# Patient Record
Sex: FEMALE | Race: WHITE | Employment: OTHER | URBAN - METROPOLITAN AREA
[De-identification: names, ages, dates, MRNs, and addresses within clinical notes are randomized per-mention and may not be internally consistent; named-entity substitution may affect disease eponyms.]

---

## 2017-02-10 ENCOUNTER — TELEPHONE (OUTPATIENT)
Dept: FAMILY MEDICINE CLINIC | Age: 67
End: 2017-02-10

## 2017-02-17 ENCOUNTER — OFFICE VISIT (OUTPATIENT)
Dept: FAMILY MEDICINE CLINIC | Age: 67
End: 2017-02-17

## 2017-02-17 VITALS
RESPIRATION RATE: 13 BRPM | WEIGHT: 165 LBS | HEART RATE: 102 BPM | OXYGEN SATURATION: 97 % | DIASTOLIC BLOOD PRESSURE: 72 MMHG | BODY MASS INDEX: 28.17 KG/M2 | SYSTOLIC BLOOD PRESSURE: 120 MMHG | HEIGHT: 64 IN

## 2017-02-17 DIAGNOSIS — Z87.898 H/O EPISTAXIS: ICD-10-CM

## 2017-02-17 DIAGNOSIS — J30.2 SEASONAL ALLERGIC RHINITIS, UNSPECIFIED ALLERGIC RHINITIS TRIGGER: Primary | ICD-10-CM

## 2017-02-17 PROCEDURE — G8399 PT W/DXA RESULTS DOCUMENT: HCPCS | Performed by: CLINICAL NURSE SPECIALIST

## 2017-02-17 PROCEDURE — 1036F TOBACCO NON-USER: CPT | Performed by: CLINICAL NURSE SPECIALIST

## 2017-02-17 PROCEDURE — 1090F PRES/ABSN URINE INCON ASSESS: CPT | Performed by: CLINICAL NURSE SPECIALIST

## 2017-02-17 PROCEDURE — G8420 CALC BMI NORM PARAMETERS: HCPCS | Performed by: CLINICAL NURSE SPECIALIST

## 2017-02-17 PROCEDURE — 3017F COLORECTAL CA SCREEN DOC REV: CPT | Performed by: CLINICAL NURSE SPECIALIST

## 2017-02-17 PROCEDURE — 4040F PNEUMOC VAC/ADMIN/RCVD: CPT | Performed by: CLINICAL NURSE SPECIALIST

## 2017-02-17 PROCEDURE — 3014F SCREEN MAMMO DOC REV: CPT | Performed by: CLINICAL NURSE SPECIALIST

## 2017-02-17 PROCEDURE — 99213 OFFICE O/P EST LOW 20 MIN: CPT | Performed by: CLINICAL NURSE SPECIALIST

## 2017-02-17 PROCEDURE — G8427 DOCREV CUR MEDS BY ELIG CLIN: HCPCS | Performed by: CLINICAL NURSE SPECIALIST

## 2017-02-17 PROCEDURE — 1123F ACP DISCUSS/DSCN MKR DOCD: CPT | Performed by: CLINICAL NURSE SPECIALIST

## 2017-02-17 PROCEDURE — G8484 FLU IMMUNIZE NO ADMIN: HCPCS | Performed by: CLINICAL NURSE SPECIALIST

## 2017-02-17 ASSESSMENT — ENCOUNTER SYMPTOMS
COUGH: 0
SORE THROAT: 0

## 2017-02-19 ASSESSMENT — ENCOUNTER SYMPTOMS
SINUS PRESSURE: 0
RHINORRHEA: 0
NAUSEA: 0
CHEST TIGHTNESS: 0
ABDOMINAL PAIN: 0
DIARRHEA: 0
SHORTNESS OF BREATH: 0
VOMITING: 0

## 2017-03-08 ENCOUNTER — HOSPITAL ENCOUNTER (OUTPATIENT)
Dept: GENERAL RADIOLOGY | Age: 67
Discharge: HOME | End: 2017-03-08

## 2017-03-08 ENCOUNTER — TELEPHONE (OUTPATIENT)
Dept: FAMILY MEDICINE CLINIC | Age: 67
End: 2017-03-08

## 2017-03-08 ENCOUNTER — OFFICE VISIT (OUTPATIENT)
Dept: FAMILY MEDICINE CLINIC | Age: 67
End: 2017-03-08

## 2017-03-08 VITALS
OXYGEN SATURATION: 99 % | HEART RATE: 91 BPM | DIASTOLIC BLOOD PRESSURE: 82 MMHG | TEMPERATURE: 98.3 F | SYSTOLIC BLOOD PRESSURE: 138 MMHG | WEIGHT: 164.2 LBS | BODY MASS INDEX: 28.18 KG/M2

## 2017-03-08 DIAGNOSIS — Z23 NEED FOR PNEUMOCOCCAL VACCINATION: ICD-10-CM

## 2017-03-08 DIAGNOSIS — F32.89 OTHER DEPRESSION: ICD-10-CM

## 2017-03-08 DIAGNOSIS — M85.80 OSTEOPENIA: ICD-10-CM

## 2017-03-08 DIAGNOSIS — E11.9 DIABETES MELLITUS WITHOUT COMPLICATION (HCC): Primary | ICD-10-CM

## 2017-03-08 DIAGNOSIS — I10 ESSENTIAL HYPERTENSION: ICD-10-CM

## 2017-03-08 DIAGNOSIS — J30.2 SEASONAL ALLERGIC RHINITIS, UNSPECIFIED ALLERGIC RHINITIS TRIGGER: ICD-10-CM

## 2017-03-08 LAB
A/G RATIO: 1.6 (ref 1.1–2.2)
ALBUMIN SERPL-MCNC: 4.5 G/DL (ref 3.4–5)
ALP BLD-CCNC: 119 U/L (ref 40–129)
ALT SERPL-CCNC: 31 U/L (ref 10–40)
ANION GAP SERPL CALCULATED.3IONS-SCNC: 15 MMOL/L (ref 3–16)
AST SERPL-CCNC: 23 U/L (ref 15–37)
BASOPHILS ABSOLUTE: 0 K/UL (ref 0–0.2)
BASOPHILS RELATIVE PERCENT: 0.7 %
BILIRUB SERPL-MCNC: 0.5 MG/DL (ref 0–1)
BILIRUBIN, POC: NORMAL
BLOOD URINE, POC: NORMAL
BUN BLDV-MCNC: 18 MG/DL (ref 7–20)
CALCIUM SERPL-MCNC: 10.1 MG/DL (ref 8.3–10.6)
CHLORIDE BLD-SCNC: 92 MMOL/L (ref 99–110)
CHOLESTEROL, TOTAL: 278 MG/DL (ref 0–199)
CK MB: 2.4 NG/ML
CLARITY, POC: CLEAR
CO2: 27 MMOL/L (ref 21–32)
COLOR, POC: YELLOW
CREAT SERPL-MCNC: 0.7 MG/DL (ref 0.6–1.2)
CREATININE URINE: 39 MG/DL (ref 28–259)
EOSINOPHILS ABSOLUTE: 0.1 K/UL (ref 0–0.6)
EOSINOPHILS RELATIVE PERCENT: 2.5 %
GFR AFRICAN AMERICAN: >60
GFR NON-AFRICAN AMERICAN: >60
GLOBULIN: 2.9 G/DL
GLUCOSE BLD-MCNC: 125 MG/DL (ref 70–99)
GLUCOSE BLD-MCNC: 148 MG/DL
GLUCOSE URINE, POC: NORMAL
HCT VFR BLD CALC: 37.6 % (ref 36–48)
HDLC SERPL-MCNC: 88 MG/DL (ref 40–60)
HEMOGLOBIN: 12.9 G/DL (ref 12–16)
KETONES, POC: NORMAL
LDL CHOLESTEROL CALCULATED: 168 MG/DL
LEUKOCYTE EST, POC: NORMAL
LYMPHOCYTES ABSOLUTE: 1.2 K/UL (ref 1–5.1)
LYMPHOCYTES RELATIVE PERCENT: 21.9 %
MCH RBC QN AUTO: 29.3 PG (ref 26–34)
MCHC RBC AUTO-ENTMCNC: 34.4 G/DL (ref 31–36)
MCV RBC AUTO: 85.4 FL (ref 80–100)
MICROALBUMIN UR-MCNC: <1.2 MG/DL
MICROALBUMIN/CREAT UR-RTO: NORMAL MG/G (ref 0–30)
MONOCYTES ABSOLUTE: 0.5 K/UL (ref 0–1.3)
MONOCYTES RELATIVE PERCENT: 8.2 %
NEUTROPHILS ABSOLUTE: 3.7 K/UL (ref 1.7–7.7)
NEUTROPHILS RELATIVE PERCENT: 66.7 %
NITRITE, POC: NORMAL
PDW BLD-RTO: 12.6 % (ref 12.4–15.4)
PH, POC: 7
PLATELET # BLD: 254 K/UL (ref 135–450)
PMV BLD AUTO: 8.4 FL (ref 5–10.5)
POTASSIUM SERPL-SCNC: 5 MMOL/L (ref 3.5–5.1)
PROTEIN, POC: NORMAL
RBC # BLD: 4.4 M/UL (ref 4–5.2)
SODIUM BLD-SCNC: 134 MMOL/L (ref 136–145)
SPECIFIC GRAVITY, POC: 1.01
TOTAL PROTEIN: 7.4 G/DL (ref 6.4–8.2)
TRIGL SERPL-MCNC: 111 MG/DL (ref 0–150)
UROBILINOGEN, POC: 0.2
VLDLC SERPL CALC-MCNC: 22 MG/DL
WBC # BLD: 5.5 K/UL (ref 4–11)

## 2017-03-08 PROCEDURE — 3014F SCREEN MAMMO DOC REV: CPT | Performed by: FAMILY MEDICINE

## 2017-03-08 PROCEDURE — 1123F ACP DISCUSS/DSCN MKR DOCD: CPT | Performed by: FAMILY MEDICINE

## 2017-03-08 PROCEDURE — G8399 PT W/DXA RESULTS DOCUMENT: HCPCS | Performed by: FAMILY MEDICINE

## 2017-03-08 PROCEDURE — 1036F TOBACCO NON-USER: CPT | Performed by: FAMILY MEDICINE

## 2017-03-08 PROCEDURE — G8484 FLU IMMUNIZE NO ADMIN: HCPCS | Performed by: FAMILY MEDICINE

## 2017-03-08 PROCEDURE — 90670 PCV13 VACCINE IM: CPT | Performed by: FAMILY MEDICINE

## 2017-03-08 PROCEDURE — G8420 CALC BMI NORM PARAMETERS: HCPCS | Performed by: FAMILY MEDICINE

## 2017-03-08 PROCEDURE — 81002 URINALYSIS NONAUTO W/O SCOPE: CPT | Performed by: FAMILY MEDICINE

## 2017-03-08 PROCEDURE — 99214 OFFICE O/P EST MOD 30 MIN: CPT | Performed by: FAMILY MEDICINE

## 2017-03-08 PROCEDURE — G0009 ADMIN PNEUMOCOCCAL VACCINE: HCPCS | Performed by: FAMILY MEDICINE

## 2017-03-08 PROCEDURE — 1090F PRES/ABSN URINE INCON ASSESS: CPT | Performed by: FAMILY MEDICINE

## 2017-03-08 PROCEDURE — 3017F COLORECTAL CA SCREEN DOC REV: CPT | Performed by: FAMILY MEDICINE

## 2017-03-08 PROCEDURE — 4040F PNEUMOC VAC/ADMIN/RCVD: CPT | Performed by: FAMILY MEDICINE

## 2017-03-08 PROCEDURE — 82962 GLUCOSE BLOOD TEST: CPT | Performed by: FAMILY MEDICINE

## 2017-03-08 PROCEDURE — G8427 DOCREV CUR MEDS BY ELIG CLIN: HCPCS | Performed by: FAMILY MEDICINE

## 2017-03-08 PROCEDURE — 3045F PR MOST RECENT HEMOGLOBIN A1C LEVEL 7.0-9.0%: CPT | Performed by: FAMILY MEDICINE

## 2017-03-08 RX ORDER — MELOXICAM 15 MG/1
15 TABLET ORAL DAILY
Qty: 90 TABLET | Refills: 0 | Status: SHIPPED | OUTPATIENT
Start: 2017-03-08 | End: 2017-06-09

## 2017-03-08 RX ORDER — VALSARTAN AND HYDROCHLOROTHIAZIDE 320; 25 MG/1; MG/1
1 TABLET, FILM COATED ORAL DAILY
Qty: 90 TABLET | Refills: 0 | Status: SHIPPED | OUTPATIENT
Start: 2017-03-08 | End: 2017-06-09 | Stop reason: SDUPTHER

## 2017-03-08 RX ORDER — OXYCODONE AND ACETAMINOPHEN 10; 325 MG/1; MG/1
TABLET ORAL
COMMUNITY
Start: 2017-03-04 | End: 2018-06-26

## 2017-03-08 RX ORDER — TRAZODONE HYDROCHLORIDE 100 MG/1
TABLET ORAL
Status: ON HOLD | COMMUNITY
Start: 2017-03-01 | End: 2018-07-06 | Stop reason: HOSPADM

## 2017-03-08 ASSESSMENT — ENCOUNTER SYMPTOMS
BLOOD IN STOOL: 0
CHEST TIGHTNESS: 0
RHINORRHEA: 1
COUGH: 0
ABDOMINAL PAIN: 0
SHORTNESS OF BREATH: 0

## 2017-03-09 LAB
ESTIMATED AVERAGE GLUCOSE: 168.6 MG/DL
HBA1C MFR BLD: 7.5 %

## 2017-06-09 ENCOUNTER — OFFICE VISIT (OUTPATIENT)
Dept: FAMILY MEDICINE CLINIC | Age: 67
End: 2017-06-09

## 2017-06-09 VITALS
TEMPERATURE: 98.4 F | WEIGHT: 160.8 LBS | OXYGEN SATURATION: 98 % | HEART RATE: 87 BPM | SYSTOLIC BLOOD PRESSURE: 124 MMHG | DIASTOLIC BLOOD PRESSURE: 70 MMHG | BODY MASS INDEX: 27.6 KG/M2

## 2017-06-09 DIAGNOSIS — F32.89 OTHER DEPRESSION: ICD-10-CM

## 2017-06-09 DIAGNOSIS — M54.50 CHRONIC BILATERAL LOW BACK PAIN WITHOUT SCIATICA: ICD-10-CM

## 2017-06-09 DIAGNOSIS — G89.29 CHRONIC BILATERAL LOW BACK PAIN WITHOUT SCIATICA: ICD-10-CM

## 2017-06-09 DIAGNOSIS — I10 ESSENTIAL HYPERTENSION: ICD-10-CM

## 2017-06-09 DIAGNOSIS — E11.9 DIABETES MELLITUS WITHOUT COMPLICATION (HCC): Primary | ICD-10-CM

## 2017-06-09 DIAGNOSIS — E78.49 OTHER HYPERLIPIDEMIA: ICD-10-CM

## 2017-06-09 LAB
BILIRUBIN, POC: NEGATIVE
BLOOD URINE, POC: NEGATIVE
CLARITY, POC: CLEAR
COLOR, POC: YELLOW
GLUCOSE BLD-MCNC: 143 MG/DL
GLUCOSE URINE, POC: NEGATIVE
KETONES, POC: NEGATIVE
LEUKOCYTE EST, POC: NEGATIVE
NITRITE, POC: NEGATIVE
PH, POC: 7
PROTEIN, POC: NEGATIVE
SPECIFIC GRAVITY, POC: 1.01
UROBILINOGEN, POC: 7

## 2017-06-09 PROCEDURE — 81002 URINALYSIS NONAUTO W/O SCOPE: CPT | Performed by: FAMILY MEDICINE

## 2017-06-09 PROCEDURE — 99214 OFFICE O/P EST MOD 30 MIN: CPT | Performed by: FAMILY MEDICINE

## 2017-06-09 PROCEDURE — 1123F ACP DISCUSS/DSCN MKR DOCD: CPT | Performed by: FAMILY MEDICINE

## 2017-06-09 PROCEDURE — G8419 CALC BMI OUT NRM PARAM NOF/U: HCPCS | Performed by: FAMILY MEDICINE

## 2017-06-09 PROCEDURE — 4040F PNEUMOC VAC/ADMIN/RCVD: CPT | Performed by: FAMILY MEDICINE

## 2017-06-09 PROCEDURE — 1090F PRES/ABSN URINE INCON ASSESS: CPT | Performed by: FAMILY MEDICINE

## 2017-06-09 PROCEDURE — G8427 DOCREV CUR MEDS BY ELIG CLIN: HCPCS | Performed by: FAMILY MEDICINE

## 2017-06-09 PROCEDURE — 1036F TOBACCO NON-USER: CPT | Performed by: FAMILY MEDICINE

## 2017-06-09 PROCEDURE — 3017F COLORECTAL CA SCREEN DOC REV: CPT | Performed by: FAMILY MEDICINE

## 2017-06-09 PROCEDURE — G8399 PT W/DXA RESULTS DOCUMENT: HCPCS | Performed by: FAMILY MEDICINE

## 2017-06-09 PROCEDURE — 3014F SCREEN MAMMO DOC REV: CPT | Performed by: FAMILY MEDICINE

## 2017-06-09 PROCEDURE — 82962 GLUCOSE BLOOD TEST: CPT | Performed by: FAMILY MEDICINE

## 2017-06-09 PROCEDURE — 3046F HEMOGLOBIN A1C LEVEL >9.0%: CPT | Performed by: FAMILY MEDICINE

## 2017-06-09 RX ORDER — VALSARTAN AND HYDROCHLOROTHIAZIDE 320; 25 MG/1; MG/1
1 TABLET, FILM COATED ORAL DAILY
Qty: 90 TABLET | Refills: 0 | Status: SHIPPED | OUTPATIENT
Start: 2017-06-09 | End: 2017-10-25 | Stop reason: SDUPTHER

## 2017-06-09 RX ORDER — MELOXICAM 7.5 MG/1
TABLET ORAL
Status: ON HOLD | COMMUNITY
Start: 2017-06-05 | End: 2018-07-06 | Stop reason: HOSPADM

## 2017-06-09 RX ORDER — TIZANIDINE 2 MG/1
TABLET ORAL
Status: ON HOLD | COMMUNITY
Start: 2017-06-05 | End: 2018-07-06 | Stop reason: HOSPADM

## 2017-06-09 RX ORDER — SERTRALINE HYDROCHLORIDE 100 MG/1
100 TABLET, FILM COATED ORAL 2 TIMES DAILY
COMMUNITY
Start: 2017-05-23 | End: 2019-06-12

## 2017-06-09 RX ORDER — ATORVASTATIN CALCIUM 20 MG/1
20 TABLET, FILM COATED ORAL DAILY
Qty: 30 TABLET | Refills: 0 | Status: SHIPPED | OUTPATIENT
Start: 2017-06-09 | End: 2017-07-14 | Stop reason: SDUPTHER

## 2017-06-09 ASSESSMENT — PATIENT HEALTH QUESTIONNAIRE - PHQ9
SUM OF ALL RESPONSES TO PHQ QUESTIONS 1-9: 0
2. FEELING DOWN, DEPRESSED OR HOPELESS: 0
SUM OF ALL RESPONSES TO PHQ9 QUESTIONS 1 & 2: 0
1. LITTLE INTEREST OR PLEASURE IN DOING THINGS: 0

## 2017-06-25 ASSESSMENT — ENCOUNTER SYMPTOMS
CHEST TIGHTNESS: 0
RHINORRHEA: 0
BLOOD IN STOOL: 0
COUGH: 0
SHORTNESS OF BREATH: 0
ABDOMINAL PAIN: 0

## 2017-07-17 RX ORDER — ATORVASTATIN CALCIUM 20 MG/1
20 TABLET, FILM COATED ORAL DAILY
Qty: 10 TABLET | Refills: 0 | Status: SHIPPED | OUTPATIENT
Start: 2017-07-17 | End: 2017-07-21 | Stop reason: SDUPTHER

## 2017-07-19 ENCOUNTER — HOSPITAL ENCOUNTER (OUTPATIENT)
Dept: GENERAL RADIOLOGY | Age: 67
Discharge: HOME | End: 2017-07-19

## 2017-07-19 LAB
A/G RATIO: 1.6 (ref 1.1–2.2)
ALBUMIN SERPL-MCNC: 4.6 G/DL (ref 3.4–5)
ALP BLD-CCNC: 146 U/L (ref 40–129)
ALT SERPL-CCNC: 78 U/L (ref 10–40)
ANION GAP SERPL CALCULATED.3IONS-SCNC: 16 MMOL/L (ref 3–16)
AST SERPL-CCNC: 31 U/L (ref 15–37)
BASOPHILS ABSOLUTE: 0 K/UL (ref 0–0.2)
BASOPHILS RELATIVE PERCENT: 0.6 %
BILIRUB SERPL-MCNC: 0.8 MG/DL (ref 0–1)
BUN BLDV-MCNC: 16 MG/DL (ref 7–20)
CALCIUM SERPL-MCNC: 10.1 MG/DL (ref 8.3–10.6)
CHLORIDE BLD-SCNC: 86 MMOL/L (ref 99–110)
CHOLESTEROL, TOTAL: 200 MG/DL (ref 0–199)
CO2: 27 MMOL/L (ref 21–32)
CREAT SERPL-MCNC: 0.7 MG/DL (ref 0.6–1.2)
EOSINOPHILS ABSOLUTE: 0.1 K/UL (ref 0–0.6)
EOSINOPHILS RELATIVE PERCENT: 2.2 %
GFR AFRICAN AMERICAN: >60
GFR NON-AFRICAN AMERICAN: >60
GLOBULIN: 2.9 G/DL
GLUCOSE BLD-MCNC: 167 MG/DL (ref 70–99)
HCT VFR BLD CALC: 39.6 % (ref 36–48)
HDLC SERPL-MCNC: 91 MG/DL (ref 40–60)
HEMOGLOBIN: 13.7 G/DL (ref 12–16)
LDL CHOLESTEROL CALCULATED: 93 MG/DL
LYMPHOCYTES ABSOLUTE: 0.7 K/UL (ref 1–5.1)
LYMPHOCYTES RELATIVE PERCENT: 12.7 %
MCH RBC QN AUTO: 29.2 PG (ref 26–34)
MCHC RBC AUTO-ENTMCNC: 34.5 G/DL (ref 31–36)
MCV RBC AUTO: 84.7 FL (ref 80–100)
MONOCYTES ABSOLUTE: 0.4 K/UL (ref 0–1.3)
MONOCYTES RELATIVE PERCENT: 7.6 %
NEUTROPHILS ABSOLUTE: 4.2 K/UL (ref 1.7–7.7)
NEUTROPHILS RELATIVE PERCENT: 76.9 %
PDW BLD-RTO: 13.3 % (ref 12.4–15.4)
PLATELET # BLD: 236 K/UL (ref 135–450)
PMV BLD AUTO: 8.8 FL (ref 5–10.5)
POTASSIUM SERPL-SCNC: 4.6 MMOL/L (ref 3.5–5.1)
RBC # BLD: 4.67 M/UL (ref 4–5.2)
SODIUM BLD-SCNC: 129 MMOL/L (ref 136–145)
TOTAL CK: 47 U/L (ref 26–192)
TOTAL PROTEIN: 7.5 G/DL (ref 6.4–8.2)
TRIGL SERPL-MCNC: 80 MG/DL (ref 0–150)
VLDLC SERPL CALC-MCNC: 16 MG/DL
WBC # BLD: 5.5 K/UL (ref 4–11)

## 2017-07-20 LAB
ESTIMATED AVERAGE GLUCOSE: 180 MG/DL
HBA1C MFR BLD: 7.9 %

## 2017-07-21 ENCOUNTER — OFFICE VISIT (OUTPATIENT)
Dept: FAMILY MEDICINE CLINIC | Age: 67
End: 2017-07-21

## 2017-07-21 VITALS
TEMPERATURE: 98.4 F | HEART RATE: 92 BPM | SYSTOLIC BLOOD PRESSURE: 130 MMHG | WEIGHT: 160.4 LBS | DIASTOLIC BLOOD PRESSURE: 60 MMHG | BODY MASS INDEX: 27.53 KG/M2 | OXYGEN SATURATION: 98 %

## 2017-07-21 DIAGNOSIS — E11.9 DIABETES MELLITUS WITHOUT COMPLICATION (HCC): ICD-10-CM

## 2017-07-21 DIAGNOSIS — I10 ESSENTIAL HYPERTENSION: ICD-10-CM

## 2017-07-21 DIAGNOSIS — E78.5 HYPERLIPIDEMIA, UNSPECIFIED HYPERLIPIDEMIA TYPE: Primary | ICD-10-CM

## 2017-07-21 DIAGNOSIS — R79.89 ELEVATED LFTS: ICD-10-CM

## 2017-07-21 LAB — GLUCOSE BLD-MCNC: 64 MG/DL

## 2017-07-21 PROCEDURE — 1090F PRES/ABSN URINE INCON ASSESS: CPT | Performed by: FAMILY MEDICINE

## 2017-07-21 PROCEDURE — G8419 CALC BMI OUT NRM PARAM NOF/U: HCPCS | Performed by: FAMILY MEDICINE

## 2017-07-21 PROCEDURE — G8399 PT W/DXA RESULTS DOCUMENT: HCPCS | Performed by: FAMILY MEDICINE

## 2017-07-21 PROCEDURE — 3017F COLORECTAL CA SCREEN DOC REV: CPT | Performed by: FAMILY MEDICINE

## 2017-07-21 PROCEDURE — 3014F SCREEN MAMMO DOC REV: CPT | Performed by: FAMILY MEDICINE

## 2017-07-21 PROCEDURE — 99213 OFFICE O/P EST LOW 20 MIN: CPT | Performed by: FAMILY MEDICINE

## 2017-07-21 PROCEDURE — 1036F TOBACCO NON-USER: CPT | Performed by: FAMILY MEDICINE

## 2017-07-21 PROCEDURE — G8427 DOCREV CUR MEDS BY ELIG CLIN: HCPCS | Performed by: FAMILY MEDICINE

## 2017-07-21 PROCEDURE — 3046F HEMOGLOBIN A1C LEVEL >9.0%: CPT | Performed by: FAMILY MEDICINE

## 2017-07-21 PROCEDURE — 4040F PNEUMOC VAC/ADMIN/RCVD: CPT | Performed by: FAMILY MEDICINE

## 2017-07-21 PROCEDURE — 82962 GLUCOSE BLOOD TEST: CPT | Performed by: FAMILY MEDICINE

## 2017-07-21 PROCEDURE — 1123F ACP DISCUSS/DSCN MKR DOCD: CPT | Performed by: FAMILY MEDICINE

## 2017-07-21 RX ORDER — ATORVASTATIN CALCIUM 20 MG/1
20 TABLET, FILM COATED ORAL DAILY
Qty: 90 TABLET | Refills: 0 | Status: SHIPPED | OUTPATIENT
Start: 2017-07-21 | End: 2017-10-25 | Stop reason: SDUPTHER

## 2017-07-21 ASSESSMENT — PATIENT HEALTH QUESTIONNAIRE - PHQ9
SUM OF ALL RESPONSES TO PHQ9 QUESTIONS 1 & 2: 0
SUM OF ALL RESPONSES TO PHQ QUESTIONS 1-9: 0
1. LITTLE INTEREST OR PLEASURE IN DOING THINGS: 0
2. FEELING DOWN, DEPRESSED OR HOPELESS: 0

## 2017-08-13 ASSESSMENT — ENCOUNTER SYMPTOMS
SHORTNESS OF BREATH: 0
COUGH: 0
BLOOD IN STOOL: 0
CHEST TIGHTNESS: 0
ABDOMINAL PAIN: 0

## 2017-10-24 ENCOUNTER — HOSPITAL ENCOUNTER (OUTPATIENT)
Dept: GENERAL RADIOLOGY | Age: 67
Discharge: HOME | End: 2017-10-24

## 2017-10-24 LAB
ALBUMIN SERPL-MCNC: 4.3 G/DL (ref 3.4–5)
ALP BLD-CCNC: 109 U/L (ref 40–129)
ALT SERPL-CCNC: 12 U/L (ref 10–40)
AST SERPL-CCNC: 21 U/L (ref 15–37)
BILIRUB SERPL-MCNC: 0.5 MG/DL (ref 0–1)
BILIRUBIN DIRECT: <0.2 MG/DL (ref 0–0.3)
BILIRUBIN, INDIRECT: NORMAL MG/DL (ref 0–1)
TOTAL PROTEIN: 7 G/DL (ref 6.4–8.2)

## 2017-10-25 ENCOUNTER — OFFICE VISIT (OUTPATIENT)
Dept: FAMILY MEDICINE CLINIC | Age: 67
End: 2017-10-25

## 2017-10-25 ENCOUNTER — TELEPHONE (OUTPATIENT)
Dept: FAMILY MEDICINE CLINIC | Age: 67
End: 2017-10-25

## 2017-10-25 VITALS
SYSTOLIC BLOOD PRESSURE: 138 MMHG | OXYGEN SATURATION: 98 % | WEIGHT: 157.2 LBS | HEART RATE: 89 BPM | BODY MASS INDEX: 26.98 KG/M2 | DIASTOLIC BLOOD PRESSURE: 80 MMHG | TEMPERATURE: 98 F

## 2017-10-25 DIAGNOSIS — K85.10 GALLSTONE PANCREATITIS: ICD-10-CM

## 2017-10-25 DIAGNOSIS — T39.395A DIABETES MELLITUS DUE TO NON-STEROID DRUG WITHOUT COMPLICATION (HCC): ICD-10-CM

## 2017-10-25 DIAGNOSIS — I10 ESSENTIAL HYPERTENSION: ICD-10-CM

## 2017-10-25 DIAGNOSIS — F32.89 OTHER DEPRESSION: ICD-10-CM

## 2017-10-25 DIAGNOSIS — K85.10 ACUTE BILIARY PANCREATITIS, UNSPECIFIED COMPLICATION STATUS: ICD-10-CM

## 2017-10-25 DIAGNOSIS — E78.5 HYPERLIPIDEMIA, UNSPECIFIED HYPERLIPIDEMIA TYPE: ICD-10-CM

## 2017-10-25 DIAGNOSIS — M85.80 OSTEOPENIA, UNSPECIFIED LOCATION: ICD-10-CM

## 2017-10-25 DIAGNOSIS — E09.9 DIABETES MELLITUS DUE TO NON-STEROID DRUG WITHOUT COMPLICATION (HCC): ICD-10-CM

## 2017-10-25 DIAGNOSIS — T39.395A DIABETES MELLITUS DUE TO NON-STEROID DRUG WITHOUT COMPLICATION (HCC): Primary | ICD-10-CM

## 2017-10-25 DIAGNOSIS — Z23 NEED FOR INFLUENZA VACCINATION: ICD-10-CM

## 2017-10-25 DIAGNOSIS — E09.9 DIABETES MELLITUS DUE TO NON-STEROID DRUG WITHOUT COMPLICATION (HCC): Primary | ICD-10-CM

## 2017-10-25 PROBLEM — K85.90 ACUTE PANCREATITIS: Status: ACTIVE | Noted: 2017-09-07

## 2017-10-25 PROBLEM — E87.6 HYPOKALEMIA: Status: ACTIVE | Noted: 2017-09-07

## 2017-10-25 PROBLEM — M19.90 OSTEOARTHROSIS: Status: ACTIVE | Noted: 2017-09-07

## 2017-10-25 PROBLEM — K83.8 DILATED CBD, ACQUIRED: Status: ACTIVE | Noted: 2017-09-07

## 2017-10-25 LAB
A/G RATIO: 1.5 (ref 1.1–2.2)
ALBUMIN SERPL-MCNC: 4.3 G/DL (ref 3.4–5)
ALP BLD-CCNC: 113 U/L (ref 40–129)
ALT SERPL-CCNC: 13 U/L (ref 10–40)
AMYLASE: 80 U/L (ref 25–115)
ANION GAP SERPL CALCULATED.3IONS-SCNC: 20 MMOL/L (ref 3–16)
AST SERPL-CCNC: 24 U/L (ref 15–37)
BILIRUB SERPL-MCNC: 0.5 MG/DL (ref 0–1)
BILIRUBIN, POC: NORMAL
BLOOD URINE, POC: NORMAL
BUN BLDV-MCNC: 13 MG/DL (ref 7–20)
CALCIUM SERPL-MCNC: 9.8 MG/DL (ref 8.3–10.6)
CHLORIDE BLD-SCNC: 91 MMOL/L (ref 99–110)
CHOLESTEROL, TOTAL: 187 MG/DL (ref 0–199)
CLARITY, POC: CLEAR
CO2: 23 MMOL/L (ref 21–32)
COLOR, POC: YELLOW
CREAT SERPL-MCNC: 0.8 MG/DL (ref 0.6–1.2)
GFR AFRICAN AMERICAN: >60
GFR NON-AFRICAN AMERICAN: >60
GLOBULIN: 2.8 G/DL
GLUCOSE BLD-MCNC: 221 MG/DL
GLUCOSE BLD-MCNC: 278 MG/DL (ref 70–99)
GLUCOSE URINE, POC: NORMAL
HDLC SERPL-MCNC: 95 MG/DL (ref 40–60)
KETONES, POC: NORMAL
LDL CHOLESTEROL CALCULATED: 76 MG/DL
LEUKOCYTE EST, POC: NORMAL
LIPASE: 15 U/L (ref 13–60)
NITRITE, POC: NORMAL
PH, POC: 7
POTASSIUM SERPL-SCNC: 5.3 MMOL/L (ref 3.5–5.1)
PROTEIN, POC: NORMAL
SODIUM BLD-SCNC: 134 MMOL/L (ref 136–145)
SPECIFIC GRAVITY, POC: 1.01
TOTAL CK: 201 U/L (ref 26–192)
TOTAL PROTEIN: 7.1 G/DL (ref 6.4–8.2)
TRIGL SERPL-MCNC: 81 MG/DL (ref 0–150)
UROBILINOGEN, POC: 0.2
VLDLC SERPL CALC-MCNC: 16 MG/DL

## 2017-10-25 PROCEDURE — 90662 IIV NO PRSV INCREASED AG IM: CPT | Performed by: FAMILY MEDICINE

## 2017-10-25 PROCEDURE — G0008 ADMIN INFLUENZA VIRUS VAC: HCPCS | Performed by: FAMILY MEDICINE

## 2017-10-25 PROCEDURE — G8399 PT W/DXA RESULTS DOCUMENT: HCPCS | Performed by: FAMILY MEDICINE

## 2017-10-25 PROCEDURE — G8484 FLU IMMUNIZE NO ADMIN: HCPCS | Performed by: FAMILY MEDICINE

## 2017-10-25 PROCEDURE — 82962 GLUCOSE BLOOD TEST: CPT | Performed by: FAMILY MEDICINE

## 2017-10-25 PROCEDURE — 81002 URINALYSIS NONAUTO W/O SCOPE: CPT | Performed by: FAMILY MEDICINE

## 2017-10-25 PROCEDURE — 1123F ACP DISCUSS/DSCN MKR DOCD: CPT | Performed by: FAMILY MEDICINE

## 2017-10-25 PROCEDURE — 3017F COLORECTAL CA SCREEN DOC REV: CPT | Performed by: FAMILY MEDICINE

## 2017-10-25 PROCEDURE — G8417 CALC BMI ABV UP PARAM F/U: HCPCS | Performed by: FAMILY MEDICINE

## 2017-10-25 PROCEDURE — G8427 DOCREV CUR MEDS BY ELIG CLIN: HCPCS | Performed by: FAMILY MEDICINE

## 2017-10-25 PROCEDURE — 3014F SCREEN MAMMO DOC REV: CPT | Performed by: FAMILY MEDICINE

## 2017-10-25 PROCEDURE — 1036F TOBACCO NON-USER: CPT | Performed by: FAMILY MEDICINE

## 2017-10-25 PROCEDURE — 4040F PNEUMOC VAC/ADMIN/RCVD: CPT | Performed by: FAMILY MEDICINE

## 2017-10-25 PROCEDURE — 99214 OFFICE O/P EST MOD 30 MIN: CPT | Performed by: FAMILY MEDICINE

## 2017-10-25 PROCEDURE — 1090F PRES/ABSN URINE INCON ASSESS: CPT | Performed by: FAMILY MEDICINE

## 2017-10-25 RX ORDER — VALSARTAN AND HYDROCHLOROTHIAZIDE 320; 25 MG/1; MG/1
1 TABLET, FILM COATED ORAL DAILY
Qty: 90 TABLET | Refills: 0 | Status: SHIPPED | OUTPATIENT
Start: 2017-10-25 | End: 2018-01-24 | Stop reason: SDUPTHER

## 2017-10-25 RX ORDER — ATORVASTATIN CALCIUM 20 MG/1
20 TABLET, FILM COATED ORAL DAILY
Qty: 90 TABLET | Refills: 0 | Status: SHIPPED | OUTPATIENT
Start: 2017-10-25 | End: 2018-01-24 | Stop reason: SDUPTHER

## 2017-10-25 NOTE — PROGRESS NOTES
Vaccine Information Sheet, \"Influenza - Inactivated\"  given to Bhavna Marroquin, or parent/legal guardian of  Bhavna Marroquin and verbalized understanding. Patient responses:    Have you ever had a reaction to a flu vaccine? No  Are you able to eat eggs without adverse effects? Yes  Do you have any current illness? No  Have you ever had Guillian Hallock Syndrome? No    Flu vaccine given per order. Please see immunization tab.

## 2017-10-30 ASSESSMENT — ENCOUNTER SYMPTOMS
BLOOD IN STOOL: 0
CHEST TIGHTNESS: 0
ABDOMINAL PAIN: 0
SHORTNESS OF BREATH: 0
DIARRHEA: 0
COUGH: 0
VOMITING: 0
NAUSEA: 0

## 2017-10-30 NOTE — PROGRESS NOTES
status: Never Smoker    Smokeless tobacco: Not on file    Alcohol use No    Drug use: No    Sexual activity: Not on file     Other Topics Concern    Not on file     Social History Narrative    ** Merged History Encounter **          Family History   Problem Relation Age of Onset    Cancer Mother      melanoma    Diabetes Father        Review of Systems   Constitutional: Negative for activity change, appetite change and unexpected weight change. Respiratory: Negative for cough, chest tightness and shortness of breath. Cardiovascular: Negative for chest pain, palpitations and leg swelling. Gastrointestinal: Negative for abdominal pain, blood in stool, diarrhea, nausea and vomiting. Endocrine: Negative for cold intolerance and heat intolerance. Musculoskeletal: Negative for arthralgias and myalgias. Skin: Negative for rash. Neurological: Negative for light-headedness and headaches. Hematological: Negative for adenopathy. Does not bruise/bleed easily. Psychiatric/Behavioral: Negative for dysphoric mood, sleep disturbance and suicidal ideas. The patient is not nervous/anxious. OBJECTIVE:  /80 (Site: Left Arm, Position: Sitting, Cuff Size: Medium Adult)   Pulse 89   Temp 98 °F (36.7 °C) (Oral)   Wt 157 lb 3.2 oz (71.3 kg)   SpO2 98%   BMI 26.98 kg/m²   Physical Exam   Constitutional: She is oriented to person, place, and time. She appears well-developed and well-nourished. HENT:   Right Ear: External ear normal.   Left Ear: External ear normal.   Nose: Nose normal.   Mouth/Throat: Oropharynx is clear and moist.   Eyes: EOM are normal. Pupils are equal, round, and reactive to light. Neck: Normal range of motion. Neck supple. No JVD present. No thyromegaly present. Cardiovascular: Normal rate and regular rhythm. Pulmonary/Chest: Effort normal and breath sounds normal.   Abdominal: Soft. Bowel sounds are normal. There is no tenderness. There is no rebound and no guarding. Lymphadenopathy:     She has no cervical adenopathy. Neurological: She is alert and oriented to person, place, and time. No cranial nerve deficit. Skin: No rash noted. Psychiatric: She has a normal mood and affect. Her behavior is normal. Thought content normal.   Nursing note and vitals reviewed. ASSESSMENT/PLAN:    1. Diabetes mellitus due to non-steroid drug without complication ([address]  Refill meds  Discussed appr diet mgt  Labs  Home ambulatory BS checks advised  - POCT Glucose  - CBC Auto Differential; Future  - Lipid Panel; Future  - Hemoglobin A1C; Future  - POCT Urinalysis no Micro    2. Other depression  Reviewed current mgt  Continue fu with psych     3. Essential hypertension  Refill meds  Intermittent ambulatory BP checks  - Comprehensive Metabolic Panel; Future  - CK; Future    4. Osteopenia, unspecified location  Vit D, increase dietary Ca    5. Gallstone pancreatitis  Reviewed recent hospitalization and tx  Re check labs    6. Need for influenza vaccination    - INFLUENZA, HIGH DOSE, 65 YRS +, IM, PF, PREFILL SYR, 0.5ML (FLUZONE HD)    7.  Hyperlipidemia, unspecified hyperlipidemia type  Continue Lipitor  Diet mgt  labs      Orders Placed This Encounter   Procedures    INFLUENZA, HIGH DOSE, 65 YRS +, IM, PF, PREFILL SYR, 0.5ML (FLUZONE HD)    CBC Auto Differential     Standing Status:   Future     Standing Expiration Date:   11/14/2018    Lipid Panel     Standing Status:   Future     Number of Occurrences:   1     Standing Expiration Date:   11/14/2018     Order Specific Question:   Is Patient Fasting?/# of Hours     Answer:   10 hrs    Hemoglobin A1C     Standing Status:   Future     Standing Expiration Date:   11/14/2018    Comprehensive Metabolic Panel     Standing Status:   Future     Number of Occurrences:   1     Standing Expiration Date:   11/14/2018    CK     Standing Status:   Future     Number of Occurrences:   1     Standing Expiration Date:   11/14/2018    AMYLASE

## 2017-11-07 DIAGNOSIS — E87.5 HYPERKALEMIA: ICD-10-CM

## 2018-01-04 ENCOUNTER — TELEPHONE (OUTPATIENT)
Dept: FAMILY MEDICINE CLINIC | Age: 68
End: 2018-01-04

## 2018-01-08 NOTE — TELEPHONE ENCOUNTER
Called and talked with pt to see if she would like to schedule appt for her wrist pain.  Pt states she will give the office a call back when she has time so she can look at her schedule before she makes the appt

## 2018-01-24 ENCOUNTER — OFFICE VISIT (OUTPATIENT)
Dept: FAMILY MEDICINE CLINIC | Age: 68
End: 2018-01-24

## 2018-01-24 VITALS
DIASTOLIC BLOOD PRESSURE: 68 MMHG | BODY MASS INDEX: 26.95 KG/M2 | SYSTOLIC BLOOD PRESSURE: 110 MMHG | WEIGHT: 157 LBS | HEART RATE: 77 BPM | TEMPERATURE: 98.2 F | OXYGEN SATURATION: 98 %

## 2018-01-24 DIAGNOSIS — I10 ESSENTIAL HYPERTENSION: ICD-10-CM

## 2018-01-24 DIAGNOSIS — E78.5 HYPERLIPIDEMIA, UNSPECIFIED HYPERLIPIDEMIA TYPE: ICD-10-CM

## 2018-01-24 DIAGNOSIS — Z12.39 BREAST CANCER SCREENING: ICD-10-CM

## 2018-01-24 DIAGNOSIS — E10.9 TYPE 1 DIABETES MELLITUS WITHOUT COMPLICATION (HCC): Primary | ICD-10-CM

## 2018-01-24 DIAGNOSIS — G56.03 BILATERAL CARPAL TUNNEL SYNDROME: ICD-10-CM

## 2018-01-24 DIAGNOSIS — F32.89 OTHER DEPRESSION: ICD-10-CM

## 2018-01-24 LAB
BILIRUBIN, POC: NORMAL
BLOOD URINE, POC: NORMAL
CLARITY, POC: NORMAL
COLOR, POC: YELLOW
GLUCOSE BLD-MCNC: 149 MG/DL
GLUCOSE URINE, POC: NORMAL
KETONES, POC: NORMAL
LEUKOCYTE EST, POC: NORMAL
NITRITE, POC: NORMAL
PH, POC: 6
PROTEIN, POC: NORMAL
SPECIFIC GRAVITY, POC: 1.01
UROBILINOGEN, POC: 0.2

## 2018-01-24 PROCEDURE — 3017F COLORECTAL CA SCREEN DOC REV: CPT | Performed by: FAMILY MEDICINE

## 2018-01-24 PROCEDURE — G8427 DOCREV CUR MEDS BY ELIG CLIN: HCPCS | Performed by: FAMILY MEDICINE

## 2018-01-24 PROCEDURE — 1090F PRES/ABSN URINE INCON ASSESS: CPT | Performed by: FAMILY MEDICINE

## 2018-01-24 PROCEDURE — 1123F ACP DISCUSS/DSCN MKR DOCD: CPT | Performed by: FAMILY MEDICINE

## 2018-01-24 PROCEDURE — 3014F SCREEN MAMMO DOC REV: CPT | Performed by: FAMILY MEDICINE

## 2018-01-24 PROCEDURE — G8484 FLU IMMUNIZE NO ADMIN: HCPCS | Performed by: FAMILY MEDICINE

## 2018-01-24 PROCEDURE — 99214 OFFICE O/P EST MOD 30 MIN: CPT | Performed by: FAMILY MEDICINE

## 2018-01-24 PROCEDURE — 4040F PNEUMOC VAC/ADMIN/RCVD: CPT | Performed by: FAMILY MEDICINE

## 2018-01-24 PROCEDURE — 81002 URINALYSIS NONAUTO W/O SCOPE: CPT | Performed by: FAMILY MEDICINE

## 2018-01-24 PROCEDURE — 82962 GLUCOSE BLOOD TEST: CPT | Performed by: FAMILY MEDICINE

## 2018-01-24 PROCEDURE — 1036F TOBACCO NON-USER: CPT | Performed by: FAMILY MEDICINE

## 2018-01-24 PROCEDURE — 3046F HEMOGLOBIN A1C LEVEL >9.0%: CPT | Performed by: FAMILY MEDICINE

## 2018-01-24 PROCEDURE — G8417 CALC BMI ABV UP PARAM F/U: HCPCS | Performed by: FAMILY MEDICINE

## 2018-01-24 PROCEDURE — G8399 PT W/DXA RESULTS DOCUMENT: HCPCS | Performed by: FAMILY MEDICINE

## 2018-01-24 RX ORDER — ATORVASTATIN CALCIUM 20 MG/1
20 TABLET, FILM COATED ORAL DAILY
Qty: 90 TABLET | Refills: 0 | Status: SHIPPED | OUTPATIENT
Start: 2018-01-24 | End: 2018-05-23 | Stop reason: SDUPTHER

## 2018-01-24 RX ORDER — VALSARTAN AND HYDROCHLOROTHIAZIDE 320; 25 MG/1; MG/1
1 TABLET, FILM COATED ORAL DAILY
Qty: 90 TABLET | Refills: 0 | Status: SHIPPED | OUTPATIENT
Start: 2018-01-24 | End: 2018-05-23 | Stop reason: SDUPTHER

## 2018-01-24 NOTE — PROGRESS NOTES
MG per tablet Take 1 tablet by mouth daily 90 tablet 0    insulin NPH (NOVOLIN N) 100 UNIT/ML injection vial INJECT 45 UNITS UNDER THE SKIN EVERY MORNING AND INJECT 15 UNITS EVERY EVENING 2 vial 2    insulin regular (HUMULIN R;NOVOLIN R) 100 UNIT/ML injection Inject 5 Units into the skin See Admin Instructions 1 vial 2    Needles & Syringes MISC Inject 1 each into the skin 3 times daily 100 each 3    atorvastatin (LIPITOR) 20 MG tablet Take 1 tablet by mouth daily 90 tablet 0    VOLTAREN 1 % GEL       meloxicam (MOBIC) 7.5 MG tablet       sertraline (ZOLOFT) 100 MG tablet       tiZANidine (ZANAFLEX) 2 MG tablet       oxyCODONE-acetaminophen (PERCOCET)  MG per tablet       traZODone (DESYREL) 100 MG tablet       lidocaine (LIDODERM) 5 % 2-3 patches daily within 12-24 hr period 30 patch 2     No current facility-administered medications for this visit. Return in about 3 months (around 4/24/2018), or if symptoms worsen or fail to improve, for diabetes.     Rubina Faye MD

## 2018-01-27 ASSESSMENT — ENCOUNTER SYMPTOMS
SHORTNESS OF BREATH: 0
COUGH: 0
ABDOMINAL PAIN: 0
BLOOD IN STOOL: 0
CHEST TIGHTNESS: 0

## 2018-01-30 ENCOUNTER — HOSPITAL ENCOUNTER (OUTPATIENT)
Dept: NEUROLOGY | Age: 68
Discharge: HOME | End: 2018-01-30
Attending: FAMILY MEDICINE | Admitting: FAMILY MEDICINE

## 2018-01-30 DIAGNOSIS — G56.03 BILATERAL CARPAL TUNNEL SYNDROME: ICD-10-CM

## 2018-01-30 NOTE — PROCEDURES
Right PronatorTeres Median C6-7 Nml Nml Nml Nml Nml 0 Nml Nml    Right Ext Indicis Radial (Post Int) C7-8 Nml Nml Nml Nml Nml 0 Nml Nml    Right 1stDorInt Ulnar C8-T1 Nml Nml Nml Nml Nml 0 Nml Nml    Right Abd Poll Brev Median C8-T1 Nml Nml Nml Nml Nml 0 Nml Nml    Right Cervical Parasp Up Rami C1-3 Nml Nml Nml         Right Cervical Parasp Mid Rami C4-6 Nml Nml Nml         Right Cervical Parasp Low Rami C7-8 Nml Nml Nml         Left Deltoid Axillary C5-6 Nml Nml Nml Nml Nml 0 Nml Nml    Left Biceps Musculocut C5-6 Nml Nml Nml Nml Nml 0 Nml Nml    Left Triceps Radial C6-7-8 Nml Nml Nml Nml Nml 0 Nml Nml    Left BrachioRad Radial C5-6 Nml Nml Nml Nml Nml 0 Nml Nml    Left PronatorTeres Median C6-7 Nml Nml Nml Nml Nml 0 Nml Nml    Left Ext Indicis Radial (Post Int) C7-8 Nml Nml Nml Nml Nml 0 Nml Nml    Left 1stDorInt Ulnar C8-T1 Nml Nml Nml Nml Nml 0 Nml Nml    Left Abd Poll Brev Median C8-T1 Nml Nml Nml Nml Nml 0 Nml Nml    Left Cervical Parasp Up Rami C1-3 Nml Nml Nml         Left Cervical Parasp Mid Rami C4-6 Nml Nml Nml         Left Cervical Parasp Low Rami C7-8 Nml Nml Nml           Electronically signed by Flaca Kent DO on 1/30/2018 at 11:29 AM

## 2018-02-27 ENCOUNTER — TELEPHONE (OUTPATIENT)
Dept: FAMILY MEDICINE CLINIC | Age: 68
End: 2018-02-27

## 2018-02-27 ENCOUNTER — OFFICE VISIT (OUTPATIENT)
Dept: FAMILY MEDICINE CLINIC | Age: 68
End: 2018-02-27

## 2018-02-27 VITALS
BODY MASS INDEX: 26.85 KG/M2 | TEMPERATURE: 98.4 F | WEIGHT: 156.4 LBS | DIASTOLIC BLOOD PRESSURE: 68 MMHG | OXYGEN SATURATION: 98 % | SYSTOLIC BLOOD PRESSURE: 116 MMHG | HEART RATE: 80 BPM

## 2018-02-27 DIAGNOSIS — J11.1 INFLUENZA: Primary | ICD-10-CM

## 2018-02-27 DIAGNOSIS — J06.9 VIRAL URI: ICD-10-CM

## 2018-02-27 PROCEDURE — 4040F PNEUMOC VAC/ADMIN/RCVD: CPT | Performed by: FAMILY MEDICINE

## 2018-02-27 PROCEDURE — G8399 PT W/DXA RESULTS DOCUMENT: HCPCS | Performed by: FAMILY MEDICINE

## 2018-02-27 PROCEDURE — G8417 CALC BMI ABV UP PARAM F/U: HCPCS | Performed by: FAMILY MEDICINE

## 2018-02-27 PROCEDURE — 1123F ACP DISCUSS/DSCN MKR DOCD: CPT | Performed by: FAMILY MEDICINE

## 2018-02-27 PROCEDURE — 3014F SCREEN MAMMO DOC REV: CPT | Performed by: FAMILY MEDICINE

## 2018-02-27 PROCEDURE — 99213 OFFICE O/P EST LOW 20 MIN: CPT | Performed by: FAMILY MEDICINE

## 2018-02-27 PROCEDURE — 1036F TOBACCO NON-USER: CPT | Performed by: FAMILY MEDICINE

## 2018-02-27 PROCEDURE — 1090F PRES/ABSN URINE INCON ASSESS: CPT | Performed by: FAMILY MEDICINE

## 2018-02-27 PROCEDURE — G8427 DOCREV CUR MEDS BY ELIG CLIN: HCPCS | Performed by: FAMILY MEDICINE

## 2018-02-27 PROCEDURE — 3017F COLORECTAL CA SCREEN DOC REV: CPT | Performed by: FAMILY MEDICINE

## 2018-02-27 PROCEDURE — G8484 FLU IMMUNIZE NO ADMIN: HCPCS | Performed by: FAMILY MEDICINE

## 2018-02-27 RX ORDER — AMOXICILLIN 875 MG/1
TABLET, COATED ORAL
COMMUNITY
Start: 2018-02-19 | End: 2018-04-05 | Stop reason: ALTCHOICE

## 2018-02-27 RX ORDER — HYDROCODONE POLISTIREX AND CHLORPHENIRAMINE POLISTIREX 10; 8 MG/5ML; MG/5ML
5 SUSPENSION, EXTENDED RELEASE ORAL EVERY 12 HOURS PRN
Qty: 70 ML | Refills: 0 | Status: SHIPPED | OUTPATIENT
Start: 2018-02-27 | End: 2018-03-06

## 2018-02-27 NOTE — TELEPHONE ENCOUNTER
Patient at check out wanted to discuss issue with cancelling same day sick visit and scheduling of appointment. Will discuss with .

## 2018-02-28 NOTE — TELEPHONE ENCOUNTER
Phone patient had to leave a message. Spoke with office mgr. She will be in meetings today but wanted me to tell patient we will remove no show. Advised about cancelling earliest possible to give another patient opportunity to be seen. Also, mgr to discuss issue with  with her supervisor and additional training.

## 2018-02-28 NOTE — PROGRESS NOTES
Subjective:       History was provided by the patient. Lucila Cabral is a 69 y.o. female who presents for evaluation of symptoms of a URI. Symptoms include chest congestion, dry cough, nasal blockage, post nasal drip and sore throat. Onset of symptoms was 1 week ago, gradually improving since that time. Associated symptoms include no  fever and non productive cough. She is drinking moderate amounts of fluids. Evaluation to date: was seen previously and dx with influenza and strep. Treatment to date: antibiotic: Amoxil. was not given a tx for the flu because she was 3 d into sx. Reports everything is improved except still feeling tired and has a non productive cough. No SOB. Appetite is returning. ST has resolved. reports BS have been good. Patient's medications, allergies, past medical, surgical, social and family histories were reviewed and updated as appropriate. Review of Systems  Pertinent items are noted in HPI.       Objective:      /68 (Site: Right Arm, Position: Sitting, Cuff Size: Medium Adult)   Pulse 80   Temp 98.4 °F (36.9 °C) (Oral)   Wt 156 lb 6.4 oz (70.9 kg)   SpO2 98%   BMI 26.85 kg/m²   General appearance: alert, appears stated age and cooperative  Ears: normal TM's and external ear canals both ears  Throat: lips, mucosa, and tongue normal; teeth and gums normal  Neck: no adenopathy, supple, symmetrical, trachea midline and thyroid not enlarged, symmetric, no tenderness/mass/nodules  Lungs: clear to auscultation bilaterally  Heart: regularly irregular rhythm  Abdomen: soft, non-tender; bowel sounds normal; no masses,  no organomegaly         Assessment:      viral upper respiratory illness, influenza and strep pharyngitis      Plan:       continue sx tx  Increase fluid intake  Add Tessalon Perles  Watch BS closely  Re evaluate if fever returns,SOB, or sx persist

## 2018-03-02 ENCOUNTER — TELEPHONE (OUTPATIENT)
Dept: FAMILY MEDICINE CLINIC | Age: 68
End: 2018-03-02

## 2018-03-02 DIAGNOSIS — J11.1 INFLUENZA: Primary | ICD-10-CM

## 2018-03-02 RX ORDER — CODEINE PHOSPHATE AND GUAIFENESIN 10; 100 MG/5ML; MG/5ML
5 SOLUTION ORAL 2 TIMES DAILY PRN
Qty: 70 ML | Refills: 0 | Status: SHIPPED | OUTPATIENT
Start: 2018-03-02 | End: 2018-03-09

## 2018-04-02 ENCOUNTER — OFFICE VISIT (OUTPATIENT)
Dept: ORTHOPEDIC SURGERY | Age: 68
End: 2018-04-02

## 2018-04-02 VITALS
BODY MASS INDEX: 26.69 KG/M2 | DIASTOLIC BLOOD PRESSURE: 79 MMHG | SYSTOLIC BLOOD PRESSURE: 134 MMHG | WEIGHT: 156.31 LBS | HEIGHT: 64 IN | HEART RATE: 101 BPM

## 2018-04-02 DIAGNOSIS — G56.22 ULNAR NEUROPATHY AT ELBOW, LEFT: ICD-10-CM

## 2018-04-02 DIAGNOSIS — G56.03 BILATERAL CARPAL TUNNEL SYNDROME: ICD-10-CM

## 2018-04-02 PROCEDURE — 1123F ACP DISCUSS/DSCN MKR DOCD: CPT | Performed by: ORTHOPAEDIC SURGERY

## 2018-04-02 PROCEDURE — 1090F PRES/ABSN URINE INCON ASSESS: CPT | Performed by: ORTHOPAEDIC SURGERY

## 2018-04-02 PROCEDURE — 3014F SCREEN MAMMO DOC REV: CPT | Performed by: ORTHOPAEDIC SURGERY

## 2018-04-02 PROCEDURE — G8427 DOCREV CUR MEDS BY ELIG CLIN: HCPCS | Performed by: ORTHOPAEDIC SURGERY

## 2018-04-02 PROCEDURE — 3017F COLORECTAL CA SCREEN DOC REV: CPT | Performed by: ORTHOPAEDIC SURGERY

## 2018-04-02 PROCEDURE — 1036F TOBACCO NON-USER: CPT | Performed by: ORTHOPAEDIC SURGERY

## 2018-04-02 PROCEDURE — 4040F PNEUMOC VAC/ADMIN/RCVD: CPT | Performed by: ORTHOPAEDIC SURGERY

## 2018-04-02 PROCEDURE — G8417 CALC BMI ABV UP PARAM F/U: HCPCS | Performed by: ORTHOPAEDIC SURGERY

## 2018-04-02 PROCEDURE — G8399 PT W/DXA RESULTS DOCUMENT: HCPCS | Performed by: ORTHOPAEDIC SURGERY

## 2018-04-02 PROCEDURE — 99203 OFFICE O/P NEW LOW 30 MIN: CPT | Performed by: ORTHOPAEDIC SURGERY

## 2018-04-03 ENCOUNTER — TELEPHONE (OUTPATIENT)
Dept: ORTHOPEDIC SURGERY | Age: 68
End: 2018-04-03

## 2018-04-04 ENCOUNTER — OFFICE VISIT (OUTPATIENT)
Dept: FAMILY MEDICINE CLINIC | Age: 68
End: 2018-04-04

## 2018-04-04 VITALS
DIASTOLIC BLOOD PRESSURE: 70 MMHG | TEMPERATURE: 97.2 F | SYSTOLIC BLOOD PRESSURE: 124 MMHG | BODY MASS INDEX: 27.59 KG/M2 | OXYGEN SATURATION: 99 % | HEART RATE: 68 BPM | WEIGHT: 160.8 LBS

## 2018-04-04 DIAGNOSIS — Z00.00 ANNUAL PHYSICAL EXAM: Primary | ICD-10-CM

## 2018-04-04 DIAGNOSIS — I10 ESSENTIAL HYPERTENSION: ICD-10-CM

## 2018-04-04 DIAGNOSIS — G56.03 BILATERAL CARPAL TUNNEL SYNDROME: ICD-10-CM

## 2018-04-04 DIAGNOSIS — F32.89 OTHER DEPRESSION: ICD-10-CM

## 2018-04-04 DIAGNOSIS — M85.80 OSTEOPENIA, UNSPECIFIED LOCATION: ICD-10-CM

## 2018-04-04 LAB
BILIRUBIN, POC: NEGATIVE
BLOOD URINE, POC: NEGATIVE
CLARITY, POC: CLEAR
COLOR, POC: YELLOW
GLUCOSE URINE, POC: NEGATIVE
KETONES, POC: NEGATIVE
LEUKOCYTE EST, POC: NEGATIVE
NITRITE, POC: NEGATIVE
PH, POC: 7
PROTEIN, POC: NEGATIVE
SPECIFIC GRAVITY, POC: 1.01
UROBILINOGEN, POC: 0.2

## 2018-04-04 PROCEDURE — 93000 ELECTROCARDIOGRAM COMPLETE: CPT | Performed by: FAMILY MEDICINE

## 2018-04-04 PROCEDURE — 3014F SCREEN MAMMO DOC REV: CPT | Performed by: FAMILY MEDICINE

## 2018-04-04 PROCEDURE — 3017F COLORECTAL CA SCREEN DOC REV: CPT | Performed by: FAMILY MEDICINE

## 2018-04-04 PROCEDURE — 1090F PRES/ABSN URINE INCON ASSESS: CPT | Performed by: FAMILY MEDICINE

## 2018-04-04 PROCEDURE — 81002 URINALYSIS NONAUTO W/O SCOPE: CPT | Performed by: FAMILY MEDICINE

## 2018-04-04 PROCEDURE — G8427 DOCREV CUR MEDS BY ELIG CLIN: HCPCS | Performed by: FAMILY MEDICINE

## 2018-04-04 PROCEDURE — 99214 OFFICE O/P EST MOD 30 MIN: CPT | Performed by: FAMILY MEDICINE

## 2018-04-04 PROCEDURE — G8417 CALC BMI ABV UP PARAM F/U: HCPCS | Performed by: FAMILY MEDICINE

## 2018-04-05 ENCOUNTER — HOSPITAL ENCOUNTER (OUTPATIENT)
Dept: GENERAL RADIOLOGY | Age: 68
Discharge: HOME | End: 2018-04-05

## 2018-04-05 LAB
A/G RATIO: 1.7 (ref 1.1–2.2)
ALBUMIN SERPL-MCNC: 4.8 G/DL (ref 3.4–5)
ALP BLD-CCNC: 103 U/L (ref 40–129)
ALT SERPL-CCNC: 14 U/L (ref 10–40)
ANION GAP SERPL CALCULATED.3IONS-SCNC: 16 MMOL/L (ref 3–16)
AST SERPL-CCNC: 20 U/L (ref 15–37)
BASOPHILS ABSOLUTE: 0 K/UL (ref 0–0.2)
BASOPHILS RELATIVE PERCENT: 0.6 %
BILIRUB SERPL-MCNC: 0.6 MG/DL (ref 0–1)
BUN BLDV-MCNC: 17 MG/DL (ref 7–20)
CALCIUM SERPL-MCNC: 9.7 MG/DL (ref 8.3–10.6)
CHLORIDE BLD-SCNC: 95 MMOL/L (ref 99–110)
CHOLESTEROL, FASTING: 210 MG/DL (ref 0–199)
CO2: 28 MMOL/L (ref 21–32)
CREAT SERPL-MCNC: 0.7 MG/DL (ref 0.6–1.2)
EOSINOPHILS ABSOLUTE: 0.2 K/UL (ref 0–0.6)
EOSINOPHILS RELATIVE PERCENT: 3.5 %
GFR AFRICAN AMERICAN: >60
GFR NON-AFRICAN AMERICAN: >60
GLOBULIN: 2.9 G/DL
GLUCOSE FASTING: 93 MG/DL (ref 70–99)
HCT VFR BLD CALC: 40.6 % (ref 36–48)
HDLC SERPL-MCNC: 107 MG/DL (ref 40–60)
HEMOGLOBIN: 14.1 G/DL (ref 12–16)
LDL CHOLESTEROL CALCULATED: 87 MG/DL
LYMPHOCYTES ABSOLUTE: 0.7 K/UL (ref 1–5.1)
LYMPHOCYTES RELATIVE PERCENT: 15.3 %
MCH RBC QN AUTO: 29.5 PG (ref 26–34)
MCHC RBC AUTO-ENTMCNC: 34.8 G/DL (ref 31–36)
MCV RBC AUTO: 84.8 FL (ref 80–100)
MONOCYTES ABSOLUTE: 0.4 K/UL (ref 0–1.3)
MONOCYTES RELATIVE PERCENT: 7.9 %
NEUTROPHILS ABSOLUTE: 3.4 K/UL (ref 1.7–7.7)
NEUTROPHILS RELATIVE PERCENT: 72.7 %
PDW BLD-RTO: 12.9 % (ref 12.4–15.4)
PLATELET # BLD: 268 K/UL (ref 135–450)
PMV BLD AUTO: 8.5 FL (ref 5–10.5)
POTASSIUM SERPL-SCNC: 4.1 MMOL/L (ref 3.5–5.1)
RBC # BLD: 4.78 M/UL (ref 4–5.2)
SODIUM BLD-SCNC: 139 MMOL/L (ref 136–145)
TOTAL CK: 59 U/L (ref 26–192)
TOTAL PROTEIN: 7.7 G/DL (ref 6.4–8.2)
TRIGLYCERIDE, FASTING: 79 MG/DL (ref 0–150)
VLDLC SERPL CALC-MCNC: 16 MG/DL
WBC # BLD: 4.6 K/UL (ref 4–11)

## 2018-04-06 LAB
ESTIMATED AVERAGE GLUCOSE: 203 MG/DL
HBA1C MFR BLD: 8.7 %

## 2018-04-09 ENCOUNTER — HOSPITAL ENCOUNTER (OUTPATIENT)
Dept: SURGERY | Age: 68
Discharge: HOME | End: 2018-04-09
Attending: ORTHOPAEDIC SURGERY | Admitting: ORTHOPAEDIC SURGERY

## 2018-04-09 VITALS
DIASTOLIC BLOOD PRESSURE: 77 MMHG | RESPIRATION RATE: 16 BRPM | TEMPERATURE: 98 F | SYSTOLIC BLOOD PRESSURE: 97 MMHG | HEART RATE: 79 BPM | OXYGEN SATURATION: 98 % | WEIGHT: 160 LBS | HEIGHT: 61 IN | BODY MASS INDEX: 30.21 KG/M2

## 2018-04-09 DIAGNOSIS — G56.03 BILATERAL CARPAL TUNNEL SYNDROME: Primary | ICD-10-CM

## 2018-04-09 LAB
GLUCOSE BLD-MCNC: 204 MG/DL (ref 70–99)
GLUCOSE BLD-MCNC: 205 MG/DL (ref 70–99)
PERFORMED ON: ABNORMAL
PERFORMED ON: ABNORMAL

## 2018-04-09 RX ORDER — MORPHINE SULFATE 2 MG/ML
2 INJECTION, SOLUTION INTRAMUSCULAR; INTRAVENOUS EVERY 5 MIN PRN
Status: DISCONTINUED | OUTPATIENT
Start: 2018-04-09 | End: 2018-04-10 | Stop reason: HOSPADM

## 2018-04-09 RX ORDER — HYDRALAZINE HYDROCHLORIDE 20 MG/ML
5 INJECTION INTRAMUSCULAR; INTRAVENOUS EVERY 10 MIN PRN
Status: DISCONTINUED | OUTPATIENT
Start: 2018-04-09 | End: 2018-04-10 | Stop reason: HOSPADM

## 2018-04-09 RX ORDER — MEPERIDINE HYDROCHLORIDE 50 MG/ML
12.5 INJECTION INTRAMUSCULAR; INTRAVENOUS; SUBCUTANEOUS EVERY 5 MIN PRN
Status: DISCONTINUED | OUTPATIENT
Start: 2018-04-09 | End: 2018-04-10 | Stop reason: HOSPADM

## 2018-04-09 RX ORDER — LIDOCAINE HYDROCHLORIDE 10 MG/ML
1 INJECTION, SOLUTION EPIDURAL; INFILTRATION; INTRACAUDAL; PERINEURAL
Status: ACTIVE | OUTPATIENT
Start: 2018-04-09 | End: 2018-04-09

## 2018-04-09 RX ORDER — DIPHENHYDRAMINE HYDROCHLORIDE 50 MG/ML
12.5 INJECTION INTRAMUSCULAR; INTRAVENOUS
Status: ACTIVE | OUTPATIENT
Start: 2018-04-09 | End: 2018-04-09

## 2018-04-09 RX ORDER — OXYCODONE HYDROCHLORIDE AND ACETAMINOPHEN 5; 325 MG/1; MG/1
2 TABLET ORAL PRN
Status: ACTIVE | OUTPATIENT
Start: 2018-04-09 | End: 2018-04-09

## 2018-04-09 RX ORDER — OXYCODONE HYDROCHLORIDE AND ACETAMINOPHEN 5; 325 MG/1; MG/1
1 TABLET ORAL PRN
Status: ACTIVE | OUTPATIENT
Start: 2018-04-09 | End: 2018-04-09

## 2018-04-09 RX ORDER — MORPHINE SULFATE 2 MG/ML
1 INJECTION, SOLUTION INTRAMUSCULAR; INTRAVENOUS EVERY 5 MIN PRN
Status: DISCONTINUED | OUTPATIENT
Start: 2018-04-09 | End: 2018-04-10 | Stop reason: HOSPADM

## 2018-04-09 RX ORDER — HYDROCODONE BITARTRATE AND ACETAMINOPHEN 5; 325 MG/1; MG/1
1 TABLET ORAL EVERY 6 HOURS PRN
Qty: 10 TABLET | Refills: 0 | Status: SHIPPED | OUTPATIENT
Start: 2018-04-09 | End: 2018-04-16

## 2018-04-09 RX ORDER — PROMETHAZINE HYDROCHLORIDE 25 MG/ML
6.25 INJECTION, SOLUTION INTRAMUSCULAR; INTRAVENOUS
Status: DISCONTINUED | OUTPATIENT
Start: 2018-04-09 | End: 2018-04-10 | Stop reason: HOSPADM

## 2018-04-09 RX ORDER — ONDANSETRON 2 MG/ML
4 INJECTION INTRAMUSCULAR; INTRAVENOUS PRN
Status: DISCONTINUED | OUTPATIENT
Start: 2018-04-09 | End: 2018-04-10 | Stop reason: HOSPADM

## 2018-04-09 RX ORDER — LABETALOL HYDROCHLORIDE 5 MG/ML
5 INJECTION, SOLUTION INTRAVENOUS EVERY 10 MIN PRN
Status: DISCONTINUED | OUTPATIENT
Start: 2018-04-09 | End: 2018-04-10 | Stop reason: HOSPADM

## 2018-04-09 RX ORDER — SODIUM CHLORIDE, SODIUM LACTATE, POTASSIUM CHLORIDE, CALCIUM CHLORIDE 600; 310; 30; 20 MG/100ML; MG/100ML; MG/100ML; MG/100ML
INJECTION, SOLUTION INTRAVENOUS CONTINUOUS
Status: DISCONTINUED | OUTPATIENT
Start: 2018-04-09 | End: 2018-04-10 | Stop reason: HOSPADM

## 2018-04-09 RX ADMIN — SODIUM CHLORIDE, SODIUM LACTATE, POTASSIUM CHLORIDE, CALCIUM CHLORIDE: 600; 310; 30; 20 INJECTION, SOLUTION INTRAVENOUS at 06:39

## 2018-04-09 ASSESSMENT — PAIN - FUNCTIONAL ASSESSMENT: PAIN_FUNCTIONAL_ASSESSMENT: 0-10

## 2018-04-20 ENCOUNTER — OFFICE VISIT (OUTPATIENT)
Dept: ORTHOPEDIC SURGERY | Age: 68
End: 2018-04-20

## 2018-04-20 DIAGNOSIS — G56.22 ULNAR NEUROPATHY AT ELBOW, LEFT: ICD-10-CM

## 2018-04-20 DIAGNOSIS — G56.03 BILATERAL CARPAL TUNNEL SYNDROME: ICD-10-CM

## 2018-04-20 DIAGNOSIS — G56.01 CARPAL TUNNEL SYNDROME OF RIGHT WRIST: Primary | ICD-10-CM

## 2018-04-20 PROCEDURE — L3908 WHO COCK-UP NONMOLDE PRE OTS: HCPCS | Performed by: ORTHOPAEDIC SURGERY

## 2018-04-20 PROCEDURE — 99024 POSTOP FOLLOW-UP VISIT: CPT | Performed by: ORTHOPAEDIC SURGERY

## 2018-05-08 ENCOUNTER — OFFICE VISIT (OUTPATIENT)
Dept: FAMILY MEDICINE CLINIC | Age: 68
End: 2018-05-08

## 2018-05-08 VITALS
TEMPERATURE: 98.4 F | OXYGEN SATURATION: 97 % | HEART RATE: 82 BPM | SYSTOLIC BLOOD PRESSURE: 132 MMHG | WEIGHT: 164.6 LBS | DIASTOLIC BLOOD PRESSURE: 64 MMHG | BODY MASS INDEX: 31.1 KG/M2

## 2018-05-08 DIAGNOSIS — D69.2 PURPURA (HCC): ICD-10-CM

## 2018-05-08 DIAGNOSIS — D69.2 PURPURA (HCC): Primary | ICD-10-CM

## 2018-05-08 LAB
APTT: 31.6 SEC (ref 24.1–34.9)
BASOPHILS ABSOLUTE: 0 K/UL (ref 0–0.2)
BASOPHILS RELATIVE PERCENT: 0.2 %
EOSINOPHILS ABSOLUTE: 0.1 K/UL (ref 0–0.6)
EOSINOPHILS RELATIVE PERCENT: 1 %
HCT VFR BLD CALC: 36.5 % (ref 36–48)
HEMOGLOBIN: 12.8 G/DL (ref 12–16)
INR BLD: 0.95 (ref 0.85–1.15)
LYMPHOCYTES ABSOLUTE: 1 K/UL (ref 1–5.1)
LYMPHOCYTES RELATIVE PERCENT: 12.4 %
MCH RBC QN AUTO: 29.7 PG (ref 26–34)
MCHC RBC AUTO-ENTMCNC: 35.1 G/DL (ref 31–36)
MCV RBC AUTO: 84.5 FL (ref 80–100)
MONOCYTES ABSOLUTE: 0.6 K/UL (ref 0–1.3)
MONOCYTES RELATIVE PERCENT: 6.7 %
NEUTROPHILS ABSOLUTE: 6.6 K/UL (ref 1.7–7.7)
NEUTROPHILS RELATIVE PERCENT: 79.7 %
PDW BLD-RTO: 13 % (ref 12.4–15.4)
PLATELET # BLD: 242 K/UL (ref 135–450)
PMV BLD AUTO: 8.1 FL (ref 5–10.5)
PROTHROMBIN TIME: 10.7 SEC (ref 9.6–13)
RBC # BLD: 4.32 M/UL (ref 4–5.2)
WBC # BLD: 8.3 K/UL (ref 4–11)

## 2018-05-08 PROCEDURE — 4040F PNEUMOC VAC/ADMIN/RCVD: CPT | Performed by: FAMILY MEDICINE

## 2018-05-08 PROCEDURE — 1036F TOBACCO NON-USER: CPT | Performed by: FAMILY MEDICINE

## 2018-05-08 PROCEDURE — 99213 OFFICE O/P EST LOW 20 MIN: CPT | Performed by: FAMILY MEDICINE

## 2018-05-08 PROCEDURE — 3045F PR MOST RECENT HEMOGLOBIN A1C LEVEL 7.0-9.0%: CPT | Performed by: FAMILY MEDICINE

## 2018-05-08 PROCEDURE — 2022F DILAT RTA XM EVC RTNOPTHY: CPT | Performed by: FAMILY MEDICINE

## 2018-05-08 PROCEDURE — 3017F COLORECTAL CA SCREEN DOC REV: CPT | Performed by: FAMILY MEDICINE

## 2018-05-08 PROCEDURE — G8427 DOCREV CUR MEDS BY ELIG CLIN: HCPCS | Performed by: FAMILY MEDICINE

## 2018-05-08 PROCEDURE — G8399 PT W/DXA RESULTS DOCUMENT: HCPCS | Performed by: FAMILY MEDICINE

## 2018-05-08 PROCEDURE — 1090F PRES/ABSN URINE INCON ASSESS: CPT | Performed by: FAMILY MEDICINE

## 2018-05-08 PROCEDURE — 1123F ACP DISCUSS/DSCN MKR DOCD: CPT | Performed by: FAMILY MEDICINE

## 2018-05-08 PROCEDURE — G8417 CALC BMI ABV UP PARAM F/U: HCPCS | Performed by: FAMILY MEDICINE

## 2018-05-11 ASSESSMENT — ENCOUNTER SYMPTOMS
ABDOMINAL PAIN: 0
CONSTIPATION: 0
DIARRHEA: 0
SHORTNESS OF BREATH: 0
COUGH: 0
BLOOD IN STOOL: 0
NAUSEA: 0
CHEST TIGHTNESS: 0

## 2018-05-23 ENCOUNTER — OFFICE VISIT (OUTPATIENT)
Dept: FAMILY MEDICINE CLINIC | Age: 68
End: 2018-05-23

## 2018-05-23 ENCOUNTER — TELEPHONE (OUTPATIENT)
Dept: FAMILY MEDICINE CLINIC | Age: 68
End: 2018-05-23

## 2018-05-23 ENCOUNTER — TELEPHONE (OUTPATIENT)
Dept: ORTHOPEDIC SURGERY | Age: 68
End: 2018-05-23

## 2018-05-23 VITALS
HEART RATE: 78 BPM | SYSTOLIC BLOOD PRESSURE: 128 MMHG | TEMPERATURE: 97.8 F | OXYGEN SATURATION: 97 % | DIASTOLIC BLOOD PRESSURE: 80 MMHG

## 2018-05-23 DIAGNOSIS — M54.5 CHRONIC LOW BACK PAIN, UNSPECIFIED BACK PAIN LATERALITY, WITH SCIATICA PRESENCE UNSPECIFIED: ICD-10-CM

## 2018-05-23 DIAGNOSIS — I10 ESSENTIAL HYPERTENSION: ICD-10-CM

## 2018-05-23 DIAGNOSIS — E78.5 HYPERLIPIDEMIA, UNSPECIFIED HYPERLIPIDEMIA TYPE: ICD-10-CM

## 2018-05-23 DIAGNOSIS — E10.9 TYPE 1 DIABETES MELLITUS WITHOUT COMPLICATION (HCC): Primary | ICD-10-CM

## 2018-05-23 DIAGNOSIS — F32.89 OTHER DEPRESSION: ICD-10-CM

## 2018-05-23 DIAGNOSIS — M85.80 OSTEOPENIA, UNSPECIFIED LOCATION: ICD-10-CM

## 2018-05-23 DIAGNOSIS — G89.29 CHRONIC LOW BACK PAIN, UNSPECIFIED BACK PAIN LATERALITY, WITH SCIATICA PRESENCE UNSPECIFIED: ICD-10-CM

## 2018-05-23 PROCEDURE — G8399 PT W/DXA RESULTS DOCUMENT: HCPCS | Performed by: FAMILY MEDICINE

## 2018-05-23 PROCEDURE — 3017F COLORECTAL CA SCREEN DOC REV: CPT | Performed by: FAMILY MEDICINE

## 2018-05-23 PROCEDURE — 99214 OFFICE O/P EST MOD 30 MIN: CPT | Performed by: FAMILY MEDICINE

## 2018-05-23 PROCEDURE — 82962 GLUCOSE BLOOD TEST: CPT | Performed by: FAMILY MEDICINE

## 2018-05-23 PROCEDURE — G8417 CALC BMI ABV UP PARAM F/U: HCPCS | Performed by: FAMILY MEDICINE

## 2018-05-23 PROCEDURE — G8427 DOCREV CUR MEDS BY ELIG CLIN: HCPCS | Performed by: FAMILY MEDICINE

## 2018-05-23 PROCEDURE — 2022F DILAT RTA XM EVC RTNOPTHY: CPT | Performed by: FAMILY MEDICINE

## 2018-05-23 PROCEDURE — 3045F PR MOST RECENT HEMOGLOBIN A1C LEVEL 7.0-9.0%: CPT | Performed by: FAMILY MEDICINE

## 2018-05-23 PROCEDURE — 1036F TOBACCO NON-USER: CPT | Performed by: FAMILY MEDICINE

## 2018-05-23 PROCEDURE — 4040F PNEUMOC VAC/ADMIN/RCVD: CPT | Performed by: FAMILY MEDICINE

## 2018-05-23 PROCEDURE — 1123F ACP DISCUSS/DSCN MKR DOCD: CPT | Performed by: FAMILY MEDICINE

## 2018-05-23 PROCEDURE — 1090F PRES/ABSN URINE INCON ASSESS: CPT | Performed by: FAMILY MEDICINE

## 2018-05-23 RX ORDER — VALSARTAN AND HYDROCHLOROTHIAZIDE 320; 25 MG/1; MG/1
1 TABLET, FILM COATED ORAL DAILY
Qty: 90 TABLET | Refills: 0 | Status: ON HOLD | OUTPATIENT
Start: 2018-05-23 | End: 2018-07-21 | Stop reason: HOSPADM

## 2018-05-23 RX ORDER — ATORVASTATIN CALCIUM 20 MG/1
20 TABLET, FILM COATED ORAL DAILY
Qty: 90 TABLET | Refills: 0 | Status: SHIPPED | OUTPATIENT
Start: 2018-05-23 | End: 2018-08-22 | Stop reason: SDUPTHER

## 2018-05-23 ASSESSMENT — ENCOUNTER SYMPTOMS
BLOOD IN STOOL: 0
ABDOMINAL PAIN: 0
BACK PAIN: 1
SHORTNESS OF BREATH: 0
CHEST TIGHTNESS: 0
COUGH: 0

## 2018-05-29 ENCOUNTER — OFFICE VISIT (OUTPATIENT)
Dept: ORTHOPEDIC SURGERY | Age: 68
End: 2018-05-29

## 2018-05-29 VITALS — BODY MASS INDEX: 31.09 KG/M2 | WEIGHT: 164.68 LBS | HEIGHT: 61 IN

## 2018-05-29 DIAGNOSIS — G56.03 BILATERAL CARPAL TUNNEL SYNDROME: Primary | ICD-10-CM

## 2018-05-29 DIAGNOSIS — G56.22 ULNAR NEUROPATHY AT ELBOW, LEFT: ICD-10-CM

## 2018-05-29 DIAGNOSIS — M47.812 OSTEOARTHRITIS OF CERVICAL SPINE, UNSPECIFIED SPINAL OSTEOARTHRITIS COMPLICATION STATUS: ICD-10-CM

## 2018-05-29 PROCEDURE — 99024 POSTOP FOLLOW-UP VISIT: CPT | Performed by: ORTHOPAEDIC SURGERY

## 2018-06-14 ENCOUNTER — HOSPITAL ENCOUNTER (OUTPATIENT)
Dept: GENERAL RADIOLOGY | Age: 68
Discharge: HOME | End: 2018-06-14
Attending: NEUROLOGICAL SURGERY | Admitting: NEUROLOGICAL SURGERY

## 2018-06-14 DIAGNOSIS — M81.8 OTHER OSTEOPOROSIS WITHOUT CURRENT PATHOLOGICAL FRACTURE: ICD-10-CM

## 2018-06-14 DIAGNOSIS — Z13.820 ENCOUNTER FOR IMAGING TO ASSESS OSTEOPOROSIS: ICD-10-CM

## 2018-06-14 DIAGNOSIS — M81.0 SENILE OSTEOPOROSIS: ICD-10-CM

## 2018-06-19 ENCOUNTER — OFFICE VISIT (OUTPATIENT)
Dept: FAMILY MEDICINE CLINIC | Age: 68
End: 2018-06-19

## 2018-06-19 ENCOUNTER — TELEPHONE (OUTPATIENT)
Dept: FAMILY MEDICINE CLINIC | Age: 68
End: 2018-06-19

## 2018-06-19 VITALS
OXYGEN SATURATION: 96 % | RESPIRATION RATE: 14 BRPM | WEIGHT: 165 LBS | BODY MASS INDEX: 32.39 KG/M2 | SYSTOLIC BLOOD PRESSURE: 130 MMHG | HEART RATE: 94 BPM | DIASTOLIC BLOOD PRESSURE: 74 MMHG | TEMPERATURE: 98.9 F | HEIGHT: 60 IN

## 2018-06-19 DIAGNOSIS — Z01.818 PREOP EXAMINATION: ICD-10-CM

## 2018-06-19 DIAGNOSIS — Z01.818 PREOP EXAMINATION: Primary | ICD-10-CM

## 2018-06-19 PROBLEM — E87.6 HYPOKALEMIA: Status: RESOLVED | Noted: 2017-09-07 | Resolved: 2018-06-19

## 2018-06-19 PROBLEM — K85.90 ACUTE PANCREATITIS: Status: RESOLVED | Noted: 2017-09-07 | Resolved: 2018-06-19

## 2018-06-19 LAB
A/G RATIO: 2.1 (ref 1.1–2.2)
ALBUMIN SERPL-MCNC: 4.7 G/DL (ref 3.4–5)
ALP BLD-CCNC: 101 U/L (ref 40–129)
ALT SERPL-CCNC: 15 U/L (ref 10–40)
ANION GAP SERPL CALCULATED.3IONS-SCNC: 15 MMOL/L (ref 3–16)
AST SERPL-CCNC: 20 U/L (ref 15–37)
BASOPHILS ABSOLUTE: 0 K/UL (ref 0–0.2)
BASOPHILS RELATIVE PERCENT: 0.6 %
BILIRUB SERPL-MCNC: 0.3 MG/DL (ref 0–1)
BUN BLDV-MCNC: 22 MG/DL (ref 7–20)
CALCIUM SERPL-MCNC: 9.5 MG/DL (ref 8.3–10.6)
CHLORIDE BLD-SCNC: 91 MMOL/L (ref 99–110)
CO2: 29 MMOL/L (ref 21–32)
CREAT SERPL-MCNC: 1 MG/DL (ref 0.6–1.2)
EOSINOPHILS ABSOLUTE: 0.1 K/UL (ref 0–0.6)
EOSINOPHILS RELATIVE PERCENT: 1.9 %
GFR AFRICAN AMERICAN: >60
GFR NON-AFRICAN AMERICAN: 55
GLOBULIN: 2.2 G/DL
GLUCOSE BLD-MCNC: 114 MG/DL (ref 70–99)
HCT VFR BLD CALC: 37.9 % (ref 36–48)
HEMOGLOBIN: 13.4 G/DL (ref 12–16)
LYMPHOCYTES ABSOLUTE: 1 K/UL (ref 1–5.1)
LYMPHOCYTES RELATIVE PERCENT: 15.4 %
MCH RBC QN AUTO: 30.1 PG (ref 26–34)
MCHC RBC AUTO-ENTMCNC: 35.3 G/DL (ref 31–36)
MCV RBC AUTO: 85.1 FL (ref 80–100)
MONOCYTES ABSOLUTE: 0.5 K/UL (ref 0–1.3)
MONOCYTES RELATIVE PERCENT: 8.3 %
NEUTROPHILS ABSOLUTE: 4.7 K/UL (ref 1.7–7.7)
NEUTROPHILS RELATIVE PERCENT: 73.8 %
PDW BLD-RTO: 13.7 % (ref 12.4–15.4)
PLATELET # BLD: 243 K/UL (ref 135–450)
PMV BLD AUTO: 8 FL (ref 5–10.5)
POTASSIUM SERPL-SCNC: 3.8 MMOL/L (ref 3.5–5.1)
RBC # BLD: 4.45 M/UL (ref 4–5.2)
SODIUM BLD-SCNC: 135 MMOL/L (ref 136–145)
TOTAL PROTEIN: 6.9 G/DL (ref 6.4–8.2)
WBC # BLD: 6.4 K/UL (ref 4–11)

## 2018-06-19 PROCEDURE — 1090F PRES/ABSN URINE INCON ASSESS: CPT | Performed by: FAMILY MEDICINE

## 2018-06-19 PROCEDURE — G8417 CALC BMI ABV UP PARAM F/U: HCPCS | Performed by: FAMILY MEDICINE

## 2018-06-19 PROCEDURE — 99213 OFFICE O/P EST LOW 20 MIN: CPT | Performed by: FAMILY MEDICINE

## 2018-06-19 PROCEDURE — G8427 DOCREV CUR MEDS BY ELIG CLIN: HCPCS | Performed by: FAMILY MEDICINE

## 2018-06-19 PROCEDURE — 93000 ELECTROCARDIOGRAM COMPLETE: CPT | Performed by: FAMILY MEDICINE

## 2018-06-19 PROCEDURE — 3017F COLORECTAL CA SCREEN DOC REV: CPT | Performed by: FAMILY MEDICINE

## 2018-06-19 RX ORDER — OXYCODONE HYDROCHLORIDE 15 MG/1
15 TABLET ORAL EVERY 6 HOURS PRN
Status: ON HOLD | COMMUNITY
Start: 2018-06-16 | End: 2018-07-06 | Stop reason: HOSPADM

## 2018-06-19 ASSESSMENT — ENCOUNTER SYMPTOMS
BACK PAIN: 1
RESPIRATORY NEGATIVE: 1
GASTROINTESTINAL NEGATIVE: 1

## 2018-06-26 ENCOUNTER — HOSPITAL ENCOUNTER (OUTPATIENT)
Dept: PREADMISSION TESTING | Age: 68
Discharge: HOME | End: 2018-06-27
Attending: NEUROLOGICAL SURGERY | Admitting: NEUROLOGICAL SURGERY

## 2018-06-26 VITALS — WEIGHT: 165 LBS | HEIGHT: 64 IN | BODY MASS INDEX: 28.17 KG/M2

## 2018-06-26 DIAGNOSIS — M48.061 SPINAL STENOSIS OF LUMBAR REGION WITHOUT NEUROGENIC CLAUDICATION: ICD-10-CM

## 2018-06-26 RX ORDER — M-VIT,TX,IRON,MINS/CALC/FOLIC 27MG-0.4MG
1 TABLET ORAL DAILY
Status: ON HOLD | COMMUNITY
End: 2018-07-06 | Stop reason: HOSPADM

## 2018-06-26 RX ORDER — OMEGA-3 FATTY ACIDS/FISH OIL 300-1000MG
CAPSULE ORAL
Status: ON HOLD | COMMUNITY
End: 2018-07-06 | Stop reason: HOSPADM

## 2018-06-26 RX ORDER — CALCIUM CARBONATE 500(1250)
500 TABLET ORAL DAILY
COMMUNITY
End: 2021-05-10

## 2018-06-26 RX ORDER — DOCUSATE SODIUM 100 MG/1
100 CAPSULE, LIQUID FILLED ORAL 2 TIMES DAILY
COMMUNITY
End: 2019-08-01

## 2018-06-27 PROBLEM — M48.061 DEGENERATIVE LUMBAR SPINAL STENOSIS: Status: ACTIVE | Noted: 2018-06-27

## 2018-07-19 ENCOUNTER — HOSPITAL ENCOUNTER (INPATIENT)
Age: 68
LOS: 5 days | Discharge: HOME HEALTH | DRG: 857 | End: 2018-07-25
Attending: EMERGENCY MEDICINE | Admitting: INTERNAL MEDICINE
Payer: MEDICARE

## 2018-07-19 ENCOUNTER — APPOINTMENT (OUTPATIENT)
Dept: GENERAL RADIOLOGY | Age: 68
DRG: 857 | End: 2018-07-19
Payer: MEDICARE

## 2018-07-19 DIAGNOSIS — G89.18 POST-OPERATIVE PAIN: ICD-10-CM

## 2018-07-19 DIAGNOSIS — M54.5 CHRONIC LOW BACK PAIN, UNSPECIFIED BACK PAIN LATERALITY, WITH SCIATICA PRESENCE UNSPECIFIED: ICD-10-CM

## 2018-07-19 DIAGNOSIS — G89.29 CHRONIC LOW BACK PAIN, UNSPECIFIED BACK PAIN LATERALITY, WITH SCIATICA PRESENCE UNSPECIFIED: ICD-10-CM

## 2018-07-19 DIAGNOSIS — E87.1 HYPONATREMIA: Primary | ICD-10-CM

## 2018-07-19 LAB
ANION GAP SERPL CALCULATED.3IONS-SCNC: 15 MMOL/L (ref 3–16)
BASE EXCESS VENOUS: 6 (ref -3–3)
BASOPHILS ABSOLUTE: 0.2 K/UL (ref 0–0.2)
BASOPHILS RELATIVE PERCENT: 1.1 %
BUN BLDV-MCNC: 23 MG/DL (ref 7–20)
CALCIUM SERPL-MCNC: 9.5 MG/DL (ref 8.3–10.6)
CHLORIDE BLD-SCNC: 85 MMOL/L (ref 99–110)
CO2: 26 MMOL/L (ref 21–32)
CREAT SERPL-MCNC: 0.9 MG/DL (ref 0.6–1.2)
EOSINOPHILS ABSOLUTE: 1.5 K/UL (ref 0–0.6)
EOSINOPHILS RELATIVE PERCENT: 10.5 %
GFR AFRICAN AMERICAN: >60
GFR NON-AFRICAN AMERICAN: >60
GLUCOSE BLD-MCNC: 246 MG/DL (ref 70–99)
HCO3 VENOUS: 30.3 MMOL/L (ref 23–29)
HCT VFR BLD CALC: 32.3 % (ref 36–48)
HEMOGLOBIN: 11 G/DL (ref 12–16)
LACTATE: 2.68 MMOL/L (ref 0.4–2)
LYMPHOCYTES ABSOLUTE: 0.1 K/UL (ref 1–5.1)
LYMPHOCYTES RELATIVE PERCENT: 0.8 %
MCH RBC QN AUTO: 30 PG (ref 26–34)
MCHC RBC AUTO-ENTMCNC: 34.1 G/DL (ref 31–36)
MCV RBC AUTO: 87.9 FL (ref 80–100)
MONOCYTES ABSOLUTE: 0.4 K/UL (ref 0–1.3)
MONOCYTES RELATIVE PERCENT: 2.8 %
NEUTROPHILS ABSOLUTE: 11.8 K/UL (ref 1.7–7.7)
NEUTROPHILS RELATIVE PERCENT: 84.8 %
O2 SAT, VEN: 46 %
PCO2, VEN: 43.4 MM HG (ref 40–50)
PDW BLD-RTO: 13.7 % (ref 12.4–15.4)
PERFORMED ON: ABNORMAL
PH VENOUS: 7.45 (ref 7.35–7.45)
PLATELET # BLD: 212 K/UL (ref 135–450)
PMV BLD AUTO: 9.2 FL (ref 5–10.5)
PO2, VEN: 24 MM HG
POC SAMPLE TYPE: ABNORMAL
POTASSIUM SERPL-SCNC: 3.7 MMOL/L (ref 3.5–5.1)
RBC # BLD: 3.67 M/UL (ref 4–5.2)
SODIUM BLD-SCNC: 126 MMOL/L (ref 136–145)
TCO2 CALC VENOUS: 32 MMOL/L
WBC # BLD: 13.9 K/UL (ref 4–11)

## 2018-07-19 PROCEDURE — 6360000002 HC RX W HCPCS: Performed by: EMERGENCY MEDICINE

## 2018-07-19 PROCEDURE — 2580000003 HC RX 258: Performed by: EMERGENCY MEDICINE

## 2018-07-19 PROCEDURE — 83605 ASSAY OF LACTIC ACID: CPT

## 2018-07-19 PROCEDURE — 36415 COLL VENOUS BLD VENIPUNCTURE: CPT

## 2018-07-19 PROCEDURE — 96374 THER/PROPH/DIAG INJ IV PUSH: CPT

## 2018-07-19 PROCEDURE — 96375 TX/PRO/DX INJ NEW DRUG ADDON: CPT

## 2018-07-19 PROCEDURE — 85652 RBC SED RATE AUTOMATED: CPT

## 2018-07-19 PROCEDURE — 85025 COMPLETE CBC W/AUTO DIFF WBC: CPT

## 2018-07-19 PROCEDURE — 96361 HYDRATE IV INFUSION ADD-ON: CPT

## 2018-07-19 PROCEDURE — 99284 EMERGENCY DEPT VISIT MOD MDM: CPT

## 2018-07-19 PROCEDURE — 80048 BASIC METABOLIC PNL TOTAL CA: CPT

## 2018-07-19 PROCEDURE — 86140 C-REACTIVE PROTEIN: CPT

## 2018-07-19 PROCEDURE — 82803 BLOOD GASES ANY COMBINATION: CPT

## 2018-07-19 PROCEDURE — 81001 URINALYSIS AUTO W/SCOPE: CPT

## 2018-07-19 PROCEDURE — 71045 X-RAY EXAM CHEST 1 VIEW: CPT

## 2018-07-19 RX ORDER — MORPHINE SULFATE 4 MG/ML
4 INJECTION, SOLUTION INTRAMUSCULAR; INTRAVENOUS ONCE
Status: COMPLETED | OUTPATIENT
Start: 2018-07-19 | End: 2018-07-19

## 2018-07-19 RX ORDER — ONDANSETRON 2 MG/ML
4 INJECTION INTRAMUSCULAR; INTRAVENOUS ONCE
Status: COMPLETED | OUTPATIENT
Start: 2018-07-19 | End: 2018-07-19

## 2018-07-19 RX ORDER — 0.9 % SODIUM CHLORIDE 0.9 %
1000 INTRAVENOUS SOLUTION INTRAVENOUS ONCE
Status: COMPLETED | OUTPATIENT
Start: 2018-07-19 | End: 2018-07-20

## 2018-07-19 RX ADMIN — SODIUM CHLORIDE 1000 ML: 9 INJECTION, SOLUTION INTRAVENOUS at 23:14

## 2018-07-19 RX ADMIN — ONDANSETRON 4 MG: 2 INJECTION INTRAMUSCULAR; INTRAVENOUS at 23:26

## 2018-07-19 RX ADMIN — MORPHINE SULFATE 4 MG: 4 INJECTION INTRAVENOUS at 23:14

## 2018-07-19 ASSESSMENT — PAIN SCALES - GENERAL: PAINLEVEL_OUTOF10: 9

## 2018-07-20 ENCOUNTER — APPOINTMENT (OUTPATIENT)
Dept: GENERAL RADIOLOGY | Age: 68
DRG: 857 | End: 2018-07-20
Payer: MEDICARE

## 2018-07-20 ENCOUNTER — APPOINTMENT (OUTPATIENT)
Dept: MRI IMAGING | Age: 68
DRG: 857 | End: 2018-07-20
Payer: MEDICARE

## 2018-07-20 ENCOUNTER — ANESTHESIA EVENT (OUTPATIENT)
Dept: OPERATING ROOM | Age: 68
DRG: 857 | End: 2018-07-20
Payer: MEDICARE

## 2018-07-20 ENCOUNTER — ANESTHESIA (OUTPATIENT)
Dept: OPERATING ROOM | Age: 68
DRG: 857 | End: 2018-07-20
Payer: MEDICARE

## 2018-07-20 VITALS — DIASTOLIC BLOOD PRESSURE: 55 MMHG | OXYGEN SATURATION: 100 % | SYSTOLIC BLOOD PRESSURE: 101 MMHG

## 2018-07-20 PROBLEM — E87.1 HYPONATREMIA: Status: ACTIVE | Noted: 2018-07-20

## 2018-07-20 PROBLEM — L24.A9 WOUND DRAINAGE: Status: ACTIVE | Noted: 2018-07-20

## 2018-07-20 PROBLEM — T14.8XXA WOUND DRAINAGE: Status: ACTIVE | Noted: 2018-07-20

## 2018-07-20 PROBLEM — T81.41XA SUPERFICIAL INCISIONAL INFECTION OF SURGICAL SITE: Status: ACTIVE | Noted: 2018-07-20

## 2018-07-20 LAB
ANION GAP SERPL CALCULATED.3IONS-SCNC: 12 MMOL/L (ref 3–16)
ANION GAP SERPL CALCULATED.3IONS-SCNC: 12 MMOL/L (ref 3–16)
ANION GAP SERPL CALCULATED.3IONS-SCNC: 16 MMOL/L (ref 3–16)
BACTERIA: ABNORMAL /HPF
BILIRUBIN URINE: NEGATIVE
BLOOD, URINE: NEGATIVE
BUN BLDV-MCNC: 20 MG/DL (ref 7–20)
BUN BLDV-MCNC: 21 MG/DL (ref 7–20)
BUN BLDV-MCNC: 21 MG/DL (ref 7–20)
C-REACTIVE PROTEIN: 238.5 MG/L (ref 0–5.1)
CALCIUM SERPL-MCNC: 8.9 MG/DL (ref 8.3–10.6)
CALCIUM SERPL-MCNC: 9.1 MG/DL (ref 8.3–10.6)
CALCIUM SERPL-MCNC: 9.2 MG/DL (ref 8.3–10.6)
CHLORIDE BLD-SCNC: 90 MMOL/L (ref 99–110)
CHLORIDE BLD-SCNC: 90 MMOL/L (ref 99–110)
CHLORIDE BLD-SCNC: 92 MMOL/L (ref 99–110)
CLARITY: CLEAR
CO2: 23 MMOL/L (ref 21–32)
CO2: 26 MMOL/L (ref 21–32)
CO2: 26 MMOL/L (ref 21–32)
COLOR: YELLOW
CREAT SERPL-MCNC: 0.7 MG/DL (ref 0.6–1.2)
CREAT SERPL-MCNC: 0.8 MG/DL (ref 0.6–1.2)
CREAT SERPL-MCNC: 0.8 MG/DL (ref 0.6–1.2)
GFR AFRICAN AMERICAN: >60
GFR NON-AFRICAN AMERICAN: >60
GLUCOSE BLD-MCNC: 165 MG/DL (ref 70–99)
GLUCOSE BLD-MCNC: 179 MG/DL (ref 70–99)
GLUCOSE BLD-MCNC: 211 MG/DL (ref 70–99)
GLUCOSE BLD-MCNC: 223 MG/DL (ref 70–99)
GLUCOSE BLD-MCNC: 251 MG/DL (ref 70–99)
GLUCOSE BLD-MCNC: 309 MG/DL (ref 70–99)
GLUCOSE BLD-MCNC: 311 MG/DL (ref 70–99)
GLUCOSE URINE: 100 MG/DL
HCT VFR BLD CALC: 28.9 % (ref 36–48)
HEMOGLOBIN: 9.8 G/DL (ref 12–16)
INR BLD: 1.23 (ref 0.86–1.14)
KETONES, URINE: ABNORMAL MG/DL
LEUKOCYTE ESTERASE, URINE: ABNORMAL
MAGNESIUM: 1.6 MG/DL (ref 1.8–2.4)
MCH RBC QN AUTO: 29.8 PG (ref 26–34)
MCHC RBC AUTO-ENTMCNC: 34 G/DL (ref 31–36)
MCV RBC AUTO: 87.7 FL (ref 80–100)
MICROSCOPIC EXAMINATION: YES
MUCUS: ABNORMAL /LPF
NITRITE, URINE: NEGATIVE
OSMOLALITY URINE: 428 MOSM/KG (ref 390–1070)
OSMOLALITY: 276 MOSM/KG (ref 278–305)
PDW BLD-RTO: 13.8 % (ref 12.4–15.4)
PERFORMED ON: ABNORMAL
PH UA: 6
PLATELET # BLD: 172 K/UL (ref 135–450)
PMV BLD AUTO: 8.7 FL (ref 5–10.5)
POTASSIUM REFLEX MAGNESIUM: 3.6 MMOL/L (ref 3.5–5.1)
POTASSIUM REFLEX MAGNESIUM: 4.1 MMOL/L (ref 3.5–5.1)
POTASSIUM REFLEX MAGNESIUM: 4.3 MMOL/L (ref 3.5–5.1)
PROTEIN UA: ABNORMAL MG/DL
PROTHROMBIN TIME: 14 SEC (ref 9.8–13)
RBC # BLD: 3.3 M/UL (ref 4–5.2)
RBC UA: ABNORMAL /HPF (ref 0–2)
SEDIMENTATION RATE, ERYTHROCYTE: 35 MM/HR (ref 0–30)
SODIUM BLD-SCNC: 128 MMOL/L (ref 136–145)
SODIUM BLD-SCNC: 128 MMOL/L (ref 136–145)
SODIUM BLD-SCNC: 131 MMOL/L (ref 136–145)
SODIUM URINE: <20 MMOL/L
SPECIFIC GRAVITY UA: 1.01
URINE TYPE: ABNORMAL
UROBILINOGEN, URINE: 0.2 E.U./DL
WBC # BLD: 11.1 K/UL (ref 4–11)
WBC UA: ABNORMAL /HPF (ref 0–5)

## 2018-07-20 PROCEDURE — 6360000002 HC RX W HCPCS: Performed by: FAMILY MEDICINE

## 2018-07-20 PROCEDURE — 84300 ASSAY OF URINE SODIUM: CPT

## 2018-07-20 PROCEDURE — 74018 RADEX ABDOMEN 1 VIEW: CPT

## 2018-07-20 PROCEDURE — 3700000000 HC ANESTHESIA ATTENDED CARE: Performed by: NEUROLOGICAL SURGERY

## 2018-07-20 PROCEDURE — 6360000002 HC RX W HCPCS: Performed by: NURSE ANESTHETIST, CERTIFIED REGISTERED

## 2018-07-20 PROCEDURE — 87077 CULTURE AEROBIC IDENTIFY: CPT

## 2018-07-20 PROCEDURE — 3700000001 HC ADD 15 MINUTES (ANESTHESIA): Performed by: NEUROLOGICAL SURGERY

## 2018-07-20 PROCEDURE — 85610 PROTHROMBIN TIME: CPT

## 2018-07-20 PROCEDURE — 6370000000 HC RX 637 (ALT 250 FOR IP): Performed by: FAMILY MEDICINE

## 2018-07-20 PROCEDURE — 80048 BASIC METABOLIC PNL TOTAL CA: CPT

## 2018-07-20 PROCEDURE — 2709999900 HC NON-CHARGEABLE SUPPLY: Performed by: NEUROLOGICAL SURGERY

## 2018-07-20 PROCEDURE — 3600000014 HC SURGERY LEVEL 4 ADDTL 15MIN: Performed by: NEUROLOGICAL SURGERY

## 2018-07-20 PROCEDURE — 6360000004 HC RX CONTRAST MEDICATION: Performed by: INTERNAL MEDICINE

## 2018-07-20 PROCEDURE — 7100000001 HC PACU RECOVERY - ADDTL 15 MIN: Performed by: NEUROLOGICAL SURGERY

## 2018-07-20 PROCEDURE — A9579 GAD-BASE MR CONTRAST NOS,1ML: HCPCS | Performed by: INTERNAL MEDICINE

## 2018-07-20 PROCEDURE — 3600000004 HC SURGERY LEVEL 4 BASE: Performed by: NEUROLOGICAL SURGERY

## 2018-07-20 PROCEDURE — 83935 ASSAY OF URINE OSMOLALITY: CPT

## 2018-07-20 PROCEDURE — 83930 ASSAY OF BLOOD OSMOLALITY: CPT

## 2018-07-20 PROCEDURE — 6360000002 HC RX W HCPCS: Performed by: ANESTHESIOLOGY

## 2018-07-20 PROCEDURE — 87070 CULTURE OTHR SPECIMN AEROBIC: CPT

## 2018-07-20 PROCEDURE — 6370000000 HC RX 637 (ALT 250 FOR IP): Performed by: PHYSICIAN ASSISTANT

## 2018-07-20 PROCEDURE — 2580000003 HC RX 258: Performed by: PHYSICIAN ASSISTANT

## 2018-07-20 PROCEDURE — 2500000003 HC RX 250 WO HCPCS: Performed by: NURSE ANESTHETIST, CERTIFIED REGISTERED

## 2018-07-20 PROCEDURE — 86403 PARTICLE AGGLUT ANTBDY SCRN: CPT

## 2018-07-20 PROCEDURE — 2720000010 HC SURG SUPPLY STERILE: Performed by: NEUROLOGICAL SURGERY

## 2018-07-20 PROCEDURE — 72158 MRI LUMBAR SPINE W/O & W/DYE: CPT

## 2018-07-20 PROCEDURE — 99223 1ST HOSP IP/OBS HIGH 75: CPT | Performed by: INTERNAL MEDICINE

## 2018-07-20 PROCEDURE — 83735 ASSAY OF MAGNESIUM: CPT

## 2018-07-20 PROCEDURE — 36415 COLL VENOUS BLD VENIPUNCTURE: CPT

## 2018-07-20 PROCEDURE — 6360000002 HC RX W HCPCS: Performed by: NEUROLOGICAL SURGERY

## 2018-07-20 PROCEDURE — 88304 TISSUE EXAM BY PATHOLOGIST: CPT

## 2018-07-20 PROCEDURE — 85027 COMPLETE CBC AUTOMATED: CPT

## 2018-07-20 PROCEDURE — 2580000003 HC RX 258: Performed by: FAMILY MEDICINE

## 2018-07-20 PROCEDURE — 1200000000 HC SEMI PRIVATE

## 2018-07-20 PROCEDURE — 87205 SMEAR GRAM STAIN: CPT

## 2018-07-20 PROCEDURE — 0JB70ZZ EXCISION OF BACK SUBCUTANEOUS TISSUE AND FASCIA, OPEN APPROACH: ICD-10-PCS | Performed by: NEUROLOGICAL SURGERY

## 2018-07-20 PROCEDURE — 87075 CULTR BACTERIA EXCEPT BLOOD: CPT

## 2018-07-20 PROCEDURE — 7100000000 HC PACU RECOVERY - FIRST 15 MIN: Performed by: NEUROLOGICAL SURGERY

## 2018-07-20 PROCEDURE — 6360000002 HC RX W HCPCS: Performed by: PHYSICIAN ASSISTANT

## 2018-07-20 PROCEDURE — 87186 SC STD MICRODIL/AGAR DIL: CPT

## 2018-07-20 PROCEDURE — 2580000003 HC RX 258: Performed by: NEUROLOGICAL SURGERY

## 2018-07-20 RX ORDER — ACETAMINOPHEN 325 MG/1
650 TABLET ORAL EVERY 4 HOURS PRN
Status: DISCONTINUED | OUTPATIENT
Start: 2018-07-20 | End: 2018-07-21

## 2018-07-20 RX ORDER — DIPHENHYDRAMINE HYDROCHLORIDE 50 MG/ML
12.5 INJECTION INTRAMUSCULAR; INTRAVENOUS
Status: DISCONTINUED | OUTPATIENT
Start: 2018-07-20 | End: 2018-07-20 | Stop reason: HOSPADM

## 2018-07-20 RX ORDER — ONDANSETRON 2 MG/ML
4 INJECTION INTRAMUSCULAR; INTRAVENOUS EVERY 6 HOURS PRN
Status: DISCONTINUED | OUTPATIENT
Start: 2018-07-20 | End: 2018-07-25 | Stop reason: HOSPADM

## 2018-07-20 RX ORDER — CEFAZOLIN SODIUM 1 G/3ML
INJECTION, POWDER, FOR SOLUTION INTRAMUSCULAR; INTRAVENOUS PRN
Status: DISCONTINUED | OUTPATIENT
Start: 2018-07-20 | End: 2018-07-20 | Stop reason: SDUPTHER

## 2018-07-20 RX ORDER — MAGNESIUM SULFATE 1 G/100ML
1 INJECTION INTRAVENOUS PRN
Status: DISCONTINUED | OUTPATIENT
Start: 2018-07-20 | End: 2018-07-25 | Stop reason: HOSPADM

## 2018-07-20 RX ORDER — VALSARTAN 80 MG/1
320 TABLET ORAL DAILY
Status: DISCONTINUED | OUTPATIENT
Start: 2018-07-20 | End: 2018-07-25 | Stop reason: HOSPADM

## 2018-07-20 RX ORDER — OXYCODONE HYDROCHLORIDE 5 MG/1
5 TABLET ORAL PRN
Status: DISCONTINUED | OUTPATIENT
Start: 2018-07-20 | End: 2018-07-20 | Stop reason: HOSPADM

## 2018-07-20 RX ORDER — METHOCARBAMOL 750 MG/1
750 TABLET, FILM COATED ORAL EVERY 6 HOURS PRN
Status: DISCONTINUED | OUTPATIENT
Start: 2018-07-20 | End: 2018-07-24

## 2018-07-20 RX ORDER — FAMOTIDINE 20 MG/1
20 TABLET, FILM COATED ORAL 2 TIMES DAILY
Status: DISCONTINUED | OUTPATIENT
Start: 2018-07-20 | End: 2018-07-25 | Stop reason: HOSPADM

## 2018-07-20 RX ORDER — SODIUM CHLORIDE 9 MG/ML
INJECTION, SOLUTION INTRAVENOUS CONTINUOUS
Status: DISCONTINUED | OUTPATIENT
Start: 2018-07-20 | End: 2018-07-21

## 2018-07-20 RX ORDER — OXYCODONE HYDROCHLORIDE 5 MG/1
10 TABLET ORAL PRN
Status: DISCONTINUED | OUTPATIENT
Start: 2018-07-20 | End: 2018-07-20 | Stop reason: HOSPADM

## 2018-07-20 RX ORDER — BISACODYL 10 MG
10 SUPPOSITORY, RECTAL RECTAL DAILY PRN
Status: DISCONTINUED | OUTPATIENT
Start: 2018-07-20 | End: 2018-07-25 | Stop reason: HOSPADM

## 2018-07-20 RX ORDER — CALCIUM CARBONATE 500(1250)
500 TABLET ORAL DAILY
Status: DISCONTINUED | OUTPATIENT
Start: 2018-07-20 | End: 2018-07-25 | Stop reason: HOSPADM

## 2018-07-20 RX ORDER — OXYCODONE HYDROCHLORIDE AND ACETAMINOPHEN 5; 325 MG/1; MG/1
2 TABLET ORAL EVERY 4 HOURS PRN
Status: DISCONTINUED | OUTPATIENT
Start: 2018-07-20 | End: 2018-07-21

## 2018-07-20 RX ORDER — METOCLOPRAMIDE HYDROCHLORIDE 5 MG/ML
10 INJECTION INTRAMUSCULAR; INTRAVENOUS
Status: DISCONTINUED | OUTPATIENT
Start: 2018-07-20 | End: 2018-07-20 | Stop reason: HOSPADM

## 2018-07-20 RX ORDER — ATORVASTATIN CALCIUM 20 MG/1
20 TABLET, FILM COATED ORAL DAILY
Status: DISCONTINUED | OUTPATIENT
Start: 2018-07-20 | End: 2018-07-25 | Stop reason: HOSPADM

## 2018-07-20 RX ORDER — POTASSIUM CHLORIDE 7.45 MG/ML
10 INJECTION INTRAVENOUS PRN
Status: DISCONTINUED | OUTPATIENT
Start: 2018-07-20 | End: 2018-07-25 | Stop reason: HOSPADM

## 2018-07-20 RX ORDER — DOCUSATE SODIUM 100 MG/1
100 CAPSULE, LIQUID FILLED ORAL 2 TIMES DAILY
Status: DISCONTINUED | OUTPATIENT
Start: 2018-07-20 | End: 2018-07-21

## 2018-07-20 RX ORDER — HYDRALAZINE HYDROCHLORIDE 20 MG/ML
5 INJECTION INTRAMUSCULAR; INTRAVENOUS EVERY 10 MIN PRN
Status: DISCONTINUED | OUTPATIENT
Start: 2018-07-20 | End: 2018-07-20 | Stop reason: HOSPADM

## 2018-07-20 RX ORDER — SODIUM CHLORIDE 0.9 % (FLUSH) 0.9 %
10 SYRINGE (ML) INJECTION EVERY 12 HOURS SCHEDULED
Status: DISCONTINUED | OUTPATIENT
Start: 2018-07-20 | End: 2018-07-21

## 2018-07-20 RX ORDER — HYDROCODONE BITARTRATE AND ACETAMINOPHEN 5; 325 MG/1; MG/1
2 TABLET ORAL EVERY 4 HOURS PRN
Status: DISCONTINUED | OUTPATIENT
Start: 2018-07-20 | End: 2018-07-25 | Stop reason: HOSPADM

## 2018-07-20 RX ORDER — ONDANSETRON 2 MG/ML
4 INJECTION INTRAMUSCULAR; INTRAVENOUS EVERY 6 HOURS PRN
Status: DISCONTINUED | OUTPATIENT
Start: 2018-07-20 | End: 2018-07-21

## 2018-07-20 RX ORDER — DEXTROSE MONOHYDRATE 50 MG/ML
100 INJECTION, SOLUTION INTRAVENOUS PRN
Status: DISCONTINUED | OUTPATIENT
Start: 2018-07-20 | End: 2018-07-25 | Stop reason: HOSPADM

## 2018-07-20 RX ORDER — DOCUSATE SODIUM 100 MG/1
100 CAPSULE, LIQUID FILLED ORAL 2 TIMES DAILY
Status: DISCONTINUED | OUTPATIENT
Start: 2018-07-20 | End: 2018-07-25 | Stop reason: HOSPADM

## 2018-07-20 RX ORDER — ONDANSETRON 2 MG/ML
INJECTION INTRAMUSCULAR; INTRAVENOUS PRN
Status: DISCONTINUED | OUTPATIENT
Start: 2018-07-20 | End: 2018-07-20 | Stop reason: SDUPTHER

## 2018-07-20 RX ORDER — ACETAMINOPHEN 325 MG/1
650 TABLET ORAL EVERY 4 HOURS PRN
Status: DISCONTINUED | OUTPATIENT
Start: 2018-07-20 | End: 2018-07-25 | Stop reason: HOSPADM

## 2018-07-20 RX ORDER — MORPHINE SULFATE 2 MG/ML
1 INJECTION, SOLUTION INTRAMUSCULAR; INTRAVENOUS EVERY 5 MIN PRN
Status: DISCONTINUED | OUTPATIENT
Start: 2018-07-20 | End: 2018-07-20 | Stop reason: HOSPADM

## 2018-07-20 RX ORDER — MEPERIDINE HYDROCHLORIDE 25 MG/ML
12.5 INJECTION INTRAMUSCULAR; INTRAVENOUS; SUBCUTANEOUS EVERY 5 MIN PRN
Status: DISCONTINUED | OUTPATIENT
Start: 2018-07-20 | End: 2018-07-20 | Stop reason: HOSPADM

## 2018-07-20 RX ORDER — SUCCINYLCHOLINE/SOD CL,ISO/PF 100 MG/5ML
SYRINGE (ML) INTRAVENOUS PRN
Status: DISCONTINUED | OUTPATIENT
Start: 2018-07-20 | End: 2018-07-20 | Stop reason: SDUPTHER

## 2018-07-20 RX ORDER — LABETALOL HYDROCHLORIDE 5 MG/ML
5 INJECTION, SOLUTION INTRAVENOUS EVERY 10 MIN PRN
Status: DISCONTINUED | OUTPATIENT
Start: 2018-07-20 | End: 2018-07-20 | Stop reason: HOSPADM

## 2018-07-20 RX ORDER — HYDROCODONE BITARTRATE AND ACETAMINOPHEN 5; 325 MG/1; MG/1
1 TABLET ORAL EVERY 4 HOURS PRN
Status: DISCONTINUED | OUTPATIENT
Start: 2018-07-20 | End: 2018-07-25 | Stop reason: HOSPADM

## 2018-07-20 RX ORDER — SODIUM CHLORIDE 0.9 % (FLUSH) 0.9 %
10 SYRINGE (ML) INJECTION PRN
Status: DISCONTINUED | OUTPATIENT
Start: 2018-07-20 | End: 2018-07-25 | Stop reason: HOSPADM

## 2018-07-20 RX ORDER — KETOROLAC TROMETHAMINE 30 MG/ML
30 INJECTION, SOLUTION INTRAMUSCULAR; INTRAVENOUS ONCE
Status: DISCONTINUED | OUTPATIENT
Start: 2018-07-20 | End: 2018-07-20

## 2018-07-20 RX ORDER — NICOTINE POLACRILEX 4 MG
15 LOZENGE BUCCAL PRN
Status: DISCONTINUED | OUTPATIENT
Start: 2018-07-20 | End: 2018-07-25 | Stop reason: HOSPADM

## 2018-07-20 RX ORDER — MAGNESIUM HYDROXIDE 1200 MG/15ML
LIQUID ORAL CONTINUOUS PRN
Status: COMPLETED | OUTPATIENT
Start: 2018-07-20 | End: 2018-07-20

## 2018-07-20 RX ORDER — OXYCODONE HYDROCHLORIDE AND ACETAMINOPHEN 5; 325 MG/1; MG/1
1 TABLET ORAL EVERY 4 HOURS PRN
Status: DISCONTINUED | OUTPATIENT
Start: 2018-07-20 | End: 2018-07-21

## 2018-07-20 RX ORDER — SODIUM CHLORIDE 0.9 % (FLUSH) 0.9 %
10 SYRINGE (ML) INJECTION EVERY 12 HOURS SCHEDULED
Status: DISCONTINUED | OUTPATIENT
Start: 2018-07-20 | End: 2018-07-25 | Stop reason: HOSPADM

## 2018-07-20 RX ORDER — PROMETHAZINE HYDROCHLORIDE 25 MG/ML
6.25 INJECTION, SOLUTION INTRAMUSCULAR; INTRAVENOUS
Status: DISCONTINUED | OUTPATIENT
Start: 2018-07-20 | End: 2018-07-20 | Stop reason: HOSPADM

## 2018-07-20 RX ORDER — SERTRALINE HYDROCHLORIDE 100 MG/1
100 TABLET, FILM COATED ORAL 2 TIMES DAILY
Status: DISCONTINUED | OUTPATIENT
Start: 2018-07-20 | End: 2018-07-25 | Stop reason: HOSPADM

## 2018-07-20 RX ORDER — POTASSIUM CHLORIDE 20 MEQ/1
40 TABLET, EXTENDED RELEASE ORAL PRN
Status: DISCONTINUED | OUTPATIENT
Start: 2018-07-20 | End: 2018-07-25 | Stop reason: HOSPADM

## 2018-07-20 RX ORDER — DEXTROSE MONOHYDRATE 25 G/50ML
12.5 INJECTION, SOLUTION INTRAVENOUS PRN
Status: DISCONTINUED | OUTPATIENT
Start: 2018-07-20 | End: 2018-07-25 | Stop reason: HOSPADM

## 2018-07-20 RX ORDER — CLONIDINE HYDROCHLORIDE 0.1 MG/1
0.1 TABLET ORAL EVERY 4 HOURS PRN
Status: DISCONTINUED | OUTPATIENT
Start: 2018-07-20 | End: 2018-07-25 | Stop reason: HOSPADM

## 2018-07-20 RX ORDER — KETOROLAC TROMETHAMINE 30 MG/ML
30 INJECTION, SOLUTION INTRAMUSCULAR; INTRAVENOUS ONCE
Status: COMPLETED | OUTPATIENT
Start: 2018-07-20 | End: 2018-07-20

## 2018-07-20 RX ORDER — ACETAMINOPHEN 650 MG/1
650 SUPPOSITORY RECTAL EVERY 4 HOURS PRN
Status: DISCONTINUED | OUTPATIENT
Start: 2018-07-20 | End: 2018-07-25 | Stop reason: HOSPADM

## 2018-07-20 RX ORDER — DEXAMETHASONE SODIUM PHOSPHATE 4 MG/ML
INJECTION, SOLUTION INTRA-ARTICULAR; INTRALESIONAL; INTRAMUSCULAR; INTRAVENOUS; SOFT TISSUE PRN
Status: DISCONTINUED | OUTPATIENT
Start: 2018-07-20 | End: 2018-07-20 | Stop reason: SDUPTHER

## 2018-07-20 RX ORDER — LIDOCAINE HYDROCHLORIDE 20 MG/ML
INJECTION, SOLUTION EPIDURAL; INFILTRATION; INTRACAUDAL; PERINEURAL PRN
Status: DISCONTINUED | OUTPATIENT
Start: 2018-07-20 | End: 2018-07-20 | Stop reason: SDUPTHER

## 2018-07-20 RX ORDER — PROPOFOL 10 MG/ML
INJECTION, EMULSION INTRAVENOUS PRN
Status: DISCONTINUED | OUTPATIENT
Start: 2018-07-20 | End: 2018-07-20 | Stop reason: SDUPTHER

## 2018-07-20 RX ORDER — SODIUM CHLORIDE 0.9 % (FLUSH) 0.9 %
10 SYRINGE (ML) INJECTION PRN
Status: DISCONTINUED | OUTPATIENT
Start: 2018-07-20 | End: 2018-07-21

## 2018-07-20 RX ORDER — FENTANYL CITRATE 50 UG/ML
INJECTION, SOLUTION INTRAMUSCULAR; INTRAVENOUS PRN
Status: DISCONTINUED | OUTPATIENT
Start: 2018-07-20 | End: 2018-07-20 | Stop reason: SDUPTHER

## 2018-07-20 RX ADMIN — PROPOFOL 150 MG: 10 INJECTION, EMULSION INTRAVENOUS at 17:08

## 2018-07-20 RX ADMIN — METHOCARBAMOL 750 MG: 750 TABLET ORAL at 19:10

## 2018-07-20 RX ADMIN — ENOXAPARIN SODIUM 40 MG: 40 INJECTION SUBCUTANEOUS at 08:02

## 2018-07-20 RX ADMIN — FENTANYL CITRATE 50 MCG: 50 INJECTION INTRAMUSCULAR; INTRAVENOUS at 17:09

## 2018-07-20 RX ADMIN — CEFAZOLIN SODIUM 2000 MG: 1 POWDER, FOR SOLUTION INTRAMUSCULAR; INTRAVENOUS at 17:09

## 2018-07-20 RX ADMIN — INSULIN GLARGINE 22 UNITS: 100 INJECTION, SOLUTION SUBCUTANEOUS at 22:05

## 2018-07-20 RX ADMIN — FENTANYL CITRATE 50 MCG: 50 INJECTION INTRAMUSCULAR; INTRAVENOUS at 17:16

## 2018-07-20 RX ADMIN — INSULIN LISPRO 5 UNITS: 100 INJECTION, SOLUTION INTRAVENOUS; SUBCUTANEOUS at 12:43

## 2018-07-20 RX ADMIN — SODIUM CHLORIDE: 9 INJECTION, SOLUTION INTRAVENOUS at 19:08

## 2018-07-20 RX ADMIN — HYDROCODONE BITARTRATE AND ACETAMINOPHEN 2 TABLET: 5; 325 TABLET ORAL at 20:45

## 2018-07-20 RX ADMIN — DEXAMETHASONE SODIUM PHOSPHATE 8 MG: 4 INJECTION, SOLUTION INTRAMUSCULAR; INTRAVENOUS at 17:15

## 2018-07-20 RX ADMIN — LIDOCAINE HYDROCHLORIDE 100 MG: 20 INJECTION, SOLUTION EPIDURAL; INFILTRATION; INTRACAUDAL; PERINEURAL at 17:09

## 2018-07-20 RX ADMIN — INSULIN LISPRO 2 UNITS: 100 INJECTION, SOLUTION INTRAVENOUS; SUBCUTANEOUS at 22:06

## 2018-07-20 RX ADMIN — PHENYLEPHRINE HYDROCHLORIDE 100 MCG: 10 INJECTION, SOLUTION INTRAMUSCULAR; INTRAVENOUS; SUBCUTANEOUS at 17:24

## 2018-07-20 RX ADMIN — INSULIN LISPRO 5 UNITS: 100 INJECTION, SOLUTION INTRAVENOUS; SUBCUTANEOUS at 08:04

## 2018-07-20 RX ADMIN — KETOROLAC TROMETHAMINE 30 MG: 30 INJECTION, SOLUTION INTRAMUSCULAR at 19:29

## 2018-07-20 RX ADMIN — ONDANSETRON 4 MG: 2 INJECTION INTRAMUSCULAR; INTRAVENOUS at 17:15

## 2018-07-20 RX ADMIN — Medication 100 MG: at 17:09

## 2018-07-20 RX ADMIN — HYDROCODONE BITARTRATE AND ACETAMINOPHEN 2 TABLET: 5; 325 TABLET ORAL at 08:16

## 2018-07-20 RX ADMIN — ATORVASTATIN CALCIUM 20 MG: 20 TABLET, FILM COATED ORAL at 08:03

## 2018-07-20 RX ADMIN — SODIUM CHLORIDE 75 ML/HR: 9 INJECTION, SOLUTION INTRAVENOUS at 03:58

## 2018-07-20 RX ADMIN — HYDROCODONE BITARTRATE AND ACETAMINOPHEN 2 TABLET: 5; 325 TABLET ORAL at 02:57

## 2018-07-20 RX ADMIN — HYDROMORPHONE HYDROCHLORIDE 0.5 MG: 1 INJECTION, SOLUTION INTRAMUSCULAR; INTRAVENOUS; SUBCUTANEOUS at 18:43

## 2018-07-20 RX ADMIN — GADOTERIDOL 15 ML: 279.3 INJECTION, SOLUTION INTRAVENOUS at 11:13

## 2018-07-20 RX ADMIN — CALCIUM 500 MG: 500 TABLET ORAL at 08:03

## 2018-07-20 RX ADMIN — HYDROMORPHONE HYDROCHLORIDE 0.5 MG: 1 INJECTION, SOLUTION INTRAMUSCULAR; INTRAVENOUS; SUBCUTANEOUS at 18:10

## 2018-07-20 RX ADMIN — HYDROMORPHONE HYDROCHLORIDE 0.5 MG: 1 INJECTION, SOLUTION INTRAMUSCULAR; INTRAVENOUS; SUBCUTANEOUS at 23:01

## 2018-07-20 RX ADMIN — SERTRALINE HYDROCHLORIDE 100 MG: 100 TABLET ORAL at 08:03

## 2018-07-20 RX ADMIN — INSULIN GLARGINE 22 UNITS: 100 INJECTION, SOLUTION SUBCUTANEOUS at 08:04

## 2018-07-20 RX ADMIN — DOCUSATE SODIUM 100 MG: 100 CAPSULE, LIQUID FILLED ORAL at 20:45

## 2018-07-20 RX ADMIN — CEFAZOLIN SODIUM 2000 MG: 1 POWDER, FOR SOLUTION INTRAMUSCULAR; INTRAVENOUS at 17:30

## 2018-07-20 RX ADMIN — HYDROMORPHONE HYDROCHLORIDE 0.5 MG: 1 INJECTION, SOLUTION INTRAMUSCULAR; INTRAVENOUS; SUBCUTANEOUS at 18:27

## 2018-07-20 RX ADMIN — HYDROCODONE BITARTRATE AND ACETAMINOPHEN 2 TABLET: 5; 325 TABLET ORAL at 12:56

## 2018-07-20 RX ADMIN — INSULIN LISPRO 6 UNITS: 100 INJECTION, SOLUTION INTRAVENOUS; SUBCUTANEOUS at 12:42

## 2018-07-20 RX ADMIN — INSULIN LISPRO 8 UNITS: 100 INJECTION, SOLUTION INTRAVENOUS; SUBCUTANEOUS at 08:04

## 2018-07-20 RX ADMIN — ONDANSETRON 4 MG: 2 INJECTION INTRAMUSCULAR; INTRAVENOUS at 12:56

## 2018-07-20 RX ADMIN — ONDANSETRON 4 MG: 2 INJECTION INTRAMUSCULAR; INTRAVENOUS at 03:03

## 2018-07-20 RX ADMIN — SERTRALINE HYDROCHLORIDE 100 MG: 100 TABLET ORAL at 20:45

## 2018-07-20 RX ADMIN — FAMOTIDINE 20 MG: 20 TABLET ORAL at 20:45

## 2018-07-20 RX ADMIN — ACETAMINOPHEN 650 MG: 325 TABLET ORAL at 05:29

## 2018-07-20 ASSESSMENT — PULMONARY FUNCTION TESTS
PIF_VALUE: 23
PIF_VALUE: 9
PIF_VALUE: 19
PIF_VALUE: 1
PIF_VALUE: 12
PIF_VALUE: 22
PIF_VALUE: 4
PIF_VALUE: 12
PIF_VALUE: 9
PIF_VALUE: 10
PIF_VALUE: 8
PIF_VALUE: 9
PIF_VALUE: 18
PIF_VALUE: 22
PIF_VALUE: 23
PIF_VALUE: 4
PIF_VALUE: 4
PIF_VALUE: 10
PIF_VALUE: 9
PIF_VALUE: 9
PIF_VALUE: 2
PIF_VALUE: 25
PIF_VALUE: 9
PIF_VALUE: 10
PIF_VALUE: 10
PIF_VALUE: 9
PIF_VALUE: 9
PIF_VALUE: 23
PIF_VALUE: 19
PIF_VALUE: 11
PIF_VALUE: 22
PIF_VALUE: 9
PIF_VALUE: 9
PIF_VALUE: 0
PIF_VALUE: 9
PIF_VALUE: 12
PIF_VALUE: 3
PIF_VALUE: 2
PIF_VALUE: 1
PIF_VALUE: 0
PIF_VALUE: 22
PIF_VALUE: 23
PIF_VALUE: 9
PIF_VALUE: 22
PIF_VALUE: 21
PIF_VALUE: 9
PIF_VALUE: 10
PIF_VALUE: 9
PIF_VALUE: 22
PIF_VALUE: 9
PIF_VALUE: 10
PIF_VALUE: 19
PIF_VALUE: 18
PIF_VALUE: 2
PIF_VALUE: 3
PIF_VALUE: 9
PIF_VALUE: 2
PIF_VALUE: 18
PIF_VALUE: 2
PIF_VALUE: 10
PIF_VALUE: 3
PIF_VALUE: 20
PIF_VALUE: 10
PIF_VALUE: 3
PIF_VALUE: 21
PIF_VALUE: 25
PIF_VALUE: 10

## 2018-07-20 ASSESSMENT — PAIN SCALES - GENERAL
PAINLEVEL_OUTOF10: 9
PAINLEVEL_OUTOF10: 8
PAINLEVEL_OUTOF10: 9
PAINLEVEL_OUTOF10: 3
PAINLEVEL_OUTOF10: 9
PAINLEVEL_OUTOF10: 0
PAINLEVEL_OUTOF10: 7
PAINLEVEL_OUTOF10: 9
PAINLEVEL_OUTOF10: 7
PAINLEVEL_OUTOF10: 7
PAINLEVEL_OUTOF10: 9
PAINLEVEL_OUTOF10: 8

## 2018-07-20 ASSESSMENT — PAIN DESCRIPTION - DESCRIPTORS
DESCRIPTORS: HEADACHE
DESCRIPTORS: ACHING;DISCOMFORT

## 2018-07-20 ASSESSMENT — PAIN DESCRIPTION - PROGRESSION
CLINICAL_PROGRESSION: GRADUALLY IMPROVING
CLINICAL_PROGRESSION: GRADUALLY WORSENING

## 2018-07-20 ASSESSMENT — ENCOUNTER SYMPTOMS
ABDOMINAL PAIN: 0
CONSTIPATION: 1
EYES NEGATIVE: 1
DIARRHEA: 0
VOMITING: 0
RESPIRATORY NEGATIVE: 1
NAUSEA: 1
BACK PAIN: 1

## 2018-07-20 ASSESSMENT — PAIN DESCRIPTION - PAIN TYPE
TYPE: ACUTE PAIN;CHRONIC PAIN
TYPE: ACUTE PAIN
TYPE: ACUTE PAIN;CHRONIC PAIN

## 2018-07-20 ASSESSMENT — PAIN DESCRIPTION - ONSET: ONSET: ON-GOING

## 2018-07-20 ASSESSMENT — PAIN DESCRIPTION - LOCATION
LOCATION: HEAD
LOCATION: BACK
LOCATION: BACK

## 2018-07-20 ASSESSMENT — PAIN DESCRIPTION - FREQUENCY
FREQUENCY: CONTINUOUS
FREQUENCY: CONTINUOUS

## 2018-07-20 ASSESSMENT — PAIN DESCRIPTION - ORIENTATION: ORIENTATION: MID

## 2018-07-20 NOTE — CONSULTS
NEUROSURGERY Alf Yoo  [id number]   1950 7/20/2018    Requesting physician: Cherie Casiano MD    Reason for consultation: back pain increased and draining incision    History of present illness: Patient is a 69 y.o. y/o female w/ PMH of total diskectomy at L2-3 and L3-4 from direct lateral approach by Mary Lou Stbubs  On 6/27/18 who presented on 7/20/2018 with increased back pain and incision leaking. Patient c/o that she had constipation x 5 days  And they gave her a \"bomb\" She had large amt of diarrhea and some vomiting. She c/o of some stomach pain and no appetite and nausea. ROS:   GENERAL:  + fever or recent illness.  Denies weight changes   EYES:  Denies vision change or diplopia  EARS:  Denies hearing loss  CARDIAC:  Denies chest pain  RESPIRATORY:  Denies shortness of breath  SKIN:  Denies rash or lesions   HEM:  Denies excessive bruising  PSYCH:  Denies anxiety or depression  NEURO:  Denies headache, numbness or tingling or lateralizing weakness   :  Denies urinary difficulty  GI: + nausea, vomiting, diarrhea or constipation  MUSCULOSKELETAL:  No arthralgias    Allergies   Allergen Reactions    Sulfa Antibiotics Hives    Sulfa Antibiotics        Past Medical History:   Diagnosis Date    Chronic back pain     Depression     Diabetes mellitus ([address]     Hyperlipidemia     Hypertension     Retinopathy     Seasonal allergies     Spinal stenosis     Type II or unspecified type diabetes mellitus without mention of complication, not stated as uncontrolled         Past Surgical History:   Procedure Laterality Date    BACK SURGERY      CHOLECYSTECTOMY  09/2017    EYE SURGERY      LASER    HYSTERECTOMY      HYSTERECTOMY  1993    NECK SURGERY      OTHER SURGICAL HISTORY  4/9/18Right CTR    OTHER SURGICAL HISTORY N/A 06/27/2018    EXTREME LATERAL INTERBODY FUSION LEFT APPROACH L2-3, L3-4,    OVARY REMOVAL  1993    SPINAL FUSION  1999    c-spine    SPINAL FUSION  2003 lumbar-spine per Dr Ranae Halsted.  TONSILLECTOMY AND ADENOIDECTOMY  1953    TUBAL LIGATION  1976       Social History     Occupational History     [employer]. Social History Main Topics    Smoking status: Never Smoker    Smokeless tobacco: Never Used    Alcohol use No    Drug use: No    Sexual activity: Not on file        Family History   Problem Relation Age of Onset    Cancer Mother         melanoma    Diabetes Father         No outpatient prescriptions have been marked as taking for the 7/19/18 encounter [facility] Encounter).       Current Facility-Administered Medications   Medication Dose Route Frequency Provider Last Rate Last Dose    atorvastatin (LIPITOR) tablet 20 mg  20 mg Oral Daily Nadine Saba MD   20 mg at 07/20/18 0803    calcium elemental (OSCAL) tablet 500 mg  500 mg Oral Daily Nadine Saba MD   500 mg at 07/20/18 2326    docusate sodium (COLACE) capsule 100 mg  100 mg Oral BID Nadine Saba MD        insulin glargine (LANTUS) injection pen 22 Units  22 Units Subcutaneous BID Nadine Saba MD   22 Units at 07/20/18 0804    insulin lispro (HUMALOG) injection pen 5 Units  5 Units Subcutaneous TID  Nadine Saba MD   5 Units at 07/20/18 0804    sertraline (ZOLOFT) tablet 100 mg  100 mg Oral BID Nadine Saba MD   100 mg at 07/20/18 7706    sodium chloride flush 0.9 % injection 10 mL  10 mL Intravenous 2 times per day Nadine Saba MD        sodium chloride flush 0.9 % injection 10 mL  10 mL Intravenous PRN Nadine Saba MD        magnesium hydroxide (MILK OF MAGNESIA) 400 MG/5ML suspension 30 mL  30 mL Oral Daily PRN Nadine Saba MD        ondansetron [facility]) injection 4 mg  4 mg Intravenous Q6H PRN Nadine Saba MD   4 mg at 07/20/18 0303    enoxaparin (LOVENOX) injection 40 mg  40 mg Subcutaneous Daily Nadine Saba MD   40 mg at 07/20/18 0802    0.9 % sodium chloride infusion   Intravenous Continuous Nadine Saba MD 75 mL/hr at

## 2018-07-20 NOTE — ANESTHESIA PRE PROCEDURE
Department of Anesthesiology  Preprocedure Note       Name:  Dara Gilbert   Age:  69 y.o.  :  1950                                          MRN:  [id number]         Date:  2018      Surgeon: Turner Barthel): Caryl Marvin MD    Procedure: Procedure(s):  LUMBAR WOUND INCISION AND DRAINAGE / WASHOUT    Medications prior to admission:   Prior to Admission medications    Medication Sig Start Date End Date Taking?  Authorizing Provider   insulin glargine (LANTUS SOLOSTAR) 100 UNIT/ML injection pen Inject 22 Units into the skin 2 times daily 18   Miguelina Smith MD   insulin lispro (HUMALOG) 100 UNIT/ML pen Inject 5 Units into the skin 3 times daily (with meals) 18   Miguelina Smith MD   calcium carbonate (OSCAL) 500 MG TABS tablet Take 500 mg by mouth daily    Historical Provider, MD   docusate sodium (COLACE) 100 MG capsule Take 100 mg by mouth 2 times daily    Historical Provider, MD   valsartan-hydrochlorothiazide (DIOVAN-HCT) 320-25 MG per tablet Take 1 tablet by mouth daily 18   Morris Majano MD   Needles & Syringes MISC Inject 1 each into the skin 3 times daily 18   Morris Majano MD   atorvastatin (LIPITOR) 20 MG tablet Take 1 tablet by mouth daily 18   Morris Majano MD   sertraline (ZOLOFT) 100 MG tablet 100 mg 2 times daily  17   Historical Provider, MD       Current medications:    Current Facility-Administered Medications   Medication Dose Route Frequency Provider Last Rate Last Dose    atorvastatin (LIPITOR) tablet 20 mg  20 mg Oral Daily Samia Pemberton MD   20 mg at 18 0803    calcium elemental (OSCAL) tablet 500 mg  500 mg Oral Daily Samia Pemberton MD   500 mg at 18 3705    docusate sodium (COLACE) capsule 100 mg  100 mg Oral BID Samia Pemberton MD        insulin glargine (LANTUS) injection pen 22 Units  22 Units Subcutaneous BID Samia Pemberton MD   22 Units at 18 0804    insulin lispro (HUMALOG) injection pen 5 Units  5 Units Subcutaneous TID BLANE Harper MD   5 Units at 07/20/18 1243    sertraline (ZOLOFT) tablet 100 mg  100 mg Oral BID Shari Harper MD   100 mg at 07/20/18 0803    sodium chloride flush 0.9 % injection 10 mL  10 mL Intravenous 2 times per day Shari Harper MD        sodium chloride flush 0.9 % injection 10 mL  10 mL Intravenous PRN Shari Harper MD        magnesium hydroxide (MILK OF MAGNESIA) 400 MG/5ML suspension 30 mL  30 mL Oral Daily PRN Shari Harper MD        ondansetron [facility]) injection 4 mg  4 mg Intravenous Q6H PRN Shari Harper MD   4 mg at 07/20/18 1256    enoxaparin (LOVENOX) injection 40 mg  40 mg Subcutaneous Daily Shari Harper MD   40 mg at 07/20/18 0802    0.9 % sodium chloride infusion   Intravenous Continuous Shari Harper MD 75 mL/hr at 07/20/18 0358 75 mL/hr at 07/20/18 0358    potassium chloride (KLOR-CON M) extended release tablet 40 mEq  40 mEq Oral PRN Shari Harper MD        Or    potassium bicarb-citric acid (EFFER-K) effervescent tablet 40 mEq  40 mEq Oral PRN Shari Harper MD        Or   Mary Beth Boyd potassium chloride 10 mEq/100 mL IVPB (Peripheral Line)  10 mEq Intravenous PRN Shari Harper MD        magnesium sulfate 1 g in dextrose 5% 100 mL IVPB  1 g Intravenous PRN Shari Harper MD        acetaminophen (TYLENOL) tablet 650 mg  650 mg Oral Q4H PRN Shari Harper MD   650 mg at 07/20/18 0529    HYDROcodone-acetaminophen (NORCO) 5-325 MG per tablet 1 tablet  1 tablet Oral Q4H PRN Shari Harper MD        Or    HYDROcodone-acetaminophen [facility]) 5-325 MG per tablet 2 tablet  2 tablet Oral Q4H PRN Shari Harper MD   2 tablet at 07/20/18 1256    insulin lispro (HUMALOG) injection pen 0-12 Units  0-12 Units Subcutaneous TID  Shari Harper MD   6 Units at 07/20/18 1242    insulin lispro (HUMALOG) injection pen 0-6 Units  0-6 Units Subcutaneous Nightly Shari Harper MD        valsartan (DIOVAN) tablet 320 mg  320 mg Oral Daily Shari Harper MD INTERBODY FUSION LEFT APPROACH L2-3, L3-4,    OVARY REMOVAL  1993    SPINAL FUSION  1999    c-spine    SPINAL FUSION  2003    lumbar-spine per Dr Javier Cheek.      TONSILLECTOMY AND ADENOIDECTOMY  1953    TUBAL LIGATION  1976       Social History:    Social History   Substance Use Topics    Smoking status: Never Smoker    Smokeless tobacco: Never Used    Alcohol use No                                Counseling given: Not Answered      Vital Signs (Current):   Vitals:    07/20/18 0523 07/20/18 0757 07/20/18 1031 07/20/18 1348   BP: 110/68 101/64 (!) 100/58 (!) 104/59   Pulse: 110 107 103 113   Resp:  16 16 16   Temp:  99.7 °F (37.6 °C) 99.6 °F (37.6 °C) 100.1 °F (37.8 °C)   TempSrc:  Oral Oral Oral   SpO2: 91% 93% 94% 92%                                              BP Readings from Last 3 Encounters:   07/20/18 (!) 104/59   07/06/18 (!) 161/89   06/19/18 130/74       NPO Status:                                                                                 BMI:   Wt Readings from Last 3 Encounters:   06/27/18 165 lb (74.8 kg)   06/26/18 165 lb (74.8 kg)   06/19/18 165 lb (74.8 kg)     There is no height or weight on file to calculate BMI.    CBC:   Lab Results   Component Value Date    WBC 11.1 07/20/2018    RBC 3.30 07/20/2018    HGB 9.8 07/20/2018    HCT 28.9 07/20/2018    MCV 87.7 07/20/2018    RDW 13.8 07/20/2018     07/20/2018       CMP:   Lab Results   Component Value Date     07/20/2018    K 3.6 07/20/2018    CL 90 07/20/2018    CO2 26 07/20/2018    BUN 21 07/20/2018    CREATININE 0.8 07/20/2018    GFRAA >60 07/20/2018    AGRATIO 2.1 06/19/2018    LABGLOM >60 07/20/2018    GLUCOSE 165 07/20/2018    PROT 6.9 06/19/2018    CALCIUM 9.1 07/20/2018    BILITOT 0.3 06/19/2018    ALKPHOS 101 06/19/2018    AST 20 06/19/2018    ALT 15 06/19/2018       POC Tests:   Recent Labs      07/20/18   1028   POCGLU  251*       Coags:   Lab Results   Component Value Date    PROTIME 14.0 07/20/2018    INR 1.23

## 2018-07-20 NOTE — CARE COORDINATION
Pt is from [facility] [place] [facility] and is able to return there upon discharge. Kerri Walker is available at [facility] spring this weekend and can come over to talk with the patient is it is likely that she will be discharged. Per notes ID is on board and cultures are going to be obtained in OR and will need to wait for cultures to come back. Pt will probably be here over the weekend.      Electronically signed by Ag Francisco RN on 7/20/2018 at 5:00 PM

## 2018-07-20 NOTE — CONSULTS
Nephrology Consult Note  Patient's Name: Yoon Jolly  10:45 AM  7/20/2018    Reason for Consult:  Hyponatremia  Requesting Physician:  Sandi Hines MD      Chief Complaint:  Back pain     History of Present Ilness:    Yoon Jolly is a 69 y.o. female with prior history of DM1, HTN, and other as below who presented from [address] [place] [address] rehab for increased back pain after receiving a strong bowel regimen yesterday for constipation and having copious frequent diarrhea afterwards. She had back surgery by Dr Rao Sellers on 6/27 and states her pain has been managed with opiates, but increased acutely after her BMs started yesterday. She was also found to have leukocytosis and hypoNa on labs. Today, she states frequency of her BMs has decreased, she had 1 this am, less watery. She still feels nauseous and states anti-nausea meds haven't been working for her. Still c/o abdominal pain and back pain. No issues with urination or leg swelling. Past Medical History:   Diagnosis Date    Chronic back pain     Depression     Diabetes mellitus ([address]     Hyperlipidemia     Hypertension     Retinopathy     Seasonal allergies     Spinal stenosis     Type II or unspecified type diabetes mellitus without mention of complication, not stated as uncontrolled        Past Surgical History:   Procedure Laterality Date    BACK SURGERY      CHOLECYSTECTOMY  09/2017    EYE SURGERY      LASER    HYSTERECTOMY      HYSTERECTOMY  1993    NECK SURGERY      OTHER SURGICAL HISTORY  4/9/18Right CTR    OTHER SURGICAL HISTORY N/A 06/27/2018    EXTREME LATERAL INTERBODY FUSION LEFT APPROACH L2-3, L3-4,    OVARY REMOVAL  1993    SPINAL FUSION  1999    c-spine    SPINAL FUSION  2003    lumbar-spine per Dr Rao Sellers.      TONSILLECTOMY AND ADENOIDECTOMY  1953    TUBAL LIGATION  1976       Family History   Problem Relation Age of Onset    Cancer Mother         melanoma    Diabetes Father         reports that she has eomi, mmm  Neck: no bruits or jvd noted  Cardiovascular:  S1, S2 without m/r/g  Respiratory: CTA B without w/r/r  Abdomen:  +bs, soft, diffusely TTP, some voluntary guarding  Ext: no lower extremity edema  Psychiatric: mood and affect appropriate  Musculoskeletal:  Rom, muscular strength intact    Data:   Labs:  CBC:   Recent Labs      07/19/18 2313 07/20/18   0503   WBC  13.9*  11.1*   HGB  11.0*  9.8*   PLT  212  172     BMP:  Recent Labs      07/19/18 2313 07/20/18   0503   NA  126*  128*   K  3.7  4.1   CL  85*  90*   CO2  26  26   BUN  23*  21*   CREATININE  0.9  0.7   GLUCOSE  246*  311*     Ca/Mg/Phos: Recent Labs      07/19/18 2313 07/20/18   0503   CALCIUM  9.5  8.9   MG   --   1.60*     Hepatic: No results for input(s): AST, ALT, ALB, BILITOT, ALKPHOS in the last 72 hours. Troponin: No results for input(s): TROPONINI in the last 72 hours. BNP: No results for input(s): BNP in the last 72 hours. Lipids: No results for input(s): CHOL, TRIG, HDL, LDLCALC, LABVLDL in the last 72 hours. ABGs: No results for input(s): PHART, PO2ART, QNG6MXC in the last 72 hours. INR: No results for input(s): INR in the last 72 hours. UA:  Recent Labs      07/19/18 2313   COLORU  Yellow   CLARITYU  Clear   GLUCOSEU  100*   BILIRUBINUR  Negative   KETUA  TRACE*   SPECGRAV  1.010   BLOODU  Negative   PHUR  6.0   PROTEINU  TRACE*   UROBILINOGEN  0.2   NITRU  Negative   LEUKOCYTESUR  TRACE*   LABMICR  YES   URINETYPE  Not Specified      Urine Microscopic: Recent Labs      07/19/18 2313   BACTERIA  1+*   WBCUA  0-2   RBCUA  0-2     Urine Culture: No results for input(s): LABURIN in the last 72 hours. Urine Chemistry: No results for input(s): Sherleen Dea, PROTEINUR, NAUR in the last 72 hours. IMAGING:  XR CHEST PORTABLE   Final Result      No acute cardiopulmonary disease.       Interpreted by:   Shivam Sol MD      Signed by:   Shivam Jeffers., MD   7/19/18      Final result      MRI LUMBAR [address]    (Results Pending)   XR ABDOMEN (KUB) (SINGLE AP VIEW)    (Results Pending)       Assessment/Plan   1. Hyponatremia - Na 126 (corrected 128) on presentation, up this am to 128 (corrected 133) after IVFs, pain control, and holding home HCTZ. Likely 2/2 combination of HCTZ diuretic, decreased PO intake, and possibly some component of SIADH 2/2 pain and stress of recent surgery. Diuretic likely major component given concordant hypochloremia. - urine sodium and osmolality ordered  - serum osmolality ordered  - cont holding diuretics  - monitor renal panel daily    2. Hx of HTN - patient on valsartan and HCTZ at home. Current BPs 90s-110s/60s  - hold BP meds in setting of soft BPs    3. Anemia - Hb 9.8 this am, close to recent baseline 9-8    4. Acid- base/ Electrolytes   - treat hypoNa and hypoCl as above  - bicarb 26 wnl    5. RAISA - patient had RAISA on presentation w Cr 0.9 up from 0.6. Cr improved to 0.7 this am.  ml overnight.         Thank you for allowing us to participate in care of Valeriano Donnelly MD, PGY-2    W/D/W/A Tayo Santiago MD  Feel free to contact me   [facility] [address]  Office : [phone]  Fax :[phone]      Agree with plan  Above   Fluid restriction   Monitor NA

## 2018-07-20 NOTE — PLAN OF CARE
Problem: Falls - Risk of:  Goal: Will remain free from falls  Will remain free from falls   Outcome: Ongoing  Pt has remained free of falls throughout shift. Pt has all fall precautions in place: bed in lowest position with alarm on, nonskid footwear on pt, fall signs posted, and call light within reach. Pt instructed to call out if in need of assistance. Will continue to monitor. Problem: Pain:  Goal: Pain level will decrease  Pain level will decrease   Outcome: Ongoing  Patient complains of continuous pain that requires consistent intervention. Patient rates pain 9/10 on 0-10 scale. Pain currently controlled with oral medications. Will continue to monitor.

## 2018-07-20 NOTE — H&P
[facility]    7/20/2018 6:49 AM    Patient Information:  Link Dorantes is a 69 y.o. female [id number]  PCP:  Susy Gonzalez MD (Tel: [phone] )    Chief complaint:    Chief Complaint   Patient presents with    Back Pain     Patient recently had back surgery by Dr. James Lin. Sent to [facility] for rehab following discharge. Patient reports she initially did well however was given an enema yesterday for constipation and now c/o increased back pain. Patient also reports abnormal labs, glucose 412, sodium 124, WBC 12.8        History of Present Illness:  Sanjay Jon is a 69 y.o. female who presents with Back pain. Patient has been at rehab facility following recent lumbar spine surgery. Patient reports she's also had some severe constipation. Nursing facility gave her a \"bomb\" and she has had problems since. Patient states had explosive diarrhea after this. She is also had some nausea and vomiting and poor p.o. intake at that time. She denies any chest pain shortness of breath. She denies any fever or chills. REVIEW OF SYSTEMS:   Constitutional: Negative for fever,chills or night sweats  ENT: Negative for rhinorrhea, epistaxis, hoarseness, sore throat. Respiratory: Negative for shortness of breath,wheezing  Cardiovascular: Negative for chest pain, palpitations   Gastrointestinal: Per HPI  Genitourinary: Negative for polyuria, dysuria   Hematologic/Lymphatic: Negative for bleeding tendency, easy bruising  Musculoskeletal: Negative for myalgias and arthralgias  Neurologic: Negative for confusion,dysarthria ; chronic back pain. She also is postop. Skin: Negative for itching,rash; positive for postoperative drainage from wound  Psychiatric: Negative for depression,anxiety, agitation. Endocrine: Negative for polydipsia,polyuria,heat /cold intolerance.     Past Medical History:   has a past medical history of Chronic back pain; Depression; Diabetes mellitus [address] [address] Hyperlipidemia; Hypertension; Retinopathy; Seasonal allergies; Spinal stenosis; and Type II or unspecified type diabetes mellitus without mention of complication, not stated as uncontrolled. Past Surgical History:   has a past surgical history that includes back surgery; Neck surgery; Hysterectomy; Tonsillectomy and adenoidectomy (1953); Ovary removal (1993); Hysterectomy (1993); Tubal ligation (1976); Spinal fusion (1999); Spinal fusion (2003); Cholecystectomy (09/2017); other surgical history (4/9/18Right CTR); eye surgery; and other surgical history (N/A, 06/27/2018). Medications:  No current facility-administered medications on file prior to encounter. Current Outpatient Prescriptions on File Prior to Encounter   Medication Sig Dispense Refill    insulin glargine (LANTUS SOLOSTAR) 100 UNIT/ML injection pen Inject 22 Units into the skin 2 times daily 5 pen 0    insulin lispro (HUMALOG) 100 UNIT/ML pen Inject 5 Units into the skin 3 times daily (with meals) 5 pen 0    calcium carbonate (OSCAL) 500 MG TABS tablet Take 500 mg by mouth daily      docusate sodium (COLACE) 100 MG capsule Take 100 mg by mouth 2 times daily      valsartan-hydrochlorothiazide (DIOVAN-HCT) 320-25 MG per tablet Take 1 tablet by mouth daily 90 tablet 0    Needles & Syringes MISC Inject 1 each into the skin 3 times daily 100 each 3    atorvastatin (LIPITOR) 20 MG tablet Take 1 tablet by mouth daily 90 tablet 0    sertraline (ZOLOFT) 100 MG tablet 100 mg 2 times daily          Allergies: Allergies   Allergen Reactions    Sulfa Antibiotics Hives    Sulfa Antibiotics         Social History:   reports that she has never smoked. She has never used smokeless tobacco. She reports that she does not drink alcohol or use drugs. Family History:  family history includes Cancer in her mother; Diabetes in her father.       Physical Exam:  /68 Pulse 110   Temp 98.8 °F (37.1 °C) (Oral)   Resp 18   SpO2 91%     General appearance:  Appears comfortable. Well nourished  Eyes: conjunctiva clear, sclera anicteric  ENT: Moist mucus membranes  Neck:  Supple, no masses  Cardiovascular: Regular rhythm, normal S1, S2. No murmur, gallop, rub. No edema in lower extremities  Respiratory: Clear to auscultation bilaterally, no wheeze, good inspiratory effort  Gastrointestinal: Abdomen soft, non-tender, not distended, normal bowel sounds  Musculoskeletal: strength symmetric  Neurology: awake and alert; no facial asymmetry, CN 2-12 appear intact  No speech or motor deficits  Psychiatry: Appropriate affect. Not agitated, cooperative  Skin: Warm, dry, no rash    Labs:  CBC:   Lab Results   Component Value Date    WBC 11.1 07/20/2018    RBC 3.30 07/20/2018    HGB 9.8 07/20/2018    HCT 28.9 07/20/2018    MCV 87.7 07/20/2018    MCH 29.8 07/20/2018    MCHC 34.0 07/20/2018    RDW 13.8 07/20/2018     07/20/2018    MPV 8.7 07/20/2018     BMP:    Lab Results   Component Value Date     07/20/2018    K 4.1 07/20/2018    CL 90 07/20/2018    CO2 26 07/20/2018    BUN 21 07/20/2018    CREATININE 0.7 07/20/2018    CALCIUM 8.9 07/20/2018    GFRAA >60 07/20/2018    LABGLOM >60 07/20/2018    GLUCOSE 311 07/20/2018         Imaging: Chest x-ray shows no acute process         Assessment/Plan: Acute on chronic back pain--unclear as to the source. Patient did have recent surgery. ER spoke with neurosurgery last night they recommended a follow-up MRI today. Postoperative wound drainage--patient has some serous drainage from her wound. The left-sided wound seems to have some opening in maceration of the wound margin  Active Problems:    Hypertension--we'll monitor her blood pressure. Continue with Diovan but stopped the hydrochlorothiazide since she has hyponatremia    Chronic back pain--symptom management.   Neurosurgery evaluation    Type 1 diabetes mellitus without

## 2018-07-20 NOTE — PROGRESS NOTES
PACU Transfer Note    Vitals:    07/20/18 1930   BP: (!) 106/56   Pulse: 114   Resp: 19   Temp: 99.3 °F (37.4 °C)   SpO2: 98%     BP stable. Meets jacques criteria for transfer to floor     In: 285 [P.O.:100; I.V.:185]  Out: -     Pain assessment:  present - adequately treated; states pain a lot but a huge improvement from before surgery. Pain Level: 8; dozes off to sleep. Report given to Receiving unit RHIANNON Smith.     7/20/2018 7:45 PM

## 2018-07-20 NOTE — ED PROVIDER NOTES
[address] [facility] ENCOUNTER          ATTENDING PHYSICIAN NOTE       Date of evaluation: 7/19/2018    Chief Complaint     Back Pain (Patient recently had back surgery by Dr. Rea Horne. Sent to [facility] for rehab following discharge. Patient reports she initially did well however was given an enema yesterday for constipation and now c/o increased back pain. Patient also reports abnormal labs, glucose 412, sodium 124, WBC 12.8)      History of Present Illness     Doy Bernheim is a 69 y.o. female who presents to the emergency department complaining of back pain and abnormal labs. Patient had total discectomy at L2-3 and L3-4 from a direct lateral approach with placement of structural interbody cage hent L2-3 and L3-4, removal of rods at L4-5 bilaterally, placement of pedicle screws at L2 and L3 bilaterally, decompressive laminectomy at L3-4 with microdissection and posterior lateral intertransverse fusion at L2, L3, and L4 with allograft an autograft placement of new rods from L2 to L5 by Dr. Ash Mohamud on June 27. Patient was discharged to nursing home for rehabilitation. She states that she had been doing well but yesterday started developing issues with constipation. She was given laxatives and had multiple bowel movements but states since then her pain has become worse. She denies any numbness or weakness in the extremities. She denies any loss of control of her bladder. She states she has had fevers at the nursing home as high as 100.5. She denies any cough or shortness of breath. She has noted drainage coming from the surgical site. She had labs drawn today that showed elevated white blood cell count as well as low sodium so came to the emergency department for evaluation. He does note increased pain in the back but also states that she has not been given any pain medication other than Tylenol when she had previously been on Oxycodone.     Review of Systems LABS:   Results for orders placed or performed during the hospital encounter of 07/19/18   CBC auto differential   Result Value Ref Range    WBC 13.9 (H) 4.0 - 11.0 K/uL    RBC 3.67 (L) 4.00 - 5.20 M/uL    Hemoglobin 11.0 (L) 12.0 - 16.0 g/dL    Hematocrit 32.3 (L) 36.0 - 48.0 %    MCV 87.9 80.0 - 100.0 fL    MCH 30.0 26.0 - 34.0 pg    MCHC 34.1 31.0 - 36.0 g/dL    RDW 13.7 12.4 - 15.4 %    Platelets 777 809 - 191 K/uL    MPV 9.2 5.0 - 10.5 fL    Neutrophils % 84.8 %    Lymphocytes % 0.8 %    Monocytes % 2.8 %    Eosinophils % 10.5 %    Basophils % 1.1 %    Neutrophils # 11.8 (H) 1.7 - 7.7 K/uL    Lymphocytes # 0.1 (L) 1.0 - 5.1 K/uL    Monocytes # 0.4 0.0 - 1.3 K/uL    Eosinophils # 1.5 (H) 0.0 - 0.6 K/uL    Basophils # 0.2 0.0 - 0.2 K/uL   Basic Metabolic Panel (EP - 1)   Result Value Ref Range    Sodium 126 (L) 136 - 145 mmol/L    Potassium 3.7 3.5 - 5.1 mmol/L    Chloride 85 (L) 99 - 110 mmol/L    CO2 26 21 - 32 mmol/L    Anion Gap 15 3 - 16    Glucose 246 (H) 70 - 99 mg/dL    BUN 23 (H) 7 - 20 mg/dL    CREATININE 0.9 0.6 - 1.2 mg/dL    GFR Non-African American >60 >60    GFR African American >60 >60    Calcium 9.5 8.3 - 10.6 mg/dL   Urinalysis, reflex to microscopic (Lab)   Result Value Ref Range    Color, UA Yellow Straw/Yellow    Clarity, UA Clear Clear    Glucose, Ur 100 (A) Negative mg/dL    Bilirubin Urine Negative Negative    Ketones, Urine TRACE (A) Negative mg/dL    Specific Gravity, UA 1.010 1.005 - 1.030    Blood, Urine Negative Negative    pH, UA 6.0 5.0 - 8.0    Protein, UA TRACE (A) Negative mg/dL    Urobilinogen, Urine 0.2 <2.0 E.U./dL    Nitrite, Urine Negative Negative    Leukocyte Esterase, Urine TRACE (A) Negative    Microscopic Examination YES     Urine Type Not Specified    Microscopic Urinalysis   Result Value Ref Range    Mucus, UA 1+ (A) /LPF    WBC, UA 0-2 0 - 5 /HPF    RBC, UA 0-2 0 - 2 /HPF    Bacteria, UA 1+ (A) /HPF   POCT Venous   Result Value Ref Range    pH, Von 7.452 (H) 7.350 - 7.450    pCO2, Chris 43.4 40.0 - 50.0 mm Hg    pO2, Chris 24 Not Established mm Hg    HCO3, Venous 30.3 (H) 23.0 - 29.0 mmol/L    Base Excess, Chris 6 (H) -3 - 3    O2 Sat, Chris 46 Not Established %    TC02 (Calc), Chris 32 Not Established mmol/L    Lactate 2.68 (H) 0.40 - 2.00 mmol/L    Sample Type CHRIS     Performed on SEE BELOW      RECENT VITALS:  BP: (!) 131/109, Temp: 100 °F (37.8 °C), Pulse: 109, Resp: 20, SpO2: 100 %     Procedures     N/A    ED Course     Nursing Notes, Past Medical Hx, Past Surgical Hx, Social Hx, Allergies, and Family Hx were reviewed. The patient was given the following medications:  Orders Placed This Encounter   Medications    0.9 % sodium chloride bolus    morphine (PF) injection 4 mg    ondansetron (ZOFRAN) injection 4 mg       CONSULTS:  IP CONSULT TO NEUROSURGERY  IP CONSULT TO HOSPITALIST  IP CONSULT TO NEPHROLOGY  IP CONSULT TO [address] [place] [address] / MAKI / Shawn Loreto is a 69 y.o. female who presents to the emergency department status post revision spine surgery complaining of increasing back pain as well as abnormal labs. On arrival, the patient was noted to be mildly tachycardic with borderline fever. The wound of the back does have small area of dehiscence to the left superior aspect but I feel secondary to tissue maceration from the serous fluid draining. I do not see any purulent drainage, induration, or fluctuance to suggest an infection. Laboratory studies are significant for white blood count of 14,000. Urinalysis shows no evidence of infection. Renal panel shows a creatinine of 0.9 which is slightly altered from 0.6. Sodium is 126 which corrects to 128 with her hyperglycemia. Chest x-ray shows no evidence of pneumonia. I did discuss the case with Dr. Davida Suarez who was on call for the patient's neurosurgeon Dr. Rao Sellers who asked the patient be admitted to the hospitalist service.   He asked if there was no obvious source of infection to hold antibiotics at this time until they could obtain an MRI of the lumbar spine. Clinical Impression     1. Hyponatremia    2.  Post-operative pain        Disposition     PATIENT REFERRED TO:  George Patino MD  [address], [address] [facility] 21  [phone]            DISCHARGE MEDICATIONS:  New Prescriptions    No medications on file       DISPOSITION          Simran Vásquez MD  07/20/18 9600

## 2018-07-20 NOTE — PROGRESS NOTES
4 Eyes Admission Assessment     I agree as the admission nurse that 2 RN's have performed a thorough Head to Toe Skin Assessment on the patient. ALL assessment sites listed below have been assessed on admission. Areas assessed by both nurses:   [x]   Head, Face, and Ears   [x]   Shoulders, Back, and Chest  [x]   Arms, Elbows, and Hands   [x]   Coccyx, Sacrum, and Ischum  [x]   Legs, Feet, and Heels        Does the Patient have Skin Breakdown? No      Pt has healing incisions for a previous back sx on the left lateral back and 2 midline on the lower back.   Owen Prevention initiated:  No   Wound Care Orders initiated:  No      WOC nurse consulted for Pressure Injury (Stage 3,4, Unstageable, DTI, NWPT, and Complex wounds):  No      Nurse 1 eSignature: Electronically signed by Karen Pena RN on 7/20/18 at 7:18 AM    **SHARE this note so that the co-signing nurse is able to place an eSignature**    Nurse 2 eSignature: {Esignature:[id number]}

## 2018-07-20 NOTE — CONSULTS
had fever to 100 still nauseated and vomiting, but diarrhea stopped. Wound on back was still draining. BC were taken at nursing home as per patient. Patient brought to [facility] from [facility]. In the ED T max 100, RR 20, , /109. WBC 13.9  Wound negative for erythema with serous drainage, negative for purulent drainage. 7/20/18 Patient still complains of nausea and 1 episode of green villous vomiting and 9/10 back pain that is not controlled with medication. T max 100.1, RR 16, , /59. The patient denies any more episode of diarrhea, SOB, chest pain, cough, or any other symptoms. Past Medical History:    Past Medical History:   Diagnosis Date    Chronic back pain     Depression     Diabetes mellitus ([address]     Hyperlipidemia     Hypertension     Retinopathy     Seasonal allergies     Spinal stenosis     Type II or unspecified type diabetes mellitus without mention of complication, not stated as uncontrolled        Past Surgical History:    Past Surgical History:   Procedure Laterality Date    BACK SURGERY      CHOLECYSTECTOMY  09/2017    EYE SURGERY      LASER    HYSTERECTOMY      HYSTERECTOMY  1993    NECK SURGERY      OTHER SURGICAL HISTORY  4/9/18Right CTR    OTHER SURGICAL HISTORY N/A 06/27/2018    EXTREME LATERAL INTERBODY FUSION LEFT APPROACH L2-3, L3-4,    OVARY REMOVAL  1993    SPINAL FUSION  1999    c-spine    SPINAL FUSION  2003    lumbar-spine per Dr Ambriz Pi.     [address]    TUBAL LIGATION  1976       Current Medications:     atorvastatin  20 mg Oral Daily    calcium elemental  500 mg Oral Daily    docusate sodium  100 mg Oral BID    insulin glargine  22 Units Subcutaneous BID    insulin lispro  5 Units Subcutaneous TID WC    sertraline  100 mg Oral BID    sodium chloride flush  10 mL Intravenous 2 times per day    enoxaparin  40 mg Subcutaneous Daily    insulin lispro  0-12 Units Subcutaneous TID WC    insulin lispro  0-6 Units Subcutaneous Nightly    valsartan  320 mg Oral Daily       Allergies:  Sulfa antibiotics and Sulfa antibiotics    Social History:    TOBACCO:    No  ETOH:    No  DRUGS:   No  MARITAL STATUS:     OCCUPATION:       Family History:   No immunodeficiency    REVIEW OF SYSTEMS:    No fever / chills / sweats. No weight loss. No visual change, eye pain, eye discharge. No oral lesion, sore throat, dysphagia. Denies cough / sputum. Denies chest pain, palpitations. Denies n / v / abd pain. No diarrhea. Denies dysuria or change in urinary function. Denies Back pain near surgical sites   Denies skin changes, itching  Denies focal weakness, sensory change or other neurologic symptom    Denies new / worse depression, psychiatric symptoms  Denies any Endocrine or Hematologic symptoms    PHYSICAL EXAM:      Vitals:    BP (!) 104/59   Pulse 113   Temp 100.1 °F (37.8 °C) (Oral)   Resp 16   SpO2 92%     GENERAL: No apparent distress.     HEENT: Membranes moist, no oral lesion  NECK:  Supple  LUNGS: Clear b/l, no rales, no dullness  CARDIAC: RRR, no murmur appreciated  ABD:  + BS, soft / NT  EXT:  Unable to assess wound d/t bandages   NEURO: No focal neurologic findings  PSYCH: Orientation, sensorium, mood normal  LINES:  Peripheral iv    DATA:    Lab Results   Component Value Date    WBC 11.1 (H) 07/20/2018    HGB 9.8 (L) 07/20/2018    HCT 28.9 (L) 07/20/2018    MCV 87.7 07/20/2018     07/20/2018     Lab Results   Component Value Date    CREATININE 0.8 07/20/2018    BUN 20 07/20/2018     (L) 07/20/2018    K 4.3 07/20/2018    CL 92 (L) 07/20/2018    CO2 23 07/20/2018       Hepatic Function Panel:   Lab Results   Component Value Date    ALKPHOS 101 06/19/2018    ALT 15 06/19/2018    AST 20 06/19/2018    PROT 6.9 06/19/2018    BILITOT 0.3 06/19/2018    BILIDIR <0.2 10/24/2017    IBILI see below 10/24/2017    LABALBU 3.7 07/06/2018       Micro:  7/20/18 Wound cultures obtained, thereafter had worse back sx. Had temp to approximately 100.5 at facility. Referred to [facility] for further evaluation. Presents from [facility] 7/19  with worse LBP and drainage from surgical wound. T 101, WBC 13.9, drainage GS 1+WBC, 1+GPC. MRI with 'postop changes'. Not started on antibiotics. Seen by Neurosurg, plan to debridement wound this evening 7/20.     IMP/  Lumbar SSI, unclear if superficial or deep space    REC/  Debridement per Neurosurg - await findings, OR cult  After surg start on vancomycin 1.5 gm iv q 12 + cefepime 2 gm iv q 12  Will f/u on BC, wound cult and OR cullt results  Postop care per neurosurg    Discussed with pt, RN  Ravindra Meyer MD

## 2018-07-20 NOTE — PROGRESS NOTES
Hospitalist addendum. Pt admitted earlier this am by my partner,Dr Dominguez Mcgowan. Seen and examined. Complains of severe back pain not relieved by current meds.    -Has  purulent drainage from her left sided posterior fusion lumbar woun after lumbar spine Surgery 3 weeks ago.     Plan is for debridement in the OR today. Ct pain control. Increase insulin doses for better glycemic control. ID and Nephro consulted. Appreciate assistance. Will follow. Please, call with questions.     MD ZACARIAS

## 2018-07-21 LAB
ANION GAP SERPL CALCULATED.3IONS-SCNC: 11 MMOL/L (ref 3–16)
BUN BLDV-MCNC: 26 MG/DL (ref 7–20)
CALCIUM SERPL-MCNC: 8.5 MG/DL (ref 8.3–10.6)
CHLORIDE BLD-SCNC: 94 MMOL/L (ref 99–110)
CO2: 23 MMOL/L (ref 21–32)
CREAT SERPL-MCNC: 1 MG/DL (ref 0.6–1.2)
GFR AFRICAN AMERICAN: >60
GFR NON-AFRICAN AMERICAN: 55
GLUCOSE BLD-MCNC: 217 MG/DL (ref 70–99)
GLUCOSE BLD-MCNC: 267 MG/DL (ref 70–99)
GLUCOSE BLD-MCNC: 271 MG/DL (ref 70–99)
GLUCOSE BLD-MCNC: 322 MG/DL (ref 70–99)
GLUCOSE BLD-MCNC: 397 MG/DL (ref 70–99)
PERFORMED ON: ABNORMAL
POTASSIUM REFLEX MAGNESIUM: 4.3 MMOL/L (ref 3.5–5.1)
SODIUM BLD-SCNC: 128 MMOL/L (ref 136–145)

## 2018-07-21 PROCEDURE — 2580000003 HC RX 258: Performed by: PHYSICIAN ASSISTANT

## 2018-07-21 PROCEDURE — 1200000000 HC SEMI PRIVATE

## 2018-07-21 PROCEDURE — S0028 INJECTION, FAMOTIDINE, 20 MG: HCPCS | Performed by: PHYSICIAN ASSISTANT

## 2018-07-21 PROCEDURE — 6370000000 HC RX 637 (ALT 250 FOR IP): Performed by: INTERNAL MEDICINE

## 2018-07-21 PROCEDURE — 94664 DEMO&/EVAL PT USE INHALER: CPT

## 2018-07-21 PROCEDURE — 2500000003 HC RX 250 WO HCPCS: Performed by: PHYSICIAN ASSISTANT

## 2018-07-21 PROCEDURE — 6360000002 HC RX W HCPCS: Performed by: FAMILY MEDICINE

## 2018-07-21 PROCEDURE — 2580000003 HC RX 258: Performed by: NURSE PRACTITIONER

## 2018-07-21 PROCEDURE — 6360000002 HC RX W HCPCS: Performed by: NURSE PRACTITIONER

## 2018-07-21 PROCEDURE — 94761 N-INVAS EAR/PLS OXIMETRY MLT: CPT

## 2018-07-21 PROCEDURE — 94150 VITAL CAPACITY TEST: CPT

## 2018-07-21 PROCEDURE — 6360000002 HC RX W HCPCS: Performed by: PHYSICIAN ASSISTANT

## 2018-07-21 PROCEDURE — 6370000000 HC RX 637 (ALT 250 FOR IP): Performed by: FAMILY MEDICINE

## 2018-07-21 PROCEDURE — 6370000000 HC RX 637 (ALT 250 FOR IP): Performed by: PHYSICIAN ASSISTANT

## 2018-07-21 RX ORDER — SODIUM CHLORIDE 1000 MG
1 TABLET, SOLUBLE MISCELLANEOUS 2 TIMES DAILY WITH MEALS
Status: COMPLETED | OUTPATIENT
Start: 2018-07-21 | End: 2018-07-22

## 2018-07-21 RX ADMIN — DOCUSATE SODIUM 100 MG: 100 CAPSULE, LIQUID FILLED ORAL at 19:52

## 2018-07-21 RX ADMIN — CEFEPIME HYDROCHLORIDE 2 G: 2 INJECTION, POWDER, FOR SOLUTION INTRAVENOUS at 20:36

## 2018-07-21 RX ADMIN — ENOXAPARIN SODIUM 40 MG: 40 INJECTION SUBCUTANEOUS at 08:51

## 2018-07-21 RX ADMIN — INSULIN LISPRO 2 UNITS: 100 INJECTION, SOLUTION INTRAVENOUS; SUBCUTANEOUS at 22:31

## 2018-07-21 RX ADMIN — Medication 1 G: at 12:12

## 2018-07-21 RX ADMIN — DOCUSATE SODIUM 100 MG: 100 CAPSULE, LIQUID FILLED ORAL at 08:51

## 2018-07-21 RX ADMIN — CALCIUM 500 MG: 500 TABLET ORAL at 08:51

## 2018-07-21 RX ADMIN — INSULIN GLARGINE 22 UNITS: 100 INJECTION, SOLUTION SUBCUTANEOUS at 09:04

## 2018-07-21 RX ADMIN — INSULIN LISPRO 10 UNITS: 100 INJECTION, SOLUTION INTRAVENOUS; SUBCUTANEOUS at 12:12

## 2018-07-21 RX ADMIN — INSULIN LISPRO 5 UNITS: 100 INJECTION, SOLUTION INTRAVENOUS; SUBCUTANEOUS at 12:16

## 2018-07-21 RX ADMIN — Medication 15 G: at 17:42

## 2018-07-21 RX ADMIN — VANCOMYCIN HYDROCHLORIDE 1500 MG: 10 INJECTION, POWDER, LYOPHILIZED, FOR SOLUTION INTRAVENOUS at 10:20

## 2018-07-21 RX ADMIN — INSULIN LISPRO 5 UNITS: 100 INJECTION, SOLUTION INTRAVENOUS; SUBCUTANEOUS at 09:04

## 2018-07-21 RX ADMIN — INSULIN LISPRO 5 UNITS: 100 INJECTION, SOLUTION INTRAVENOUS; SUBCUTANEOUS at 17:45

## 2018-07-21 RX ADMIN — INSULIN LISPRO 6 UNITS: 100 INJECTION, SOLUTION INTRAVENOUS; SUBCUTANEOUS at 09:09

## 2018-07-21 RX ADMIN — ATORVASTATIN CALCIUM 20 MG: 20 TABLET, FILM COATED ORAL at 08:51

## 2018-07-21 RX ADMIN — SERTRALINE HYDROCHLORIDE 100 MG: 100 TABLET ORAL at 08:51

## 2018-07-21 RX ADMIN — HYDROCODONE BITARTRATE AND ACETAMINOPHEN 2 TABLET: 5; 325 TABLET ORAL at 22:24

## 2018-07-21 RX ADMIN — FAMOTIDINE 20 MG: 20 TABLET ORAL at 08:51

## 2018-07-21 RX ADMIN — SERTRALINE HYDROCHLORIDE 100 MG: 100 TABLET ORAL at 19:52

## 2018-07-21 RX ADMIN — SODIUM CHLORIDE: 9 INJECTION, SOLUTION INTRAVENOUS at 03:17

## 2018-07-21 RX ADMIN — HYDROCODONE BITARTRATE AND ACETAMINOPHEN 1 TABLET: 5; 325 TABLET ORAL at 03:18

## 2018-07-21 RX ADMIN — Medication 1 G: at 18:10

## 2018-07-21 RX ADMIN — CEFEPIME HYDROCHLORIDE 2 G: 2 INJECTION, POWDER, FOR SOLUTION INTRAVENOUS at 09:02

## 2018-07-21 RX ADMIN — HYDROCODONE BITARTRATE AND ACETAMINOPHEN 2 TABLET: 5; 325 TABLET ORAL at 13:17

## 2018-07-21 RX ADMIN — FAMOTIDINE 20 MG: 10 INJECTION INTRAVENOUS at 19:52

## 2018-07-21 RX ADMIN — VANCOMYCIN HYDROCHLORIDE 1500 MG: 10 INJECTION, POWDER, LYOPHILIZED, FOR SOLUTION INTRAVENOUS at 22:57

## 2018-07-21 RX ADMIN — INSULIN LISPRO 8 UNITS: 100 INJECTION, SOLUTION INTRAVENOUS; SUBCUTANEOUS at 17:43

## 2018-07-21 RX ADMIN — HYDROCODONE BITARTRATE AND ACETAMINOPHEN 2 TABLET: 5; 325 TABLET ORAL at 17:42

## 2018-07-21 RX ADMIN — Medication 10 ML: at 19:55

## 2018-07-21 RX ADMIN — HYDROMORPHONE HYDROCHLORIDE 0.5 MG: 1 INJECTION, SOLUTION INTRAMUSCULAR; INTRAVENOUS; SUBCUTANEOUS at 19:47

## 2018-07-21 RX ADMIN — INSULIN GLARGINE 22 UNITS: 100 INJECTION, SOLUTION SUBCUTANEOUS at 22:30

## 2018-07-21 RX ADMIN — HYDROCODONE BITARTRATE AND ACETAMINOPHEN 2 TABLET: 5; 325 TABLET ORAL at 08:52

## 2018-07-21 ASSESSMENT — PAIN DESCRIPTION - LOCATION: LOCATION: BACK

## 2018-07-21 ASSESSMENT — PAIN SCALES - GENERAL
PAINLEVEL_OUTOF10: 7
PAINLEVEL_OUTOF10: 9
PAINLEVEL_OUTOF10: 7
PAINLEVEL_OUTOF10: 4
PAINLEVEL_OUTOF10: 6
PAINLEVEL_OUTOF10: 4
PAINLEVEL_OUTOF10: 7
PAINLEVEL_OUTOF10: 7
PAINLEVEL_OUTOF10: 6

## 2018-07-21 ASSESSMENT — PAIN DESCRIPTION - PROGRESSION: CLINICAL_PROGRESSION: NOT CHANGED

## 2018-07-21 ASSESSMENT — PAIN DESCRIPTION - ONSET: ONSET: ON-GOING

## 2018-07-21 ASSESSMENT — PAIN DESCRIPTION - PAIN TYPE: TYPE: SURGICAL PAIN

## 2018-07-21 ASSESSMENT — PAIN DESCRIPTION - ORIENTATION: ORIENTATION: LOWER

## 2018-07-21 ASSESSMENT — PAIN DESCRIPTION - DESCRIPTORS: DESCRIPTORS: ACHING

## 2018-07-21 ASSESSMENT — PAIN DESCRIPTION - FREQUENCY: FREQUENCY: CONTINUOUS

## 2018-07-21 NOTE — PROGRESS NOTES
L3 nerve root. 4. Persistent mild to moderate thecal sac narrowing at L3-4.   5. Facet arthrosis with moderate left neural foraminal stenosis at   L5-S1. XR CHEST PORTABLE   Final Result      No acute cardiopulmonary disease. Interpreted by:   Robert Hampton MD      Signed by:   Robert Hampton MD   7/19/18      Final result          Assessment/Plan:    Active Hospital Problems    Diagnosis Date Noted    Hyponatremia [E87.1] 07/20/2018    Wound drainage [T14. 8XXA] 07/20/2018    Superficial incisional infection of surgical site [T81. 4XXA] 07/20/2018    Post-operative pain [G89.18]     Degenerative lumbar spinal stenosis [M48.061] 06/27/2018    Type 1 diabetes mellitus without complication (HCC) [C23.8] 09/14/2015    Chronic back pain [M54.9, G89.29]     Hypertension [I10]        Acute Medical Issues Being Addressed:  Postoperative infection- ID is following- pt debrided by [facility] on 7/20- on broad spectrum abx- culures pending. Hyperglycemia- I will cover with basal bolus regimen     Hyponatremia- hold hctz, fluid restrict, salt tabs. RAISA- prerenal v intrinsic v postrenal (obstructive)- check electrolytes, review underlying kidney function- review use of nephrotoxic meds- monitor urine output- get urine lytes to calculate FENA (or FE UREA if on diuretics)- urine osm, if no improvement by a/m get renal ultrasound to evaluate for hydronephrosis and kidney size. Work towards optimal volume status for now for maximal perfusion.       Resolved Medical Issues during this hospital stay:  n/a    Chronic Stable Medical Issue --> cont. home regimen as appropriate or otherwise noted:  htn  hld  obesity    DVT Prophylaxis: lovenox  Diet: DIET CARB CONTROL; Daily Fluid Restriction: 1500 ml  Code Status: Full Code    PT/OT Eval Status: will have see    Dispo - once acute medical processes have resolved    Kirsten Lawson MD

## 2018-07-21 NOTE — PLAN OF CARE
Problem: Skin Integrity:  Goal: Absence of new skin breakdown  Absence of new skin breakdown  Outcome: Ongoing  Pt alert oriented and assisted the pt to roll. Wound dressing with purple foam is CDI. Reposition for comfort and no new skin breakdown noted. Gowns changed and keep the pt comfortable. Pillows as bolster to support the low back region and between legs. Call light within reach and no other needs for now.

## 2018-07-21 NOTE — PROGRESS NOTES
PROGRESS NOTE    7/21/2018 7:53 AM                               Bronxshelbi Hicks                      LOS: 1 day   POD#1  s/p Lumbar wound I&D    Subjective:  No acute events overnight. Patient has no specific complaints this am.         Physical Exam:    Vitals:    07/21/18 0300   BP: 118/75   Pulse: 84   Resp: 15   Temp: 97.6 °F (36.4 °C)   SpO2: 99%       Patient seen and examined   General: Well developed. Alert and cooperative in no acute distress. HENT: atraumatic, neck supple  Eyes: Optic discs: Not tested  Pulmonary: unlabored respiratory effort  Cardiovascular:  Warm well perfused. No peripheral edema  Gastrointestinal: abdomen soft, NT, ND  Incision: CDI    Neurological:  Mental Status: Awake, alert, oriented x 4, speech clear and appropriate  Attention: Intact  Language: No aphasia or dysarthria noted  Sensation: Intact to all extremities to light touch  Coordination: Intact    Cranial Nerves:  Cranial Nerves:  II: Visual acuity not tested, denies new visual changes / diplopia  III, IV, VI: PERRL, 3 mm bilaterally, EOMI, no nystagmus noted  V: Facial sensation intact bilaterally to touch  VII: Face symmetric  VIII: Hearing intact bilaterally to spoken voice  IX: Palate movement equal bilaterally  XI: Shoulder shrug equal bilaterally  XII: Tongue midline    Musculoskeletal:   Gait: Not tested   Assist devices: None   Tone: Normal  Motor strength:    Right  Left    Right  Left    Deltoid  5 5  Hip Flex  5 5   Biceps  5 5  Knee Extensors  5 5   Triceps  5 5  Knee Flexors  5 5   Wrist Ext  5 5  Ankle Dorsiflex. 5 5   Wrist Flex  5 5  Ankle Plantarflex.   5 5   Handgrip  5 5  Ext Chase Longus  5 5   Thumb Ext  5 5         Incision: Prevena purple foam to mid lower back about a foot in CDI    Radiological Findings:  No post-op images    Labs:  Recent Labs      07/20/18   0503   WBC  11.1*   HGB  9.8*   HCT  28.9*   PLT  172       Recent Labs      07/20/18   0503   07/20/18   2328   NA  128*   < > discharge. Patient was seen and examined with Dr. Yasmine Dunaway who agrees with above assessment and plan.      Electronically signed by: RAYMUNDO Tierney, 7/21/2018 7:53 AM  [phone]

## 2018-07-21 NOTE — PROGRESS NOTES
07/20/18   2328   NA  131*  128*  128*   K  4.3  3.6  4.3   CL  92*  90*  94*   CO2  23  26  23   BUN  20  21*  26*   CREATININE  0.8  0.8  1.0   GLUCOSE  223*  165*  271*     Ca/Mg/Phos:   Recent Labs      07/20/18   0503  07/20/18   1150  07/20/18   1449  07/20/18   2328   CALCIUM  8.9  9.2  9.1  8.5   MG  1.60*   --    --    --      Hepatic: No results for input(s): AST, ALT, ALB, BILITOT, ALKPHOS in the last 72 hours. Troponin: No results for input(s): TROPONINI in the last 72 hours. BNP: No results for input(s): BNP in the last 72 hours. Lipids: No results for input(s): CHOL, TRIG, HDL, LDLCALC, LABVLDL in the last 72 hours. ABGs: No results for input(s): PHART, PO2ART, BPU1GCE in the last 72 hours. INR:   Recent Labs      07/20/18   1449   INR  1.23*     UA:  Recent Labs      07/19/18   2313   COLORU  Yellow   CLARITYU  Clear   GLUCOSEU  100*   BILIRUBINUR  Negative   KETUA  TRACE*   SPECGRAV  1.010   BLOODU  Negative   PHUR  6.0   PROTEINU  TRACE*   UROBILINOGEN  0.2   NITRU  Negative   LEUKOCYTESUR  TRACE*   LABMICR  YES   URINETYPE  Not Specified      Urine Microscopic:   Recent Labs      07/19/18   2313   BACTERIA  1+*   WBCUA  0-2   RBCUA  0-2     Urine Culture: No results for input(s): LABURIN in the last 72 hours. Urine Chemistry:   Recent Labs      07/20/18   1620   NAUR  <20             IMAGING:  XR ABDOMEN (KUB) (SINGLE AP VIEW)   Final Result   Negative abdominal bowel gas pattern. MRI LUMBAR SPINE W WO CONTRAST   Final Result      1. Postoperative changes of anterior and posterior surgical fusion   L2-L5. 2. Grade 1 retrolisthesis of L5 on S1.   3. Right paracentral disc protrusion/osteophyte at L2-3 level   causing severe right lateral recess stenosis and is likely   impinging on the traversing right L3 nerve root. 4. Persistent mild to moderate thecal sac narrowing at L3-4.   5. Facet arthrosis with moderate left neural foraminal stenosis at   L5-S1.                         XR

## 2018-07-22 LAB
ALBUMIN SERPL-MCNC: 2.8 G/DL (ref 3.4–5)
ANION GAP SERPL CALCULATED.3IONS-SCNC: 11 MMOL/L (ref 3–16)
BUN BLDV-MCNC: 32 MG/DL (ref 7–20)
CALCIUM SERPL-MCNC: 8.8 MG/DL (ref 8.3–10.6)
CHLORIDE BLD-SCNC: 97 MMOL/L (ref 99–110)
CO2: 24 MMOL/L (ref 21–32)
CREAT SERPL-MCNC: 0.7 MG/DL (ref 0.6–1.2)
GFR AFRICAN AMERICAN: >60
GFR NON-AFRICAN AMERICAN: >60
GLUCOSE BLD-MCNC: 127 MG/DL (ref 70–99)
GLUCOSE BLD-MCNC: 166 MG/DL (ref 70–99)
GLUCOSE BLD-MCNC: 189 MG/DL (ref 70–99)
GLUCOSE BLD-MCNC: 196 MG/DL (ref 70–99)
GLUCOSE BLD-MCNC: 289 MG/DL (ref 70–99)
GRAM STAIN RESULT: ABNORMAL
HCT VFR BLD CALC: 27.5 % (ref 36–48)
HEMOGLOBIN: 9.4 G/DL (ref 12–16)
MCH RBC QN AUTO: 30 PG (ref 26–34)
MCHC RBC AUTO-ENTMCNC: 34.2 G/DL (ref 31–36)
MCV RBC AUTO: 87.7 FL (ref 80–100)
ORGANISM: ABNORMAL
PDW BLD-RTO: 13.5 % (ref 12.4–15.4)
PERFORMED ON: ABNORMAL
PHOSPHORUS: 2 MG/DL (ref 2.5–4.9)
PLATELET # BLD: 144 K/UL (ref 135–450)
PMV BLD AUTO: 9.2 FL (ref 5–10.5)
POTASSIUM SERPL-SCNC: 3.3 MMOL/L (ref 3.5–5.1)
RBC # BLD: 3.14 M/UL (ref 4–5.2)
SODIUM BLD-SCNC: 132 MMOL/L (ref 136–145)
WBC # BLD: 9.7 K/UL (ref 4–11)
WOUND/ABSCESS: ABNORMAL
WOUND/ABSCESS: ABNORMAL

## 2018-07-22 PROCEDURE — 6370000000 HC RX 637 (ALT 250 FOR IP): Performed by: FAMILY MEDICINE

## 2018-07-22 PROCEDURE — 6370000000 HC RX 637 (ALT 250 FOR IP): Performed by: INTERNAL MEDICINE

## 2018-07-22 PROCEDURE — 2580000003 HC RX 258: Performed by: NURSE PRACTITIONER

## 2018-07-22 PROCEDURE — 80069 RENAL FUNCTION PANEL: CPT

## 2018-07-22 PROCEDURE — 6360000002 HC RX W HCPCS: Performed by: NURSE PRACTITIONER

## 2018-07-22 PROCEDURE — 6360000002 HC RX W HCPCS: Performed by: FAMILY MEDICINE

## 2018-07-22 PROCEDURE — 1200000000 HC SEMI PRIVATE

## 2018-07-22 PROCEDURE — 36415 COLL VENOUS BLD VENIPUNCTURE: CPT

## 2018-07-22 PROCEDURE — 6370000000 HC RX 637 (ALT 250 FOR IP): Performed by: PHYSICIAN ASSISTANT

## 2018-07-22 PROCEDURE — 85027 COMPLETE CBC AUTOMATED: CPT

## 2018-07-22 PROCEDURE — 99232 SBSQ HOSP IP/OBS MODERATE 35: CPT | Performed by: INTERNAL MEDICINE

## 2018-07-22 PROCEDURE — 2580000003 HC RX 258: Performed by: PHYSICIAN ASSISTANT

## 2018-07-22 RX ADMIN — INSULIN LISPRO 2 UNITS: 100 INJECTION, SOLUTION INTRAVENOUS; SUBCUTANEOUS at 08:58

## 2018-07-22 RX ADMIN — INSULIN LISPRO 6 UNITS: 100 INJECTION, SOLUTION INTRAVENOUS; SUBCUTANEOUS at 12:27

## 2018-07-22 RX ADMIN — Medication 10 ML: at 08:57

## 2018-07-22 RX ADMIN — INSULIN GLARGINE 22 UNITS: 100 INJECTION, SOLUTION SUBCUTANEOUS at 08:58

## 2018-07-22 RX ADMIN — DOCUSATE SODIUM 100 MG: 100 CAPSULE, LIQUID FILLED ORAL at 08:57

## 2018-07-22 RX ADMIN — ATORVASTATIN CALCIUM 20 MG: 20 TABLET, FILM COATED ORAL at 08:57

## 2018-07-22 RX ADMIN — Medication 10 ML: at 21:56

## 2018-07-22 RX ADMIN — ENOXAPARIN SODIUM 40 MG: 40 INJECTION SUBCUTANEOUS at 08:56

## 2018-07-22 RX ADMIN — SERTRALINE HYDROCHLORIDE 100 MG: 100 TABLET ORAL at 08:56

## 2018-07-22 RX ADMIN — HYDROCODONE BITARTRATE AND ACETAMINOPHEN 2 TABLET: 5; 325 TABLET ORAL at 22:35

## 2018-07-22 RX ADMIN — INSULIN LISPRO 5 UNITS: 100 INJECTION, SOLUTION INTRAVENOUS; SUBCUTANEOUS at 09:04

## 2018-07-22 RX ADMIN — Medication 1 G: at 18:25

## 2018-07-22 RX ADMIN — INSULIN GLARGINE 22 UNITS: 100 INJECTION, SOLUTION SUBCUTANEOUS at 22:15

## 2018-07-22 RX ADMIN — Medication 15 G: at 10:50

## 2018-07-22 RX ADMIN — CEFEPIME HYDROCHLORIDE 2 G: 2 INJECTION, POWDER, FOR SOLUTION INTRAVENOUS at 21:56

## 2018-07-22 RX ADMIN — INSULIN LISPRO 5 UNITS: 100 INJECTION, SOLUTION INTRAVENOUS; SUBCUTANEOUS at 18:25

## 2018-07-22 RX ADMIN — HYDROCODONE BITARTRATE AND ACETAMINOPHEN 2 TABLET: 5; 325 TABLET ORAL at 13:13

## 2018-07-22 RX ADMIN — Medication 1 G: at 08:56

## 2018-07-22 RX ADMIN — INSULIN LISPRO 5 UNITS: 100 INJECTION, SOLUTION INTRAVENOUS; SUBCUTANEOUS at 12:30

## 2018-07-22 RX ADMIN — HYDROCODONE BITARTRATE AND ACETAMINOPHEN 2 TABLET: 5; 325 TABLET ORAL at 03:52

## 2018-07-22 RX ADMIN — HYDROCODONE BITARTRATE AND ACETAMINOPHEN 2 TABLET: 5; 325 TABLET ORAL at 08:56

## 2018-07-22 RX ADMIN — SERTRALINE HYDROCHLORIDE 100 MG: 100 TABLET ORAL at 21:54

## 2018-07-22 RX ADMIN — HYDROCODONE BITARTRATE AND ACETAMINOPHEN 2 TABLET: 5; 325 TABLET ORAL at 18:31

## 2018-07-22 RX ADMIN — CALCIUM 500 MG: 500 TABLET ORAL at 08:57

## 2018-07-22 RX ADMIN — CEFEPIME HYDROCHLORIDE 2 G: 2 INJECTION, POWDER, FOR SOLUTION INTRAVENOUS at 08:56

## 2018-07-22 RX ADMIN — DOCUSATE SODIUM 100 MG: 100 CAPSULE, LIQUID FILLED ORAL at 21:55

## 2018-07-22 RX ADMIN — INSULIN LISPRO 2 UNITS: 100 INJECTION, SOLUTION INTRAVENOUS; SUBCUTANEOUS at 18:20

## 2018-07-22 RX ADMIN — VANCOMYCIN HYDROCHLORIDE 1500 MG: 10 INJECTION, POWDER, LYOPHILIZED, FOR SOLUTION INTRAVENOUS at 10:50

## 2018-07-22 RX ADMIN — FAMOTIDINE 20 MG: 20 TABLET ORAL at 08:56

## 2018-07-22 RX ADMIN — FAMOTIDINE 20 MG: 20 TABLET ORAL at 21:54

## 2018-07-22 ASSESSMENT — PAIN SCALES - GENERAL
PAINLEVEL_OUTOF10: 8
PAINLEVEL_OUTOF10: 7
PAINLEVEL_OUTOF10: 8
PAINLEVEL_OUTOF10: 8
PAINLEVEL_OUTOF10: 0
PAINLEVEL_OUTOF10: 7
PAINLEVEL_OUTOF10: 4

## 2018-07-22 ASSESSMENT — PAIN DESCRIPTION - DESCRIPTORS
DESCRIPTORS: ACHING
DESCRIPTORS: ACHING

## 2018-07-22 ASSESSMENT — PAIN DESCRIPTION - ORIENTATION
ORIENTATION: LOWER
ORIENTATION: LOWER

## 2018-07-22 ASSESSMENT — PAIN DESCRIPTION - ONSET
ONSET: ON-GOING
ONSET: ON-GOING

## 2018-07-22 ASSESSMENT — PAIN DESCRIPTION - LOCATION
LOCATION: BACK
LOCATION: BACK

## 2018-07-22 ASSESSMENT — PAIN DESCRIPTION - PAIN TYPE
TYPE: SURGICAL PAIN
TYPE: SURGICAL PAIN

## 2018-07-22 ASSESSMENT — PAIN DESCRIPTION - FREQUENCY
FREQUENCY: CONTINUOUS
FREQUENCY: CONTINUOUS

## 2018-07-22 ASSESSMENT — PAIN DESCRIPTION - PROGRESSION
CLINICAL_PROGRESSION: NOT CHANGED
CLINICAL_PROGRESSION: NOT CHANGED

## 2018-07-22 NOTE — PLAN OF CARE
Problem: Falls - Risk of:  Goal: Absence of physical injury  Absence of physical injury   Outcome: Ongoing  Pt is alert oriented and tearful at times. Pt is toileted with rounding with and back brace/ SBA. Gait is slightly steady and absence of falls and injury this shift. Pt calls light appropriately and bed alarm is ON. Pt made no attempts getting OOB without this nurse. Fall protocol in place.

## 2018-07-22 NOTE — PLAN OF CARE
Problem: Pain:  Goal: Pain level will decrease  Pain level will decrease   Outcome: Ongoing  Post ambulation ptt c/o back surgical pain. Express some soreness to lumbar area. As ordered pt was given Vicodon 2 tabs PO PRN and denies nausea. Pt showed nosigns of resp distress. Alert oriented and able to express needs related to pain. Reposition the pt for comfort and able to view the dressing to lumbar area. Assist the pt repositioning and pt tolerated well.

## 2018-07-22 NOTE — ANESTHESIA POSTPROCEDURE EVALUATION
Department of Anesthesiology  Postprocedure Note    Patient: Dick Richard  MRN: [id number]  YOB: 1950  Date of evaluation: 7/22/2018  Time:  7:58 AM     Procedure Summary     Date:  07/20/18 Room / Location:  [facility] OR [address] [place] [address] OR    Anesthesia Start:  1649 Anesthesia Stop:  1120    Procedure:  LUMBAR WOUND INCISION AND DRAINAGE / Melva Ridges (N/A ) Diagnosis:  (LUMBAR WOUND INFECTION)    Surgeon:  Vasu Neff MD Responsible Provider:  Jessica Louis MD    Anesthesia Type:  general ASA Status:  3          Anesthesia Type: No value filed. Juana Phase I: Juana Score: 10    Juana Phase II:      Last vitals: Reviewed and per EMR flowsheets.        Anesthesia Post Evaluation    Patient location during evaluation: PACU  Patient participation: complete - patient participated  Level of consciousness: awake and alert  Pain score: 0  Airway patency: patent  Nausea & Vomiting: no nausea and no vomiting  Complications: no  Cardiovascular status: hemodynamically stable  Respiratory status: acceptable  Hydration status: euvolemic

## 2018-07-22 NOTE — PROGRESS NOTES
Infectious Disease Follow up Notes    CC :  SSI after lumbar sgy     Antibiotics:   Cefepime 2q12  vanc 1500 q12     Admit Date:   7/19/2018  Hospital Day: 4    Subjective:   Tm 99.3  She is anxious about her recovery. Less pain relative to pre-surgery. No N/V/D/pruritus    Objective:     Patient Vitals for the past 8 hrs:   BP Temp Temp src Pulse Resp SpO2   07/22/18 1051 115/67 98.5 °F (36.9 °C) Oral 110 16 90 %   07/22/18 0853 112/68 99.3 °F (37.4 °C) Oral 94 16 97 %       EXAM:  General:  Alert, sitting on edge of bed, NAD    HEENT:  NCAT, PERRL, sclera anicteric. MMM, no thrush  NECK:  supple      ABD:  Soft, +BS, NT  EXT:  No LE Edema.   No acute rash  Back incision with VAC   LINE:  PIV site ok       Scheduled Meds:   vancomycin  1,500 mg Intravenous Q12H    cefepime  2 g Intravenous Q12H    sodium chloride  1 g Oral BID WC    Urea  15 g Oral Daily    atorvastatin  20 mg Oral Daily    calcium elemental  500 mg Oral Daily    insulin glargine  22 Units Subcutaneous BID    insulin lispro  5 Units Subcutaneous TID WC    sertraline  100 mg Oral BID    enoxaparin  40 mg Subcutaneous Daily    insulin lispro  0-12 Units Subcutaneous TID WC    insulin lispro  0-6 Units Subcutaneous Nightly    valsartan  320 mg Oral Daily    sodium chloride flush  10 mL Intravenous 2 times per day    docusate sodium  100 mg Oral BID    famotidine  20 mg Oral BID    famotidine (PEPCID) injection  20 mg Intravenous BID       Continuous Infusions:   dextrose            Data Review:    Lab Results   Component Value Date    WBC 9.7 07/22/2018    HGB 9.4 (L) 07/22/2018    HCT 27.5 (L) 07/22/2018    MCV 87.7 07/22/2018     07/22/2018     Lab Results   Component Value Date    CREATININE 0.7 07/22/2018    BUN 32 (H) 07/22/2018     (L) 07/22/2018    K 3.3 (L) 07/22/2018    CL 97 (L) 07/22/2018    CO2 24 07/22/2018       Hepatic Function Panel: Lab Results   Component Value Date    ALKPHOS 101 06/19/2018    ALT 15 06/19/2018    AST 20 06/19/2018    PROT 6.9 06/19/2018    BILITOT 0.3 06/19/2018    BILIDIR <0.2 10/24/2017    IBILI see below 10/24/2017    LABALBU 2.8 07/22/2018         MICRO:  7/20 Wound cx lumbar incision MSSA   Surgical culture heavy growth S aurues       IMAGING:  MRI 7/20:  Impression       1. Postoperative changes of anterior and posterior surgical fusion   L2-L5. 2. Grade 1 retrolisthesis of L5 on S1.   3. Right paracentral disc protrusion/osteophyte at L2-3 level   causing severe right lateral recess stenosis and is likely   impinging on the traversing right L3 nerve root. 4. Persistent mild to moderate thecal sac narrowing at L3-4.   5. Facet arthrosis with moderate left neural foraminal stenosis at   L5-S1.           Assessment:     Patient Active Problem List    Diagnosis Date Noted    Hyponatremia 07/20/2018    Wound drainage 07/20/2018    Superficial incisional infection of surgical site 07/20/2018    Post-operative pain     Degenerative lumbar spinal stenosis 06/27/2018    Bilateral carpal tunnel syndrome 04/02/2018    Ulnar neuropathy at elbow, left 04/02/2018    Gallstone pancreatitis 10/25/2017    Dilated cbd, acquired 09/07/2017    Osteoarthrosis 09/07/2017    Type 1 diabetes mellitus without complication ([address] 58/91/5877    Osteopenia 08/31/2015    Family history of melanoma 05/27/2014    IDDM (insulin dependent diabetes mellitus) ([address]     Depression     Hypertension     Spinal stenosis     Chronic back pain     Retinopathy     Cervical osteoarthritis 04/10/2013     Lumbar fusion 6/27    Admitted 7/19 with fever, SSI   S/p I&D 7/20.   Full op note not yet available   MSSA in cultures     Allergy sulfa       Plan:   Continue cefepime   Stop vanc   Confer with Sgy      Discussed with patient/family, all questions answered        Humble Hull MD  Phone: [phone]   Fax : [phone]

## 2018-07-22 NOTE — PROGRESS NOTES
[facility]    7/22/2018 10:35 AM        Name: Omar Carson . Admitted: 7/19/2018  Primary Care Provider: Rodríguez Fernandez MD (Tel: [phone])      Subjective:      Some back pain, drowsy today. No fevers. S/P debridement 7/20/18. Reviewed interval ancillary notes    Objective:  /68   Pulse 94   Temp 99.3 °F (37.4 °C) (Oral)   Resp 16   SpO2 97%     Intake/Output Summary (Last 24 hours) at 07/22/18 1035  Last data filed at 07/22/18 0841   Gross per 24 hour   Intake                0 ml   Output             2200 ml   Net            -2200 ml    Wt Readings from Last 3 Encounters:   06/27/18 165 lb (74.8 kg)   06/26/18 165 lb (74.8 kg)   06/19/18 165 lb (74.8 kg)       General appearance:  Appears comfortable, no distress  Sleeping soundly but arouses  Head:  Atraumatic normocephalic  Neck: supple  Cardiovascular: Regular rhythm, normal S1, S2. No murmur. No edema in lower extremities  Respiratory: Not using accessory muscles. Good inspiratory effort. Clear to auscultation bilaterally, no wheeze or crackles. GI: Abdomen soft, no tenderness, not distended, normal bowel sounds  Musculoskeletal: strength symmetric  Neurology: awake and alert, cooperative      Labs and Tests:  CBC:   Recent Labs      07/19/18   2313  07/20/18   0503  07/22/18   0531   WBC  13.9*  11.1*  9.7   HGB  11.0*  9.8*  9.4*   PLT  212  172  144     BMP:  Recent Labs      07/20/18   1449  07/20/18   2328  07/22/18   0531   NA  128*  128*  132*   K  3.6  4.3  3.3*   CL  90*  94*  97*   CO2  26  23  24   BUN  21*  26*  32*   CREATININE  0.8  1.0  0.7   GLUCOSE  165*  271*  196*     Culture + S aureus    Assessment & Plan:    Active Problems:  Superficial incisional infection of surgical site s/p I&D 7/20/18    Hypertension    Chronic back pain    Type 1 diabetes mellitus without complication (HCC)    Degenerative lumbar

## 2018-07-23 LAB
GLUCOSE BLD-MCNC: 109 MG/DL (ref 70–99)
GLUCOSE BLD-MCNC: 116 MG/DL (ref 70–99)
GLUCOSE BLD-MCNC: 251 MG/DL (ref 70–99)
GLUCOSE BLD-MCNC: 272 MG/DL (ref 70–99)
HCT VFR BLD CALC: 26.8 % (ref 36–48)
HEMOGLOBIN: 9.1 G/DL (ref 12–16)
MCH RBC QN AUTO: 30 PG (ref 26–34)
MCHC RBC AUTO-ENTMCNC: 33.9 G/DL (ref 31–36)
MCV RBC AUTO: 88.2 FL (ref 80–100)
PDW BLD-RTO: 13.8 % (ref 12.4–15.4)
PERFORMED ON: ABNORMAL
PLATELET # BLD: 145 K/UL (ref 135–450)
PMV BLD AUTO: 9.3 FL (ref 5–10.5)
RBC # BLD: 3.03 M/UL (ref 4–5.2)
WBC # BLD: 7.6 K/UL (ref 4–11)

## 2018-07-23 PROCEDURE — 2580000003 HC RX 258: Performed by: NURSE PRACTITIONER

## 2018-07-23 PROCEDURE — 36415 COLL VENOUS BLD VENIPUNCTURE: CPT

## 2018-07-23 PROCEDURE — 6370000000 HC RX 637 (ALT 250 FOR IP): Performed by: FAMILY MEDICINE

## 2018-07-23 PROCEDURE — 6370000000 HC RX 637 (ALT 250 FOR IP): Performed by: PHYSICIAN ASSISTANT

## 2018-07-23 PROCEDURE — 6360000002 HC RX W HCPCS: Performed by: FAMILY MEDICINE

## 2018-07-23 PROCEDURE — 6370000000 HC RX 637 (ALT 250 FOR IP): Performed by: INTERNAL MEDICINE

## 2018-07-23 PROCEDURE — 85027 COMPLETE CBC AUTOMATED: CPT

## 2018-07-23 PROCEDURE — 1200000000 HC SEMI PRIVATE

## 2018-07-23 PROCEDURE — 2580000003 HC RX 258: Performed by: PHYSICIAN ASSISTANT

## 2018-07-23 PROCEDURE — 6360000002 HC RX W HCPCS: Performed by: INTERNAL MEDICINE

## 2018-07-23 PROCEDURE — 99232 SBSQ HOSP IP/OBS MODERATE 35: CPT | Performed by: INTERNAL MEDICINE

## 2018-07-23 PROCEDURE — 6360000002 HC RX W HCPCS: Performed by: NURSE PRACTITIONER

## 2018-07-23 PROCEDURE — 2580000003 HC RX 258: Performed by: INTERNAL MEDICINE

## 2018-07-23 RX ORDER — SODIUM CHLORIDE 0.9 % (FLUSH) 0.9 %
10 SYRINGE (ML) INJECTION PRN
Status: DISCONTINUED | OUTPATIENT
Start: 2018-07-23 | End: 2018-07-25 | Stop reason: HOSPADM

## 2018-07-23 RX ORDER — LIDOCAINE HYDROCHLORIDE 10 MG/ML
5 INJECTION, SOLUTION EPIDURAL; INFILTRATION; INTRACAUDAL; PERINEURAL ONCE
Status: DISCONTINUED | OUTPATIENT
Start: 2018-07-23 | End: 2018-07-25 | Stop reason: HOSPADM

## 2018-07-23 RX ORDER — SODIUM CHLORIDE 0.9 % (FLUSH) 0.9 %
10 SYRINGE (ML) INJECTION EVERY 12 HOURS SCHEDULED
Status: DISCONTINUED | OUTPATIENT
Start: 2018-07-23 | End: 2018-07-25 | Stop reason: HOSPADM

## 2018-07-23 RX ADMIN — SERTRALINE HYDROCHLORIDE 100 MG: 100 TABLET ORAL at 08:31

## 2018-07-23 RX ADMIN — SERTRALINE HYDROCHLORIDE 100 MG: 100 TABLET ORAL at 21:02

## 2018-07-23 RX ADMIN — INSULIN LISPRO 5 UNITS: 100 INJECTION, SOLUTION INTRAVENOUS; SUBCUTANEOUS at 12:43

## 2018-07-23 RX ADMIN — DOCUSATE SODIUM 100 MG: 100 CAPSULE, LIQUID FILLED ORAL at 08:31

## 2018-07-23 RX ADMIN — INSULIN LISPRO 6 UNITS: 100 INJECTION, SOLUTION INTRAVENOUS; SUBCUTANEOUS at 16:50

## 2018-07-23 RX ADMIN — INSULIN LISPRO 5 UNITS: 100 INJECTION, SOLUTION INTRAVENOUS; SUBCUTANEOUS at 16:50

## 2018-07-23 RX ADMIN — HYDROCODONE BITARTRATE AND ACETAMINOPHEN 2 TABLET: 5; 325 TABLET ORAL at 03:59

## 2018-07-23 RX ADMIN — CEFAZOLIN 2 G: 10 INJECTION, POWDER, FOR SOLUTION INTRAVENOUS; PARENTERAL at 22:39

## 2018-07-23 RX ADMIN — ENOXAPARIN SODIUM 40 MG: 40 INJECTION SUBCUTANEOUS at 08:31

## 2018-07-23 RX ADMIN — INSULIN GLARGINE 22 UNITS: 100 INJECTION, SOLUTION SUBCUTANEOUS at 08:35

## 2018-07-23 RX ADMIN — METHOCARBAMOL 750 MG: 750 TABLET ORAL at 08:31

## 2018-07-23 RX ADMIN — INSULIN LISPRO 6 UNITS: 100 INJECTION, SOLUTION INTRAVENOUS; SUBCUTANEOUS at 12:43

## 2018-07-23 RX ADMIN — Medication 10 ML: at 08:32

## 2018-07-23 RX ADMIN — DOCUSATE SODIUM 100 MG: 100 CAPSULE, LIQUID FILLED ORAL at 21:02

## 2018-07-23 RX ADMIN — CEFEPIME HYDROCHLORIDE 2 G: 2 INJECTION, POWDER, FOR SOLUTION INTRAVENOUS at 08:32

## 2018-07-23 RX ADMIN — HYDROCODONE BITARTRATE AND ACETAMINOPHEN 2 TABLET: 5; 325 TABLET ORAL at 08:31

## 2018-07-23 RX ADMIN — FAMOTIDINE 20 MG: 20 TABLET ORAL at 21:02

## 2018-07-23 RX ADMIN — METHOCARBAMOL 750 MG: 750 TABLET ORAL at 16:51

## 2018-07-23 RX ADMIN — HYDROCODONE BITARTRATE AND ACETAMINOPHEN 2 TABLET: 5; 325 TABLET ORAL at 12:51

## 2018-07-23 RX ADMIN — HYDROCODONE BITARTRATE AND ACETAMINOPHEN 2 TABLET: 5; 325 TABLET ORAL at 16:51

## 2018-07-23 RX ADMIN — VALSARTAN 320 MG: 80 TABLET, FILM COATED ORAL at 08:31

## 2018-07-23 RX ADMIN — FAMOTIDINE 20 MG: 20 TABLET ORAL at 08:31

## 2018-07-23 RX ADMIN — Medication 15 G: at 08:32

## 2018-07-23 RX ADMIN — ATORVASTATIN CALCIUM 20 MG: 20 TABLET, FILM COATED ORAL at 08:31

## 2018-07-23 RX ADMIN — CALCIUM 500 MG: 500 TABLET ORAL at 08:31

## 2018-07-23 RX ADMIN — INSULIN GLARGINE 22 UNITS: 100 INJECTION, SOLUTION SUBCUTANEOUS at 21:07

## 2018-07-23 RX ADMIN — HYDROCODONE BITARTRATE AND ACETAMINOPHEN 2 TABLET: 5; 325 TABLET ORAL at 21:01

## 2018-07-23 ASSESSMENT — PAIN DESCRIPTION - PROGRESSION
CLINICAL_PROGRESSION: NOT CHANGED

## 2018-07-23 ASSESSMENT — PAIN DESCRIPTION - LOCATION
LOCATION: BACK

## 2018-07-23 ASSESSMENT — PAIN SCALES - GENERAL
PAINLEVEL_OUTOF10: 0
PAINLEVEL_OUTOF10: 8
PAINLEVEL_OUTOF10: 9
PAINLEVEL_OUTOF10: 8
PAINLEVEL_OUTOF10: 0
PAINLEVEL_OUTOF10: 7
PAINLEVEL_OUTOF10: 10

## 2018-07-23 ASSESSMENT — PAIN DESCRIPTION - DESCRIPTORS
DESCRIPTORS: ACHING

## 2018-07-23 ASSESSMENT — PAIN DESCRIPTION - FREQUENCY
FREQUENCY: CONTINUOUS

## 2018-07-23 ASSESSMENT — PAIN DESCRIPTION - PAIN TYPE
TYPE: SURGICAL PAIN

## 2018-07-23 ASSESSMENT — PAIN DESCRIPTION - ORIENTATION
ORIENTATION: LOWER

## 2018-07-23 ASSESSMENT — PAIN DESCRIPTION - ONSET
ONSET: ON-GOING

## 2018-07-23 NOTE — PROGRESS NOTES
methocarbamol, magnesium hydroxide, bisacodyl, ondansetron      Assessment:     Patient has MSSA positive surgical and wound cultures s/p left sided posterior lumbar fusion     68 yo woman with hx DM.     Pt admit / OR 6/27 for lumbar surg with fusion / instrumentation. Pt had postop pain, psych issues. D/c off antibiotics to [facility] on 7/6. At [facility], pt developed increase in back pain and wound drainage. She had constipation and with meds, had BM and thereafter had worse back sx. Had temp to approximately 100.5 at facility. Referred to [facility] for further evaluation.     Presents from [facility] 7/19  with worse LBP and drainage from surgical wound. T 101, WBC 13.9, drainage GS 1+WBC, 1+GPC. MRI with 'postop changes'. Not started on antibiotics. Seen by Rachele Miller, plan to debridement wound this evening 7/20.     IMP/  Lumbar SSI, unclear if superficial or deep space    Plan:     Continue cefepime  D/c vancomycin  Post op care per neurosurgery    Discussed with Dr Surendra Branch, MS4    Addendum to Medical Student Progress note:  Pt seen,examined and evaluated. I have independently performed history, physical exam, lab and data review. I have determined assessment and plan as documented by student Liz Ratliff).    68 yo woman with hx DM.     Pt admit / OR 6/27 for lumbar surg with fusion / instrumentation. Pt had postop pain, psych issues. D/c off antibiotics to [facility] on 7/6. At [facility], pt developed increase in back pain and wound drainage. She had constipation and with meds, had BM and thereafter had worse back sx. Had temp to approximately 100.5 at facility. Referred to [facility] for further evaluation.     Presents from [facility] 7/19  with worse LBP and drainage from surgical wound. T 101, WBC 13.9, drainage GS 1+WBC, 1+GPC. MRI with 'postop changes'. Not started on antibiotics.   Seen by Neurosurg, plan to debridement wound this evening 7/20.     IMP/  Lumbar SSI, s/p

## 2018-07-23 NOTE — PROGRESS NOTES
PROGRESS NOTE    7/23/2018 9:57 AM                               Elas Krishnamurthy                      LOS: 3 days   POD#3  s/p Lumbar wound I&D    Subjective:  No acute events overnight. Patient has no specific complaints this am.         Physical Exam:    Vitals:    07/23/18 0815   BP: (!) 150/78   Pulse: 89   Resp: 18   Temp: 98 °F (36.7 °C)   SpO2: 97%       Patient seen and examined   General: Well developed. Alert and cooperative in no acute distress. HENT: atraumatic, neck supple  Eyes: Optic discs: Not tested  Pulmonary: unlabored respiratory effort  Cardiovascular:  Warm well perfused. No peripheral edema  Gastrointestinal: abdomen soft, NT, ND  Incision: CDI    Neurological:  Mental Status: Awake, alert, oriented x 4, speech clear and appropriate  Attention: Intact  Language: No aphasia or dysarthria noted  Sensation: Intact to all extremities to light touch  Coordination: Intact    Cranial Nerves:  Cranial Nerves:  II: Visual acuity not tested, denies new visual changes / diplopia  III, IV, VI: PERRL, 3 mm bilaterally, EOMI, no nystagmus noted  V: Facial sensation intact bilaterally to touch  VII: Face symmetric  VIII: Hearing intact bilaterally to spoken voice  IX: Palate movement equal bilaterally  XI: Shoulder shrug equal bilaterally  XII: Tongue midline    Musculoskeletal:   Gait: Not tested   Assist devices: None   Tone: Normal  Motor strength:    Right  Left    Right  Left    Deltoid  5 5  Hip Flex  5 5   Biceps  5 5  Knee Extensors  5 5   Triceps  5 5  Knee Flexors  5 5   Wrist Ext  5 5  Ankle Dorsiflex. 5 5   Wrist Flex  5 5  Ankle Plantarflex.   5 5   Handgrip  5 5  Ext Chase Longus  5 5   Thumb Ext  5 5         Incision: Prevena purple foam to mid lower back about a foot in CDI    Radiological Findings:  No post-op images    Labs:  Recent Labs      07/23/18   0555   WBC  7.6   HGB  9.1*   HCT  26.8*   PLT  145       Recent Labs      07/22/18   0531   NA  132*   K  3.3*   CL  97*

## 2018-07-23 NOTE — PROGRESS NOTES
normal. Pupils are equal, round, and reactive to light. Cardiovascular: Normal rate and regular rhythm. Exam reveals no friction rub. No murmur heard. Pulmonary/Chest: Effort normal and breath sounds normal. No respiratory distress. She has no wheezes. Abdominal: Soft. Bowel sounds are normal. She exhibits no distension. There is no tenderness. There is no guarding. Musculoskeletal: Normal range of motion. She exhibits no edema. Neurological: She is alert and oriented to person, place, and time. No cranial nerve deficit. Coordination normal.   Skin: Skin is warm and dry. Capillary refill takes less than 2 seconds. No erythema. Psychiatric: She has a normal mood and affect. Her behavior is normal.         Labs;    CBC:   Recent Labs      07/22/18   0531  07/23/18   0555   WBC  9.7  7.6   HGB  9.4*  9.1*   HCT  27.5*  26.8*   MCV  87.7  88.2   PLT  144  145     BMP:   Recent Labs      07/20/18   1449  07/20/18   2328  07/22/18   0531   NA  128*  128*  132*   K  3.6  4.3  3.3*   CL  90*  94*  97*   CO2  26  23  24   PHOS   --    --   2.0*   BUN  21*  26*  32*   CREATININE  0.8  1.0  0.7     Mag:   Lab Results   Component Value Date    MG 1.60 07/20/2018       Recent Labs      07/20/18   1449  07/20/18   1906  07/20/18   2053  07/20/18   2328  07/21/18   0749  07/21/18   1158  07/21/18   1704  07/21/18   2217  07/22/18   0531  07/22/18   0829  07/22/18   1220  07/22/18   1634  07/22/18   2212  07/23/18   0741  07/23/18   1115   GLUCOSE  165*   --    --   271*   --    --    --    --   196*   --    --    --    --    --    --    POCGLU   --   179*  211*   --   267*  397*  322*  217*   --   166*  289*  189*  127*  116*  272*          Diagnostics    MRI         1. Postoperative changes of anterior and posterior surgical fusion  L2-L5.   2. Grade 1 retrolisthesis of L5 on S1.  3. Right paracentral disc protrusion/osteophyte at L2-3 level  causing severe right lateral recess stenosis and is likely  impinging on

## 2018-07-23 NOTE — PLAN OF CARE
Problem: Falls - Risk of:  Goal: Will remain free from falls  Will remain free from falls   Outcome: Ongoing  Patient has remained free of falls and will continue to remain free of falls. Patient is in bed with the bed alarm activated. Call light and belongings are within reach. Problem: Pain:  Goal: Control of acute pain  Control of acute pain   Outcome: Ongoing  Patient has complained of moderate to severe pain which has been relieved by oral pain medication. RN will continue to assess.

## 2018-07-23 NOTE — PROGRESS NOTES
Nephrology Progress Note     Na better   On oral urea   Oral intake poor     Past Medical History:   Diagnosis Date    Chronic back pain     Depression     Diabetes mellitus ([address]     Hyperlipidemia     Hypertension     Retinopathy     Seasonal allergies     Spinal stenosis     Type II or unspecified type diabetes mellitus without mention of complication, not stated as uncontrolled        Past Surgical History:   Procedure Laterality Date    BACK SURGERY      CHOLECYSTECTOMY  09/2017    EYE SURGERY      LASER    HYSTERECTOMY      HYSTERECTOMY  1993    NECK SURGERY      OTHER SURGICAL HISTORY  4/9/18Right CTR    OTHER SURGICAL HISTORY N/A 06/27/2018    EXTREME LATERAL INTERBODY FUSION LEFT APPROACH L2-3, L3-4,    OTHER SURGICAL HISTORY  07/20/2018     LUMBAR WOUND INCISION AND DRAINAGE / WASHOUT     OVARY REMOVAL  1993    SPINAL FUSION  1999    c-spine    SPINAL FUSION  2003    lumbar-spine per Dr Mary Lou Stubbs.  TONSILLECTOMY AND ADENOIDECTOMY  1953    TUBAL LIGATION  1976       Family History   Problem Relation Age of Onset    Cancer Mother         melanoma    Diabetes Father         reports that she has never smoked. She has never used smokeless tobacco. She reports that she does not drink alcohol or use drugs.     Allergies:  Sulfa antibiotics and Sulfa antibiotics    Current Medications:      cefepime (MAXIPIME) 2 g IVPB minibag Q12H   Urea PACK 15 g Daily   atorvastatin (LIPITOR) tablet 20 mg Daily   calcium elemental (OSCAL) tablet 500 mg Daily   insulin glargine (LANTUS) injection pen 22 Units BID   insulin lispro (HUMALOG) injection pen 5 Units TID WC   sertraline (ZOLOFT) tablet 100 mg BID   enoxaparin (LOVENOX) injection 40 mg Daily   potassium chloride (KLOR-CON M) extended release tablet 40 mEq PRN   Or    potassium bicarb-citric acid (EFFER-K) effervescent tablet 40 mEq PRN   Or    potassium chloride 10 mEq/100 mL IVPB (Peripheral Line) PRN   magnesium sulfate 1 g in dextrose 5% 100 mL IVPB PRN   HYDROcodone-acetaminophen (NORCO) 5-325 MG per tablet 1 tablet Q4H PRN   Or    HYDROcodone-acetaminophen (NORCO) 5-325 MG per tablet 2 tablet Q4H PRN   insulin lispro (HUMALOG) injection pen 0-12 Units TID WC   insulin lispro (HUMALOG) injection pen 0-6 Units Nightly   valsartan (DIOVAN) tablet 320 mg Daily   cloNIDine (CATAPRES) tablet 0.1 mg Q4H PRN   glucose (GLUTOSE) 40 % oral gel 15 g PRN   dextrose 50 % solution 12.5 g PRN   glucagon (rDNA) injection 1 mg PRN   dextrose 5 % solution PRN   sodium chloride flush 0.9 % injection 10 mL 2 times per day   sodium chloride flush 0.9 % injection 10 mL PRN   acetaminophen (TYLENOL) tablet 650 mg Q4H PRN   acetaminophen (TYLENOL) suppository 650 mg Q4H PRN   methocarbamol (ROBAXIN) tablet 750 mg Q6H PRN   docusate sodium (COLACE) capsule 100 mg BID   magnesium hydroxide (MILK OF MAGNESIA) 400 MG/5ML suspension 30 mL Daily PRN   bisacodyl (DULCOLAX) suppository 10 mg Daily PRN   ondansetron (ZOFRAN) injection 4 mg Q6H PRN   famotidine (PEPCID) tablet 20 mg BID   famotidine (PEPCID) injection 20 mg BID           Physical exam:     Vitals:  /82   Pulse 95   Temp 98.6 °F (37 °C) (Oral)   Resp 18   SpO2 98%   Constitutional:  OAA X3 NAD  Skin: no rash, turgor wnl  Heent:  eomi, mmm  Neck: no bruits or jvd noted  Cardiovascular:  S1, S2 without m/r/g  Respiratory: CTA B without w/r/r  Abdomen:  +bs, soft, diffusely TTP, some voluntary guarding  Ext: no lower extremity edema  Psychiatric: mood and affect appropriate  Musculoskeletal:  Rom, muscular strength intact    Data:   Labs:  CBC:   Recent Labs      07/20/18   0503  07/22/18   0531   WBC  11.1*  9.7   HGB  9.8*  9.4*   PLT  172  144     BMP:    Recent Labs      07/20/18   1449  07/20/18   2328  07/22/18   0531   NA  128*  128*  132*   K  3.6  4.3  3.3*   CL  90*  94*  97*   CO2  26  23  24   BUN  21*  26*  32*   CREATININE  0.8  1.0  0.7   GLUCOSE  165*  271*  196*     Ca/Mg/Phos:   Recent Labs Assessment/Plan   1. Hyponatremia - sec to SIADH   - Na better   - Inc oral solute intake   - Laney urea unitl solute intake better   - no HCTZ at discharge   - monitor renal panel daily    2. Hx of HTN - patient on valsartan and HCTZ at home. Current BPs 90s-110s/60s  - hold BP meds    3. Anemia - monitor     4. Acid- base/ Electrolytes   - treat hypoNa and hypoCl as above  - bicarb 26 wnl    5.  RAISA - cr better     Thank you for allowing us to participate in care of Jus Costa MD,

## 2018-07-24 LAB
ALBUMIN SERPL-MCNC: 3.3 G/DL (ref 3.4–5)
ANION GAP SERPL CALCULATED.3IONS-SCNC: 10 MMOL/L (ref 3–16)
BUN BLDV-MCNC: 13 MG/DL (ref 7–20)
CALCIUM SERPL-MCNC: 8.7 MG/DL (ref 8.3–10.6)
CHLORIDE BLD-SCNC: 101 MMOL/L (ref 99–110)
CO2: 28 MMOL/L (ref 21–32)
CREAT SERPL-MCNC: 0.6 MG/DL (ref 0.6–1.2)
GFR AFRICAN AMERICAN: >60
GFR NON-AFRICAN AMERICAN: >60
GLUCOSE BLD-MCNC: 119 MG/DL (ref 70–99)
GLUCOSE BLD-MCNC: 149 MG/DL (ref 70–99)
GLUCOSE BLD-MCNC: 150 MG/DL (ref 70–99)
GLUCOSE BLD-MCNC: 179 MG/DL (ref 70–99)
GLUCOSE BLD-MCNC: 81 MG/DL (ref 70–99)
HCT VFR BLD CALC: 28.4 % (ref 36–48)
HEMOGLOBIN: 9.7 G/DL (ref 12–16)
MCH RBC QN AUTO: 29.6 PG (ref 26–34)
MCHC RBC AUTO-ENTMCNC: 34.1 G/DL (ref 31–36)
MCV RBC AUTO: 86.8 FL (ref 80–100)
PDW BLD-RTO: 13.7 % (ref 12.4–15.4)
PERFORMED ON: ABNORMAL
PERFORMED ON: NORMAL
PHOSPHORUS: 2.3 MG/DL (ref 2.5–4.9)
PLATELET # BLD: 196 K/UL (ref 135–450)
PMV BLD AUTO: 8.8 FL (ref 5–10.5)
POTASSIUM SERPL-SCNC: 3.3 MMOL/L (ref 3.5–5.1)
RBC # BLD: 3.27 M/UL (ref 4–5.2)
SODIUM BLD-SCNC: 139 MMOL/L (ref 136–145)
WBC # BLD: 9.2 K/UL (ref 4–11)

## 2018-07-24 PROCEDURE — 6370000000 HC RX 637 (ALT 250 FOR IP): Performed by: NURSE PRACTITIONER

## 2018-07-24 PROCEDURE — 6360000002 HC RX W HCPCS: Performed by: PHYSICIAN ASSISTANT

## 2018-07-24 PROCEDURE — 02HV33Z INSERTION OF INFUSION DEVICE INTO SUPERIOR VENA CAVA, PERCUTANEOUS APPROACH: ICD-10-PCS | Performed by: INTERNAL MEDICINE

## 2018-07-24 PROCEDURE — 6370000000 HC RX 637 (ALT 250 FOR IP): Performed by: INTERNAL MEDICINE

## 2018-07-24 PROCEDURE — 97166 OT EVAL MOD COMPLEX 45 MIN: CPT

## 2018-07-24 PROCEDURE — G8988 SELF CARE GOAL STATUS: HCPCS

## 2018-07-24 PROCEDURE — 1200000000 HC SEMI PRIVATE

## 2018-07-24 PROCEDURE — 99232 SBSQ HOSP IP/OBS MODERATE 35: CPT | Performed by: INTERNAL MEDICINE

## 2018-07-24 PROCEDURE — C1751 CATH, INF, PER/CENT/MIDLINE: HCPCS

## 2018-07-24 PROCEDURE — 6370000000 HC RX 637 (ALT 250 FOR IP): Performed by: PHYSICIAN ASSISTANT

## 2018-07-24 PROCEDURE — 85027 COMPLETE CBC AUTOMATED: CPT

## 2018-07-24 PROCEDURE — 97162 PT EVAL MOD COMPLEX 30 MIN: CPT

## 2018-07-24 PROCEDURE — 6360000002 HC RX W HCPCS: Performed by: FAMILY MEDICINE

## 2018-07-24 PROCEDURE — 6360000002 HC RX W HCPCS: Performed by: INTERNAL MEDICINE

## 2018-07-24 PROCEDURE — 2580000003 HC RX 258: Performed by: INTERNAL MEDICINE

## 2018-07-24 PROCEDURE — 80069 RENAL FUNCTION PANEL: CPT

## 2018-07-24 PROCEDURE — 36569 INSJ PICC 5 YR+ W/O IMAGING: CPT

## 2018-07-24 PROCEDURE — 97116 GAIT TRAINING THERAPY: CPT

## 2018-07-24 PROCEDURE — 6360000002 HC RX W HCPCS

## 2018-07-24 PROCEDURE — 36415 COLL VENOUS BLD VENIPUNCTURE: CPT

## 2018-07-24 PROCEDURE — G8987 SELF CARE CURRENT STATUS: HCPCS

## 2018-07-24 PROCEDURE — 97530 THERAPEUTIC ACTIVITIES: CPT

## 2018-07-24 PROCEDURE — 6370000000 HC RX 637 (ALT 250 FOR IP): Performed by: FAMILY MEDICINE

## 2018-07-24 RX ORDER — POTASSIUM CHLORIDE 20 MEQ/1
20 TABLET, EXTENDED RELEASE ORAL ONCE
Status: COMPLETED | OUTPATIENT
Start: 2018-07-24 | End: 2018-07-24

## 2018-07-24 RX ORDER — IBUPROFEN 200 MG
400 TABLET ORAL EVERY 8 HOURS PRN
Status: DISCONTINUED | OUTPATIENT
Start: 2018-07-24 | End: 2018-07-25 | Stop reason: HOSPADM

## 2018-07-24 RX ORDER — METHOCARBAMOL 750 MG/1
750 TABLET, FILM COATED ORAL 4 TIMES DAILY
Status: DISCONTINUED | OUTPATIENT
Start: 2018-07-24 | End: 2018-07-25 | Stop reason: HOSPADM

## 2018-07-24 RX ADMIN — ONDANSETRON 4 MG: 2 INJECTION INTRAMUSCULAR; INTRAVENOUS at 13:01

## 2018-07-24 RX ADMIN — SERTRALINE HYDROCHLORIDE 100 MG: 100 TABLET ORAL at 09:26

## 2018-07-24 RX ADMIN — Medication 15 G: at 09:26

## 2018-07-24 RX ADMIN — INSULIN LISPRO 2 UNITS: 100 INJECTION, SOLUTION INTRAVENOUS; SUBCUTANEOUS at 13:06

## 2018-07-24 RX ADMIN — CEFAZOLIN 2 G: 10 INJECTION, POWDER, FOR SOLUTION INTRAVENOUS; PARENTERAL at 09:26

## 2018-07-24 RX ADMIN — POTASSIUM CHLORIDE 20 MEQ: 1500 TABLET, EXTENDED RELEASE ORAL at 13:01

## 2018-07-24 RX ADMIN — INSULIN LISPRO 5 UNITS: 100 INJECTION, SOLUTION INTRAVENOUS; SUBCUTANEOUS at 18:19

## 2018-07-24 RX ADMIN — CEFAZOLIN 2 G: 10 INJECTION, POWDER, FOR SOLUTION INTRAVENOUS; PARENTERAL at 18:18

## 2018-07-24 RX ADMIN — INSULIN GLARGINE 22 UNITS: 100 INJECTION, SOLUTION SUBCUTANEOUS at 21:57

## 2018-07-24 RX ADMIN — HYDROCODONE BITARTRATE AND ACETAMINOPHEN 2 TABLET: 5; 325 TABLET ORAL at 10:07

## 2018-07-24 RX ADMIN — FAMOTIDINE 20 MG: 20 TABLET ORAL at 21:49

## 2018-07-24 RX ADMIN — FAMOTIDINE 20 MG: 20 TABLET ORAL at 09:26

## 2018-07-24 RX ADMIN — METHOCARBAMOL 750 MG: 750 TABLET ORAL at 00:47

## 2018-07-24 RX ADMIN — INSULIN LISPRO 5 UNITS: 100 INJECTION, SOLUTION INTRAVENOUS; SUBCUTANEOUS at 13:08

## 2018-07-24 RX ADMIN — METHOCARBAMOL 750 MG: 750 TABLET ORAL at 09:26

## 2018-07-24 RX ADMIN — INSULIN GLARGINE 22 UNITS: 100 INJECTION, SOLUTION SUBCUTANEOUS at 09:32

## 2018-07-24 RX ADMIN — NALOXEGOL OXALATE 25 MG: 25 TABLET, FILM COATED ORAL at 14:25

## 2018-07-24 RX ADMIN — HYDROCODONE BITARTRATE AND ACETAMINOPHEN 2 TABLET: 5; 325 TABLET ORAL at 00:47

## 2018-07-24 RX ADMIN — DOCUSATE SODIUM 100 MG: 100 CAPSULE, LIQUID FILLED ORAL at 09:26

## 2018-07-24 RX ADMIN — HYDROCODONE BITARTRATE AND ACETAMINOPHEN 2 TABLET: 5; 325 TABLET ORAL at 18:18

## 2018-07-24 RX ADMIN — METHOCARBAMOL 750 MG: 750 TABLET ORAL at 18:18

## 2018-07-24 RX ADMIN — METHOCARBAMOL 750 MG: 750 TABLET ORAL at 21:49

## 2018-07-24 RX ADMIN — CALCIUM 500 MG: 500 TABLET ORAL at 09:26

## 2018-07-24 RX ADMIN — ATORVASTATIN CALCIUM 20 MG: 20 TABLET, FILM COATED ORAL at 09:26

## 2018-07-24 RX ADMIN — HYDROCODONE BITARTRATE AND ACETAMINOPHEN 2 TABLET: 5; 325 TABLET ORAL at 14:25

## 2018-07-24 RX ADMIN — INSULIN LISPRO 2 UNITS: 100 INJECTION, SOLUTION INTRAVENOUS; SUBCUTANEOUS at 18:18

## 2018-07-24 RX ADMIN — METHOCARBAMOL 750 MG: 750 TABLET ORAL at 13:01

## 2018-07-24 RX ADMIN — HYDROCODONE BITARTRATE AND ACETAMINOPHEN 2 TABLET: 5; 325 TABLET ORAL at 05:53

## 2018-07-24 RX ADMIN — DOCUSATE SODIUM 100 MG: 100 CAPSULE, LIQUID FILLED ORAL at 21:49

## 2018-07-24 RX ADMIN — ENOXAPARIN SODIUM 40 MG: 40 INJECTION SUBCUTANEOUS at 09:26

## 2018-07-24 RX ADMIN — SERTRALINE HYDROCHLORIDE 100 MG: 100 TABLET ORAL at 21:51

## 2018-07-24 RX ADMIN — CEFAZOLIN 2 G: 10 INJECTION, POWDER, FOR SOLUTION INTRAVENOUS; PARENTERAL at 04:00

## 2018-07-24 RX ADMIN — Medication 10 ML: at 21:50

## 2018-07-24 RX ADMIN — VALSARTAN 320 MG: 80 TABLET, FILM COATED ORAL at 09:26

## 2018-07-24 RX ADMIN — Medication 10 ML: at 09:27

## 2018-07-24 RX ADMIN — HYDROCODONE BITARTRATE AND ACETAMINOPHEN 2 TABLET: 5; 325 TABLET ORAL at 22:15

## 2018-07-24 ASSESSMENT — PAIN DESCRIPTION - ORIENTATION
ORIENTATION: LOWER

## 2018-07-24 ASSESSMENT — PAIN DESCRIPTION - PROGRESSION
CLINICAL_PROGRESSION: NOT CHANGED

## 2018-07-24 ASSESSMENT — PAIN DESCRIPTION - DESCRIPTORS
DESCRIPTORS: ACHING

## 2018-07-24 ASSESSMENT — PAIN DESCRIPTION - FREQUENCY
FREQUENCY: CONTINUOUS

## 2018-07-24 ASSESSMENT — PAIN DESCRIPTION - LOCATION
LOCATION: BACK

## 2018-07-24 ASSESSMENT — PAIN DESCRIPTION - ONSET
ONSET: ON-GOING

## 2018-07-24 ASSESSMENT — PAIN DESCRIPTION - PAIN TYPE
TYPE: SURGICAL PAIN

## 2018-07-24 ASSESSMENT — PAIN SCALES - GENERAL
PAINLEVEL_OUTOF10: 8
PAINLEVEL_OUTOF10: 7
PAINLEVEL_OUTOF10: 7
PAINLEVEL_OUTOF10: 10
PAINLEVEL_OUTOF10: 9
PAINLEVEL_OUTOF10: 7
PAINLEVEL_OUTOF10: 6
PAINLEVEL_OUTOF10: 0
PAINLEVEL_OUTOF10: 0

## 2018-07-24 NOTE — PLAN OF CARE
Problem: Falls - Risk of:  Goal: Will remain free from falls  Will remain free from falls   Outcome: Ongoing  Pt remains free from injury during this shift. Pt is up with assistance x 1 person walker and back brace. Encourage pt to call for all assistance. Call light is in reach, bed alarm is activated for safety, bed locked and in lowest position. Will continue to monitor and follow POC. Problem: Pain:  Goal: Pain level will decrease  Pain level will decrease   Outcome: Ongoing  Medicated pt per orders, please see e-Mar. Encourage pt to call if pain increases. Will continue to monitor and follow POC.

## 2018-07-24 NOTE — PROGRESS NOTES
PROGRESS NOTE    7/24/2018 10:32 AM                               Dara Gilbert                      LOS: 4 days   POD#4  s/p Lumbar wound I&D    Subjective:  No acute events overnight. Patient has no specific complaints this am.         Physical Exam:    Vitals:    07/24/18 0730   BP: (!) 151/70   Pulse: 84   Resp: 18   Temp: 98 °F (36.7 °C)   SpO2: 95%       Patient seen and examined   General: Well developed. Alert and cooperative in no acute distress. HENT: atraumatic, neck supple  Eyes: Optic discs: Not tested  Pulmonary: unlabored respiratory effort  Cardiovascular:  Warm well perfused. No peripheral edema  Gastrointestinal: abdomen soft, NT, ND  Incision: CDI    Neurological:  Mental Status: Awake, alert, oriented x 4, speech clear and appropriate  Attention: Intact  Language: No aphasia or dysarthria noted  Sensation: Intact to all extremities to light touch  Coordination: Intact    Cranial Nerves:  Cranial Nerves:  II: Visual acuity not tested, denies new visual changes / diplopia  III, IV, VI: PERRL, 3 mm bilaterally, EOMI, no nystagmus noted  V: Facial sensation intact bilaterally to touch  VII: Face symmetric  VIII: Hearing intact bilaterally to spoken voice  IX: Palate movement equal bilaterally  XI: Shoulder shrug equal bilaterally  XII: Tongue midline    Musculoskeletal:   Gait: Not tested   Assist devices: None   Tone: Normal  Motor strength:    Right  Left    Right  Left    Deltoid  5 5  Hip Flex  5 5   Biceps  5 5  Knee Extensors  5 5   Triceps  5 5  Knee Flexors  5 5   Wrist Ext  5 5  Ankle Dorsiflex. 5 5   Wrist Flex  5 5  Ankle Plantarflex.   5 5   Handgrip  5 5  Ext Chase Longus  5 5   Thumb Ext  5 5         Incision: Prevena purple foam to mid lower back about a foot in CDI    Radiological Findings:  No post-op images    Labs:  Recent Labs      07/24/18   0948   WBC  9.2   HGB  9.7*   HCT  28.4*   PLT  196       Recent Labs      07/22/18   0531   NA  132*   K  3.3*   CL  97* CO2  24   BUN  32*   CREATININE  0.7   GLUCOSE  196*   CALCIUM  8.8   PHOS  2.0*       No results for input(s): PROTIME, INR, APTT in the last 72 hours. Patient Active Problem List    Diagnosis Date Noted    Staph aureus infection     Hyponatremia 07/20/2018    Wound drainage 07/20/2018    Superficial incisional infection of surgical site 07/20/2018    Post-operative pain     Degenerative lumbar spinal stenosis 06/27/2018    Bilateral carpal tunnel syndrome 04/02/2018    Ulnar neuropathy at elbow, left 04/02/2018    Gallstone pancreatitis 10/25/2017    Dilated cbd, acquired 09/07/2017    Osteoarthrosis 09/07/2017    Type 1 diabetes mellitus without complication ([facility] [address] 46/19/7681    Osteopenia 08/31/2015    Family history of melanoma 05/27/2014    IDDM (insulin dependent diabetes mellitus) ([facility] [address]     Depression     Hypertension     Spinal stenosis     Chronic back pain     Retinopathy     Cervical osteoarthritis 04/10/2013       Assessment:  Patient is a 69 y.o. y/o female s/p lumbar wound I&D    Plan:  1. Neurologically stable  2. Neurologic exams frequency:  Floor: Q4H  3. For change in exam MUST contact neurosurgery team along with critical care or primary team  4. Wound: Provena wound vac in place until 07/27/2018  5. Antibiotics:  - Managed by ID  - Ancef 2 gm iv q8 hr through 8/21/18  - Will need PICC  - Will need SNF placement if pt scores low on PT/OT assessment, but not [facility] per pt request  6. DVT Prophylaxis: SCD's & Lovenox in AM  7. Mobility: PT/OT consulted, appreciate recs  8. Pain control: Managed by medical team  9. Muscle spasms: Robaxin 4x Daily  10. Will follow inpatient. Please call with any questions or decline in neurological status    DISPO: OK to DC to home or SNF if pt scores low on PT/OT assessment from neurosurgery standpoint. Dispo timing to be determined by primary team once patient is medically stable for discharge.     Patient was discussed with  Inga who agrees with above assessment and plan.      Electronically signed by: RAYMUNDO Cox, 7/24/2018 10:32 AM  [phone]

## 2018-07-24 NOTE — PLAN OF CARE
Problem: Pain:  Goal: Control of acute pain  Control of acute pain   Outcome: Ongoing  Patient has complained of moderate pain which has been relieved by oral pain medication. RN will continue to assess.

## 2018-07-24 NOTE — CARE COORDINATION
Spoke with patient and son and she is agreeable with going home and has agreed to have [facility] for home care. Per pt her pain is still not managed and does not feel ready to go home just yet so will follow along for needs. Pt had a terrible experience at the SNF and does not want to go to another facility. I gave them my contact information so if they have any problems at home they can call me and I will get them assistance.      Electronically signed by Dulce Tam RN on 7/24/2018 at 6:41 PM

## 2018-07-24 NOTE — PROGRESS NOTES
ID Follow-up NOTE  RESIDENT NOTE - reviewed / edited, attending note at bottom    CC:   Surgical wound pain and infection   Antibiotics: Cefazolin    Admit Date: 7/19/2018  Hospital Day: 6    Subjective: Today the patient admits to being depressed and endorses a low mood and is actively crying. The patient admits to being in a lot of pain (7/10) that does not seem to decrease on medication. She denies N/V, fever, chills, cough, SOB, chest pain, abdominal pain, diarrhea or any other symptoms. T Max 98.8      Objective:     Patient Vitals for the past 8 hrs:   BP Temp Temp src Pulse Resp SpO2   07/24/18 0730 (!) 151/70 98 °F (36.7 °C) Oral 84 18 95 %   07/24/18 0500 (!) 156/73 98.3 °F (36.8 °C) Oral 81 18 96 %     I/O last 3 completed shifts:  In: -   Out: 1750 [IGNVS:8135]  I/O this shift:  In: -   Out: 100 [Urine:100]    GENERAL:      No apparent distress. HEENT:           Membranes moist, no oral lesion  NECK:             Supple  LUNGS:           Clear b/l, no rales, no dullness  CARDIAC:       RRR, no murmur appreciated  ABD:                + BS, soft / NT  EXT:                No rash, no edema, no lesions. Wound vac over surgical area unable to assess but no erythema or drainage appreciated. Wearing back brace.   NEURO:          No focal neurologic findings  PSYCH:           Orientation, sensorium, mood normal  LINES:             Peripheral iv                Data Review:  Lab Results   Component Value Date    WBC 7.6 07/23/2018    HGB 9.1 (L) 07/23/2018    HCT 26.8 (L) 07/23/2018    MCV 88.2 07/23/2018     07/23/2018     Lab Results   Component Value Date    CREATININE 0.7 07/22/2018    BUN 32 (H) 07/22/2018     (L) 07/22/2018    K 3.3 (L) 07/22/2018    CL 97 (L) 07/22/2018    CO2 24 07/22/2018       Hepatic Function Panel: Lab Results   Component Value Date    ALKPHOS 101 06/19/2018    ALT 15 06/19/2018    AST 20 06/19/2018    PROT 6.9 06/19/2018    BILITOT 0.3 06/19/2018    BILIDIR <0.2 10/24/2017    IBILI see below 10/24/2017    LABALBU 2.8 07/22/2018      MICRO:  7/20/18    Wound cx lumbar incision MSSA              Surgical culture heavy growth S aurues      Culture & Susceptibility   7/22/18  STAPHYLOCOCCUS AUREUS   Antibiotic Interpretation APARNA Unit   ceFAZolin Sensitive <=4 mcg/mL   ciprofloxacin Sensitive <=1 mcg/mL   clindamycin Resistant <=0.5 mcg/mL   erythromycin Resistant >4 mcg/mL   oxacillin Sensitive <=0.25 mcg/mL   tetracycline Sensitive <=4 mcg/mL   trimethoprim-sulfamethoxazole Sensitive <=0.5/9.5 mcg/mL          Scheduled Meds:   ceFAZolin  2 g Intravenous Q8H    lidocaine 1 % injection  5 mL Intradermal Once    sodium chloride flush  10 mL Intravenous 2 times per day    Urea  15 g Oral Daily    atorvastatin  20 mg Oral Daily    calcium elemental  500 mg Oral Daily    insulin glargine  22 Units Subcutaneous BID    insulin lispro  5 Units Subcutaneous TID WC    sertraline  100 mg Oral BID    enoxaparin  40 mg Subcutaneous Daily    insulin lispro  0-12 Units Subcutaneous TID WC    insulin lispro  0-6 Units Subcutaneous Nightly    valsartan  320 mg Oral Daily    sodium chloride flush  10 mL Intravenous 2 times per day    docusate sodium  100 mg Oral BID    famotidine  20 mg Oral BID    famotidine (PEPCID) injection  20 mg Intravenous BID       Continuous Infusions:   dextrose         PRN Meds:  sodium chloride flush, potassium chloride **OR** potassium bicarb-citric acid **OR** potassium chloride, magnesium sulfate, HYDROcodone 5 mg - acetaminophen **OR** HYDROcodone 5 mg - acetaminophen, cloNIDine, glucose, dextrose, glucagon (rDNA), dextrose, sodium chloride flush, acetaminophen, acetaminophen, methocarbamol, magnesium hydroxide, bisacodyl, ondansetron      Assessment:     POD #3 s/p lumbar wound I&D  Overnight the patient has remained afebrile   Patient medication changed to cefazolin    Plan:     Possible PICC line placement today  Continue on Cefazolin  Await

## 2018-07-24 NOTE — PROGRESS NOTES
Nephrology Progress Note     Na stable   On oral urea   Oral intake better     Past Medical History:   Diagnosis Date    Chronic back pain     Depression     Diabetes mellitus ([address]     Hyperlipidemia     Hypertension     Retinopathy     Seasonal allergies     Spinal stenosis     Type II or unspecified type diabetes mellitus without mention of complication, not stated as uncontrolled        Past Surgical History:   Procedure Laterality Date    BACK SURGERY      CHOLECYSTECTOMY  09/2017    EYE SURGERY      LASER    HYSTERECTOMY      HYSTERECTOMY  1993    NECK SURGERY      OTHER SURGICAL HISTORY  4/9/18Right CTR    OTHER SURGICAL HISTORY N/A 06/27/2018    EXTREME LATERAL INTERBODY FUSION LEFT APPROACH L2-3, L3-4,    OTHER SURGICAL HISTORY  07/20/2018     LUMBAR WOUND INCISION AND DRAINAGE / WASHOUT     OVARY REMOVAL  1993    MO LAMNOTMY W/DCMPRSN NRV EACH ADDL CRVCL/LMBR N/A 7/20/2018    LUMBAR WOUND INCISION AND DRAINAGE / WASHOUT performed by Jj Reyes MD at [address] [place] [address]    c-spine    SPINAL FUSION  2003    lumbar-spine per Dr Rey Krueger.  TONSILLECTOMY AND ADENOIDECTOMY  1953    TUBAL LIGATION  1976       Family History   Problem Relation Age of Onset    Cancer Mother         melanoma    Diabetes Father         reports that she has never smoked. She has never used smokeless tobacco. She reports that she does not drink alcohol or use drugs.     Allergies:  Sulfa antibiotics and Sulfa antibiotics    Current Medications:      ceFAZolin (ANCEF) 2 g in dextrose 5 % 50 mL IVPB Q8H   lidocaine PF 1 % injection 5 mL Once   sodium chloride flush 0.9 % injection 10 mL 2 times per day   sodium chloride flush 0.9 % injection 10 mL PRN   Urea PACK 15 g Daily   atorvastatin (LIPITOR) tablet 20 mg Daily   calcium elemental (OSCAL) tablet 500 mg Daily   insulin glargine (LANTUS) injection pen 22 Units BID   insulin lispro (HUMALOG) injection pen 5 Units TID WC   sertraline (ZOLOFT) tablet 100 mg BID   enoxaparin (LOVENOX) injection 40 mg Daily   potassium chloride (KLOR-CON M) extended release tablet 40 mEq PRN   Or    potassium bicarb-citric acid (EFFER-K) effervescent tablet 40 mEq PRN   Or    potassium chloride 10 mEq/100 mL IVPB (Peripheral Line) PRN   magnesium sulfate 1 g in dextrose 5% 100 mL IVPB PRN   HYDROcodone-acetaminophen (NORCO) 5-325 MG per tablet 1 tablet Q4H PRN   Or    HYDROcodone-acetaminophen (NORCO) 5-325 MG per tablet 2 tablet Q4H PRN   insulin lispro (HUMALOG) injection pen 0-12 Units TID WC   insulin lispro (HUMALOG) injection pen 0-6 Units Nightly   valsartan (DIOVAN) tablet 320 mg Daily   cloNIDine (CATAPRES) tablet 0.1 mg Q4H PRN   glucose (GLUTOSE) 40 % oral gel 15 g PRN   dextrose 50 % solution 12.5 g PRN   glucagon (rDNA) injection 1 mg PRN   dextrose 5 % solution PRN   sodium chloride flush 0.9 % injection 10 mL 2 times per day   sodium chloride flush 0.9 % injection 10 mL PRN   acetaminophen (TYLENOL) tablet 650 mg Q4H PRN   acetaminophen (TYLENOL) suppository 650 mg Q4H PRN   methocarbamol (ROBAXIN) tablet 750 mg Q6H PRN   docusate sodium (COLACE) capsule 100 mg BID   magnesium hydroxide (MILK OF MAGNESIA) 400 MG/5ML suspension 30 mL Daily PRN   bisacodyl (DULCOLAX) suppository 10 mg Daily PRN   ondansetron (ZOFRAN) injection 4 mg Q6H PRN   famotidine (PEPCID) tablet 20 mg BID   famotidine (PEPCID) injection 20 mg BID           Physical exam:     Vitals:  BP (!) 151/70   Pulse 84   Temp 98 °F (36.7 °C) (Oral)   Resp 18   SpO2 95%   Constitutional:  OAA X3 NAD  Skin: no rash, turgor wnl  Heent:  eomi, mmm  Neck: no bruits or jvd noted  Cardiovascular:  S1, S2 without m/r/g  Respiratory: CTA B without w/r/r  Abdomen:  +bs, soft, diffusely TTP, some voluntary guarding  Ext: no lower extremity edema  Psychiatric: mood and affect appropriate  Musculoskeletal:  Rom, muscular strength intact    Data:   Labs:  CBC:   Recent Labs      07/22/18   0531  07/23/18 0555   WBC  9.7  7.6   HGB  9.4*  9.1*   PLT  144  145     BMP:    Recent Labs      07/22/18   0531   NA  132*   K  3.3*   CL  97*   CO2  24   BUN  32*   CREATININE  0.7   GLUCOSE  196*     Ca/Mg/Phos:   Recent Labs      07/22/18   0531   CALCIUM  8.8   PHOS  2.0*     Hepatic: No results for input(s): AST, ALT, ALB, BILITOT, ALKPHOS in the last 72 hours. Troponin: No results for input(s): TROPONINI in the last 72 hours. BNP: No results for input(s): BNP in the last 72 hours. Lipids: No results for input(s): CHOL, TRIG, HDL, LDLCALC, LABVLDL in the last 72 hours. ABGs: No results for input(s): PHART, PO2ART, [id number] in the last 72 hours. INR:   No results for input(s): INR in the last 72 hours. UA:  No results for input(s): Shahrzad Monday, GLUCOSEU, BILIRUBINUR, KETUA, SPECGRAV, BLOODU, PHUR, PROTEINU, UROBILINOGEN, NITRU, LEUKOCYTESUR, Rogue Challenger in the last 72 hours. Urine Microscopic:   No results for input(s): LABCAST, BACTERIA, COMU, HYALCAST, WBCUA, RBCUA, EPIU in the last 72 hours. Urine Culture: No results for input(s): LABURIN in the last 72 hours. Urine Chemistry:   No results for input(s): Lugene Betters, PROTEINUR, NAUR in the last 72 hours. IMAGING:  XR ABDOMEN (KUB) (SINGLE AP VIEW)   Final Result   Negative abdominal bowel gas pattern. MRI LUMBAR SPINE W WO CONTRAST   Final Result      1. Postoperative changes of anterior and posterior surgical fusion   L2-L5. 2. Grade 1 retrolisthesis of L5 on S1.   3. Right paracentral disc protrusion/osteophyte at L2-3 level   causing severe right lateral recess stenosis and is likely   impinging on the traversing right L3 nerve root. 4. Persistent mild to moderate thecal sac narrowing at L3-4.   5. Facet arthrosis with moderate left neural foraminal stenosis at   L5-S1. XR CHEST PORTABLE   Final Result      No acute cardiopulmonary disease.       Interpreted by:   Karina Chisholm MD      Signed by: Shivam Sol MD   7/19/18      Final result          Assessment/Plan   1. Hyponatremia - sec to SIADH   - Na better   - Inc oral solute intake   - Laney urea unitl solute intake better   - no HCTZ at discharge   - monitor renal panel daily    2. Hx of HTN - patient on valsartan and HCTZ at home. Current BPs 90s-110s/60s  - hold BP meds    3. Anemia - monitor     4. Acid- base/ Electrolytes   - treat hypoNa and hypoCl as above  - bicarb 26 wnl    5.  RAISA - cr better     Thank you for allowing us to participate in care of Jus Costa MD,

## 2018-07-24 NOTE — PROGRESS NOTES
assessed for rehabilitation services?: Yes  Additional Pertinent Hx: 70 y/o female s/p s/p lumbar wound I&D. PMH includes DM, HTN, HLD, Depression, lumbar fusion, cervical fusion. Family / Caregiver Present: No  Referring Practitioner: SAI Baker CNP  Diagnosis: lumbar wound I&D  Follows Commands: Within Functional Limits  Subjective  Subjective: Patient supine in bed, agreeable to PT  Pain Screening  Patient Currently in Pain: Yes (\"I am always in pain\", not rated, RN aware)  Vital Signs  Patient Currently in Pain: Yes (\"I am always in pain\", not rated, RN aware)       Orientation  Orientation  Overall Orientation Status: Within Functional Limits    Social/Functional History  Social/Functional History  Lives With: Alone  Type of Home: Apartment  Home Layout: One level  Home Access: Stairs to enter with rails  Entrance Stairs - Number of Steps: 4 + 7 with 1 rail  Bathroom Shower/Tub: Tub/Shower unit  Bathroom Toilet: Standard  Bathroom Equipment: Shower chair  Home Equipment: Cane  ADL Assistance: Independent  Homemaking Assistance: Independent  Ambulation Assistance: Independent  Transfer Assistance: Independent  Active : Yes  Occupation: Part time employment  Type of occupation: Senior services   Additional Comments: Came from SNF this admission, does not want to return to [employer]. Objective  Treatment included gait training, stair training, transfer training, and patient education.    AROM RLE (degrees)  RLE General AROM: Grossly WFL as evident by patient's functional mobility  AROM LLE (degrees)  LLE General AROM: Grossly WFL as evident by patient's functional mobility  Strength RLE  Comment: Generalized deconditioning with gross LE strength WFL (at least 3+/5) as evident by patient's functional mobility  Strength LLE  Comment: Generalized deconditioning with gross LE strength WFL (at least 3+/5) as evident by patient's functional mobility

## 2018-07-24 NOTE — PROGRESS NOTES
Occupational Therapy   Occupational Therapy Initial Assessment and Treatment  Date: 2018   Patient Name: Shital Caraballo  MRN: [id number]     : 1950    Date of Service: 2018    Discharge Recommendations: Shital Caraballo scored a 21/24 on the AM-PAC ADL Inpatient form. Current research shows that an AM-PAC score of 18 or greater is typically associated with a discharge to the patient's home setting. Based on the patients AM-PAC score and their current ADL deficits, it is recommended that the patient have 2-3 sessions per week of Occupational Therapy at d/c to increase the patients independence. HOME HEALTH CARE: LEVEL 2 SOCIAL     - Initial home health evaluation to occur within 24-48 hours, in patient home   - Therapy to evaluate with goal of regaining prior level of functioning   - Therapy to evaluate if patient has [address] needs for personal care   -  evaluation within 24-48 hours, includes evaluation of resources and insurance to determine AL/IL/LTC/Medicaid options   - [place] [facility] Referral   - Joan Wang to discuss home support and care needs post discharge within two weeks of discharge. OT Equipment Recommendations  Equipment Needed: No      Patient Diagnosis(es): The primary encounter diagnosis was Hyponatremia. A diagnosis of Post-operative pain was also pertinent to this visit. has a past medical history of Chronic back pain; Depression; Diabetes mellitus ([address] Hyperlipidemia; Hypertension; Retinopathy; Seasonal allergies; Spinal stenosis; and Type II or unspecified type diabetes mellitus without mention of complication, not stated as uncontrolled. has a past surgical history that includes back surgery; Neck surgery; Hysterectomy; Tonsillectomy and adenoidectomy (); Ovary removal (); Hysterectomy (); Tubal ligation (); Spinal fusion (); Spinal fusion ();  Cholecystectomy (2017); other surgical

## 2018-07-24 NOTE — CONSULTS
Patient: Faustino Low  [id number]  Date: 7/24/2018      Chief Complaint: Back pain    History of Present Illness/Hospital Course:  Ms Lina Malone is a 69year old female admitted 7/19/2018 with worsening back pain in the context of recent discectomy by Dr. Chandu Fajardo. She also reported increased wound drainage with low grade temp at SNF. ID was consulted and she was started on vancomycin after undergoing debridement of her wound on 7/20. Doing very well with therapy, ambulating 150' with SBA using RW.     has a past medical history of Chronic back pain; Depression; Diabetes mellitus ([facility] [address] Hyperlipidemia; Hypertension; Retinopathy; Seasonal allergies; Spinal stenosis; and Type II or unspecified type diabetes mellitus without mention of complication, not stated as uncontrolled. has a past surgical history that includes back surgery; Neck surgery; Hysterectomy; Tonsillectomy and adenoidectomy (1953); Ovary removal (1993); Hysterectomy (1993); Tubal ligation (1976); Spinal fusion (1999); Spinal fusion (2003); Cholecystectomy (09/2017); other surgical history (4/9/18Right CTR); eye surgery; other surgical history (N/A, 06/27/2018); other surgical history (07/20/2018); and pr lamnotmy w/dcmprsn nrv each addl crvcl/lmbr (N/A, 7/20/2018). reports that she has never smoked. She has never used smokeless tobacco. She reports that she does not drink alcohol or use drugs. family history includes Cancer in her mother; Diabetes in her father. REVIEW OF SYSTEMS:   CONSTITUTIONAL: negative for fevers, chills, diaphoresis, activity change, appetite change, fatigue, night sweats and unexpected weight change.    EYES: negative for blurred vision, eye discharge, visual disturbance and icterus  HEENT: negative for hearing loss, tinnitus, ear drainage, sinus pressure, nasal congestion, epistaxis and snoring  RESPIRATORY: Negative for hemoptysis, cough, sputum production  CARDIOVASCULAR: negative for chest pain,

## 2018-07-24 NOTE — PROGRESS NOTES
Hospital medicine Progress Notes    CC on admission/Reason for admission: post op infection     Subjective/Interval history:  Back pain is uncontrolled. Bs controlled this am.   Denies any nausea, vomiting, diarrhea.   Denies any chest pain, shortness of breath, palpitation      Medications:  Scheduled Meds:   methocarbamol  750 mg Oral 4x Daily    ceFAZolin  2 g Intravenous Q8H    lidocaine 1 % injection  5 mL Intradermal Once    sodium chloride flush  10 mL Intravenous 2 times per day    Urea  15 g Oral Daily    atorvastatin  20 mg Oral Daily    calcium elemental  500 mg Oral Daily    insulin glargine  22 Units Subcutaneous BID    insulin lispro  5 Units Subcutaneous TID WC    sertraline  100 mg Oral BID    enoxaparin  40 mg Subcutaneous Daily    insulin lispro  0-12 Units Subcutaneous TID WC    insulin lispro  0-6 Units Subcutaneous Nightly    valsartan  320 mg Oral Daily    sodium chloride flush  10 mL Intravenous 2 times per day    docusate sodium  100 mg Oral BID    famotidine  20 mg Oral BID    famotidine (PEPCID) injection  20 mg Intravenous BID     Continuous Infusions:   dextrose       PRN Meds:.ibuprofen, sodium chloride flush, potassium chloride **OR** potassium bicarb-citric acid **OR** potassium chloride, magnesium sulfate, HYDROcodone 5 mg - acetaminophen **OR** HYDROcodone 5 mg - acetaminophen, cloNIDine, glucose, dextrose, glucagon (rDNA), dextrose, sodium chloride flush, acetaminophen, acetaminophen, magnesium hydroxide, bisacodyl, ondansetron        Objective:    Vitals:    07/24/18 0030 07/24/18 0500 07/24/18 0730 07/24/18 1245   BP: (!) 162/79 (!) 156/73 (!) 151/70 (!) 148/78   Pulse: 85 81 84 86   Resp: 18 18 18 16   Temp: 98.3 °F (36.8 °C) 98.3 °F (36.8 °C) 98 °F (36.7 °C) 99.2 °F (37.3 °C)   TempSrc: Oral Oral Oral Oral   SpO2: 97% 96% 95% 99%       I/O last 3 completed shifts:  In: -   Out: 1750 [Urine:1750]  I/O this shift:  In: -   Out: 400 [Urine:400]        Physical Exam   Constitutional: She is oriented to person, place, and time. She appears well-developed and well-nourished. HENT:   Head: Normocephalic and atraumatic. Eyes: EOM are normal. Pupils are equal, round, and reactive to light. Cardiovascular: Normal rate and regular rhythm. Exam reveals no friction rub. No murmur heard. Pulmonary/Chest: Effort normal and breath sounds normal. No respiratory distress. She has no wheezes. Abdominal: Soft. Bowel sounds are normal. She exhibits no distension. There is no tenderness. There is no guarding. Musculoskeletal: Normal range of motion. She exhibits no edema. Neurological: She is alert and oriented to person, place, and time. No cranial nerve deficit. Coordination normal.   Skin: Skin is warm and dry. Capillary refill takes less than 2 seconds. No erythema. Psychiatric: She has a normal mood and affect. Her behavior is normal.         Labs;    CBC:   Recent Labs      07/22/18   0531  07/23/18   0555  07/24/18   0948   WBC  9.7  7.6  9.2   HGB  9.4*  9.1*  9.7*   HCT  27.5*  26.8*  28.4*   MCV  87.7  88.2  86.8   PLT  144  145  196     BMP:   Recent Labs      07/22/18   0531  07/24/18   0948   NA  132*  139   K  3.3*  3.3*   CL  97*  101   CO2  24  28   PHOS  2.0*  2.3*   BUN  32*  13   CREATININE  0.7  0.6     Mag:   Lab Results   Component Value Date    MG 1.60 07/20/2018       Recent Labs      07/21/18   1704  07/21/18   2217  07/22/18   0531  07/22/18   0829  07/22/18   1220  07/22/18   1634  07/22/18   2212  07/23/18   0741  07/23/18   1115  07/23/18   1646  07/23/18   2040  07/24/18   0738  07/24/18   0948  07/24/18   1250   GLUCOSE   --    --   196*   --    --    --    --    --    --    --    --    --   179*   --    POCGLU  322*  217*   --   166*  289*  189*  127*  116*  272*  251*  109*  81   --   150*          Diagnostics    MRI         1. Postoperative changes of anterior and posterior surgical fusion  L2-L5.   2. Grade 1

## 2018-07-24 NOTE — PLAN OF CARE
Problem: Musculor/Skeletal Functional Status  Intervention: PT Evaluation/treatment  Increase functional status to baseline.

## 2018-07-24 NOTE — PROGRESS NOTES
Dr. Clara Rousseau is planning to d.c pt. Tomorrow. Social work aware of pt. Wanting to go home with home care and actively trying to get home care set up for pt.

## 2018-07-24 NOTE — PROGRESS NOTES
RN spoke with patient about future discharge plans. Pt. Fears going to a nursing home, Pt/OT deemed pt. Safe for going home. Pt. Is agreeable and prefers to go home with home care. Pt. Stated she will do better if she is able to go home without going to a nursing home. Neuro team is agreeable with decision. RN has contacted internal medicine team and waiting to hear back at this time. RN will continue to assess as needed.

## 2018-07-25 VITALS
SYSTOLIC BLOOD PRESSURE: 159 MMHG | OXYGEN SATURATION: 96 % | HEART RATE: 91 BPM | TEMPERATURE: 98.1 F | RESPIRATION RATE: 16 BRPM | DIASTOLIC BLOOD PRESSURE: 77 MMHG

## 2018-07-25 LAB
ALBUMIN SERPL-MCNC: 3.2 G/DL (ref 3.4–5)
ANAEROBIC CULTURE: ABNORMAL
ANION GAP SERPL CALCULATED.3IONS-SCNC: 9 MMOL/L (ref 3–16)
BUN BLDV-MCNC: 9 MG/DL (ref 7–20)
CALCIUM SERPL-MCNC: 8.9 MG/DL (ref 8.3–10.6)
CHLORIDE BLD-SCNC: 105 MMOL/L (ref 99–110)
CO2: 31 MMOL/L (ref 21–32)
CREAT SERPL-MCNC: 0.6 MG/DL (ref 0.6–1.2)
GFR AFRICAN AMERICAN: >60
GFR NON-AFRICAN AMERICAN: >60
GLUCOSE BLD-MCNC: 218 MG/DL (ref 70–99)
GLUCOSE BLD-MCNC: 75 MG/DL (ref 70–99)
GLUCOSE BLD-MCNC: 76 MG/DL (ref 70–99)
GRAM STAIN RESULT: ABNORMAL
HCT VFR BLD CALC: 26.6 % (ref 36–48)
HEMOGLOBIN: 9 G/DL (ref 12–16)
MCH RBC QN AUTO: 29.8 PG (ref 26–34)
MCHC RBC AUTO-ENTMCNC: 33.9 G/DL (ref 31–36)
MCV RBC AUTO: 87.9 FL (ref 80–100)
ORGANISM: ABNORMAL
PDW BLD-RTO: 14.1 % (ref 12.4–15.4)
PERFORMED ON: ABNORMAL
PERFORMED ON: NORMAL
PHOSPHORUS: 3.2 MG/DL (ref 2.5–4.9)
PLATELET # BLD: 215 K/UL (ref 135–450)
PMV BLD AUTO: 8.2 FL (ref 5–10.5)
POTASSIUM SERPL-SCNC: 3.4 MMOL/L (ref 3.5–5.1)
RBC # BLD: 3.02 M/UL (ref 4–5.2)
SODIUM BLD-SCNC: 145 MMOL/L (ref 136–145)
WBC # BLD: 9.6 K/UL (ref 4–11)
WOUND/ABSCESS: ABNORMAL

## 2018-07-25 PROCEDURE — 6370000000 HC RX 637 (ALT 250 FOR IP): Performed by: INTERNAL MEDICINE

## 2018-07-25 PROCEDURE — 6360000002 HC RX W HCPCS: Performed by: PHYSICIAN ASSISTANT

## 2018-07-25 PROCEDURE — 2580000003 HC RX 258: Performed by: INTERNAL MEDICINE

## 2018-07-25 PROCEDURE — 6360000002 HC RX W HCPCS

## 2018-07-25 PROCEDURE — 85027 COMPLETE CBC AUTOMATED: CPT

## 2018-07-25 PROCEDURE — 80069 RENAL FUNCTION PANEL: CPT

## 2018-07-25 PROCEDURE — 2580000003 HC RX 258: Performed by: PHYSICIAN ASSISTANT

## 2018-07-25 PROCEDURE — 36592 COLLECT BLOOD FROM PICC: CPT

## 2018-07-25 PROCEDURE — 97535 SELF CARE MNGMENT TRAINING: CPT

## 2018-07-25 PROCEDURE — 6370000000 HC RX 637 (ALT 250 FOR IP): Performed by: NURSE PRACTITIONER

## 2018-07-25 PROCEDURE — 6360000002 HC RX W HCPCS: Performed by: INTERNAL MEDICINE

## 2018-07-25 PROCEDURE — 6370000000 HC RX 637 (ALT 250 FOR IP): Performed by: PHYSICIAN ASSISTANT

## 2018-07-25 PROCEDURE — 6370000000 HC RX 637 (ALT 250 FOR IP): Performed by: FAMILY MEDICINE

## 2018-07-25 PROCEDURE — 99232 SBSQ HOSP IP/OBS MODERATE 35: CPT | Performed by: INTERNAL MEDICINE

## 2018-07-25 PROCEDURE — 97530 THERAPEUTIC ACTIVITIES: CPT

## 2018-07-25 PROCEDURE — 6360000002 HC RX W HCPCS: Performed by: FAMILY MEDICINE

## 2018-07-25 RX ORDER — AMLODIPINE BESYLATE 5 MG/1
5 TABLET ORAL DAILY
Qty: 30 TABLET | Refills: 3 | Status: SHIPPED | OUTPATIENT
Start: 2018-07-26 | End: 2018-08-22 | Stop reason: SDUPTHER

## 2018-07-25 RX ORDER — HYDROCODONE BITARTRATE AND ACETAMINOPHEN 5; 325 MG/1; MG/1
1 TABLET ORAL EVERY 4 HOURS PRN
Qty: 30 TABLET | Refills: 0 | Status: SHIPPED | OUTPATIENT
Start: 2018-07-25 | End: 2018-07-30

## 2018-07-25 RX ORDER — VALSARTAN 320 MG/1
320 TABLET ORAL DAILY
Qty: 30 TABLET | Refills: 3 | Status: SHIPPED | OUTPATIENT
Start: 2018-07-26 | End: 2018-08-24 | Stop reason: ALTCHOICE

## 2018-07-25 RX ORDER — METHOCARBAMOL 750 MG/1
750 TABLET, FILM COATED ORAL 3 TIMES DAILY
Qty: 30 TABLET | Refills: 0 | Status: SHIPPED | OUTPATIENT
Start: 2018-07-25 | End: 2018-08-04

## 2018-07-25 RX ORDER — AMLODIPINE BESYLATE 5 MG/1
5 TABLET ORAL DAILY
Status: DISCONTINUED | OUTPATIENT
Start: 2018-07-25 | End: 2018-07-25 | Stop reason: HOSPADM

## 2018-07-25 RX ORDER — SENNA PLUS 8.6 MG/1
1 TABLET ORAL 2 TIMES DAILY
Qty: 20 TABLET | Refills: 0 | Status: SHIPPED | OUTPATIENT
Start: 2018-07-25 | End: 2018-08-04

## 2018-07-25 RX ADMIN — HYDROCODONE BITARTRATE AND ACETAMINOPHEN 2 TABLET: 5; 325 TABLET ORAL at 08:48

## 2018-07-25 RX ADMIN — VALSARTAN 320 MG: 80 TABLET, FILM COATED ORAL at 08:48

## 2018-07-25 RX ADMIN — Medication 10 ML: at 11:33

## 2018-07-25 RX ADMIN — ENOXAPARIN SODIUM 40 MG: 40 INJECTION SUBCUTANEOUS at 08:47

## 2018-07-25 RX ADMIN — AMLODIPINE BESYLATE 5 MG: 5 TABLET ORAL at 08:48

## 2018-07-25 RX ADMIN — INSULIN GLARGINE 22 UNITS: 100 INJECTION, SOLUTION SUBCUTANEOUS at 08:49

## 2018-07-25 RX ADMIN — SERTRALINE HYDROCHLORIDE 100 MG: 100 TABLET ORAL at 08:48

## 2018-07-25 RX ADMIN — NALOXEGOL OXALATE 25 MG: 25 TABLET, FILM COATED ORAL at 08:48

## 2018-07-25 RX ADMIN — METHOCARBAMOL 750 MG: 750 TABLET ORAL at 14:48

## 2018-07-25 RX ADMIN — ONDANSETRON 4 MG: 2 INJECTION INTRAMUSCULAR; INTRAVENOUS at 11:27

## 2018-07-25 RX ADMIN — Medication 10 ML: at 08:49

## 2018-07-25 RX ADMIN — CEFAZOLIN 2 G: 10 INJECTION, POWDER, FOR SOLUTION INTRAVENOUS; PARENTERAL at 13:08

## 2018-07-25 RX ADMIN — INSULIN LISPRO 6 UNITS: 100 INJECTION, SOLUTION INTRAVENOUS; SUBCUTANEOUS at 11:27

## 2018-07-25 RX ADMIN — CALCIUM 500 MG: 500 TABLET ORAL at 08:48

## 2018-07-25 RX ADMIN — ATORVASTATIN CALCIUM 20 MG: 20 TABLET, FILM COATED ORAL at 08:48

## 2018-07-25 RX ADMIN — CEFAZOLIN 2 G: 10 INJECTION, POWDER, FOR SOLUTION INTRAVENOUS; PARENTERAL at 03:41

## 2018-07-25 RX ADMIN — METHOCARBAMOL 750 MG: 750 TABLET ORAL at 08:48

## 2018-07-25 RX ADMIN — HYDROCODONE BITARTRATE AND ACETAMINOPHEN 1 TABLET: 5; 325 TABLET ORAL at 03:40

## 2018-07-25 RX ADMIN — INSULIN LISPRO 5 UNITS: 100 INJECTION, SOLUTION INTRAVENOUS; SUBCUTANEOUS at 11:29

## 2018-07-25 RX ADMIN — DOCUSATE SODIUM 100 MG: 100 CAPSULE, LIQUID FILLED ORAL at 08:48

## 2018-07-25 RX ADMIN — FAMOTIDINE 20 MG: 20 TABLET ORAL at 08:48

## 2018-07-25 RX ADMIN — HYDROCODONE BITARTRATE AND ACETAMINOPHEN 2 TABLET: 5; 325 TABLET ORAL at 13:09

## 2018-07-25 ASSESSMENT — PAIN DESCRIPTION - LOCATION
LOCATION: BACK

## 2018-07-25 ASSESSMENT — PAIN DESCRIPTION - PAIN TYPE
TYPE: SURGICAL PAIN

## 2018-07-25 ASSESSMENT — PAIN DESCRIPTION - FREQUENCY
FREQUENCY: CONTINUOUS
FREQUENCY: CONTINUOUS

## 2018-07-25 ASSESSMENT — PAIN SCALES - GENERAL
PAINLEVEL_OUTOF10: 8
PAINLEVEL_OUTOF10: 0
PAINLEVEL_OUTOF10: 8
PAINLEVEL_OUTOF10: 6

## 2018-07-25 ASSESSMENT — PAIN DESCRIPTION - ONSET
ONSET: ON-GOING
ONSET: ON-GOING

## 2018-07-25 ASSESSMENT — PAIN DESCRIPTION - DESCRIPTORS
DESCRIPTORS: ACHING
DESCRIPTORS: ACHING

## 2018-07-25 ASSESSMENT — PAIN DESCRIPTION - PROGRESSION
CLINICAL_PROGRESSION: NOT CHANGED
CLINICAL_PROGRESSION: NOT CHANGED

## 2018-07-25 ASSESSMENT — PAIN DESCRIPTION - ORIENTATION
ORIENTATION: LOWER
ORIENTATION: LOWER

## 2018-07-25 NOTE — DISCHARGE SUMMARY
(HUMALOG) 100 UNIT/ML pen  Inject 5 Units into the skin 3 times daily (with meals)             methocarbamol (ROBAXIN) 750 MG tablet  Take 1 tablet by mouth 3 times daily for 10 days             Needles & Syringes MISC  Inject 1 each into the skin 3 times daily             senna (SENOKOT) 8.6 MG tablet  Take 1 tablet by mouth 2 times daily for 10 days             sertraline (ZOLOFT) 100 MG tablet  100 mg 2 times daily              valsartan (DIOVAN) 320 MG tablet  Take 1 tablet by mouth daily                Activity: activity as tolerated  Diet: regular diet  Wound Care: as directed per NS     Disposition: home  Discharged Condition: Stable  Follow Up: Primary Care Physician in one week      Discharge medications, instructions was reviewed with patient , Side effects associated with medications discussed with patient. Time Spent on discharge is more than 45 minutes  Thank you Dr. Kirstie Acharya MD for the opportunity to be involved in this patients care. If you have any questions or concerns please feel free to contact me at [phone].

## 2018-07-25 NOTE — PROGRESS NOTES
Nephrology Progress Note     Na stable   K slightly low   Off Urea      Past Medical History:   Diagnosis Date    Chronic back pain     Depression     Diabetes mellitus ([address]     Hyperlipidemia     Hypertension     Retinopathy     Seasonal allergies     Spinal stenosis     Type II or unspecified type diabetes mellitus without mention of complication, not stated as uncontrolled        Past Surgical History:   Procedure Laterality Date    BACK SURGERY      CHOLECYSTECTOMY  09/2017    EYE SURGERY      LASER    HYSTERECTOMY      HYSTERECTOMY  1993    NECK SURGERY      OTHER SURGICAL HISTORY  4/9/18Right CTR    OTHER SURGICAL HISTORY N/A 06/27/2018    EXTREME LATERAL INTERBODY FUSION LEFT APPROACH L2-3, L3-4,    OTHER SURGICAL HISTORY  07/20/2018     LUMBAR WOUND INCISION AND DRAINAGE / WASHOUT     OVARY REMOVAL  1993    ND LAMNOTMY W/DCMPRSN NRV EACH ADDL CRVCL/LMBR N/A 7/20/2018    LUMBAR WOUND INCISION AND DRAINAGE / WASHOUT performed by Asuncion Mckee MD at [address] [place] [address]    c-spine    SPINAL FUSION  2003    lumbar-spine per Dr Ash Mohamud.  TONSILLECTOMY AND ADENOIDECTOMY  1953    TUBAL LIGATION  1976       Family History   Problem Relation Age of Onset    Cancer Mother         melanoma    Diabetes Father         reports that she has never smoked. She has never used smokeless tobacco. She reports that she does not drink alcohol or use drugs.     Allergies:  Sulfa antibiotics and Sulfa antibiotics    Current Medications:      amLODIPine (NORVASC) tablet 5 mg Daily   methocarbamol (ROBAXIN) tablet 750 mg 4x Daily   ibuprofen (ADVIL;MOTRIN) tablet 400 mg Q8H PRN   naloxegol (MOVANTIK) tablet 25 mg QAM   ceFAZolin (ANCEF) 2 g in dextrose 5 % 50 mL IVPB Q8H   lidocaine PF 1 % injection 5 mL Once   sodium chloride flush 0.9 % injection 10 mL 2 times per day   sodium chloride flush 0.9 % injection 10 mL PRN   atorvastatin (LIPITOR) tablet 20 mg Daily   calcium elemental (OSCAL) Rom, muscular strength intact    Data:   Labs:  CBC:   Recent Labs      07/23/18   0555  07/24/18   0948  07/25/18   0621   WBC  7.6  9.2  9.6   HGB  9.1*  9.7*  9.0*   PLT  145  196  215     BMP:    Recent Labs      07/24/18   0948  07/25/18   0621   NA  139  145   K  3.3*  3.4*   CL  101  105   CO2  28  31   BUN  13  9   CREATININE  0.6  0.6   GLUCOSE  179*  75     Ca/Mg/Phos:   Recent Labs      07/24/18   0948  07/25/18   0621   CALCIUM  8.7  8.9   PHOS  2.3*  3.2     Hepatic: No results for input(s): AST, ALT, ALB, BILITOT, ALKPHOS in the last 72 hours. Troponin: No results for input(s): TROPONINI in the last 72 hours. BNP: No results for input(s): BNP in the last 72 hours. Lipids: No results for input(s): CHOL, TRIG, HDL, LDLCALC, LABVLDL in the last 72 hours. ABGs: No results for input(s): PHART, PO2ART, LHV4OIC in the last 72 hours. INR:   No results for input(s): INR in the last 72 hours. UA:  No results for input(s): Cristofer Steptoe, GLUCOSEU, BILIRUBINUR, KETUA, SPECGRAV, BLOODU, PHUR, PROTEINU, UROBILINOGEN, NITRU, LEUKOCYTESUR, Johnathan Hakeem in the last 72 hours. Urine Microscopic:   No results for input(s): LABCAST, BACTERIA, COMU, HYALCAST, WBCUA, RBCUA, EPIU in the last 72 hours. Urine Culture: No results for input(s): LABURIN in the last 72 hours. Urine Chemistry:   No results for input(s): Sherleen Dea, PROTEINUR, NAUR in the last 72 hours. IMAGING:  XR ABDOMEN (KUB) (SINGLE AP VIEW)   Final Result   Negative abdominal bowel gas pattern. MRI LUMBAR SPINE W WO CONTRAST   Final Result      1. Postoperative changes of anterior and posterior surgical fusion   L2-L5. 2. Grade 1 retrolisthesis of L5 on S1.   3. Right paracentral disc protrusion/osteophyte at L2-3 level   causing severe right lateral recess stenosis and is likely   impinging on the traversing right L3 nerve root.    4. Persistent mild to moderate thecal sac narrowing at L3-4.   5. Facet arthrosis with

## 2018-07-25 NOTE — PROGRESS NOTES
injection pen 22 Units BID   insulin lispro (HUMALOG) injection pen 5 Units TID WC   sertraline (ZOLOFT) tablet 100 mg BID   enoxaparin (LOVENOX) injection 40 mg Daily   potassium chloride (KLOR-CON M) extended release tablet 40 mEq PRN   Or    potassium bicarb-citric acid (EFFER-K) effervescent tablet 40 mEq PRN   Or    potassium chloride 10 mEq/100 mL IVPB (Peripheral Line) PRN   magnesium sulfate 1 g in dextrose 5% 100 mL IVPB PRN   HYDROcodone-acetaminophen (NORCO) 5-325 MG per tablet 1 tablet Q4H PRN   Or    HYDROcodone-acetaminophen (NORCO) 5-325 MG per tablet 2 tablet Q4H PRN   insulin lispro (HUMALOG) injection pen 0-12 Units TID WC   insulin lispro (HUMALOG) injection pen 0-6 Units Nightly   valsartan (DIOVAN) tablet 320 mg Daily   cloNIDine (CATAPRES) tablet 0.1 mg Q4H PRN   glucose (GLUTOSE) 40 % oral gel 15 g PRN   dextrose 50 % solution 12.5 g PRN   glucagon (rDNA) injection 1 mg PRN   dextrose 5 % solution PRN   sodium chloride flush 0.9 % injection 10 mL 2 times per day   sodium chloride flush 0.9 % injection 10 mL PRN   acetaminophen (TYLENOL) tablet 650 mg Q4H PRN   acetaminophen (TYLENOL) suppository 650 mg Q4H PRN   docusate sodium (COLACE) capsule 100 mg BID   magnesium hydroxide (MILK OF MAGNESIA) 400 MG/5ML suspension 30 mL Daily PRN   bisacodyl (DULCOLAX) suppository 10 mg Daily PRN   ondansetron (ZOFRAN) injection 4 mg Q6H PRN   famotidine (PEPCID) tablet 20 mg BID   famotidine (PEPCID) injection 20 mg BID           Physical exam:     Vitals:  BP (!) 150/80   Pulse 87   Temp 97.9 °F (36.6 °C) (Oral)   Resp 16   SpO2 93%   Constitutional:  OAA X3 NAD  Skin: no rash, turgor wnl  Heent:  eomi, mmm  Neck: no bruits or jvd noted  Cardiovascular:  S1, S2 without m/r/g  Respiratory: CTA B without w/r/r  Abdomen:  +bs, soft, diffusely TTP, some voluntary guarding  Ext: no lower extremity edema  Psychiatric: mood and affect appropriate  Musculoskeletal:  Rom, muscular strength intact    Data: Labs:  CBC:   Recent Labs      07/23/18   0555  07/24/18   0948   WBC  7.6  9.2   HGB  9.1*  9.7*   PLT  145  196     BMP:    Recent Labs      07/24/18   0948   NA  139   K  3.3*   CL  101   CO2  28   BUN  13   CREATININE  0.6   GLUCOSE  179*     Ca/Mg/Phos:   Recent Labs      07/24/18   0948   CALCIUM  8.7   PHOS  2.3*     Hepatic: No results for input(s): AST, ALT, ALB, BILITOT, ALKPHOS in the last 72 hours. Troponin: No results for input(s): TROPONINI in the last 72 hours. BNP: No results for input(s): BNP in the last 72 hours. Lipids: No results for input(s): CHOL, TRIG, HDL, LDLCALC, LABVLDL in the last 72 hours. ABGs: No results for input(s): PHART, PO2ART, BAW6VNC in the last 72 hours. INR:   No results for input(s): INR in the last 72 hours. UA:  No results for input(s): Talisha Chatters, GLUCOSEU, BILIRUBINUR, KETUA, SPECGRAV, BLOODU, PHUR, PROTEINU, UROBILINOGEN, NITRU, LEUKOCYTESUR, Acacia Radon in the last 72 hours. Urine Microscopic:   No results for input(s): LABCAST, BACTERIA, COMU, HYALCAST, WBCUA, RBCUA, EPIU in the last 72 hours. Urine Culture: No results for input(s): LABURIN in the last 72 hours. Urine Chemistry:   No results for input(s): [place], PROTEINUR, NAUR in the last 72 hours. IMAGING:  XR ABDOMEN (KUB) (SINGLE AP VIEW)   Final Result   Negative abdominal bowel gas pattern. MRI LUMBAR SPINE W WO CONTRAST   Final Result      1. Postoperative changes of anterior and posterior surgical fusion   L2-L5. 2. Grade 1 retrolisthesis of L5 on S1.   3. Right paracentral disc protrusion/osteophyte at L2-3 level   causing severe right lateral recess stenosis and is likely   impinging on the traversing right L3 nerve root. 4. Persistent mild to moderate thecal sac narrowing at L3-4.   5. Facet arthrosis with moderate left neural foraminal stenosis at   L5-S1. XR CHEST PORTABLE   Final Result      No acute cardiopulmonary disease. Interpreted by:   Mao Gallardo MD      Signed by:   Mao Gallardo MD   7/19/18      Final result          Assessment/Plan   1. Hyponatremia - sec to SIADH   - Na better   - Inc oral solute intake   - stop oral urea   - no HCTZ at discharge   - monitor renal panel daily    2. Hx of HTN   - cont meds   - Monitor BP     3. Anemia - monitor     4. Acid- base/ Electrolytes   - stable     5.  RAISA - cr better     Thank you for allowing us to participate in care of Johanna Paul MD,

## 2018-07-25 NOTE — PROGRESS NOTES
debridement, cult + MSSA.  Only brief op note available, unclear if superficial or deep space.  Given recent surgery, 'instrumented', will treat aggressive, risk of extension to deep space. Plan:     Continue Cefazolin with PICC line 4-6 weeks  Post op care per neurology  Follow up outpatient per neurosurgery reconmendations    Discussed with Dr Yecenia Dougherty, MS4    Addendum to Medical Student Progress note:  Pt seen,examined and evaluated. I have independently performed history, physical exam, lab and data review.  I have determined assessment and plan as documented by student (Viji).    70 yo woman with hx DM.     Pt admit / OR 6/27 for lumbar surg with fusion / instrumentation (Yasmine Dunaway).  Pt had postop pain, psych issues.    D/c off antibiotics to [facility] on 7/6.  At [facility], pt developed increase in back pain and wound drainage. She had constipation and with meds, had BM and thereafter had worse back sx.   Had temp to approximately 100.5 at facility. Referred to [facility] for further evaluation.     Presents from [facility]19  with worse LBP and drainage from surgical wound.    T 101, WBC 13.9, drainage GS 1+WBC, 1+GPC.  MRI with 'postop changes'.   Not started on antibiotics.  Seen by Denise Gonzalez, plan to debridement wound in evening 7/20 (Inga)     IMP/  Lumbar SSI, s/p debridement, cult + MSSA.  Only brief op note available, unclear if superficial or deep space.  Given recent surgery, 'instrumented', will treat aggressive, risk of extension to deep space.     REC/  Cont cefazolin  Await OR report   Postop care per neurosurg  See VILLA     Discussed with pt, Neurosurg NP  Kelly Arriaga MD     INFUSION ORDERS:  Ancef 2 gm iv q8 hr through 8/21/18  - Check CBC w diff, CMP, ESR, CRP every Mon or Tue - FAX result to [phone]  - Call with any antibiotic / infusion issues, [phone]

## 2018-07-25 NOTE — PROGRESS NOTES
Patient is alert and oriented x4. She has ambulated to the bathroom several times this shift and voiding adequately. She wears her back brace when ambulating. Patient requests oral pain medication PRN. Vacuum dressing is clean dry and intact. Left side incision is CDI surgical glue. No nausea/vomiting this shift. On fluid restriction 1500 mL/day. Will continue to monitor and assess.

## 2018-07-25 NOTE — CARE COORDINATION
[facility]    DC order noted, all docs needed have been faxed to [facility] for home care services.       Ismael Melara  Work mobile: [phone]  [facility] office: [phone]

## 2018-07-26 LAB
ANAEROBIC CULTURE: ABNORMAL
CULTURE SURGICAL: ABNORMAL
GRAM STAIN RESULT: ABNORMAL
ORGANISM: ABNORMAL

## 2018-07-27 ENCOUNTER — TELEPHONE (OUTPATIENT)
Dept: FAMILY MEDICINE CLINIC | Age: 68
End: 2018-07-27

## 2018-07-27 RX ORDER — ONDANSETRON 4 MG/1
4 TABLET, FILM COATED ORAL EVERY 12 HOURS PRN
Qty: 10 TABLET | Refills: 0 | Status: SHIPPED | OUTPATIENT
Start: 2018-07-27 | End: 2019-06-12

## 2018-07-27 NOTE — TELEPHONE ENCOUNTER
ust had surgery and is having nausea. Dory Bailon from [facility] called wondering if something can be called in for the nausea. Uses [facility] on [address].

## 2018-07-30 ENCOUNTER — HOSPITAL ENCOUNTER (EMERGENCY)
Age: 68
Discharge: HOME | End: 2018-07-30
Attending: EMERGENCY MEDICINE
Payer: MEDICARE

## 2018-07-30 VITALS
SYSTOLIC BLOOD PRESSURE: 143 MMHG | RESPIRATION RATE: 16 BRPM | TEMPERATURE: 97.1 F | DIASTOLIC BLOOD PRESSURE: 75 MMHG | OXYGEN SATURATION: 99 % | HEART RATE: 88 BPM

## 2018-07-30 DIAGNOSIS — D64.9 ANEMIA, UNSPECIFIED TYPE: ICD-10-CM

## 2018-07-30 DIAGNOSIS — R53.83 FATIGUE, UNSPECIFIED TYPE: Primary | ICD-10-CM

## 2018-07-30 LAB
ANION GAP SERPL CALCULATED.3IONS-SCNC: 9 MMOL/L (ref 3–16)
APTT: 29.2 SEC (ref 26–36)
BASOPHILS ABSOLUTE: 0 K/UL (ref 0–0.2)
BASOPHILS RELATIVE PERCENT: 0.5 %
BUN BLDV-MCNC: 9 MG/DL (ref 7–20)
CALCIUM SERPL-MCNC: 9 MG/DL (ref 8.3–10.6)
CHLORIDE BLD-SCNC: 101 MMOL/L (ref 99–110)
CO2: 29 MMOL/L (ref 21–32)
CREAT SERPL-MCNC: 0.6 MG/DL (ref 0.6–1.2)
EOSINOPHILS ABSOLUTE: 0.5 K/UL (ref 0–0.6)
EOSINOPHILS RELATIVE PERCENT: 7.9 %
GFR AFRICAN AMERICAN: >60
GFR NON-AFRICAN AMERICAN: >60
GLUCOSE BLD-MCNC: 102 MG/DL (ref 70–99)
HCT VFR BLD CALC: 28.6 % (ref 36–48)
HEMOGLOBIN: 9.3 G/DL (ref 12–16)
INR BLD: 1.01 (ref 0.86–1.14)
LYMPHOCYTES ABSOLUTE: 1.3 K/UL (ref 1–5.1)
LYMPHOCYTES RELATIVE PERCENT: 18.7 %
MCH RBC QN AUTO: 28.6 PG (ref 26–34)
MCHC RBC AUTO-ENTMCNC: 32.5 G/DL (ref 31–36)
MCV RBC AUTO: 88.2 FL (ref 80–100)
MONOCYTES ABSOLUTE: 0.5 K/UL (ref 0–1.3)
MONOCYTES RELATIVE PERCENT: 7.8 %
NEUTROPHILS ABSOLUTE: 4.5 K/UL (ref 1.7–7.7)
NEUTROPHILS RELATIVE PERCENT: 65.1 %
PDW BLD-RTO: 14.4 % (ref 12.4–15.4)
PLATELET # BLD: 285 K/UL (ref 135–450)
PMV BLD AUTO: 7.5 FL (ref 5–10.5)
POTASSIUM REFLEX MAGNESIUM: 3.9 MMOL/L (ref 3.5–5.1)
PROTHROMBIN TIME: 11.5 SEC (ref 9.8–13)
RBC # BLD: 3.25 M/UL (ref 4–5.2)
SODIUM BLD-SCNC: 139 MMOL/L (ref 136–145)
WBC # BLD: 6.9 K/UL (ref 4–11)

## 2018-07-30 PROCEDURE — 96374 THER/PROPH/DIAG INJ IV PUSH: CPT

## 2018-07-30 PROCEDURE — 85730 THROMBOPLASTIN TIME PARTIAL: CPT

## 2018-07-30 PROCEDURE — 85025 COMPLETE CBC W/AUTO DIFF WBC: CPT

## 2018-07-30 PROCEDURE — 99284 EMERGENCY DEPT VISIT MOD MDM: CPT

## 2018-07-30 PROCEDURE — 85610 PROTHROMBIN TIME: CPT

## 2018-07-30 PROCEDURE — 80048 BASIC METABOLIC PNL TOTAL CA: CPT

## 2018-07-30 PROCEDURE — 6360000002 HC RX W HCPCS: Performed by: PHYSICIAN ASSISTANT

## 2018-07-30 RX ORDER — ONDANSETRON 2 MG/ML
4 INJECTION INTRAMUSCULAR; INTRAVENOUS ONCE
Status: COMPLETED | OUTPATIENT
Start: 2018-07-30 | End: 2018-07-30

## 2018-07-30 RX ORDER — ONDANSETRON 4 MG/1
4 TABLET, FILM COATED ORAL EVERY 12 HOURS PRN
Qty: 60 TABLET | Refills: 0 | OUTPATIENT
Start: 2018-07-30

## 2018-07-30 RX ADMIN — ONDANSETRON 4 MG: 2 INJECTION, SOLUTION INTRAMUSCULAR; INTRAVENOUS at 21:08

## 2018-07-30 ASSESSMENT — ENCOUNTER SYMPTOMS
COUGH: 0
ABDOMINAL PAIN: 0
COLOR CHANGE: 0
CHEST TIGHTNESS: 0
VOMITING: 0
WHEEZING: 0
BACK PAIN: 1
SHORTNESS OF BREATH: 0
NAUSEA: 1
DIARRHEA: 0

## 2018-07-30 NOTE — TELEPHONE ENCOUNTER
Called the patient and she states that when she was in the hospital they changed her script and stated it was not affected by the recall. Also, she states that due to antibiotic three times a day  until August 21st, she is having nausea, Vomiting. We did call in #10 on 7-27-18 but will need more to get her through this. She is on antibiotic due to infection after surgery.

## 2018-07-30 NOTE — TELEPHONE ENCOUNTER
This is not a long term medication . She should not be having nausea and vomiting up to a month after surgery?  Needs TBS if sx persist

## 2018-07-31 ENCOUNTER — TELEPHONE (OUTPATIENT)
Dept: FAMILY MEDICINE CLINIC | Age: 68
End: 2018-07-31

## 2018-07-31 NOTE — ED PROVIDER NOTES
Family, and Social History     She has a past medical history of Chronic back pain; Depression; Diabetes mellitus ([address] Hyperlipidemia; Hypertension; Retinopathy; Seasonal allergies; Spinal stenosis; and Type II or unspecified type diabetes mellitus without mention of complication, not stated as uncontrolled. She has a past surgical history that includes back surgery; Neck surgery; Hysterectomy; Tonsillectomy and adenoidectomy (1953); Ovary removal (1993); Hysterectomy (1993); Tubal ligation (1976); Spinal fusion (1999); Spinal fusion (2003); Cholecystectomy (09/2017); other surgical history (4/9/18Right CTR); eye surgery; other surgical history (N/A, 06/27/2018); other surgical history (07/20/2018); and pr lamnotmy w/dcmprsn nrv each addl crvcl/lmbr (N/A, 7/20/2018). Her family history includes Cancer in her mother; Diabetes in her father. She reports that she has never smoked. She has never used smokeless tobacco. She reports that she does not drink alcohol or use drugs. Medications     Previous Medications    AMLODIPINE (NORVASC) 5 MG TABLET    Take 1 tablet by mouth daily    ATORVASTATIN (LIPITOR) 20 MG TABLET    Take 1 tablet by mouth daily    CALCIUM CARBONATE (OSCAL) 500 MG TABS TABLET    Take 500 mg by mouth daily    DOCUSATE SODIUM (COLACE) 100 MG CAPSULE    Take 100 mg by mouth 2 times daily    HYDROCODONE-ACETAMINOPHEN (NORCO) 5-325 MG PER TABLET    Take 1 tablet by mouth every 4 hours as needed for Pain for up to 5 days. .    INSULIN GLARGINE (LANTUS SOLOSTAR) 100 UNIT/ML INJECTION PEN    Inject 22 Units into the skin 2 times daily    INSULIN LISPRO (HUMALOG) 100 UNIT/ML PEN    Inject 5 Units into the skin 3 times daily (with meals)    METHOCARBAMOL (ROBAXIN) 750 MG TABLET    Take 1 tablet by mouth 3 times daily for 10 days    NEEDLES & SYRINGES MISC    Inject 1 each into the skin 3 times daily    ONDANSETRON (ZOFRAN) 4 MG TABLET    Take 1 tablet by mouth every 12 hours as needed for Nausea or place, and time. She has normal strength. No sensory deficit. Coordination normal. GCS eye subscore is 4. GCS verbal subscore is 5. GCS motor subscore is 6. Skin: Skin is warm and dry. She is not diaphoretic. No erythema. Nursing note and vitals reviewed. Diagnostic Results     LABS:   Results for orders placed or performed during the hospital encounter of 07/30/18   CBC auto differential   Result Value Ref Range    WBC 6.9 4.0 - 11.0 K/uL    RBC 3.25 (L) 4.00 - 5.20 M/uL    Hemoglobin 9.3 (L) 12.0 - 16.0 g/dL    Hematocrit 28.6 (L) 36.0 - 48.0 %    MCV 88.2 80.0 - 100.0 fL    MCH 28.6 26.0 - 34.0 pg    MCHC 32.5 31.0 - 36.0 g/dL    RDW 14.4 12.4 - 15.4 %    Platelets 845 296 - 118 K/uL    MPV 7.5 5.0 - 10.5 fL    Neutrophils % 65.1 %    Lymphocytes % 18.7 %    Monocytes % 7.8 %    Eosinophils % 7.9 %    Basophils % 0.5 %    Neutrophils # 4.5 1.7 - 7.7 K/uL    Lymphocytes # 1.3 1.0 - 5.1 K/uL    Monocytes # 0.5 0.0 - 1.3 K/uL    Eosinophils # 0.5 0.0 - 0.6 K/uL    Basophils # 0.0 0.0 - 0.2 K/uL   Basic Metabolic Panel w/ Reflex to MG   Result Value Ref Range    Sodium 139 136 - 145 mmol/L    Potassium reflex Magnesium 3.9 3.5 - 5.1 mmol/L    Chloride 101 99 - 110 mmol/L    CO2 29 21 - 32 mmol/L    Anion Gap 9 3 - 16    Glucose 102 (H) 70 - 99 mg/dL    BUN 9 7 - 20 mg/dL    CREATININE 0.6 0.6 - 1.2 mg/dL    GFR Non-African American >60 >60    GFR African American >60 >60    Calcium 9.0 8.3 - 10.6 mg/dL   Protime-INR   Result Value Ref Range    Protime 11.5 9.8 - 13.0 sec    INR 1.01 0.86 - 1.14   APTT   Result Value Ref Range    aPTT 29.2 26.0 - 36.0 sec       RECENT VITALS:  BP: (!) 143/75, Temp: 97.1 °F (36.2 °C), Pulse: 88, Resp: 16, SpO2: 99 %     Procedures       ED Course     Nursing Notes, Past Medical Hx, Past Surgical Hx, Social Hx, Allergies, and Family Hx were reviewed.     The patient was given the following medications:  Orders Placed This Encounter   Medications    ondansetron (Chanelle Nixon) injection 4 mg       CONSULTS:  None    MEDICAL DECISION MAKING / ASSESSMENT / Wing Kathryn is a 69 y.o. female presents here for possible anemia after her home health nurse called her and told her blood counts are low. Patient does complain of fatigue but otherwise has no other acute complaints. CBC today showed white count of 6.9 with hemoglobin of 9.3 and 285 platelets. Patient's last hemoglobin was 9.0 on July 25. Medical record review showed she has had hemoglobin in the 9 range since her surgery. Patient's incision is healing well. She is on IV antibiotics through a PICC line at home. BNP is within normal limits with glucose of 102 and creatinine 0.6. Pro time and INR were normal with an INR 1.0. At this point patient is stable for discharge. She was told to follow-up with her surgeon and PCP. She has no acute drop in her hemoglobin or other change in blood work. She has no acute complaints. No signs of acute infection of her incision. Patient to return if dizziness, syncope, chest pain, shortness of breath, signs of infection around the wound or worsening symptoms. Patient stable for discharge. This patient was also evaluated by the attending physician. All care plans were discussed and agreed upon. Clinical Impression     1. Fatigue, unspecified type    2.  Anemia, unspecified type        Disposition     PATIENT REFERRED TO:  [facility]  [address]  [phone]    If symptoms worsen    Zain Izaguirre MD  [address], [address] . [address]  [phone]    Schedule an appointment as soon as possible for a visit       Gina Soto MD  [address]  [phone]    Schedule an appointment as soon as possible for a visit         DISCHARGE MEDICATIONS:  New Prescriptions    No medications on file       DISPOSITION Decision To Discharge 07/30/2018 [address] [place], [place]  07/30/18 9035

## 2018-07-31 NOTE — ED PROVIDER NOTES
ED Attending Attestation Note     Date of evaluation: 7/30/2018    This patient was seen by the advance practice provider. I have seen and examined the patient, agree with the workup, evaluation, management and diagnosis. The care plan has been discussed. My assessment reveals A 59-year-old female, well-appearing, recovery from back surgery, K by infection receiving IV antibiotics is complaining of generalized malaise and fatigue. She is sitting at the edge of the bed in no apparent distress. She has a PICC site that is well appearing and her right basilic vein. She denies any dyspnea on exertion, or orthopnea or syncope. Sarina Torres MD  07/30/18 [id number]

## 2018-08-01 NOTE — TELEPHONE ENCOUNTER
Called the patient at cell number and left message for the patient that Dr. Lucia Andino states she should not be on this long term. If medication is making her this sick she needs to contact the surgeon to change the antibiotic or come in to see us to be evaluated.   Asked her to call me if surgeon will not change the antibiotic

## 2018-08-02 NOTE — OP NOTE
[address] [facility], [address] [facility]                                 OPERATIVE REPORT    PATIENT NAME: Shiela Mazariegos              :        1950  MED REC NO:   [id number]                          ROOM:       [id number]  ACCOUNT NO:   [de-identified]                           ADMIT DATE: 2018  PROVIDER:     Naila Garcia MD    DATE OF PROCEDURE:  2018    PREOPERATIVE DIAGNOSIS:  Infected draining left-sided posterior lumbar  wound. POSTOPERATIVE DIAGNOSIS:  Infected draining left-sided posterior lumbar  wound. OPERATIONS PERFORMED:  Open debridement washout of subcutaneous infected  wound, placement of a Prevena drain. SURGEON:  Naila Garcia M.D.    ASSISTANT:  None. ANESTHESIA:  General.    ESTIMATED BLOOD LOSS:  Less than 25 mL. COMPLICATIONS:  None. HISTORY:  This patient is a 10-year-old woman who underwent a lumbar  deformity correction surgery approximately three weeks ago. The patient  was admitted from an extended care facility where there was purulent  drainage from the wound. A recommendation for surgical debridement was  made. Informed consent was provided. The risks and complications outlined  and she consented to the operation. OPERATIVE PROCEDURE:  The patient was taken to surgery. She was placed  under general endotracheal anesthesia. She was then placed in a prone  position on the operating table. The lumbar region was prepped and draped  in the routine fashion on the left side. The right side was protected and  draped outside of the prepped area. The incision was opened and all  subcutaneous sutures were removed. I opened the wound. There was purulent  drainage in the base of the wound. I used culture tips and sent these for  routine cultures. The necrotic edges of the skin were excised. The wound  was then irrigated out. Necrotic material was removed.   The fascia

## 2018-08-06 ENCOUNTER — TELEPHONE (OUTPATIENT)
Dept: FAMILY MEDICINE CLINIC | Age: 68
End: 2018-08-06

## 2018-08-06 NOTE — TELEPHONE ENCOUNTER
Patient called and states that she had back surgery not too long ago, and before that she use to go to [facility], and just called to schedule a follow up appt with them,and was told that the office closed, pt now needs to find a new pain management office, pt has Medicare and Anyi Wilcox, pt is worried that she won't be able to find a new pain med office, please call pt to advise @ [phone]

## 2018-08-16 NOTE — TELEPHONE ENCOUNTER
Called the patient and let her know that I did the referral to Dr. Rosenda Mcnair and faxed all to that office.

## 2018-08-17 NOTE — TELEPHONE ENCOUNTER
Called and spoke to patient and advised ok to discuss additional topic beside medicine check at visit Wed. 8/22/18 at 1pm with doctor.

## 2018-08-20 ENCOUNTER — TELEPHONE (OUTPATIENT)
Dept: INFECTIOUS DISEASES | Age: 68
End: 2018-08-20

## 2018-08-20 RX ORDER — VALSARTAN AND HYDROCHLOROTHIAZIDE 320; 25 MG/1; MG/1
TABLET, FILM COATED ORAL
Qty: 10 TABLET | Refills: 0 | Status: SHIPPED | OUTPATIENT
Start: 2018-08-20 | End: 2018-08-24 | Stop reason: ALTCHOICE

## 2018-08-20 NOTE — TELEPHONE ENCOUNTER
Medication:   Requested Prescriptions     Pending Prescriptions Disp Refills    valsartan-hydrochlorothiazide (DIOVAN-HCT) 320-25 MG per tablet [Pharmacy Med Name: VALSARTAN-HCTZ 320-25 MG TAB] 90 tablet 0     Sig: TAKE 1 TABLET BY MOUTH EVERY DAY     Last Filled:  5/23/18    Last appt: 6/19/2018   Next appt: 8/22/2018    Last OARRS: No flowsheet data found.

## 2018-08-20 NOTE — TELEPHONE ENCOUNTER
Called patient and she stated that she's feeling good. Staples are out, incision area is numb. Stated that she thinks she's be better after completing her abx treatment, because they make her a little sick to her stomach. Wanted to tell Dr Stormy Cantu thank you for everything.

## 2018-08-22 ENCOUNTER — OFFICE VISIT (OUTPATIENT)
Dept: FAMILY MEDICINE CLINIC | Age: 68
End: 2018-08-22

## 2018-08-22 VITALS
SYSTOLIC BLOOD PRESSURE: 126 MMHG | WEIGHT: 153 LBS | OXYGEN SATURATION: 99 % | DIASTOLIC BLOOD PRESSURE: 84 MMHG | HEART RATE: 104 BPM | TEMPERATURE: 99.3 F | BODY MASS INDEX: 26.26 KG/M2

## 2018-08-22 DIAGNOSIS — E10.9 TYPE 1 DIABETES MELLITUS WITHOUT COMPLICATION (HCC): Primary | ICD-10-CM

## 2018-08-22 DIAGNOSIS — G89.29 CHRONIC LOW BACK PAIN, UNSPECIFIED BACK PAIN LATERALITY, WITH SCIATICA PRESENCE UNSPECIFIED: ICD-10-CM

## 2018-08-22 DIAGNOSIS — D64.9 ANEMIA, UNSPECIFIED TYPE: ICD-10-CM

## 2018-08-22 DIAGNOSIS — F32.A DEPRESSION, UNSPECIFIED DEPRESSION TYPE: ICD-10-CM

## 2018-08-22 DIAGNOSIS — I10 ESSENTIAL HYPERTENSION: ICD-10-CM

## 2018-08-22 DIAGNOSIS — M85.80 OSTEOPENIA, UNSPECIFIED LOCATION: ICD-10-CM

## 2018-08-22 DIAGNOSIS — M54.5 CHRONIC LOW BACK PAIN, UNSPECIFIED BACK PAIN LATERALITY, WITH SCIATICA PRESENCE UNSPECIFIED: ICD-10-CM

## 2018-08-22 DIAGNOSIS — F32.89 OTHER DEPRESSION: ICD-10-CM

## 2018-08-22 DIAGNOSIS — E78.5 HYPERLIPIDEMIA, UNSPECIFIED HYPERLIPIDEMIA TYPE: ICD-10-CM

## 2018-08-22 LAB — GLUCOSE BLD-MCNC: 231 MG/DL

## 2018-08-22 PROCEDURE — 3017F COLORECTAL CA SCREEN DOC REV: CPT | Performed by: FAMILY MEDICINE

## 2018-08-22 PROCEDURE — 81002 URINALYSIS NONAUTO W/O SCOPE: CPT | Performed by: FAMILY MEDICINE

## 2018-08-22 PROCEDURE — 4040F PNEUMOC VAC/ADMIN/RCVD: CPT | Performed by: FAMILY MEDICINE

## 2018-08-22 PROCEDURE — G8427 DOCREV CUR MEDS BY ELIG CLIN: HCPCS | Performed by: FAMILY MEDICINE

## 2018-08-22 PROCEDURE — 1101F PT FALLS ASSESS-DOCD LE1/YR: CPT | Performed by: FAMILY MEDICINE

## 2018-08-22 PROCEDURE — 99215 OFFICE O/P EST HI 40 MIN: CPT | Performed by: FAMILY MEDICINE

## 2018-08-22 PROCEDURE — 1111F DSCHRG MED/CURRENT MED MERGE: CPT | Performed by: FAMILY MEDICINE

## 2018-08-22 PROCEDURE — G8399 PT W/DXA RESULTS DOCUMENT: HCPCS | Performed by: FAMILY MEDICINE

## 2018-08-22 PROCEDURE — 82962 GLUCOSE BLOOD TEST: CPT | Performed by: FAMILY MEDICINE

## 2018-08-22 PROCEDURE — 1036F TOBACCO NON-USER: CPT | Performed by: FAMILY MEDICINE

## 2018-08-22 PROCEDURE — G8417 CALC BMI ABV UP PARAM F/U: HCPCS | Performed by: FAMILY MEDICINE

## 2018-08-22 PROCEDURE — 1090F PRES/ABSN URINE INCON ASSESS: CPT | Performed by: FAMILY MEDICINE

## 2018-08-22 PROCEDURE — 3045F PR MOST RECENT HEMOGLOBIN A1C LEVEL 7.0-9.0%: CPT | Performed by: FAMILY MEDICINE

## 2018-08-22 PROCEDURE — 1123F ACP DISCUSS/DSCN MKR DOCD: CPT | Performed by: FAMILY MEDICINE

## 2018-08-22 PROCEDURE — 2022F DILAT RTA XM EVC RTNOPTHY: CPT | Performed by: FAMILY MEDICINE

## 2018-08-22 RX ORDER — LOSARTAN POTASSIUM 100 MG/1
100 TABLET ORAL DAILY
Qty: 30 TABLET | Refills: 0 | Status: SHIPPED | OUTPATIENT
Start: 2018-08-22 | End: 2018-09-21 | Stop reason: SDUPTHER

## 2018-08-22 RX ORDER — ATORVASTATIN CALCIUM 20 MG/1
20 TABLET, FILM COATED ORAL DAILY
Qty: 90 TABLET | Refills: 0 | Status: SHIPPED | OUTPATIENT
Start: 2018-08-22 | End: 2020-01-27

## 2018-08-22 RX ORDER — AMLODIPINE BESYLATE 5 MG/1
5 TABLET ORAL DAILY
Qty: 90 TABLET | Refills: 0 | Status: SHIPPED | OUTPATIENT
Start: 2018-08-22 | End: 2018-10-23 | Stop reason: SDUPTHER

## 2018-08-22 RX ORDER — VALSARTAN AND HYDROCHLOROTHIAZIDE 320; 25 MG/1; MG/1
TABLET, FILM COATED ORAL
Qty: 10 TABLET | Refills: 0 | Status: CANCELLED | OUTPATIENT
Start: 2018-08-22

## 2018-08-22 RX ORDER — VALSARTAN 320 MG/1
320 TABLET ORAL DAILY
Qty: 30 TABLET | Refills: 3 | Status: CANCELLED | OUTPATIENT
Start: 2018-08-22

## 2018-08-22 RX ORDER — ONDANSETRON 4 MG/1
4 TABLET, FILM COATED ORAL EVERY 12 HOURS PRN
Qty: 10 TABLET | Refills: 0 | Status: CANCELLED | OUTPATIENT
Start: 2018-08-22

## 2018-08-22 ASSESSMENT — ENCOUNTER SYMPTOMS
BLOOD IN STOOL: 0
CONSTIPATION: 0
CHEST TIGHTNESS: 0
BACK PAIN: 1
SHORTNESS OF BREATH: 0
DIARRHEA: 0
ABDOMINAL PAIN: 0
COUGH: 0

## 2018-08-22 NOTE — PROGRESS NOTES
SUBJECTIVE:  Rose Marie Love   1950   female   Allergies   Allergen Reactions    Sulfa Antibiotics Hives    Sulfa Antibiotics        Chief Complaint   Patient presents with    Diabetes     3 month med check         Patient Active Problem List    Diagnosis Date Noted    Staph aureus infection     Hyponatremia 07/20/2018    Wound drainage 07/20/2018    Superficial incisional infection of surgical site 07/20/2018    Post-operative pain     Degenerative lumbar spinal stenosis 06/27/2018    Bilateral carpal tunnel syndrome 04/02/2018    Ulnar neuropathy at elbow, left 04/02/2018    Gallstone pancreatitis 10/25/2017    Dilated cbd, acquired 09/07/2017    Osteoarthrosis 09/07/2017    Type 1 diabetes mellitus without complication ([facility] 90/96/5210    Osteopenia 08/31/2015    Family history of melanoma 05/27/2014    IDDM (insulin dependent diabetes mellitus) ([facility]     Depression     Hypertension     Spinal stenosis     Chronic back pain     Retinopathy     Cervical osteoarthritis 04/10/2013       HPI   Pt is here today for fu on HTN, diabetes, hyperlipidemia, depression, chronic back pain, and anemia. She is s/p back surgery and had developed a staph infection at the surgical site requiring surgical tx and She has been under the care of ID and receiving IV abx through PICC line. Finished abx yesterday and PICC has been removed as of this morning. She had also become anemic post surgery and being followed for H/H which is slowly improving. It looks like in the hospital and nursing facility she has been changed to Lispro before meals and Lantus at night time. She is on 44 U of Lantus and 5 U Lispro prior to meals. Has been doing well on this. She continues to struggle with back pain and will fu with pain mgt. Denies CP,SOB or myalgias. No hypoglycemia. Has been feeling more depressed since surgery and following complications . She will fu with her psychiatrist. Has been on Zoloft.    Past person, place, and time. She appears well-developed and well-nourished. Eyes: Pupils are equal, round, and reactive to light. EOM are normal.   Neck: Normal range of motion. Neck supple. No JVD present. No thyromegaly present. Cardiovascular: Normal rate and regular rhythm. Pulmonary/Chest: Effort normal and breath sounds normal.   Abdominal: Soft. Bowel sounds are normal. There is no tenderness. Neurological: She is alert and oriented to person, place, and time. Skin: No rash noted. Back-healing incision. No erythema,swelling or drainage   Psychiatric: She has a normal mood and affect. Her behavior is normal. Thought content normal.   Nursing note and vitals reviewed. ASSESSMENT/PLAN:    1. Type 1 diabetes mellitus without complication (HCC)  Will continue with Lantus and Lispro  Reviewed current tx   Instructions given  Pt will check BS at home and record  - POCT Urinalysis no Micro  - POCT Glucose  - Lipid Panel; Future  - Hemoglobin A1C; Future    2. Other depression  continue current mgt  Return to psych for med adj if needed    3. Essential hypertension  Change Diovan to Cozaar due to recent recall  Intermittent ambulatory BP checks  Refill meds    4. Osteopenia, unspecified location  Daily Vit D3  Increase dietary Ca    5. Hyperlipidemia, unspecified hyperlipidemia type  Continue Lipitor   Labs  appr diet mgt  - Lipid Panel; Future    7. Chronic low back pain, unspecified back pain laterality, with sciatica presence unspecified  Reviewed recent surgery and complications  Will fu with pain mgt        8.  Anemia, unspecified type  Anemia post surgery  labs    9.s/p back surgery  Reviewed surgery ,complications, and hospitalization  Orders Placed This Encounter   Procedures    Lipid Panel     Standing Status:   Future     Standing Expiration Date:   9/11/2019     Order Specific Question:   Is Patient Fasting?/# of Hours     Answer:   10 hrs    Hemoglobin A1C     Standing Status:   Future

## 2018-08-27 ENCOUNTER — TELEPHONE (OUTPATIENT)
Dept: FAMILY MEDICINE CLINIC | Age: 68
End: 2018-08-27

## 2018-09-07 ENCOUNTER — TELEPHONE (OUTPATIENT)
Dept: PAIN MANAGEMENT | Age: 68
End: 2018-09-07

## 2018-09-07 NOTE — TELEPHONE ENCOUNTER
This patients referral has been approved for scheduling with [facility]. New appointment has been scheduled. Address was confirmed and a new patient packet along with appt reminder card was mailed to the patient. Informed patient the first appointment may only be rescheduled once and no showing or giving less than 24 hour notice of inability to come to first office visit will result in a cancellation of the referral. Advised that if they arrive to the appt without completed paperwork, their appointment may be rescheduled for the next available new patient spot. Also advised the pt to prepare to be in the office for at least 2-3 hours and that once established with one of our 2 providers, they may not switch to the other. She requested packet be mailed.

## 2018-09-21 RX ORDER — LOSARTAN POTASSIUM 100 MG/1
100 TABLET ORAL DAILY
Qty: 15 TABLET | Refills: 0 | Status: SHIPPED | OUTPATIENT
Start: 2018-09-21 | End: 2018-10-15 | Stop reason: SDUPTHER

## 2018-10-05 ENCOUNTER — OFFICE VISIT (OUTPATIENT)
Dept: PAIN MANAGEMENT | Age: 68
End: 2018-10-05
Payer: MEDICARE

## 2018-10-05 VITALS
BODY MASS INDEX: 25.78 KG/M2 | HEART RATE: 91 BPM | DIASTOLIC BLOOD PRESSURE: 85 MMHG | HEIGHT: 64 IN | WEIGHT: 151 LBS | SYSTOLIC BLOOD PRESSURE: 131 MMHG

## 2018-10-05 DIAGNOSIS — M79.7 FIBROMYALGIA: ICD-10-CM

## 2018-10-05 DIAGNOSIS — F51.01 PRIMARY INSOMNIA: ICD-10-CM

## 2018-10-05 DIAGNOSIS — G89.4 CHRONIC PAIN SYNDROME: ICD-10-CM

## 2018-10-05 DIAGNOSIS — M96.1 FAILED NECK SYNDROME: ICD-10-CM

## 2018-10-05 DIAGNOSIS — M96.1 FAILED BACK SURGICAL SYNDROME: ICD-10-CM

## 2018-10-05 PROCEDURE — G8484 FLU IMMUNIZE NO ADMIN: HCPCS | Performed by: INTERNAL MEDICINE

## 2018-10-05 PROCEDURE — G8417 CALC BMI ABV UP PARAM F/U: HCPCS | Performed by: INTERNAL MEDICINE

## 2018-10-05 PROCEDURE — 1090F PRES/ABSN URINE INCON ASSESS: CPT | Performed by: INTERNAL MEDICINE

## 2018-10-05 PROCEDURE — 1036F TOBACCO NON-USER: CPT | Performed by: INTERNAL MEDICINE

## 2018-10-05 PROCEDURE — 4040F PNEUMOC VAC/ADMIN/RCVD: CPT | Performed by: INTERNAL MEDICINE

## 2018-10-05 PROCEDURE — 1101F PT FALLS ASSESS-DOCD LE1/YR: CPT | Performed by: INTERNAL MEDICINE

## 2018-10-05 PROCEDURE — 3017F COLORECTAL CA SCREEN DOC REV: CPT | Performed by: INTERNAL MEDICINE

## 2018-10-05 PROCEDURE — G8399 PT W/DXA RESULTS DOCUMENT: HCPCS | Performed by: INTERNAL MEDICINE

## 2018-10-05 PROCEDURE — G8427 DOCREV CUR MEDS BY ELIG CLIN: HCPCS | Performed by: INTERNAL MEDICINE

## 2018-10-05 PROCEDURE — 1123F ACP DISCUSS/DSCN MKR DOCD: CPT | Performed by: INTERNAL MEDICINE

## 2018-10-05 PROCEDURE — 99204 OFFICE O/P NEW MOD 45 MIN: CPT | Performed by: INTERNAL MEDICINE

## 2018-10-05 RX ORDER — METHOCARBAMOL 750 MG/1
750 TABLET, FILM COATED ORAL NIGHTLY
COMMUNITY
End: 2018-10-05

## 2018-10-05 RX ORDER — OXYCODONE AND ACETAMINOPHEN 7.5; 325 MG/1; MG/1
1 TABLET ORAL EVERY 4 HOURS PRN
Qty: 120 TABLET | Refills: 0 | Status: SHIPPED | OUTPATIENT
Start: 2018-10-05 | End: 2018-11-03 | Stop reason: SDUPTHER

## 2018-10-05 RX ORDER — MELOXICAM 7.5 MG/1
7.5 TABLET ORAL 2 TIMES DAILY
COMMUNITY
End: 2018-10-05

## 2018-10-05 RX ORDER — PREGABALIN 75 MG/1
CAPSULE ORAL
Qty: 90 CAPSULE | Refills: 0 | Status: SHIPPED | OUTPATIENT
Start: 2018-10-05 | End: 2018-11-03 | Stop reason: SDUPTHER

## 2018-10-05 RX ORDER — MELOXICAM 15 MG/1
15 TABLET ORAL DAILY
Qty: 30 TABLET | Refills: 0 | Status: SHIPPED | OUTPATIENT
Start: 2018-10-05 | End: 2018-11-03 | Stop reason: SDUPTHER

## 2018-10-15 RX ORDER — LOSARTAN POTASSIUM 100 MG/1
100 TABLET ORAL DAILY
Qty: 8 TABLET | Refills: 0 | Status: SHIPPED | OUTPATIENT
Start: 2018-10-15 | End: 2018-10-23 | Stop reason: SDUPTHER

## 2018-10-15 NOTE — PROGRESS NOTES
also takes Robaxin states she was on the Percocet 10 mg pills 10 mg pills before gives a history of depression and anxiety takes Zoloft and trazodone. Patient denies smoking alcohol abuse or drug abuse denies marijuana use states she is not working but will is planning to return to work after she gets better. Does complain a weakness of lower extremity weakness of the upper extremity numbness and tingling in the arm and hand and occasionally in the leg states ongoing pain symptoms have restricted social and recreational life. The patient's social history, past medical history, family history,  medications, allergies and review of systems have all been reviewed,  initialed and dated October 14, 2018 and are recorded on appropriate forms in the chart. These forms have been scanned into the \"media\" tab of patients electronic medical record  PHYSICAL EXAM:  Please see the physical exam form for a detailed examination on this visit. This form has been completed and scanned into the  Media section of the chart  This is an elderly female well-developed no apparent acute distress alert oriented ×3 mood and affect shows a flat affect gait has a limp gross motor coordination is normal Paola signs a positive. Back and trunk exam shows scar from surgery flatback tenderness paralumbar region range of motion is within normal limits motor strength over 5 sensory exam shows decreased sensation in the left lower extremity without any dermatomal distribution is a possible glove stocking distribution sensory deficit although not consistent. Reflexes absent knees and ankle. Straight leg raising negative neurotension sign. Femoral stretch is negative shayna' test is positive for decreased range of motion on the left side. /85   Pulse 91   Ht 5' 4\" (1.626 m)   Wt 151 lb (68.5 kg)   BMI 25.92 kg/m²   Skin: Warm and dry. Good turgor. No rashes.  No lesions or marks noted  Eyes:  PERRLA, cornea/ conjunctiva normal, no

## 2018-10-15 NOTE — TELEPHONE ENCOUNTER
Pt called for a refill of losartan 100mg 1 tab daily. Next ov 10/23/2018. Is out of medication. Can we fill until then? Uses [facility] pharmacy.

## 2018-10-22 ENCOUNTER — HOSPITAL ENCOUNTER (OUTPATIENT)
Age: 68
Discharge: HOME | End: 2018-10-22
Payer: MEDICARE

## 2018-10-22 DIAGNOSIS — E10.9 TYPE 1 DIABETES MELLITUS WITHOUT COMPLICATION (HCC): ICD-10-CM

## 2018-10-22 DIAGNOSIS — D64.9 ANEMIA, UNSPECIFIED TYPE: ICD-10-CM

## 2018-10-22 DIAGNOSIS — E78.5 HYPERLIPIDEMIA, UNSPECIFIED HYPERLIPIDEMIA TYPE: ICD-10-CM

## 2018-10-22 DIAGNOSIS — I10 ESSENTIAL HYPERTENSION: ICD-10-CM

## 2018-10-22 LAB
A/G RATIO: 1.5 (ref 1.1–2.2)
ALBUMIN SERPL-MCNC: 4.5 G/DL (ref 3.4–5)
ALP BLD-CCNC: 131 U/L (ref 40–129)
ALT SERPL-CCNC: 16 U/L (ref 10–40)
ANION GAP SERPL CALCULATED.3IONS-SCNC: 16 MMOL/L (ref 3–16)
AST SERPL-CCNC: 25 U/L (ref 15–37)
BASOPHILS ABSOLUTE: 0 K/UL (ref 0–0.2)
BASOPHILS RELATIVE PERCENT: 0.5 %
BILIRUB SERPL-MCNC: 0.4 MG/DL (ref 0–1)
BUN BLDV-MCNC: 17 MG/DL (ref 7–20)
CALCIUM SERPL-MCNC: 9.8 MG/DL (ref 8.3–10.6)
CHLORIDE BLD-SCNC: 101 MMOL/L (ref 99–110)
CHOLESTEROL, TOTAL: 204 MG/DL (ref 0–199)
CO2: 27 MMOL/L (ref 21–32)
CREAT SERPL-MCNC: 0.8 MG/DL (ref 0.6–1.2)
EOSINOPHILS ABSOLUTE: 0.2 K/UL (ref 0–0.6)
EOSINOPHILS RELATIVE PERCENT: 4.5 %
FOLATE: >20 NG/ML (ref 4.78–24.2)
GFR AFRICAN AMERICAN: >60
GFR NON-AFRICAN AMERICAN: >60
GLOBULIN: 3 G/DL
GLUCOSE BLD-MCNC: 147 MG/DL (ref 70–99)
HCT VFR BLD CALC: 38.3 % (ref 36–48)
HDLC SERPL-MCNC: 84 MG/DL (ref 40–60)
HEMOGLOBIN: 12.5 G/DL (ref 12–16)
IRON: 52 UG/DL (ref 37–145)
LDL CHOLESTEROL CALCULATED: 100 MG/DL
LYMPHOCYTES ABSOLUTE: 0.9 K/UL (ref 1–5.1)
LYMPHOCYTES RELATIVE PERCENT: 18.6 %
MCH RBC QN AUTO: 26.9 PG (ref 26–34)
MCHC RBC AUTO-ENTMCNC: 32.6 G/DL (ref 31–36)
MCV RBC AUTO: 82.7 FL (ref 80–100)
MONOCYTES ABSOLUTE: 0.4 K/UL (ref 0–1.3)
MONOCYTES RELATIVE PERCENT: 7.5 %
NEUTROPHILS ABSOLUTE: 3.3 K/UL (ref 1.7–7.7)
NEUTROPHILS RELATIVE PERCENT: 68.9 %
PDW BLD-RTO: 17.3 % (ref 12.4–15.4)
PLATELET # BLD: 213 K/UL (ref 135–450)
PMV BLD AUTO: 8.8 FL (ref 5–10.5)
POTASSIUM SERPL-SCNC: 4.2 MMOL/L (ref 3.5–5.1)
RBC # BLD: 4.63 M/UL (ref 4–5.2)
SODIUM BLD-SCNC: 144 MMOL/L (ref 136–145)
TOTAL CK: 46 U/L (ref 26–192)
TOTAL PROTEIN: 7.5 G/DL (ref 6.4–8.2)
TRIGL SERPL-MCNC: 98 MG/DL (ref 0–150)
VITAMIN B-12: 797 PG/ML (ref 211–911)
VLDLC SERPL CALC-MCNC: 20 MG/DL
WBC # BLD: 4.8 K/UL (ref 4–11)

## 2018-10-22 PROCEDURE — 82607 VITAMIN B-12: CPT

## 2018-10-22 PROCEDURE — 82746 ASSAY OF FOLIC ACID SERUM: CPT

## 2018-10-22 PROCEDURE — 85025 COMPLETE CBC W/AUTO DIFF WBC: CPT

## 2018-10-22 PROCEDURE — 36415 COLL VENOUS BLD VENIPUNCTURE: CPT

## 2018-10-22 PROCEDURE — 83540 ASSAY OF IRON: CPT

## 2018-10-22 PROCEDURE — 82550 ASSAY OF CK (CPK): CPT

## 2018-10-22 PROCEDURE — 83036 HEMOGLOBIN GLYCOSYLATED A1C: CPT

## 2018-10-22 PROCEDURE — 80061 LIPID PANEL: CPT

## 2018-10-22 PROCEDURE — 80053 COMPREHEN METABOLIC PANEL: CPT

## 2018-10-22 PROCEDURE — 80307 DRUG TEST PRSMV CHEM ANLYZR: CPT

## 2018-10-23 ENCOUNTER — OFFICE VISIT (OUTPATIENT)
Dept: FAMILY MEDICINE CLINIC | Age: 68
End: 2018-10-23
Payer: MEDICARE

## 2018-10-23 VITALS
TEMPERATURE: 97.8 F | HEART RATE: 78 BPM | DIASTOLIC BLOOD PRESSURE: 72 MMHG | RESPIRATION RATE: 12 BRPM | WEIGHT: 158.2 LBS | OXYGEN SATURATION: 97 % | BODY MASS INDEX: 27.15 KG/M2 | SYSTOLIC BLOOD PRESSURE: 134 MMHG

## 2018-10-23 DIAGNOSIS — Z23 NEED FOR INFLUENZA VACCINATION: ICD-10-CM

## 2018-10-23 DIAGNOSIS — I10 ESSENTIAL HYPERTENSION: ICD-10-CM

## 2018-10-23 DIAGNOSIS — G89.29 OTHER CHRONIC PAIN: ICD-10-CM

## 2018-10-23 DIAGNOSIS — E78.5 HYPERLIPIDEMIA, UNSPECIFIED HYPERLIPIDEMIA TYPE: ICD-10-CM

## 2018-10-23 DIAGNOSIS — F32.A DEPRESSION, UNSPECIFIED DEPRESSION TYPE: ICD-10-CM

## 2018-10-23 DIAGNOSIS — E10.9 TYPE 1 DIABETES MELLITUS WITHOUT COMPLICATION (HCC): Primary | ICD-10-CM

## 2018-10-23 LAB
BILIRUBIN, POC: NORMAL
BLOOD URINE, POC: NORMAL
CLARITY, POC: CLEAR
COLOR, POC: YELLOW
ESTIMATED AVERAGE GLUCOSE: 188.6 MG/DL
GLUCOSE BLD-MCNC: 151 MG/DL
GLUCOSE URINE, POC: NORMAL
HBA1C MFR BLD: 8.2 %
KETONES, POC: NORMAL
LEUKOCYTE EST, POC: NORMAL
NITRITE, POC: NORMAL
PH, POC: 6.5
PROTEIN, POC: NORMAL
SPECIFIC GRAVITY, POC: 1.01
UROBILINOGEN, POC: 0.2

## 2018-10-23 PROCEDURE — G8482 FLU IMMUNIZE ORDER/ADMIN: HCPCS | Performed by: FAMILY MEDICINE

## 2018-10-23 PROCEDURE — 90662 IIV NO PRSV INCREASED AG IM: CPT | Performed by: FAMILY MEDICINE

## 2018-10-23 PROCEDURE — 99214 OFFICE O/P EST MOD 30 MIN: CPT | Performed by: FAMILY MEDICINE

## 2018-10-23 PROCEDURE — 1101F PT FALLS ASSESS-DOCD LE1/YR: CPT | Performed by: FAMILY MEDICINE

## 2018-10-23 PROCEDURE — 1123F ACP DISCUSS/DSCN MKR DOCD: CPT | Performed by: FAMILY MEDICINE

## 2018-10-23 PROCEDURE — G8417 CALC BMI ABV UP PARAM F/U: HCPCS | Performed by: FAMILY MEDICINE

## 2018-10-23 PROCEDURE — 81002 URINALYSIS NONAUTO W/O SCOPE: CPT | Performed by: FAMILY MEDICINE

## 2018-10-23 PROCEDURE — 3045F PR MOST RECENT HEMOGLOBIN A1C LEVEL 7.0-9.0%: CPT | Performed by: FAMILY MEDICINE

## 2018-10-23 PROCEDURE — 4040F PNEUMOC VAC/ADMIN/RCVD: CPT | Performed by: FAMILY MEDICINE

## 2018-10-23 PROCEDURE — G8399 PT W/DXA RESULTS DOCUMENT: HCPCS | Performed by: FAMILY MEDICINE

## 2018-10-23 PROCEDURE — 2022F DILAT RTA XM EVC RTNOPTHY: CPT | Performed by: FAMILY MEDICINE

## 2018-10-23 PROCEDURE — 1090F PRES/ABSN URINE INCON ASSESS: CPT | Performed by: FAMILY MEDICINE

## 2018-10-23 PROCEDURE — 82962 GLUCOSE BLOOD TEST: CPT | Performed by: FAMILY MEDICINE

## 2018-10-23 PROCEDURE — G8427 DOCREV CUR MEDS BY ELIG CLIN: HCPCS | Performed by: FAMILY MEDICINE

## 2018-10-23 PROCEDURE — G0008 ADMIN INFLUENZA VIRUS VAC: HCPCS | Performed by: FAMILY MEDICINE

## 2018-10-23 PROCEDURE — 1036F TOBACCO NON-USER: CPT | Performed by: FAMILY MEDICINE

## 2018-10-23 PROCEDURE — 3017F COLORECTAL CA SCREEN DOC REV: CPT | Performed by: FAMILY MEDICINE

## 2018-10-23 RX ORDER — AMLODIPINE BESYLATE 5 MG/1
5 TABLET ORAL DAILY
Qty: 90 TABLET | Refills: 0 | Status: SHIPPED | OUTPATIENT
Start: 2018-10-23 | End: 2019-01-25 | Stop reason: SDUPTHER

## 2018-10-23 RX ORDER — LOSARTAN POTASSIUM 100 MG/1
100 TABLET ORAL DAILY
Qty: 90 TABLET | Refills: 0 | Status: SHIPPED | OUTPATIENT
Start: 2018-10-23 | End: 2019-01-25 | Stop reason: SDUPTHER

## 2018-10-23 RX ORDER — ATORVASTATIN CALCIUM 40 MG/1
40 TABLET, FILM COATED ORAL DAILY
Qty: 90 TABLET | Refills: 0 | Status: SHIPPED | OUTPATIENT
Start: 2018-10-23 | End: 2019-01-25 | Stop reason: SDUPTHER

## 2018-10-23 ASSESSMENT — ENCOUNTER SYMPTOMS
CHEST TIGHTNESS: 0
ABDOMINAL PAIN: 0
BACK PAIN: 1
COUGH: 0
SHORTNESS OF BREATH: 0
BLOOD IN STOOL: 0

## 2018-10-25 ENCOUNTER — HOSPITAL ENCOUNTER (OUTPATIENT)
Dept: MAMMOGRAPHY | Age: 68
Discharge: HOME | End: 2018-10-25
Payer: MEDICARE

## 2018-10-25 DIAGNOSIS — Z12.39 BREAST CANCER SCREENING: ICD-10-CM

## 2018-10-25 LAB
6-ACETYLMORPHINE: NOT DETECTED
7-AMINOCLONAZEPAM: NOT DETECTED
ALPHA-OH-ALPRAZOLAM: NOT DETECTED
ALPRAZOLAM: NOT DETECTED
AMPHETAMINE: NOT DETECTED
BARBITURATES: NOT DETECTED
BENZOYLECGONINE: NOT DETECTED
BUPRENORPHINE: NOT DETECTED
CARISOPRODOL: NOT DETECTED
CLONAZEPAM: NOT DETECTED
CODEINE: NOT DETECTED
CREATININE URINE: 77 MG/DL (ref 20–400)
DIAZEPAM: NOT DETECTED
EER PAIN MGT DRUG PANEL, HIGH RES/EMIT U: NORMAL
ETHYL GLUCURONIDE: NOT DETECTED
FENTANYL: NOT DETECTED
HYDROCODONE: NOT DETECTED
HYDROMORPHONE: NOT DETECTED
LORAZEPAM: NOT DETECTED
MARIJUANA METABOLITE: NOT DETECTED
MDA: NOT DETECTED
MDEA: NOT DETECTED
MDMA URINE: NOT DETECTED
MEPERIDINE: NOT DETECTED
METHADONE: NOT DETECTED
METHAMPHETAMINE: NOT DETECTED
METHYLPHENIDATE: NOT DETECTED
MIDAZOLAM: NOT DETECTED
MORPHINE: NOT DETECTED
NORBUPRENORPHINE, FREE: NOT DETECTED
NORDIAZEPAM: NOT DETECTED
NORFENTANYL: NOT DETECTED
NORHYDROCODONE, URINE: NOT DETECTED
NOROXYCODONE: PRESENT
NOROXYMORPHONE, URINE: PRESENT
OXAZEPAM: NOT DETECTED
OXYCODONE: PRESENT
OXYMORPHONE: NOT DETECTED
PAIN MANAGEMENT DRUG PANEL: NORMAL
PCP: NOT DETECTED
PHENTERMINE: NOT DETECTED
PROPOXYPHENE: NOT DETECTED
TAPENTADOL, URINE: NOT DETECTED
TAPENTADOL-O-SULFATE, URINE: NOT DETECTED
TEMAZEPAM: NOT DETECTED
TRAMADOL: NOT DETECTED
ZOLPIDEM: NOT DETECTED

## 2018-10-25 PROCEDURE — 77063 BREAST TOMOSYNTHESIS BI: CPT

## 2018-11-03 ENCOUNTER — OFFICE VISIT (OUTPATIENT)
Dept: PAIN MANAGEMENT | Age: 68
End: 2018-11-03
Payer: MEDICARE

## 2018-11-03 VITALS
BODY MASS INDEX: 25.92 KG/M2 | HEART RATE: 83 BPM | SYSTOLIC BLOOD PRESSURE: 136 MMHG | WEIGHT: 151 LBS | DIASTOLIC BLOOD PRESSURE: 77 MMHG

## 2018-11-03 DIAGNOSIS — M96.1 FAILED NECK SYNDROME: ICD-10-CM

## 2018-11-03 DIAGNOSIS — M79.7 FIBROMYALGIA: ICD-10-CM

## 2018-11-03 DIAGNOSIS — M96.1 FAILED BACK SURGICAL SYNDROME: ICD-10-CM

## 2018-11-03 DIAGNOSIS — F51.01 PRIMARY INSOMNIA: ICD-10-CM

## 2018-11-03 DIAGNOSIS — M48.07 SPINAL STENOSIS OF LUMBOSACRAL REGION: ICD-10-CM

## 2018-11-03 DIAGNOSIS — G89.4 CHRONIC PAIN SYNDROME: ICD-10-CM

## 2018-11-03 PROCEDURE — 1123F ACP DISCUSS/DSCN MKR DOCD: CPT | Performed by: INTERNAL MEDICINE

## 2018-11-03 PROCEDURE — 99213 OFFICE O/P EST LOW 20 MIN: CPT | Performed by: INTERNAL MEDICINE

## 2018-11-03 PROCEDURE — G8417 CALC BMI ABV UP PARAM F/U: HCPCS | Performed by: INTERNAL MEDICINE

## 2018-11-03 PROCEDURE — 1101F PT FALLS ASSESS-DOCD LE1/YR: CPT | Performed by: INTERNAL MEDICINE

## 2018-11-03 PROCEDURE — 1036F TOBACCO NON-USER: CPT | Performed by: INTERNAL MEDICINE

## 2018-11-03 PROCEDURE — G8427 DOCREV CUR MEDS BY ELIG CLIN: HCPCS | Performed by: INTERNAL MEDICINE

## 2018-11-03 PROCEDURE — 4040F PNEUMOC VAC/ADMIN/RCVD: CPT | Performed by: INTERNAL MEDICINE

## 2018-11-03 PROCEDURE — 1090F PRES/ABSN URINE INCON ASSESS: CPT | Performed by: INTERNAL MEDICINE

## 2018-11-03 PROCEDURE — G8399 PT W/DXA RESULTS DOCUMENT: HCPCS | Performed by: INTERNAL MEDICINE

## 2018-11-03 PROCEDURE — 3017F COLORECTAL CA SCREEN DOC REV: CPT | Performed by: INTERNAL MEDICINE

## 2018-11-03 PROCEDURE — G8482 FLU IMMUNIZE ORDER/ADMIN: HCPCS | Performed by: INTERNAL MEDICINE

## 2018-11-03 RX ORDER — MELOXICAM 15 MG/1
15 TABLET ORAL DAILY
Qty: 30 TABLET | Refills: 1 | Status: SHIPPED | OUTPATIENT
Start: 2018-11-03 | End: 2018-12-03 | Stop reason: SDUPTHER

## 2018-11-03 RX ORDER — OXYCODONE AND ACETAMINOPHEN 7.5; 325 MG/1; MG/1
1 TABLET ORAL EVERY 6 HOURS PRN
Qty: 90 TABLET | Refills: 0 | Status: SHIPPED | OUTPATIENT
Start: 2018-11-03 | End: 2018-12-03 | Stop reason: SDUPTHER

## 2018-11-03 RX ORDER — PREGABALIN 75 MG/1
CAPSULE ORAL
Qty: 90 CAPSULE | Refills: 0 | Status: SHIPPED | OUTPATIENT
Start: 2018-11-03 | End: 2018-12-03 | Stop reason: SDUPTHER

## 2018-11-21 RX ORDER — INSULIN DETEMIR 100 [IU]/ML
INJECTION, SOLUTION SUBCUTANEOUS
Qty: 10 PEN | Refills: 0 | Status: SHIPPED | OUTPATIENT
Start: 2018-11-21 | End: 2019-01-07 | Stop reason: SDUPTHER

## 2018-11-21 NOTE — TELEPHONE ENCOUNTER
Medication:   Requested Prescriptions     Pending Prescriptions Disp Refills    LEVEMIR FLEXTOUCH 100 UNIT/ML injection pen [Pharmacy Med Name: Kate Borrego 100 UNITS/ML]  0     Sig: IMJECT 20 UNITS SUB Q TWICE A DAY     Last Filled:  8/27/18    Last appt: 10/23/2018   Next appt: 1/15/2019    Last Labs DM:   Lab Results   Component Value Date    LABA1C 8.2 10/22/2018       Last OARRS:   RX Monitoring 10/5/2018   Attestation The Prescription Monitoring Report for this patient was reviewed today.

## 2018-12-03 ENCOUNTER — OFFICE VISIT (OUTPATIENT)
Dept: PAIN MANAGEMENT | Age: 68
End: 2018-12-03
Payer: MEDICARE

## 2018-12-03 VITALS
HEART RATE: 89 BPM | WEIGHT: 151 LBS | SYSTOLIC BLOOD PRESSURE: 150 MMHG | BODY MASS INDEX: 25.92 KG/M2 | DIASTOLIC BLOOD PRESSURE: 68 MMHG

## 2018-12-03 DIAGNOSIS — M79.7 FIBROMYALGIA: ICD-10-CM

## 2018-12-03 DIAGNOSIS — G89.4 CHRONIC PAIN SYNDROME: ICD-10-CM

## 2018-12-03 DIAGNOSIS — M96.1 FAILED NECK SYNDROME: ICD-10-CM

## 2018-12-03 DIAGNOSIS — M48.061 DEGENERATIVE LUMBAR SPINAL STENOSIS: ICD-10-CM

## 2018-12-03 DIAGNOSIS — M96.1 FAILED BACK SURGICAL SYNDROME: ICD-10-CM

## 2018-12-03 DIAGNOSIS — M48.07 SPINAL STENOSIS OF LUMBOSACRAL REGION: ICD-10-CM

## 2018-12-03 PROCEDURE — 99213 OFFICE O/P EST LOW 20 MIN: CPT | Performed by: INTERNAL MEDICINE

## 2018-12-03 PROCEDURE — 1090F PRES/ABSN URINE INCON ASSESS: CPT | Performed by: INTERNAL MEDICINE

## 2018-12-03 PROCEDURE — G8417 CALC BMI ABV UP PARAM F/U: HCPCS | Performed by: INTERNAL MEDICINE

## 2018-12-03 PROCEDURE — G8482 FLU IMMUNIZE ORDER/ADMIN: HCPCS | Performed by: INTERNAL MEDICINE

## 2018-12-03 PROCEDURE — 1036F TOBACCO NON-USER: CPT | Performed by: INTERNAL MEDICINE

## 2018-12-03 PROCEDURE — 4040F PNEUMOC VAC/ADMIN/RCVD: CPT | Performed by: INTERNAL MEDICINE

## 2018-12-03 PROCEDURE — 1101F PT FALLS ASSESS-DOCD LE1/YR: CPT | Performed by: INTERNAL MEDICINE

## 2018-12-03 PROCEDURE — 3017F COLORECTAL CA SCREEN DOC REV: CPT | Performed by: INTERNAL MEDICINE

## 2018-12-03 PROCEDURE — G8427 DOCREV CUR MEDS BY ELIG CLIN: HCPCS | Performed by: INTERNAL MEDICINE

## 2018-12-03 PROCEDURE — G8399 PT W/DXA RESULTS DOCUMENT: HCPCS | Performed by: INTERNAL MEDICINE

## 2018-12-03 PROCEDURE — 1123F ACP DISCUSS/DSCN MKR DOCD: CPT | Performed by: INTERNAL MEDICINE

## 2018-12-03 RX ORDER — PREGABALIN 75 MG/1
CAPSULE ORAL
Qty: 90 CAPSULE | Refills: 0 | Status: SHIPPED | OUTPATIENT
Start: 2018-12-03 | End: 2018-12-03

## 2018-12-03 RX ORDER — MELOXICAM 15 MG/1
15 TABLET ORAL DAILY
Qty: 30 TABLET | Refills: 1 | Status: SHIPPED | OUTPATIENT
Start: 2018-12-03 | End: 2019-01-07 | Stop reason: SDUPTHER

## 2018-12-03 RX ORDER — GABAPENTIN 400 MG/1
CAPSULE ORAL
Qty: 90 CAPSULE | Refills: 1 | Status: SHIPPED | OUTPATIENT
Start: 2018-12-03 | End: 2018-12-12 | Stop reason: SINTOL

## 2018-12-03 RX ORDER — OXYCODONE AND ACETAMINOPHEN 7.5; 325 MG/1; MG/1
1 TABLET ORAL EVERY 6 HOURS PRN
Qty: 90 TABLET | Refills: 0 | Status: SHIPPED | OUTPATIENT
Start: 2018-12-03 | End: 2019-01-07 | Stop reason: SDUPTHER

## 2018-12-12 ENCOUNTER — TELEPHONE (OUTPATIENT)
Dept: PAIN MANAGEMENT | Age: 68
End: 2018-12-12

## 2018-12-12 DIAGNOSIS — M48.061 DEGENERATIVE LUMBAR SPINAL STENOSIS: ICD-10-CM

## 2018-12-12 DIAGNOSIS — G89.4 CHRONIC PAIN SYNDROME: ICD-10-CM

## 2018-12-12 DIAGNOSIS — M79.7 FIBROMYALGIA: ICD-10-CM

## 2018-12-12 DIAGNOSIS — M48.07 SPINAL STENOSIS OF LUMBOSACRAL REGION: ICD-10-CM

## 2018-12-12 DIAGNOSIS — M96.1 FAILED NECK SYNDROME: ICD-10-CM

## 2018-12-12 DIAGNOSIS — M96.1 FAILED BACK SURGICAL SYNDROME: ICD-10-CM

## 2018-12-12 RX ORDER — PREGABALIN 75 MG/1
CAPSULE ORAL
Qty: 90 CAPSULE | Refills: 0 | Status: SHIPPED | OUTPATIENT
Start: 2018-12-12 | End: 2019-01-07 | Stop reason: SDUPTHER

## 2019-01-07 ENCOUNTER — OFFICE VISIT (OUTPATIENT)
Dept: PAIN MANAGEMENT | Age: 69
End: 2019-01-07
Payer: MEDICARE

## 2019-01-07 ENCOUNTER — TELEPHONE (OUTPATIENT)
Dept: FAMILY MEDICINE CLINIC | Age: 69
End: 2019-01-07

## 2019-01-07 VITALS
DIASTOLIC BLOOD PRESSURE: 72 MMHG | WEIGHT: 155 LBS | HEART RATE: 89 BPM | SYSTOLIC BLOOD PRESSURE: 145 MMHG | BODY MASS INDEX: 26.61 KG/M2

## 2019-01-07 DIAGNOSIS — M96.1 FAILED BACK SURGICAL SYNDROME: ICD-10-CM

## 2019-01-07 DIAGNOSIS — G89.4 CHRONIC PAIN SYNDROME: ICD-10-CM

## 2019-01-07 DIAGNOSIS — M79.7 FIBROMYALGIA: ICD-10-CM

## 2019-01-07 DIAGNOSIS — M48.07 SPINAL STENOSIS OF LUMBOSACRAL REGION: ICD-10-CM

## 2019-01-07 DIAGNOSIS — M96.1 FAILED NECK SYNDROME: ICD-10-CM

## 2019-01-07 DIAGNOSIS — M48.061 DEGENERATIVE LUMBAR SPINAL STENOSIS: ICD-10-CM

## 2019-01-07 PROCEDURE — G8399 PT W/DXA RESULTS DOCUMENT: HCPCS | Performed by: INTERNAL MEDICINE

## 2019-01-07 PROCEDURE — 1036F TOBACCO NON-USER: CPT | Performed by: INTERNAL MEDICINE

## 2019-01-07 PROCEDURE — 4040F PNEUMOC VAC/ADMIN/RCVD: CPT | Performed by: INTERNAL MEDICINE

## 2019-01-07 PROCEDURE — 99213 OFFICE O/P EST LOW 20 MIN: CPT | Performed by: INTERNAL MEDICINE

## 2019-01-07 PROCEDURE — 3017F COLORECTAL CA SCREEN DOC REV: CPT | Performed by: INTERNAL MEDICINE

## 2019-01-07 PROCEDURE — 1090F PRES/ABSN URINE INCON ASSESS: CPT | Performed by: INTERNAL MEDICINE

## 2019-01-07 PROCEDURE — 1123F ACP DISCUSS/DSCN MKR DOCD: CPT | Performed by: INTERNAL MEDICINE

## 2019-01-07 PROCEDURE — G8427 DOCREV CUR MEDS BY ELIG CLIN: HCPCS | Performed by: INTERNAL MEDICINE

## 2019-01-07 PROCEDURE — 1101F PT FALLS ASSESS-DOCD LE1/YR: CPT | Performed by: INTERNAL MEDICINE

## 2019-01-07 PROCEDURE — G8482 FLU IMMUNIZE ORDER/ADMIN: HCPCS | Performed by: INTERNAL MEDICINE

## 2019-01-07 PROCEDURE — G8417 CALC BMI ABV UP PARAM F/U: HCPCS | Performed by: INTERNAL MEDICINE

## 2019-01-07 RX ORDER — INSULIN DETEMIR 100 [IU]/ML
INJECTION, SOLUTION SUBCUTANEOUS
Refills: 0 | OUTPATIENT
Start: 2019-01-07

## 2019-01-07 RX ORDER — OXYCODONE AND ACETAMINOPHEN 7.5; 325 MG/1; MG/1
1 TABLET ORAL EVERY 6 HOURS PRN
Qty: 90 TABLET | Refills: 0 | Status: SHIPPED | OUTPATIENT
Start: 2019-01-07 | End: 2019-02-11 | Stop reason: SDUPTHER

## 2019-01-07 RX ORDER — PREGABALIN 75 MG/1
CAPSULE ORAL
Qty: 90 CAPSULE | Refills: 1 | Status: SHIPPED | OUTPATIENT
Start: 2019-01-07 | End: 2019-02-11 | Stop reason: SDUPTHER

## 2019-01-07 RX ORDER — MELOXICAM 15 MG/1
15 TABLET ORAL DAILY
Qty: 30 TABLET | Refills: 1 | Status: SHIPPED | OUTPATIENT
Start: 2019-01-07 | End: 2019-01-25 | Stop reason: SDUPTHER

## 2019-01-21 RX ORDER — INSULIN ASPART 100 [IU]/ML
5 INJECTION, SOLUTION INTRAVENOUS; SUBCUTANEOUS
Qty: 5 PEN | Refills: 0 | Status: SHIPPED | OUTPATIENT
Start: 2019-01-21 | End: 2019-01-25 | Stop reason: SDUPTHER

## 2019-01-25 ENCOUNTER — OFFICE VISIT (OUTPATIENT)
Dept: FAMILY MEDICINE CLINIC | Age: 69
End: 2019-01-25
Payer: MEDICARE

## 2019-01-25 VITALS
TEMPERATURE: 98.4 F | OXYGEN SATURATION: 98 % | WEIGHT: 167 LBS | HEART RATE: 81 BPM | SYSTOLIC BLOOD PRESSURE: 128 MMHG | DIASTOLIC BLOOD PRESSURE: 80 MMHG | RESPIRATION RATE: 16 BRPM | BODY MASS INDEX: 28.67 KG/M2

## 2019-01-25 DIAGNOSIS — E78.5 HYPERLIPIDEMIA, UNSPECIFIED HYPERLIPIDEMIA TYPE: Primary | ICD-10-CM

## 2019-01-25 DIAGNOSIS — G89.4 CHRONIC PAIN SYNDROME: ICD-10-CM

## 2019-01-25 DIAGNOSIS — M96.1 FAILED BACK SURGICAL SYNDROME: ICD-10-CM

## 2019-01-25 DIAGNOSIS — D22.9 MULTIPLE NEVI: ICD-10-CM

## 2019-01-25 DIAGNOSIS — E10.9 TYPE 1 DIABETES MELLITUS WITHOUT COMPLICATION (HCC): ICD-10-CM

## 2019-01-25 DIAGNOSIS — I10 ESSENTIAL HYPERTENSION: ICD-10-CM

## 2019-01-25 DIAGNOSIS — F32.A DEPRESSION, UNSPECIFIED DEPRESSION TYPE: ICD-10-CM

## 2019-01-25 DIAGNOSIS — M79.7 FIBROMYALGIA: ICD-10-CM

## 2019-01-25 DIAGNOSIS — N39.3 STRESS INCONTINENCE: ICD-10-CM

## 2019-01-25 DIAGNOSIS — M48.061 DEGENERATIVE LUMBAR SPINAL STENOSIS: ICD-10-CM

## 2019-01-25 LAB
BILIRUBIN, POC: NORMAL
BLOOD URINE, POC: NORMAL
CLARITY, POC: CLEAR
COLOR, POC: YELLOW
CREATININE URINE POCT: NORMAL
GLUCOSE BLD-MCNC: 115 MG/DL
GLUCOSE URINE, POC: NORMAL
KETONES, POC: NORMAL
LEUKOCYTE EST, POC: NORMAL
MICROALBUMIN/CREAT 24H UR: NORMAL MG/G{CREAT}
MICROALBUMIN/CREAT UR-RTO: NORMAL
NITRITE, POC: NORMAL
PH, POC: 5.5
PROTEIN, POC: NORMAL
SPECIFIC GRAVITY, POC: 1.01
UROBILINOGEN, POC: 0.2

## 2019-01-25 PROCEDURE — 81002 URINALYSIS NONAUTO W/O SCOPE: CPT | Performed by: FAMILY MEDICINE

## 2019-01-25 PROCEDURE — G8399 PT W/DXA RESULTS DOCUMENT: HCPCS | Performed by: FAMILY MEDICINE

## 2019-01-25 PROCEDURE — G8427 DOCREV CUR MEDS BY ELIG CLIN: HCPCS | Performed by: FAMILY MEDICINE

## 2019-01-25 PROCEDURE — G8482 FLU IMMUNIZE ORDER/ADMIN: HCPCS | Performed by: FAMILY MEDICINE

## 2019-01-25 PROCEDURE — 3046F HEMOGLOBIN A1C LEVEL >9.0%: CPT | Performed by: FAMILY MEDICINE

## 2019-01-25 PROCEDURE — 1123F ACP DISCUSS/DSCN MKR DOCD: CPT | Performed by: FAMILY MEDICINE

## 2019-01-25 PROCEDURE — 99214 OFFICE O/P EST MOD 30 MIN: CPT | Performed by: FAMILY MEDICINE

## 2019-01-25 PROCEDURE — 1036F TOBACCO NON-USER: CPT | Performed by: FAMILY MEDICINE

## 2019-01-25 PROCEDURE — 2022F DILAT RTA XM EVC RTNOPTHY: CPT | Performed by: FAMILY MEDICINE

## 2019-01-25 PROCEDURE — 1090F PRES/ABSN URINE INCON ASSESS: CPT | Performed by: FAMILY MEDICINE

## 2019-01-25 PROCEDURE — 82044 UR ALBUMIN SEMIQUANTITATIVE: CPT | Performed by: FAMILY MEDICINE

## 2019-01-25 PROCEDURE — 1101F PT FALLS ASSESS-DOCD LE1/YR: CPT | Performed by: FAMILY MEDICINE

## 2019-01-25 PROCEDURE — 3017F COLORECTAL CA SCREEN DOC REV: CPT | Performed by: FAMILY MEDICINE

## 2019-01-25 PROCEDURE — 4040F PNEUMOC VAC/ADMIN/RCVD: CPT | Performed by: FAMILY MEDICINE

## 2019-01-25 PROCEDURE — 82962 GLUCOSE BLOOD TEST: CPT | Performed by: FAMILY MEDICINE

## 2019-01-25 PROCEDURE — G8417 CALC BMI ABV UP PARAM F/U: HCPCS | Performed by: FAMILY MEDICINE

## 2019-01-25 RX ORDER — ATORVASTATIN CALCIUM 40 MG/1
40 TABLET, FILM COATED ORAL DAILY
Qty: 90 TABLET | Refills: 0 | Status: SHIPPED | OUTPATIENT
Start: 2019-01-25 | End: 2019-04-23 | Stop reason: SDUPTHER

## 2019-01-25 RX ORDER — LOSARTAN POTASSIUM 100 MG/1
100 TABLET ORAL DAILY
Qty: 90 TABLET | Refills: 0 | Status: SHIPPED | OUTPATIENT
Start: 2019-01-25 | End: 2019-04-11 | Stop reason: SDUPTHER

## 2019-01-25 RX ORDER — AMLODIPINE BESYLATE 5 MG/1
5 TABLET ORAL DAILY
Qty: 90 TABLET | Refills: 0 | Status: SHIPPED | OUTPATIENT
Start: 2019-01-25 | End: 2019-04-23 | Stop reason: SDUPTHER

## 2019-01-25 RX ORDER — MELOXICAM 15 MG/1
15 TABLET ORAL DAILY
Qty: 30 TABLET | Refills: 1 | Status: SHIPPED | OUTPATIENT
Start: 2019-01-25 | End: 2019-02-11 | Stop reason: SDUPTHER

## 2019-01-27 ASSESSMENT — ENCOUNTER SYMPTOMS
ABDOMINAL PAIN: 0
CHEST TIGHTNESS: 0
COUGH: 0
BLOOD IN STOOL: 0
SHORTNESS OF BREATH: 0

## 2019-02-11 ENCOUNTER — OFFICE VISIT (OUTPATIENT)
Dept: PAIN MANAGEMENT | Age: 69
End: 2019-02-11
Payer: MEDICARE

## 2019-02-11 VITALS
HEART RATE: 92 BPM | BODY MASS INDEX: 28.49 KG/M2 | DIASTOLIC BLOOD PRESSURE: 72 MMHG | WEIGHT: 166 LBS | SYSTOLIC BLOOD PRESSURE: 125 MMHG

## 2019-02-11 DIAGNOSIS — M79.7 FIBROMYALGIA: ICD-10-CM

## 2019-02-11 DIAGNOSIS — M96.1 FAILED BACK SURGICAL SYNDROME: ICD-10-CM

## 2019-02-11 DIAGNOSIS — G89.4 CHRONIC PAIN SYNDROME: ICD-10-CM

## 2019-02-11 DIAGNOSIS — M47.812 OSTEOARTHRITIS OF CERVICAL SPINE, UNSPECIFIED SPINAL OSTEOARTHRITIS COMPLICATION STATUS: ICD-10-CM

## 2019-02-11 DIAGNOSIS — M48.07 SPINAL STENOSIS OF LUMBOSACRAL REGION: ICD-10-CM

## 2019-02-11 DIAGNOSIS — M96.1 FAILED NECK SYNDROME: ICD-10-CM

## 2019-02-11 DIAGNOSIS — M48.061 DEGENERATIVE LUMBAR SPINAL STENOSIS: ICD-10-CM

## 2019-02-11 PROCEDURE — G8417 CALC BMI ABV UP PARAM F/U: HCPCS | Performed by: INTERNAL MEDICINE

## 2019-02-11 PROCEDURE — 3017F COLORECTAL CA SCREEN DOC REV: CPT | Performed by: INTERNAL MEDICINE

## 2019-02-11 PROCEDURE — 1036F TOBACCO NON-USER: CPT | Performed by: INTERNAL MEDICINE

## 2019-02-11 PROCEDURE — G8427 DOCREV CUR MEDS BY ELIG CLIN: HCPCS | Performed by: INTERNAL MEDICINE

## 2019-02-11 PROCEDURE — 1101F PT FALLS ASSESS-DOCD LE1/YR: CPT | Performed by: INTERNAL MEDICINE

## 2019-02-11 PROCEDURE — 4040F PNEUMOC VAC/ADMIN/RCVD: CPT | Performed by: INTERNAL MEDICINE

## 2019-02-11 PROCEDURE — G8482 FLU IMMUNIZE ORDER/ADMIN: HCPCS | Performed by: INTERNAL MEDICINE

## 2019-02-11 PROCEDURE — 1090F PRES/ABSN URINE INCON ASSESS: CPT | Performed by: INTERNAL MEDICINE

## 2019-02-11 PROCEDURE — 99213 OFFICE O/P EST LOW 20 MIN: CPT | Performed by: INTERNAL MEDICINE

## 2019-02-11 PROCEDURE — 1123F ACP DISCUSS/DSCN MKR DOCD: CPT | Performed by: INTERNAL MEDICINE

## 2019-02-11 PROCEDURE — G8399 PT W/DXA RESULTS DOCUMENT: HCPCS | Performed by: INTERNAL MEDICINE

## 2019-02-11 RX ORDER — OXYCODONE AND ACETAMINOPHEN 7.5; 325 MG/1; MG/1
1 TABLET ORAL EVERY 6 HOURS PRN
Qty: 90 TABLET | Refills: 0 | Status: SHIPPED | OUTPATIENT
Start: 2019-02-11 | End: 2019-03-18 | Stop reason: SDUPTHER

## 2019-02-11 RX ORDER — PREGABALIN 75 MG/1
CAPSULE ORAL
Qty: 90 CAPSULE | Refills: 1 | Status: SHIPPED | OUTPATIENT
Start: 2019-02-11 | End: 2019-03-18 | Stop reason: SDUPTHER

## 2019-02-11 RX ORDER — MELOXICAM 15 MG/1
15 TABLET ORAL DAILY
Qty: 30 TABLET | Refills: 1 | Status: SHIPPED | OUTPATIENT
Start: 2019-02-11 | End: 2019-03-18 | Stop reason: SDUPTHER

## 2019-03-15 ENCOUNTER — OFFICE VISIT (OUTPATIENT)
Dept: FAMILY MEDICINE CLINIC | Age: 69
End: 2019-03-15
Payer: MEDICARE

## 2019-03-15 VITALS
WEIGHT: 167 LBS | BODY MASS INDEX: 28.51 KG/M2 | DIASTOLIC BLOOD PRESSURE: 76 MMHG | SYSTOLIC BLOOD PRESSURE: 134 MMHG | HEART RATE: 88 BPM | HEIGHT: 64 IN | RESPIRATION RATE: 14 BRPM | OXYGEN SATURATION: 96 %

## 2019-03-15 DIAGNOSIS — R51.9 NONINTRACTABLE HEADACHE, UNSPECIFIED CHRONICITY PATTERN, UNSPECIFIED HEADACHE TYPE: ICD-10-CM

## 2019-03-15 DIAGNOSIS — E10.9 TYPE 1 DIABETES MELLITUS WITHOUT COMPLICATION (HCC): ICD-10-CM

## 2019-03-15 DIAGNOSIS — R42 DIZZINESS: Primary | ICD-10-CM

## 2019-03-15 DIAGNOSIS — E78.5 HYPERLIPIDEMIA, UNSPECIFIED HYPERLIPIDEMIA TYPE: ICD-10-CM

## 2019-03-15 DIAGNOSIS — F32.A DEPRESSION, UNSPECIFIED DEPRESSION TYPE: ICD-10-CM

## 2019-03-15 LAB
BILIRUBIN, POC: NORMAL
BLOOD URINE, POC: NORMAL
CLARITY, POC: CLEAR
COLOR, POC: YELLOW
GLUCOSE BLD-MCNC: 171 MG/DL
GLUCOSE URINE, POC: NORMAL
KETONES, POC: NORMAL
LEUKOCYTE EST, POC: NORMAL
NITRITE, POC: NORMAL
PH, POC: 7.5
PROTEIN, POC: NORMAL
SPECIFIC GRAVITY, POC: 1.01
UROBILINOGEN, POC: 0.2

## 2019-03-15 PROCEDURE — 1036F TOBACCO NON-USER: CPT | Performed by: FAMILY MEDICINE

## 2019-03-15 PROCEDURE — 82962 GLUCOSE BLOOD TEST: CPT | Performed by: FAMILY MEDICINE

## 2019-03-15 PROCEDURE — 3045F PR MOST RECENT HEMOGLOBIN A1C LEVEL 7.0-9.0%: CPT | Performed by: FAMILY MEDICINE

## 2019-03-15 PROCEDURE — G8482 FLU IMMUNIZE ORDER/ADMIN: HCPCS | Performed by: FAMILY MEDICINE

## 2019-03-15 PROCEDURE — 4040F PNEUMOC VAC/ADMIN/RCVD: CPT | Performed by: FAMILY MEDICINE

## 2019-03-15 PROCEDURE — 99214 OFFICE O/P EST MOD 30 MIN: CPT | Performed by: FAMILY MEDICINE

## 2019-03-15 PROCEDURE — G8427 DOCREV CUR MEDS BY ELIG CLIN: HCPCS | Performed by: FAMILY MEDICINE

## 2019-03-15 PROCEDURE — 2022F DILAT RTA XM EVC RTNOPTHY: CPT | Performed by: FAMILY MEDICINE

## 2019-03-15 PROCEDURE — 81002 URINALYSIS NONAUTO W/O SCOPE: CPT | Performed by: FAMILY MEDICINE

## 2019-03-15 PROCEDURE — G8399 PT W/DXA RESULTS DOCUMENT: HCPCS | Performed by: FAMILY MEDICINE

## 2019-03-15 PROCEDURE — 1123F ACP DISCUSS/DSCN MKR DOCD: CPT | Performed by: FAMILY MEDICINE

## 2019-03-15 PROCEDURE — 3017F COLORECTAL CA SCREEN DOC REV: CPT | Performed by: FAMILY MEDICINE

## 2019-03-15 PROCEDURE — G8417 CALC BMI ABV UP PARAM F/U: HCPCS | Performed by: FAMILY MEDICINE

## 2019-03-15 PROCEDURE — 1090F PRES/ABSN URINE INCON ASSESS: CPT | Performed by: FAMILY MEDICINE

## 2019-03-15 RX ORDER — TRAZODONE HYDROCHLORIDE 100 MG/1
100 TABLET ORAL NIGHTLY
COMMUNITY
End: 2019-10-07

## 2019-03-15 RX ORDER — METHYLPREDNISOLONE 4 MG/1
TABLET ORAL
Qty: 1 KIT | Refills: 0 | Status: SHIPPED | OUTPATIENT
Start: 2019-03-15 | End: 2019-03-21

## 2019-03-18 ENCOUNTER — OFFICE VISIT (OUTPATIENT)
Dept: PAIN MANAGEMENT | Age: 69
End: 2019-03-18
Payer: MEDICARE

## 2019-03-18 VITALS
SYSTOLIC BLOOD PRESSURE: 138 MMHG | DIASTOLIC BLOOD PRESSURE: 89 MMHG | BODY MASS INDEX: 28.84 KG/M2 | WEIGHT: 168 LBS | HEART RATE: 90 BPM

## 2019-03-18 DIAGNOSIS — M96.1 FAILED NECK SYNDROME: ICD-10-CM

## 2019-03-18 DIAGNOSIS — G89.4 CHRONIC PAIN SYNDROME: ICD-10-CM

## 2019-03-18 DIAGNOSIS — M48.061 DEGENERATIVE LUMBAR SPINAL STENOSIS: ICD-10-CM

## 2019-03-18 DIAGNOSIS — M48.07 SPINAL STENOSIS OF LUMBOSACRAL REGION: ICD-10-CM

## 2019-03-18 DIAGNOSIS — M79.7 FIBROMYALGIA: ICD-10-CM

## 2019-03-18 DIAGNOSIS — M96.1 FAILED BACK SURGICAL SYNDROME: ICD-10-CM

## 2019-03-18 PROCEDURE — 1101F PT FALLS ASSESS-DOCD LE1/YR: CPT | Performed by: INTERNAL MEDICINE

## 2019-03-18 PROCEDURE — 4040F PNEUMOC VAC/ADMIN/RCVD: CPT | Performed by: INTERNAL MEDICINE

## 2019-03-18 PROCEDURE — 1123F ACP DISCUSS/DSCN MKR DOCD: CPT | Performed by: INTERNAL MEDICINE

## 2019-03-18 PROCEDURE — G8482 FLU IMMUNIZE ORDER/ADMIN: HCPCS | Performed by: INTERNAL MEDICINE

## 2019-03-18 PROCEDURE — G8427 DOCREV CUR MEDS BY ELIG CLIN: HCPCS | Performed by: INTERNAL MEDICINE

## 2019-03-18 PROCEDURE — 1036F TOBACCO NON-USER: CPT | Performed by: INTERNAL MEDICINE

## 2019-03-18 PROCEDURE — 1090F PRES/ABSN URINE INCON ASSESS: CPT | Performed by: INTERNAL MEDICINE

## 2019-03-18 PROCEDURE — G8417 CALC BMI ABV UP PARAM F/U: HCPCS | Performed by: INTERNAL MEDICINE

## 2019-03-18 PROCEDURE — 99213 OFFICE O/P EST LOW 20 MIN: CPT | Performed by: INTERNAL MEDICINE

## 2019-03-18 PROCEDURE — 3017F COLORECTAL CA SCREEN DOC REV: CPT | Performed by: INTERNAL MEDICINE

## 2019-03-18 PROCEDURE — G8399 PT W/DXA RESULTS DOCUMENT: HCPCS | Performed by: INTERNAL MEDICINE

## 2019-03-18 RX ORDER — PREGABALIN 75 MG/1
CAPSULE ORAL
Qty: 90 CAPSULE | Refills: 1 | Status: SHIPPED | OUTPATIENT
Start: 2019-03-18 | End: 2019-04-12 | Stop reason: SDUPTHER

## 2019-03-18 RX ORDER — MELOXICAM 15 MG/1
15 TABLET ORAL DAILY
Qty: 30 TABLET | Refills: 1 | Status: SHIPPED | OUTPATIENT
Start: 2019-03-18 | End: 2019-04-12 | Stop reason: SDUPTHER

## 2019-03-18 RX ORDER — OXYCODONE AND ACETAMINOPHEN 7.5; 325 MG/1; MG/1
1 TABLET ORAL EVERY 6 HOURS PRN
Qty: 90 TABLET | Refills: 0 | Status: SHIPPED | OUTPATIENT
Start: 2019-03-18 | End: 2019-04-12 | Stop reason: SDUPTHER

## 2019-03-19 ENCOUNTER — HOSPITAL ENCOUNTER (OUTPATIENT)
Dept: CT IMAGING | Age: 69
Discharge: HOME | End: 2019-03-19
Payer: MEDICARE

## 2019-03-19 DIAGNOSIS — R42 DIZZINESS: ICD-10-CM

## 2019-03-19 DIAGNOSIS — R51.9 NONINTRACTABLE HEADACHE, UNSPECIFIED CHRONICITY PATTERN, UNSPECIFIED HEADACHE TYPE: ICD-10-CM

## 2019-03-19 LAB
GFR AFRICAN AMERICAN: 59
GFR NON-AFRICAN AMERICAN: 49
PERFORMED ON: ABNORMAL
POC CREATININE: 1.1 MG/DL (ref 0.6–1.2)
POC SAMPLE TYPE: ABNORMAL

## 2019-03-19 PROCEDURE — 70470 CT HEAD/BRAIN W/O & W/DYE: CPT

## 2019-03-19 PROCEDURE — 6360000004 HC RX CONTRAST MEDICATION: Performed by: FAMILY MEDICINE

## 2019-03-19 PROCEDURE — 82565 ASSAY OF CREATININE: CPT

## 2019-03-19 RX ADMIN — IOPAMIDOL 80 ML: 755 INJECTION, SOLUTION INTRAVENOUS at 15:25

## 2019-03-20 ENCOUNTER — TELEPHONE (OUTPATIENT)
Dept: FAMILY MEDICINE CLINIC | Age: 69
End: 2019-03-20

## 2019-03-21 ENCOUNTER — TELEPHONE (OUTPATIENT)
Dept: FAMILY MEDICINE CLINIC | Age: 69
End: 2019-03-21

## 2019-03-21 DIAGNOSIS — G93.9 BRAIN LESION: Primary | ICD-10-CM

## 2019-03-21 DIAGNOSIS — R90.89 ABNORMAL BRAIN CT: ICD-10-CM

## 2019-03-25 ENCOUNTER — TELEPHONE (OUTPATIENT)
Dept: PRIMARY CARE CLINIC | Age: 69
End: 2019-03-25

## 2019-03-25 ENCOUNTER — HOSPITAL ENCOUNTER (OUTPATIENT)
Age: 69
Discharge: HOME | End: 2019-03-25
Payer: MEDICARE

## 2019-03-25 DIAGNOSIS — G89.4 CHRONIC PAIN SYNDROME: ICD-10-CM

## 2019-03-25 DIAGNOSIS — M96.1 FAILED BACK SURGICAL SYNDROME: ICD-10-CM

## 2019-03-25 DIAGNOSIS — E10.9 TYPE 1 DIABETES MELLITUS WITHOUT COMPLICATION (HCC): ICD-10-CM

## 2019-03-25 DIAGNOSIS — M79.7 FIBROMYALGIA: ICD-10-CM

## 2019-03-25 DIAGNOSIS — E78.5 HYPERLIPIDEMIA, UNSPECIFIED HYPERLIPIDEMIA TYPE: ICD-10-CM

## 2019-03-25 LAB
A/G RATIO: 1.2 (ref 1.1–2.2)
ALBUMIN SERPL-MCNC: 4.1 G/DL (ref 3.4–5)
ALP BLD-CCNC: 108 U/L (ref 40–129)
ALT SERPL-CCNC: 15 U/L (ref 10–40)
ANION GAP SERPL CALCULATED.3IONS-SCNC: 15 MMOL/L (ref 3–16)
AST SERPL-CCNC: 20 U/L (ref 15–37)
BASOPHILS ABSOLUTE: 0 K/UL (ref 0–0.2)
BASOPHILS RELATIVE PERCENT: 0.5 %
BILIRUB SERPL-MCNC: 0.5 MG/DL (ref 0–1)
BUN BLDV-MCNC: 18 MG/DL (ref 7–20)
CALCIUM SERPL-MCNC: 9.5 MG/DL (ref 8.3–10.6)
CHLORIDE BLD-SCNC: 105 MMOL/L (ref 99–110)
CHOLESTEROL, TOTAL: 180 MG/DL (ref 0–199)
CO2: 26 MMOL/L (ref 21–32)
CREAT SERPL-MCNC: 0.7 MG/DL (ref 0.6–1.2)
EOSINOPHILS ABSOLUTE: 0.1 K/UL (ref 0–0.6)
EOSINOPHILS RELATIVE PERCENT: 2.3 %
GFR AFRICAN AMERICAN: >60
GFR NON-AFRICAN AMERICAN: >60
GLOBULIN: 3.4 G/DL
GLUCOSE BLD-MCNC: 83 MG/DL (ref 70–99)
HCT VFR BLD CALC: 41.1 % (ref 36–48)
HDLC SERPL-MCNC: 88 MG/DL (ref 40–60)
HEMOGLOBIN: 13.9 G/DL (ref 12–16)
HEPATITIS C ANTIBODY INTERPRETATION: NORMAL
LDL CHOLESTEROL CALCULATED: 71 MG/DL
LYMPHOCYTES ABSOLUTE: 1.1 K/UL (ref 1–5.1)
LYMPHOCYTES RELATIVE PERCENT: 17.9 %
MCH RBC QN AUTO: 29 PG (ref 26–34)
MCHC RBC AUTO-ENTMCNC: 33.9 G/DL (ref 31–36)
MCV RBC AUTO: 85.6 FL (ref 80–100)
MONOCYTES ABSOLUTE: 0.4 K/UL (ref 0–1.3)
MONOCYTES RELATIVE PERCENT: 6.4 %
NEUTROPHILS ABSOLUTE: 4.6 K/UL (ref 1.7–7.7)
NEUTROPHILS RELATIVE PERCENT: 72.9 %
PDW BLD-RTO: 13.3 % (ref 12.4–15.4)
PLATELET # BLD: 207 K/UL (ref 135–450)
PMV BLD AUTO: 9.1 FL (ref 5–10.5)
POTASSIUM SERPL-SCNC: 4.4 MMOL/L (ref 3.5–5.1)
RBC # BLD: 4.8 M/UL (ref 4–5.2)
SODIUM BLD-SCNC: 146 MMOL/L (ref 136–145)
TOTAL CK: 34 U/L (ref 26–192)
TOTAL PROTEIN: 7.5 G/DL (ref 6.4–8.2)
TRIGL SERPL-MCNC: 103 MG/DL (ref 0–150)
VLDLC SERPL CALC-MCNC: 21 MG/DL
WBC # BLD: 6.3 K/UL (ref 4–11)

## 2019-03-25 PROCEDURE — 80053 COMPREHEN METABOLIC PANEL: CPT

## 2019-03-25 PROCEDURE — 86803 HEPATITIS C AB TEST: CPT

## 2019-03-25 PROCEDURE — 80061 LIPID PANEL: CPT

## 2019-03-25 PROCEDURE — 36415 COLL VENOUS BLD VENIPUNCTURE: CPT

## 2019-03-25 PROCEDURE — 83036 HEMOGLOBIN GLYCOSYLATED A1C: CPT

## 2019-03-25 PROCEDURE — 82550 ASSAY OF CK (CPK): CPT

## 2019-03-25 PROCEDURE — 85025 COMPLETE CBC W/AUTO DIFF WBC: CPT

## 2019-03-26 ENCOUNTER — TELEPHONE (OUTPATIENT)
Dept: FAMILY MEDICINE CLINIC | Age: 69
End: 2019-03-26

## 2019-03-26 DIAGNOSIS — R90.89 ABNORMAL BRAIN CT: Primary | ICD-10-CM

## 2019-03-26 LAB
ESTIMATED AVERAGE GLUCOSE: 177.2 MG/DL
HBA1C MFR BLD: 7.8 %

## 2019-03-29 ENCOUNTER — OFFICE VISIT (OUTPATIENT)
Dept: FAMILY MEDICINE CLINIC | Age: 69
End: 2019-03-29
Payer: MEDICARE

## 2019-03-29 VITALS
SYSTOLIC BLOOD PRESSURE: 140 MMHG | HEART RATE: 86 BPM | RESPIRATION RATE: 14 BRPM | BODY MASS INDEX: 28.34 KG/M2 | DIASTOLIC BLOOD PRESSURE: 72 MMHG | WEIGHT: 166 LBS | OXYGEN SATURATION: 98 % | HEIGHT: 64 IN

## 2019-03-29 DIAGNOSIS — R42 DIZZINESS: ICD-10-CM

## 2019-03-29 DIAGNOSIS — F32.A DEPRESSION, UNSPECIFIED DEPRESSION TYPE: ICD-10-CM

## 2019-03-29 DIAGNOSIS — G93.9 BRAIN LESION: Primary | ICD-10-CM

## 2019-03-29 DIAGNOSIS — R51.9 NONINTRACTABLE HEADACHE, UNSPECIFIED CHRONICITY PATTERN, UNSPECIFIED HEADACHE TYPE: ICD-10-CM

## 2019-03-29 DIAGNOSIS — E10.9 TYPE 1 DIABETES MELLITUS WITHOUT COMPLICATION (HCC): ICD-10-CM

## 2019-03-29 PROCEDURE — 3017F COLORECTAL CA SCREEN DOC REV: CPT | Performed by: FAMILY MEDICINE

## 2019-03-29 PROCEDURE — G8482 FLU IMMUNIZE ORDER/ADMIN: HCPCS | Performed by: FAMILY MEDICINE

## 2019-03-29 PROCEDURE — 4040F PNEUMOC VAC/ADMIN/RCVD: CPT | Performed by: FAMILY MEDICINE

## 2019-03-29 PROCEDURE — G8399 PT W/DXA RESULTS DOCUMENT: HCPCS | Performed by: FAMILY MEDICINE

## 2019-03-29 PROCEDURE — 3045F PR MOST RECENT HEMOGLOBIN A1C LEVEL 7.0-9.0%: CPT | Performed by: FAMILY MEDICINE

## 2019-03-29 PROCEDURE — 1123F ACP DISCUSS/DSCN MKR DOCD: CPT | Performed by: FAMILY MEDICINE

## 2019-03-29 PROCEDURE — G8417 CALC BMI ABV UP PARAM F/U: HCPCS | Performed by: FAMILY MEDICINE

## 2019-03-29 PROCEDURE — 1090F PRES/ABSN URINE INCON ASSESS: CPT | Performed by: FAMILY MEDICINE

## 2019-03-29 PROCEDURE — G8427 DOCREV CUR MEDS BY ELIG CLIN: HCPCS | Performed by: FAMILY MEDICINE

## 2019-03-29 PROCEDURE — 2022F DILAT RTA XM EVC RTNOPTHY: CPT | Performed by: FAMILY MEDICINE

## 2019-03-29 PROCEDURE — 1036F TOBACCO NON-USER: CPT | Performed by: FAMILY MEDICINE

## 2019-03-29 PROCEDURE — 99214 OFFICE O/P EST MOD 30 MIN: CPT | Performed by: FAMILY MEDICINE

## 2019-03-29 RX ORDER — HYDROXYZINE PAMOATE 25 MG/1
CAPSULE ORAL
Qty: 20 CAPSULE | Refills: 0 | Status: SHIPPED | OUTPATIENT
Start: 2019-03-29 | End: 2019-08-01

## 2019-03-30 ASSESSMENT — ENCOUNTER SYMPTOMS
SHORTNESS OF BREATH: 0
CHEST TIGHTNESS: 0
ABDOMINAL PAIN: 0
COUGH: 0

## 2019-04-01 ENCOUNTER — TELEPHONE (OUTPATIENT)
Dept: FAMILY MEDICINE CLINIC | Age: 69
End: 2019-04-01

## 2019-04-01 NOTE — TELEPHONE ENCOUNTER
The patient called as she has been on hold with [facility] since Friday and cannot reach anyone. She is upset and frustrated. Told her I will call that office at [phone] and schedule her with Dr. Antoinette Prado, whom she wishes to see instead of the other physician. I called the initial care patient line to try to schedule her appointment. I will need to put in a new referral as the other doctor is neurologist and this doctor is a neurosurgeon.

## 2019-04-02 ENCOUNTER — TELEPHONE (OUTPATIENT)
Dept: INTERNAL MEDICINE CLINIC | Age: 69
End: 2019-04-02

## 2019-04-02 ASSESSMENT — ENCOUNTER SYMPTOMS
COUGH: 0
CHEST TIGHTNESS: 0
SHORTNESS OF BREATH: 0
ABDOMINAL PAIN: 0

## 2019-04-02 NOTE — TELEPHONE ENCOUNTER
Called the patient to see if she has heard from the New Patient Line to schedule an appointment. No answer.   LM for patient to call me back

## 2019-04-02 NOTE — TELEPHONE ENCOUNTER
PA SUBMITTED FOR hydrOXYzine Pamoate 25MG OR CAPS  Key: [id number] - Rx #: [id number]   STATUS: The patient currently has access to the requested medication  and a Prior Authorization is not needed for the patient/medication

## 2019-04-02 NOTE — TELEPHONE ENCOUNTER
Called patient and she states that Dr. Carolyn Mcneill looked over the images and he has referred her to a different neurosurgeon who specializes in what she has. Dr. Patrick Wilkins.  He is in the same office as Dr. Jaziel Cheung. She has an appointment with him on Friday at noon. She will call me back with update. She was told that it was a non malignant mass and that it could be causing her headaches and dizziness/vertigo. Could also cause balance issues.

## 2019-04-02 NOTE — PROGRESS NOTES
SUBJECTIVE:  Trinh Needle   1950   female   Allergies   Allergen Reactions    Sulfa Antibiotics Hives    Sulfa Antibiotics     Gabapentin Anxiety and Other (See Comments)     Also causes suicidal thoughts and confusion       Chief Complaint   Patient presents with   [place]     left ear aches x several weeks     Dizziness     balance is off, head feels like \" a bowling ball\"- really heavy by the end of the day     Nausea    Sinus Problem     possible sinus infection     Anxiety     having trouble with Son         Patient Active Problem List    Diagnosis Date Noted    Chronic pain syndrome 11/03/2018    Staph aureus infection     Hyponatremia 07/20/2018    Wound drainage 07/20/2018    Superficial incisional infection of surgical site 07/20/2018    Post-operative pain     Degenerative lumbar spinal stenosis 06/27/2018    Bilateral carpal tunnel syndrome 04/02/2018    Ulnar neuropathy at elbow, left 04/02/2018    Gallstone pancreatitis 10/25/2017    Dilated cbd, acquired 09/07/2017    Osteoarthrosis 09/07/2017    Type 1 diabetes mellitus without complication ([facility] 01/23/6645    Osteopenia 08/31/2015    Family history of melanoma 05/27/2014    IDDM (insulin dependent diabetes mellitus) ([facility]     Depression     Hypertension     Spinal stenosis     Chronic back pain     Retinopathy     Cervical osteoarthritis 04/10/2013       HPI   Pt with hx of hyperlipidemia, diabetes, depression is here today co dizziness and feeling off balance. Reports she has had these sx for about a month. Also noticing intermittent ha . Reports BS have been good. Denies episodes of hypoglycemia. She is currently followed by psych and has been stable on current tx. Reports she is feeling more depressed from feeling \"dizzy\". No syncope, change in vision or other neurologic sx. deneis CP,SOB or myalgias.   Past Medical History:   Diagnosis Date    Chronic back pain     Depression     [facility]     Hyperlipidemia     Hypertension     Retinopathy     Seasonal allergies     Spinal stenosis     Type II or unspecified type diabetes mellitus without mention of complication, not stated as uncontrolled      Social History     Socioeconomic History    Marital status:      Spouse name: Not on file    Number of children: Not on file    Years of education: Not on file    Highest education level: Not on file   Occupational History    Occupation:  [employer]. Social Needs    Financial resource strain: Not on file    Food insecurity:     Worry: Not on file     Inability: Not on file    Transportation needs:     Medical: Not on file     Non-medical: Not on file   Tobacco Use    Smoking status: Never Smoker    Smokeless tobacco: Never Used   Substance and Sexual Activity    Alcohol use: No     Alcohol/week: 0.0 oz    Drug use: No    Sexual activity: Not on file   Lifestyle    Physical activity:     Days per week: Not on file     Minutes per session: Not on file    Stress: Not on file   Relationships    Social connections:     Talks on phone: Not on file     Gets together: Not on file     Attends Holiness service: Not on file     Active member of club or organization: Not on file     Attends meetings of clubs or organizations: Not on file     Relationship status: Not on file    Intimate partner violence:     Fear of current or ex partner: Not on file     Emotionally abused: Not on file     Physically abused: Not on file     Forced sexual activity: Not on file   Other Topics Concern    Not on file   Social History Narrative    ** Merged History Encounter **          Family History   Problem Relation Age of Onset    Cancer Mother         melanoma    Diabetes Father         Review of Systems   Constitutional: Negative for activity change, appetite change and unexpected weight change. Respiratory: Negative for cough, chest tightness and shortness of breath. Cardiovascular: Negative for chest pain and leg swelling. Gastrointestinal: Negative for abdominal pain. Endocrine: Negative for cold intolerance and heat intolerance. Musculoskeletal: Negative for arthralgias and myalgias. Skin: Negative for rash. Neurological: Positive for dizziness and headaches. Negative for light-headedness. Hematological: Negative for adenopathy. Does not bruise/bleed easily. Psychiatric/Behavioral: Negative for dysphoric mood. The patient is not nervous/anxious. OBJECTIVE:  /76   Pulse 88   Resp 14   Ht 5' 4\" (1.626 m)   Wt 167 lb (75.8 kg)   SpO2 96%   BMI 28.67 kg/m²   Physical Exam   Constitutional: She is oriented to person, place, and time. She appears well-developed and well-nourished. Eyes: Pupils are equal, round, and reactive to light. EOM are normal.   Neck: Normal range of motion. Neck supple. No JVD present. Cardiovascular: Normal rate and regular rhythm. Pulmonary/Chest: Effort normal and breath sounds normal.   Abdominal: Soft. Bowel sounds are normal. There is no tenderness. Neurological: She is alert and oriented to person, place, and time. No cranial nerve deficit. Coordination normal.   Skin: No rash noted. Psychiatric: She has a normal mood and affect. Her behavior is normal. Thought content normal.   Nursing note and vitals reviewed. ASSESSMENT/PLAN:    1. Dizziness  Medrol Dose Pk-discussed monitoring BS closely  - CT HEAD W WO CONTRAST; Future  - POCT Urinalysis no Micro  - POCT Glucose    2. Nonintractable headache, unspecified chronicity pattern, unspecified headache type  Medrol Dose Pk  Discussed watching BS closely on Prednisone  - CT HEAD W WO CONTRAST; Future  - POCT Urinalysis no Micro  - POCT Glucose    3. Type 1 diabetes mellitus without complication (HCC)  continue current mgt  Intermittent ambulatory BP checks      4. Depression, unspecified depression type  Reviewed current tx  continue fu with psych    5. Hyperlipidemia, unspecified hyperlipidemia type  Continue current mgt      Orders Placed This Encounter   Procedures    CT HEAD W WO CONTRAST     Standing Status:   Future     Number of Occurrences:   1     Standing Expiration Date:   3/15/2020    POCT Urinalysis no Micro    POCT Glucose     Current Outpatient Medications   Medication Sig Dispense Refill    traZODone (DESYREL) 100 MG tablet Take 100 mg by mouth nightly      insulin detemir (LEVEMIR FLEXTOUCH) 100 UNIT/ML injection pen Inject 20 Units into the skin 2 times daily 5 pen 0    amLODIPine (NORVASC) 5 MG tablet Take 1 tablet by mouth daily 90 tablet 0    losartan (COZAAR) 100 MG tablet Take 1 tablet by mouth daily 90 tablet 0    atorvastatin (LIPITOR) 40 MG tablet Take 1 tablet by mouth daily 90 tablet 0    atorvastatin (LIPITOR) 20 MG tablet Take 1 tablet by mouth daily 90 tablet 0    ondansetron (ZOFRAN) 4 MG tablet Take 1 tablet by mouth every 12 hours as needed for Nausea or Vomiting 10 tablet 0    calcium carbonate (OSCAL) 500 MG TABS tablet Take 500 mg by mouth daily      docusate sodium (COLACE) 100 MG capsule Take 100 mg by mouth 2 times daily      Needles & Syringes MISC Inject 1 each into the skin 3 times daily 100 each 3    sertraline (ZOLOFT) 100 MG tablet 100 mg 2 times daily       insulin aspart (NOVOLOG FLEXPEN) 100 UNIT/ML injection pen Inject 5 Units into the skin 3 times daily (before meals) 5 pen 0    hydrOXYzine (VISTARIL) 25 MG capsule 1 po qd -BID prn for anxiety 20 capsule 0    pregabalin (LYRICA) 75 MG capsule Take 1 tablet by mouth in the AM, take 2 tablets by mouth in the PM 90 capsule 1    meloxicam (MOBIC) 15 MG tablet Take 1 tablet by mouth daily 30 tablet 1    oxyCODONE-acetaminophen (PERCOCET) 7.5-325 MG per tablet Take 1 tablet by mouth every 6 hours as needed for Pain (max 2-3) for up to 30 days. 90 tablet 0     No current facility-administered medications for this visit.          Return in about 2 weeks (around 3/29/2019).     Param Singleton MD

## 2019-04-08 ENCOUNTER — TELEPHONE (OUTPATIENT)
Dept: FAMILY MEDICINE CLINIC | Age: 69
End: 2019-04-08

## 2019-04-08 NOTE — TELEPHONE ENCOUNTER
Patient is requesting that Brina Torres or Louanne Severe gives her a call about her medical situation. Please call and advise.

## 2019-04-09 NOTE — TELEPHONE ENCOUNTER
Called the patient regarding health issues. She went to the neurosurgeon on Friday and he states no surgery. It is calcified and not cancerous. He told her that her symptoms sounded more like migraines. She went to see a migraine specialist in practice (neuro) who specializes in headaches and she states that she is having a certain type of migraines and started her on medications. She thinks her unsteadiness is due to this type of headache and wants her to do PT/ for balance issues. She will be having another MRI every six  Weeks for two years to watch the calcified tumor to make sure that it doesn't grow, but the fact that it is calcified means that it has been there a while.

## 2019-04-11 NOTE — TELEPHONE ENCOUNTER
Pt doesn't have enough medication to last until her appt. On the 23rd.    She only has 6 pills left of losartan (COZAAR) 100 MG tablet

## 2019-04-12 ENCOUNTER — OFFICE VISIT (OUTPATIENT)
Dept: PAIN MANAGEMENT | Age: 69
End: 2019-04-12
Payer: MEDICARE

## 2019-04-12 VITALS
WEIGHT: 170 LBS | HEART RATE: 84 BPM | SYSTOLIC BLOOD PRESSURE: 129 MMHG | DIASTOLIC BLOOD PRESSURE: 70 MMHG | BODY MASS INDEX: 29.18 KG/M2

## 2019-04-12 DIAGNOSIS — M96.1 FAILED BACK SURGICAL SYNDROME: ICD-10-CM

## 2019-04-12 DIAGNOSIS — M79.7 FIBROMYALGIA: ICD-10-CM

## 2019-04-12 DIAGNOSIS — M96.1 FAILED NECK SYNDROME: ICD-10-CM

## 2019-04-12 DIAGNOSIS — M48.07 SPINAL STENOSIS OF LUMBOSACRAL REGION: ICD-10-CM

## 2019-04-12 DIAGNOSIS — M48.061 DEGENERATIVE LUMBAR SPINAL STENOSIS: ICD-10-CM

## 2019-04-12 DIAGNOSIS — G89.4 CHRONIC PAIN SYNDROME: ICD-10-CM

## 2019-04-12 PROCEDURE — 1090F PRES/ABSN URINE INCON ASSESS: CPT | Performed by: INTERNAL MEDICINE

## 2019-04-12 PROCEDURE — 99213 OFFICE O/P EST LOW 20 MIN: CPT | Performed by: INTERNAL MEDICINE

## 2019-04-12 PROCEDURE — G8399 PT W/DXA RESULTS DOCUMENT: HCPCS | Performed by: INTERNAL MEDICINE

## 2019-04-12 PROCEDURE — 3017F COLORECTAL CA SCREEN DOC REV: CPT | Performed by: INTERNAL MEDICINE

## 2019-04-12 PROCEDURE — 4040F PNEUMOC VAC/ADMIN/RCVD: CPT | Performed by: INTERNAL MEDICINE

## 2019-04-12 PROCEDURE — G8427 DOCREV CUR MEDS BY ELIG CLIN: HCPCS | Performed by: INTERNAL MEDICINE

## 2019-04-12 PROCEDURE — 1123F ACP DISCUSS/DSCN MKR DOCD: CPT | Performed by: INTERNAL MEDICINE

## 2019-04-12 PROCEDURE — 1036F TOBACCO NON-USER: CPT | Performed by: INTERNAL MEDICINE

## 2019-04-12 PROCEDURE — G8417 CALC BMI ABV UP PARAM F/U: HCPCS | Performed by: INTERNAL MEDICINE

## 2019-04-12 RX ORDER — PREGABALIN 75 MG/1
CAPSULE ORAL
Qty: 90 CAPSULE | Refills: 1 | Status: SHIPPED | OUTPATIENT
Start: 2019-04-12 | End: 2019-05-10

## 2019-04-12 RX ORDER — OXYCODONE AND ACETAMINOPHEN 7.5; 325 MG/1; MG/1
1 TABLET ORAL EVERY 6 HOURS PRN
Qty: 90 TABLET | Refills: 0 | Status: SHIPPED | OUTPATIENT
Start: 2019-04-12 | End: 2019-05-10 | Stop reason: SDUPTHER

## 2019-04-12 RX ORDER — MELOXICAM 15 MG/1
15 TABLET ORAL DAILY
Qty: 30 TABLET | Refills: 1 | Status: SHIPPED | OUTPATIENT
Start: 2019-04-12 | End: 2019-05-10 | Stop reason: SDUPTHER

## 2019-04-12 RX ORDER — LOSARTAN POTASSIUM 100 MG/1
100 TABLET ORAL DAILY
Qty: 15 TABLET | Refills: 0 | Status: SHIPPED | OUTPATIENT
Start: 2019-04-12 | End: 2019-04-23 | Stop reason: SDUPTHER

## 2019-04-12 NOTE — PROGRESS NOTES
Rafael Quintonmelisa  1950  [id number]    HISTORY OF PRESENT ILLNESS:  Ms. Yohana Snow is a 77 y.o. female returns for a follow up visit for multiple medical problems. Her current presenting problems are   1. Chronic pain syndrome    2. Spinal stenosis of lumbosacral region    3. Degenerative lumbar spinal stenosis    4. Failed back surgical syndrome    5. Fibromyalgia    6. Failed neck syndrome    7. Primary insomnia    . As per information/history obtained from the PADT(patient assessment and documentation tool) - She complains of pain in the head, neck and lower back with radiation to the shoulders Bilateral, buttocks and hips Bilateral She rates the pain 4/10 and describes it as sharp, aching, burning. Pain is made worse by: movement, walking, standing, sitting, bending, lifting. Current treatment regimen has helped relieve about 60% of the pain. She denies side effects from the current pain regimen. Patient reports that since the last follow up visit the physical functioning is better, family/social relationships are unchanged, mood is better and sleep patterns are better, and that the overall functioning is better. Patient denies neurological bowel or bladder. Patient denies misusing/abusing her narcotic pain medications or using any illegal drugs. There are No indicators for possible drug abuse, addiction or diversion problems. Upon obtaining the medical history from Ms. Yohana Snow regarding today's office visit for her presenting problems,  Patient states she was diagnosis with a brain tumor and migraines. She says she saw her PCP and had scans done and was diagnosis with meningioma. She mentions she saw a neurologist also and was started on possible Topamax. She denies any GERD symptoms. C/o possible weight gain issues, denies SE with meds    ALLERGIES: Patients list of allergies were reviewed     MEDICATIONS: Ms. Yohana Snow list of medications were reviewed. Her current medications are Outpatient Medications Prior to Visit   Medication Sig Dispense Refill    losartan (COZAAR) 100 MG tablet Take 1 tablet by mouth daily 15 tablet 0    insulin aspart (NOVOLOG FLEXPEN) 100 UNIT/ML injection pen Inject 5 Units into the skin 3 times daily (before meals) 5 pen 0    hydrOXYzine (VISTARIL) 25 MG capsule 1 po qd -BID prn for anxiety 20 capsule 0    pregabalin (LYRICA) 75 MG capsule Take 1 tablet by mouth in the AM, take 2 tablets by mouth in the PM 90 capsule 1    meloxicam (MOBIC) 15 MG tablet Take 1 tablet by mouth daily 30 tablet 1    oxyCODONE-acetaminophen (PERCOCET) 7.5-325 MG per tablet Take 1 tablet by mouth every 6 hours as needed for Pain (max 2-3) for up to 30 days. 90 tablet 0    traZODone (DESYREL) 100 MG tablet Take 100 mg by mouth nightly      insulin detemir (LEVEMIR FLEXTOUCH) 100 UNIT/ML injection pen Inject 20 Units into the skin 2 times daily 5 pen 0    amLODIPine (NORVASC) 5 MG tablet Take 1 tablet by mouth daily 90 tablet 0    atorvastatin (LIPITOR) 40 MG tablet Take 1 tablet by mouth daily 90 tablet 0    atorvastatin (LIPITOR) 20 MG tablet Take 1 tablet by mouth daily 90 tablet 0    ondansetron (ZOFRAN) 4 MG tablet Take 1 tablet by mouth every 12 hours as needed for Nausea or Vomiting 10 tablet 0    calcium carbonate (OSCAL) 500 MG TABS tablet Take 500 mg by mouth daily      docusate sodium (COLACE) 100 MG capsule Take 100 mg by mouth 2 times daily      Needles & Syringes MISC Inject 1 each into the skin 3 times daily 100 each 3    sertraline (ZOLOFT) 100 MG tablet 100 mg 2 times daily        No facility-administered medications prior to visit. SOCIAL/FAMILY/PAST MEDICAL HISTORY: Ms. Tyson Fernandez, family and past medical history was reviewed. REVIEW OF SYSTEMS:    Respiratory: Negative for apnea, chest tightness and shortness of breath or change in baseline breathing.     Gastrointestinal: Negative for nausea, vomiting, abdominal pain, diarrhea, constipation, blood in stool and abdominal distention. PHYSICAL EXAM:   Nursing note and vitals reviewed. /70   Pulse 84   Wt 170 lb (77.1 kg)   BMI 29.18 kg/m²   Constitutional: She appears well-developed and well-nourished. No acute distress. Skin: Skin is warm and dry, good turgor. No rash noted. She is not diaphoretic. Cardiovascular: Normal rate, regular rhythm, normal heart sounds, and does not have murmur. Pulmonary/Chest: Effort normal. No respiratory distress. She does not have wheezes in the lung fields. She has no rales. Neurological/Psychiatric:She is alert and oriented to person, place, and time. Coordination is  normal.  Her mood isAppropriate and affect is Anxious . IMPRESSION:   1. Chronic pain syndrome    2. Spinal stenosis of lumbosacral region    3. Degenerative lumbar spinal stenosis    4. Failed back surgical syndrome    5. Fibromyalgia    6. Failed neck syndrome        PLAN:  Informed verbal consent was obtained  -[place] record was obtained and reviewed  for the last one year and no indicators of drug misuse  were found. Any other controlled substance prescriptions  seen on the record have been accounted for, I am aware of the patient receiving these medications. Sara January [place] record will be rechecked as part of office protocol.    -Continue with current opioid regimen   _records from the hospital reviewed - dx with meningioma- possibly old , will monitor sxs f/u with neurology/surgery  -advise to monitor weight every week- last 170 lbs   -Walking as tolerated 20 minutes daily   -  Monitor blood sugar regularly, diabetic control- adv diabetic diet. Goal for fasting blood sugars around 120.  Follow up with Endocrinologist/PCP also for on going management    Current Outpatient Medications   Medication Sig Dispense Refill    losartan (COZAAR) 100 MG tablet Take 1 tablet by mouth daily 15 tablet 0    insulin aspart (NOVOLOG FLEXPEN) 100 UNIT/ML injection pen Inject 5 Units into the skin 3 times daily (before meals) 5 pen 0    hydrOXYzine (VISTARIL) 25 MG capsule 1 po qd -BID prn for anxiety 20 capsule 0    pregabalin (LYRICA) 75 MG capsule Take 1 tablet by mouth in the AM, take 2 tablets by mouth in the PM 90 capsule 1    meloxicam (MOBIC) 15 MG tablet Take 1 tablet by mouth daily 30 tablet 1    oxyCODONE-acetaminophen (PERCOCET) 7.5-325 MG per tablet Take 1 tablet by mouth every 6 hours as needed for Pain (max 2-3) for up to 30 days. 90 tablet 0    traZODone (DESYREL) 100 MG tablet Take 100 mg by mouth nightly      insulin detemir (LEVEMIR FLEXTOUCH) 100 UNIT/ML injection pen Inject 20 Units into the skin 2 times daily 5 pen 0    amLODIPine (NORVASC) 5 MG tablet Take 1 tablet by mouth daily 90 tablet 0    atorvastatin (LIPITOR) 40 MG tablet Take 1 tablet by mouth daily 90 tablet 0    atorvastatin (LIPITOR) 20 MG tablet Take 1 tablet by mouth daily 90 tablet 0    ondansetron (ZOFRAN) 4 MG tablet Take 1 tablet by mouth every 12 hours as needed for Nausea or Vomiting 10 tablet 0    calcium carbonate (OSCAL) 500 MG TABS tablet Take 500 mg by mouth daily      docusate sodium (COLACE) 100 MG capsule Take 100 mg by mouth 2 times daily      Needles & Syringes MISC Inject 1 each into the skin 3 times daily 100 each 3    sertraline (ZOLOFT) 100 MG tablet 100 mg 2 times daily        No current facility-administered medications for this visit. I will continue her current medication regimen  which is part of the above treatment schedule. It has been helping with Ms. Adrian's chronic  medical problems which for this visit include:   Diagnoses of Chronic pain syndrome, Spinal stenosis of lumbosacral region, Degenerative lumbar spinal stenosis, Failed back surgical syndrome, Fibromyalgia, Failed neck syndrome, and Primary insomnia were pertinent to this visit.    Risks and benefits of the medications and other alternative treatments  including no treatment were discussed with the patient. The common side effects of these medications were also explained to the patient. Informed verbal consent was obtained. Goals of current treatment regimen include improvement in pain, restoration of functioning- with focus on improvement in physical performance, general activity, work or disability,emotional distress, health care utilization and  decreased medication consumption. Will continue to monitor progress towards achieving/maintaining therapeutic goals with special emphasis on  1. Improvement in perceived interfernce  of pain with ADL's. Ability to do home exercises independently. Ability to do household chores indoor and/or outdoor work and social and leisure activities. Improve psychosocial and physical functioning. - she is showing progression towards this treatment goal with the current regimen. She was advised against drinking alcohol with the narcotic pain medicines, advised against driving or handling machinery while adjusting the dose of medicines or if having cognitive  issues related to the current medications. Risk of overdose and death, if medicines not taken as prescribed, were also discussed. If the patient develops new symptoms or if the symptoms worsen, the patient should call the office. While transcribing every attempt was made to maintain the accuracy of the note in terms of it's contents,there may have been some errors made inadvertently. Thank you for allowing me to participate in the care of this patient.     Kane Logan MD.    Cc: Mary Nunez MD    I, Yolanda Freitas, am scribing for and in the presence of Dr. Kane Logan.   04/12/19  4:56 PM  SHELLY Juárez, Dr. Kane Logan, personally performed the services described in this documentation as scribed by   Yolanda Freitas MA in my presence and it is both accurate and complete

## 2019-04-16 ENCOUNTER — TELEPHONE (OUTPATIENT)
Dept: FAMILY MEDICINE CLINIC | Age: 69
End: 2019-04-16

## 2019-04-23 ENCOUNTER — OFFICE VISIT (OUTPATIENT)
Dept: FAMILY MEDICINE CLINIC | Age: 69
End: 2019-04-23
Payer: MEDICARE

## 2019-04-23 VITALS
DIASTOLIC BLOOD PRESSURE: 70 MMHG | WEIGHT: 163.4 LBS | SYSTOLIC BLOOD PRESSURE: 124 MMHG | OXYGEN SATURATION: 94 % | TEMPERATURE: 98.7 F | RESPIRATION RATE: 18 BRPM | BODY MASS INDEX: 28.05 KG/M2 | HEART RATE: 84 BPM

## 2019-04-23 DIAGNOSIS — E10.9 TYPE 1 DIABETES MELLITUS WITHOUT COMPLICATION (HCC): Primary | ICD-10-CM

## 2019-04-23 DIAGNOSIS — G43.909 MIGRAINE WITHOUT STATUS MIGRAINOSUS, NOT INTRACTABLE, UNSPECIFIED MIGRAINE TYPE: ICD-10-CM

## 2019-04-23 DIAGNOSIS — F32.A DEPRESSION, UNSPECIFIED DEPRESSION TYPE: ICD-10-CM

## 2019-04-23 DIAGNOSIS — M85.80 OSTEOPENIA, UNSPECIFIED LOCATION: ICD-10-CM

## 2019-04-23 DIAGNOSIS — G93.9 BRAIN LESION: ICD-10-CM

## 2019-04-23 DIAGNOSIS — J06.9 VIRAL URI: ICD-10-CM

## 2019-04-23 DIAGNOSIS — G89.4 CHRONIC PAIN SYNDROME: ICD-10-CM

## 2019-04-23 DIAGNOSIS — M79.7 FIBROMYALGIA: ICD-10-CM

## 2019-04-23 DIAGNOSIS — E78.5 HYPERLIPIDEMIA, UNSPECIFIED HYPERLIPIDEMIA TYPE: ICD-10-CM

## 2019-04-23 DIAGNOSIS — M96.1 FAILED BACK SURGICAL SYNDROME: ICD-10-CM

## 2019-04-23 DIAGNOSIS — I10 ESSENTIAL HYPERTENSION: ICD-10-CM

## 2019-04-23 LAB
CHP ED QC CHECK: NORMAL
GLUCOSE BLD-MCNC: 137 MG/DL

## 2019-04-23 PROCEDURE — 2022F DILAT RTA XM EVC RTNOPTHY: CPT | Performed by: FAMILY MEDICINE

## 2019-04-23 PROCEDURE — 1123F ACP DISCUSS/DSCN MKR DOCD: CPT | Performed by: FAMILY MEDICINE

## 2019-04-23 PROCEDURE — 99214 OFFICE O/P EST MOD 30 MIN: CPT | Performed by: FAMILY MEDICINE

## 2019-04-23 PROCEDURE — 3017F COLORECTAL CA SCREEN DOC REV: CPT | Performed by: FAMILY MEDICINE

## 2019-04-23 PROCEDURE — G8427 DOCREV CUR MEDS BY ELIG CLIN: HCPCS | Performed by: FAMILY MEDICINE

## 2019-04-23 PROCEDURE — 4040F PNEUMOC VAC/ADMIN/RCVD: CPT | Performed by: FAMILY MEDICINE

## 2019-04-23 PROCEDURE — 81002 URINALYSIS NONAUTO W/O SCOPE: CPT | Performed by: FAMILY MEDICINE

## 2019-04-23 PROCEDURE — 82962 GLUCOSE BLOOD TEST: CPT | Performed by: FAMILY MEDICINE

## 2019-04-23 PROCEDURE — G8399 PT W/DXA RESULTS DOCUMENT: HCPCS | Performed by: FAMILY MEDICINE

## 2019-04-23 PROCEDURE — 1090F PRES/ABSN URINE INCON ASSESS: CPT | Performed by: FAMILY MEDICINE

## 2019-04-23 PROCEDURE — G8417 CALC BMI ABV UP PARAM F/U: HCPCS | Performed by: FAMILY MEDICINE

## 2019-04-23 PROCEDURE — 1036F TOBACCO NON-USER: CPT | Performed by: FAMILY MEDICINE

## 2019-04-23 PROCEDURE — 3045F PR MOST RECENT HEMOGLOBIN A1C LEVEL 7.0-9.0%: CPT | Performed by: FAMILY MEDICINE

## 2019-04-23 RX ORDER — FLUTICASONE PROPIONATE 50 MCG
2 SPRAY, SUSPENSION (ML) NASAL DAILY
Qty: 1 BOTTLE | Refills: 0 | Status: SHIPPED | OUTPATIENT
Start: 2019-04-23 | End: 2019-06-11 | Stop reason: SDUPTHER

## 2019-04-23 RX ORDER — PREGABALIN 75 MG/1
CAPSULE ORAL
Qty: 90 CAPSULE | Refills: 1 | Status: CANCELLED | OUTPATIENT
Start: 2019-04-23 | End: 2019-05-21

## 2019-04-23 RX ORDER — AMLODIPINE BESYLATE 5 MG/1
5 TABLET ORAL DAILY
Qty: 90 TABLET | Refills: 0 | Status: SHIPPED | OUTPATIENT
Start: 2019-04-23 | End: 2019-07-16 | Stop reason: SDUPTHER

## 2019-04-23 RX ORDER — LOSARTAN POTASSIUM 100 MG/1
100 TABLET ORAL DAILY
Qty: 15 TABLET | Refills: 0 | Status: SHIPPED | OUTPATIENT
Start: 2019-04-23 | End: 2019-05-02 | Stop reason: SDUPTHER

## 2019-04-23 RX ORDER — ATORVASTATIN CALCIUM 40 MG/1
40 TABLET, FILM COATED ORAL DAILY
Qty: 90 TABLET | Refills: 0 | Status: SHIPPED | OUTPATIENT
Start: 2019-04-23 | End: 2019-07-16 | Stop reason: SDUPTHER

## 2019-04-23 RX ORDER — MELOXICAM 15 MG/1
15 TABLET ORAL DAILY
Qty: 30 TABLET | Refills: 1 | Status: CANCELLED | OUTPATIENT
Start: 2019-04-23

## 2019-04-23 RX ORDER — HYDROXYZINE PAMOATE 25 MG/1
CAPSULE ORAL
Qty: 20 CAPSULE | Refills: 0 | Status: CANCELLED | OUTPATIENT
Start: 2019-04-23

## 2019-04-23 ASSESSMENT — ENCOUNTER SYMPTOMS
SINUS PRESSURE: 0
CHEST TIGHTNESS: 0
ABDOMINAL PAIN: 0
COUGH: 0
CONSTIPATION: 0
BACK PAIN: 1
BLOOD IN STOOL: 0
SHORTNESS OF BREATH: 0
DIARRHEA: 0

## 2019-04-23 NOTE — PROGRESS NOTES
SUBJECTIVE:  Rafael Phillips   1950   female   Allergies   Allergen Reactions    Sulfa Antibiotics Hives    Sulfa Antibiotics     Gabapentin Anxiety and Other (See Comments)     Also causes suicidal thoughts and confusion       Chief Complaint   Patient presents with    Depression    Hypertension    Cough        Patient Active Problem List    Diagnosis Date Noted    Chronic pain syndrome 11/03/2018    Staph aureus infection     Hyponatremia 07/20/2018    Wound drainage 07/20/2018    Superficial incisional infection of surgical site 07/20/2018    Post-operative pain     Degenerative lumbar spinal stenosis 06/27/2018    Bilateral carpal tunnel syndrome 04/02/2018    Ulnar neuropathy at elbow, left 04/02/2018    Gallstone pancreatitis 10/25/2017    Dilated cbd, acquired 09/07/2017    Osteoarthrosis 09/07/2017    Type 1 diabetes mellitus without complication ([facility] [address] 97/10/5988    Osteopenia 08/31/2015    Family history of melanoma 05/27/2014    IDDM (insulin dependent diabetes mellitus) ([facility] [address]     Depression     Hypertension     Spinal stenosis     Chronic back pain     Retinopathy     Cervical osteoarthritis 04/10/2013       HPI   Pt is here today for fu on diabetes, chronic pain/failed back syndrome, hyperlipidemia, HTN, depression, recent dx of migraines. She had recently been co severe ha and MRI revealed a small mass abutting ICA. She has been evaluated by neurology who deemed the lesion as benign and no further procedure necessary. She was sent to a ha specialist and dx with migraines. She is currently on Topamax 50 mg BID and reports there is not much change in sx. She has been stable on other meds. She is currently followed by pain mgt and reports Lyrica has been helping with sx. She is also followed by psych and stable on meds. Reports sometimes she feels like she sees things move when they are not? Does not see objects or people that are not there .  No auditory hallucinations. She is also co 2-3 d hx of congestion and PND and occasional cough. Taking Mucinex. Last HgA1c 7.8. Past Medical History:   Diagnosis Date    Chronic back pain     Depression     Diabetes mellitus ([facility] [address]     Hyperlipidemia     Hypertension     Retinopathy     Seasonal allergies     Spinal stenosis     Type II or unspecified type diabetes mellitus without mention of complication, not stated as uncontrolled      Social History     Socioeconomic History    Marital status:      Spouse name: Not on file    Number of children: Not on file    Years of education: Not on file    Highest education level: Not on file   Occupational History    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     Social Needs    Financial resource strain: Not on file    Food insecurity:     Worry: Not on file     Inability: Not on file    Transportation needs:     Medical: Not on file     Non-medical: Not on file   Tobacco Use    Smoking status: Never Smoker    Smokeless tobacco: Never Used   Substance and Sexual Activity    Alcohol use: No     Alcohol/week: 0.0 oz    Drug use: No    Sexual activity: Not on file   Lifestyle    Physical activity:     Days per week: Not on file     Minutes per session: Not on file    Stress: Not on file   Relationships    Social connections:     Talks on phone: Not on file     Gets together: Not on file     Attends Rastafari service: Not on file     Active member of club or organization: Not on file     Attends meetings of clubs or organizations: Not on file     Relationship status: Not on file    Intimate partner violence:     Fear of current or ex partner: Not on file     Emotionally abused: Not on file     Physically abused: Not on file     Forced sexual activity: Not on file   Other Topics Concern    Not on file   Social History Narrative    ** Merged History Encounter **          Family History   Problem Relation Age of Onset    Cancer Mother         melanoma    Diabetes Father         Review of Systems   Constitutional: Negative for activity change, appetite change and unexpected weight change. HENT: Positive for congestion, postnasal drip and sneezing. Negative for sinus pressure. Eyes: Negative for visual disturbance. Respiratory: Negative for cough, chest tightness and shortness of breath. Cardiovascular: Negative for chest pain, palpitations and leg swelling. Gastrointestinal: Negative for abdominal pain, blood in stool, constipation and diarrhea. Endocrine: Negative for cold intolerance and heat intolerance. Musculoskeletal: Positive for back pain. Negative for arthralgias and myalgias. Skin: Negative for rash. Neurological: Positive for headaches. Negative for weakness, light-headedness and numbness. Hematological: Negative for adenopathy. Does not bruise/bleed easily. Psychiatric/Behavioral: Positive for dysphoric mood. Negative for sleep disturbance and suicidal ideas. The patient is not nervous/anxious. OBJECTIVE:  /70 (Site: Right Upper Arm, Position: Sitting, Cuff Size: Medium Adult)   Pulse 84   Temp 98.7 °F (37.1 °C) (Oral)   Resp 18   Wt 163 lb 6.4 oz (74.1 kg)   SpO2 94%   BMI 28.05 kg/m²   Physical Exam   Constitutional: She is oriented to person, place, and time. She appears well-developed and well-nourished. HENT:   Right Ear: External ear normal.   Left Ear: External ear normal.   Mouth/Throat: No oropharyngeal exudate. Eyes: Pupils are equal, round, and reactive to light. EOM are normal.   Neck: Normal range of motion. Neck supple. No JVD present. Cardiovascular: Normal rate and regular rhythm. Pulmonary/Chest: Effort normal and breath sounds normal.   Abdominal: Soft. Bowel sounds are normal. There is no tenderness. Neurological: She is alert and oriented to person, place, and time. Skin: No rash noted. Psychiatric: She has a normal mood and affect.  Her behavior is normal. Thought content normal. Nursing note and vitals reviewed. ASSESSMENT/PLAN:    1. Type 1 diabetes mellitus without complication (HCC)  Reviewed labs  Refill meds  appr diet mgt  - POCT Glucose  - POCT Urinalysis no Micro  - insulin aspart (NOVOLOG FLEXPEN) 100 UNIT/ML injection pen; Inject 5 Units into the skin 3 times daily (before meals)  Dispense: 5 pen; Refill: 0    2. Chronic pain syndrome  Reviewed current tx with pain mgt  Continue fu as advised    3. Fibromyalgia  Reviewed current mgt  Encouraged increase physical activity    4. Failed back surgical syndrome  Pain mgt    5. Hyperlipidemia, unspecified hyperlipidemia type  Refill Lipitor  Reviewed labs    6. Essential hypertension  Continue current mgt  Intermittent ambulatory BP checks    7. Migraine without status migrainosus, not intractable, unspecified migraine type  Reviewed recent fu and recommendations per neurology    8. Viral URI  Prn Flonase  Re evaluate if sx persist    9. Osteopenia, unspecified location  Ca and Vit D    10. Depression, unspecified depression type  Reviewed current tx  Fu with psych     11.  Brain lesion  Reviewed recent MRI and neurology fu/recommendations      Orders Placed This Encounter   Procedures    POCT Glucose    POCT Urinalysis no Micro     Current Outpatient Medications   Medication Sig Dispense Refill    losartan (COZAAR) 100 MG tablet Take 1 tablet by mouth daily 15 tablet 0    insulin detemir (LEVEMIR FLEXTOUCH) 100 UNIT/ML injection pen Inject 20 Units into the skin 2 times daily 5 pen 0    insulin aspart (NOVOLOG FLEXPEN) 100 UNIT/ML injection pen Inject 5 Units into the skin 3 times daily (before meals) 5 pen 0    amLODIPine (NORVASC) 5 MG tablet Take 1 tablet by mouth daily 90 tablet 0    atorvastatin (LIPITOR) 40 MG tablet Take 1 tablet by mouth daily 90 tablet 0    fluticasone (FLONASE) 50 MCG/ACT nasal spray 2 sprays by Each Nare route daily 1 Spray in each nostril 1 Bottle 0    pregabalin (LYRICA) 75 MG capsule Take 1 tablet by mouth in the AM, take 2 tablets by mouth in the PM 90 capsule 1    meloxicam (MOBIC) 15 MG tablet Take 1 tablet by mouth daily 30 tablet 1    oxyCODONE-acetaminophen (PERCOCET) 7.5-325 MG per tablet Take 1 tablet by mouth every 6 hours as needed for Pain (max 2-3) for up to 30 days. 90 tablet 0    hydrOXYzine (VISTARIL) 25 MG capsule 1 po qd -BID prn for anxiety 20 capsule 0    traZODone (DESYREL) 100 MG tablet Take 100 mg by mouth nightly      atorvastatin (LIPITOR) 20 MG tablet Take 1 tablet by mouth daily 90 tablet 0    ondansetron (ZOFRAN) 4 MG tablet Take 1 tablet by mouth every 12 hours as needed for Nausea or Vomiting 10 tablet 0    calcium carbonate (OSCAL) 500 MG TABS tablet Take 500 mg by mouth daily      docusate sodium (COLACE) 100 MG capsule Take 100 mg by mouth 2 times daily      Needles & Syringes MISC Inject 1 each into the skin 3 times daily 100 each 3    sertraline (ZOLOFT) 100 MG tablet 100 mg 2 times daily        No current facility-administered medications for this visit. Return in about 3 months (around 7/23/2019), or if symptoms worsen or fail to improve.     Reyes Wild MD

## 2019-05-03 RX ORDER — LOSARTAN POTASSIUM 100 MG/1
100 TABLET ORAL DAILY
Qty: 90 TABLET | Refills: 0 | Status: SHIPPED | OUTPATIENT
Start: 2019-05-03 | End: 2019-07-16 | Stop reason: SDUPTHER

## 2019-05-10 ENCOUNTER — OFFICE VISIT (OUTPATIENT)
Dept: PAIN MANAGEMENT | Age: 69
End: 2019-05-10
Payer: MEDICARE

## 2019-05-10 VITALS
DIASTOLIC BLOOD PRESSURE: 78 MMHG | SYSTOLIC BLOOD PRESSURE: 137 MMHG | WEIGHT: 163 LBS | HEART RATE: 84 BPM | BODY MASS INDEX: 27.83 KG/M2 | HEIGHT: 64 IN

## 2019-05-10 DIAGNOSIS — M48.07 SPINAL STENOSIS OF LUMBOSACRAL REGION: ICD-10-CM

## 2019-05-10 DIAGNOSIS — M48.061 DEGENERATIVE LUMBAR SPINAL STENOSIS: ICD-10-CM

## 2019-05-10 DIAGNOSIS — M79.7 FIBROMYALGIA: ICD-10-CM

## 2019-05-10 DIAGNOSIS — M96.1 FAILED NECK SYNDROME: ICD-10-CM

## 2019-05-10 DIAGNOSIS — G89.4 CHRONIC PAIN SYNDROME: ICD-10-CM

## 2019-05-10 DIAGNOSIS — M96.1 FAILED BACK SURGICAL SYNDROME: ICD-10-CM

## 2019-05-10 PROCEDURE — 99213 OFFICE O/P EST LOW 20 MIN: CPT | Performed by: INTERNAL MEDICINE

## 2019-05-10 PROCEDURE — 1036F TOBACCO NON-USER: CPT | Performed by: INTERNAL MEDICINE

## 2019-05-10 PROCEDURE — 1090F PRES/ABSN URINE INCON ASSESS: CPT | Performed by: INTERNAL MEDICINE

## 2019-05-10 PROCEDURE — G8417 CALC BMI ABV UP PARAM F/U: HCPCS | Performed by: INTERNAL MEDICINE

## 2019-05-10 PROCEDURE — 3017F COLORECTAL CA SCREEN DOC REV: CPT | Performed by: INTERNAL MEDICINE

## 2019-05-10 PROCEDURE — G8427 DOCREV CUR MEDS BY ELIG CLIN: HCPCS | Performed by: INTERNAL MEDICINE

## 2019-05-10 PROCEDURE — 1123F ACP DISCUSS/DSCN MKR DOCD: CPT | Performed by: INTERNAL MEDICINE

## 2019-05-10 PROCEDURE — 4040F PNEUMOC VAC/ADMIN/RCVD: CPT | Performed by: INTERNAL MEDICINE

## 2019-05-10 PROCEDURE — G8399 PT W/DXA RESULTS DOCUMENT: HCPCS | Performed by: INTERNAL MEDICINE

## 2019-05-10 RX ORDER — MELOXICAM 15 MG/1
15 TABLET ORAL DAILY
Qty: 30 TABLET | Refills: 1 | Status: SHIPPED | OUTPATIENT
Start: 2019-05-10 | End: 2019-06-20 | Stop reason: SDUPTHER

## 2019-05-10 RX ORDER — OXYCODONE AND ACETAMINOPHEN 7.5; 325 MG/1; MG/1
1 TABLET ORAL EVERY 6 HOURS PRN
Qty: 60 TABLET | Refills: 0 | Status: SHIPPED | OUTPATIENT
Start: 2019-05-10 | End: 2019-06-20 | Stop reason: SDUPTHER

## 2019-05-10 RX ORDER — TOPIRAMATE 100 MG/1
50 TABLET, FILM COATED ORAL 2 TIMES DAILY
COMMUNITY
End: 2019-06-20

## 2019-05-10 NOTE — PROGRESS NOTES
Rena Nelson  1950  [id number]    HISTORY OF PRESENT ILLNESS:  Ms. Holly Post is a 77 y.o. female returns for a follow up visit for multiple medical problems. Her current presenting problems are   1. Chronic pain syndrome    2. Spinal stenosis of lumbosacral region    3. Degenerative lumbar spinal stenosis    4. Failed back surgical syndrome    5. Fibromyalgia    6. Failed neck syndrome    7. Primary insomnia    . As per information/history obtained from the PADT(patient assessment and documentation tool) - She complains of pain in the neck and lower back with radiation to the buttocks, hips Left, upper leg Left and knees Left She rates the pain 4/10 and describes it as sharp, aching, burning, numbness, pins and needles. Pain is made worse by: movement, walking, standing, sitting, bending, lifting. Current treatment regimen has helped relieve about 60% of the pain. She denies side effects from the current pain regimen. Patient reports that since the last follow up visit the physical functioning is unchanged, family/social relationships are unchanged, mood is worse and sleep patterns are worse, and that the overall functioning is worse. Patient denies neurological bowel or bladder. Patient denies misusing/abusing her narcotic pain medications or using any illegal drugs. There are No indicators for possible drug abuse, addiction or diversion problems. Upon obtaining the medical history from Ms. Holly Post regarding today's office visit for her presenting problems, Ms. Holly Post complains she is having some GERD symptoms. She states she has been on Mobic for years on and off and has not had much side effects with it in the past. She mentions she is using Percocet 1-2 per day. She says he has bene using Lyrica as needed, and does not want to use it, thinks its causing other issues.        ALLERGIES: Patients list of allergies were reviewed     MEDICATIONS: Ms. Holly Post list of medications were reviewed. Her current medications are   Outpatient Medications Prior to Visit   Medication Sig Dispense Refill    topiramate (TOPAMAX) 100 MG tablet Take 50 mg by mouth 2 times daily      losartan (COZAAR) 100 MG tablet Take 1 tablet by mouth daily 90 tablet 0    insulin detemir (LEVEMIR FLEXTOUCH) 100 UNIT/ML injection pen Inject 20 Units into the skin 2 times daily 5 pen 0    insulin aspart (NOVOLOG FLEXPEN) 100 UNIT/ML injection pen Inject 5 Units into the skin 3 times daily (before meals) 5 pen 0    amLODIPine (NORVASC) 5 MG tablet Take 1 tablet by mouth daily 90 tablet 0    atorvastatin (LIPITOR) 40 MG tablet Take 1 tablet by mouth daily 90 tablet 0    fluticasone (FLONASE) 50 MCG/ACT nasal spray 2 sprays by Each Nare route daily 1 Spray in each nostril 1 Bottle 0    meloxicam (MOBIC) 15 MG tablet Take 1 tablet by mouth daily 30 tablet 1    oxyCODONE-acetaminophen (PERCOCET) 7.5-325 MG per tablet Take 1 tablet by mouth every 6 hours as needed for Pain (max 2-3) for up to 30 days. 90 tablet 0    hydrOXYzine (VISTARIL) 25 MG capsule 1 po qd -BID prn for anxiety 20 capsule 0    traZODone (DESYREL) 100 MG tablet Take 100 mg by mouth nightly      atorvastatin (LIPITOR) 20 MG tablet Take 1 tablet by mouth daily 90 tablet 0    ondansetron (ZOFRAN) 4 MG tablet Take 1 tablet by mouth every 12 hours as needed for Nausea or Vomiting 10 tablet 0    calcium carbonate (OSCAL) 500 MG TABS tablet Take 500 mg by mouth daily      docusate sodium (COLACE) 100 MG capsule Take 100 mg by mouth 2 times daily      Needles & Syringes MISC Inject 1 each into the skin 3 times daily 100 each 3    sertraline (ZOLOFT) 100 MG tablet 100 mg 2 times daily       pregabalin (LYRICA) 75 MG capsule Take 1 tablet by mouth in the AM, take 2 tablets by mouth in the PM 90 capsule 1     No facility-administered medications prior to visit.         SOCIAL/FAMILY/PAST MEDICAL HISTORY: Ms. Fontaine Confer, family and past medical history tablet by mouth daily 90 tablet 0    insulin detemir (LEVEMIR FLEXTOUCH) 100 UNIT/ML injection pen Inject 20 Units into the skin 2 times daily 5 pen 0    insulin aspart (NOVOLOG FLEXPEN) 100 UNIT/ML injection pen Inject 5 Units into the skin 3 times daily (before meals) 5 pen 0    amLODIPine (NORVASC) 5 MG tablet Take 1 tablet by mouth daily 90 tablet 0    atorvastatin (LIPITOR) 40 MG tablet Take 1 tablet by mouth daily 90 tablet 0    fluticasone (FLONASE) 50 MCG/ACT nasal spray 2 sprays by Each Nare route daily 1 Spray in each nostril 1 Bottle 0    meloxicam (MOBIC) 15 MG tablet Take 1 tablet by mouth daily 30 tablet 1    oxyCODONE-acetaminophen (PERCOCET) 7.5-325 MG per tablet Take 1 tablet by mouth every 6 hours as needed for Pain (max 2-3) for up to 30 days. 90 tablet 0    hydrOXYzine (VISTARIL) 25 MG capsule 1 po qd -BID prn for anxiety 20 capsule 0    traZODone (DESYREL) 100 MG tablet Take 100 mg by mouth nightly      atorvastatin (LIPITOR) 20 MG tablet Take 1 tablet by mouth daily 90 tablet 0    ondansetron (ZOFRAN) 4 MG tablet Take 1 tablet by mouth every 12 hours as needed for Nausea or Vomiting 10 tablet 0    calcium carbonate (OSCAL) 500 MG TABS tablet Take 500 mg by mouth daily      docusate sodium (COLACE) 100 MG capsule Take 100 mg by mouth 2 times daily      Needles & Syringes MISC Inject 1 each into the skin 3 times daily 100 each 3    sertraline (ZOLOFT) 100 MG tablet 100 mg 2 times daily        No current facility-administered medications for this visit. I will continue her current medication regimen  which is part of the above treatment schedule. It has been helping with Ms. Adrian's chronic  medical problems which for this visit include:   Diagnoses of Chronic pain syndrome, Spinal stenosis of lumbosacral region, Degenerative lumbar spinal stenosis, Failed back surgical syndrome, Fibromyalgia, Failed neck syndrome, and Primary insomnia were pertinent to this visit.    Risks

## 2019-05-28 ENCOUNTER — TELEPHONE (OUTPATIENT)
Dept: FAMILY MEDICINE CLINIC | Age: 69
End: 2019-05-28

## 2019-06-07 ENCOUNTER — TELEPHONE (OUTPATIENT)
Dept: FAMILY MEDICINE CLINIC | Age: 69
End: 2019-06-07

## 2019-06-07 DIAGNOSIS — E11.9 TYPE 2 DIABETES MELLITUS WITHOUT COMPLICATION, WITHOUT LONG-TERM CURRENT USE OF INSULIN (HCC): Primary | ICD-10-CM

## 2019-06-07 DIAGNOSIS — E78.5 HYPERLIPIDEMIA, UNSPECIFIED HYPERLIPIDEMIA TYPE: ICD-10-CM

## 2019-06-07 NOTE — TELEPHONE ENCOUNTER
Patient is wanting to get her thyroid checked and had some other concerns she wanted to speak to someone about.  Best call back number [phone]

## 2019-06-07 NOTE — TELEPHONE ENCOUNTER
Pt wants thyroid labs ck 'd. experiencing loss of eye brows form the outside inward. Hair is thin. Has infused a lot of heavy duty antibiotics lately.  Can extensive thyroid levels be ck 'd

## 2019-06-10 ENCOUNTER — HOSPITAL ENCOUNTER (OUTPATIENT)
Age: 69
Discharge: HOME | End: 2019-06-10
Payer: MEDICARE

## 2019-06-10 DIAGNOSIS — E11.9 TYPE 2 DIABETES MELLITUS WITHOUT COMPLICATION, WITHOUT LONG-TERM CURRENT USE OF INSULIN (HCC): ICD-10-CM

## 2019-06-10 DIAGNOSIS — E78.5 HYPERLIPIDEMIA, UNSPECIFIED HYPERLIPIDEMIA TYPE: ICD-10-CM

## 2019-06-10 LAB
A/G RATIO: 1.6 (ref 1.1–2.2)
ALBUMIN SERPL-MCNC: 4.6 G/DL (ref 3.4–5)
ALP BLD-CCNC: 108 U/L (ref 40–129)
ALT SERPL-CCNC: 15 U/L (ref 10–40)
ANION GAP SERPL CALCULATED.3IONS-SCNC: 15 MMOL/L (ref 3–16)
AST SERPL-CCNC: 15 U/L (ref 15–37)
BASOPHILS ABSOLUTE: 0 K/UL (ref 0–0.2)
BASOPHILS RELATIVE PERCENT: 0.8 %
BILIRUB SERPL-MCNC: 0.6 MG/DL (ref 0–1)
BUN BLDV-MCNC: 15 MG/DL (ref 7–20)
CALCIUM SERPL-MCNC: 10.1 MG/DL (ref 8.3–10.6)
CHLORIDE BLD-SCNC: 108 MMOL/L (ref 99–110)
CHOLESTEROL, TOTAL: 161 MG/DL (ref 0–199)
CO2: 22 MMOL/L (ref 21–32)
CREAT SERPL-MCNC: 0.7 MG/DL (ref 0.6–1.2)
EOSINOPHILS ABSOLUTE: 0.1 K/UL (ref 0–0.6)
EOSINOPHILS RELATIVE PERCENT: 2.1 %
GFR AFRICAN AMERICAN: >60
GFR NON-AFRICAN AMERICAN: >60
GLOBULIN: 2.9 G/DL
GLUCOSE BLD-MCNC: 200 MG/DL (ref 70–99)
HCT VFR BLD CALC: 38.8 % (ref 36–48)
HDLC SERPL-MCNC: 71 MG/DL (ref 40–60)
HEMOGLOBIN: 13.5 G/DL (ref 12–16)
LDL CHOLESTEROL CALCULATED: 71 MG/DL
LYMPHOCYTES ABSOLUTE: 0.7 K/UL (ref 1–5.1)
LYMPHOCYTES RELATIVE PERCENT: 12.6 %
MCH RBC QN AUTO: 30.3 PG (ref 26–34)
MCHC RBC AUTO-ENTMCNC: 34.8 G/DL (ref 31–36)
MCV RBC AUTO: 87 FL (ref 80–100)
MONOCYTES ABSOLUTE: 0.3 K/UL (ref 0–1.3)
MONOCYTES RELATIVE PERCENT: 6 %
NEUTROPHILS ABSOLUTE: 4.4 K/UL (ref 1.7–7.7)
NEUTROPHILS RELATIVE PERCENT: 78.5 %
PDW BLD-RTO: 14 % (ref 12.4–15.4)
PLATELET # BLD: 206 K/UL (ref 135–450)
PMV BLD AUTO: 9 FL (ref 5–10.5)
POTASSIUM SERPL-SCNC: 3.7 MMOL/L (ref 3.5–5.1)
RBC # BLD: 4.45 M/UL (ref 4–5.2)
SODIUM BLD-SCNC: 145 MMOL/L (ref 136–145)
TOTAL CK: 32 U/L (ref 26–192)
TOTAL PROTEIN: 7.5 G/DL (ref 6.4–8.2)
TRIGL SERPL-MCNC: 95 MG/DL (ref 0–150)
TSH REFLEX: 0.65 UIU/ML (ref 0.27–4.2)
VLDLC SERPL CALC-MCNC: 19 MG/DL
WBC # BLD: 5.6 K/UL (ref 4–11)

## 2019-06-10 PROCEDURE — 85025 COMPLETE CBC W/AUTO DIFF WBC: CPT

## 2019-06-10 PROCEDURE — 84443 ASSAY THYROID STIM HORMONE: CPT

## 2019-06-10 PROCEDURE — 82550 ASSAY OF CK (CPK): CPT

## 2019-06-10 PROCEDURE — 80053 COMPREHEN METABOLIC PANEL: CPT

## 2019-06-10 PROCEDURE — 36415 COLL VENOUS BLD VENIPUNCTURE: CPT

## 2019-06-10 PROCEDURE — 83036 HEMOGLOBIN GLYCOSYLATED A1C: CPT

## 2019-06-10 PROCEDURE — 80061 LIPID PANEL: CPT

## 2019-06-11 LAB
ESTIMATED AVERAGE GLUCOSE: 162.8 MG/DL
HBA1C MFR BLD: 7.3 %

## 2019-06-11 RX ORDER — FLUTICASONE PROPIONATE 50 MCG
1 SPRAY, SUSPENSION (ML) NASAL 2 TIMES DAILY PRN
Qty: 1 BOTTLE | Refills: 0 | Status: SHIPPED | OUTPATIENT
Start: 2019-06-11 | End: 2019-07-16 | Stop reason: SDUPTHER

## 2019-06-12 ENCOUNTER — OFFICE VISIT (OUTPATIENT)
Dept: ENT CLINIC | Age: 69
End: 2019-06-12
Payer: MEDICARE

## 2019-06-12 VITALS
HEIGHT: 64 IN | WEIGHT: 163 LBS | HEART RATE: 98 BPM | DIASTOLIC BLOOD PRESSURE: 72 MMHG | RESPIRATION RATE: 14 BRPM | OXYGEN SATURATION: 99 % | BODY MASS INDEX: 27.83 KG/M2 | SYSTOLIC BLOOD PRESSURE: 126 MMHG

## 2019-06-12 DIAGNOSIS — R09.81 NASAL CONGESTION: ICD-10-CM

## 2019-06-12 DIAGNOSIS — J32.9 CHRONIC SINUSITIS, UNSPECIFIED LOCATION: Primary | ICD-10-CM

## 2019-06-12 DIAGNOSIS — J30.9 ALLERGIC RHINITIS, UNSPECIFIED SEASONALITY, UNSPECIFIED TRIGGER: ICD-10-CM

## 2019-06-12 DIAGNOSIS — R09.82 POST-NASAL DRIP: ICD-10-CM

## 2019-06-12 PROCEDURE — 3017F COLORECTAL CA SCREEN DOC REV: CPT | Performed by: OTOLARYNGOLOGY

## 2019-06-12 PROCEDURE — 1036F TOBACCO NON-USER: CPT | Performed by: OTOLARYNGOLOGY

## 2019-06-12 PROCEDURE — G8427 DOCREV CUR MEDS BY ELIG CLIN: HCPCS | Performed by: OTOLARYNGOLOGY

## 2019-06-12 PROCEDURE — 4040F PNEUMOC VAC/ADMIN/RCVD: CPT | Performed by: OTOLARYNGOLOGY

## 2019-06-12 PROCEDURE — G8417 CALC BMI ABV UP PARAM F/U: HCPCS | Performed by: OTOLARYNGOLOGY

## 2019-06-12 PROCEDURE — G8399 PT W/DXA RESULTS DOCUMENT: HCPCS | Performed by: OTOLARYNGOLOGY

## 2019-06-12 PROCEDURE — 99204 OFFICE O/P NEW MOD 45 MIN: CPT | Performed by: OTOLARYNGOLOGY

## 2019-06-12 PROCEDURE — 1090F PRES/ABSN URINE INCON ASSESS: CPT | Performed by: OTOLARYNGOLOGY

## 2019-06-12 PROCEDURE — 1123F ACP DISCUSS/DSCN MKR DOCD: CPT | Performed by: OTOLARYNGOLOGY

## 2019-06-12 RX ORDER — BUPROPION HYDROCHLORIDE 100 MG/1
TABLET, EXTENDED RELEASE ORAL
Refills: 2 | COMMUNITY
Start: 2019-06-06 | End: 2019-10-07

## 2019-06-12 RX ORDER — SERTRALINE HYDROCHLORIDE 100 MG/1
100 TABLET, FILM COATED ORAL DAILY
COMMUNITY
End: 2020-03-18 | Stop reason: SDUPTHER

## 2019-06-12 RX ORDER — CETIRIZINE HYDROCHLORIDE 10 MG/1
10 TABLET ORAL DAILY
COMMUNITY
End: 2020-01-27

## 2019-06-12 RX ORDER — TOPIRAMATE 50 MG/1
TABLET, FILM COATED ORAL
Refills: 3 | COMMUNITY
Start: 2019-05-10 | End: 2019-06-20

## 2019-06-12 NOTE — PROGRESS NOTES
[facility]       NEW PATIENT VISIT    PCP:  Kerri Kay MD    REFERRED BY:   Dr. Juana Olson. CHIEF COMPLAINT:  Chief Complaint   Patient presents with    Sinus Problem    Dizziness    Allergic Rhinitis        HISTORY OF PRESENT ILLNESS:       (Location, Quality, Severity, Duration, Timing, Context, Modifying factors, Associated symptoms)  Jones Miner is a 77 y.o. female referred by Dr. Juana Olson here for evaluation and treatment of a problem located in the sinuses. The quality is \"sinusitis. \"  Associated with headache. The severity is severe. The duration is about 6 months. The timing is comes and goes. But its gotten worse and stayed worse, there is not break in it its just constant. phlegm in my throat tickle in my thorat. Mucinex-D helped stuffy but PCP said not to take because of blood pressure medication      \"I am struggling with my allergies. I've got this burning in my throat and behind my nose. In the morning, I always brush my tongue and when I do that I nearly choke to death. The other day I coughed up a chunk of stuff like infection. I'm miserable. My throat is a mess. My head hurts. My doctor is treating me for migraines. I have a brain tumor. The neurosurgeon says the headaches are not from the brain tumor. The neurologist thought they could be. The eye doctor said headaches are not caused my tumor, meningioma. It is calcified and extemely close to my optic nerve, but nobody is worried about it but me. \"  This was all in March, 2019. She stated that she has not has a sinus CT scan. \"My ears hurt sometimes, off and on, periodically. \"  She stated that her ears hurt today. Has sore throat sometimes. \"My nose itches. \"  Had back surgery last June, then I got a staph infection in incision and back.       Dizziness  Ears/head, dizziness, at a severity level of mild to moderate, comes and goes, occurs sporadically,  The duration is about 6 months. Onset sometime after my back surgery last June, probably about December. Dizziness was described as a sensation of being off balance. \"I am spinning\"  movement \"like I'm going to pass out\"  \"like I'm on a roller coaster\"  \"my Head is spinning. \"  She denied any sensation of the room or environment spinning, whirling. No precipitating factors have been identified. There is no change in hearing with dizziness. There is no onset of or change in tinnitus during dizziness. REVIEW OF SYSTEMS:    CONSTITUTIONAL:  Denied fever. Denied unexplained > 20# weight loss. EARS, NOSE, THROAT:  (+) rhinorrhea, will drip sometimes and I blow it out for several months, looks like clear, and colorless to light yellow. (+) hoarseness off and on for 6 months. Recently hoarse constant for 2 months. Denied otorrhea, hearing loss, tinnitus, and  nasal dyspnea. EYES:   Denied double vision and pain. CARDIOVASCULAR:  Denied chest pains. Denied syncope. RESPIRATORY:  Denied dyspnea. Denied chronic cough. GASTROINTESTINAL:  Denied dysphagia. Denied hematemesis. MUSCULOSKELETAL:  Denied pain in joints, arthritis. Denied pain in bones. INTEGUMENTARY (SKIN):  Denied hives. Denied non-healing skin ulcers/lesions. NEUROLOGIC:  (+) chronic or frequently recurrent headache. Denied paralysis of any body parts. HEMATOLOGIC/LYMPHATIC:  Denied prolonged and excessive bleeding. Denied enlarged lymph nodes. ALLERGIC/IMMUNOLOGIC:  (+) Spring seasonal or environmental allergies. Denied frequent infections. ENDOCRINE:  (+) diabetes. Denied thyroid disorders.            PAST MEDICAL, FAMILY, AND SOCIAL HISTORY:  Past Medical History:   Diagnosis Date    Chronic back pain     Depression     Diabetes mellitus ([facility] [address]     Hyperlipidemia     Hypertension     Retinopathy     Seasonal allergies     Spinal stenosis     Type II or unspecified type diabetes mellitus without mention of Attends meetings of clubs or organizations: Not on file     Relationship status: Not on file    Intimate partner violence:     Fear of current or ex partner: Not on file     Emotionally abused: Not on file     Physically abused: Not on file     Forced sexual activity: Not on file   Other Topics Concern    Not on file   Social History Narrative    ** Merged History Encounter **              EXAMINATION, COMPREHENSIVE:       VITALS SIGNS reviewed. Vitals:    06/12/19 1300   BP: 126/72   Pulse: 98   Resp: 14   SpO2: 99%     GENERAL APPEARANCE:  Well developed, well nourished, no apparent distress, no apparent deformities. ABILITY TO COMMUNICATE/QUALITY OF VOICE:  Communicated without difficulty. Normal voice. INSPECTION:  Normocephalic. No evidence of trauma. No tenderness. Normal overall appearance with no scars, lesions or masses. SINUSES:  The maxillary and frontal sinuses were nontender, bilaterally. SALIVARY GLANDS:  Parotid, submandibular and sublingual glands normal.    FACIAL STRENGTH, MOTION:  Normal and equal for all five branches bilaterally. EXTRAOCULAR MOTION:  intact, normal primary gaze alignment. No nystagmus at any point of gaze. EARS, OTOMICROSCOPY:  The TMs and EACs appeared to be normal bilaterally, including normal TM mobility bilaterally. HEARING ASSESSMENT:  Able to hear finger rub bilaterally. Tuning fork tests, 512 [address]z tuning fork:  Drake midline. Rinne air > bone bilaterally. EXTERNAL EAR/NOSE:  Normal pinnae and mastoids. Normal external nose. NOSE:  The nasal septum was midline. The nasal mucosa, inferior turbinates, and secretions were normal.  No pus or polyps were seen. LIPS, TEETH AND GUMS:  Normal.   (+) OROPHARYNX:  Post tonsillectomy. Oral mucosa, hard and soft palates, tongue, and pharynx were normal.   INSPECTION OF PHARYNGEAL WALLS AND PYRIFORM SINUSES:  Normal with no pooling of saliva asymmetry or lesions.    INDIRECT LARYNGOSCOPY, MIRROR EXAMINATION:  The epiglottis, false vocal cords, true vocal cords, mobility of the larynx, supraglottis, piriform sinuses, and base of tongue appeared normal.     (+) NASOPHARYNX, MIRROR EXAMINATION:  suboptimal visualization. No gross mass or tumor appreciated. (+) NECK: Well healed surgical scar right neck. No masses or tenderness. No carotid bruits, abnormal appearance, asymmetry or tracheal deviation. THYROID:  No nodules, enlargement, tenderness or masses. CHEST, INSPECTION:  Normal symmetrical expansion, and respiratory effort. LUNGS, AUSCULTATION:  clear, with normal breath sounds and no rales, rhonchi, or rubs. HEART, AUSCULTATION:  normal S1 and S2. No abnormal sounds or murmurs. No carotid bruits. PALPATION OF LYMPH NODES, CERVICAL, FACIAL AND SUPRACLAVICULAR:  Nontender, No lymphadenopathy. CRANIAL NERVES:  II - XII intact, with no apparent deficits. ORIENTATION TO TIME, PLACE, AND PERSON:  Oriented x 3. MOOD AND AFFECT:  Normal.  No evidence of depression, anxiety or agitation. CEREBELLAR TESTING:  Finger to nose and rapid alternating motion intact. REVIEW OF IMAGES:       I independently reviewed the images of the CT head 3/19/19, showing \"Hypoattenuated enhancing 13 mm lesion with central calcification. \"  See report for details. Narrative   EXAM: CT HEAD W WO CONTRAST       INDICATION: Dizziness, Nonintractable headache, unspecified chronicity pattern, unspecified headache type;       COMPARISON: None.       TECHNICAL: Axial CT imaging obtained from vertex to skull base. Axial images and multiplanar reformatted images reviewed.   Up-to-date CT equipment and radiation dose reduction techniques were employed.       IV Contrast: The study was performed without and after administration of 80 mL of Isovue 370.        FINDINGS:       There is no evidence of an acute intracranial hemorrhage.  Note is made of a 13 x 9 x 12 mm paraclinoid mass with central

## 2019-06-18 ENCOUNTER — HOSPITAL ENCOUNTER (OUTPATIENT)
Dept: CT IMAGING | Age: 69
Discharge: HOME | End: 2019-06-18
Payer: MEDICARE

## 2019-06-18 ENCOUNTER — HOSPITAL ENCOUNTER (OUTPATIENT)
Age: 69
Discharge: HOME | End: 2019-06-18
Payer: MEDICARE

## 2019-06-18 DIAGNOSIS — J32.9 CHRONIC SINUSITIS, UNSPECIFIED LOCATION: ICD-10-CM

## 2019-06-18 DIAGNOSIS — R09.82 POST-NASAL DRIP: ICD-10-CM

## 2019-06-18 DIAGNOSIS — R09.81 NASAL CONGESTION: ICD-10-CM

## 2019-06-18 PROCEDURE — 82785 ASSAY OF IGE: CPT

## 2019-06-18 PROCEDURE — 70486 CT MAXILLOFACIAL W/O DYE: CPT

## 2019-06-18 PROCEDURE — 86003 ALLG SPEC IGE CRUDE XTRC EA: CPT

## 2019-06-19 DIAGNOSIS — M79.7 FIBROMYALGIA: ICD-10-CM

## 2019-06-19 DIAGNOSIS — M96.1 FAILED NECK SYNDROME: ICD-10-CM

## 2019-06-19 DIAGNOSIS — M48.061 DEGENERATIVE LUMBAR SPINAL STENOSIS: ICD-10-CM

## 2019-06-19 DIAGNOSIS — G89.4 CHRONIC PAIN SYNDROME: ICD-10-CM

## 2019-06-19 DIAGNOSIS — M48.07 SPINAL STENOSIS OF LUMBOSACRAL REGION: ICD-10-CM

## 2019-06-19 DIAGNOSIS — M96.1 FAILED BACK SURGICAL SYNDROME: ICD-10-CM

## 2019-06-19 RX ORDER — MELOXICAM 15 MG/1
TABLET ORAL
Qty: 30 TABLET | Refills: 1 | OUTPATIENT
Start: 2019-06-19

## 2019-06-20 ENCOUNTER — OFFICE VISIT (OUTPATIENT)
Dept: PAIN MANAGEMENT | Age: 69
End: 2019-06-20
Payer: MEDICARE

## 2019-06-20 VITALS
WEIGHT: 163 LBS | DIASTOLIC BLOOD PRESSURE: 72 MMHG | BODY MASS INDEX: 27.98 KG/M2 | HEART RATE: 101 BPM | SYSTOLIC BLOOD PRESSURE: 144 MMHG

## 2019-06-20 DIAGNOSIS — M96.1 FAILED NECK SYNDROME: ICD-10-CM

## 2019-06-20 DIAGNOSIS — G89.4 CHRONIC PAIN SYNDROME: ICD-10-CM

## 2019-06-20 DIAGNOSIS — M48.061 DEGENERATIVE LUMBAR SPINAL STENOSIS: ICD-10-CM

## 2019-06-20 DIAGNOSIS — M79.7 FIBROMYALGIA: ICD-10-CM

## 2019-06-20 DIAGNOSIS — M48.07 SPINAL STENOSIS OF LUMBOSACRAL REGION: ICD-10-CM

## 2019-06-20 DIAGNOSIS — M96.1 FAILED BACK SURGICAL SYNDROME: ICD-10-CM

## 2019-06-20 LAB
2000687N OAK TREE IGE: <0.1 KU/L
ALLERGEN ASPERGILLUS ALTERNATA IGE: <0.1 KU/L
ALLERGEN ASPERGILLUS FUMIGATUS IGE: <0.1 KU/L
ALLERGEN BERMUDA GRASS IGE: <0.1 KU/L
ALLERGEN BIRCH IGE: <0.1 KU/L
ALLERGEN CAT DANDER IGE: <0.1 KU/L
ALLERGEN CLAMS IGE: <0.1 KU/L
ALLERGEN CODFISH IGE: <0.1 KU/L
ALLERGEN COMMON SHORT RAGWEED IGE: <0.1 KU/L
ALLERGEN CORN IGE: <0.1 KU/L
ALLERGEN COTTONWOOD: <0.1 KU/L
ALLERGEN COW MILK IGE: <0.1 KU/L
ALLERGEN DOG DANDER IGE: <0.1 KU/L
ALLERGEN EGG WHITE IGE: <0.1 KU/L
ALLERGEN ELM IGE: <0.1 KU/L
ALLERGEN FUNGI/MOLD M.RACEMOSUS IGE: <0.1 KU/L
ALLERGEN GERMAN COCKROACH IGE: <0.1 KU/L
ALLERGEN HORMODENDRUM HORDEI IGE: <0.1 KU/L
ALLERGEN MAPLE/BOX ELDER IGE: <0.1 KU/L
ALLERGEN MITE DUST FARINAE IGE: <0.1 KU/L
ALLERGEN MITE DUST PTERONYSSINUS IGE: <0.1 KU/L
ALLERGEN MOUNTAIN CEDAR: <0.1 KU/L
ALLERGEN MOUSE EPITHELIA IGE: <0.1 KU/L
ALLERGEN PEANUT (F13) IGE: <0.1 KU/L
ALLERGEN PECAN TREE IGE: <0.1 KU/L
ALLERGEN PENICILLIUM NOTATUM: <0.1 KU/L
ALLERGEN ROUGH PIGWEED (W14) IGE: <0.1 KU/L
ALLERGEN RUSSIAN THISTLE IGE: <0.1 KU/L
ALLERGEN SCALLOP IGE: <0.1 KU/L
ALLERGEN SEE NOTE: NORMAL
ALLERGEN SHEEP SORREL (W18) IGE: <0.1 KU/L
ALLERGEN SHRIMP IGE: <0.1 KU/L
ALLERGEN SOYBEAN IGE: <0.1 KU/L
ALLERGEN TIMOTHY GRASS: <0.1 KU/L
ALLERGEN TREE SYCAMORE: <0.1 KU/L
ALLERGEN WALNUT IGE: <0.1 KU/L
ALLERGEN WALNUT TREE IGE: <0.1 KU/L
ALLERGEN WHEAT IGE: <0.1 KU/L
ALLERGEN WHITE MULBERRY TREE, IGE: <0.1 KU/L
ALLERGEN, TREE, WHITE ASH IGE: <0.1 KU/L
IGE: 7 KU/L

## 2019-06-20 PROCEDURE — G8427 DOCREV CUR MEDS BY ELIG CLIN: HCPCS | Performed by: INTERNAL MEDICINE

## 2019-06-20 PROCEDURE — G8399 PT W/DXA RESULTS DOCUMENT: HCPCS | Performed by: INTERNAL MEDICINE

## 2019-06-20 PROCEDURE — G8417 CALC BMI ABV UP PARAM F/U: HCPCS | Performed by: INTERNAL MEDICINE

## 2019-06-20 PROCEDURE — 1123F ACP DISCUSS/DSCN MKR DOCD: CPT | Performed by: INTERNAL MEDICINE

## 2019-06-20 PROCEDURE — 1090F PRES/ABSN URINE INCON ASSESS: CPT | Performed by: INTERNAL MEDICINE

## 2019-06-20 PROCEDURE — 99213 OFFICE O/P EST LOW 20 MIN: CPT | Performed by: INTERNAL MEDICINE

## 2019-06-20 PROCEDURE — 1036F TOBACCO NON-USER: CPT | Performed by: INTERNAL MEDICINE

## 2019-06-20 PROCEDURE — 4040F PNEUMOC VAC/ADMIN/RCVD: CPT | Performed by: INTERNAL MEDICINE

## 2019-06-20 PROCEDURE — 3017F COLORECTAL CA SCREEN DOC REV: CPT | Performed by: INTERNAL MEDICINE

## 2019-06-20 RX ORDER — OXYCODONE AND ACETAMINOPHEN 7.5; 325 MG/1; MG/1
1 TABLET ORAL EVERY 6 HOURS PRN
Qty: 60 TABLET | Refills: 0 | Status: SHIPPED | OUTPATIENT
Start: 2019-06-20 | End: 2019-08-01 | Stop reason: SDUPTHER

## 2019-06-20 RX ORDER — MELOXICAM 15 MG/1
15 TABLET ORAL DAILY
Qty: 30 TABLET | Refills: 1 | Status: SHIPPED | OUTPATIENT
Start: 2019-06-20 | End: 2019-07-16 | Stop reason: SDUPTHER

## 2019-06-20 NOTE — PROGRESS NOTES
Edwin Wong  1950  [id number]    HISTORY OF PRESENT ILLNESS:  Ms. Gunner Chandler is a 77 y.o. female returns for a follow up visit for multiple medical problems. Her current presenting problems are   1. Chronic pain syndrome    2. Fibromyalgia    3. Failed back surgical syndrome    4. Failed neck syndrome    5. Spinal stenosis of lumbosacral region    6. Degenerative lumbar spinal stenosis    7. Primary insomnia    . As per information/history obtained from the PADT(patient assessment and documentation tool) - She complains of pain in the neck and lower back with radiation to the shoulders Bilateral, buttocks and hips Bilateral She rates the pain 3/10 and describes it as sharp, aching, burning, numbness, pins and needles. Pain is made worse by: movement. Current treatment regimen has helped relieve about 60% of the pain. She denies side effects from the current pain regimen. Patient reports that since the last follow up visit the physical functioning is unchanged, family/social relationships are unchanged, mood is unchanged and sleep patterns are unchanged, and that the overall functioning is unchanged. Patient denies neurological bowel or bladder. Patient denies misusing/abusing her narcotic pain medications or using any illegal drugs. There are No indicators for possible drug abuse, addiction or diversion problems. Upon obtaining the medical history from Ms. Gunner Chandler regarding today's office visit for her presenting problems,  Patient complains she is having increase pain in the back and leg. She states it feels like nerve pain. She says she is not using the Lyrica, but using Percocet 2 per day along with the Mobic. She denies any side effects with the medication. She denies any GERD or constipation symptoms. She reports her blood sugar has been okay. ALLERGIES: Patients list of allergies were reviewed     MEDICATIONS: Ms. Gunner Chandler list of medications were reviewed. Her current medications are Outpatient Medications Prior to Visit   Medication Sig Dispense Refill    buPROPion (WELLBUTRIN SR) 100 MG extended release tablet TAKE 1 TABLET BY MOUTH EVERY DAY  2    cetirizine (ZYRTEC) 10 MG tablet Take 10 mg by mouth daily      sertraline (ZOLOFT) 100 MG tablet Take 100 mg by mouth daily      fluticasone (FLONASE) 50 MCG/ACT nasal spray 1 spray by Nasal route 2 times daily as needed for Rhinitis 1 Bottle 0    meloxicam (MOBIC) 15 MG tablet Take 1 tablet by mouth daily 30 tablet 1    losartan (COZAAR) 100 MG tablet Take 1 tablet by mouth daily 90 tablet 0    insulin detemir (LEVEMIR FLEXTOUCH) 100 UNIT/ML injection pen Inject 20 Units into the skin 2 times daily 5 pen 0    insulin aspart (NOVOLOG FLEXPEN) 100 UNIT/ML injection pen Inject 5 Units into the skin 3 times daily (before meals) 5 pen 0    amLODIPine (NORVASC) 5 MG tablet Take 1 tablet by mouth daily 90 tablet 0    atorvastatin (LIPITOR) 40 MG tablet Take 1 tablet by mouth daily 90 tablet 0    hydrOXYzine (VISTARIL) 25 MG capsule 1 po qd -BID prn for anxiety 20 capsule 0    traZODone (DESYREL) 100 MG tablet Take 100 mg by mouth nightly      atorvastatin (LIPITOR) 20 MG tablet Take 1 tablet by mouth daily 90 tablet 0    calcium carbonate (OSCAL) 500 MG TABS tablet Take 500 mg by mouth daily      docusate sodium (COLACE) 100 MG capsule Take 100 mg by mouth 2 times daily      Needles & Syringes MISC Inject 1 each into the skin 3 times daily 100 each 3    topiramate (TOPAMAX) 50 MG tablet TAKE 1 TABLET (50 MG) BY ORAL ROUTE AT BEDTIME FOR 1 WEEK AND THEN 2 TIMES PER DAY  3    topiramate (TOPAMAX) 100 MG tablet Take 50 mg by mouth 2 times daily       No facility-administered medications prior to visit. SOCIAL/FAMILY/PAST MEDICAL HISTORY: Ms. Toi Evans, family and past medical history was reviewed.      REVIEW OF SYSTEMS:    Respiratory: Negative for apnea, chest tightness and shortness of breath or change in baseline breathing. Gastrointestinal: Negative for nausea, vomiting, abdominal pain, diarrhea, constipation, blood in stool and abdominal distention. PHYSICAL EXAM:   Nursing note and vitals reviewed. BP (!) 144/72   Pulse 101   Wt 163 lb (73.9 kg)   BMI 27.98 kg/m²   Constitutional: She appears well-developed and well-nourished. No acute distress. Skin: Skin is warm and dry, good turgor. No rash noted. She is not diaphoretic. Cardiovascular: Normal rate, regular rhythm, normal heart sounds, and does not have murmur. Pulmonary/Chest: Effort normal. No respiratory distress. She does not have wheezes in the lung fields. She has no rales. Neurological/Psychiatric:She is alert and oriented to person, place, and time. Coordination is  normal.  Her mood isAppropriate and affect is Flat/blunted and Anxious . IMPRESSION:   1. Chronic pain syndrome    2. Fibromyalgia    3. Failed back surgical syndrome    4. Failed neck syndrome    5. Spinal stenosis of lumbosacral region    6.  Degenerative lumbar spinal stenosis        PLAN:  Informed verbal consent was obtained  -Continue with current opioid regimen   -Advised caffeine reduction, dietary changes, elevate head end of bed, NPO after supper, if using alcohol advised reduction of alcohol intake, tobacco cessation if smoking, weight loss   -Walking/Stretching exercises   -Consider swimming exercises    Current Outpatient Medications   Medication Sig Dispense Refill    buPROPion (WELLBUTRIN SR) 100 MG extended release tablet TAKE 1 TABLET BY MOUTH EVERY DAY  2    cetirizine (ZYRTEC) 10 MG tablet Take 10 mg by mouth daily      sertraline (ZOLOFT) 100 MG tablet Take 100 mg by mouth daily      fluticasone (FLONASE) 50 MCG/ACT nasal spray 1 spray by Nasal route 2 times daily as needed for Rhinitis 1 Bottle 0    meloxicam (MOBIC) 15 MG tablet Take 1 tablet by mouth daily 30 tablet 1    losartan (COZAAR) 100 MG tablet Take 1 tablet by mouth daily 90 tablet 0 achieving/maintaining therapeutic goals with special emphasis on  1. Improvement in perceived interfernce  of pain with ADL's. Ability to do home exercises independently. Ability to do household chores indoor and/or outdoor work and social and leisure activities. Improve psychosocial and physical functioning. - she is showing progression towards this treatment goal with the current regimen. She was advised against drinking alcohol with the narcotic pain medicines, advised against driving or handling machinery while adjusting the dose of medicines or if having cognitive  issues related to the current medications. Risk of overdose and death, if medicines not taken as prescribed, were also discussed. If the patient develops new symptoms or if the symptoms worsen, the patient should call the office. While transcribing every attempt was made to maintain the accuracy of the note in terms of it's contents,there may have been some errors made inadvertently. Thank you for allowing me to participate in the care of this patient.     Brenda Perez MD.    Cc: MD ANDRADE Irwin, Jj Matos, am scribing for and in the presence of Dr. Brenda Perez.   06/20/19  2:12 PM  SHELLY Morales, Dr. Brenda Perez, personally performed the services described in this documentation as scribed by   Jj Matos MA in my presence and it is both accurate and complete

## 2019-06-25 ENCOUNTER — PROCEDURE VISIT (OUTPATIENT)
Dept: AUDIOLOGY | Age: 69
End: 2019-06-25
Payer: MEDICARE

## 2019-06-25 DIAGNOSIS — R42 DIZZINESS AND GIDDINESS: Primary | ICD-10-CM

## 2019-06-25 DIAGNOSIS — H90.3 SENSORY HEARING LOSS, BILATERAL: ICD-10-CM

## 2019-06-25 PROCEDURE — 92537 CALORIC VSTBLR TEST W/REC: CPT | Performed by: AUDIOLOGIST

## 2019-06-25 PROCEDURE — 92540 BASIC VESTIBULAR EVALUATION: CPT | Performed by: AUDIOLOGIST

## 2019-06-25 PROCEDURE — 92557 COMPREHENSIVE HEARING TEST: CPT | Performed by: AUDIOLOGIST

## 2019-06-25 PROCEDURE — 92550 TYMPANOMETRY & REFLEX THRESH: CPT | Performed by: AUDIOLOGIST

## 2019-06-25 PROCEDURE — 92547 SUPPLEMENTAL ELECTRICAL TEST: CPT | Performed by: AUDIOLOGIST

## 2019-06-25 NOTE — PROGRESS NOTES
Subjective:      Patient ID: Alyssa Grady is a 77 y.o. female who complains of dizziness          Objective:       Assessment:      Marginal to moderate high frequency sloping sensorineural hearing loss bilaterally. Normal tympanograms with acoustic reflexes. Essentially normal ENG. A lot of \"square waves\" noted which is sometimes suggestive of an over anxietious patient.       Plan:      Medical follow-up for dizziness  Hearing retest as needed  Hearing aid evaluation when patient is motivated    See scan audiogram, tympanogram and ENG for additional details        Violet Nieves, AuD

## 2019-07-10 ENCOUNTER — OFFICE VISIT (OUTPATIENT)
Dept: ENT CLINIC | Age: 69
End: 2019-07-10
Payer: MEDICARE

## 2019-07-10 VITALS
WEIGHT: 163 LBS | SYSTOLIC BLOOD PRESSURE: 141 MMHG | BODY MASS INDEX: 27.98 KG/M2 | DIASTOLIC BLOOD PRESSURE: 95 MMHG | HEART RATE: 100 BPM

## 2019-07-10 DIAGNOSIS — R09.82 POST-NASAL DRIP: ICD-10-CM

## 2019-07-10 DIAGNOSIS — H90.3 BILATERAL SENSORINEURAL HEARING LOSS: ICD-10-CM

## 2019-07-10 DIAGNOSIS — R26.89 BALANCE DISORDER: Primary | ICD-10-CM

## 2019-07-10 PROCEDURE — 1090F PRES/ABSN URINE INCON ASSESS: CPT | Performed by: OTOLARYNGOLOGY

## 2019-07-10 PROCEDURE — G8427 DOCREV CUR MEDS BY ELIG CLIN: HCPCS | Performed by: OTOLARYNGOLOGY

## 2019-07-10 PROCEDURE — 1036F TOBACCO NON-USER: CPT | Performed by: OTOLARYNGOLOGY

## 2019-07-10 PROCEDURE — 4040F PNEUMOC VAC/ADMIN/RCVD: CPT | Performed by: OTOLARYNGOLOGY

## 2019-07-10 PROCEDURE — 1123F ACP DISCUSS/DSCN MKR DOCD: CPT | Performed by: OTOLARYNGOLOGY

## 2019-07-10 PROCEDURE — G8417 CALC BMI ABV UP PARAM F/U: HCPCS | Performed by: OTOLARYNGOLOGY

## 2019-07-10 PROCEDURE — G8399 PT W/DXA RESULTS DOCUMENT: HCPCS | Performed by: OTOLARYNGOLOGY

## 2019-07-10 PROCEDURE — 3017F COLORECTAL CA SCREEN DOC REV: CPT | Performed by: OTOLARYNGOLOGY

## 2019-07-10 PROCEDURE — 99214 OFFICE O/P EST MOD 30 MIN: CPT | Performed by: OTOLARYNGOLOGY

## 2019-07-10 NOTE — PROGRESS NOTES
[address] [place] [address] ENT         CHIEF COMPLAINT:  Chief Complaint   Patient presents with    Dizziness       INTERVAL HISTORY:        Since the last visit here, \"I started getting better after I had my pet cat put down. \"  However, she still has one cat. AUDIOGRAM:                  ENG (see full report for details): REVIEW OF IMAGES:       I independently reviewed the images of the CT scan of the sinuses, showing no sinusitis. Narrative   EXAMINATION:   CT OF THE SINUS WITHOUT CONTRAST  6/18/2019 9:50 am       TECHNIQUE:   CT of the sinuses was performed without the administration of intravenous   contrast. Multiplanar reformatted images are provided for review. Dose   modulation, iterative reconstruction, and/or weight based adjustment of the   mA/kV was utilized to reduce the radiation dose to as low as reasonably   achievable.       COMPARISON:   None       HISTORY:   ORDERING SYSTEM PROVIDED HISTORY: Chronic sinusitis, unspecified location   TECHNOLOGIST PROVIDED HISTORY:   Ordering Physician Provided Reason for Exam: pt states she has nasal   [place] all the time   Acuity: Acute   Type of Exam: Initial   Relevant Medical/Surgical History: no hx surgery on sinus       FINDINGS:   SINUSES/MASTOIDS:  The paranasal sinuses are well aerated without significant   opacification or air-fluid level.  The ostiomeatal units are patent.  Note is   made of a right middle turbinate shawn bullosa.  There is mild left nasal   septal deviation with a large left bony spur.  There is approximately 1-2 mm   mucosal thickening involving the floors of the maxillary sinuses with the   roots of the 2nd molars protruding into the floor of the maxillary sinuses.    The mastoid air cells are well aerated.       SOFT TISSUES:  Visualized soft tissues demonstrate no acute abnormality.  The   visualized portion of the intracranial contents demonstrate no gross acute   abnormality.           Impression

## 2019-07-16 ENCOUNTER — OFFICE VISIT (OUTPATIENT)
Dept: FAMILY MEDICINE CLINIC | Age: 69
End: 2019-07-16
Payer: MEDICARE

## 2019-07-16 VITALS
DIASTOLIC BLOOD PRESSURE: 72 MMHG | SYSTOLIC BLOOD PRESSURE: 126 MMHG | TEMPERATURE: 98.2 F | BODY MASS INDEX: 27.09 KG/M2 | HEART RATE: 87 BPM | OXYGEN SATURATION: 98 % | WEIGHT: 157.8 LBS

## 2019-07-16 DIAGNOSIS — M48.07 SPINAL STENOSIS OF LUMBOSACRAL REGION: ICD-10-CM

## 2019-07-16 DIAGNOSIS — G89.4 CHRONIC PAIN SYNDROME: ICD-10-CM

## 2019-07-16 DIAGNOSIS — J30.9 ALLERGIC RHINITIS, UNSPECIFIED SEASONALITY, UNSPECIFIED TRIGGER: ICD-10-CM

## 2019-07-16 DIAGNOSIS — M79.7 FIBROMYALGIA: ICD-10-CM

## 2019-07-16 DIAGNOSIS — F32.A DEPRESSION, UNSPECIFIED DEPRESSION TYPE: ICD-10-CM

## 2019-07-16 DIAGNOSIS — I10 ESSENTIAL HYPERTENSION: ICD-10-CM

## 2019-07-16 DIAGNOSIS — E10.9 TYPE 1 DIABETES MELLITUS WITHOUT COMPLICATION (HCC): ICD-10-CM

## 2019-07-16 DIAGNOSIS — E78.5 HYPERLIPIDEMIA, UNSPECIFIED HYPERLIPIDEMIA TYPE: Primary | ICD-10-CM

## 2019-07-16 DIAGNOSIS — M85.80 OSTEOPENIA, UNSPECIFIED LOCATION: ICD-10-CM

## 2019-07-16 LAB
CHP ED QC CHECK: NORMAL
GLUCOSE BLD-MCNC: 205 MG/DL

## 2019-07-16 PROCEDURE — G8427 DOCREV CUR MEDS BY ELIG CLIN: HCPCS | Performed by: FAMILY MEDICINE

## 2019-07-16 PROCEDURE — 1123F ACP DISCUSS/DSCN MKR DOCD: CPT | Performed by: FAMILY MEDICINE

## 2019-07-16 PROCEDURE — 3017F COLORECTAL CA SCREEN DOC REV: CPT | Performed by: FAMILY MEDICINE

## 2019-07-16 PROCEDURE — 1090F PRES/ABSN URINE INCON ASSESS: CPT | Performed by: FAMILY MEDICINE

## 2019-07-16 PROCEDURE — G8399 PT W/DXA RESULTS DOCUMENT: HCPCS | Performed by: FAMILY MEDICINE

## 2019-07-16 PROCEDURE — G8417 CALC BMI ABV UP PARAM F/U: HCPCS | Performed by: FAMILY MEDICINE

## 2019-07-16 PROCEDURE — 4040F PNEUMOC VAC/ADMIN/RCVD: CPT | Performed by: FAMILY MEDICINE

## 2019-07-16 PROCEDURE — 2022F DILAT RTA XM EVC RTNOPTHY: CPT | Performed by: FAMILY MEDICINE

## 2019-07-16 PROCEDURE — 99214 OFFICE O/P EST MOD 30 MIN: CPT | Performed by: FAMILY MEDICINE

## 2019-07-16 PROCEDURE — 82962 GLUCOSE BLOOD TEST: CPT | Performed by: FAMILY MEDICINE

## 2019-07-16 PROCEDURE — 1036F TOBACCO NON-USER: CPT | Performed by: FAMILY MEDICINE

## 2019-07-16 PROCEDURE — 3045F PR MOST RECENT HEMOGLOBIN A1C LEVEL 7.0-9.0%: CPT | Performed by: FAMILY MEDICINE

## 2019-07-16 RX ORDER — AMLODIPINE BESYLATE 5 MG/1
5 TABLET ORAL DAILY
Qty: 90 TABLET | Refills: 0 | Status: SHIPPED | OUTPATIENT
Start: 2019-07-16 | End: 2019-10-15 | Stop reason: SDUPTHER

## 2019-07-16 RX ORDER — FLUTICASONE PROPIONATE 50 MCG
1 SPRAY, SUSPENSION (ML) NASAL 2 TIMES DAILY PRN
Qty: 1 BOTTLE | Refills: 0 | Status: CANCELLED | OUTPATIENT
Start: 2019-07-16

## 2019-07-16 RX ORDER — HYDROXYZINE PAMOATE 25 MG/1
CAPSULE ORAL
Qty: 20 CAPSULE | Refills: 0 | Status: CANCELLED | OUTPATIENT
Start: 2019-07-16

## 2019-07-16 RX ORDER — LOSARTAN POTASSIUM 100 MG/1
100 TABLET ORAL DAILY
Qty: 90 TABLET | Refills: 0 | Status: SHIPPED | OUTPATIENT
Start: 2019-07-16 | End: 2019-10-15 | Stop reason: SDUPTHER

## 2019-07-16 RX ORDER — MELOXICAM 15 MG/1
15 TABLET ORAL DAILY
Qty: 30 TABLET | Refills: 1 | Status: CANCELLED | OUTPATIENT
Start: 2019-07-16

## 2019-07-16 RX ORDER — AMLODIPINE BESYLATE 5 MG/1
5 TABLET ORAL DAILY
Qty: 90 TABLET | Refills: 0 | Status: CANCELLED | OUTPATIENT
Start: 2019-07-16

## 2019-07-16 RX ORDER — LOSARTAN POTASSIUM 100 MG/1
100 TABLET ORAL DAILY
Qty: 90 TABLET | Refills: 0 | Status: CANCELLED | OUTPATIENT
Start: 2019-07-16

## 2019-07-16 RX ORDER — ATORVASTATIN CALCIUM 40 MG/1
40 TABLET, FILM COATED ORAL DAILY
Qty: 90 TABLET | Refills: 0 | Status: CANCELLED | OUTPATIENT
Start: 2019-07-16

## 2019-07-16 RX ORDER — ATORVASTATIN CALCIUM 40 MG/1
40 TABLET, FILM COATED ORAL DAILY
Qty: 90 TABLET | Refills: 0 | Status: SHIPPED | OUTPATIENT
Start: 2019-07-16 | End: 2019-10-15 | Stop reason: SDUPTHER

## 2019-07-16 RX ORDER — MELOXICAM 15 MG/1
15 TABLET ORAL DAILY
Qty: 30 TABLET | Refills: 1 | Status: SHIPPED | OUTPATIENT
Start: 2019-07-16 | End: 2019-10-15 | Stop reason: SDUPTHER

## 2019-07-16 RX ORDER — FLUTICASONE PROPIONATE 50 MCG
1 SPRAY, SUSPENSION (ML) NASAL 2 TIMES DAILY PRN
Qty: 1 BOTTLE | Refills: 0 | Status: SHIPPED | OUTPATIENT
Start: 2019-07-16 | End: 2019-08-19 | Stop reason: SDUPTHER

## 2019-07-23 ENCOUNTER — TELEPHONE (OUTPATIENT)
Dept: FAMILY MEDICINE CLINIC | Age: 69
End: 2019-07-23

## 2019-07-28 ASSESSMENT — ENCOUNTER SYMPTOMS
ABDOMINAL PAIN: 0
BLOOD IN STOOL: 0
CHEST TIGHTNESS: 0
SHORTNESS OF BREATH: 0
COUGH: 0

## 2019-07-29 NOTE — PROGRESS NOTES
SUBJECTIVE:  Jones Miner   1950   female   Allergies   Allergen Reactions    Sulfa Antibiotics Hives    Sulfa Antibiotics     Gabapentin Anxiety and Other (See Comments)     Also causes suicidal thoughts and confusion       Chief Complaint   Patient presents with    Hypertension    Depression        Patient Active Problem List    Diagnosis Date Noted    Bilateral sensorineural hearing loss 07/10/2019    Balance disorder 07/10/2019    Post-nasal drip 07/10/2019    Chronic pain syndrome 11/03/2018    Staph aureus infection     Hyponatremia 07/20/2018    Wound drainage 07/20/2018    Superficial incisional infection of surgical site 07/20/2018    Post-operative pain     Degenerative lumbar spinal stenosis 06/27/2018    Bilateral carpal tunnel syndrome 04/02/2018    Ulnar neuropathy at elbow, left 04/02/2018    Gallstone pancreatitis 10/25/2017    Dilated cbd, acquired 09/07/2017    Osteoarthrosis 09/07/2017    Type 1 diabetes mellitus without complication ([address] 44/77/6173    Osteopenia 08/31/2015    Family history of melanoma 05/27/2014    IDDM (insulin dependent diabetes mellitus) ([address]     Depression     Hypertension     Spinal stenosis     Chronic back pain     Retinopathy     Cervical osteoarthritis 04/10/2013       HPI   Pt is here today for fu on hyperlipidemia, chronic pain syndrome, fibromyalgia, diabetes, osteopenia, HTN, depression an allergic rhinitis. She has recently been evaluated for a brain mass per neurosurgeon. She is also followed by pain mgt for chronic back pain and psych for depression. She has been stable on current tx mgt. ambualtory BS have been good. No episodes of hypoglycemia. Denies CP,SOB or myalgias. No ha,change in vision or neurologic sx. No GI concerns.    Past Medical History:   Diagnosis Date    Chronic back pain     Depression     Diabetes mellitus ([address]     Hyperlipidemia     Hypertension     Retinopathy     Seasonal allergies     1    5. Type 1 diabetes mellitus without complication (HCC)  Continue current mgt  Labs  Intermittent ambulatory BP checks  - POCT Glucose  - insulin aspart (NOVOLOG FLEXPEN) 100 UNIT/ML injection pen; Inject 5 Units into the skin 3 times daily (before meals)  Dispense: 5 pen; Refill: 0  - CBC Auto Differential; Future  - Hemoglobin A1C; Future    6. Osteopenia, unspecified location  Daily Vit D3 and ca    7. Essential hypertension  Refill meds  Intermittent ambulatory BP checks  DASH diet reviewed    8. Depression, unspecified depression type  Reviewed current tx  Continue fu with psych as advised    9.  Allergic rhinitis, unspecified seasonality, unspecified trigger  Continue prn mgt  Pt has fu with ENT,Dr Silva      Orders Placed This Encounter   Procedures    Comprehensive Metabolic Panel     Standing Status:   Future     Standing Expiration Date:   8/4/2020    CK     Standing Status:   Future     Standing Expiration Date:   8/4/2020    CBC Auto Differential     Standing Status:   Future     Standing Expiration Date:   8/4/2020    Hemoglobin A1C     Standing Status:   Future     Standing Expiration Date:   8/4/2020    Lipid Panel     Standing Status:   Future     Standing Expiration Date:   8/4/2020     Order Specific Question:   Is Patient Fasting?/# of Hours     Answer:   10 hrs    POCT Glucose     Current Outpatient Medications   Medication Sig Dispense Refill    meloxicam (MOBIC) 15 MG tablet Take 1 tablet by mouth daily 30 tablet 1    fluticasone (FLONASE) 50 MCG/ACT nasal spray 1 spray by Nasal route 2 times daily as needed for Rhinitis 1 Bottle 0    losartan (COZAAR) 100 MG tablet Take 1 tablet by mouth daily 90 tablet 0    insulin aspart (NOVOLOG FLEXPEN) 100 UNIT/ML injection pen Inject 5 Units into the skin 3 times daily (before meals) 5 pen 0    amLODIPine (NORVASC) 5 MG tablet Take 1 tablet by mouth daily 90 tablet 0    atorvastatin (LIPITOR) 40 MG tablet Take 1 tablet by mouth daily 90

## 2019-08-01 ENCOUNTER — OFFICE VISIT (OUTPATIENT)
Dept: PAIN MANAGEMENT | Age: 69
End: 2019-08-01
Payer: MEDICARE

## 2019-08-01 VITALS
WEIGHT: 156 LBS | DIASTOLIC BLOOD PRESSURE: 75 MMHG | BODY MASS INDEX: 26.78 KG/M2 | HEART RATE: 83 BPM | SYSTOLIC BLOOD PRESSURE: 135 MMHG

## 2019-08-01 DIAGNOSIS — M79.7 FIBROMYALGIA: ICD-10-CM

## 2019-08-01 DIAGNOSIS — M48.07 SPINAL STENOSIS OF LUMBOSACRAL REGION: ICD-10-CM

## 2019-08-01 DIAGNOSIS — M96.1 FAILED NECK SYNDROME: ICD-10-CM

## 2019-08-01 DIAGNOSIS — M48.061 DEGENERATIVE LUMBAR SPINAL STENOSIS: ICD-10-CM

## 2019-08-01 DIAGNOSIS — G89.4 CHRONIC PAIN SYNDROME: ICD-10-CM

## 2019-08-01 DIAGNOSIS — M47.812 OSTEOARTHRITIS OF CERVICAL SPINE, UNSPECIFIED SPINAL OSTEOARTHRITIS COMPLICATION STATUS: ICD-10-CM

## 2019-08-01 DIAGNOSIS — M96.1 FAILED BACK SURGICAL SYNDROME: ICD-10-CM

## 2019-08-01 PROCEDURE — 99213 OFFICE O/P EST LOW 20 MIN: CPT | Performed by: INTERNAL MEDICINE

## 2019-08-01 PROCEDURE — 1036F TOBACCO NON-USER: CPT | Performed by: INTERNAL MEDICINE

## 2019-08-01 PROCEDURE — G8427 DOCREV CUR MEDS BY ELIG CLIN: HCPCS | Performed by: INTERNAL MEDICINE

## 2019-08-01 PROCEDURE — G8399 PT W/DXA RESULTS DOCUMENT: HCPCS | Performed by: INTERNAL MEDICINE

## 2019-08-01 PROCEDURE — G8417 CALC BMI ABV UP PARAM F/U: HCPCS | Performed by: INTERNAL MEDICINE

## 2019-08-01 PROCEDURE — 1090F PRES/ABSN URINE INCON ASSESS: CPT | Performed by: INTERNAL MEDICINE

## 2019-08-01 PROCEDURE — 4040F PNEUMOC VAC/ADMIN/RCVD: CPT | Performed by: INTERNAL MEDICINE

## 2019-08-01 PROCEDURE — 1123F ACP DISCUSS/DSCN MKR DOCD: CPT | Performed by: INTERNAL MEDICINE

## 2019-08-01 PROCEDURE — 3017F COLORECTAL CA SCREEN DOC REV: CPT | Performed by: INTERNAL MEDICINE

## 2019-08-01 RX ORDER — OXYCODONE AND ACETAMINOPHEN 7.5; 325 MG/1; MG/1
1 TABLET ORAL EVERY 6 HOURS PRN
Qty: 60 TABLET | Refills: 0 | Status: SHIPPED | OUTPATIENT
Start: 2019-08-01 | End: 2019-08-29 | Stop reason: SDUPTHER

## 2019-08-01 NOTE — PROGRESS NOTES
Current Outpatient Medications   Medication Sig Dispense Refill    meloxicam (MOBIC) 15 MG tablet Take 1 tablet by mouth daily 30 tablet 1    fluticasone (FLONASE) 50 MCG/ACT nasal spray 1 spray by Nasal route 2 times daily as needed for Rhinitis 1 Bottle 0    losartan (COZAAR) 100 MG tablet Take 1 tablet by mouth daily 90 tablet 0    insulin aspart (NOVOLOG FLEXPEN) 100 UNIT/ML injection pen Inject 5 Units into the skin 3 times daily (before meals) 5 pen 0    amLODIPine (NORVASC) 5 MG tablet Take 1 tablet by mouth daily 90 tablet 0    atorvastatin (LIPITOR) 40 MG tablet Take 1 tablet by mouth daily 90 tablet 0    insulin detemir (LEVEMIR FLEXTOUCH) 100 UNIT/ML injection pen Inject 20 Units into the skin 2 times daily 5 pen 1    buPROPion (WELLBUTRIN SR) 100 MG extended release tablet TAKE 1 TABLET BY MOUTH EVERY DAY  2    cetirizine (ZYRTEC) 10 MG tablet Take 10 mg by mouth daily      sertraline (ZOLOFT) 100 MG tablet Take 100 mg by mouth daily      hydrOXYzine (VISTARIL) 25 MG capsule 1 po qd -BID prn for anxiety 20 capsule 0    traZODone (DESYREL) 100 MG tablet Take 100 mg by mouth nightly      atorvastatin (LIPITOR) 20 MG tablet Take 1 tablet by mouth daily 90 tablet 0    calcium carbonate (OSCAL) 500 MG TABS tablet Take 500 mg by mouth daily      docusate sodium (COLACE) 100 MG capsule Take 100 mg by mouth 2 times daily      Needles & Syringes MISC Inject 1 each into the skin 3 times daily 100 each 3     No current facility-administered medications for this visit. I will continue her current medication regimen  which is part of the above treatment schedule. It has been helping with Ms. Adrian's chronic  medical problems which for this visit include:   Diagnoses of Chronic pain syndrome, Fibromyalgia, Failed back surgical syndrome, Failed neck syndrome, Spinal stenosis of lumbosacral region, Degenerative lumbar spinal stenosis, Osteoarthritis of cervical spine, unspecified spinal

## 2019-08-19 RX ORDER — FLUTICASONE PROPIONATE 50 MCG
1 SPRAY, SUSPENSION (ML) NASAL 2 TIMES DAILY PRN
Qty: 1 BOTTLE | Refills: 1 | Status: SHIPPED | OUTPATIENT
Start: 2019-08-19 | End: 2020-01-27

## 2019-08-29 ENCOUNTER — TELEPHONE (OUTPATIENT)
Dept: PAIN MANAGEMENT | Age: 69
End: 2019-08-29

## 2019-08-29 DIAGNOSIS — M96.1 FAILED NECK SYNDROME: ICD-10-CM

## 2019-08-29 DIAGNOSIS — M79.7 FIBROMYALGIA: ICD-10-CM

## 2019-08-29 DIAGNOSIS — M96.1 FAILED BACK SURGICAL SYNDROME: ICD-10-CM

## 2019-08-29 DIAGNOSIS — M48.07 SPINAL STENOSIS OF LUMBOSACRAL REGION: ICD-10-CM

## 2019-08-29 DIAGNOSIS — M48.061 DEGENERATIVE LUMBAR SPINAL STENOSIS: ICD-10-CM

## 2019-08-29 DIAGNOSIS — G89.4 CHRONIC PAIN SYNDROME: ICD-10-CM

## 2019-08-29 RX ORDER — OXYCODONE AND ACETAMINOPHEN 7.5; 325 MG/1; MG/1
1 TABLET ORAL EVERY 6 HOURS PRN
Qty: 14 TABLET | Refills: 0 | Status: SHIPPED | OUTPATIENT
Start: 2019-08-29 | End: 2019-09-09 | Stop reason: SDUPTHER

## 2019-09-03 RX ORDER — FLUTICASONE PROPIONATE 50 MCG
1 SPRAY, SUSPENSION (ML) NASAL 2 TIMES DAILY PRN
Qty: 1 BOTTLE | Refills: 1 | OUTPATIENT
Start: 2019-09-03

## 2019-09-09 ENCOUNTER — OFFICE VISIT (OUTPATIENT)
Dept: PAIN MANAGEMENT | Age: 69
End: 2019-09-09
Payer: MEDICARE

## 2019-09-09 VITALS
BODY MASS INDEX: 26.78 KG/M2 | SYSTOLIC BLOOD PRESSURE: 151 MMHG | HEART RATE: 86 BPM | DIASTOLIC BLOOD PRESSURE: 87 MMHG | WEIGHT: 156 LBS

## 2019-09-09 DIAGNOSIS — M96.1 FAILED NECK SYNDROME: ICD-10-CM

## 2019-09-09 DIAGNOSIS — M47.812 OSTEOARTHRITIS OF CERVICAL SPINE, UNSPECIFIED SPINAL OSTEOARTHRITIS COMPLICATION STATUS: ICD-10-CM

## 2019-09-09 DIAGNOSIS — M96.1 FAILED BACK SURGICAL SYNDROME: ICD-10-CM

## 2019-09-09 DIAGNOSIS — M79.7 FIBROMYALGIA: ICD-10-CM

## 2019-09-09 DIAGNOSIS — M48.061 DEGENERATIVE LUMBAR SPINAL STENOSIS: ICD-10-CM

## 2019-09-09 DIAGNOSIS — G89.4 CHRONIC PAIN SYNDROME: ICD-10-CM

## 2019-09-09 DIAGNOSIS — M48.07 SPINAL STENOSIS OF LUMBOSACRAL REGION: ICD-10-CM

## 2019-09-09 PROCEDURE — 1036F TOBACCO NON-USER: CPT | Performed by: INTERNAL MEDICINE

## 2019-09-09 PROCEDURE — G8417 CALC BMI ABV UP PARAM F/U: HCPCS | Performed by: INTERNAL MEDICINE

## 2019-09-09 PROCEDURE — 4040F PNEUMOC VAC/ADMIN/RCVD: CPT | Performed by: INTERNAL MEDICINE

## 2019-09-09 PROCEDURE — 3017F COLORECTAL CA SCREEN DOC REV: CPT | Performed by: INTERNAL MEDICINE

## 2019-09-09 PROCEDURE — 99213 OFFICE O/P EST LOW 20 MIN: CPT | Performed by: INTERNAL MEDICINE

## 2019-09-09 PROCEDURE — 1123F ACP DISCUSS/DSCN MKR DOCD: CPT | Performed by: INTERNAL MEDICINE

## 2019-09-09 PROCEDURE — G8427 DOCREV CUR MEDS BY ELIG CLIN: HCPCS | Performed by: INTERNAL MEDICINE

## 2019-09-09 PROCEDURE — 1090F PRES/ABSN URINE INCON ASSESS: CPT | Performed by: INTERNAL MEDICINE

## 2019-09-09 PROCEDURE — G8399 PT W/DXA RESULTS DOCUMENT: HCPCS | Performed by: INTERNAL MEDICINE

## 2019-09-09 RX ORDER — OXYCODONE AND ACETAMINOPHEN 7.5; 325 MG/1; MG/1
1 TABLET ORAL EVERY 6 HOURS PRN
Qty: 60 TABLET | Refills: 0 | Status: SHIPPED | OUTPATIENT
Start: 2019-09-09 | End: 2019-10-07 | Stop reason: SDUPTHER

## 2019-09-09 RX ORDER — OXYCODONE AND ACETAMINOPHEN 7.5; 325 MG/1; MG/1
1 TABLET ORAL EVERY 6 HOURS PRN
Qty: 60 TABLET | Refills: 0 | Status: SHIPPED | OUTPATIENT
Start: 2019-09-09 | End: 2019-09-09

## 2019-09-09 NOTE — PROGRESS NOTES
losartan (COZAAR) 100 MG tablet Take 1 tablet by mouth daily 90 tablet 0    insulin aspart (NOVOLOG FLEXPEN) 100 UNIT/ML injection pen Inject 5 Units into the skin 3 times daily (before meals) 5 pen 0    amLODIPine (NORVASC) 5 MG tablet Take 1 tablet by mouth daily 90 tablet 0    atorvastatin (LIPITOR) 40 MG tablet Take 1 tablet by mouth daily 90 tablet 0    insulin detemir (LEVEMIR FLEXTOUCH) 100 UNIT/ML injection pen Inject 20 Units into the skin 2 times daily 5 pen 1    buPROPion (WELLBUTRIN SR) 100 MG extended release tablet TAKE 1 TABLET BY MOUTH EVERY DAY  2    cetirizine (ZYRTEC) 10 MG tablet Take 10 mg by mouth daily      sertraline (ZOLOFT) 100 MG tablet Take 100 mg by mouth daily      traZODone (DESYREL) 100 MG tablet Take 100 mg by mouth nightly      atorvastatin (LIPITOR) 20 MG tablet Take 1 tablet by mouth daily 90 tablet 0    calcium carbonate (OSCAL) 500 MG TABS tablet Take 500 mg by mouth daily      Needles & Syringes MISC Inject 1 each into the skin 3 times daily 100 each 3     No facility-administered medications prior to visit. SOCIAL/FAMILY/PAST MEDICAL HISTORY: Ms. Cristy Simons, family and past medical history was reviewed. REVIEW OF SYSTEMS:    Respiratory: Negative for apnea, chest tightness and shortness of breath or change in baseline breathing. Gastrointestinal: Negative for nausea, vomiting, abdominal pain, diarrhea, constipation, blood in stool and abdominal distention. PHYSICAL EXAM:   Nursing note and vitals reviewed. BP (!) 151/87   Pulse 86   Wt 156 lb (70.8 kg)   BMI 26.78 kg/m²   Constitutional: She appears well-developed and well-nourished. No acute distress. Skin: Skin is warm and dry, good turgor. No rash noted. She is not diaphoretic. Cardiovascular: Normal rate, regular rhythm, normal heart sounds, and does not have murmur. Pulmonary/Chest: Effort normal. No respiratory distress. She does not have wheezes in the lung fields.  She has cetirizine (ZYRTEC) 10 MG tablet Take 10 mg by mouth daily      sertraline (ZOLOFT) 100 MG tablet Take 100 mg by mouth daily      traZODone (DESYREL) 100 MG tablet Take 100 mg by mouth nightly      atorvastatin (LIPITOR) 20 MG tablet Take 1 tablet by mouth daily 90 tablet 0    calcium carbonate (OSCAL) 500 MG TABS tablet Take 500 mg by mouth daily      Needles & Syringes MISC Inject 1 each into the skin 3 times daily 100 each 3     No current facility-administered medications for this visit. I will continue her current medication regimen  which is part of the above treatment schedule. It has been helping with Ms. Adrian's chronic  medical problems which for this visit include:   Diagnoses of Chronic pain syndrome, Fibromyalgia, Failed back surgical syndrome, Failed neck syndrome, Spinal stenosis of lumbosacral region, Degenerative lumbar spinal stenosis, Osteoarthritis of cervical spine, unspecified spinal osteoarthritis complication status, and Primary insomnia were pertinent to this visit. Risks and benefits of the medications and other alternative treatments  including no treatment were discussed with the patient. The common side effects of these medications were also explained to the patient. Informed verbal consent was obtained. Goals of current treatment regimen include improvement in pain, restoration of functioning- with focus on improvement in physical performance, general activity, work or disability,emotional distress, health care utilization and  decreased medication consumption. Will continue to monitor progress towards achieving/maintaining therapeutic goals with special emphasis on  1. Improvement in perceived interfernce  of pain with ADL's. Ability to do home exercises independently. Ability to do household chores indoor and/or outdoor work and social and leisure activities. Improve psychosocial and physical functioning. - she is showing progression towards this treatment goal with

## 2019-09-16 DIAGNOSIS — E10.9 TYPE 1 DIABETES MELLITUS WITHOUT COMPLICATION (HCC): ICD-10-CM

## 2019-09-16 RX ORDER — INSULIN ASPART 100 [IU]/ML
INJECTION, SOLUTION INTRAVENOUS; SUBCUTANEOUS
Qty: 5 PEN | Refills: 0 | Status: SHIPPED | OUTPATIENT
Start: 2019-09-16 | End: 2021-02-15 | Stop reason: SDUPTHER

## 2019-09-23 RX ORDER — INSULIN DETEMIR 100 [IU]/ML
INJECTION, SOLUTION SUBCUTANEOUS
Qty: 10 PEN | Refills: 1 | Status: SHIPPED | OUTPATIENT
Start: 2019-09-23 | End: 2019-10-15 | Stop reason: SDUPTHER

## 2019-10-01 DIAGNOSIS — M48.061 DEGENERATIVE LUMBAR SPINAL STENOSIS: ICD-10-CM

## 2019-10-01 DIAGNOSIS — M96.1 FAILED BACK SURGICAL SYNDROME: ICD-10-CM

## 2019-10-01 DIAGNOSIS — M96.1 FAILED NECK SYNDROME: ICD-10-CM

## 2019-10-01 DIAGNOSIS — G89.4 CHRONIC PAIN SYNDROME: ICD-10-CM

## 2019-10-01 DIAGNOSIS — M48.07 SPINAL STENOSIS OF LUMBOSACRAL REGION: ICD-10-CM

## 2019-10-01 DIAGNOSIS — M79.7 FIBROMYALGIA: ICD-10-CM

## 2019-10-03 RX ORDER — GABAPENTIN 400 MG/1
CAPSULE ORAL
Qty: 90 CAPSULE | Refills: 1 | OUTPATIENT
Start: 2019-10-03

## 2019-10-07 ENCOUNTER — OFFICE VISIT (OUTPATIENT)
Dept: PAIN MANAGEMENT | Age: 69
End: 2019-10-07
Payer: MEDICARE

## 2019-10-07 VITALS
HEART RATE: 72 BPM | BODY MASS INDEX: 26.78 KG/M2 | DIASTOLIC BLOOD PRESSURE: 72 MMHG | SYSTOLIC BLOOD PRESSURE: 130 MMHG | WEIGHT: 156 LBS

## 2019-10-07 DIAGNOSIS — M96.1 FAILED BACK SURGICAL SYNDROME: ICD-10-CM

## 2019-10-07 DIAGNOSIS — M96.1 FAILED NECK SYNDROME: ICD-10-CM

## 2019-10-07 DIAGNOSIS — F51.01 PRIMARY INSOMNIA: ICD-10-CM

## 2019-10-07 DIAGNOSIS — M48.07 SPINAL STENOSIS OF LUMBOSACRAL REGION: ICD-10-CM

## 2019-10-07 DIAGNOSIS — F39 MOOD DISORDER (HCC): ICD-10-CM

## 2019-10-07 DIAGNOSIS — M79.7 FIBROMYALGIA: ICD-10-CM

## 2019-10-07 DIAGNOSIS — M48.061 DEGENERATIVE LUMBAR SPINAL STENOSIS: ICD-10-CM

## 2019-10-07 DIAGNOSIS — G89.4 CHRONIC PAIN SYNDROME: ICD-10-CM

## 2019-10-07 PROCEDURE — G8484 FLU IMMUNIZE NO ADMIN: HCPCS | Performed by: INTERNAL MEDICINE

## 2019-10-07 PROCEDURE — 1123F ACP DISCUSS/DSCN MKR DOCD: CPT | Performed by: INTERNAL MEDICINE

## 2019-10-07 PROCEDURE — G8417 CALC BMI ABV UP PARAM F/U: HCPCS | Performed by: INTERNAL MEDICINE

## 2019-10-07 PROCEDURE — 4040F PNEUMOC VAC/ADMIN/RCVD: CPT | Performed by: INTERNAL MEDICINE

## 2019-10-07 PROCEDURE — G8427 DOCREV CUR MEDS BY ELIG CLIN: HCPCS | Performed by: INTERNAL MEDICINE

## 2019-10-07 PROCEDURE — 1036F TOBACCO NON-USER: CPT | Performed by: INTERNAL MEDICINE

## 2019-10-07 PROCEDURE — 3017F COLORECTAL CA SCREEN DOC REV: CPT | Performed by: INTERNAL MEDICINE

## 2019-10-07 PROCEDURE — 99214 OFFICE O/P EST MOD 30 MIN: CPT | Performed by: INTERNAL MEDICINE

## 2019-10-07 PROCEDURE — 1090F PRES/ABSN URINE INCON ASSESS: CPT | Performed by: INTERNAL MEDICINE

## 2019-10-07 PROCEDURE — G8399 PT W/DXA RESULTS DOCUMENT: HCPCS | Performed by: INTERNAL MEDICINE

## 2019-10-07 RX ORDER — GABAPENTIN 400 MG/1
CAPSULE ORAL
Qty: 90 CAPSULE | Refills: 0 | Status: SHIPPED | OUTPATIENT
Start: 2019-10-07 | End: 2019-11-11 | Stop reason: SDUPTHER

## 2019-10-07 RX ORDER — OXYCODONE AND ACETAMINOPHEN 7.5; 325 MG/1; MG/1
1 TABLET ORAL EVERY 6 HOURS PRN
Qty: 84 TABLET | Refills: 0 | Status: SHIPPED | OUTPATIENT
Start: 2019-10-07 | End: 2019-11-11 | Stop reason: SDUPTHER

## 2019-10-07 RX ORDER — BUPROPION HYDROCHLORIDE 150 MG/1
150 TABLET ORAL EVERY MORNING
Qty: 30 TABLET | Refills: 0 | Status: SHIPPED | OUTPATIENT
Start: 2019-10-07 | End: 2019-11-11 | Stop reason: SDUPTHER

## 2019-10-07 RX ORDER — AMITRIPTYLINE HYDROCHLORIDE 25 MG/1
25-50 TABLET, FILM COATED ORAL NIGHTLY
Qty: 60 TABLET | Refills: 0 | Status: SHIPPED | OUTPATIENT
Start: 2019-10-07 | End: 2019-11-11 | Stop reason: SDUPTHER

## 2019-10-09 DIAGNOSIS — E10.9 TYPE 1 DIABETES MELLITUS WITHOUT COMPLICATION (HCC): ICD-10-CM

## 2019-10-10 ENCOUNTER — HOSPITAL ENCOUNTER (OUTPATIENT)
Age: 69
Discharge: HOME | End: 2019-10-10
Payer: MEDICARE

## 2019-10-10 LAB
A/G RATIO: 2.2 (ref 1.1–2.2)
ALBUMIN SERPL-MCNC: 5 G/DL (ref 3.4–5)
ALP BLD-CCNC: 167 U/L (ref 40–129)
ALT SERPL-CCNC: 13 U/L (ref 10–40)
ANION GAP SERPL CALCULATED.3IONS-SCNC: 13 MMOL/L (ref 3–16)
AST SERPL-CCNC: 17 U/L (ref 15–37)
BASOPHILS ABSOLUTE: 0 K/UL (ref 0–0.2)
BASOPHILS RELATIVE PERCENT: 0.6 %
BILIRUB SERPL-MCNC: 0.6 MG/DL (ref 0–1)
BUN BLDV-MCNC: 15 MG/DL (ref 7–20)
CALCIUM SERPL-MCNC: 9.7 MG/DL (ref 8.3–10.6)
CHLORIDE BLD-SCNC: 100 MMOL/L (ref 99–110)
CHOLESTEROL, TOTAL: 182 MG/DL (ref 0–199)
CO2: 28 MMOL/L (ref 21–32)
CREAT SERPL-MCNC: 0.7 MG/DL (ref 0.6–1.2)
EOSINOPHILS ABSOLUTE: 0.2 K/UL (ref 0–0.6)
EOSINOPHILS RELATIVE PERCENT: 4 %
GFR AFRICAN AMERICAN: >60
GFR NON-AFRICAN AMERICAN: >60
GLOBULIN: 2.3 G/DL
GLUCOSE BLD-MCNC: 136 MG/DL (ref 70–99)
HCT VFR BLD CALC: 38.2 % (ref 36–48)
HDLC SERPL-MCNC: 85 MG/DL (ref 40–60)
HEMOGLOBIN: 13.2 G/DL (ref 12–16)
LDL CHOLESTEROL CALCULATED: 80 MG/DL
LYMPHOCYTES ABSOLUTE: 1.1 K/UL (ref 1–5.1)
LYMPHOCYTES RELATIVE PERCENT: 21.2 %
MCH RBC QN AUTO: 29.9 PG (ref 26–34)
MCHC RBC AUTO-ENTMCNC: 34.6 G/DL (ref 31–36)
MCV RBC AUTO: 86.2 FL (ref 80–100)
MONOCYTES ABSOLUTE: 0.4 K/UL (ref 0–1.3)
MONOCYTES RELATIVE PERCENT: 7.5 %
NEUTROPHILS ABSOLUTE: 3.4 K/UL (ref 1.7–7.7)
NEUTROPHILS RELATIVE PERCENT: 66.7 %
PDW BLD-RTO: 13.2 % (ref 12.4–15.4)
PLATELET # BLD: 205 K/UL (ref 135–450)
PMV BLD AUTO: 9 FL (ref 5–10.5)
POTASSIUM SERPL-SCNC: 4.5 MMOL/L (ref 3.5–5.1)
RBC # BLD: 4.43 M/UL (ref 4–5.2)
SODIUM BLD-SCNC: 141 MMOL/L (ref 136–145)
TOTAL CK: 68 U/L (ref 26–192)
TOTAL PROTEIN: 7.3 G/DL (ref 6.4–8.2)
TRIGL SERPL-MCNC: 85 MG/DL (ref 0–150)
VLDLC SERPL CALC-MCNC: 17 MG/DL
WBC # BLD: 5.1 K/UL (ref 4–11)

## 2019-10-10 PROCEDURE — 83036 HEMOGLOBIN GLYCOSYLATED A1C: CPT

## 2019-10-10 PROCEDURE — 82550 ASSAY OF CK (CPK): CPT

## 2019-10-10 PROCEDURE — 85025 COMPLETE CBC W/AUTO DIFF WBC: CPT

## 2019-10-10 PROCEDURE — 80053 COMPREHEN METABOLIC PANEL: CPT

## 2019-10-10 PROCEDURE — 36415 COLL VENOUS BLD VENIPUNCTURE: CPT

## 2019-10-10 PROCEDURE — 80061 LIPID PANEL: CPT

## 2019-10-11 LAB
ESTIMATED AVERAGE GLUCOSE: 185.8 MG/DL
HBA1C MFR BLD: 8.1 %

## 2019-10-15 ENCOUNTER — OFFICE VISIT (OUTPATIENT)
Dept: FAMILY MEDICINE CLINIC | Age: 69
End: 2019-10-15
Payer: MEDICARE

## 2019-10-15 VITALS
WEIGHT: 155.4 LBS | TEMPERATURE: 98.6 F | RESPIRATION RATE: 18 BRPM | DIASTOLIC BLOOD PRESSURE: 80 MMHG | SYSTOLIC BLOOD PRESSURE: 138 MMHG | HEART RATE: 102 BPM | BODY MASS INDEX: 26.67 KG/M2 | OXYGEN SATURATION: 96 %

## 2019-10-15 DIAGNOSIS — Z12.39 BREAST CANCER SCREENING: ICD-10-CM

## 2019-10-15 DIAGNOSIS — J30.9 ALLERGIC RHINITIS, UNSPECIFIED SEASONALITY, UNSPECIFIED TRIGGER: ICD-10-CM

## 2019-10-15 DIAGNOSIS — F32.A DEPRESSION, UNSPECIFIED DEPRESSION TYPE: ICD-10-CM

## 2019-10-15 DIAGNOSIS — G89.4 CHRONIC PAIN SYNDROME: ICD-10-CM

## 2019-10-15 DIAGNOSIS — M48.07 SPINAL STENOSIS OF LUMBOSACRAL REGION: ICD-10-CM

## 2019-10-15 DIAGNOSIS — M85.80 OSTEOPENIA, UNSPECIFIED LOCATION: ICD-10-CM

## 2019-10-15 DIAGNOSIS — E10.9 TYPE 1 DIABETES MELLITUS WITHOUT COMPLICATION (HCC): Primary | ICD-10-CM

## 2019-10-15 DIAGNOSIS — Z23 NEED FOR INFLUENZA VACCINATION: ICD-10-CM

## 2019-10-15 DIAGNOSIS — Z23 NEED FOR VACCINATION: ICD-10-CM

## 2019-10-15 DIAGNOSIS — M79.7 FIBROMYALGIA: ICD-10-CM

## 2019-10-15 DIAGNOSIS — I10 ESSENTIAL HYPERTENSION: ICD-10-CM

## 2019-10-15 DIAGNOSIS — E78.5 HYPERLIPIDEMIA, UNSPECIFIED HYPERLIPIDEMIA TYPE: ICD-10-CM

## 2019-10-15 LAB
BILIRUBIN, POC: NORMAL
BLOOD URINE, POC: NORMAL
CHP ED QC CHECK: NORMAL
CLARITY, POC: CLEAR
COLOR, POC: YELLOW
GLUCOSE BLD-MCNC: 113 MG/DL
GLUCOSE URINE, POC: NORMAL
KETONES, POC: NORMAL
LEUKOCYTE EST, POC: NORMAL
NITRITE, POC: NORMAL
PH, POC: 7.5
PROTEIN, POC: NORMAL
SPECIFIC GRAVITY, POC: 1.01
UROBILINOGEN, POC: 1

## 2019-10-15 PROCEDURE — 81002 URINALYSIS NONAUTO W/O SCOPE: CPT | Performed by: FAMILY MEDICINE

## 2019-10-15 PROCEDURE — G0008 ADMIN INFLUENZA VIRUS VAC: HCPCS | Performed by: FAMILY MEDICINE

## 2019-10-15 PROCEDURE — 90750 HZV VACC RECOMBINANT IM: CPT | Performed by: FAMILY MEDICINE

## 2019-10-15 PROCEDURE — G8427 DOCREV CUR MEDS BY ELIG CLIN: HCPCS | Performed by: FAMILY MEDICINE

## 2019-10-15 PROCEDURE — G8399 PT W/DXA RESULTS DOCUMENT: HCPCS | Performed by: FAMILY MEDICINE

## 2019-10-15 PROCEDURE — 90662 IIV NO PRSV INCREASED AG IM: CPT | Performed by: FAMILY MEDICINE

## 2019-10-15 PROCEDURE — 99214 OFFICE O/P EST MOD 30 MIN: CPT | Performed by: FAMILY MEDICINE

## 2019-10-15 PROCEDURE — 1123F ACP DISCUSS/DSCN MKR DOCD: CPT | Performed by: FAMILY MEDICINE

## 2019-10-15 PROCEDURE — 3017F COLORECTAL CA SCREEN DOC REV: CPT | Performed by: FAMILY MEDICINE

## 2019-10-15 PROCEDURE — 2022F DILAT RTA XM EVC RTNOPTHY: CPT | Performed by: FAMILY MEDICINE

## 2019-10-15 PROCEDURE — G8482 FLU IMMUNIZE ORDER/ADMIN: HCPCS | Performed by: FAMILY MEDICINE

## 2019-10-15 PROCEDURE — 82962 GLUCOSE BLOOD TEST: CPT | Performed by: FAMILY MEDICINE

## 2019-10-15 PROCEDURE — G8417 CALC BMI ABV UP PARAM F/U: HCPCS | Performed by: FAMILY MEDICINE

## 2019-10-15 PROCEDURE — 1036F TOBACCO NON-USER: CPT | Performed by: FAMILY MEDICINE

## 2019-10-15 PROCEDURE — 4040F PNEUMOC VAC/ADMIN/RCVD: CPT | Performed by: FAMILY MEDICINE

## 2019-10-15 PROCEDURE — 1090F PRES/ABSN URINE INCON ASSESS: CPT | Performed by: FAMILY MEDICINE

## 2019-10-15 PROCEDURE — 90471 IMMUNIZATION ADMIN: CPT | Performed by: FAMILY MEDICINE

## 2019-10-15 RX ORDER — AMLODIPINE BESYLATE 5 MG/1
5 TABLET ORAL DAILY
Qty: 90 TABLET | Refills: 0 | Status: SHIPPED | OUTPATIENT
Start: 2019-10-15 | End: 2020-06-24 | Stop reason: SDUPTHER

## 2019-10-15 RX ORDER — LOSARTAN POTASSIUM 100 MG/1
100 TABLET ORAL DAILY
Qty: 90 TABLET | Refills: 0 | Status: SHIPPED | OUTPATIENT
Start: 2019-10-15 | End: 2020-03-11 | Stop reason: SDUPTHER

## 2019-10-15 RX ORDER — MELOXICAM 15 MG/1
15 TABLET ORAL DAILY
Qty: 30 TABLET | Refills: 1 | Status: SHIPPED | OUTPATIENT
Start: 2019-10-15 | End: 2020-08-25 | Stop reason: SDUPTHER

## 2019-10-15 RX ORDER — GABAPENTIN 400 MG/1
CAPSULE ORAL
Qty: 90 CAPSULE | Refills: 0 | Status: CANCELLED | OUTPATIENT
Start: 2019-10-15 | End: 2019-11-15

## 2019-10-15 RX ORDER — ATORVASTATIN CALCIUM 40 MG/1
40 TABLET, FILM COATED ORAL DAILY
Qty: 90 TABLET | Refills: 0 | Status: SHIPPED | OUTPATIENT
Start: 2019-10-15 | End: 2020-03-11 | Stop reason: SDUPTHER

## 2019-10-15 ASSESSMENT — ENCOUNTER SYMPTOMS
ABDOMINAL PAIN: 0
DIARRHEA: 0
CHEST TIGHTNESS: 0
BLOOD IN STOOL: 0
CONSTIPATION: 0
COUGH: 0
BACK PAIN: 1
SHORTNESS OF BREATH: 0

## 2019-11-11 ENCOUNTER — OFFICE VISIT (OUTPATIENT)
Dept: PAIN MANAGEMENT | Age: 69
End: 2019-11-11
Payer: MEDICARE

## 2019-11-11 VITALS
SYSTOLIC BLOOD PRESSURE: 135 MMHG | DIASTOLIC BLOOD PRESSURE: 81 MMHG | BODY MASS INDEX: 26.78 KG/M2 | WEIGHT: 156 LBS | HEART RATE: 97 BPM

## 2019-11-11 DIAGNOSIS — M96.1 FAILED BACK SURGICAL SYNDROME: ICD-10-CM

## 2019-11-11 DIAGNOSIS — M47.812 OSTEOARTHRITIS OF CERVICAL SPINE, UNSPECIFIED SPINAL OSTEOARTHRITIS COMPLICATION STATUS: ICD-10-CM

## 2019-11-11 DIAGNOSIS — M48.07 SPINAL STENOSIS OF LUMBOSACRAL REGION: ICD-10-CM

## 2019-11-11 DIAGNOSIS — M48.061 DEGENERATIVE LUMBAR SPINAL STENOSIS: ICD-10-CM

## 2019-11-11 DIAGNOSIS — M96.1 FAILED NECK SYNDROME: ICD-10-CM

## 2019-11-11 DIAGNOSIS — G89.4 CHRONIC PAIN SYNDROME: ICD-10-CM

## 2019-11-11 DIAGNOSIS — M79.7 FIBROMYALGIA: ICD-10-CM

## 2019-11-11 PROCEDURE — G8427 DOCREV CUR MEDS BY ELIG CLIN: HCPCS | Performed by: INTERNAL MEDICINE

## 2019-11-11 PROCEDURE — 4040F PNEUMOC VAC/ADMIN/RCVD: CPT | Performed by: INTERNAL MEDICINE

## 2019-11-11 PROCEDURE — 3017F COLORECTAL CA SCREEN DOC REV: CPT | Performed by: INTERNAL MEDICINE

## 2019-11-11 PROCEDURE — 1036F TOBACCO NON-USER: CPT | Performed by: INTERNAL MEDICINE

## 2019-11-11 PROCEDURE — 99213 OFFICE O/P EST LOW 20 MIN: CPT | Performed by: INTERNAL MEDICINE

## 2019-11-11 PROCEDURE — G8399 PT W/DXA RESULTS DOCUMENT: HCPCS | Performed by: INTERNAL MEDICINE

## 2019-11-11 PROCEDURE — G8482 FLU IMMUNIZE ORDER/ADMIN: HCPCS | Performed by: INTERNAL MEDICINE

## 2019-11-11 PROCEDURE — 1090F PRES/ABSN URINE INCON ASSESS: CPT | Performed by: INTERNAL MEDICINE

## 2019-11-11 PROCEDURE — G8417 CALC BMI ABV UP PARAM F/U: HCPCS | Performed by: INTERNAL MEDICINE

## 2019-11-11 PROCEDURE — 1123F ACP DISCUSS/DSCN MKR DOCD: CPT | Performed by: INTERNAL MEDICINE

## 2019-11-11 RX ORDER — AMITRIPTYLINE HYDROCHLORIDE 25 MG/1
25-50 TABLET, FILM COATED ORAL NIGHTLY
Qty: 60 TABLET | Refills: 1 | Status: SHIPPED | OUTPATIENT
Start: 2019-11-11 | End: 2019-12-20 | Stop reason: SDUPTHER

## 2019-11-11 RX ORDER — BUPROPION HYDROCHLORIDE 150 MG/1
150 TABLET ORAL EVERY MORNING
Qty: 30 TABLET | Refills: 1 | Status: SHIPPED | OUTPATIENT
Start: 2019-11-11 | End: 2019-12-20 | Stop reason: SDUPTHER

## 2019-11-11 RX ORDER — OXYCODONE AND ACETAMINOPHEN 7.5; 325 MG/1; MG/1
1 TABLET ORAL EVERY 6 HOURS PRN
Qty: 84 TABLET | Refills: 0 | Status: SHIPPED | OUTPATIENT
Start: 2019-11-11 | End: 2019-12-20 | Stop reason: SDUPTHER

## 2019-11-11 RX ORDER — GABAPENTIN 400 MG/1
CAPSULE ORAL
Qty: 90 CAPSULE | Refills: 1 | Status: SHIPPED | OUTPATIENT
Start: 2019-11-11 | End: 2019-12-20 | Stop reason: SDUPTHER

## 2019-11-27 RX ORDER — AMITRIPTYLINE HYDROCHLORIDE 25 MG/1
TABLET, FILM COATED ORAL
Qty: 60 TABLET | Refills: 0 | OUTPATIENT
Start: 2019-11-27

## 2019-11-27 RX ORDER — BUPROPION HYDROCHLORIDE 150 MG/1
TABLET ORAL
Qty: 30 TABLET | Refills: 0 | OUTPATIENT
Start: 2019-11-27

## 2019-12-05 RX ORDER — GABAPENTIN 400 MG/1
CAPSULE ORAL
Qty: 90 CAPSULE | Refills: 0 | OUTPATIENT
Start: 2019-12-05

## 2019-12-20 ENCOUNTER — OFFICE VISIT (OUTPATIENT)
Dept: PAIN MANAGEMENT | Age: 69
End: 2019-12-20
Payer: MEDICARE

## 2019-12-20 VITALS
HEART RATE: 92 BPM | WEIGHT: 156 LBS | BODY MASS INDEX: 26.78 KG/M2 | DIASTOLIC BLOOD PRESSURE: 93 MMHG | SYSTOLIC BLOOD PRESSURE: 152 MMHG

## 2019-12-20 DIAGNOSIS — G89.4 CHRONIC PAIN SYNDROME: ICD-10-CM

## 2019-12-20 DIAGNOSIS — M79.7 FIBROMYALGIA: ICD-10-CM

## 2019-12-20 DIAGNOSIS — M48.061 DEGENERATIVE LUMBAR SPINAL STENOSIS: ICD-10-CM

## 2019-12-20 DIAGNOSIS — M48.07 SPINAL STENOSIS OF LUMBOSACRAL REGION: ICD-10-CM

## 2019-12-20 DIAGNOSIS — M96.1 FAILED NECK SYNDROME: ICD-10-CM

## 2019-12-20 DIAGNOSIS — M96.1 FAILED BACK SURGICAL SYNDROME: ICD-10-CM

## 2019-12-20 PROCEDURE — 3017F COLORECTAL CA SCREEN DOC REV: CPT | Performed by: INTERNAL MEDICINE

## 2019-12-20 PROCEDURE — G8427 DOCREV CUR MEDS BY ELIG CLIN: HCPCS | Performed by: INTERNAL MEDICINE

## 2019-12-20 PROCEDURE — G8417 CALC BMI ABV UP PARAM F/U: HCPCS | Performed by: INTERNAL MEDICINE

## 2019-12-20 PROCEDURE — G8399 PT W/DXA RESULTS DOCUMENT: HCPCS | Performed by: INTERNAL MEDICINE

## 2019-12-20 PROCEDURE — 1123F ACP DISCUSS/DSCN MKR DOCD: CPT | Performed by: INTERNAL MEDICINE

## 2019-12-20 PROCEDURE — 4040F PNEUMOC VAC/ADMIN/RCVD: CPT | Performed by: INTERNAL MEDICINE

## 2019-12-20 PROCEDURE — 99213 OFFICE O/P EST LOW 20 MIN: CPT | Performed by: INTERNAL MEDICINE

## 2019-12-20 PROCEDURE — G8482 FLU IMMUNIZE ORDER/ADMIN: HCPCS | Performed by: INTERNAL MEDICINE

## 2019-12-20 PROCEDURE — 1090F PRES/ABSN URINE INCON ASSESS: CPT | Performed by: INTERNAL MEDICINE

## 2019-12-20 PROCEDURE — 1036F TOBACCO NON-USER: CPT | Performed by: INTERNAL MEDICINE

## 2019-12-20 RX ORDER — GABAPENTIN 400 MG/1
CAPSULE ORAL
Qty: 90 CAPSULE | Refills: 1 | Status: SHIPPED | OUTPATIENT
Start: 2019-12-20 | End: 2020-01-27 | Stop reason: SDUPTHER

## 2019-12-20 RX ORDER — AMITRIPTYLINE HYDROCHLORIDE 25 MG/1
25-50 TABLET, FILM COATED ORAL NIGHTLY
Qty: 60 TABLET | Refills: 1 | Status: SHIPPED | OUTPATIENT
Start: 2019-12-20 | End: 2020-01-27

## 2019-12-20 RX ORDER — BUPROPION HYDROCHLORIDE 150 MG/1
150 TABLET ORAL EVERY MORNING
Qty: 30 TABLET | Refills: 1 | Status: SHIPPED | OUTPATIENT
Start: 2019-12-20 | End: 2020-01-27

## 2019-12-20 RX ORDER — OXYCODONE AND ACETAMINOPHEN 7.5; 325 MG/1; MG/1
1 TABLET ORAL EVERY 6 HOURS PRN
Qty: 90 TABLET | Refills: 0 | Status: SHIPPED | OUTPATIENT
Start: 2019-12-20 | End: 2020-01-27 | Stop reason: SDUPTHER

## 2019-12-26 DIAGNOSIS — E10.9 TYPE 1 DIABETES MELLITUS WITHOUT COMPLICATION (HCC): ICD-10-CM

## 2020-01-03 NOTE — TELEPHONE ENCOUNTER
Spoke with Flaquita Pena from pharmacy and gave a verbal for Patient insulin needles because she is out. Left voicemail for Patient on [phone]. No further questions and or concerns thus far.

## 2020-01-03 NOTE — TELEPHONE ENCOUNTER
Per Desire LPN needles called into pharmacy. Patient can  in 30 minutes. Left message on patient's voice mail needle script phoned in.

## 2020-01-27 ENCOUNTER — OFFICE VISIT (OUTPATIENT)
Dept: FAMILY MEDICINE CLINIC | Age: 70
End: 2020-01-27
Payer: MEDICARE

## 2020-01-27 ENCOUNTER — OFFICE VISIT (OUTPATIENT)
Dept: PAIN MANAGEMENT | Age: 70
End: 2020-01-27
Payer: MEDICARE

## 2020-01-27 VITALS
SYSTOLIC BLOOD PRESSURE: 137 MMHG | DIASTOLIC BLOOD PRESSURE: 81 MMHG | WEIGHT: 159 LBS | BODY MASS INDEX: 27.29 KG/M2 | HEART RATE: 82 BPM

## 2020-01-27 VITALS
HEIGHT: 64 IN | SYSTOLIC BLOOD PRESSURE: 136 MMHG | DIASTOLIC BLOOD PRESSURE: 78 MMHG | OXYGEN SATURATION: 97 % | BODY MASS INDEX: 27.14 KG/M2 | HEART RATE: 83 BPM | WEIGHT: 159 LBS

## 2020-01-27 LAB
CREATININE URINE POCT: 300
HBA1C MFR BLD: 7.7 %
MICROALBUMIN/CREAT 24H UR: 80 MG/G{CREAT}
MICROALBUMIN/CREAT UR-RTO: ABNORMAL

## 2020-01-27 PROCEDURE — 3017F COLORECTAL CA SCREEN DOC REV: CPT | Performed by: NURSE PRACTITIONER

## 2020-01-27 PROCEDURE — 1090F PRES/ABSN URINE INCON ASSESS: CPT | Performed by: NURSE PRACTITIONER

## 2020-01-27 PROCEDURE — 1036F TOBACCO NON-USER: CPT | Performed by: INTERNAL MEDICINE

## 2020-01-27 PROCEDURE — G8417 CALC BMI ABV UP PARAM F/U: HCPCS | Performed by: INTERNAL MEDICINE

## 2020-01-27 PROCEDURE — G8482 FLU IMMUNIZE ORDER/ADMIN: HCPCS | Performed by: INTERNAL MEDICINE

## 2020-01-27 PROCEDURE — 83036 HEMOGLOBIN GLYCOSYLATED A1C: CPT | Performed by: NURSE PRACTITIONER

## 2020-01-27 PROCEDURE — 99214 OFFICE O/P EST MOD 30 MIN: CPT | Performed by: NURSE PRACTITIONER

## 2020-01-27 PROCEDURE — 2022F DILAT RTA XM EVC RTNOPTHY: CPT | Performed by: NURSE PRACTITIONER

## 2020-01-27 PROCEDURE — G8399 PT W/DXA RESULTS DOCUMENT: HCPCS | Performed by: INTERNAL MEDICINE

## 2020-01-27 PROCEDURE — 1123F ACP DISCUSS/DSCN MKR DOCD: CPT | Performed by: NURSE PRACTITIONER

## 2020-01-27 PROCEDURE — G8427 DOCREV CUR MEDS BY ELIG CLIN: HCPCS | Performed by: NURSE PRACTITIONER

## 2020-01-27 PROCEDURE — 1036F TOBACCO NON-USER: CPT | Performed by: NURSE PRACTITIONER

## 2020-01-27 PROCEDURE — G8417 CALC BMI ABV UP PARAM F/U: HCPCS | Performed by: NURSE PRACTITIONER

## 2020-01-27 PROCEDURE — G8482 FLU IMMUNIZE ORDER/ADMIN: HCPCS | Performed by: NURSE PRACTITIONER

## 2020-01-27 PROCEDURE — 1090F PRES/ABSN URINE INCON ASSESS: CPT | Performed by: INTERNAL MEDICINE

## 2020-01-27 PROCEDURE — 3017F COLORECTAL CA SCREEN DOC REV: CPT | Performed by: INTERNAL MEDICINE

## 2020-01-27 PROCEDURE — G8399 PT W/DXA RESULTS DOCUMENT: HCPCS | Performed by: NURSE PRACTITIONER

## 2020-01-27 PROCEDURE — 99213 OFFICE O/P EST LOW 20 MIN: CPT | Performed by: INTERNAL MEDICINE

## 2020-01-27 PROCEDURE — G8427 DOCREV CUR MEDS BY ELIG CLIN: HCPCS | Performed by: INTERNAL MEDICINE

## 2020-01-27 PROCEDURE — 1123F ACP DISCUSS/DSCN MKR DOCD: CPT | Performed by: INTERNAL MEDICINE

## 2020-01-27 PROCEDURE — 3288F FALL RISK ASSESSMENT DOCD: CPT | Performed by: NURSE PRACTITIONER

## 2020-01-27 PROCEDURE — 82044 UR ALBUMIN SEMIQUANTITATIVE: CPT | Performed by: NURSE PRACTITIONER

## 2020-01-27 PROCEDURE — 4040F PNEUMOC VAC/ADMIN/RCVD: CPT | Performed by: NURSE PRACTITIONER

## 2020-01-27 PROCEDURE — 4040F PNEUMOC VAC/ADMIN/RCVD: CPT | Performed by: INTERNAL MEDICINE

## 2020-01-27 RX ORDER — OXYCODONE AND ACETAMINOPHEN 7.5; 325 MG/1; MG/1
1 TABLET ORAL EVERY 6 HOURS PRN
Qty: 70 TABLET | Refills: 0 | Status: SHIPPED | OUTPATIENT
Start: 2020-01-27 | End: 2020-02-24 | Stop reason: SDUPTHER

## 2020-01-27 RX ORDER — GABAPENTIN 400 MG/1
CAPSULE ORAL
Qty: 90 CAPSULE | Refills: 1 | Status: SHIPPED | OUTPATIENT
Start: 2020-01-27 | End: 2020-03-26 | Stop reason: SDUPTHER

## 2020-01-27 RX ORDER — TRAZODONE HYDROCHLORIDE 100 MG/1
100 TABLET ORAL NIGHTLY
COMMUNITY
End: 2020-03-26

## 2020-01-27 RX ORDER — OLANZAPINE 2.5 MG/1
2.5 TABLET ORAL NIGHTLY
Qty: 30 TABLET | Refills: 1 | Status: SHIPPED | OUTPATIENT
Start: 2020-01-27 | End: 2020-03-27

## 2020-01-27 NOTE — PATIENT INSTRUCTIONS
Take part in a Spiritism activity or other social gathering. Go to a [email or web address] game. · Ask a friend to have dinner with you. Take care of yourself  · Eat a balanced diet with plenty of fresh fruits and vegetables, whole grains, and lean protein. If you have lost your appetite, eat small snacks rather than large meals. · Avoid drinking alcohol or using illegal drugs. Do not take medicines that have not been prescribed for you. They may interfere with medicines you may be taking for depression, or they may make your depression worse. · Take your medicines exactly as they are prescribed. You may start to feel better within 1 to 3 weeks of taking antidepressant medicine. But it can take as many as 6 to 8 weeks to see more improvement. If you have questions or concerns about your medicines, or if you do not notice any improvement by 3 weeks, talk to your doctor. · If you have any side effects from your medicine, tell your doctor. Antidepressants can make you feel tired, dizzy, or nervous. Some people have dry mouth, constipation, headaches, sexual problems, or diarrhea. Many of these side effects are mild and will go away on their own after you have been taking the medicine for a few weeks. Some may last longer. Talk to your doctor if side effects are bothering you too much. You might be able to try a different medicine. · Get enough sleep. If you have problems sleeping:  ? Go to bed at the same time every night, and get up at the same time every morning. ? Keep your bedroom dark and quiet. ? Do not exercise after 5:00 p.m.  ? Avoid drinks with caffeine after 5:00 p.m. · Avoid sleeping pills unless they are prescribed by the doctor treating your depression. Sleeping pills may make you groggy during the day, and they may interact with other medicine you are taking. · If you have any other illnesses, such as diabetes, heart disease, or high blood pressure, make sure to continue with your treatment.  Tell your doctor about all of the medicines you take, including those with or without a prescription. · Keep the numbers for these national suicide hotlines: 3-034-190-TALK (3-858.740.2592) and 1-002-KEFXVHG (0-914.359.1770). If you or someone you know talks about suicide or feeling hopeless, get help right away. When should you call for help? Call 911 anytime you think you may need emergency care. For example, call if:    · You feel like hurting yourself or someone else.     · Someone you know has depression and is about to attempt or is attempting suicide.   [facility] your doctor now or seek immediate medical care if:    · You hear voices.     · Someone you know has depression and:  ? Starts to give away his or her possessions. ? Uses illegal drugs or drinks alcohol heavily. ? Talks or writes about death, including writing suicide notes or talking about guns, knives, or pills. ? Starts to spend a lot of time alone. ? Acts very aggressively or suddenly appears calm.    Watch closely for changes in your health, and be sure to contact your doctor if:    · You do not get better as expected. Where can you learn more? Go to [email or web address]. org and sign in to your [employer] account. Enter Q724 in the "[employer] box to learn more about \"Recovering From Depression: Care Instructions. \"     If you do not have an account, please click on the \"Sign Up Now\" link. Current as of: May 28, 2019  Content Version: 12.3  © 8040-6650 [employer]. Care instructions adapted under license by [facility] ([facility]). If you have questions about a medical condition or this instruction, always ask your healthcare professional. [place] [address] any warranty or liability for your use of this information.

## 2020-01-27 NOTE — PROGRESS NOTES
aspart (NOVOLOG) 100 UNIT/ML injection pen INJECT 5 UNITS INTO THE SKIN 3 TIMES DAILY (BEFORE MEALS) 6 pen 2    meloxicam (MOBIC) 15 MG tablet Take 1 tablet by mouth daily 30 tablet 1    losartan (COZAAR) 100 MG tablet Take 1 tablet by mouth daily 90 tablet 0    atorvastatin (LIPITOR) 40 MG tablet Take 1 tablet by mouth daily 90 tablet 0    amLODIPine (NORVASC) 5 MG tablet Take 1 tablet by mouth daily 90 tablet 0    insulin detemir (LEVEMIR FLEXTOUCH) 100 UNIT/ML injection pen Inject 20 Units into the skin 2 times daily 10 pen 2    Handicap Placard MISC by Does not apply route 1 each 0    NOVOLOG FLEXPEN 100 UNIT/ML injection pen INJECT 5 UNITS INTO THE SKIN 3 TIMES DAILY (BEFORE MEALS) 5 pen 0    sertraline (ZOLOFT) 100 MG tablet Take 100 mg by mouth daily      calcium carbonate (OSCAL) 500 MG TABS tablet Take 500 mg by mouth daily      Needles & Syringes MISC Inject 1 each into the skin 3 times daily 100 each 3    gabapentin (NEURONTIN) 400 MG capsule Take 1 tablet po AM, take 2 tablets po PM 90 capsule 1     No facility-administered medications prior to visit. SOCIAL/FAMILY/PAST MEDICAL HISTORY: Ms. Chaya Cintron, family and past medical history was reviewed. REVIEW OF SYSTEMS:    Respiratory: Negative for apnea, chest tightness and shortness of breath or change in baseline breathing. Gastrointestinal: Negative for nausea, vomiting, abdominal pain, diarrhea, constipation, blood in stool and abdominal distention. PHYSICAL EXAM:   Nursing note and vitals reviewed. /81   Pulse 82   Wt 159 lb (72.1 kg)   BMI 27.29 kg/m²   Constitutional: She appears well-developed and well-nourished. No acute distress. Skin: Skin is warm and dry, good turgor. No rash noted. She is not diaphoretic. Cardiovascular: Normal rate, regular rhythm, normal heart sounds, and does not have murmur. Pulmonary/Chest: Effort normal. No respiratory distress.  She does not have wheezes in the lung fields. She has no rales. Neurological/Psychiatric:She is alert and oriented to person, place, and time. Coordination is  normal.  Her mood isAppropriate and affect is Neutral/Euthymic(normal) . IMPRESSION:   1. Chronic pain syndrome    2. Degenerative lumbar spinal stenosis    3. Failed neck syndrome    4. Failed back surgical syndrome    5. Fibromyalgia    6. Spinal stenosis of lumbosacral region    7. Osteoarthritis of cervical spine, unspecified spinal osteoarthritis complication status        PLAN:  Informed verbal consent was obtained  -continue with current opioid regimen, Percocet 2-3 per day  -Adv Biofeedback, relaxation and meditation techniques. Referral to psychologist for CBT was also discussed with patient  -She was advised to increase fluids ( 5-7  glasses of fluid daily), limit caffeine, avoid cheese products, increase dietary fiber, increase activity and exercise as tolerated and relax regularly and enjoy meals   -walking/stretching exercises as advised for the back  -She was advised weight reduction, diet changes- 800-1200 pedro diet, diet diary, exercising, nutritional  consult increased physical activity as tolerated   -labs to be done in the next month   Current Outpatient Medications   Medication Sig Dispense Refill    traZODone (DESYREL) 100 MG tablet Take 100 mg by mouth nightly      OLANZapine (ZYPREXA) 2.5 MG tablet Take 1 tablet by mouth nightly 30 tablet 1    gabapentin (NEURONTIN) 400 MG capsule Take 1 tablet po AM, take 2 tablets po PM 90 capsule 1    oxyCODONE-acetaminophen (PERCOCET) 7.5-325 MG per tablet Take 1 tablet by mouth every 6 hours as needed for Pain (max 2-3/day) for up to 28 days.  70 tablet 0    insulin aspart (NOVOLOG) 100 UNIT/ML injection pen INJECT 5 UNITS INTO THE SKIN 3 TIMES DAILY (BEFORE MEALS) 6 pen 2    meloxicam (MOBIC) 15 MG tablet Take 1 tablet by mouth daily 30 tablet 1    losartan (COZAAR) 100 MG tablet Take 1 tablet by mouth daily 90 tablet 0 with ADL's. Ability to do home exercises independently. Ability to do household chores indoor and/or outdoor work and social and leisure activities. Improve psychosocial and physical functioning. - she is showing progression towards this treatment goal with the current regimen. She was advised against drinking alcohol with the narcotic pain medicines, advised against driving or handling machinery while adjusting the dose of medicines or if having cognitive  issues related to the current medications. Risk of overdose and death, if medicines not taken as prescribed, were also discussed. If the patient develops new symptoms or if the symptoms worsen, the patient should call the office. While transcribing every attempt was made to maintain the accuracy of the note in terms of it's contents,there may have been some errors made inadvertently. Thank you for allowing me to participate in the care of this patient.     Pretty Isaac MD.    Cc: Dr.Mary Eyad Barajas, APRN - CNP

## 2020-01-27 NOTE — PROGRESS NOTES
irritability,No restlessness, or social isolation  Sleep: No excess sleepiness, insomnia, or restless sleep  Whole body: No excessive hunger, fatigue, or loss of appetite  Cognitive: No lack of concentration, slowness in activity, or actively present thoughts of suicide  Weight: No weight gain or weight loss    She talked about her mother that  of melanoma ( in ). She talks about this as it happened recently. She was 17 at the time of her death and states she hasn't dealt with it well over all these years. Schizophrenia (Differential diagnosis) assessment today includes:  Hallucinations - imagined voices or images that seem real: She reports she infrequently has these symptoms: She estimates about 2-3 time per month. Delusions - beliefs that are not true: No   Disorganized thinking or trouble organizing your thoughts and making sense: She describes sometimes during the day she can't organize herself to complete any household chores: Then then explains there's days she has no trouble. Little desire to be around other people: + (yes)   Trouble speaking clearly: No  Lack of motivation: Yes    She graduated high school and completed two years of college. 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 She has recently not been able to go in to work due to [employer] off the walls\". When questioned about this description she doesn't describe any manic episodes and has never been diagnosed with bipolar (according to her). Visual inspection:  Deformity/amputation: absent  Skin lesions/pre-ulcerative calluses: absent  Edema: right- negative, left- negative    Sensory exam:  Monofilament sensation: normal  (minimum of 5 random plantar locations tested, avoiding callused areas - > 1 area with absence of sensation is + for neuropathy)    Plus at least one of the following:  Pulses: normal,   Pinprick: Intact  Proprioception: Intact  Vibration (128 Hz):  Intact    Wt Readings from Last 3 Encounters:   01/27/20 159 lb (72.1 kg)   12/20/19 156 lb (70.8 kg)   11/11/19 156 lb (70.8 kg)     BP Readings from Last 3 Encounters:   01/27/20 136/78   12/20/19 (!) 152/93   11/11/19 135/81       Patient Active Problem List   Diagnosis    Depression    Hypertension    Spinal stenosis    Chronic back pain    Retinopathy    IDDM (insulin dependent diabetes mellitus) ([facility] [address]    Family history of melanoma    Osteopenia    Type 1 diabetes mellitus without complication (HCC)    Cervical osteoarthritis    Dilated cbd, acquired    Gallstone pancreatitis    Osteoarthrosis    Bilateral carpal tunnel syndrome    Ulnar neuropathy at elbow, left    Degenerative lumbar spinal stenosis    Hyponatremia    Wound drainage    Superficial incisional infection of surgical site    Post-operative pain    Staph aureus infection    Chronic pain syndrome    Bilateral sensorineural hearing loss    Balance disorder    Post-nasal drip       Preventive Care:  Health Maintenance   Topic Date Due    DTaP/Tdap/Td vaccine (1 - Tdap) 09/30/1961    Annual Wellness Visit (AWV)  05/29/2019    Shingles Vaccine (2 of 2) 12/10/2019    Diabetic microalbuminuria test  01/25/2020    Diabetic foot exam  01/27/2020    Diabetic retinal exam  06/03/2020    A1C test (Diabetic or Prediabetic)  10/10/2020    Lipid screen  10/10/2020    Potassium monitoring  10/10/2020    Creatinine monitoring  10/10/2020    Breast cancer screen  10/25/2020    Colon cancer screen colonoscopy  07/31/2025    Flu vaccine  Completed    Pneumococcal 65+ years Vaccine  Completed    Hepatitis C screen  Completed    DEXA (modify frequency per FRAX score)  Addressed      Hx abnormal PAP: no  Sexual activity: none   Self-breast exams: yes  Previous DEXA scan: no  Last eye exam: Unknown  Exercise: no regular exercise  Seatbelt use: yes  Lipid panel:   Lab Results   Component Value Date    CHOL 182 10/10/2019    TRIG 85 10/10/2019 normal. She exhibits no organomegaly, mass or bruit. Genitourinary: exam declined by the patient. Breast exam:  Not examined, encouraged patient to have mammogram performed. Musculoskeletal: Normal range of motion, no synovitis. She exhibits no edema. Neurological: She is alert and oriented to person, place, and time. She has normal reflexes. Unsteady balance upon raising from chair patient describes this as a chronic condition. Skin: Skin is warm and dry. There is no rash or erythema. No suspicious lesions noted. Significant scaring of the lumbar previous surgeries. Psychiatric: She has a depressed mood and affect. Her speech is normal and behavior is normal. Judgment is questionable based on her report of self-injurious thoughts, cognition and memory are normal.     Assessment/Plan:    Zulema Low was seen today for establish care. Diagnoses and all orders for this visit:    Type 2 diabetes mellitus without complication, without long-term current use of insulin (HCC)  -     POCT glycosylated hemoglobin (Hb A1C)  -     POCT microalbumin  -     HM DIABETES FOOT EXAM  -     CBC; Future  -     Comprehensive Metabolic Panel; Future  -     Hemoglobin A1C; Future  -     Lipid Panel; Future  -     CK; Future    Depression, unspecified depression type  -     OLANZapine (ZYPREXA) 2.5 MG tablet; Take 1 tablet by mouth nightly  -     TSH without Reflex; Future  -     Vitamin B12; Future  -     Vitamin D 25 Hydroxy; Future    -Attend upcoming psychiatrist appointment  -Follow up in 6-8 weeks: Education on the importance of follow up provided to patient.   -Initiate new medication (Lowest dose): Call for any adverse side effects or increase (or intent/plan) in self injury thoughts  -> 30 minute face to face time spent with patient.

## 2020-02-24 ENCOUNTER — OFFICE VISIT (OUTPATIENT)
Dept: PAIN MANAGEMENT | Age: 70
End: 2020-02-24
Payer: MEDICARE

## 2020-02-24 VITALS
WEIGHT: 159 LBS | BODY MASS INDEX: 27.29 KG/M2 | SYSTOLIC BLOOD PRESSURE: 151 MMHG | HEART RATE: 85 BPM | DIASTOLIC BLOOD PRESSURE: 81 MMHG

## 2020-02-24 PROCEDURE — 3017F COLORECTAL CA SCREEN DOC REV: CPT | Performed by: INTERNAL MEDICINE

## 2020-02-24 PROCEDURE — 1123F ACP DISCUSS/DSCN MKR DOCD: CPT | Performed by: INTERNAL MEDICINE

## 2020-02-24 PROCEDURE — 1090F PRES/ABSN URINE INCON ASSESS: CPT | Performed by: INTERNAL MEDICINE

## 2020-02-24 PROCEDURE — G8399 PT W/DXA RESULTS DOCUMENT: HCPCS | Performed by: INTERNAL MEDICINE

## 2020-02-24 PROCEDURE — G8427 DOCREV CUR MEDS BY ELIG CLIN: HCPCS | Performed by: INTERNAL MEDICINE

## 2020-02-24 PROCEDURE — G8482 FLU IMMUNIZE ORDER/ADMIN: HCPCS | Performed by: INTERNAL MEDICINE

## 2020-02-24 PROCEDURE — 99213 OFFICE O/P EST LOW 20 MIN: CPT | Performed by: INTERNAL MEDICINE

## 2020-02-24 PROCEDURE — 1036F TOBACCO NON-USER: CPT | Performed by: INTERNAL MEDICINE

## 2020-02-24 PROCEDURE — G8417 CALC BMI ABV UP PARAM F/U: HCPCS | Performed by: INTERNAL MEDICINE

## 2020-02-24 PROCEDURE — 4040F PNEUMOC VAC/ADMIN/RCVD: CPT | Performed by: INTERNAL MEDICINE

## 2020-02-24 RX ORDER — OXYCODONE AND ACETAMINOPHEN 7.5; 325 MG/1; MG/1
1 TABLET ORAL EVERY 6 HOURS PRN
Qty: 70 TABLET | Refills: 0 | Status: SHIPPED | OUTPATIENT
Start: 2020-02-24 | End: 2020-03-26 | Stop reason: SDUPTHER

## 2020-02-24 NOTE — PROGRESS NOTES
Paris Cage  1950  [id number]      HISTORY OF PRESENT ILLNESS:  Ms. Hope Cortez is a 68 y.o. female returns for a follow up visit for pain management  She has a diagnosis of   1. Chronic pain syndrome    2. Degenerative lumbar spinal stenosis    3. Failed neck syndrome    4. Failed back surgical syndrome    5. Fibromyalgia    6. Spinal stenosis of lumbosacral region    7. Primary insomnia    8. Mood disorder ([address]    . She complains of pain in the mid, lower back, left hip, left upper leg She rates the pain 5/10 and describes it as aching, burning, numbness. Current treatment regimen has helped relieve about 40% of the pain. She denies any side effects from the current pain regimen. Patient reports that since the last follow up visit the physical functioning is unchanged, family/social relationships are unchanged, mood is unchanged sleep patterns are unchanged, and that the overall functioning is unchanged. Patient denies misusing/abusing her narcotic pain medications or using any illegal drugs. There are No indicators for possible drug abuse, addiction or diversion problems. Ms. Hope Cortez states she is doing about the same. She states she is using Neurontin along with Percocet 2-3 per day. She states she is having some mood issues. Patient denies any constipation symptoms. She reports her back hurts worse than her neck. Patient says she is managing some light house chores. Patient is complaining of burning is in the left leg. ALLERGIES: Patients list of allergies were reviewed     MEDICATIONS: Ms. Hope Cortez list of medications were reviewed. Her current medications are   Outpatient Medications Prior to Visit   Medication Sig Dispense Refill    traZODone (DESYREL) 100 MG tablet Take 100 mg by mouth nightly      OLANZapine (ZYPREXA) 2.5 MG tablet Take 1 tablet by mouth nightly 30 tablet 1    gabapentin (NEURONTIN) 400 MG capsule Take 1 tablet po AM, take 2 tablets po PM 90 capsule 1    oxyCODONE-acetaminophen (PERCOCET) 7.5-325 MG per tablet Take 1 tablet by mouth every 6 hours as needed for Pain (max 2-3/day) for up to 28 days. 70 tablet 0    insulin aspart (NOVOLOG) 100 UNIT/ML injection pen INJECT 5 UNITS INTO THE SKIN 3 TIMES DAILY (BEFORE MEALS) 6 pen 2    meloxicam (MOBIC) 15 MG tablet Take 1 tablet by mouth daily 30 tablet 1    losartan (COZAAR) 100 MG tablet Take 1 tablet by mouth daily 90 tablet 0    atorvastatin (LIPITOR) 40 MG tablet Take 1 tablet by mouth daily 90 tablet 0    amLODIPine (NORVASC) 5 MG tablet Take 1 tablet by mouth daily 90 tablet 0    insulin detemir (LEVEMIR FLEXTOUCH) 100 UNIT/ML injection pen Inject 20 Units into the skin 2 times daily 10 pen 2    Handicap Placard MISC by Does not apply route 1 each 0    NOVOLOG FLEXPEN 100 UNIT/ML injection pen INJECT 5 UNITS INTO THE SKIN 3 TIMES DAILY (BEFORE MEALS) 5 pen 0    sertraline (ZOLOFT) 100 MG tablet Take 100 mg by mouth daily      calcium carbonate (OSCAL) 500 MG TABS tablet Take 500 mg by mouth daily      Needles & Syringes MISC Inject 1 each into the skin 3 times daily 100 each 3     No facility-administered medications prior to visit. SOCIAL/FAMILY/PAST MEDICAL HISTORY: Ms. Ralf Matthews, family and past medical history was reviewed. REVIEW OF SYSTEMS:    Respiratory: Negative for apnea, chest tightness and shortness of breath or change in baseline breathing. Gastrointestinal: Negative for nausea, vomiting, abdominal pain, diarrhea, constipation, blood in stool and abdominal distention. PHYSICAL EXAM:   Nursing note and vitals reviewed. BP (!) 151/81   Pulse 85   Wt 159 lb (72.1 kg)   BMI 27.29 kg/m²   Constitutional: She appears well-developed and well-nourished. No acute distress. Skin: Skin is warm and dry, good turgor. No rash noted. She is not diaphoretic. Cardiovascular: Normal rate, regular rhythm, normal heart sounds, and does not have murmur.      Pulmonary/Chest: social and leisure activities. Improve psychosocial and physical functioning. - she is showing progression towards this treatment goal with the current regimen. She was advised against drinking alcohol with the narcotic pain medicines, advised against driving or handling machinery while adjusting the dose of medicines or if having cognitive  issues related to the current medications. Risk of overdose and death, if medicines not taken as prescribed, were also discussed. If the patient develops new symptoms or if the symptoms worsen, the patient should call the office. While transcribing every attempt was made to maintain the accuracy of the note in terms of it's contents,there may have been some errors made inadvertently. Thank you for allowing me to participate in the care of this patient.     Sherley Subramanian MD.    Cc: Dr.Mary Shailesh Serna, APRN - CNP

## 2020-02-24 NOTE — LETTER
[facility]  [address] [place] [address] [address] [place]  Phone: [phone]  Fax: [phone]    Tacos Martinez MD        February 24, 2020     Patient: Demetria Coronado   YOB: 1950   Date of Visit: 2/24/2020       To Whom It May Concern: It is my medical opinion that Demetria Coronado requires a disability parking placard for the following reasons:  She cannot walk without assistance from another person or the use of an assistance device (cane, crutch, prosthetic device, wheelchair, etc.). She cannot walk 200 feet without stopping to rest.  Duration of need: 3 years    If you have any questions or concerns, please don't hesitate to call.     Sincerely,        Tacos Martinez MD

## 2020-03-09 NOTE — TELEPHONE ENCOUNTER
Future Appointments   Date Time Provider John Irizarry   3/18/2020 11:00 AM SAI Michele - CNP [facility]   3/26/2020 11:40 AM Charmaine Calvillo MD [facility]   Last ov 1/27/20

## 2020-03-11 RX ORDER — LOSARTAN POTASSIUM 100 MG/1
100 TABLET ORAL DAILY
Qty: 90 TABLET | Refills: 0 | Status: SHIPPED | OUTPATIENT
Start: 2020-03-11 | End: 2020-06-04

## 2020-03-11 RX ORDER — ATORVASTATIN CALCIUM 40 MG/1
40 TABLET, FILM COATED ORAL DAILY
Qty: 90 TABLET | Refills: 0 | Status: SHIPPED | OUTPATIENT
Start: 2020-03-11 | End: 2020-06-04

## 2020-03-18 ENCOUNTER — OFFICE VISIT (OUTPATIENT)
Dept: FAMILY MEDICINE CLINIC | Age: 70
End: 2020-03-18
Payer: MEDICARE

## 2020-03-18 VITALS
WEIGHT: 164 LBS | DIASTOLIC BLOOD PRESSURE: 80 MMHG | OXYGEN SATURATION: 95 % | HEART RATE: 85 BPM | RESPIRATION RATE: 16 BRPM | SYSTOLIC BLOOD PRESSURE: 134 MMHG | BODY MASS INDEX: 28.15 KG/M2 | TEMPERATURE: 97.8 F

## 2020-03-18 PROCEDURE — 99213 OFFICE O/P EST LOW 20 MIN: CPT | Performed by: NURSE PRACTITIONER

## 2020-03-18 PROCEDURE — 1123F ACP DISCUSS/DSCN MKR DOCD: CPT | Performed by: NURSE PRACTITIONER

## 2020-03-18 PROCEDURE — 3288F FALL RISK ASSESSMENT DOCD: CPT | Performed by: NURSE PRACTITIONER

## 2020-03-18 PROCEDURE — 3017F COLORECTAL CA SCREEN DOC REV: CPT | Performed by: NURSE PRACTITIONER

## 2020-03-18 PROCEDURE — G8417 CALC BMI ABV UP PARAM F/U: HCPCS | Performed by: NURSE PRACTITIONER

## 2020-03-18 PROCEDURE — G8427 DOCREV CUR MEDS BY ELIG CLIN: HCPCS | Performed by: NURSE PRACTITIONER

## 2020-03-18 PROCEDURE — G8482 FLU IMMUNIZE ORDER/ADMIN: HCPCS | Performed by: NURSE PRACTITIONER

## 2020-03-18 PROCEDURE — 1090F PRES/ABSN URINE INCON ASSESS: CPT | Performed by: NURSE PRACTITIONER

## 2020-03-18 PROCEDURE — G8399 PT W/DXA RESULTS DOCUMENT: HCPCS | Performed by: NURSE PRACTITIONER

## 2020-03-18 PROCEDURE — 4040F PNEUMOC VAC/ADMIN/RCVD: CPT | Performed by: NURSE PRACTITIONER

## 2020-03-18 PROCEDURE — 1036F TOBACCO NON-USER: CPT | Performed by: NURSE PRACTITIONER

## 2020-03-18 PROCEDURE — G8510 SCR DEP NEG, NO PLAN REQD: HCPCS | Performed by: NURSE PRACTITIONER

## 2020-03-18 RX ORDER — SERTRALINE HYDROCHLORIDE 100 MG/1
100 TABLET, FILM COATED ORAL 2 TIMES DAILY
Qty: 60 TABLET | Refills: 1 | Status: SHIPPED | OUTPATIENT
Start: 2020-03-18 | End: 2022-06-15 | Stop reason: SDUPTHER

## 2020-03-18 ASSESSMENT — PATIENT HEALTH QUESTIONNAIRE - PHQ9
SUM OF ALL RESPONSES TO PHQ QUESTIONS 1-9: 0
SUM OF ALL RESPONSES TO PHQ QUESTIONS 1-9: 0
2. FEELING DOWN, DEPRESSED OR HOPELESS: 0
1. LITTLE INTEREST OR PLEASURE IN DOING THINGS: 0
SUM OF ALL RESPONSES TO PHQ9 QUESTIONS 1 & 2: 0

## 2020-03-18 ASSESSMENT — ENCOUNTER SYMPTOMS
RECTAL PAIN: 0
VOICE CHANGE: 0
APNEA: 0
ABDOMINAL DISTENTION: 0
COUGH: 0
TROUBLE SWALLOWING: 0
EYE PAIN: 0
PHOTOPHOBIA: 0
WHEEZING: 0
COLOR CHANGE: 0
CHEST TIGHTNESS: 0
EYE DISCHARGE: 0
SHORTNESS OF BREATH: 0
STRIDOR: 0
CHOKING: 0
ABDOMINAL PAIN: 0
FACIAL SWELLING: 0
EYE REDNESS: 0

## 2020-03-18 NOTE — PATIENT INSTRUCTIONS
Patient Education        Depression and Chronic Disease: Care Instructions  Your Care Instructions    A chronic disease is one that you have for a long time. Some chronic diseases can be controlled, but they usually cannot be cured. Depression is common in people with chronic diseases, but it often goes unnoticed. Many people have concerns about seeking treatment for a mental health problem. You may think it's a sign of weakness, or you don't want people to know about it. It's important to overcome these reasons for not seeking treatment. Treating depression or anxiety is good for your health. Follow-up care is a key part of your treatment and safety. Be sure to make and go to all appointments, and call your doctor if you are having problems. It's also a good idea to know your test results and keep a list of the medicines you take. How can you care for yourself at home? Watch for symptoms of depression  The symptoms of depression are often subtle at first. You may think they are caused by your disease rather than depression. Or you may think it is normal to be depressed when you have a chronic disease. If you are depressed you may:  · Feel sad or hopeless. · Feel guilty or worthless. · Not enjoy the things you used to enjoy. · Feel hopeless, as though life is not worth living. · Have trouble thinking or remembering. · Have low energy, and you may not eat or sleep well. · Pull away from others. · Think often about death or killing yourself. (Keep the numbers for these national suicide hotlines: 9-196-561-TALK [1-776.868.7521] and 7-038-GXASMWN [1-850.632.2267]. )  Get treatment  By treating your depression, you can feel more hopeful and have more energy. If you feel better, you may take better care of yourself, so your health may improve. · Talk to your doctor if you have any changes in mood during treatment for your disease. · Ask your doctor for help.  Counseling, antidepressant medicine, or a combination of the two can help most people with depression. Often a combination works best. Counseling can also help you cope with having a chronic disease. When should you call for help? Call 911 anytime you think you may need emergency care. For example, call if:    · You feel like hurting yourself or someone else.     · Someone you know has depression and is about to attempt or is attempting suicide.   [facility] your doctor now or seek immediate medical care if:    · You hear voices.     · Someone you know has depression and:  ? Starts to give away his or her possessions. ? Uses illegal drugs or drinks alcohol heavily. ? Talks or writes about death, including writing suicide notes or talking about guns, knives, or pills. ? Starts to spend a lot of time alone. ? Acts very aggressively or suddenly appears calm.    Watch closely for changes in your health, and be sure to contact your doctor if:    · You do not get better as expected. Where can you learn more? Go to https://H[employer]erna.GetYou. org and sign in to your [employer] account. Enter V187 in the [employer] box to learn more about \"Depression and Chronic Disease: Care Instructions. \"     If you do not have an account, please click on the \"Sign Up Now\" link. Current as of: May 28, 2019Content Version: 12.4  © 1717-4047 [employer]wise, Incorporated. Care instructions adapted under license by [facility] ([facility]). If you have questions about a medical condition or this instruction, always ask your healthcare professional. [place] [address] any warranty or liability for your use of this information.

## 2020-03-18 NOTE — PROGRESS NOTES
HPI: Juan Babcock is a 68 y.o. female who presents for follow up and chronic medication management. She reports her chronic migraines have significantly improved. She reports she has not had any visual or auditory hallucinations since she began the medication added into her regimen. She reports no adverse side effects to her new medication. She reports no self-injury or suicidal thoughts. Mood: No anxiety, No apathy, no hopelessness, denies any thoughts of worthlessness, no loss of interest or pleasure in activities. Behavioral: No agitation, she reports excessive crying and increased anxiety with the current virus threats and the confusing reports on the news, No irritability,No restlessness, + social isolation due to her living facility has halted all activities and asked residents to stay within their living space to prevent virus spreading.    Sleep: No excess sleepiness, insomnia, or restless sleep  Whole body: No excessive hunger, fatigue, or loss of appetite  Cognitive: No lack of concentration, slowness in activity, or actively present thoughts of suicide  Weight: No weight gain or weight loss    Past Medical History:   Diagnosis Date    Chronic back pain     Depression     Diabetes mellitus ([facility] [address]     Hyperlipidemia     Hypertension     Retinopathy     Seasonal allergies     Spinal stenosis     Type II or unspecified type diabetes mellitus without mention of complication, not stated as uncontrolled        Past Surgical History:   Procedure Laterality Date    BACK SURGERY      CHOLECYSTECTOMY  09/2017    EYE SURGERY      LASER    HYSTERECTOMY      HYSTERECTOMY  1993    NECK SURGERY      OTHER SURGICAL HISTORY  4/9/18Right [facility]    OTHER SURGICAL HISTORY N/A 06/27/2018    EXTREME LATERAL INTERBODY FUSION LEFT APPROACH L2-3, L3-4,    OTHER SURGICAL HISTORY  07/20/2018     LUMBAR WOUND INCISION AND DRAINAGE / WASHOUT     OVARY REMOVAL  1993    VA LAMNOTMY W/DCMPRSN NRV EACH ADDL nervous/anxious. Physical Exam  Vitals signs reviewed. Constitutional:       General: She is not in acute distress. Appearance: She is well-developed and normal weight. She is not diaphoretic. HENT:      Head: Normocephalic and atraumatic. Right Ear: Tympanic membrane, ear canal and external ear normal.      Left Ear: Tympanic membrane, ear canal and external ear normal.      Nose: Nose normal.      Mouth/Throat:      Mouth: Mucous membranes are moist.   Eyes:      General:         Right eye: No discharge. Left eye: No discharge. Conjunctiva/sclera: Conjunctivae normal.      Pupils: Pupils are equal, round, and reactive to light. Neck:      Musculoskeletal: Normal range of motion. Vascular: No JVD. Trachea: No tracheal deviation. Cardiovascular:      Rate and Rhythm: Normal rate and regular rhythm. Heart sounds: No murmur. No friction rub. No gallop. Pulmonary:      Effort: Pulmonary effort is normal. No respiratory distress. Breath sounds: Normal breath sounds. No stridor. No rales. Chest:      Chest wall: No tenderness. Abdominal:      General: Bowel sounds are normal. There is no distension. Palpations: Abdomen is soft. There is no mass. Tenderness: There is no abdominal tenderness. There is no guarding or rebound. Hernia: No hernia is present. Musculoskeletal: Normal range of motion. General: No tenderness or deformity. Lymphadenopathy:      Cervical: No cervical adenopathy. Skin:     General: Skin is warm and dry. Capillary Refill: Capillary refill takes 2 to 3 seconds. Coloration: Skin is not pale. Findings: No erythema or rash. Neurological:      Mental Status: She is alert and oriented to person, place, and time. Cranial Nerves: No cranial nerve deficit. Sensory: No sensory deficit.       Coordination: Coordination normal.   Psychiatric:         Behavior: Behavior normal.         Thought Content: Thought content normal.      Comments: Increased crying episodes and anxiety. Vitals:    03/18/20 1104   BP: 134/80   Site: Left Upper Arm   Position: Sitting   Cuff Size: Medium Adult   Pulse: 85   Resp: 16   Temp: 97.8 °F (36.6 °C)   TempSrc: Oral   SpO2: 95%   Weight: 164 lb (74.4 kg)       Assessment/Plan:  1. Depression, unspecified depression type    - sertraline (ZOLOFT) 100 MG tablet; Take 1 tablet by mouth 2 times daily  Dispense: 60 tablet; Refill: 1  -Patient educated on reporting any negative changes, self injury thoughts, intent, or plans. Outpatient Encounter Medications as of 3/18/2020   Medication Sig Dispense Refill    sertraline (ZOLOFT) 100 MG tablet Take 1 tablet by mouth 2 times daily 60 tablet 1    atorvastatin (LIPITOR) 40 MG tablet Take 1 tablet by mouth daily 90 tablet 0    losartan (COZAAR) 100 MG tablet Take 1 tablet by mouth daily 90 tablet 0    oxyCODONE-acetaminophen (PERCOCET) 7.5-325 MG per tablet Take 1 tablet by mouth every 6 hours as needed for Pain (max 2-3/day) for up to 28 days.  70 tablet 0    traZODone (DESYREL) 100 MG tablet Take 100 mg by mouth nightly      OLANZapine (ZYPREXA) 2.5 MG tablet Take 1 tablet by mouth nightly 30 tablet 1    gabapentin (NEURONTIN) 400 MG capsule Take 1 tablet po AM, take 2 tablets po PM 90 capsule 1    insulin aspart (NOVOLOG) 100 UNIT/ML injection pen INJECT 5 UNITS INTO THE SKIN 3 TIMES DAILY (BEFORE MEALS) 6 pen 2    meloxicam (MOBIC) 15 MG tablet Take 1 tablet by mouth daily 30 tablet 1    amLODIPine (NORVASC) 5 MG tablet Take 1 tablet by mouth daily 90 tablet 0    insulin detemir (LEVEMIR FLEXTOUCH) 100 UNIT/ML injection pen Inject 20 Units into the skin 2 times daily 10 pen 2    Handicap Placard MISC by Does not apply route 1 each 0    NOVOLOG FLEXPEN 100 UNIT/ML injection pen INJECT 5 UNITS INTO THE SKIN 3 TIMES DAILY (BEFORE MEALS) 5 pen 0    calcium carbonate (OSCAL) 500 MG TABS tablet Take 500 mg by mouth daily      Needles & Syringes MISC Inject 1 each into the skin 3 times daily 100 each 3    [DISCONTINUED] sertraline (ZOLOFT) 100 MG tablet Take 100 mg by mouth daily       No facility-administered encounter medications on file as of 3/18/2020.           Daniel Nguyen, CNP

## 2020-03-26 ENCOUNTER — OFFICE VISIT (OUTPATIENT)
Dept: PAIN MANAGEMENT | Age: 70
End: 2020-03-26
Payer: MEDICARE

## 2020-03-26 VITALS
SYSTOLIC BLOOD PRESSURE: 138 MMHG | BODY MASS INDEX: 28.15 KG/M2 | WEIGHT: 164 LBS | DIASTOLIC BLOOD PRESSURE: 84 MMHG | HEART RATE: 78 BPM | TEMPERATURE: 97.3 F

## 2020-03-26 PROCEDURE — 1036F TOBACCO NON-USER: CPT | Performed by: INTERNAL MEDICINE

## 2020-03-26 PROCEDURE — G8482 FLU IMMUNIZE ORDER/ADMIN: HCPCS | Performed by: INTERNAL MEDICINE

## 2020-03-26 PROCEDURE — 1090F PRES/ABSN URINE INCON ASSESS: CPT | Performed by: INTERNAL MEDICINE

## 2020-03-26 PROCEDURE — G8427 DOCREV CUR MEDS BY ELIG CLIN: HCPCS | Performed by: INTERNAL MEDICINE

## 2020-03-26 PROCEDURE — 99213 OFFICE O/P EST LOW 20 MIN: CPT | Performed by: INTERNAL MEDICINE

## 2020-03-26 PROCEDURE — G8417 CALC BMI ABV UP PARAM F/U: HCPCS | Performed by: INTERNAL MEDICINE

## 2020-03-26 PROCEDURE — 4040F PNEUMOC VAC/ADMIN/RCVD: CPT | Performed by: INTERNAL MEDICINE

## 2020-03-26 PROCEDURE — 3017F COLORECTAL CA SCREEN DOC REV: CPT | Performed by: INTERNAL MEDICINE

## 2020-03-26 PROCEDURE — G8399 PT W/DXA RESULTS DOCUMENT: HCPCS | Performed by: INTERNAL MEDICINE

## 2020-03-26 PROCEDURE — 1123F ACP DISCUSS/DSCN MKR DOCD: CPT | Performed by: INTERNAL MEDICINE

## 2020-03-26 RX ORDER — GABAPENTIN 400 MG/1
CAPSULE ORAL
Qty: 90 CAPSULE | Refills: 1 | Status: SHIPPED | OUTPATIENT
Start: 2020-03-26 | End: 2020-04-23 | Stop reason: SDUPTHER

## 2020-03-26 RX ORDER — OXYCODONE AND ACETAMINOPHEN 7.5; 325 MG/1; MG/1
1 TABLET ORAL EVERY 6 HOURS PRN
Qty: 70 TABLET | Refills: 0 | Status: SHIPPED | OUTPATIENT
Start: 2020-03-26 | End: 2020-04-23 | Stop reason: SDUPTHER

## 2020-03-26 NOTE — TELEPHONE ENCOUNTER
3/18/2020  Future Appointments   Date Time Provider John Willsi   4/23/2020 10:40 AM MD DEANGELO Khoury [facility] PA[facility]   6/17/2020 11:00 AM SAI Flores - CNP [facility]

## 2020-03-26 NOTE — PROGRESS NOTES
Fadumo Marrero  1950  [id number]      HISTORY OF PRESENT ILLNESS:  Ms. Maylene Gosselin is a 68 y.o. female returns for a follow up visit for pain management  She has a diagnosis of   1. Chronic pain syndrome    2. Degenerative lumbar spinal stenosis    3. Failed neck syndrome    4. Failed back surgical syndrome    5. Fibromyalgia    6. Spinal stenosis of lumbosacral region    7. Osteoarthritis of cervical spine, unspecified spinal osteoarthritis complication status    8. Primary insomnia    9. Mood disorder ([address]    . She complains of pain in the mid, lower back She rates the pain 5/10 and describes it as aching, burning, pins and needles. Current treatment regimen has helped relieve about 50% of the pain. She denies any side effects from the current pain regimen. Patient reports that since the last follow up visit the physical functioning is unchanged, family/social relationships are unchanged, mood is unchanged sleep patterns are unchanged, and that the overall functioning is unchanged. Patient denies misusing/abusing her narcotic pain medications or using any illegal drugs. There are No indicators for possible drug abuse, addiction or diversion problems. Ms. Maylene Gosselin states she has been doing fair, managing with the regimen. She mentions she is using Mobic along with Neurontin and Percocet 2-3 per day. Patient denies any constipation symptoms. She reports she is managing her ADL's. Patient states her back hurts worse than her back but gets burning in the left leg occasionally. ALLERGIES: Patients list of allergies were reviewed     MEDICATIONS: Ms. Maylene Gosselin list of medications were reviewed. Her current medications are   Outpatient Medications Prior to Visit   Medication Sig Dispense Refill    sertraline (ZOLOFT) 100 MG tablet Take 1 tablet by mouth 2 times daily 60 tablet 1    atorvastatin (LIPITOR) 40 MG tablet Take 1 tablet by mouth daily 90 tablet 0    losartan (COZAAR) 100 MG tablet Take 1 tablet by mouth daily 90 tablet 0    traZODone (DESYREL) 100 MG tablet Take 100 mg by mouth nightly      OLANZapine (ZYPREXA) 2.5 MG tablet Take 1 tablet by mouth nightly 30 tablet 1    insulin aspart (NOVOLOG) 100 UNIT/ML injection pen INJECT 5 UNITS INTO THE SKIN 3 TIMES DAILY (BEFORE MEALS) 6 pen 2    meloxicam (MOBIC) 15 MG tablet Take 1 tablet by mouth daily 30 tablet 1    amLODIPine (NORVASC) 5 MG tablet Take 1 tablet by mouth daily 90 tablet 0    insulin detemir (LEVEMIR FLEXTOUCH) 100 UNIT/ML injection pen Inject 20 Units into the skin 2 times daily 10 pen 2    Handicap Placard MISC by Does not apply route 1 each 0    NOVOLOG FLEXPEN 100 UNIT/ML injection pen INJECT 5 UNITS INTO THE SKIN 3 TIMES DAILY (BEFORE MEALS) 5 pen 0    calcium carbonate (OSCAL) 500 MG TABS tablet Take 500 mg by mouth daily      Needles & Syringes MISC Inject 1 each into the skin 3 times daily 100 each 3    gabapentin (NEURONTIN) 400 MG capsule Take 1 tablet po AM, take 2 tablets po PM 90 capsule 1     No facility-administered medications prior to visit. SOCIAL/FAMILY/PAST MEDICAL HISTORY: Ms. Axel Juarez, family and past medical history was reviewed. REVIEW OF SYSTEMS:    Respiratory: Negative for apnea, chest tightness and shortness of breath or change in baseline breathing. Gastrointestinal: Negative for nausea, vomiting, abdominal pain, diarrhea, constipation, blood in stool and abdominal distention. PHYSICAL EXAM:   Nursing note and vitals reviewed. /84   Pulse 78   Temp 97.3 °F (36.3 °C)   Wt 164 lb (74.4 kg)   BMI 28.15 kg/m²   Constitutional: She appears well-developed and well-nourished. No acute distress. Skin: Skin is warm and dry, good turgor. No rash noted. She is not diaphoretic. Cardiovascular: Normal rate, regular rhythm, normal heart sounds, and does not have murmur. Pulmonary/Chest: Effort normal. No respiratory distress.  She does not have wheezes in the

## 2020-03-27 RX ORDER — OLANZAPINE 2.5 MG/1
TABLET ORAL
Qty: 30 TABLET | Refills: 1 | Status: SHIPPED | OUTPATIENT
Start: 2020-03-27 | End: 2020-05-27

## 2020-03-31 NOTE — TELEPHONE ENCOUNTER
Last ov 03/18/2020   Future Appointments   Date Time Provider John Edie   4/23/2020 10:40 AM MD DEANGELO Briceno KW PAIN DEANGELO   6/17/2020 11:00 AM SAI Aguilar - CNP [facility]

## 2020-04-01 RX ORDER — INSULIN DETEMIR 100 [IU]/ML
INJECTION, SOLUTION SUBCUTANEOUS
Qty: 5 PEN | Refills: 1 | Status: SHIPPED | OUTPATIENT
Start: 2020-04-01 | End: 2020-06-16 | Stop reason: SDUPTHER

## 2020-04-20 RX ORDER — BUPROPION HYDROCHLORIDE 150 MG/1
TABLET ORAL
Qty: 30 TABLET | Refills: 1 | OUTPATIENT
Start: 2020-04-20

## 2020-04-23 ENCOUNTER — VIRTUAL VISIT (OUTPATIENT)
Dept: PAIN MANAGEMENT | Age: 70
End: 2020-04-23
Payer: MEDICARE

## 2020-04-23 PROCEDURE — G8427 DOCREV CUR MEDS BY ELIG CLIN: HCPCS | Performed by: INTERNAL MEDICINE

## 2020-04-23 PROCEDURE — 4040F PNEUMOC VAC/ADMIN/RCVD: CPT | Performed by: INTERNAL MEDICINE

## 2020-04-23 PROCEDURE — 1090F PRES/ABSN URINE INCON ASSESS: CPT | Performed by: INTERNAL MEDICINE

## 2020-04-23 PROCEDURE — 3017F COLORECTAL CA SCREEN DOC REV: CPT | Performed by: INTERNAL MEDICINE

## 2020-04-23 PROCEDURE — 99213 OFFICE O/P EST LOW 20 MIN: CPT | Performed by: INTERNAL MEDICINE

## 2020-04-23 PROCEDURE — G8399 PT W/DXA RESULTS DOCUMENT: HCPCS | Performed by: INTERNAL MEDICINE

## 2020-04-23 PROCEDURE — 1123F ACP DISCUSS/DSCN MKR DOCD: CPT | Performed by: INTERNAL MEDICINE

## 2020-04-23 RX ORDER — OXYCODONE AND ACETAMINOPHEN 7.5; 325 MG/1; MG/1
1 TABLET ORAL EVERY 6 HOURS PRN
Qty: 70 TABLET | Refills: 0 | Status: SHIPPED | OUTPATIENT
Start: 2020-04-23 | End: 2020-05-21 | Stop reason: SDUPTHER

## 2020-04-23 RX ORDER — GABAPENTIN 400 MG/1
CAPSULE ORAL
Qty: 90 CAPSULE | Refills: 1 | Status: SHIPPED | OUTPATIENT
Start: 2020-04-23 | End: 2020-06-18 | Stop reason: SDUPTHER

## 2020-04-23 NOTE — PROGRESS NOTES
for, I am aware of the patient receiving these medications. [place] record will be rechecked as part of office protocol.    -Continue with current opioid regimen Percocet 2-3 per day   -She was advised to increase fluids ( 5-7  glasses of fluid daily), limit caffeine, avoid cheese products, increase dietary fiber, increase activity and exercise as tolerated and relax regularly and enjoy meals   -Walking/Stretching exercises as advised    Current Outpatient Medications   Medication Sig Dispense Refill    LEVEMIR FLEXTOUCH 100 UNIT/ML injection pen INJECT 20 UNITS INTO THE SKIN 2 TIMES DAILY 5 pen 1    OLANZapine (ZYPREXA) 2.5 MG tablet TAKE 1 TABLET BY MOUTH EVERY DAY AT NIGHT 30 tablet 1    gabapentin (NEURONTIN) 400 MG capsule Take 1 tablet po AM, take 2 tablets po PM 90 capsule 1    oxyCODONE-acetaminophen (PERCOCET) 7.5-325 MG per tablet Take 1 tablet by mouth every 6 hours as needed for Pain (max 2-3/day) for up to 28 days. 70 tablet 0    sertraline (ZOLOFT) 100 MG tablet Take 1 tablet by mouth 2 times daily 60 tablet 1    atorvastatin (LIPITOR) 40 MG tablet Take 1 tablet by mouth daily 90 tablet 0    losartan (COZAAR) 100 MG tablet Take 1 tablet by mouth daily 90 tablet 0    insulin aspart (NOVOLOG) 100 UNIT/ML injection pen INJECT 5 UNITS INTO THE SKIN 3 TIMES DAILY (BEFORE MEALS) 6 pen 2    meloxicam (MOBIC) 15 MG tablet Take 1 tablet by mouth daily 30 tablet 1    amLODIPine (NORVASC) 5 MG tablet Take 1 tablet by mouth daily 90 tablet 0    Handicap Placard MISC by Does not apply route 1 each 0    NOVOLOG FLEXPEN 100 UNIT/ML injection pen INJECT 5 UNITS INTO THE SKIN 3 TIMES DAILY (BEFORE MEALS) 5 pen 0    calcium carbonate (OSCAL) 500 MG TABS tablet Take 500 mg by mouth daily      Needles & Syringes MISC Inject 1 each into the skin 3 times daily 100 each 3     No current facility-administered medications for this visit.       I will continue her current medication regimen  which is part of the

## 2020-05-08 RX ORDER — BUPROPION HYDROCHLORIDE 150 MG/1
TABLET ORAL
Qty: 30 TABLET | Refills: 1 | OUTPATIENT
Start: 2020-05-08

## 2020-05-19 RX ORDER — INSULIN DETEMIR 100 [IU]/ML
INJECTION, SOLUTION SUBCUTANEOUS
Qty: 5 PEN | Refills: 2 | OUTPATIENT
Start: 2020-05-19

## 2020-05-20 RX ORDER — BUPROPION HYDROCHLORIDE 150 MG/1
TABLET ORAL
Qty: 30 TABLET | Refills: 1 | OUTPATIENT
Start: 2020-05-20

## 2020-05-21 ENCOUNTER — VIRTUAL VISIT (OUTPATIENT)
Dept: PAIN MANAGEMENT | Age: 70
End: 2020-05-21
Payer: MEDICARE

## 2020-05-21 PROCEDURE — 3017F COLORECTAL CA SCREEN DOC REV: CPT | Performed by: INTERNAL MEDICINE

## 2020-05-21 PROCEDURE — G8399 PT W/DXA RESULTS DOCUMENT: HCPCS | Performed by: INTERNAL MEDICINE

## 2020-05-21 PROCEDURE — 4040F PNEUMOC VAC/ADMIN/RCVD: CPT | Performed by: INTERNAL MEDICINE

## 2020-05-21 PROCEDURE — 1123F ACP DISCUSS/DSCN MKR DOCD: CPT | Performed by: INTERNAL MEDICINE

## 2020-05-21 PROCEDURE — 99213 OFFICE O/P EST LOW 20 MIN: CPT | Performed by: INTERNAL MEDICINE

## 2020-05-21 PROCEDURE — 1090F PRES/ABSN URINE INCON ASSESS: CPT | Performed by: INTERNAL MEDICINE

## 2020-05-21 PROCEDURE — G8427 DOCREV CUR MEDS BY ELIG CLIN: HCPCS | Performed by: INTERNAL MEDICINE

## 2020-05-21 RX ORDER — OXYCODONE AND ACETAMINOPHEN 7.5; 325 MG/1; MG/1
1 TABLET ORAL EVERY 6 HOURS PRN
Qty: 70 TABLET | Refills: 0 | Status: SHIPPED | OUTPATIENT
Start: 2020-05-21 | End: 2020-06-18 | Stop reason: SDUPTHER

## 2020-05-21 RX ORDER — BUPROPION HYDROCHLORIDE 150 MG/1
150 TABLET ORAL EVERY MORNING
Qty: 30 TABLET | Refills: 0 | Status: SHIPPED | OUTPATIENT
Start: 2020-05-21 | End: 2020-06-16

## 2020-05-21 RX ORDER — AMLODIPINE BESYLATE 5 MG/1
TABLET ORAL
Qty: 90 TABLET | Refills: 0 | OUTPATIENT
Start: 2020-05-21

## 2020-05-21 NOTE — PROGRESS NOTES
Does not apply route 1 each 0    NOVOLOG FLEXPEN 100 UNIT/ML injection pen INJECT 5 UNITS INTO THE SKIN 3 TIMES DAILY (BEFORE MEALS) 5 pen 0    calcium carbonate (OSCAL) 500 MG TABS tablet Take 500 mg by mouth daily      Needles & Syringes MISC Inject 1 each into the skin 3 times daily 100 each 3     No facility-administered medications prior to visit. SOCIAL/FAMILY/PAST MEDICAL HISTORY: Ms. Yocasta Crenshaw, family and past medical history was reviewed. REVIEW OF SYSTEMS:    Respiratory: Negative for apnea, chest tightness and shortness of breath or change in baseline breathing. Gastrointestinal: Negative for nausea, vomiting, abdominal pain, diarrhea, constipation, blood in stool and abdominal distention. PHYSICAL EXAM:   Nursing note and vitals per patient reviewed. There were no vitals taken for this visit. Constitutional: She appears well-developed and well-nourished. No acute distress. No respiratory distress. Skin: Skin appears to be warm and dry. No rashes or any other marks noted. She is not diaphoretic. Respiratory/Pulmonary: NO conversational dyspnea, no accessory muscle use, no coughing during exam. She does not appear to be in labored breathing. Neurological/Psychiatric:She is alert and oriented to person, place, and time. Coordination is  normal.  Her mood isAppropriate and affect is Anxious. Musculoskeletal / Extremities: Range of motion is normal. Gait is normal, assistive devices use: none. IMPRESSION:   1. Chronic pain syndrome    2. Fibromyalgia    3. Failed back surgical syndrome    4. Failed neck syndrome    5. Spinal stenosis of lumbosacral region    6. Degenerative lumbar spinal stenosis        PLAN:  Informed verbal consent regarding treatment was obtained  -OAR[place] record was obtained and reviewed  for the last one year and no indicators of drug misuse  were found.  Any other controlled substance prescriptions  seen on the record have been accounted for, I am

## 2020-05-27 RX ORDER — MELOXICAM 15 MG/1
TABLET ORAL
Qty: 30 TABLET | Refills: 1 | OUTPATIENT
Start: 2020-05-27

## 2020-05-27 RX ORDER — OLANZAPINE 2.5 MG/1
TABLET ORAL
Qty: 30 TABLET | Refills: 1 | Status: SHIPPED | OUTPATIENT
Start: 2020-05-27 | End: 2020-07-27

## 2020-06-04 RX ORDER — ATORVASTATIN CALCIUM 40 MG/1
TABLET, FILM COATED ORAL
Qty: 90 TABLET | Refills: 0 | Status: SHIPPED | OUTPATIENT
Start: 2020-06-04 | End: 2020-08-31

## 2020-06-04 RX ORDER — LOSARTAN POTASSIUM 100 MG/1
TABLET ORAL
Qty: 90 TABLET | Refills: 0 | Status: SHIPPED | OUTPATIENT
Start: 2020-06-04 | End: 2020-08-31

## 2020-06-16 RX ORDER — BUPROPION HYDROCHLORIDE 150 MG/1
TABLET ORAL
Qty: 30 TABLET | Refills: 0 | Status: SHIPPED | OUTPATIENT
Start: 2020-06-16 | End: 2020-06-18 | Stop reason: SDUPTHER

## 2020-06-17 RX ORDER — INSULIN DETEMIR 100 [IU]/ML
20 INJECTION, SOLUTION SUBCUTANEOUS 2 TIMES DAILY
Qty: 5 PEN | Refills: 1 | Status: SHIPPED | OUTPATIENT
Start: 2020-06-17 | End: 2020-08-12

## 2020-06-18 ENCOUNTER — VIRTUAL VISIT (OUTPATIENT)
Dept: PAIN MANAGEMENT | Age: 70
End: 2020-06-18
Payer: MEDICARE

## 2020-06-18 PROCEDURE — 1090F PRES/ABSN URINE INCON ASSESS: CPT | Performed by: INTERNAL MEDICINE

## 2020-06-18 PROCEDURE — 4040F PNEUMOC VAC/ADMIN/RCVD: CPT | Performed by: INTERNAL MEDICINE

## 2020-06-18 PROCEDURE — G8428 CUR MEDS NOT DOCUMENT: HCPCS | Performed by: INTERNAL MEDICINE

## 2020-06-18 PROCEDURE — 1123F ACP DISCUSS/DSCN MKR DOCD: CPT | Performed by: INTERNAL MEDICINE

## 2020-06-18 PROCEDURE — G8399 PT W/DXA RESULTS DOCUMENT: HCPCS | Performed by: INTERNAL MEDICINE

## 2020-06-18 PROCEDURE — 99213 OFFICE O/P EST LOW 20 MIN: CPT | Performed by: INTERNAL MEDICINE

## 2020-06-18 PROCEDURE — 3017F COLORECTAL CA SCREEN DOC REV: CPT | Performed by: INTERNAL MEDICINE

## 2020-06-18 RX ORDER — OXYCODONE AND ACETAMINOPHEN 7.5; 325 MG/1; MG/1
1 TABLET ORAL EVERY 6 HOURS PRN
Qty: 70 TABLET | Refills: 0 | Status: SHIPPED | OUTPATIENT
Start: 2020-06-18 | End: 2020-07-16 | Stop reason: SDUPTHER

## 2020-06-18 RX ORDER — GABAPENTIN 400 MG/1
CAPSULE ORAL
Qty: 90 CAPSULE | Refills: 0 | Status: SHIPPED | OUTPATIENT
Start: 2020-06-18 | End: 2020-07-16 | Stop reason: SDUPTHER

## 2020-06-18 RX ORDER — MELOXICAM 15 MG/1
15 TABLET ORAL DAILY
Qty: 30 TABLET | Refills: 0 | Status: SHIPPED | OUTPATIENT
Start: 2020-06-18 | End: 2020-07-16 | Stop reason: SDUPTHER

## 2020-06-18 RX ORDER — BUPROPION HYDROCHLORIDE 150 MG/1
TABLET ORAL
Qty: 30 TABLET | Refills: 0 | Status: SHIPPED | OUTPATIENT
Start: 2020-06-18 | End: 2020-07-16 | Stop reason: SDUPTHER

## 2020-06-18 NOTE — PROGRESS NOTES
TELE HEALTH VISIT (AUDIO-VISUAL)    Pursuant to the emergency declaration under the [facility], [facility] waiver authority and the [employer] Act, this Virtual  Visit was conducted, with patient's/legal guardian's consent, to reduce the patient's risk of exposure to COVID-19 and provide continuity of care for an established patient. Service is  provided through a video synchronous discussion virtually to substitute for in-person clinic visit. Due to this being a TeleHealth encounter (During VDRHX-83 public health emergency), evaluation of the following organ systems was limited: Vitals/Constitutional/EENT/Resp/CV/GI//MS/Neuro/Skin/Aglj-Rqjy-Itf. Rico Albert  1950  [id number]    Ms. Di Lancaster is being seen virtually for a follow up visit using one of the following techniques  [email or web address] me  Informed verbal consent to the virtual visit was obtained from Ms. Di Lancaster. Risks associated with HIPPA compliance with the virtual visit was explained to the patient. Ms. Di Lancaster is at her residence and Dr. Britany Smith is in his office. HISTORY OF PRESENT ILLNESS:  Ms. Di Lancaster is a 68 y.o. female  being assessed for a follow up visit for pain management for evaluation of ongoing care regarding her symptoms and monitoring of compliance with long term use high risk medications. She has a diagnosis of   1. Chronic pain syndrome    2. Fibromyalgia    3. Spinal stenosis of lumbosacral region    4. Failed back surgical syndrome    5. Failed neck syndrome    6. Degenerative lumbar spinal stenosis    7. Osteoarthritis of cervical spine, unspecified spinal osteoarthritis complication status    . She complains of pain in the lower back  with radiation to the buttocks, hips Left, upper leg Left, knees Left, lower leg Left and ankles Left . She rates the pain 6/10 and describes it as aching, burning.  Current treatment regimen has helped relieve about 50% of the pain. She denies any side effects from the current pain regimen. Patient reports that since the last follow up visit the physical functioning is worse, family/social relationships are worse, mood is worse sleep patterns are worse, and that the overall functioning is worse. Patient denies misusing/abusing her narcotic pain medications or using any illegal drugs. There are No indicators for possible drug abuse, addiction or diversion problems. Ms. Pierre Miranda states she has been doing fair, pain has been manageable. She mentions she is using Percocet along with Neurontin. Patient states her back hurts the most. Patient denies any side effects with it. Patient states she is using Wellbutrin  mg which is helping with moods. ALLERGIES: Patients list of allergies were reviewed     MEDICATIONS: Ms. Pierre Miranda list of medications were reviewed. Her current medications are   Outpatient Medications Prior to Visit   Medication Sig Dispense Refill    insulin detemir (LEVEMIR FLEXTOUCH) 100 UNIT/ML injection pen Inject 20 Units into the skin 2 times daily 5 pen 1    buPROPion (WELLBUTRIN XL) 150 MG extended release tablet TAKE 1 TABLET BY MOUTH EVERY DAY IN THE MORNING 30 tablet 0    atorvastatin (LIPITOR) 40 MG tablet TAKE 1 TABLET BY MOUTH EVERY DAY 90 tablet 0    losartan (COZAAR) 100 MG tablet TAKE 1 TABLET BY MOUTH EVERY DAY 90 tablet 0    OLANZapine (ZYPREXA) 2.5 MG tablet TAKE 1 TABLET BY MOUTH EVERY DAY AT NIGHT 30 tablet 1    oxyCODONE-acetaminophen (PERCOCET) 7.5-325 MG per tablet Take 1 tablet by mouth every 6 hours as needed for Pain (max 2-3/day) for up to 28 days.  70 tablet 0    sertraline (ZOLOFT) 100 MG tablet Take 1 tablet by mouth 2 times daily 60 tablet 1    insulin aspart (NOVOLOG) 100 UNIT/ML injection pen INJECT 5 UNITS INTO THE SKIN 3 TIMES DAILY (BEFORE MEALS) 6 pen 2    meloxicam (MOBIC) 15 MG tablet Take 1 tablet by mouth daily 30 tablet 1    amLODIPine

## 2020-06-24 ENCOUNTER — VIRTUAL VISIT (OUTPATIENT)
Dept: FAMILY MEDICINE CLINIC | Age: 70
End: 2020-06-24
Payer: MEDICARE

## 2020-06-24 PROCEDURE — 99214 OFFICE O/P EST MOD 30 MIN: CPT | Performed by: NURSE PRACTITIONER

## 2020-06-24 RX ORDER — AMLODIPINE BESYLATE 5 MG/1
5 TABLET ORAL DAILY
Qty: 90 TABLET | Refills: 0 | Status: SHIPPED | OUTPATIENT
Start: 2020-06-24 | End: 2020-09-09

## 2020-06-24 ASSESSMENT — ENCOUNTER SYMPTOMS
PHOTOPHOBIA: 0
APNEA: 0
RECTAL PAIN: 0
FACIAL SWELLING: 0
CHEST TIGHTNESS: 0
ABDOMINAL PAIN: 0
EYE DISCHARGE: 0
COLOR CHANGE: 0
VOICE CHANGE: 0
COUGH: 0
ABDOMINAL DISTENTION: 0
EYE PAIN: 0
TROUBLE SWALLOWING: 0
STRIDOR: 0
EYE REDNESS: 0
WHEEZING: 0
CHOKING: 0
SHORTNESS OF BREATH: 0
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
## 2020-07-06 ENCOUNTER — NURSE ONLY (OUTPATIENT)
Dept: FAMILY MEDICINE CLINIC | Age: 70
End: 2020-07-06
Payer: MEDICARE

## 2020-07-06 VITALS — TEMPERATURE: 98.4 F

## 2020-07-06 LAB
A/G RATIO: 2.1 (ref 1.1–2.2)
ALBUMIN SERPL-MCNC: 4.5 G/DL (ref 3.4–5)
ALP BLD-CCNC: 104 U/L (ref 40–129)
ALT SERPL-CCNC: 14 U/L (ref 10–40)
ANION GAP SERPL CALCULATED.3IONS-SCNC: 11 MMOL/L (ref 3–16)
AST SERPL-CCNC: 18 U/L (ref 15–37)
BILIRUB SERPL-MCNC: 0.4 MG/DL (ref 0–1)
BUN BLDV-MCNC: 21 MG/DL (ref 7–20)
CALCIUM SERPL-MCNC: 10.1 MG/DL (ref 8.3–10.6)
CHLORIDE BLD-SCNC: 103 MMOL/L (ref 99–110)
CHOLESTEROL, TOTAL: 178 MG/DL (ref 0–199)
CO2: 30 MMOL/L (ref 21–32)
CREAT SERPL-MCNC: 0.8 MG/DL (ref 0.6–1.2)
GFR AFRICAN AMERICAN: >60
GFR NON-AFRICAN AMERICAN: >60
GLOBULIN: 2.1 G/DL
GLUCOSE BLD-MCNC: 71 MG/DL (ref 70–99)
HCT VFR BLD CALC: 40.6 % (ref 36–48)
HDLC SERPL-MCNC: 72 MG/DL (ref 40–60)
HEMOGLOBIN: 13.5 G/DL (ref 12–16)
LDL CHOLESTEROL CALCULATED: 82 MG/DL
MCH RBC QN AUTO: 29.3 PG (ref 26–34)
MCHC RBC AUTO-ENTMCNC: 33.2 G/DL (ref 31–36)
MCV RBC AUTO: 88.1 FL (ref 80–100)
PDW BLD-RTO: 13.7 % (ref 12.4–15.4)
PLATELET # BLD: 195 K/UL (ref 135–450)
PMV BLD AUTO: 9 FL (ref 5–10.5)
POTASSIUM SERPL-SCNC: 4.3 MMOL/L (ref 3.5–5.1)
RBC # BLD: 4.61 M/UL (ref 4–5.2)
SODIUM BLD-SCNC: 144 MMOL/L (ref 136–145)
TOTAL CK: 49 U/L (ref 26–192)
TOTAL PROTEIN: 6.6 G/DL (ref 6.4–8.2)
TRIGL SERPL-MCNC: 120 MG/DL (ref 0–150)
TSH SERPL DL<=0.05 MIU/L-ACNC: 4.99 UIU/ML (ref 0.27–4.2)
VITAMIN B-12: 931 PG/ML (ref 211–911)
VITAMIN D 25-HYDROXY: 41.2 NG/ML
VLDLC SERPL CALC-MCNC: 24 MG/DL
WBC # BLD: 6.4 K/UL (ref 4–11)

## 2020-07-06 PROCEDURE — 36415 COLL VENOUS BLD VENIPUNCTURE: CPT | Performed by: NURSE PRACTITIONER

## 2020-07-07 LAB
ESTIMATED AVERAGE GLUCOSE: 194.4 MG/DL
HBA1C MFR BLD: 8.4 %

## 2020-07-08 RX ORDER — LEVOTHYROXINE SODIUM 75 MCG
75 TABLET ORAL DAILY
Qty: 30 TABLET | Refills: 1 | Status: SHIPPED | OUTPATIENT
Start: 2020-07-08 | End: 2020-08-27

## 2020-07-16 ENCOUNTER — OFFICE VISIT (OUTPATIENT)
Dept: PAIN MANAGEMENT | Age: 70
End: 2020-07-16
Payer: MEDICARE

## 2020-07-16 VITALS
BODY MASS INDEX: 28.32 KG/M2 | SYSTOLIC BLOOD PRESSURE: 119 MMHG | HEART RATE: 91 BPM | DIASTOLIC BLOOD PRESSURE: 71 MMHG | WEIGHT: 165 LBS | TEMPERATURE: 97.3 F

## 2020-07-16 PROCEDURE — G8427 DOCREV CUR MEDS BY ELIG CLIN: HCPCS | Performed by: INTERNAL MEDICINE

## 2020-07-16 PROCEDURE — 1090F PRES/ABSN URINE INCON ASSESS: CPT | Performed by: INTERNAL MEDICINE

## 2020-07-16 PROCEDURE — G8417 CALC BMI ABV UP PARAM F/U: HCPCS | Performed by: INTERNAL MEDICINE

## 2020-07-16 PROCEDURE — 3017F COLORECTAL CA SCREEN DOC REV: CPT | Performed by: INTERNAL MEDICINE

## 2020-07-16 PROCEDURE — 1036F TOBACCO NON-USER: CPT | Performed by: INTERNAL MEDICINE

## 2020-07-16 PROCEDURE — G8399 PT W/DXA RESULTS DOCUMENT: HCPCS | Performed by: INTERNAL MEDICINE

## 2020-07-16 PROCEDURE — 4040F PNEUMOC VAC/ADMIN/RCVD: CPT | Performed by: INTERNAL MEDICINE

## 2020-07-16 PROCEDURE — 1123F ACP DISCUSS/DSCN MKR DOCD: CPT | Performed by: INTERNAL MEDICINE

## 2020-07-16 PROCEDURE — 99213 OFFICE O/P EST LOW 20 MIN: CPT | Performed by: INTERNAL MEDICINE

## 2020-07-16 RX ORDER — BUPROPION HYDROCHLORIDE 150 MG/1
TABLET ORAL
Qty: 30 TABLET | Refills: 0 | Status: SHIPPED | OUTPATIENT
Start: 2020-07-16 | End: 2020-08-25 | Stop reason: SDUPTHER

## 2020-07-16 RX ORDER — GABAPENTIN 400 MG/1
CAPSULE ORAL
Qty: 90 CAPSULE | Refills: 0 | Status: SHIPPED | OUTPATIENT
Start: 2020-07-16 | End: 2020-08-25 | Stop reason: SDUPTHER

## 2020-07-16 RX ORDER — OXYCODONE AND ACETAMINOPHEN 7.5; 325 MG/1; MG/1
1 TABLET ORAL EVERY 6 HOURS PRN
Qty: 70 TABLET | Refills: 0 | Status: SHIPPED | OUTPATIENT
Start: 2020-07-16 | End: 2020-08-25 | Stop reason: SDUPTHER

## 2020-07-16 RX ORDER — MELOXICAM 15 MG/1
15 TABLET ORAL DAILY
Qty: 30 TABLET | Refills: 0 | Status: SHIPPED | OUTPATIENT
Start: 2020-07-16 | End: 2020-08-25 | Stop reason: SDUPTHER

## 2020-07-16 NOTE — PROGRESS NOTES
MORNING 30 tablet 0    oxyCODONE-acetaminophen (PERCOCET) 7.5-325 MG per tablet Take 1 tablet by mouth every 6 hours as needed for Pain (max 2-3/day) for up to 28 days. 70 tablet 0    gabapentin (NEURONTIN) 400 MG capsule Take 1 tablet po AM, take 2 tablets po PM 90 capsule 0    meloxicam (MOBIC) 15 MG tablet Take 1 tablet by mouth daily 30 tablet 0    insulin detemir (LEVEMIR FLEXTOUCH) 100 UNIT/ML injection pen Inject 20 Units into the skin 2 times daily 5 pen 1    atorvastatin (LIPITOR) 40 MG tablet TAKE 1 TABLET BY MOUTH EVERY DAY 90 tablet 0    losartan (COZAAR) 100 MG tablet TAKE 1 TABLET BY MOUTH EVERY DAY 90 tablet 0    OLANZapine (ZYPREXA) 2.5 MG tablet TAKE 1 TABLET BY MOUTH EVERY DAY AT NIGHT 30 tablet 1    sertraline (ZOLOFT) 100 MG tablet Take 1 tablet by mouth 2 times daily 60 tablet 1    insulin aspart (NOVOLOG) 100 UNIT/ML injection pen INJECT 5 UNITS INTO THE SKIN 3 TIMES DAILY (BEFORE MEALS) 6 pen 2    meloxicam (MOBIC) 15 MG tablet Take 1 tablet by mouth daily 30 tablet 1    Handicap Placard MISC by Does not apply route 1 each 0    NOVOLOG FLEXPEN 100 UNIT/ML injection pen INJECT 5 UNITS INTO THE SKIN 3 TIMES DAILY (BEFORE MEALS) 5 pen 0    calcium carbonate (OSCAL) 500 MG TABS tablet Take 500 mg by mouth daily      Needles & Syringes MISC Inject 1 each into the skin 3 times daily 100 each 3     No facility-administered medications prior to visit. SOCIAL/FAMILY/PAST MEDICAL HISTORY: Ms. Suárez Bodily, family and past medical history was reviewed. REVIEW OF SYSTEMS:    Respiratory: Negative for apnea, chest tightness and shortness of breath or change in baseline breathing. Gastrointestinal: Negative for nausea, vomiting, abdominal pain, diarrhea, constipation, blood in stool and abdominal distention. PHYSICAL EXAM:   Nursing note and vitals reviewed.  /71   Pulse 91   Temp 97.3 °F (36.3 °C) (Temporal)   Wt 165 lb (74.8 kg)   BMI 28.32 kg/m² Constitutional: She appears well-developed and well-nourished. No acute distress. Skin: Skin is warm and dry, good turgor. No rash noted. She is not diaphoretic. Cardiovascular: Normal rate, regular rhythm, normal heart sounds, and does not have murmur. Pulmonary/Chest: Effort normal. No respiratory distress. She does not have wheezes in the lung fields. She has no rales. Neurological/Psychiatric:She is alert and oriented to person, place, and time. Coordination is  normal.  Her mood isAppropriate and affect is Neutral/Euthymic(normal) . Her  Other: using a cane  IMPRESSION:   1. Chronic pain syndrome    2. Fibromyalgia    3. Spinal stenosis of lumbosacral region    4. Degenerative lumbar spinal stenosis    5. Failed back surgical syndrome    6. Failed neck syndrome    7. Osteoarthritis of cervical spine, unspecified spinal osteoarthritis complication status        PLAN:  Informed verbal consent was obtained  -continue with current opioid regimen Percocet 2-3 per day  -She was advised to increase fluids ( 5-7  glasses of fluid daily), limit caffeine, avoid cheese products, increase dietary fiber, increase activity and exercise as tolerated and relax regularly and enjoy meals   -Urine drug screen with GC/MS for opiates and drugs of abuse was ordered and will follow up on results.   -walking/stretching exercises as advised    -labs reviewed; shows elevated A1C, elevated TSH, she is on medication     Current Outpatient Medications   Medication Sig Dispense Refill    SYNTHROID 75 MCG tablet Take 1 tablet by mouth daily Take with water on an empty stomach- wait 30 minutes before eating or taking other meds.  30 tablet 1    amLODIPine (NORVASC) 5 MG tablet Take 1 tablet by mouth daily 90 tablet 0    buPROPion (WELLBUTRIN XL) 150 MG extended release tablet TAKE 1 TABLET BY MOUTH EVERY DAY IN THE MORNING 30 tablet 0    oxyCODONE-acetaminophen (PERCOCET) 7.5-325 MG per tablet Take 1 tablet by mouth every 6 hours as needed for Pain (max 2-3/day) for up to 28 days. 70 tablet 0    gabapentin (NEURONTIN) 400 MG capsule Take 1 tablet po AM, take 2 tablets po PM 90 capsule 0    meloxicam (MOBIC) 15 MG tablet Take 1 tablet by mouth daily 30 tablet 0    insulin detemir (LEVEMIR FLEXTOUCH) 100 UNIT/ML injection pen Inject 20 Units into the skin 2 times daily 5 pen 1    atorvastatin (LIPITOR) 40 MG tablet TAKE 1 TABLET BY MOUTH EVERY DAY 90 tablet 0    losartan (COZAAR) 100 MG tablet TAKE 1 TABLET BY MOUTH EVERY DAY 90 tablet 0    OLANZapine (ZYPREXA) 2.5 MG tablet TAKE 1 TABLET BY MOUTH EVERY DAY AT NIGHT 30 tablet 1    sertraline (ZOLOFT) 100 MG tablet Take 1 tablet by mouth 2 times daily 60 tablet 1    insulin aspart (NOVOLOG) 100 UNIT/ML injection pen INJECT 5 UNITS INTO THE SKIN 3 TIMES DAILY (BEFORE MEALS) 6 pen 2    meloxicam (MOBIC) 15 MG tablet Take 1 tablet by mouth daily 30 tablet 1    Handicap Placard MISC by Does not apply route 1 each 0    NOVOLOG FLEXPEN 100 UNIT/ML injection pen INJECT 5 UNITS INTO THE SKIN 3 TIMES DAILY (BEFORE MEALS) 5 pen 0    calcium carbonate (OSCAL) 500 MG TABS tablet Take 500 mg by mouth daily      Needles & Syringes MISC Inject 1 each into the skin 3 times daily 100 each 3     No current facility-administered medications for this visit. I will continue her current medication regimen  which is part of the above treatment schedule. It has been helping with Ms. Adrian's chronic  medical problems which for this visit include:   Diagnoses of Chronic pain syndrome, Fibromyalgia, Spinal stenosis of lumbosacral region, Degenerative lumbar spinal stenosis, Failed back surgical syndrome, Primary insomnia, Mood disorder ([facility] [address] and Failed neck syndrome were pertinent to this visit. Risks and benefits of the medications and other alternative treatments  including no treatment were discussed with the patient. The common side effects of these medications were also explained to the patient. Informed verbal consent was obtained. Goals of current treatment regimen include improvement in pain, restoration of functioning- with focus on improvement in physical performance, general activity, work or disability,emotional distress, health care utilization and  decreased medication consumption. Will continue to monitor progress towards achieving/maintaining therapeutic goals with special emphasis on  1. Improvement in perceived interfernce  of pain with ADL's. Ability to do home exercises independently. Ability to do household chores indoor and/or outdoor work and social and leisure activities. Improve psychosocial and physical functioning. - she is showing progression towards this treatment goal with the current regimen. She was advised against drinking alcohol with the narcotic pain medicines, advised against driving or handling machinery while adjusting the dose of medicines or if having cognitive  issues related to the current medications. Risk of overdose and death, if medicines not taken as prescribed, were also discussed. If the patient develops new symptoms or if the symptoms worsen, the patient should call the office. While transcribing every attempt was made to maintain the accuracy of the note in terms of it's contents,there may have been some errors made inadvertently. Thank you for allowing me to participate in the care of this patient.     Chente Tom MD.    Cc: Dr.Mary Dianna Warner, APRN - CNP

## 2020-07-27 ENCOUNTER — VIRTUAL VISIT (OUTPATIENT)
Dept: FAMILY MEDICINE CLINIC | Age: 70
End: 2020-07-27
Payer: MEDICARE

## 2020-07-27 PROCEDURE — 99213 OFFICE O/P EST LOW 20 MIN: CPT | Performed by: NURSE PRACTITIONER

## 2020-07-27 RX ORDER — OLANZAPINE 2.5 MG/1
TABLET ORAL
Qty: 30 TABLET | Refills: 1 | Status: SHIPPED | OUTPATIENT
Start: 2020-07-27 | End: 2020-09-30

## 2020-07-27 ASSESSMENT — ENCOUNTER SYMPTOMS
PHOTOPHOBIA: 0
FACIAL SWELLING: 0
VOICE CHANGE: 0
COLOR CHANGE: 0
CHEST TIGHTNESS: 0
TROUBLE SWALLOWING: 0
CHOKING: 0
SHORTNESS OF BREATH: 0
EYE REDNESS: 0
ABDOMINAL PAIN: 0
COUGH: 0
ABDOMINAL DISTENTION: 0
BACK PAIN: 1
RECTAL PAIN: 0
EYE PAIN: 0
EYE DISCHARGE: 0
WHEEZING: 0
APNEA: 0
STRIDOR: 0

## 2020-07-27 NOTE — PROGRESS NOTES
Past Surgical History:   Procedure Laterality Date    BACK SURGERY      CHOLECYSTECTOMY  09/2017    EYE SURGERY      LASER    HYSTERECTOMY      HYSTERECTOMY  1993    NECK SURGERY      OTHER SURGICAL HISTORY  4/9/18[facility]    OTHER SURGICAL HISTORY N/A 06/27/2018    EXTREME LATERAL INTERBODY FUSION LEFT APPROACH L2-3, L3-4,    OTHER SURGICAL HISTORY  07/20/2018     LUMBAR WOUND INCISION AND DRAINAGE / WASHOUT     OVARY REMOVAL  1993    WI LAMNOTMY W/DCMPRSN NRV EACH ADDL CRVCL/LMBR N/A 7/20/2018    LUMBAR WOUND INCISION AND DRAINAGE / WASHOUT performed by Iraida Youssef MD at [address] [place] [address]    c-spine    SPINAL FUSION  2003    lumbar-spine per Dr Fannie Dyson.  TONSILLECTOMY AND ADENOIDECTOMY  1953    TUBAL LIGATION  1976       Social History     Tobacco Use    Smoking status: Never Smoker    Smokeless tobacco: Never Used   Substance Use Topics    Alcohol use: Yes     Alcohol/week: 2.0 standard drinks     Types: 2 Glasses of wine per week    Drug use: No       Family History   Problem Relation Age of Onset    Cancer Mother         melanoma    Diabetes Father        Review of Systems   Constitutional: Negative for activity change, appetite change, chills, diaphoresis, fever and unexpected weight change. HENT: Negative for congestion, drooling, ear discharge, ear pain, facial swelling, mouth sores, nosebleeds, sneezing, trouble swallowing and voice change. Eyes: Negative for photophobia, pain, discharge, redness and visual disturbance. Respiratory: Negative for apnea, cough, choking, chest tightness, shortness of breath, wheezing and stridor. Cardiovascular: Negative for chest pain, palpitations and leg swelling. Gastrointestinal: Negative for abdominal distention, abdominal pain and rectal pain. Endocrine: Negative for polydipsia, polyphagia and polyuria. Genitourinary: Negative for difficulty urinating, flank pain and hematuria.    Musculoskeletal: Positive for back pain and myalgias. Negative for arthralgias and gait problem. Skin: Negative for color change, rash and wound. Allergic/Immunologic: Negative for environmental allergies and immunocompromised state. Neurological: Negative for tremors, seizures, syncope, facial asymmetry, speech difficulty, weakness, light-headedness and numbness. Hematological: Does not bruise/bleed easily. Psychiatric/Behavioral: Negative for agitation, confusion, hallucinations, self-injury and suicidal ideas. Physical Exam  Constitutional:       Appearance: Normal appearance. HENT:      Head: Atraumatic. Left Ear: External ear normal.      Nose: Nose normal.      Mouth/Throat:      Mouth: Mucous membranes are moist.   Eyes:      Conjunctiva/sclera: Conjunctivae normal.      Comments: Pupils equally round on video   Neck:      Musculoskeletal: Normal range of motion. Cardiovascular:      Comments: Patient denies any chest pain, indigestion, nausea and/or vomiting. Pulmonary:      Effort: Pulmonary effort is normal.   Abdominal:      Palpations: Abdomen is soft. Comments: Per her assessment soft and non-tendar   Musculoskeletal: Normal range of motion. Skin:     General: Skin is dry. Capillary Refill: Capillary refill takes 2 to 3 seconds. Neurological:      General: No focal deficit present. Mental Status: She is alert and oriented to person, place, and time. Mental status is at baseline. Psychiatric:         Mood and Affect: Mood normal.         Behavior: Behavior normal.         Thought Content: Thought content normal.         Judgment: Judgment normal.          Barriers is physical assessment due to the nature of the visit. There were no vitals filed for this visit. Assessment/Plan:  1.  Depression, unspecified depression type  -Continue medication as prescribed  -reach out to friends and family via telephone  -Call insurance to see what therapist you can see in your area (last one not covered)  -Talk small walks outdoors 2-3 times per day (avoid the hottest part of the day)  -Eat a heart healthy diet: Discussed options for healthy eating    2. Hypothyroidism, unspecified type  -Continue medication as prescribed  -Take medication in the morning on an empty stomach      Outpatient Encounter Medications as of 7/27/2020   Medication Sig Dispense Refill    OLANZapine (ZYPREXA) 2.5 MG tablet TAKE 1 TABLET BY MOUTH EVERY DAY AT NIGHT 30 tablet 1    buPROPion (WELLBUTRIN XL) 150 MG extended release tablet TAKE 1 TABLET BY MOUTH EVERY DAY IN THE MORNING 30 tablet 0    oxyCODONE-acetaminophen (PERCOCET) 7.5-325 MG per tablet Take 1 tablet by mouth every 6 hours as needed for Pain (max 2-3/day) for up to 28 days. 70 tablet 0    gabapentin (NEURONTIN) 400 MG capsule Take 1 tablet po AM, take 2 tablets po PM 90 capsule 0    meloxicam (MOBIC) 15 MG tablet Take 1 tablet by mouth daily 30 tablet 0    SYNTHROID 75 MCG tablet Take 1 tablet by mouth daily Take with water on an empty stomach- wait 30 minutes before eating or taking other meds.  30 tablet 1    amLODIPine (NORVASC) 5 MG tablet Take 1 tablet by mouth daily 90 tablet 0    insulin detemir (LEVEMIR FLEXTOUCH) 100 UNIT/ML injection pen Inject 20 Units into the skin 2 times daily 5 pen 1    atorvastatin (LIPITOR) 40 MG tablet TAKE 1 TABLET BY MOUTH EVERY DAY 90 tablet 0    losartan (COZAAR) 100 MG tablet TAKE 1 TABLET BY MOUTH EVERY DAY 90 tablet 0    sertraline (ZOLOFT) 100 MG tablet Take 1 tablet by mouth 2 times daily 60 tablet 1    insulin aspart (NOVOLOG) 100 UNIT/ML injection pen INJECT 5 UNITS INTO THE SKIN 3 TIMES DAILY (BEFORE MEALS) 6 pen 2    meloxicam (MOBIC) 15 MG tablet Take 1 tablet by mouth daily 30 tablet 1    Handicap Placard MISC by Does not apply route 1 each 0    NOVOLOG FLEXPEN 100 UNIT/ML injection pen INJECT 5 UNITS INTO THE SKIN 3 TIMES DAILY (BEFORE MEALS) 5 pen 0    calcium carbonate (OSCAL) 500 MG TABS tablet Take 500 mg by mouth daily      Needles & Syringes MISC Inject 1 each into the skin 3 times daily 100 each 3     No facility-administered encounter medications on file as of 7/27/2020.           Isabella Fuller, CNP

## 2020-08-11 NOTE — TELEPHONE ENCOUNTER
Last ov 07/27/2020   Future Appointments   Date Time Provider John Irizarry   8/13/2020 12:00 PM Shannen Flores MD [facility]

## 2020-08-12 RX ORDER — INSULIN DETEMIR 100 [IU]/ML
20 INJECTION, SOLUTION SUBCUTANEOUS 2 TIMES DAILY
Qty: 5 PEN | Refills: 2 | Status: SHIPPED | OUTPATIENT
Start: 2020-08-12 | End: 2021-01-07

## 2020-08-25 ENCOUNTER — OFFICE VISIT (OUTPATIENT)
Dept: PAIN MANAGEMENT | Age: 70
End: 2020-08-25
Payer: MEDICARE

## 2020-08-25 VITALS
SYSTOLIC BLOOD PRESSURE: 119 MMHG | TEMPERATURE: 97.5 F | HEART RATE: 89 BPM | WEIGHT: 160 LBS | BODY MASS INDEX: 27.46 KG/M2 | DIASTOLIC BLOOD PRESSURE: 63 MMHG

## 2020-08-25 PROCEDURE — 3017F COLORECTAL CA SCREEN DOC REV: CPT | Performed by: INTERNAL MEDICINE

## 2020-08-25 PROCEDURE — G8427 DOCREV CUR MEDS BY ELIG CLIN: HCPCS | Performed by: INTERNAL MEDICINE

## 2020-08-25 PROCEDURE — G8417 CALC BMI ABV UP PARAM F/U: HCPCS | Performed by: INTERNAL MEDICINE

## 2020-08-25 PROCEDURE — 99213 OFFICE O/P EST LOW 20 MIN: CPT | Performed by: INTERNAL MEDICINE

## 2020-08-25 PROCEDURE — 1090F PRES/ABSN URINE INCON ASSESS: CPT | Performed by: INTERNAL MEDICINE

## 2020-08-25 PROCEDURE — 1123F ACP DISCUSS/DSCN MKR DOCD: CPT | Performed by: INTERNAL MEDICINE

## 2020-08-25 PROCEDURE — 1036F TOBACCO NON-USER: CPT | Performed by: INTERNAL MEDICINE

## 2020-08-25 PROCEDURE — G8399 PT W/DXA RESULTS DOCUMENT: HCPCS | Performed by: INTERNAL MEDICINE

## 2020-08-25 PROCEDURE — 4040F PNEUMOC VAC/ADMIN/RCVD: CPT | Performed by: INTERNAL MEDICINE

## 2020-08-25 RX ORDER — OXYCODONE AND ACETAMINOPHEN 7.5; 325 MG/1; MG/1
1 TABLET ORAL EVERY 6 HOURS PRN
Qty: 70 TABLET | Refills: 0 | Status: SHIPPED | OUTPATIENT
Start: 2020-08-25 | End: 2020-09-29 | Stop reason: SDUPTHER

## 2020-08-25 RX ORDER — MELOXICAM 15 MG/1
15 TABLET ORAL DAILY
Qty: 30 TABLET | Refills: 1 | Status: SHIPPED | OUTPATIENT
Start: 2020-08-25 | End: 2020-09-29 | Stop reason: SDUPTHER

## 2020-08-25 RX ORDER — BUPROPION HYDROCHLORIDE 150 MG/1
TABLET ORAL
Qty: 30 TABLET | Refills: 0 | Status: SHIPPED | OUTPATIENT
Start: 2020-08-25 | End: 2020-09-29 | Stop reason: SDUPTHER

## 2020-08-25 RX ORDER — GABAPENTIN 400 MG/1
CAPSULE ORAL
Qty: 90 CAPSULE | Refills: 0 | Status: SHIPPED | OUTPATIENT
Start: 2020-08-25 | End: 2020-09-29 | Stop reason: SDUPTHER

## 2020-08-25 NOTE — PROGRESS NOTES
Maritza Steele  1950  [id number]      HISTORY OF PRESENT ILLNESS:  Ms. Renita Pardo is a 68 y.o. female returns for a follow up visit for pain management  She has a diagnosis of   1. Chronic pain syndrome    2. Fibromyalgia    3. Spinal stenosis of lumbosacral region    4. Degenerative lumbar spinal stenosis    5. Failed back surgical syndrome    6. Failed neck syndrome    7. Primary insomnia    8. Mood disorder ([address]    . She complains of pain in the lower back, left hip, left upper leg. She rates the pain 7/10 and describes it as aching, burning, numbness. Current treatment regimen has helped relieve about 40% of the pain. She denies any side effects from the current pain regimen. Patient reports that since the last follow up visit the physical functioning is unchanged, family/social relationships are unchanged, mood is worse sleep patterns are worse, and that the overall functioning is unchanged. Patient denies misusing/abusing her narcotic pain medications or using any illegal drugs. There are No indicators for possible drug abuse, addiction or diversion problems. Patient reports that the pain is fairly well tolerated with the current regimen has had some exacerbations, but overall has been tolerable. Ms. Renita Pardo states that  she has been staying with her exercise routine and working indoors and/or outdoors as tolerated, performing household chores. She says she has been out of pain medications for 4 days. She mentions she tried to stretch her visit. She says she is had problems with her appointment. She reports she is using Percocet 1-2 per day. She mentions she is u sing al her other adjuvants. She denies any constipation symptoms or cognitive side effects. She states she is managing her ADLs and house chores. ALLERGIES: Patients list of allergies were reviewed     MEDICATIONS: Ms. Renita Pardo list of medications were reviewed. Her current medications are   Outpatient Medications Prior to Visit   Medication Sig Dispense Refill    LEVEMIR FLEXTOUCH 100 UNIT/ML injection pen INJECT 20 UNITS INTO THE SKIN 2 TIMES DAILY 5 pen 2    OLANZapine (ZYPREXA) 2.5 MG tablet TAKE 1 TABLET BY MOUTH EVERY DAY AT NIGHT 30 tablet 1    buPROPion (WELLBUTRIN XL) 150 MG extended release tablet TAKE 1 TABLET BY MOUTH EVERY DAY IN THE MORNING 30 tablet 0    gabapentin (NEURONTIN) 400 MG capsule Take 1 tablet po AM, take 2 tablets po PM 90 capsule 0    SYNTHROID 75 MCG tablet Take 1 tablet by mouth daily Take with water on an empty stomach- wait 30 minutes before eating or taking other meds. 30 tablet 1    amLODIPine (NORVASC) 5 MG tablet Take 1 tablet by mouth daily 90 tablet 0    atorvastatin (LIPITOR) 40 MG tablet TAKE 1 TABLET BY MOUTH EVERY DAY 90 tablet 0    losartan (COZAAR) 100 MG tablet TAKE 1 TABLET BY MOUTH EVERY DAY 90 tablet 0    sertraline (ZOLOFT) 100 MG tablet Take 1 tablet by mouth 2 times daily 60 tablet 1    insulin aspart (NOVOLOG) 100 UNIT/ML injection pen INJECT 5 UNITS INTO THE SKIN 3 TIMES DAILY (BEFORE MEALS) 6 pen 2    meloxicam (MOBIC) 15 MG tablet Take 1 tablet by mouth daily 30 tablet 1    Handicap Placard MISC by Does not apply route 1 each 0    NOVOLOG FLEXPEN 100 UNIT/ML injection pen INJECT 5 UNITS INTO THE SKIN 3 TIMES DAILY (BEFORE MEALS) 5 pen 0    calcium carbonate (OSCAL) 500 MG TABS tablet Take 500 mg by mouth daily      Needles & Syringes MISC Inject 1 each into the skin 3 times daily 100 each 3    meloxicam (MOBIC) 15 MG tablet Take 1 tablet by mouth daily 30 tablet 0     No facility-administered medications prior to visit. SOCIAL/FAMILY/PAST MEDICAL HISTORY: Ms. Kristy Ardon, family and past medical history was reviewed. REVIEW OF SYSTEMS:    Respiratory: Negative for apnea, chest tightness and shortness of breath or change in baseline breathing.     Gastrointestinal: Negative for nausea, vomiting, abdominal pain, diarrhea, constipation, blood in stool and abdominal distention. PHYSICAL EXAM:   Nursing note and vitals reviewed. /63   Pulse 89   Temp 97.5 °F (36.4 °C)   Wt 160 lb (72.6 kg)   Breastfeeding No   BMI 27.46 kg/m²   Constitutional: She appears well-developed and well-nourished. No acute distress. Skin: Skin is warm and dry, good turgor. No rash noted. She is not diaphoretic. Cardiovascular: Normal rate, regular rhythm, normal heart sounds, and does not have murmur. Pulmonary/Chest: Effort normal. No respiratory distress. She does not have wheezes in the lung fields. She has no rales. Neurological/Psychiatric:She is alert and oriented to person, place, and time. Coordination is  normal.  Her mood isAppropriate and affect is Anxious . IMPRESSION:   1. Chronic pain syndrome    2. Fibromyalgia    3. Spinal stenosis of lumbosacral region    4. Degenerative lumbar spinal stenosis    5. Failed back surgical syndrome    6. Failed neck syndrome    7. Osteoarthritis of cervical spine, unspecified spinal osteoarthritis complication status        PLAN:  Informed verbal consent was obtained  -[place] was obtained and reviewed  for the last one year and no indicators of drug misuse  were found. Any other controlled substance prescriptions  seen on the record have been accounted for, I am aware of the patient receiving these medications. Suresh Iverson  [place] record will be rechecked as part of office protocol.    -Continue with current opioid regimen Percocet 1-2 per day   -She was advised to increase fluids ( 5-7  glasses of fluid daily), limit caffeine, avoid cheese products, increase dietary fiber, increase activity and exercise as tolerated and relax regularly and enjoy meals   -She was advised weight reduction, diet changes- 800-1200 pedro diet, diet diary, exercising, nutritional  consult increased physical activity as tolerated   -Walking as tolerated   -Patient's urine drug screen results with GC/MS confirmation were obtained and reviewed and were negative for any illicit drugs. Prescribed medications were within acceptable range. Current Outpatient Medications   Medication Sig Dispense Refill    LEVEMIR FLEXTOUCH 100 UNIT/ML injection pen INJECT 20 UNITS INTO THE SKIN 2 TIMES DAILY 5 pen 2    OLANZapine (ZYPREXA) 2.5 MG tablet TAKE 1 TABLET BY MOUTH EVERY DAY AT NIGHT 30 tablet 1    buPROPion (WELLBUTRIN XL) 150 MG extended release tablet TAKE 1 TABLET BY MOUTH EVERY DAY IN THE MORNING 30 tablet 0    gabapentin (NEURONTIN) 400 MG capsule Take 1 tablet po AM, take 2 tablets po PM 90 capsule 0    SYNTHROID 75 MCG tablet Take 1 tablet by mouth daily Take with water on an empty stomach- wait 30 minutes before eating or taking other meds. 30 tablet 1    amLODIPine (NORVASC) 5 MG tablet Take 1 tablet by mouth daily 90 tablet 0    atorvastatin (LIPITOR) 40 MG tablet TAKE 1 TABLET BY MOUTH EVERY DAY 90 tablet 0    losartan (COZAAR) 100 MG tablet TAKE 1 TABLET BY MOUTH EVERY DAY 90 tablet 0    sertraline (ZOLOFT) 100 MG tablet Take 1 tablet by mouth 2 times daily 60 tablet 1    insulin aspart (NOVOLOG) 100 UNIT/ML injection pen INJECT 5 UNITS INTO THE SKIN 3 TIMES DAILY (BEFORE MEALS) 6 pen 2    meloxicam (MOBIC) 15 MG tablet Take 1 tablet by mouth daily 30 tablet 1    Handicap Placard MISC by Does not apply route 1 each 0    NOVOLOG FLEXPEN 100 UNIT/ML injection pen INJECT 5 UNITS INTO THE SKIN 3 TIMES DAILY (BEFORE MEALS) 5 pen 0    calcium carbonate (OSCAL) 500 MG TABS tablet Take 500 mg by mouth daily      Needles & Syringes MISC Inject 1 each into the skin 3 times daily 100 each 3     No current facility-administered medications for this visit. I will continue her current medication regimen  which is part of the above treatment schedule. It has been helping with Ms. Adrian's chronic  medical problems which for this visit include:   Diagnoses of Chronic pain syndrome, Fibromyalgia, Spinal stenosis of lumbosacral region, Degenerative lumbar spinal stenosis, Failed back surgical syndrome, Failed neck syndrome, Primary insomnia, and Mood disorder ([facility]) were pertinent to this visit. Risks and benefits of the medications and other alternative treatments  including no treatment were discussed with the patient. The common side effects of these medications were also explained to the patient. Informed verbal consent was obtained. Goals of current treatment regimen include improvement in pain, restoration of functioning- with focus on improvement in physical performance, general activity, work or disability,emotional distress, health care utilization and  decreased medication consumption. Will continue to monitor progress towards achieving/maintaining therapeutic goals with special emphasis on  1. Improvement in perceived interfernce  of pain with ADL's. Ability to do home exercises independently. Ability to do household chores indoor and/or outdoor work and social and leisure activities. Improve psychosocial and physical functioning. - she is showing progression towards this treatment goal with the current regimen. She was advised against drinking alcohol with the narcotic pain medicines, advised against driving or handling machinery while adjusting the dose of medicines or if having cognitive  issues related to the current medications. Risk of overdose and death, if medicines not taken as prescribed, were also discussed. If the patient develops new symptoms or if the symptoms worsen, the patient should call the office. While transcribing every attempt was made to maintain the accuracy of the note in terms of it's contents,there may have been some errors made inadvertently. Thank you for allowing me to participate in the care of this patient.     Phan Messer MD.    Cc: Dr.Mary Abad Roberts, APRN - CNP

## 2020-08-27 RX ORDER — LEVOTHYROXINE SODIUM 75 MCG
75 TABLET ORAL DAILY
Qty: 30 TABLET | Refills: 1 | Status: SHIPPED | OUTPATIENT
Start: 2020-08-27 | End: 2020-10-20 | Stop reason: SDUPTHER

## 2020-08-27 NOTE — TELEPHONE ENCOUNTER
I would like for the patient to have her thyroid level checked sometime between October 10th - 25th, 2020 so I have the result prior to the next refill. I will put the order in: Please let her know when/where she needs to get that lab completed.     Thanks,  Rock Matamoros

## 2020-08-31 RX ORDER — LOSARTAN POTASSIUM 100 MG/1
TABLET ORAL
Qty: 90 TABLET | Refills: 0 | Status: SHIPPED | OUTPATIENT
Start: 2020-08-31 | End: 2020-12-16

## 2020-08-31 RX ORDER — ATORVASTATIN CALCIUM 40 MG/1
TABLET, FILM COATED ORAL
Qty: 90 TABLET | Refills: 0 | Status: SHIPPED | OUTPATIENT
Start: 2020-08-31 | End: 2021-02-08

## 2020-09-08 NOTE — TELEPHONE ENCOUNTER
Future Appointments   Date Time Provider John Irizarry   9/29/2020 11:00 AM Jenna Nyhan, MD [facility]

## 2020-09-09 RX ORDER — AMLODIPINE BESYLATE 5 MG/1
TABLET ORAL
Qty: 90 TABLET | Refills: 0 | Status: SHIPPED | OUTPATIENT
Start: 2020-09-09 | End: 2022-06-15 | Stop reason: SDUPTHER

## 2020-09-29 ENCOUNTER — VIRTUAL VISIT (OUTPATIENT)
Dept: PAIN MANAGEMENT | Age: 70
End: 2020-09-29
Payer: MEDICARE

## 2020-09-29 PROCEDURE — 3017F COLORECTAL CA SCREEN DOC REV: CPT | Performed by: INTERNAL MEDICINE

## 2020-09-29 PROCEDURE — G8427 DOCREV CUR MEDS BY ELIG CLIN: HCPCS | Performed by: INTERNAL MEDICINE

## 2020-09-29 PROCEDURE — 99213 OFFICE O/P EST LOW 20 MIN: CPT | Performed by: INTERNAL MEDICINE

## 2020-09-29 PROCEDURE — G8399 PT W/DXA RESULTS DOCUMENT: HCPCS | Performed by: INTERNAL MEDICINE

## 2020-09-29 PROCEDURE — 1123F ACP DISCUSS/DSCN MKR DOCD: CPT | Performed by: INTERNAL MEDICINE

## 2020-09-29 PROCEDURE — 4040F PNEUMOC VAC/ADMIN/RCVD: CPT | Performed by: INTERNAL MEDICINE

## 2020-09-29 PROCEDURE — 1090F PRES/ABSN URINE INCON ASSESS: CPT | Performed by: INTERNAL MEDICINE

## 2020-09-29 RX ORDER — GABAPENTIN 400 MG/1
CAPSULE ORAL
Qty: 90 CAPSULE | Refills: 1 | Status: SHIPPED | OUTPATIENT
Start: 2020-09-29 | End: 2020-10-28 | Stop reason: SDUPTHER

## 2020-09-29 RX ORDER — MELOXICAM 15 MG/1
15 TABLET ORAL DAILY
Qty: 30 TABLET | Refills: 1 | Status: SHIPPED | OUTPATIENT
Start: 2020-09-29 | End: 2020-10-28 | Stop reason: SDUPTHER

## 2020-09-29 RX ORDER — BUPROPION HYDROCHLORIDE 150 MG/1
TABLET ORAL
Qty: 30 TABLET | Refills: 1 | Status: SHIPPED | OUTPATIENT
Start: 2020-09-29 | End: 2020-10-28 | Stop reason: SDUPTHER

## 2020-09-29 RX ORDER — OXYCODONE AND ACETAMINOPHEN 7.5; 325 MG/1; MG/1
1 TABLET ORAL EVERY 6 HOURS PRN
Qty: 70 TABLET | Refills: 0 | Status: SHIPPED | OUTPATIENT
Start: 2020-09-29 | End: 2020-10-28 | Stop reason: SDUPTHER

## 2020-09-30 RX ORDER — OLANZAPINE 2.5 MG/1
TABLET ORAL
Qty: 30 TABLET | Refills: 1 | Status: SHIPPED | OUTPATIENT
Start: 2020-09-30 | End: 2020-11-04

## 2020-09-30 NOTE — TELEPHONE ENCOUNTER
Last ov 07/27/2020   Future Appointments   Date Time Provider John Irizarry   10/28/2020  9:40 AM Ana Watson MD [facility]

## 2020-10-05 ENCOUNTER — TELEPHONE (OUTPATIENT)
Dept: FAMILY MEDICINE CLINIC | Age: 70
End: 2020-10-05

## 2020-10-05 NOTE — TELEPHONE ENCOUNTER
----- Message from Cody Ottoandre sent at [id number]/2/9718  3:22 PM EDT -----  Subject: Message to Provider    QUESTIONS  Information for Provider? PT showed up to lab to have blood work done and   found out order had not been submitted. Pt is requesting that blood work   be ordered   ---------------------------------------------------------------------------  --------------  [address]  What is the best way for the office to contact you? OK to leave message on   voicemail  Preferred Call Back Phone Number? [phone]  ---------------------------------------------------------------------------  --------------  SCRIPT ANSWERS  Relationship to Patient?  Self

## 2020-10-12 DIAGNOSIS — E03.9 HYPOTHYROIDISM, UNSPECIFIED TYPE: ICD-10-CM

## 2020-10-13 LAB
T4 FREE: 1.4 NG/DL (ref 0.9–1.8)
TSH SERPL DL<=0.05 MIU/L-ACNC: 0.91 UIU/ML (ref 0.27–4.2)

## 2020-10-20 RX ORDER — LEVOTHYROXINE SODIUM 75 MCG
75 TABLET ORAL DAILY
Qty: 30 TABLET | Refills: 1 | Status: SHIPPED | OUTPATIENT
Start: 2020-10-20 | End: 2020-11-16

## 2020-10-28 ENCOUNTER — VIRTUAL VISIT (OUTPATIENT)
Dept: PAIN MANAGEMENT | Age: 70
End: 2020-10-28
Payer: MEDICARE

## 2020-10-28 PROCEDURE — 1123F ACP DISCUSS/DSCN MKR DOCD: CPT | Performed by: INTERNAL MEDICINE

## 2020-10-28 PROCEDURE — 99213 OFFICE O/P EST LOW 20 MIN: CPT | Performed by: INTERNAL MEDICINE

## 2020-10-28 PROCEDURE — 4040F PNEUMOC VAC/ADMIN/RCVD: CPT | Performed by: INTERNAL MEDICINE

## 2020-10-28 PROCEDURE — 1090F PRES/ABSN URINE INCON ASSESS: CPT | Performed by: INTERNAL MEDICINE

## 2020-10-28 PROCEDURE — G8399 PT W/DXA RESULTS DOCUMENT: HCPCS | Performed by: INTERNAL MEDICINE

## 2020-10-28 PROCEDURE — G8428 CUR MEDS NOT DOCUMENT: HCPCS | Performed by: INTERNAL MEDICINE

## 2020-10-28 PROCEDURE — 3017F COLORECTAL CA SCREEN DOC REV: CPT | Performed by: INTERNAL MEDICINE

## 2020-10-28 RX ORDER — BUPROPION HYDROCHLORIDE 150 MG/1
TABLET ORAL
Qty: 30 TABLET | Refills: 1 | Status: SHIPPED | OUTPATIENT
Start: 2020-10-28 | End: 2020-12-08 | Stop reason: SDUPTHER

## 2020-10-28 RX ORDER — OXYCODONE AND ACETAMINOPHEN 7.5; 325 MG/1; MG/1
1 TABLET ORAL EVERY 6 HOURS PRN
Qty: 70 TABLET | Refills: 0 | Status: SHIPPED | OUTPATIENT
Start: 2020-10-28 | End: 2020-12-08 | Stop reason: SDUPTHER

## 2020-10-28 RX ORDER — MELOXICAM 15 MG/1
15 TABLET ORAL DAILY
Qty: 30 TABLET | Refills: 1 | Status: SHIPPED | OUTPATIENT
Start: 2020-10-28 | End: 2021-01-12 | Stop reason: SDUPTHER

## 2020-10-28 RX ORDER — GABAPENTIN 400 MG/1
CAPSULE ORAL
Qty: 90 CAPSULE | Refills: 1 | Status: SHIPPED | OUTPATIENT
Start: 2020-10-28 | End: 2020-12-08 | Stop reason: SDUPTHER

## 2020-10-28 NOTE — PROGRESS NOTES
TELE HEALTH VISIT (AUDIO-VISUAL)    Pursuant to the emergency declaration under the [facility] waiver authority and the [employer] Act, this Virtual  Visit was conducted, with patient's/legal guardian's consent, to reduce the patient's risk of exposure to COVID-19 and provide continuity of care for an established patient. Service is  provided through a video synchronous discussion virtually to substitute for in-person clinic visit. Due to this being a TeleHealth encounter (During IPOIX-27 public health emergency), evaluation of the following organ systems was limited: Vitals/Constitutional/EENT/Resp/CV/GI//MS/Neuro/Skin/Aowv-Flib-Vjz. [facility]  1950  [id number]    Ms. Jasbir Preciado is being seen virtually for a follow up visit using one of the following techniques  Face time  Informed verbal consent to the virtual visit was obtained from Ms. Jasbir Preciado. Risks associated with HIPPA compliance with the virtual visit was explained to the patient. Ms. Jasbir Preciado is at her residence and Dr. Milton Reeves is in his office. HISTORY OF PRESENT ILLNESS:  Ms. Jasbir Preciado is a 65 y.o. female  being assessed for a follow up visit for pain management for evaluation of ongoing care regarding her symptoms and monitoring of compliance with long term use high risk medications. She has a diagnosis of   1. Chronic pain syndrome    2. Fibromyalgia    3. Spinal stenosis of lumbosacral region    4. Osteoarthritis of cervical spine, unspecified spinal osteoarthritis complication status    5. Degenerative lumbar spinal stenosis    . She complains of pain in the lower back  with radiation to the buttocks, hips Left, upper leg Left, knees Left, lower leg Left, ankles Left and feet Left . She rates the pain 7/10 and describes it as sharp, aching. Current treatment regimen has helped relieve about 40% of the pain.   She denies any side effects from the current pain regimen. Patient reports that since the last follow up visit the physical functioning is unchanged, family/social relationships are unchanged, mood is unchanged sleep patterns are worse, and that the overall functioning is unchanged. Patient denies misusing/abusing her narcotic pain medications or using any illegal drugs. There are No indicators for possible drug abuse, addiction or diversion problems. Ms. Yuridia Spencer states she has been doing fair. She says she had a fall recently, she is very sore, she banged her head and back, she states she had to call EMS and went to the ER, she had no fractures. Patient mentions she is using Percocet 2-3 per day. ALLERGIES: Patients list of allergies were reviewed     MEDICATIONS: Ms. Yuridia Spencer list of medications were reviewed. Her current medications are   Outpatient Medications Prior to Visit   Medication Sig Dispense Refill    SYNTHROID 75 MCG tablet Take 1 tablet by mouth daily Take with water on an empty stomach- wait 30 minutes before eating or taking other meds.  30 tablet 1    OLANZapine (ZYPREXA) 2.5 MG tablet TAKE 1 TABLET BY MOUTH EVERY DAY AT NIGHT 30 tablet 1    buPROPion (WELLBUTRIN XL) 150 MG extended release tablet TAKE 1 TABLET BY MOUTH EVERY DAY IN THE MORNING 30 tablet 1    gabapentin (NEURONTIN) 400 MG capsule Take 1 tablet po AM, take 2 tablets po PM 90 capsule 1    meloxicam (MOBIC) 15 MG tablet Take 1 tablet by mouth daily 30 tablet 1    amLODIPine (NORVASC) 5 MG tablet TAKE 1 TABLET BY MOUTH EVERY DAY 90 tablet 0    atorvastatin (LIPITOR) 40 MG tablet TAKE 1 TABLET BY MOUTH EVERY DAY 90 tablet 0    losartan (COZAAR) 100 MG tablet TAKE 1 TABLET BY MOUTH EVERY DAY 90 tablet 0    LEVEMIR FLEXTOUCH 100 UNIT/ML injection pen INJECT 20 UNITS INTO THE SKIN 2 TIMES DAILY 5 pen 2    sertraline (ZOLOFT) 100 MG tablet Take 1 tablet by mouth 2 times daily 60 tablet 1    insulin aspart (NOVOLOG) 100 UNIT/ML injection pen INJECT 5 UNITS INTO THE SKIN 3 TIMES DAILY (BEFORE MEALS) 6 pen 2    Handicap Placard MISC by Does not apply route 1 each 0    NOVOLOG FLEXPEN 100 UNIT/ML injection pen INJECT 5 UNITS INTO THE SKIN 3 TIMES DAILY (BEFORE MEALS) 5 pen 0    calcium carbonate (OSCAL) 500 MG TABS tablet Take 500 mg by mouth daily      Needles & Syringes MISC Inject 1 each into the skin 3 times daily 100 each 3     No facility-administered medications prior to visit. SOCIAL/FAMILY/PAST MEDICAL HISTORY: Ms. Maribel Lino, family and past medical history was reviewed. REVIEW OF SYSTEMS:    Respiratory: Negative for apnea, chest tightness and shortness of breath or change in baseline breathing. Gastrointestinal: Negative for nausea, vomiting, abdominal pain, diarrhea, constipation, blood in stool and abdominal distention. PHYSICAL EXAM:   Nursing note and vitals per patient reviewed. There were no vitals taken for this visit. Constitutional: She appears well-developed and well-nourished. No acute distress. No respiratory distress. Skin: Skin appears to be warm and dry. No rashes or any other marks noted. She is not diaphoretic. Respiratory/Pulmonary: NO conversational dyspnea, no accessory muscle use, no coughing during exam. She does not appear to be in labored breathing. Neurological/Psychiatric:She is alert and oriented to person, place, and time. Coordination is  normal.  Her mood isAppropriate and affect is Neutral/Euthymic(normal). Musculoskeletal / Extremities: Range of motion is normal. Gait is normal, assistive devices use: none. IMPRESSION:   1. Chronic pain syndrome    2. Fibromyalgia    3. Spinal stenosis of lumbosacral region    4. Osteoarthritis of cervical spine, unspecified spinal osteoarthritis complication status    5. Degenerative lumbar spinal stenosis    6. Failed neck syndrome    7.  Failed back surgical syndrome        PLAN:  Informed verbal consent regarding treatment pain syndrome, Fibromyalgia, Spinal stenosis of lumbosacral region, Osteoarthritis of cervical spine, unspecified spinal osteoarthritis complication status, and Degenerative lumbar spinal stenosis were pertinent to this visit. Risks and benefits of the medications and other alternative treatments  including no treatment were discussed with the patient. The common side effects of these medications were also explained to the patient. Informed verbal consent was obtained. Goals of current treatment regimen include improvement in pain, restoration of functioning- with focus on improvement in physical performance, general activity, work or disability,emotional distress, health care utilization and  decreased medication consumption. Will continue to monitor progress towards achieving/maintaining therapeutic goals with special emphasis on  1. Improvement in perceived interfernce  of pain with ADL's. Ability to do home exercises independently. Ability to do household chores indoor and/or outdoor work and social and leisure activities. Improve psychosocial and physical functioning. - she is showing progression towards this treatment goal with the current regimen. She was advised against drinking alcohol with the narcotic pain medicines, advised against driving or handling machinery while adjusting the dose of medicines or if having cognitive  issues related to the current medications. Risk of overdose and death, if medicines not taken as prescribed, were also discussed. If the patient develops new symptoms or if the symptoms worsen, the patient should call the office. While transcribing every attempt was made to maintain the accuracy of the note in terms of it's contents,there may have been some errors made inadvertently. Thank you for allowing me to participate in the care of this patient.     uSmit Kincaid MD.    Cc: Dr.Mary Kandis Holbrook, APRN - CNP

## 2020-11-04 RX ORDER — OLANZAPINE 2.5 MG/1
TABLET ORAL
Qty: 30 TABLET | Refills: 1 | Status: SHIPPED | OUTPATIENT
Start: 2020-11-04 | End: 2020-12-02

## 2020-12-02 RX ORDER — OLANZAPINE 2.5 MG/1
TABLET ORAL
Qty: 30 TABLET | Refills: 1 | Status: SHIPPED | OUTPATIENT
Start: 2020-12-02 | End: 2020-12-28

## 2020-12-02 NOTE — TELEPHONE ENCOUNTER
7/27/2020  Future Appointments   Date Time Provider John Irizarry   12/8/2020 10:20 AM Asuncion Lee MD [facility]

## 2020-12-08 ENCOUNTER — VIRTUAL VISIT (OUTPATIENT)
Dept: PAIN MANAGEMENT | Age: 70
End: 2020-12-08
Payer: MEDICARE

## 2020-12-08 PROCEDURE — G8399 PT W/DXA RESULTS DOCUMENT: HCPCS | Performed by: INTERNAL MEDICINE

## 2020-12-08 PROCEDURE — 3017F COLORECTAL CA SCREEN DOC REV: CPT | Performed by: INTERNAL MEDICINE

## 2020-12-08 PROCEDURE — 99213 OFFICE O/P EST LOW 20 MIN: CPT | Performed by: INTERNAL MEDICINE

## 2020-12-08 PROCEDURE — 1090F PRES/ABSN URINE INCON ASSESS: CPT | Performed by: INTERNAL MEDICINE

## 2020-12-08 PROCEDURE — G8427 DOCREV CUR MEDS BY ELIG CLIN: HCPCS | Performed by: INTERNAL MEDICINE

## 2020-12-08 PROCEDURE — 4040F PNEUMOC VAC/ADMIN/RCVD: CPT | Performed by: INTERNAL MEDICINE

## 2020-12-08 PROCEDURE — 1123F ACP DISCUSS/DSCN MKR DOCD: CPT | Performed by: INTERNAL MEDICINE

## 2020-12-08 RX ORDER — GABAPENTIN 400 MG/1
CAPSULE ORAL
Qty: 90 CAPSULE | Refills: 1 | Status: SHIPPED | OUTPATIENT
Start: 2020-12-08 | End: 2021-02-16 | Stop reason: SDUPTHER

## 2020-12-08 RX ORDER — BUPROPION HYDROCHLORIDE 150 MG/1
TABLET ORAL
Qty: 30 TABLET | Refills: 1 | Status: SHIPPED | OUTPATIENT
Start: 2020-12-08 | End: 2021-03-19 | Stop reason: SDUPTHER

## 2020-12-08 RX ORDER — OXYCODONE AND ACETAMINOPHEN 7.5; 325 MG/1; MG/1
1 TABLET ORAL EVERY 6 HOURS PRN
Qty: 80 TABLET | Refills: 0 | Status: SHIPPED | OUTPATIENT
Start: 2020-12-08 | End: 2021-01-12 | Stop reason: SDUPTHER

## 2020-12-08 NOTE — PROGRESS NOTES
She complains of pain in the Low back  with radiation to the buttocks, hips Left, upper leg Left, knees Left, lower leg Left, ankles Left and feet Left . She rates the pain 6/10 and describes it as sharp, aching, burning. Current treatment regimen has helped relieve about 40% of the pain. She denies any side effects from the current pain regimen. Patient reports that since the last follow up visit the physical functioning is unchanged, family/social relationships are unchanged, mood is unchanged sleep patterns are unchanged, and that the overall functioning is unchanged. Patient denies misusing/abusing her narcotic pain medications or using any illegal drugs. There are No indicators for possible drug abuse, addiction or diversion problems.  reports she has been doing fair. She mentions she has been managing with the medications. She says she is using Percocet 2 per day. She states she is in lock down and has been rationing her medication. She mentions her weight has been stable. ALLERGIES: Patients list of allergies were reviewed     MEDICATIONS: Ms. Ghassan Saldivar list of medications were reviewed. Her current medications are   Outpatient Medications Prior to Visit   Medication Sig Dispense Refill    OLANZapine (ZYPREXA) 2.5 MG tablet TAKE 1 TABLET BY MOUTH EVERY DAY AT NIGHT 30 tablet 1    SYNTHROID 75 MCG tablet TAKE 1 TABLET BY MOUTH DAILY. TAKE WITH WATER ON AN EMPTY STOMACH - WAIT 30 MINUTES BEFORE EATING OR TAKING OTHER MEDS.  30 tablet 1    buPROPion (WELLBUTRIN XL) 150 MG extended release tablet TAKE 1 TABLET BY MOUTH EVERY DAY IN THE MORNING 30 tablet 1    gabapentin (NEURONTIN) 400 MG capsule Take 1 tablet po AM, take 2 tablets po PM 90 capsule 1    meloxicam (MOBIC) 15 MG tablet Take 1 tablet by mouth daily 30 tablet 1    amLODIPine (NORVASC) 5 MG tablet TAKE 1 TABLET BY MOUTH EVERY DAY 90 tablet 0    atorvastatin (LIPITOR) 40 MG tablet TAKE 1 TABLET BY MOUTH EVERY DAY 90 tablet 0  losartan (COZAAR) 100 MG tablet TAKE 1 TABLET BY MOUTH EVERY DAY 90 tablet 0    LEVEMIR FLEXTOUCH 100 UNIT/ML injection pen INJECT 20 UNITS INTO THE SKIN 2 TIMES DAILY 5 pen 2    sertraline (ZOLOFT) 100 MG tablet Take 1 tablet by mouth 2 times daily 60 tablet 1    insulin aspart (NOVOLOG) 100 UNIT/ML injection pen INJECT 5 UNITS INTO THE SKIN 3 TIMES DAILY (BEFORE MEALS) 6 pen 2    Handicap Placard MISC by Does not apply route 1 each 0    NOVOLOG FLEXPEN 100 UNIT/ML injection pen INJECT 5 UNITS INTO THE SKIN 3 TIMES DAILY (BEFORE MEALS) 5 pen 0    calcium carbonate (OSCAL) 500 MG TABS tablet Take 500 mg by mouth daily      Needles & Syringes MISC Inject 1 each into the skin 3 times daily 100 each 3     No facility-administered medications prior to visit. SOCIAL/FAMILY/PAST MEDICAL HISTORY: Ms. Hadley Elder, family and past medical history was reviewed. REVIEW OF SYSTEMS:    Respiratory: Negative for apnea, chest tightness and shortness of breath or change in baseline breathing. Gastrointestinal: Negative for nausea, vomiting, abdominal pain, diarrhea, constipation, blood in stool and abdominal distention. PHYSICAL EXAM:   Nursing note and vitals per patient reviewed. There were no vitals taken for this visit. Constitutional: She appears well-developed and well-nourished. No acute distress. No respiratory distress. Skin: Skin appears to be warm and dry. No rashes or any other marks noted. She is not diaphoretic. Respiratory/Pulmonary: NO conversational dyspnea, no accessory muscle use, no coughing during exam. She does not appear to be in labored breathing. Neurological/Psychiatric:She is alert and oriented to person, place, and time. Coordination is  normal.  Her mood isAppropriate and affect is Flat/blunted. Musculoskeletal / Extremities: Range of motion is normal. Gait is normal, assistive devices use: none. IMPRESSION:   1. Chronic pain syndrome    2.  Fibromyalgia She was advised against drinking alcohol with the narcotic pain medicines, advised against driving or handling machinery while adjusting the dose of medicines or if having cognitive  issues related to the current medications. Risk of overdose and death, if medicines not taken as prescribed, were also discussed. If the patient develops new symptoms or if the symptoms worsen, the patient should call the office. While transcribing every attempt was made to maintain the accuracy of the note in terms of it's contents,there may have been some errors made inadvertently. Thank you for allowing me to participate in the care of this patient.     Emigdio Morgan MD.    Cc: Dr.Mary Sadaf Bray, APRN - CNP

## 2020-12-15 NOTE — TELEPHONE ENCOUNTER
Future Appointments   Date Time Provider John Irizarry   1/12/2021  9:00 AM Nancy Pickering MD [facility]   Last ov 7/27/20

## 2020-12-15 NOTE — TELEPHONE ENCOUNTER
Future Appointments   Date Time Provider John Irizarry   1/12/2021  9:00 AM Sangita Hammer MD [facility]     7/27/2020

## 2020-12-16 RX ORDER — LOSARTAN POTASSIUM 100 MG/1
TABLET ORAL
Qty: 90 TABLET | Refills: 0 | Status: SHIPPED | OUTPATIENT
Start: 2020-12-16 | End: 2021-03-31

## 2020-12-16 RX ORDER — LEVOTHYROXINE SODIUM 75 MCG
TABLET ORAL
Qty: 30 TABLET | Refills: 1 | Status: SHIPPED | OUTPATIENT
Start: 2020-12-16 | End: 2021-01-11

## 2020-12-28 RX ORDER — OLANZAPINE 2.5 MG/1
TABLET ORAL
Qty: 30 TABLET | Refills: 1 | Status: SHIPPED | OUTPATIENT
Start: 2020-12-28 | End: 2021-02-09

## 2020-12-28 NOTE — TELEPHONE ENCOUNTER
Last ov 07/27/2020   Future Appointments   Date Time Provider John Irizarry   1/12/2021  9:00 AM Bashir Sylvester MD [facility]

## 2021-01-07 RX ORDER — INSULIN DETEMIR 100 [IU]/ML
20 INJECTION, SOLUTION SUBCUTANEOUS 2 TIMES DAILY
Qty: 5 PEN | Refills: 2 | Status: SHIPPED | OUTPATIENT
Start: 2021-01-07 | End: 2021-04-19

## 2021-01-07 NOTE — TELEPHONE ENCOUNTER
Last ov 07/27/2020   Future Appointments   Date Time Provider John Irizarry   1/12/2021  9:00 AM Benja Clancy MD [facility]

## 2021-01-10 DIAGNOSIS — E03.9 HYPOTHYROIDISM, UNSPECIFIED TYPE: ICD-10-CM

## 2021-01-11 RX ORDER — LEVOTHYROXINE SODIUM 75 MCG
TABLET ORAL
Qty: 30 TABLET | Refills: 1 | Status: SHIPPED | OUTPATIENT
Start: 2021-01-11 | End: 2021-02-08

## 2021-01-11 NOTE — TELEPHONE ENCOUNTER
Last ov 07/27/2020   Future Appointments   Date Time Provider John Irizarry   1/12/2021  9:00 AM [facility]

## 2021-01-12 ENCOUNTER — VIRTUAL VISIT (OUTPATIENT)
Dept: PAIN MANAGEMENT | Age: 71
End: 2021-01-12
Payer: MEDICARE

## 2021-01-12 DIAGNOSIS — G89.4 CHRONIC PAIN SYNDROME: ICD-10-CM

## 2021-01-12 DIAGNOSIS — M96.1 FAILED BACK SURGICAL SYNDROME: ICD-10-CM

## 2021-01-12 DIAGNOSIS — M96.1 FAILED NECK SYNDROME: ICD-10-CM

## 2021-01-12 DIAGNOSIS — M47.812 OSTEOARTHRITIS OF CERVICAL SPINE, UNSPECIFIED SPINAL OSTEOARTHRITIS COMPLICATION STATUS: ICD-10-CM

## 2021-01-12 DIAGNOSIS — M79.7 FIBROMYALGIA: ICD-10-CM

## 2021-01-12 DIAGNOSIS — M48.061 DEGENERATIVE LUMBAR SPINAL STENOSIS: ICD-10-CM

## 2021-01-12 DIAGNOSIS — M48.07 SPINAL STENOSIS OF LUMBOSACRAL REGION: ICD-10-CM

## 2021-01-12 PROCEDURE — 1090F PRES/ABSN URINE INCON ASSESS: CPT | Performed by: INTERNAL MEDICINE

## 2021-01-12 PROCEDURE — 4040F PNEUMOC VAC/ADMIN/RCVD: CPT | Performed by: INTERNAL MEDICINE

## 2021-01-12 PROCEDURE — 1123F ACP DISCUSS/DSCN MKR DOCD: CPT | Performed by: INTERNAL MEDICINE

## 2021-01-12 PROCEDURE — G8427 DOCREV CUR MEDS BY ELIG CLIN: HCPCS | Performed by: INTERNAL MEDICINE

## 2021-01-12 PROCEDURE — 3017F COLORECTAL CA SCREEN DOC REV: CPT | Performed by: INTERNAL MEDICINE

## 2021-01-12 PROCEDURE — G8399 PT W/DXA RESULTS DOCUMENT: HCPCS | Performed by: INTERNAL MEDICINE

## 2021-01-12 PROCEDURE — 99213 OFFICE O/P EST LOW 20 MIN: CPT | Performed by: INTERNAL MEDICINE

## 2021-01-12 RX ORDER — OXYCODONE AND ACETAMINOPHEN 7.5; 325 MG/1; MG/1
1 TABLET ORAL EVERY 6 HOURS PRN
Qty: 70 TABLET | Refills: 0 | Status: SHIPPED | OUTPATIENT
Start: 2021-01-12 | End: 2021-02-16 | Stop reason: SDUPTHER

## 2021-01-12 RX ORDER — MELOXICAM 15 MG/1
15 TABLET ORAL DAILY
Qty: 30 TABLET | Refills: 1 | Status: SHIPPED | OUTPATIENT
Start: 2021-01-12 | End: 2021-02-16 | Stop reason: SDUPTHER

## 2021-01-12 NOTE — PROGRESS NOTES
As per information/history obtained from the PADT(patient assessment and documentation tool) - She complains of pain in the lower back with radiation to the upper leg Left and lower leg Left She rates the pain 6/10 and describes it as aching. Pain is made worse by: standing, lifting. Current treatment regimen has helped relieve about 50% of the pain. She denies side effects from the current pain regimen. Patient reports that since the last follow up visit the physical functioning is unchanged, family/social relationships are worse, mood is unchanged and sleep patterns are unchanged, and that the overall functioning is unchanged. Patient denies neurological bowel or bladder. Patient denies misusing/abusing her narcotic pain medications or using any illegal drugs. There are No indicators for possible drug abuse, addiction or diversion problems. Upon obtaining the medical history from Ms. Edwige Mckinney regarding today's office visit for her presenting problems, patient states she has been doing fair, pian has been baseline. She mentions she is still not sleeping too well. She mentions she is using OTC medications, which is not help. She reports she has been using Neurontin along with Mobic. She complains her back pain is worse than her leg pain. She denies any constipation symptoms. She states her PCP started her on Zyprexa 2.5 mg       ALLERGIES: Patients list of allergies were reviewed     MEDICATIONS: Ms. Edwige Mckinney list of medications were reviewed. Her current medications are   Outpatient Medications Prior to Visit   Medication Sig Dispense Refill    SYNTHROID 75 MCG tablet TAKE 1 TABLET BY MOUTH DAILY. TAKE WITH WATER ON AN EMPTY STOMACH - WAIT 30 MINUTES BEFORE EATING OR TAKING OTHER MEDS.  30 tablet 1    LEVEMIR FLEXTOUCH 100 UNIT/ML injection pen INJECT 20 UNITS INTO THE SKIN 2 TIMES DAILY 5 pen 2    OLANZapine (ZYPREXA) 2.5 MG tablet TAKE 1 TABLET BY MOUTH EVERY DAY AT NIGHT 30 tablet 1  losartan (COZAAR) 100 MG tablet TAKE 1 TABLET BY MOUTH EVERY DAY 90 tablet 0    buPROPion (WELLBUTRIN XL) 150 MG extended release tablet TAKE 1 TABLET BY MOUTH EVERY DAY IN THE MORNING 30 tablet 1    gabapentin (NEURONTIN) 400 MG capsule Take 1 tablet po AM, take 2 tablets po PM 90 capsule 1    oxyCODONE-acetaminophen (PERCOCET) 7.5-325 MG per tablet Take 1 tablet by mouth every 6 hours as needed for Pain (max 2-3/day) for up to 35 days. 80 tablet 0    meloxicam (MOBIC) 15 MG tablet Take 1 tablet by mouth daily 30 tablet 1    amLODIPine (NORVASC) 5 MG tablet TAKE 1 TABLET BY MOUTH EVERY DAY 90 tablet 0    atorvastatin (LIPITOR) 40 MG tablet TAKE 1 TABLET BY MOUTH EVERY DAY 90 tablet 0    sertraline (ZOLOFT) 100 MG tablet Take 1 tablet by mouth 2 times daily 60 tablet 1    insulin aspart (NOVOLOG) 100 UNIT/ML injection pen INJECT 5 UNITS INTO THE SKIN 3 TIMES DAILY (BEFORE MEALS) 6 pen 2    Handicap Placard MISC by Does not apply route 1 each 0    NOVOLOG FLEXPEN 100 UNIT/ML injection pen INJECT 5 UNITS INTO THE SKIN 3 TIMES DAILY (BEFORE MEALS) 5 pen 0    calcium carbonate (OSCAL) 500 MG TABS tablet Take 500 mg by mouth daily      Needles & Syringes MISC Inject 1 each into the skin 3 times daily 100 each 3     No facility-administered medications prior to visit. REVIEW OF SYSTEMS:    Respiratory: Negative for apnea, chest tightness and shortness of breath or change in baseline breathing. PHYSICAL EXAM:   Nursing note and vitals reviewed. There were no vitals taken for this visit. Constitutional: She appears well-developed and well-nourished. No acute distress. Cardiovascular: Normal rate, regular rhythm, normal heart sounds, and does not have murmur. Pulmonary/Chest: Effort normal. No respiratory distress. She does not have wheezes in the lung fields. She has no rales. Neurological/Psychiatric:She is alert and oriented to person, place, and time. Coordination is  normal.  Her mood isAppropriate and affect is Neutral/Euthymic(normal) . IMPRESSION:   1. Chronic pain syndrome    2. Fibromyalgia    3. Spinal stenosis of lumbosacral region    4. Osteoarthritis of cervical spine, unspecified spinal osteoarthritis complication status    5. Degenerative lumbar spinal stenosis    6. Failed neck syndrome    7. Failed back surgical syndrome        PLAN:  Informed verbal consent was obtained  -Continue with current opioid regimen Percocet 2-3 per day   -she was advised proper sleep hygiene-told to avoid:use of caffeine or other stimulants after noon, alcohol use near bedtime, long or frequent naps during the day, erratic sleep schedule, heavy meals near bedtime, vigorous exercise near bedtime and use of electronic devices near bedtime   -Interim history reviewed   -Advised to discuss with PCP regards to increase Zyprexa start recently     Current Outpatient Medications   Medication Sig Dispense Refill    SYNTHROID 75 MCG tablet TAKE 1 TABLET BY MOUTH DAILY. TAKE WITH WATER ON AN EMPTY STOMACH - WAIT 30 MINUTES BEFORE EATING OR TAKING OTHER MEDS. 30 tablet 1    LEVEMIR FLEXTOUCH 100 UNIT/ML injection pen INJECT 20 UNITS INTO THE SKIN 2 TIMES DAILY 5 pen 2    OLANZapine (ZYPREXA) 2.5 MG tablet TAKE 1 TABLET BY MOUTH EVERY DAY AT NIGHT 30 tablet 1    losartan (COZAAR) 100 MG tablet TAKE 1 TABLET BY MOUTH EVERY DAY 90 tablet 0    buPROPion (WELLBUTRIN XL) 150 MG extended release tablet TAKE 1 TABLET BY MOUTH EVERY DAY IN THE MORNING 30 tablet 1    gabapentin (NEURONTIN) 400 MG capsule Take 1 tablet po AM, take 2 tablets po PM 90 capsule 1    oxyCODONE-acetaminophen (PERCOCET) 7.5-325 MG per tablet Take 1 tablet by mouth every 6 hours as needed for Pain (max 2-3/day) for up to 35 days.  80 tablet 0    meloxicam (MOBIC) 15 MG tablet Take 1 tablet by mouth daily 30 tablet 1  amLODIPine (NORVASC) 5 MG tablet TAKE 1 TABLET BY MOUTH EVERY DAY 90 tablet 0    atorvastatin (LIPITOR) 40 MG tablet TAKE 1 TABLET BY MOUTH EVERY DAY 90 tablet 0    sertraline (ZOLOFT) 100 MG tablet Take 1 tablet by mouth 2 times daily 60 tablet 1    insulin aspart (NOVOLOG) 100 UNIT/ML injection pen INJECT 5 UNITS INTO THE SKIN 3 TIMES DAILY (BEFORE MEALS) 6 pen 2    Handicap Placard MISC by Does not apply route 1 each 0    NOVOLOG FLEXPEN 100 UNIT/ML injection pen INJECT 5 UNITS INTO THE SKIN 3 TIMES DAILY (BEFORE MEALS) 5 pen 0    calcium carbonate (OSCAL) 500 MG TABS tablet Take 500 mg by mouth daily      Needles & Syringes MISC Inject 1 each into the skin 3 times daily 100 each 3     No current facility-administered medications for this visit. I will continue her current medication regimen  which is part of the above treatment schedule. It has been helping with Ms. Adrian's chronic  medical problems which for this visit include:   Diagnoses of Chronic pain syndrome, Fibromyalgia, Spinal stenosis of lumbosacral region, Osteoarthritis of cervical spine, unspecified spinal osteoarthritis complication status, Degenerative lumbar spinal stenosis, Failed neck syndrome, and Failed back surgical syndrome were pertinent to this visit. Risks and benefits of the medications and other alternative treatments  including no treatment were discussed with the patient. The common side effects of these medications were also explained to the patient. Informed verbal consent was obtained. Goals of current treatment regimen include improvement in pain, restoration of functioning- with focus on improvement in physical performance, general activity, work or disability,emotional distress, health care utilization and  decreased medication consumption.  Will continue to monitor progress towards achieving/maintaining therapeutic goals with special emphasis on 1. Improvement in perceived interfernce  of pain with ADL's. Ability to do home exercises independently. Ability to do household chores indoor and/or outdoor work and social and leisure activities. Improve psychosocial and physical functioning. - she is showing progression towards this treatment goal with the current regimen. She was advised against drinking alcohol with the narcotic pain medicines, advised against driving or handling machinery while adjusting the dose of medicines or if having cognitive  issues related to the current medications. Risk of overdose and death, if medicines not taken as prescribed, were also discussed. If the patient develops new symptoms or if the symptoms worsen, the patient should call the office. While transcribing every attempt was made to maintain the accuracy of the note in terms of it's contents,there may have been some errors made inadvertently. Thank you for allowing me to participate in the care of this patient.     Simran Granda MD.    Cc: Dr.Mary Owen Nicholson, APRANDREW - CNP

## 2021-02-05 NOTE — TELEPHONE ENCOUNTER
7/27/2020  Future Appointments   Date Time Provider John Irizarry   2/16/2021  9:30 AM Kilo Hernandez MD [facility]

## 2021-02-06 DIAGNOSIS — E03.9 HYPOTHYROIDISM, UNSPECIFIED TYPE: ICD-10-CM

## 2021-02-08 RX ORDER — LEVOTHYROXINE SODIUM 75 MCG
TABLET ORAL
Qty: 30 TABLET | Refills: 1 | Status: SHIPPED | OUTPATIENT
Start: 2021-02-08 | End: 2021-02-18 | Stop reason: SDUPTHER

## 2021-02-08 RX ORDER — ATORVASTATIN CALCIUM 40 MG/1
TABLET, FILM COATED ORAL
Qty: 90 TABLET | Refills: 0 | Status: SHIPPED | OUTPATIENT
Start: 2021-02-08 | End: 2021-06-03

## 2021-02-08 NOTE — TELEPHONE ENCOUNTER
Future Appointments   Date Time Provider John Irizarry   2/16/2021  9:30 AM Rajesh Colunga MD [facility]   Last ov 7/27/20

## 2021-02-09 DIAGNOSIS — F32.A DEPRESSION, UNSPECIFIED DEPRESSION TYPE: ICD-10-CM

## 2021-02-09 RX ORDER — OLANZAPINE 2.5 MG/1
TABLET ORAL
Qty: 30 TABLET | Refills: 1 | Status: SHIPPED | OUTPATIENT
Start: 2021-02-09 | End: 2021-03-09

## 2021-02-09 NOTE — TELEPHONE ENCOUNTER
7/27/2020  Future Appointments   Date Time Provider John Irizarry   2/16/2021  9:30 AM Joan Salinas MD [facility]

## 2021-02-10 ENCOUNTER — TELEPHONE (OUTPATIENT)
Dept: FAMILY MEDICINE CLINIC | Age: 71
End: 2021-02-10

## 2021-02-10 NOTE — TELEPHONE ENCOUNTER
Patient called stating that she has new insurance as of January 1st and now she needs to change her Insulins to something generic. She is asking that you prescribe new insulins for her since Levemir and Novalog wont be covered.

## 2021-02-15 ENCOUNTER — TELEPHONE (OUTPATIENT)
Dept: FAMILY MEDICINE CLINIC | Age: 71
End: 2021-02-15

## 2021-02-15 DIAGNOSIS — E10.9 TYPE 1 DIABETES MELLITUS WITHOUT COMPLICATION (HCC): ICD-10-CM

## 2021-02-15 RX ORDER — INSULIN ASPART 100 [IU]/ML
INJECTION, SOLUTION INTRAVENOUS; SUBCUTANEOUS
Qty: 5 PEN | Refills: 0 | Status: SHIPPED | OUTPATIENT
Start: 2021-02-15 | End: 2021-07-30 | Stop reason: SDUPTHER

## 2021-02-15 NOTE — TELEPHONE ENCOUNTER
Virtual visit 07/27/2020   Future Appointments   Date Time Provider John Irizarry   2/16/2021  9:30 AM Amber Gabriel MD [facility]

## 2021-02-15 NOTE — TELEPHONE ENCOUNTER
Last ov 07/27/2020   Future Appointments   Date Time Provider John Irizarry   2/16/2021  9:30 AM Josh Duke MD [facility]

## 2021-02-15 NOTE — TELEPHONE ENCOUNTER
----- Message from Laney Jesusaumya sent at 2/12/2021  2:08 PM EST -----  Subject: Message to Provider    QUESTIONS  Information for Provider? Patient called stating that she has new   insurance as of January 1st and now she needs to change her Insulins to   something generic. She is asking that you prescribe new insulins for her   since Levemir and Novalog wont be covered. ---------------------------------------------------------------------------  --------------  Celso SCHAFFER  What is the best way for the office to contact you? OK to leave message on   voicemail  Preferred Call Back Phone Number? [phone]  ---------------------------------------------------------------------------  --------------  SCRIPT ANSWERS  Relationship to Patient?  Self

## 2021-02-15 NOTE — TELEPHONE ENCOUNTER
----- Message from Mer Ward sent at 2/12/2021  2:36 PM EST -----  Subject: Refill Request    QUESTIONS  Name of Medication? NOVOLOG FLEXPEN 100 UNIT/ML injection pen  Patient-reported dosage and instructions? 100 unit/ml  How many days do you have left? 0  Preferred Pharmacy? [id number]  Pharmacy phone number (if available)? [phone]  ---------------------------------------------------------------------------  --------------  Dami SCHAFFER  What is the best way for the office to contact you? OK to leave message on   voicemail  Preferred Call Back Phone Number?  [phone]

## 2021-02-16 ENCOUNTER — VIRTUAL VISIT (OUTPATIENT)
Dept: PAIN MANAGEMENT | Age: 71
End: 2021-02-16
Payer: MEDICARE

## 2021-02-16 DIAGNOSIS — M47.812 OSTEOARTHRITIS OF CERVICAL SPINE, UNSPECIFIED SPINAL OSTEOARTHRITIS COMPLICATION STATUS: ICD-10-CM

## 2021-02-16 DIAGNOSIS — M79.7 FIBROMYALGIA: ICD-10-CM

## 2021-02-16 DIAGNOSIS — M96.1 FAILED NECK SYNDROME: ICD-10-CM

## 2021-02-16 DIAGNOSIS — M96.1 FAILED BACK SURGICAL SYNDROME: ICD-10-CM

## 2021-02-16 DIAGNOSIS — G89.4 CHRONIC PAIN SYNDROME: ICD-10-CM

## 2021-02-16 DIAGNOSIS — M48.061 DEGENERATIVE LUMBAR SPINAL STENOSIS: ICD-10-CM

## 2021-02-16 DIAGNOSIS — M48.07 SPINAL STENOSIS OF LUMBOSACRAL REGION: ICD-10-CM

## 2021-02-16 PROCEDURE — 99213 OFFICE O/P EST LOW 20 MIN: CPT | Performed by: INTERNAL MEDICINE

## 2021-02-16 PROCEDURE — G8399 PT W/DXA RESULTS DOCUMENT: HCPCS | Performed by: INTERNAL MEDICINE

## 2021-02-16 PROCEDURE — 4040F PNEUMOC VAC/ADMIN/RCVD: CPT | Performed by: INTERNAL MEDICINE

## 2021-02-16 PROCEDURE — 1123F ACP DISCUSS/DSCN MKR DOCD: CPT | Performed by: INTERNAL MEDICINE

## 2021-02-16 PROCEDURE — 3017F COLORECTAL CA SCREEN DOC REV: CPT | Performed by: INTERNAL MEDICINE

## 2021-02-16 PROCEDURE — G8427 DOCREV CUR MEDS BY ELIG CLIN: HCPCS | Performed by: INTERNAL MEDICINE

## 2021-02-16 PROCEDURE — 1090F PRES/ABSN URINE INCON ASSESS: CPT | Performed by: INTERNAL MEDICINE

## 2021-02-16 RX ORDER — OXYCODONE AND ACETAMINOPHEN 7.5; 325 MG/1; MG/1
1 TABLET ORAL EVERY 6 HOURS PRN
Qty: 70 TABLET | Refills: 0 | Status: SHIPPED | OUTPATIENT
Start: 2021-02-16 | End: 2021-03-19 | Stop reason: SDUPTHER

## 2021-02-16 RX ORDER — MELOXICAM 15 MG/1
15 TABLET ORAL DAILY
Qty: 30 TABLET | Refills: 1 | Status: SHIPPED | OUTPATIENT
Start: 2021-02-16 | End: 2021-03-19 | Stop reason: SDUPTHER

## 2021-02-16 RX ORDER — GABAPENTIN 400 MG/1
CAPSULE ORAL
Qty: 90 CAPSULE | Refills: 1 | Status: SHIPPED | OUTPATIENT
Start: 2021-02-16 | End: 2021-03-19 | Stop reason: SDUPTHER

## 2021-02-16 NOTE — PROGRESS NOTES
TELE HEALTH VISIT (AUDIO-VISUAL)    Pursuant to the emergency declaration under the [facility] waiver authority and the [employer] and [employer] Act, this Virtual  Visit was conducted, with patient's/legal guardian's consent, to reduce the patient's risk of exposure to COVID-19 and provide continuity of care for an established patient. Service is  provided through a video synchronous discussion virtually to substitute for in-person clinic visit. Due to this being a TeleHealth encounter (During [facility] public health emergency), evaluation of the following organ systems was limited: Vitals/Constitutional/EENT/Resp/CV/GI//MS/Neuro/Skin/Ezfg-Cwzz-Put. Jessica Hightower  1950  [id number]    Ms. Benny Rodriguez is being seen virtually for a follow up visit using one of the following techniques  Face time   Informed verbal consent to the virtual visit was obtained from Ms. Benny Rodriguez. Risks associated with HIPPA compliance with the virtual visit was explained to the patient. Ms. Benny Rodriguez is at her residence and Dr. Savanah Her is in his office. HISTORY OF PRESENT ILLNESS:  Ms. Benny Rodriguez is a 65 y.o. female returns for a follow up visit for multiple medical problems. Her current presenting problems are   1. Chronic pain syndrome    2. Fibromyalgia    3. Spinal stenosis of lumbosacral region    4. Failed neck syndrome    5. Degenerative lumbar spinal stenosis    6. Osteoarthritis of cervical spine, unspecified spinal osteoarthritis complication status    7. Failed back surgical syndrome    . As per information/history obtained from the PADT(patient assessment and documentation tool) - She complains of pain in the neck and shoulders Left with radiation to the lower back, hips Left, upper leg Left, knees Left and lower leg Left She rates the pain 6/10 and describes it as aching, tingling. Pain is made worse by: movement, walking, standing, sitting, bending. Current treatment regimen has helped relieve about 60% of the pain. She denies side effects from the current pain regimen. Patient reports that since the last follow up visit the physical functioning is unchanged, family/social relationships are unchanged, mood is unchanged and sleep patterns are unchanged, and that the overall functioning is unchanged. Patient denies neurological bowel or bladder. Patient denies misusing/abusing her narcotic pain medications or using any illegal drugs. There are No indicators for possible drug abuse, addiction or diversion problems. Upon obtaining the medical history from Ms. Elen Cardoso regarding today's office visit for her presenting problems, patient states she has been doing fair, pain has been manageable somewhat. She complains her back and left side buttock hurts, and has some leg pain. She mentions she is using Percocet 2 per day mostly. She reports she is working around the house. ALLERGIES: Patients list of allergies were reviewed     MEDICATIONS: Ms. Elen Cardoso list of medications were reviewed. Her current medications are   Outpatient Medications Prior to Visit   Medication Sig Dispense Refill    insulin aspart (NOVOLOG FLEXPEN) 100 UNIT/ML injection pen INJECT 5 UNITS INTO THE SKIN 3 TIMES DAILY (BEFORE MEALS) 5 pen 0    OLANZapine (ZYPREXA) 2.5 MG tablet TAKE 1 TABLET BY MOUTH EVERY DAY AT NIGHT 30 tablet 1    atorvastatin (LIPITOR) 40 MG tablet TAKE 1 TABLET BY MOUTH EVERY DAY 90 tablet 0  SYNTHROID 75 MCG tablet TAKE 1 TABLET BY MOUTH DAILY. TAKE WITH WATER ON AN EMPTY STOMACH - WAIT 30 MINUTES BEFORE EATING OR TAKING OTHER MEDS. 30 tablet 1    oxyCODONE-acetaminophen (PERCOCET) 7.5-325 MG per tablet Take 1 tablet by mouth every 6 hours as needed for Pain (max 2-3/day) for up to 35 days. 70 tablet 0    meloxicam (MOBIC) 15 MG tablet Take 1 tablet by mouth daily 30 tablet 1    LEVEMIR FLEXTOUCH 100 UNIT/ML injection pen INJECT 20 UNITS INTO THE SKIN 2 TIMES DAILY 5 pen 2    losartan (COZAAR) 100 MG tablet TAKE 1 TABLET BY MOUTH EVERY DAY 90 tablet 0    buPROPion (WELLBUTRIN XL) 150 MG extended release tablet TAKE 1 TABLET BY MOUTH EVERY DAY IN THE MORNING 30 tablet 1    amLODIPine (NORVASC) 5 MG tablet TAKE 1 TABLET BY MOUTH EVERY DAY 90 tablet 0    sertraline (ZOLOFT) 100 MG tablet Take 1 tablet by mouth 2 times daily 60 tablet 1    Handicap Placard MISC by Does not apply route 1 each 0    calcium carbonate (OSCAL) 500 MG TABS tablet Take 500 mg by mouth daily      Needles & Syringes MISC Inject 1 each into the skin 3 times daily 100 each 3    gabapentin (NEURONTIN) 400 MG capsule Take 1 tablet po AM, take 2 tablets po PM 90 capsule 1     No facility-administered medications prior to visit. REVIEW OF SYSTEMS:    Respiratory: Negative for apnea, chest tightness and shortness of breath or change in baseline breathing. PHYSICAL EXAM:   Nursing note and vitals reviewed. There were no vitals taken for this visit. Constitutional: She appears well-developed and well-nourished. No acute distress. Cardiovascular: Normal rate, regular rhythm, normal heart sounds, and does not have murmur. Pulmonary/Chest: Effort normal. No respiratory distress. She does not have wheezes in the lung fields. She has no rales. Neurological/Psychiatric:She is alert and oriented to person, place, and time. Coordination is  normal.  Her mood isAppropriate and affect is Flat/blunted .  Her IMPRESSION:   1. Chronic pain syndrome    2. Fibromyalgia    3. Spinal stenosis of lumbosacral region    4. Failed neck syndrome    5. Degenerative lumbar spinal stenosis    6. Osteoarthritis of cervical spine, unspecified spinal osteoarthritis complication status    7. Failed back surgical syndrome        PLAN:  Informed verbal consent was obtained  -continue with current opioid regimen Percocet 2 per day  -She was advised to increase fluids ( 5-7  glasses of fluid daily), limit caffeine, avoid cheese products, increase dietary fiber, increase activity and exercise as tolerated and relax regularly and enjoy meals   -advised to walk 20-30 minutes daily as tolerated     Current Outpatient Medications   Medication Sig Dispense Refill    insulin aspart (NOVOLOG FLEXPEN) 100 UNIT/ML injection pen INJECT 5 UNITS INTO THE SKIN 3 TIMES DAILY (BEFORE MEALS) 5 pen 0    OLANZapine (ZYPREXA) 2.5 MG tablet TAKE 1 TABLET BY MOUTH EVERY DAY AT NIGHT 30 tablet 1    atorvastatin (LIPITOR) 40 MG tablet TAKE 1 TABLET BY MOUTH EVERY DAY 90 tablet 0    SYNTHROID 75 MCG tablet TAKE 1 TABLET BY MOUTH DAILY. TAKE WITH WATER ON AN EMPTY STOMACH - WAIT 30 MINUTES BEFORE EATING OR TAKING OTHER MEDS. 30 tablet 1    oxyCODONE-acetaminophen (PERCOCET) 7.5-325 MG per tablet Take 1 tablet by mouth every 6 hours as needed for Pain (max 2-3/day) for up to 35 days.  70 tablet 0    meloxicam (MOBIC) 15 MG tablet Take 1 tablet by mouth daily 30 tablet 1    LEVEMIR FLEXTOUCH 100 UNIT/ML injection pen INJECT 20 UNITS INTO THE SKIN 2 TIMES DAILY 5 pen 2    losartan (COZAAR) 100 MG tablet TAKE 1 TABLET BY MOUTH EVERY DAY 90 tablet 0    buPROPion (WELLBUTRIN XL) 150 MG extended release tablet TAKE 1 TABLET BY MOUTH EVERY DAY IN THE MORNING 30 tablet 1    amLODIPine (NORVASC) 5 MG tablet TAKE 1 TABLET BY MOUTH EVERY DAY 90 tablet 0    sertraline (ZOLOFT) 100 MG tablet Take 1 tablet by mouth 2 times daily 60 tablet 1  Handicap Placard MISC by Does not apply route 1 each 0    calcium carbonate (OSCAL) 500 MG TABS tablet Take 500 mg by mouth daily      Needles & Syringes MISC Inject 1 each into the skin 3 times daily 100 each 3    gabapentin (NEURONTIN) 400 MG capsule Take 1 tablet po AM, take 2 tablets po PM 90 capsule 1     No current facility-administered medications for this visit. I will continue her current medication regimen  which is part of the above treatment schedule. It has been helping with Ms. Adrian's chronic  medical problems which for this visit include:   Diagnoses of Chronic pain syndrome, Fibromyalgia, Spinal stenosis of lumbosacral region, Failed neck syndrome, Degenerative lumbar spinal stenosis, Osteoarthritis of cervical spine, unspecified spinal osteoarthritis complication status, and Failed back surgical syndrome were pertinent to this visit. Risks and benefits of the medications and other alternative treatments  including no treatment were discussed with the patient. The common side effects of these medications were also explained to the patient. Informed verbal consent was obtained. Goals of current treatment regimen include improvement in pain, restoration of functioning- with focus on improvement in physical performance, general activity, work or disability,emotional distress, health care utilization and  decreased medication consumption. Will continue to monitor progress towards achieving/maintaining therapeutic goals with special emphasis on  1. Improvement in perceived interfernce  of pain with ADL's. Ability to do home exercises independently. Ability to do household chores indoor and/or outdoor work and social and leisure activities. Improve psychosocial and physical functioning. - she is showing progression towards this treatment goal with the current regimen. She was advised against drinking alcohol with the narcotic pain medicines, advised against driving or handling machinery while adjusting the dose of medicines or if having cognitive  issues related to the current medications. Risk of overdose and death, if medicines not taken as prescribed, were also discussed. If the patient develops new symptoms or if the symptoms worsen, the patient should call the office. While transcribing every attempt was made to maintain the accuracy of the note in terms of it's contents,there may have been some errors made inadvertently. Thank you for allowing me to participate in the care of this patient.     Ascencion Sheffield MD.    Cc: Dr.Mary Nisha Eubanks, APRN - CNP

## 2021-02-18 DIAGNOSIS — E03.9 HYPOTHYROIDISM, UNSPECIFIED TYPE: ICD-10-CM

## 2021-02-18 RX ORDER — LEVOTHYROXINE SODIUM 75 MCG
TABLET ORAL
Qty: 90 TABLET | Refills: 0 | Status: SHIPPED | OUTPATIENT
Start: 2021-02-18 | End: 2021-04-28

## 2021-03-07 DIAGNOSIS — F32.A DEPRESSION, UNSPECIFIED DEPRESSION TYPE: ICD-10-CM

## 2021-03-08 NOTE — TELEPHONE ENCOUNTER
Virtual Visit 07/27/2020   Future Appointments   Date Time Provider John Irizarry   3/19/2021 12:45 PM Fahad Amor MD [facility]

## 2021-03-09 RX ORDER — OLANZAPINE 2.5 MG/1
TABLET ORAL
Qty: 30 TABLET | Refills: 1 | Status: SHIPPED | OUTPATIENT
Start: 2021-03-09 | End: 2021-04-05

## 2021-03-19 ENCOUNTER — VIRTUAL VISIT (OUTPATIENT)
Dept: PAIN MANAGEMENT | Age: 71
End: 2021-03-19
Payer: MEDICARE

## 2021-03-19 DIAGNOSIS — M48.061 DEGENERATIVE LUMBAR SPINAL STENOSIS: ICD-10-CM

## 2021-03-19 DIAGNOSIS — G89.4 CHRONIC PAIN SYNDROME: ICD-10-CM

## 2021-03-19 DIAGNOSIS — M47.812 OSTEOARTHRITIS OF CERVICAL SPINE, UNSPECIFIED SPINAL OSTEOARTHRITIS COMPLICATION STATUS: ICD-10-CM

## 2021-03-19 DIAGNOSIS — M96.1 FAILED NECK SYNDROME: ICD-10-CM

## 2021-03-19 DIAGNOSIS — M79.7 FIBROMYALGIA: ICD-10-CM

## 2021-03-19 DIAGNOSIS — M96.1 FAILED BACK SURGICAL SYNDROME: ICD-10-CM

## 2021-03-19 DIAGNOSIS — M48.07 SPINAL STENOSIS OF LUMBOSACRAL REGION: ICD-10-CM

## 2021-03-19 PROCEDURE — G8399 PT W/DXA RESULTS DOCUMENT: HCPCS | Performed by: INTERNAL MEDICINE

## 2021-03-19 PROCEDURE — G8427 DOCREV CUR MEDS BY ELIG CLIN: HCPCS | Performed by: INTERNAL MEDICINE

## 2021-03-19 PROCEDURE — 4040F PNEUMOC VAC/ADMIN/RCVD: CPT | Performed by: INTERNAL MEDICINE

## 2021-03-19 PROCEDURE — 3017F COLORECTAL CA SCREEN DOC REV: CPT | Performed by: INTERNAL MEDICINE

## 2021-03-19 PROCEDURE — 1090F PRES/ABSN URINE INCON ASSESS: CPT | Performed by: INTERNAL MEDICINE

## 2021-03-19 PROCEDURE — 99213 OFFICE O/P EST LOW 20 MIN: CPT | Performed by: INTERNAL MEDICINE

## 2021-03-19 PROCEDURE — 1123F ACP DISCUSS/DSCN MKR DOCD: CPT | Performed by: INTERNAL MEDICINE

## 2021-03-19 RX ORDER — MELOXICAM 15 MG/1
15 TABLET ORAL DAILY
Qty: 30 TABLET | Refills: 1 | Status: SHIPPED | OUTPATIENT
Start: 2021-03-19 | End: 2021-04-23 | Stop reason: SDUPTHER

## 2021-03-19 RX ORDER — GABAPENTIN 400 MG/1
CAPSULE ORAL
Qty: 90 CAPSULE | Refills: 1 | Status: SHIPPED | OUTPATIENT
Start: 2021-03-19 | End: 2021-04-23 | Stop reason: SDUPTHER

## 2021-03-19 RX ORDER — BUPROPION HYDROCHLORIDE 150 MG/1
TABLET ORAL
Qty: 30 TABLET | Refills: 1 | Status: SHIPPED | OUTPATIENT
Start: 2021-03-19 | End: 2021-04-23

## 2021-03-19 RX ORDER — OXYCODONE AND ACETAMINOPHEN 7.5; 325 MG/1; MG/1
1 TABLET ORAL EVERY 6 HOURS PRN
Qty: 70 TABLET | Refills: 0 | Status: SHIPPED | OUTPATIENT
Start: 2021-03-19 | End: 2021-04-23 | Stop reason: SDUPTHER

## 2021-03-19 NOTE — PROGRESS NOTES
Pain is made worse by: nothing. Current treatment regimen has helped relieve about 40% of the pain. She denies side effects from the current pain regimen. Patient reports that since the last follow up visit the physical functioning is worse, family/social relationships are worse, mood is worse and sleep patterns are worse, and that the overall functioning is worse. Patient denies neurological bowel or bladder. Patient denies misusing/abusing her narcotic pain medications or using any illegal drugs. There are No indicators for possible drug abuse, addiction or diversion problems. Upon obtaining the medical history from Ms. Zachary Mejia regarding today's office visit for her presenting problems, patient states she has been doing fair. She states she has had a few falls, her left leg gave out. She states she has been using Percocet 2 per day along with Neurontin. Ms. Zachary Mejia states her left side hurts the most. Patient mentions she is using Mobic also. ALLERGIES: Patients list of allergies were reviewed     MEDICATIONS: Ms. Zachary Mejia list of medications were reviewed. Her current medications are   Outpatient Medications Prior to Visit   Medication Sig Dispense Refill    OLANZapine (ZYPREXA) 2.5 MG tablet TAKE 1 TABLET BY MOUTH EVERY DAY AT NIGHT 30 tablet 1    SYNTHROID 75 MCG tablet TAKE 1 TABLET BY MOUTH DAILY. TAKE WITH WATER ON AN EMPTY STOMACH - WAIT 30 MINUTES BEFORE EATING OR TAKING OTHER MEDS. 90 tablet 0    oxyCODONE-acetaminophen (PERCOCET) 7.5-325 MG per tablet Take 1 tablet by mouth every 6 hours as needed for Pain (max 2-3/day) for up to 35 days.  70 tablet 0    meloxicam (MOBIC) 15 MG tablet Take 1 tablet by mouth daily 30 tablet 1    gabapentin (NEURONTIN) 400 MG capsule Take 1 tablet po AM, take 2 tablets po PM 90 capsule 1    insulin aspart (NOVOLOG FLEXPEN) 100 UNIT/ML injection pen INJECT 5 UNITS INTO THE SKIN 3 TIMES DAILY (BEFORE MEALS) 5 pen 0    atorvastatin (LIPITOR) 40 MG tablet TAKE 1 TABLET BY MOUTH EVERY DAY 90 tablet 0    LEVEMIR FLEXTOUCH 100 UNIT/ML injection pen INJECT 20 UNITS INTO THE SKIN 2 TIMES DAILY 5 pen 2    losartan (COZAAR) 100 MG tablet TAKE 1 TABLET BY MOUTH EVERY DAY 90 tablet 0    buPROPion (WELLBUTRIN XL) 150 MG extended release tablet TAKE 1 TABLET BY MOUTH EVERY DAY IN THE MORNING 30 tablet 1    amLODIPine (NORVASC) 5 MG tablet TAKE 1 TABLET BY MOUTH EVERY DAY 90 tablet 0    sertraline (ZOLOFT) 100 MG tablet Take 1 tablet by mouth 2 times daily 60 tablet 1    Handicap Placard MISC by Does not apply route 1 each 0    calcium carbonate (OSCAL) 500 MG TABS tablet Take 500 mg by mouth daily      Needles & Syringes MISC Inject 1 each into the skin 3 times daily 100 each 3     No facility-administered medications prior to visit. REVIEW OF SYSTEMS:    Respiratory: Negative for apnea, chest tightness and shortness of breath or change in baseline breathing. PHYSICAL EXAM:   Nursing note and vitals reviewed. There were no vitals taken for this visit. Constitutional: She appears well-developed and well-nourished. No acute distress. Cardiovascular: Normal rate, regular rhythm, normal heart sounds, and does not have murmur. Pulmonary/Chest: Effort normal. No respiratory distress. She does not have wheezes in the lung fields. She has no rales. Neurological/Psychiatric:She is alert and oriented to person, place, and time. Coordination is  normal.  Her mood isAppropriate and affect is Neutral/Euthymic(normal) . Her    IMPRESSION:   1. Chronic pain syndrome    2. Fibromyalgia    3. Spinal stenosis of lumbosacral region    4. Failed neck syndrome    5. Degenerative lumbar spinal stenosis    6. Osteoarthritis of cervical spine, unspecified spinal osteoarthritis complication status    7.  Failed back surgical syndrome        PLAN:  Informed verbal consent was obtained  -continue with current opioid regimen Percocet 2 per day  -leg strengthening exercises Diagnoses of Chronic pain syndrome, Fibromyalgia, Spinal stenosis of lumbosacral region, Failed neck syndrome, Degenerative lumbar spinal stenosis, Osteoarthritis of cervical spine, unspecified spinal osteoarthritis complication status, and Failed back surgical syndrome were pertinent to this visit. Risks and benefits of the medications and other alternative treatments  including no treatment were discussed with the patient. The common side effects of these medications were also explained to the patient. Informed verbal consent was obtained. Goals of current treatment regimen include improvement in pain, restoration of functioning- with focus on improvement in physical performance, general activity, work or disability,emotional distress, health care utilization and  decreased medication consumption. Will continue to monitor progress towards achieving/maintaining therapeutic goals with special emphasis on  1. Improvement in perceived interfernce  of pain with ADL's. Ability to do home exercises independently. Ability to do household chores indoor and/or outdoor work and social and leisure activities. Improve psychosocial and physical functioning. - she is showing progression towards this treatment goal with the current regimen. She was advised against drinking alcohol with the narcotic pain medicines, advised against driving or handling machinery while adjusting the dose of medicines or if having cognitive  issues related to the current medications. Risk of overdose and death, if medicines not taken as prescribed, were also discussed. If the patient develops new symptoms or if the symptoms worsen, the patient should call the office. While transcribing every attempt was made to maintain the accuracy of the note in terms of it's contents,there may have been some errors made inadvertently. Thank you for allowing me to participate in the care of this patient.     Jarret Mauricio MD.    Cc: SAI Simon - CNP

## 2021-03-31 RX ORDER — LOSARTAN POTASSIUM 100 MG/1
TABLET ORAL
Qty: 90 TABLET | Refills: 0 | Status: SHIPPED | OUTPATIENT
Start: 2021-03-31 | End: 2021-06-28

## 2021-03-31 NOTE — TELEPHONE ENCOUNTER
Last ov 07/27/2020   Future Appointments   Date Time Provider John Irizarry   4/23/2021  8:45 AM Kenzie Patten MD [facility]

## 2021-04-03 DIAGNOSIS — F32.A DEPRESSION, UNSPECIFIED DEPRESSION TYPE: ICD-10-CM

## 2021-04-05 RX ORDER — OLANZAPINE 2.5 MG/1
TABLET ORAL
Qty: 30 TABLET | Refills: 0 | Status: SHIPPED | OUTPATIENT
Start: 2021-04-05 | End: 2021-04-28

## 2021-04-19 RX ORDER — INSULIN DETEMIR 100 [IU]/ML
INJECTION, SOLUTION SUBCUTANEOUS
Qty: 5 PEN | Refills: 0 | Status: SHIPPED | OUTPATIENT
Start: 2021-04-19 | End: 2021-06-17

## 2021-04-19 NOTE — TELEPHONE ENCOUNTER
LM for patient to schedule a DM FU visit with suzanna for further refills per [facility]

## 2021-04-19 NOTE — TELEPHONE ENCOUNTER
Patient has   Future Appointments   Date Time Provider John Irizarry   4/23/2021  8:45 AM Christian Peralta MD [facility]   4/23/2021 11:20 AM Geri Romberg, APRN - CNP [facility]

## 2021-04-19 NOTE — TELEPHONE ENCOUNTER
Future Appointments   Date Time Provider John Irizarry   4/23/2021  8:45 AM Aminah Elizabeth MD [facility]   last ov 7/27/20

## 2021-04-23 ENCOUNTER — VIRTUAL VISIT (OUTPATIENT)
Dept: PAIN MANAGEMENT | Age: 71
End: 2021-04-23
Payer: MEDICARE

## 2021-04-23 DIAGNOSIS — M48.061 DEGENERATIVE LUMBAR SPINAL STENOSIS: ICD-10-CM

## 2021-04-23 DIAGNOSIS — M96.1 FAILED BACK SURGICAL SYNDROME: ICD-10-CM

## 2021-04-23 DIAGNOSIS — M96.1 FAILED NECK SYNDROME: ICD-10-CM

## 2021-04-23 DIAGNOSIS — M47.812 OSTEOARTHRITIS OF CERVICAL SPINE, UNSPECIFIED SPINAL OSTEOARTHRITIS COMPLICATION STATUS: ICD-10-CM

## 2021-04-23 DIAGNOSIS — M48.07 SPINAL STENOSIS OF LUMBOSACRAL REGION: ICD-10-CM

## 2021-04-23 DIAGNOSIS — M79.7 FIBROMYALGIA: ICD-10-CM

## 2021-04-23 DIAGNOSIS — G89.4 CHRONIC PAIN SYNDROME: ICD-10-CM

## 2021-04-23 PROCEDURE — G8399 PT W/DXA RESULTS DOCUMENT: HCPCS | Performed by: INTERNAL MEDICINE

## 2021-04-23 PROCEDURE — 1123F ACP DISCUSS/DSCN MKR DOCD: CPT | Performed by: INTERNAL MEDICINE

## 2021-04-23 PROCEDURE — 3017F COLORECTAL CA SCREEN DOC REV: CPT | Performed by: INTERNAL MEDICINE

## 2021-04-23 PROCEDURE — 4040F PNEUMOC VAC/ADMIN/RCVD: CPT | Performed by: INTERNAL MEDICINE

## 2021-04-23 PROCEDURE — 99213 OFFICE O/P EST LOW 20 MIN: CPT | Performed by: INTERNAL MEDICINE

## 2021-04-23 PROCEDURE — G8427 DOCREV CUR MEDS BY ELIG CLIN: HCPCS | Performed by: INTERNAL MEDICINE

## 2021-04-23 PROCEDURE — 1090F PRES/ABSN URINE INCON ASSESS: CPT | Performed by: INTERNAL MEDICINE

## 2021-04-23 RX ORDER — OXYCODONE AND ACETAMINOPHEN 7.5; 325 MG/1; MG/1
1 TABLET ORAL EVERY 6 HOURS PRN
Qty: 80 TABLET | Refills: 0 | Status: SHIPPED | OUTPATIENT
Start: 2021-04-23 | End: 2021-05-28 | Stop reason: SDUPTHER

## 2021-04-23 RX ORDER — GABAPENTIN 400 MG/1
CAPSULE ORAL
Qty: 90 CAPSULE | Refills: 1 | Status: SHIPPED | OUTPATIENT
Start: 2021-04-23 | End: 2021-08-05 | Stop reason: SDUPTHER

## 2021-04-23 RX ORDER — MELOXICAM 15 MG/1
15 TABLET ORAL DAILY
Qty: 30 TABLET | Refills: 1 | Status: SHIPPED | OUTPATIENT
Start: 2021-04-23 | End: 2021-05-28 | Stop reason: SDUPTHER

## 2021-04-23 NOTE — PROGRESS NOTES
TELE HEALTH VISIT (AUDIO-VISUAL)    Pursuant to the emergency declaration under the [facility], [facility] waiver authority and the [employer] Act, this Virtual  Visit was conducted, with patient's/legal guardian's consent, to reduce the patient's risk of exposure to COVID-19 and provide continuity of care for an established patient. Service is  provided through a video synchronous discussion virtually to substitute for in-person clinic visit. Due to this being a TeleHealth encounter (During ZOFYQ-59 public health emergency), evaluation of the following organ systems was limited: Vitals/Constitutional/EENT/Resp/CV/GI//MS/Neuro/Skin/Hpru-Lyxc-Ubg. Natalia Ware  1950  [id number]    Ms. Catalino Chong is being seen virtually for a follow up visit using one of the following techniques  Google Duo, Face time or Doxy. me  Informed verbal consent to the virtual visit was obtained from Ms. Catalino Chong. Risks associated with HIPPA compliance with the virtual visit was explained to the patient. Ms. Catalino Chong is at her residence and Dr. Naren Barone is in his office. HISTORY OF PRESENT ILLNESS:  Ms. Catalino Chong is a 65 y.o. female returns for a follow up visit for multiple medical problems. Her current presenting problems are   1. Chronic pain syndrome    2. Fibromyalgia    3. Spinal stenosis of lumbosacral region    4. Osteoarthritis of cervical spine, unspecified spinal osteoarthritis complication status    5. Degenerative lumbar spinal stenosis    6. Failed neck syndrome    7. Failed back surgical syndrome    . As per information/history obtained from the PADT(patient assessment and documentation tool) - She complains of pain in the lower back with radiation to the neck, hips Left, upper leg Left, knees Left, lower leg Left, ankles Left and feet Left She rates the pain 6/10 and describes it as aching, shooting.   Pain is made worse by: movement. Current treatment regimen has helped relieve about 40% of the pain. She denies side effects from the current pain regimen. Patient reports that since the last follow up visit the physical functioning is worse, family/social relationships are unchanged, mood is worse and sleep patterns are worse, and that the overall functioning is worse. Patient denies neurological bowel or bladder. Patient denies misusing/abusing her narcotic pain medications or using any illegal drugs. There are No indicators for possible drug abuse, addiction or diversion problems. Upon obtaining the medical history from Ms. Stacy Carrillo regarding today's office visit for her presenting problems, patient states she has been doing fair. She states she had a bad few weeks. Patient states she went back to work and states she is having increase pian since. Patient mentions she is using Percocet 2 per day but occasionally needs more. Patient denies any constipation symptoms. ALLERGIES: Patients list of allergies were reviewed     MEDICATIONS: Ms. Stacy Carrillo list of medications were reviewed. Her current medications are   Outpatient Medications Prior to Visit   Medication Sig Dispense Refill    LEVEMIR FLEXTOUCH 100 UNIT/ML injection pen INJECT 20 UNITS SUBCUTANEOUSLY TWICE A DAY 5 pen 0    OLANZapine (ZYPREXA) 2.5 MG tablet TAKE 1 TABLET BY MOUTH EVERY DAY AT NIGHT 30 tablet 0    losartan (COZAAR) 100 MG tablet TAKE 1 TABLET BY MOUTH EVERY DAY 90 tablet 0    oxyCODONE-acetaminophen (PERCOCET) 7.5-325 MG per tablet Take 1 tablet by mouth every 6 hours as needed for Pain (max 2-3/day) for up to 35 days. 70 tablet 0    meloxicam (MOBIC) 15 MG tablet Take 1 tablet by mouth daily 30 tablet 1    gabapentin (NEURONTIN) 400 MG capsule Take 1 tablet po AM, take 2 tablets po PM 90 capsule 1    SYNTHROID 75 MCG tablet TAKE 1 TABLET BY MOUTH DAILY.  TAKE WITH WATER ON AN EMPTY STOMACH - WAIT 30 MINUTES BEFORE EATING OR TAKING exercises as advised   -She was advised to increase fluids ( 5-7  glasses of fluid daily), limit caffeine, avoid cheese products, increase dietary fiber, increase activity and exercise as tolerated and relax regularly and enjoy meals      Current Outpatient Medications   Medication Sig Dispense Refill    LEVEMIR FLEXTOUCH 100 UNIT/ML injection pen INJECT 20 UNITS SUBCUTANEOUSLY TWICE A DAY 5 pen 0    OLANZapine (ZYPREXA) 2.5 MG tablet TAKE 1 TABLET BY MOUTH EVERY DAY AT NIGHT 30 tablet 0    losartan (COZAAR) 100 MG tablet TAKE 1 TABLET BY MOUTH EVERY DAY 90 tablet 0    oxyCODONE-acetaminophen (PERCOCET) 7.5-325 MG per tablet Take 1 tablet by mouth every 6 hours as needed for Pain (max 2-3/day) for up to 35 days. 70 tablet 0    meloxicam (MOBIC) 15 MG tablet Take 1 tablet by mouth daily 30 tablet 1    gabapentin (NEURONTIN) 400 MG capsule Take 1 tablet po AM, take 2 tablets po PM 90 capsule 1    SYNTHROID 75 MCG tablet TAKE 1 TABLET BY MOUTH DAILY. TAKE WITH WATER ON AN EMPTY STOMACH - WAIT 30 MINUTES BEFORE EATING OR TAKING OTHER MEDS. 90 tablet 0    insulin aspart (NOVOLOG FLEXPEN) 100 UNIT/ML injection pen INJECT 5 UNITS INTO THE SKIN 3 TIMES DAILY (BEFORE MEALS) 5 pen 0    atorvastatin (LIPITOR) 40 MG tablet TAKE 1 TABLET BY MOUTH EVERY DAY 90 tablet 0    amLODIPine (NORVASC) 5 MG tablet TAKE 1 TABLET BY MOUTH EVERY DAY 90 tablet 0    sertraline (ZOLOFT) 100 MG tablet Take 1 tablet by mouth 2 times daily 60 tablet 1    Handicap Placard MISC by Does not apply route 1 each 0    calcium carbonate (OSCAL) 500 MG TABS tablet Take 500 mg by mouth daily      Needles & Syringes MISC Inject 1 each into the skin 3 times daily 100 each 3     No current facility-administered medications for this visit. I will continue her current medication regimen  which is part of the above treatment schedule. It has been helping with Ms. Adrian's chronic  medical problems which for this visit include:   Diagnoses of Chronic pain syndrome, Fibromyalgia, Spinal stenosis of lumbosacral region, Osteoarthritis of cervical spine, unspecified spinal osteoarthritis complication status, Degenerative lumbar spinal stenosis, Failed neck syndrome, and Failed back surgical syndrome were pertinent to this visit. Risks and benefits of the medications and other alternative treatments  including no treatment were discussed with the patient. The common side effects of these medications were also explained to the patient. Informed verbal consent was obtained. Goals of current treatment regimen include improvement in pain, restoration of functioning- with focus on improvement in physical performance, general activity, work or disability,emotional distress, health care utilization and  decreased medication consumption. Will continue to monitor progress towards achieving/maintaining therapeutic goals with special emphasis on  1. Improvement in perceived interfernce  of pain with ADL's. Ability to do home exercises independently. Ability to do household chores indoor and/or outdoor work and social and leisure activities. Improve psychosocial and physical functioning. - she is showing progression towards this treatment goal with the current regimen. She was advised against drinking alcohol with the narcotic pain medicines, advised against driving or handling machinery while adjusting the dose of medicines or if having cognitive  issues related to the current medications. Risk of overdose and death, if medicines not taken as prescribed, were also discussed. If the patient develops new symptoms or if the symptoms worsen, the patient should call the office. While transcribing every attempt was made to maintain the accuracy of the note in terms of it's contents,there may have been some errors made inadvertently. Thank you for allowing me to participate in the care of this patient.     Qasim Gonzalez MD.    Cc: Dr.Mary Hans Wilkins, APRN - CNP

## 2021-04-28 ENCOUNTER — OFFICE VISIT (OUTPATIENT)
Dept: FAMILY MEDICINE CLINIC | Age: 71
End: 2021-04-28
Payer: MEDICARE

## 2021-04-28 VITALS
BODY MASS INDEX: 26.12 KG/M2 | OXYGEN SATURATION: 98 % | HEIGHT: 64 IN | SYSTOLIC BLOOD PRESSURE: 132 MMHG | WEIGHT: 153 LBS | DIASTOLIC BLOOD PRESSURE: 80 MMHG | HEART RATE: 76 BPM

## 2021-04-28 DIAGNOSIS — Z91.81 AT HIGH RISK FOR FALLS: ICD-10-CM

## 2021-04-28 DIAGNOSIS — E11.9 TYPE 2 DIABETES MELLITUS WITHOUT COMPLICATION, WITHOUT LONG-TERM CURRENT USE OF INSULIN (HCC): Primary | ICD-10-CM

## 2021-04-28 DIAGNOSIS — N39.3 STRESS INCONTINENCE: ICD-10-CM

## 2021-04-28 DIAGNOSIS — F39 MOOD DISORDER (HCC): ICD-10-CM

## 2021-04-28 DIAGNOSIS — B35.1 NAIL FUNGUS: ICD-10-CM

## 2021-04-28 DIAGNOSIS — L72.3 SEBACEOUS CYST: ICD-10-CM

## 2021-04-28 DIAGNOSIS — E03.9 HYPOTHYROIDISM, UNSPECIFIED TYPE: ICD-10-CM

## 2021-04-28 DIAGNOSIS — I10 ESSENTIAL HYPERTENSION: ICD-10-CM

## 2021-04-28 DIAGNOSIS — Z12.31 ENCOUNTER FOR SCREENING MAMMOGRAM FOR MALIGNANT NEOPLASM OF BREAST: ICD-10-CM

## 2021-04-28 PROBLEM — D69.2 PURPURA (HCC): Status: RESOLVED | Noted: 2021-04-28 | Resolved: 2021-04-28

## 2021-04-28 PROBLEM — D69.2 PURPURA (HCC): Status: ACTIVE | Noted: 2021-04-28

## 2021-04-28 LAB — HBA1C MFR BLD: 7.9 %

## 2021-04-28 PROCEDURE — 1123F ACP DISCUSS/DSCN MKR DOCD: CPT | Performed by: NURSE PRACTITIONER

## 2021-04-28 PROCEDURE — 2022F DILAT RTA XM EVC RTNOPTHY: CPT | Performed by: NURSE PRACTITIONER

## 2021-04-28 PROCEDURE — G8427 DOCREV CUR MEDS BY ELIG CLIN: HCPCS | Performed by: NURSE PRACTITIONER

## 2021-04-28 PROCEDURE — 3017F COLORECTAL CA SCREEN DOC REV: CPT | Performed by: NURSE PRACTITIONER

## 2021-04-28 PROCEDURE — G8399 PT W/DXA RESULTS DOCUMENT: HCPCS | Performed by: NURSE PRACTITIONER

## 2021-04-28 PROCEDURE — G8417 CALC BMI ABV UP PARAM F/U: HCPCS | Performed by: NURSE PRACTITIONER

## 2021-04-28 PROCEDURE — 99214 OFFICE O/P EST MOD 30 MIN: CPT | Performed by: NURSE PRACTITIONER

## 2021-04-28 PROCEDURE — 4040F PNEUMOC VAC/ADMIN/RCVD: CPT | Performed by: NURSE PRACTITIONER

## 2021-04-28 PROCEDURE — 3288F FALL RISK ASSESSMENT DOCD: CPT | Performed by: NURSE PRACTITIONER

## 2021-04-28 PROCEDURE — 3051F HG A1C>EQUAL 7.0%<8.0%: CPT | Performed by: NURSE PRACTITIONER

## 2021-04-28 PROCEDURE — 1036F TOBACCO NON-USER: CPT | Performed by: NURSE PRACTITIONER

## 2021-04-28 PROCEDURE — 83036 HEMOGLOBIN GLYCOSYLATED A1C: CPT | Performed by: NURSE PRACTITIONER

## 2021-04-28 PROCEDURE — 1090F PRES/ABSN URINE INCON ASSESS: CPT | Performed by: NURSE PRACTITIONER

## 2021-04-28 RX ORDER — OXYBUTYNIN CHLORIDE 10 MG/1
10 TABLET, EXTENDED RELEASE ORAL DAILY
Qty: 90 TABLET | Refills: 3 | Status: SHIPPED | OUTPATIENT
Start: 2021-04-28 | End: 2022-03-07

## 2021-04-28 RX ORDER — LEVOTHYROXINE SODIUM 0.07 MG/1
75 TABLET ORAL DAILY
Qty: 90 TABLET | Refills: 3 | Status: SHIPPED | OUTPATIENT
Start: 2021-04-28 | End: 2022-06-07

## 2021-04-28 RX ORDER — DIVALPROEX SODIUM 500 MG/1
TABLET, EXTENDED RELEASE ORAL
COMMUNITY
Start: 2021-02-01 | End: 2021-11-10 | Stop reason: SDUPTHER

## 2021-04-28 RX ORDER — GLUCOSAMINE HCL/CHONDROITIN SU 500-400 MG
CAPSULE ORAL
Qty: 400 STRIP | Refills: 0 | Status: SHIPPED | OUTPATIENT
Start: 2021-04-28

## 2021-04-28 SDOH — ECONOMIC STABILITY: FOOD INSECURITY: WITHIN THE PAST 12 MONTHS, THE FOOD YOU BOUGHT JUST DIDN'T LAST AND YOU DIDN'T HAVE MONEY TO GET MORE.: NEVER TRUE

## 2021-04-28 SDOH — ECONOMIC STABILITY: TRANSPORTATION INSECURITY
IN THE PAST 12 MONTHS, HAS LACK OF TRANSPORTATION KEPT YOU FROM MEETINGS, WORK, OR FROM GETTING THINGS NEEDED FOR DAILY LIVING?: NO

## 2021-04-28 SDOH — ECONOMIC STABILITY: FOOD INSECURITY: WITHIN THE PAST 12 MONTHS, YOU WORRIED THAT YOUR FOOD WOULD RUN OUT BEFORE YOU GOT MONEY TO BUY MORE.: NEVER TRUE

## 2021-04-28 ASSESSMENT — ENCOUNTER SYMPTOMS
DIARRHEA: 0
ROS SKIN COMMENTS: CYST
COLOR CHANGE: 0
BACK PAIN: 1
COUGH: 0
VOMITING: 0
NAUSEA: 0
SHORTNESS OF BREATH: 0

## 2021-04-28 NOTE — PROGRESS NOTES
2021     Chief Complaint   Patient presents with    Diabetes     check     Aleshia Spain (:  1950) is a 65 y.o. female, here for evaluation of the following medical concerns:    HPI  Treatment Adherence:   Medication compliance:  compliant all of the time  Diet compliance:  compliant most of the time  Weight trend: decreasing  Current exercise: plans to start walking  Barriers: stress    Diabetes Mellitus Type 2: Current symptoms/problems include none  Home blood sugar records: she tests 4 times daily- fasting and before each meal  Any episodes of hypoglycemia? no  Eye exam current (within one year): no  Tobacco history: She  reports that she has never smoked. She has never used smokeless tobacco.   Daily Aspirin? Yes    Hypertension:  Home blood pressure monitoring: No.  She is not adherent to a low sodium diet. Patient denies chest pain. Current treatment: amlodipine 5 mg    Hyperlipidemia:  No new myalgias or GI upset on atorvastatin (Lipitor). Lab Results   Component Value Date    LABA1C 7.9 2021    LABA1C 8.4 2020    LABA1C 7.7 2020     Lab Results   Component Value Date    LABMICR YES 2018    CREATININE 0.8 2020     Lab Results   Component Value Date    ALT 14 2020    AST 18 2020     Lab Results   Component Value Date    CHOL 178 2020    TRIG 120 2020    HDL 72 (H) 2020    LDLCALC 82 2020        Hypothyroidism: Recent symptoms: none. She denies hair loss. Patient is  taking her medication consistently on an empty stomach. bSy    Lab Results   Component Value Date    TSHREFLEX 0.65 06/10/2019     Lab Results   Component Value Date    TSH 0.91 10/12/2020    TSH 4.99 (H) 2020    TSH 1.69 2016     Mood Disorder:  Worried about her son's health problems and she has chronic back pain which causes insomnia. Psychologist quit so not seeing a year ag. Her psychiatrist is Dr. Bekah Garcia.  Chronic depression- taking Zoloft and Zyprexa. No Suicidal plans currently- has in the past.     Cyst- right shoulder, has been removed twice in the past. It is burning at times and pruritic. Interested in having it removed again. Urinary incontinence- not at night, but throughout the day, urge incontinence      Review of Systems   Constitutional: Negative for chills, fatigue and fever. Respiratory: Negative for cough and shortness of breath. Cardiovascular: Negative for chest pain and leg swelling. Gastrointestinal: Negative for diarrhea, nausea and vomiting. Endocrine: Negative for cold intolerance, heat intolerance, polydipsia, polyphagia and polyuria. Genitourinary:        Urge incontinence   Musculoskeletal: Positive for back pain. Skin: Negative for color change and pallor. Cyst   Neurological: Negative for dizziness and headaches. Psychiatric/Behavioral: Positive for dysphoric mood and sleep disturbance. All other systems reviewed and are negative. Prior to Visit Medications    Medication Sig Taking? Authorizing Provider   divalproex (DEPAKOTE ER) 500 MG extended release tablet TAKE 1 TABLET BY MOUTH EVERY DAY Yes Historical Provider, MD   oxybutynin (DITROPAN XL) 10 MG extended release tablet Take 1 tablet by mouth daily Yes SAI Joy CNP   levothyroxine (SYNTHROID) 75 MCG tablet Take 1 tablet by mouth daily Yes SAI Joy CNP   ciclopirox (PENLAC) 8 % solution Apply topically nightly. Yes SAI Joy CNP   blood glucose monitor kit and supplies Test 4 times a day & as needed for symptoms of irregular blood glucose. Yes SAI Joy CNP   blood glucose monitor strips Test 4 times a day & as needed for symptoms of irregular blood glucose. Dispense sufficient amount for indicated testing frequency plus additional to accommodate PRN testing needs.  Yes SAI Joy CNP   oxyCODONE-acetaminophen (PERCOCET) 7.5-325 MG per tablet Take 1 tablet by mouth every 6 hours as needed for Pain (max 2-3/day) for up to 35 days. Yes Rosa Torres MD   meloxicam (MOBIC) 15 MG tablet Take 1 tablet by mouth daily Yes Rosa Torres MD   gabapentin (NEURONTIN) 400 MG capsule Take 1 tablet po AM, take 2 tablets po PM Yes Rosa Torres MD   LEVEMIR FLEXTOUCH 100 UNIT/ML injection pen INJECT 20 UNITS SUBCUTANEOUSLY TWICE A DAY Yes SAI Cruz CNP   losartan (COZAAR) 100 MG tablet TAKE 1 TABLET BY MOUTH EVERY DAY Yes SAI Clemons CNP   insulin aspart (NOVOLOG FLEXPEN) 100 UNIT/ML injection pen INJECT 5 UNITS INTO THE SKIN 3 TIMES DAILY (BEFORE MEALS) Yes SAI Clemons CNP   atorvastatin (LIPITOR) 40 MG tablet TAKE 1 TABLET BY MOUTH EVERY DAY Yes SAI Clemons CNP   amLODIPine (NORVASC) 5 MG tablet TAKE 1 TABLET BY MOUTH EVERY DAY Yes SAI Clemons CNP   sertraline (ZOLOFT) 100 MG tablet Take 1 tablet by mouth 2 times daily Yes SAI Clemons CNP   Handicap Placard MISC by Does not apply route Yes Jt Wallace MD   calcium carbonate (OSCAL) 500 MG TABS tablet Take 500 mg by mouth daily Yes Historical Provider, MD   [place] [facility] Syringes [address] 1 each into the skin 3 times daily Yes Jt Wallace MD   OLANZapine (ZYPREXA) 2.5 MG tablet TAKE 1 TABLET BY MOUTH EVERY DAY AT NIGHT  [facility] SAI Mendoza CNP        Social History     Tobacco Use    Smoking status: Never Smoker    Smokeless tobacco: Never Used   Substance Use Topics    Alcohol use: Yes     Alcohol/week: 2.0 standard drinks     Types: 2 Glasses of wine per week        Vitals:    04/28/21 0903   BP: 132/80   Site: Left Upper Arm   Position: Sitting   Cuff Size: Medium Adult   Pulse: 76   SpO2: 98%   Weight: 153 lb (69.4 kg)   Height: 5' 4\" (1.626 m)     Estimated body mass index is 26.26 kg/m² as calculated from the following:    Height as of this encounter: 5' 4\" (1.626 m).     Weight as of this encounter: 153 lb (69.4 kg). Physical Exam  Vitals signs and nursing note reviewed. Constitutional:       General: She is not in acute distress. Appearance: Normal appearance. She is well-developed. She is not ill-appearing, toxic-appearing or diaphoretic. HENT:      Head: Normocephalic and atraumatic. Right Ear: Tympanic membrane and ear canal normal.      Left Ear: Tympanic membrane and ear canal normal.   Eyes:      General: No scleral icterus. Right eye: No discharge. Left eye: No discharge. Extraocular Movements: Extraocular movements intact. Conjunctiva/sclera: Conjunctivae normal.      Pupils: Pupils are equal, round, and reactive to light. Cardiovascular:      Rate and Rhythm: Normal rate and regular rhythm. Pulses:           Dorsalis pedis pulses are 2+ on the right side and 2+ on the left side. Heart sounds: Normal heart sounds, S1 normal and S2 normal. No murmur. No friction rub. No gallop. Pulmonary:      Effort: Pulmonary effort is normal. No respiratory distress. Breath sounds: Normal breath sounds. No stridor. No wheezing, rhonchi or rales. Feet:      Right foot:      Protective Sensation: 5 sites tested. 5 sites sensed. Skin integrity: Skin integrity normal.      Toenail Condition: Right toenails are abnormally thick. Fungal disease present. Left foot:      Protective Sensation: 5 sites tested. 5 sites sensed. Skin integrity: Skin integrity normal.      Toenail Condition: Left toenails are abnormally thick. Fungal disease present. Neurological:      General: No focal deficit present. Mental Status: She is alert and oriented to person, place, and time. Mental status is at baseline. Cranial Nerves: No cranial nerve deficit.    Psychiatric:         Speech: Speech normal.       Results for POC orders placed in visit on 04/28/21   POCT glycosylated hemoglobin (Hb A1C)   Result Value Ref Range    Hemoglobin A1C 7.9 % ASSESSMENT/PLAN:  1. Type 2 diabetes mellitus without complication, without long-term current use of insulin ([address]- a1c 7.9 which is improving (previously was 8.4). Levemir and Novolg will be continued. She does admit to needing to have better dietary compliance  - POCT glycosylated hemoglobin (Hb A1C)  -  DIABETES FOOT EXAM  - blood glucose monitor kit and supplies; Test 4 times a day & as needed for symptoms of irregular blood glucose. Dispense: 1 kit; Refill: 0  - blood glucose monitor strips; Test 4 times a day & as needed for symptoms of irregular blood glucose. Dispense sufficient amount for indicated testing frequency plus additional to accommodate PRN testing needs. Dispense: 400 strip; Refill: 0    2. Essential hypertension-controlled continue amlodipine amlodipine    3. Hypothyroidism, unspecified type-last TSH was 0.91 in October 2020. I will continue her on the same dose levothyroxine but we will switch her over to the generic because the brand is very expensive for her  - levothyroxine (SYNTHROID) 75 MCG tablet; Take 1 tablet by mouth daily  Dispense: 90 tablet; Refill: 3    4. Mood disorder (HCC)-stable she is on Zoloft and Zyprexa and she follows with Dr. Darshan Cedeno    5. Sebaceous cyst-has had this removed twice in the past, will refer her to Dr. Dante Costa as this cyst is bothersome  - [facility] [place]    6. Stress incontinence we will start oxybutynin 10 mg daily to see if this helps with her stress incontinence symptoms if it does not we will refer her to urology  - oxybutynin (DITROPAN XL) 10 MG extended release tablet; Take 1 tablet by mouth daily  Dispense: 90 tablet; Refill: 3    7. At high risk for falls  -Home safety tips provided    8. Encounter for screening mammogram for malignant neoplasm of breast  - MIGUEL ANGEL DIGITAL SCREEN W OR WO CAD BILATERAL; Future    9.  Nail fungus-start ciclopirox solution-discussed how to properly care for the nail  - ciclopirox (PENLAC) 8 % solution; Apply topically nightly. Dispense: 6 mL; Refill: 0    Patient Instructions   Generic Synthroid sent to the pharmacy  Ditropan for overactive bladder  Ciclopirox for nail fungus- apply nightly  Follow up in July for AWV      Patients should file away (with emery board) loose nail material and trim nails, as required, or as directed by the health care professional, every seven days after PENLAC® NAIL LACQUER (ciclopirox) Topical Solution, 8%, is removed with alcohol. PENLAC® NAIL LACQUER (ciclopirox) Topical Solution, 8%, should be applied once daily (preferably at bedtime or eight hours before washing) to all affected nails with the applicator brush provided. The PENLAC® NAIL LACQUER (ciclopirox) Topical Solution, 8%, should be applied evenly over the entire nail plate. Return in about 3 months (around 7/28/2021). An electronic signature was used to authenticate this note. --SAI Dunlap - CNP on 4/28/2021 at 11:13 AM  On the basis of positive falls risk screening, assessment and plan is as follows: home safety tips provided.

## 2021-05-03 ENCOUNTER — INITIAL CONSULT (OUTPATIENT)
Dept: SURGERY | Age: 71
End: 2021-05-03
Payer: MEDICARE

## 2021-05-03 VITALS
SYSTOLIC BLOOD PRESSURE: 145 MMHG | HEART RATE: 81 BPM | WEIGHT: 156 LBS | BODY MASS INDEX: 26.63 KG/M2 | HEIGHT: 64 IN | DIASTOLIC BLOOD PRESSURE: 96 MMHG

## 2021-05-03 DIAGNOSIS — L72.3 SEBACEOUS CYST: Primary | ICD-10-CM

## 2021-05-03 PROCEDURE — 99204 OFFICE O/P NEW MOD 45 MIN: CPT | Performed by: SURGERY

## 2021-05-03 PROCEDURE — G8427 DOCREV CUR MEDS BY ELIG CLIN: HCPCS | Performed by: SURGERY

## 2021-05-03 PROCEDURE — 1090F PRES/ABSN URINE INCON ASSESS: CPT | Performed by: SURGERY

## 2021-05-03 PROCEDURE — G8417 CALC BMI ABV UP PARAM F/U: HCPCS | Performed by: SURGERY

## 2021-05-03 NOTE — PROGRESS NOTES
New Patient [address], MD    [address] [facility], [facility] [place] [address], [facility] Lawler [address]   YOB: 1950    Date of Visit:  5/3/2021    Hiwot Granda APRN - CNP    Allergies   Allergen Reactions    Sulfa Antibiotics Hives    Sulfa Antibiotics      No outpatient medications have been marked as taking for the 5/3/21 encounter (Appointment) with Erika Woods MD.       Past Medical History:   Diagnosis Date    Chronic back pain     Depression     Diabetes mellitus ([address]     Hyperlipidemia     Hypertension     Retinopathy     Seasonal allergies     Spinal stenosis     Type II or unspecified type diabetes mellitus without mention of complication, not stated as uncontrolled      Past Surgical History:   Procedure Laterality Date    BACK SURGERY      CHOLECYSTECTOMY  09/2017    EYE SURGERY      LASER    HYSTERECTOMY      HYSTERECTOMY  1993    NECK SURGERY      OTHER SURGICAL HISTORY  4/9/18Right CTR    OTHER SURGICAL HISTORY N/A 06/27/2018    EXTREME LATERAL INTERBODY FUSION LEFT APPROACH L2-3, L3-4,    OTHER SURGICAL HISTORY  07/20/2018     LUMBAR WOUND INCISION AND DRAINAGE / WASHOUT     OVARY REMOVAL  1993    DE LAMNOTMY W/DCMPRSN NRV EACH ADDL CRVCL/LMBR N/A 7/20/2018    LUMBAR WOUND INCISION AND DRAINAGE / WASHOUT performed by Erika Espinoza MD at [address] [place] [address]    c-spine    SPINAL FUSION  2003    lumbar-spine per Dr Kallie Cotton.      TONSILLECTOMY AND ADENOIDECTOMY  1953    TUBAL LIGATION  1976     Family History   Problem Relation Age of Onset    Cancer Mother         melanoma    Diabetes Father      Social History     Socioeconomic History    Marital status:      Spouse name: Not on file    Number of children: Not on file    Years of education: Not on file    Highest education level: Not on file   Occupational History    Occupation:  [employer] Social Needs    Financial resource strain: Not hard at all   [place] insecurity     Worry: Never true     Inability: Never true   [employer] needs     Medical: No     Non-medical: No   Tobacco Use    Smoking status: Never Smoker    Smokeless tobacco: Never Used   Substance and Sexual Activity    Alcohol use: Yes     Alcohol/week: 2.0 standard drinks     Types: 2 Glasses of wine per week    Drug use: No    Sexual activity: Not Currently     Partners: Male   Lifestyle    Physical activity     Days per week: Not on file     Minutes per session: Not on file    Stress: Not on file   Relationships    Social connections     Talks on phone: Not on file     Gets together: Not on file     Attends Bahai service: Not on file     Active member of club or organization: Not on file     Attends meetings of clubs or organizations: Not on file     Relationship status: Not on file    Intimate partner violence     Fear of current or ex partner: Not on file     Emotionally abused: Not on file     Physically abused: Not on file     Forced sexual activity: Not on file   Other Topics Concern    Not on file   Social History Narrative    ** Merged History Encounter **               There were no vitals filed for this visit. There is no height or weight on file to calculate BMI. Wt Readings from Last 3 Encounters:   04/28/21 153 lb (69.4 kg)   08/25/20 160 lb (72.6 kg)   07/16/20 165 lb (74.8 kg)     BP Readings from Last 3 Encounters:   04/28/21 132/80   08/25/20 119/63   07/16/20 119/71        HPI:    75-year-old female who presents with complaints of a sebaceous cyst of the left shoulder. She states she had one prior that has been removed 2-3 times. She also states prior cyst was infected. She noticed the new cyst about a year ago and states that is has continued to grow in size. She also states that it is tender and she has associated burning and itching.      10 point review

## 2021-05-04 ENCOUNTER — TELEPHONE (OUTPATIENT)
Dept: SURGERY | Age: 71
End: 2021-05-04

## 2021-05-04 NOTE — PROGRESS NOTES
Albertina Bellow    Age 65 y.o.    female    1950    N [id number]    5/12/2021  Arrival Time_____________  OR Time____________60 Min     Procedure(s):  EXCISION OF CYST ON LEFT SHOULDER                      Local    Surgeon(s):  Aleida Lino, MD       Phone [phone] ([place])     [address]  Cell         Work  _____________________________________________________________________  _____________________________________________________________________  _____________________________________________________________________  _____________________________________________________________________  _____________________________________________________________________      PCP _____________________________ Phone_________________       H&P__________________Bringing      Chart            Epic   DOS      Called________  EKG__________________Bringing      Chart            Epic   DOS      Called________  LAB__________________ Bringing      Chart            Epic   DOS      Called________  Cardiac Clearance_______Bringing      Chart            Epic      DOS      Called________    Cardiologist________________________ Phone___________________________    ? [employer] concerns / Waiver on Chart            PAT Communications________________  ? Pre-op Instructions Given [place]          _________________________________  ? Directions to Surgery Center                          _________________________________  ? Transportation Home_______________      __________________________________  ?  Crutches/Walker__________________        __________________________________      ________Pre-op Orders   _______Transcribed    _______Wt.  ________Pharmacy          _______SCD  ______VTE     ______Beta Blocker  ________Consent             ________TED HOSE    COVID DATE_________   LOCATION___________________  RESULT____________

## 2021-05-04 NOTE — TELEPHONE ENCOUNTER
Called patient, scheduled for L shoulder cyst excision on 5/12/21 at 1130 am with an arrival of 1030 am. Informed the patient that PAT will also call with further instructions. Patient verbally states that he/she understands pre-op instructions. Patient notified of pre-op instructions for general/mac anesthesia:     Patient notified of pre-op instructions for local anesthesia:      *Stop all blood thinners if needed, might need cardiac clearance. *COVID Testing 5-7 priors to procedure/surgery. *Can  themselves. *Procedure/Surgery time at this point is tentative time as PAT will confirm arrival time.

## 2021-05-04 NOTE — LETTER
Surgery Scheduling Form:  DEMOGRAPHICS:                                                                                                         .  Patient Name:  Juan C Green  Patient :  1950   Patient SS#:      Patient Phone:  [phone] (home)                         Alt. Patient Phone:                 Patient Address:  [address] [place] [id number]  PCP:  SAI Morocho CNP  Insurance:  Payor: MEDICARE / Plan: MEDICARE PART A AND B / Product Type: *No Product type* /        Insurance ID Number:    Payor/Plan Subscr  Sex Relation Sub. Ins. ID Effective Group Num   1. [facility] 1950 Female Self [id number] 10/1/15                                    [address] [address]   2. [facility]* 1950 Female Self [id number]                                    [address] [address]     Interpretor Needed:  (NO)  (TYPE)           LATEX ALLERGY:  (NO)  Allergies: Sulfa   Defibulator or Pacemaker:  (NO)    DIAGNOSIS & PROCEDURE:                                                                                       .  Diagnosis Code/Description:   Cyst on shoulder L72.9  Operation Code/Description:  Excision of cyst on left shoulder 27840  Location:  [address] [place] [address] Ambulatory       Surgeon:  Dr. Kimble Knee    SCHEDULING INFORMATION:                                                                                    .  Surgeon's Scheduling Instruction:  elective  Requested Date: 21     OR Time: 1130 am            Patient Arrival Time: 1030 am  OR Time Required:  60  Minutes  Anesthesia:  Local Only       Equipment:  n/a                                                            SA Required (only for Mac and Gen): n/a  Status:  Outpatient        Standard C-Arm (only for port and nic):  n/a   Mini C-Arm: No   PAT Required:   Yes                                          H&P needs to be completed: No  Cardiac Clearance Requested:  (NO)               PRE-CERTIFICATION INFORMATION:                                                                           .  Procedure/CPT code: Excision of cyst on left shoulder 32583  Modifier:

## 2021-05-10 RX ORDER — TRAZODONE HYDROCHLORIDE 100 MG/1
100 TABLET ORAL DAILY
COMMUNITY
End: 2022-06-15 | Stop reason: SDUPTHER

## 2021-05-10 NOTE — PROGRESS NOTES
Preoperative Screening for Elective Surgery/Invasive Procedures While COVID-19 present in the community     Have you had any of the following symptoms? No  o Fever, chills  o Cough  o Shortness of breath  o Muscle aches/pain  o Diarrhea  o Abdominal pain, nausea, vomiting  o Loss or decrease in taste and / or smell   Risk of Exposure  o Have you recently been hospitalized for COVID-19 or flu-like illness, if so when? No  o Recently diagnosed with COVID-19, if so when? No  o Recently tested for COVID-19, if so when? 5/6/21  o Have you been in close contact with a person or family member who currently has or recently had COVID-23? If yes, when and in what context? No  o Do you live with anybody who in the last 14 days has had fever, chills, shortness of breath, muscle aches, flu-like illness? No  o Do you have any close contacts or family members who are currently in the hospital for COVID-19 or flu-like illness? No  If yes, assess recent close contact with this person. Indicate if the patient has a positive screen by answering yes to one or more of the above questions. Patients who test positive or screen positive prior to surgery or on the day of surgery should be evaluated in conjunction with the surgeon/proceduralist/anesthesiologist to determine the urgency of the procedure.

## 2021-05-12 ENCOUNTER — HOSPITAL ENCOUNTER (OUTPATIENT)
Age: 71
Setting detail: OUTPATIENT SURGERY
Discharge: HOME | End: 2021-05-12
Attending: SURGERY | Admitting: SURGERY
Payer: MEDICARE

## 2021-05-12 VITALS
HEIGHT: 64 IN | TEMPERATURE: 97.5 F | RESPIRATION RATE: 16 BRPM | BODY MASS INDEX: 26.63 KG/M2 | WEIGHT: 156 LBS | DIASTOLIC BLOOD PRESSURE: 67 MMHG | SYSTOLIC BLOOD PRESSURE: 146 MMHG | OXYGEN SATURATION: 98 % | HEART RATE: 92 BPM

## 2021-05-12 DIAGNOSIS — L72.9 SKIN CYST: ICD-10-CM

## 2021-05-12 PROCEDURE — 7100000010 HC PHASE II RECOVERY - FIRST 15 MIN: Performed by: SURGERY

## 2021-05-12 PROCEDURE — 7100000011 HC PHASE II RECOVERY - ADDTL 15 MIN: Performed by: SURGERY

## 2021-05-12 PROCEDURE — 88304 TISSUE EXAM BY PATHOLOGIST: CPT

## 2021-05-12 PROCEDURE — 2500000003 HC RX 250 WO HCPCS: Performed by: SURGERY

## 2021-05-12 PROCEDURE — 23071 EXC SHOULDER LES SC 3 CM/>: CPT | Performed by: SURGERY

## 2021-05-12 PROCEDURE — 3600000012 HC SURGERY LEVEL 2 ADDTL 15MIN: Performed by: SURGERY

## 2021-05-12 PROCEDURE — 2709999900 HC NON-CHARGEABLE SUPPLY: Performed by: SURGERY

## 2021-05-12 PROCEDURE — 3600000002 HC SURGERY LEVEL 2 BASE: Performed by: SURGERY

## 2021-05-12 RX ORDER — BUPIVACAINE HYDROCHLORIDE AND EPINEPHRINE 5; 5 MG/ML; UG/ML
INJECTION, SOLUTION PERINEURAL PRN
Status: DISCONTINUED | OUTPATIENT
Start: 2021-05-12 | End: 2021-05-12 | Stop reason: ALTCHOICE

## 2021-05-12 ASSESSMENT — PAIN SCALES - GENERAL: PAINLEVEL_OUTOF10: 0

## 2021-05-12 NOTE — H&P
I have reviewed the history and physical and examined the patient. I find no relevant changes. I have reviewed with the patient and/or family members, during the preoperative office visit the risks, benefits, and alternatives to the procedure.     Jovana Jc

## 2021-05-12 NOTE — OP NOTE
Date of Surgery: 5/12/21    Preop Dx:  Recurrent left shoulder sebaceous cyst    Postop Dx:  Same    Procedure:  Excision of left shoulder sebaceous cyst    Surgeon:  [place] Boles    Assistant:      Anesthesia:  local    EBL:   <50ml    Specimen:  Sebaceous cyst left shoulder    Complications: none    Drains/Lines:  none    Indications:  64 yo with recurrent left shoulder sebaceous cyst with increase in size and discomfort    Description:  Patient was given adequate description of the risks and rewards of the procedure, including bleeding, infection, recurrence and freely consented. Pt was placed in lateral position. Prepped and draped in usual sterile fashion. Area around cyst anesthetized with local anesthetic. Vertical incision made and carried down circumferentially around cyst using combination of blunt dissection and electrocautery. Cyst was into subcutaneous tissue and measured about [id number] and was removed and sent to pathology. Wound irrigated and hemostasis assured. Subcutaneous tissue closed with interrupted 3-0 vicryl. 3-0 nylon used to close the epidermis. Sterile dressing placed. All suture, sponge and instrument count correct times two at end of case. Transferred to PACU in stable condition.     Wayne Frzaier MD

## 2021-05-28 ENCOUNTER — VIRTUAL VISIT (OUTPATIENT)
Dept: PAIN MANAGEMENT | Age: 71
End: 2021-05-28
Payer: MEDICARE

## 2021-05-28 DIAGNOSIS — M48.061 DEGENERATIVE LUMBAR SPINAL STENOSIS: ICD-10-CM

## 2021-05-28 DIAGNOSIS — M47.812 OSTEOARTHRITIS OF CERVICAL SPINE, UNSPECIFIED SPINAL OSTEOARTHRITIS COMPLICATION STATUS: ICD-10-CM

## 2021-05-28 DIAGNOSIS — M96.1 FAILED NECK SYNDROME: ICD-10-CM

## 2021-05-28 DIAGNOSIS — M48.07 SPINAL STENOSIS OF LUMBOSACRAL REGION: ICD-10-CM

## 2021-05-28 DIAGNOSIS — M96.1 FAILED BACK SURGICAL SYNDROME: ICD-10-CM

## 2021-05-28 DIAGNOSIS — M79.7 FIBROMYALGIA: ICD-10-CM

## 2021-05-28 DIAGNOSIS — G89.4 CHRONIC PAIN SYNDROME: ICD-10-CM

## 2021-05-28 PROCEDURE — 4040F PNEUMOC VAC/ADMIN/RCVD: CPT | Performed by: INTERNAL MEDICINE

## 2021-05-28 PROCEDURE — G8399 PT W/DXA RESULTS DOCUMENT: HCPCS | Performed by: INTERNAL MEDICINE

## 2021-05-28 PROCEDURE — 1090F PRES/ABSN URINE INCON ASSESS: CPT | Performed by: INTERNAL MEDICINE

## 2021-05-28 PROCEDURE — 1123F ACP DISCUSS/DSCN MKR DOCD: CPT | Performed by: INTERNAL MEDICINE

## 2021-05-28 PROCEDURE — 3017F COLORECTAL CA SCREEN DOC REV: CPT | Performed by: INTERNAL MEDICINE

## 2021-05-28 PROCEDURE — 99213 OFFICE O/P EST LOW 20 MIN: CPT | Performed by: INTERNAL MEDICINE

## 2021-05-28 PROCEDURE — G8427 DOCREV CUR MEDS BY ELIG CLIN: HCPCS | Performed by: INTERNAL MEDICINE

## 2021-05-28 RX ORDER — MELOXICAM 15 MG/1
15 TABLET ORAL DAILY
Qty: 30 TABLET | Refills: 1 | Status: SHIPPED | OUTPATIENT
Start: 2021-05-28 | End: 2021-07-02 | Stop reason: SDUPTHER

## 2021-05-28 RX ORDER — OXYCODONE AND ACETAMINOPHEN 7.5; 325 MG/1; MG/1
1 TABLET ORAL EVERY 6 HOURS PRN
Qty: 75 TABLET | Refills: 0 | Status: SHIPPED | OUTPATIENT
Start: 2021-05-28 | End: 2021-07-02 | Stop reason: SDUPTHER

## 2021-05-28 NOTE — PROGRESS NOTES
TELE HEALTH VISIT (AUDIO-VISUAL)    Pursuant to the emergency declaration under the [facility], [facility], [facility] waiver authority and the [employer] and [employer] Act, this Virtual  Visit was conducted, with patient's/legal guardian's consent, to reduce the patient's risk of exposure to COVID-19 and provide continuity of care for an established patient. Service is  provided through a video synchronous discussion virtually to substitute for in-person clinic visit. Due to this being a TeleHealth encounter ([facility]), evaluation of the following organ systems was limited: Vitals/Constitutional/EENT/Resp/CV/GI//MS/Neuro/Skin/Dwuz-Teiq-Hif. Romana Rebecca  1950  [id number]    Ms. José Miguel Lang is being seen virtually for a follow up visit using one of the following techniques  Google Duo, Face time or Doxy. me  Informed verbal consent to the virtual visit was obtained from Ms. José Miguel Lang. Risks associated with HIPPA compliance with the virtual visit was explained to the patient. Ms. José Miguel Lang is at her residence and Dr. Richie Hagen is in his office. HISTORY OF PRESENT ILLNESS:  Ms. José Miguel Lang is a 65 y.o. female returns for a follow up visit for multiple medical problems. Her current presenting problems are   1. Chronic pain syndrome    2. Fibromyalgia    3. Spinal stenosis of lumbosacral region    4. Osteoarthritis of cervical spine, unspecified spinal osteoarthritis complication status    5. Degenerative lumbar spinal stenosis    6. Failed neck syndrome    7. Failed back surgical syndrome    . As per information/history obtained from the PADT(patient assessment and documentation tool) She complains of pain in the lower back with radiation to the neck, hips Left, upper leg Left, knees Left, lower leg Left, ankles Left and feet Left She rates the pain 4/10 and describes it as aching, shooting.   Pain is made Test 4 times a day & as needed for symptoms of irregular blood glucose. Dispense sufficient amount for indicated testing frequency plus additional to accommodate PRN testing needs. 400 strip 0    oxyCODONE-acetaminophen (PERCOCET) 7.5-325 MG per tablet Take 1 tablet by mouth every 6 hours as needed for Pain (max 2-3/day) for up to 35 days. 80 tablet 0    meloxicam (MOBIC) 15 MG tablet Take 1 tablet by mouth daily 30 tablet 1    gabapentin (NEURONTIN) 400 MG capsule Take 1 tablet po AM, take 2 tablets po PM 90 capsule 1    LEVEMIR FLEXTOUCH 100 UNIT/ML injection pen INJECT 20 UNITS SUBCUTANEOUSLY TWICE A DAY 5 pen 0    losartan (COZAAR) 100 MG tablet TAKE 1 TABLET BY MOUTH EVERY DAY 90 tablet 0    insulin aspart (NOVOLOG FLEXPEN) 100 UNIT/ML injection pen INJECT 5 UNITS INTO THE SKIN 3 TIMES DAILY (BEFORE MEALS) 5 pen 0    atorvastatin (LIPITOR) 40 MG tablet TAKE 1 TABLET BY MOUTH EVERY DAY 90 tablet 0    amLODIPine (NORVASC) 5 MG tablet TAKE 1 TABLET BY MOUTH EVERY DAY 90 tablet 0    sertraline (ZOLOFT) 100 MG tablet Take 1 tablet by mouth 2 times daily (Patient taking differently: Take 200 mg by mouth nightly ) 60 tablet 1    Handicap Placard MISC by Does not apply route 1 each 0    Needles & Syringes MISC Inject 1 each into the skin 3 times daily 100 each 3     No facility-administered medications prior to visit. REVIEW OF SYSTEMS:    Respiratory: Negative for apnea, chest tightness and shortness of breath or change in baseline breathing. PHYSICAL EXAM:   Nursing note and vitals reviewed. There were no vitals taken for this visit. Constitutional: She appears well-developed and well-nourished. No acute distress. Cardiovascular: Normal rate, regular rhythm, normal heart sounds, and does not have murmur. Pulmonary/Chest: Effort normal. No respiratory distress. She does not have wheezes in the lung fields. She has no rales.      Neurological/Psychiatric:She is alert and oriented to person, place, and time. Coordination is  normal.  Her mood isAppropriate and affect is Neutral/Euthymic(normal) . Her    IMPRESSION:   1. Chronic pain syndrome    2. Fibromyalgia    3. Spinal stenosis of lumbosacral region    4. Osteoarthritis of cervical spine, unspecified spinal osteoarthritis complication status    5. Degenerative lumbar spinal stenosis    6. Failed neck syndrome    7. Failed back surgical syndrome        PLAN:  Informed verbal consent was obtained  -continue with current opioid regimen, Percocet 2-3 times daily  -She was advised to increase fluids ( 5-7  glasses of fluid daily), limit caffeine, avoid cheese products, increase dietary fiber, increase activity and exercise as tolerated and relax regularly and enjoy meals  -walking/stretching exercises as advised     Current Outpatient Medications   Medication Sig Dispense Refill    VITAMIN D PO Take by mouth daily      MAGNESIUM PO Take by mouth daily      CALCIUM-VITAMIN D PO Take by mouth daily      traZODone (DESYREL) 100 MG tablet Take 100 mg by mouth daily      OLANZapine (ZYPREXA) 2.5 MG tablet TAKE 1 TABLET BY MOUTH EVERY DAY AT NIGHT 30 tablet 5    divalproex (DEPAKOTE ER) 500 MG extended release tablet TAKE 1 TABLET BY MOUTH EVERY DAY      oxybutynin (DITROPAN XL) 10 MG extended release tablet Take 1 tablet by mouth daily 90 tablet 3    levothyroxine (SYNTHROID) 75 MCG tablet Take 1 tablet by mouth daily 90 tablet 3    blood glucose monitor kit and supplies Test 4 times a day & as needed for symptoms of irregular blood glucose. 1 kit 0    blood glucose monitor strips Test 4 times a day & as needed for symptoms of irregular blood glucose. Dispense sufficient amount for indicated testing frequency plus additional to accommodate PRN testing needs. 400 strip 0    oxyCODONE-acetaminophen (PERCOCET) 7.5-325 MG per tablet Take 1 tablet by mouth every 6 hours as needed for Pain (max 2-3/day) for up to 35 days.  80 tablet 0    meloxicam (MOBIC) 15 MG tablet Take 1 tablet by mouth daily 30 tablet 1    gabapentin (NEURONTIN) 400 MG capsule Take 1 tablet po AM, take 2 tablets po PM 90 capsule 1    LEVEMIR FLEXTOUCH 100 UNIT/ML injection pen INJECT 20 UNITS SUBCUTANEOUSLY TWICE A DAY 5 pen 0    losartan (COZAAR) 100 MG tablet TAKE 1 TABLET BY MOUTH EVERY DAY 90 tablet 0    insulin aspart (NOVOLOG FLEXPEN) 100 UNIT/ML injection pen INJECT 5 UNITS INTO THE SKIN 3 TIMES DAILY (BEFORE MEALS) 5 pen 0    atorvastatin (LIPITOR) 40 MG tablet TAKE 1 TABLET BY MOUTH EVERY DAY 90 tablet 0    amLODIPine (NORVASC) 5 MG tablet TAKE 1 TABLET BY MOUTH EVERY DAY 90 tablet 0    sertraline (ZOLOFT) 100 MG tablet Take 1 tablet by mouth 2 times daily (Patient taking differently: Take 200 mg by mouth nightly ) 60 tablet 1    Handicap Placard MISC by Does not apply route 1 each 0    Needles & Syringes MISC Inject 1 each into the skin 3 times daily 100 each 3     No current facility-administered medications for this visit. I will continue her current medication regimen  which is part of the above treatment schedule. It has been helping with Ms. Adrian's chronic  medical problems which for this visit include:   Diagnoses of Chronic pain syndrome, Fibromyalgia, Spinal stenosis of lumbosacral region, Osteoarthritis of cervical spine, unspecified spinal osteoarthritis complication status, Degenerative lumbar spinal stenosis, Failed neck syndrome, and Failed back surgical syndrome were pertinent to this visit. Risks and benefits of the medications and other alternative treatments  including no treatment were discussed with the patient. The common side effects of these medications were also explained to the patient. Informed verbal consent was obtained.    Goals of current treatment regimen include improvement in pain, restoration of functioning- with focus on improvement in physical performance, general activity, work or disability,emotional distress, health care utilization and  decreased medication consumption. Will continue to monitor progress towards achieving/maintaining therapeutic goals with special emphasis on  1. Improvement in perceived interfernce  of pain with ADL's. Ability to do home exercises independently. Ability to do household chores indoor and/or outdoor work and social and leisure activities. Improve psychosocial and physical functioning. - she is showing progression towards this treatment goal with the current regimen. She was advised against drinking alcohol with the narcotic pain medicines, advised against driving or handling machinery while adjusting the dose of medicines or if having cognitive  issues related to the current medications. Risk of overdose and death, if medicines not taken as prescribed, were also discussed. If the patient develops new symptoms or if the symptoms worsen, the patient should call the office. While transcribing every attempt was made to maintain the accuracy of the note in terms of it's contents,there may have been some errors made inadvertently. Thank you for allowing me to participate in the care of this patient.     Thanh Stauffer MD.    Cc: SAI Ward - CNP

## 2021-06-02 ENCOUNTER — OFFICE VISIT (OUTPATIENT)
Dept: SURGERY | Age: 71
End: 2021-06-02

## 2021-06-02 VITALS
BODY MASS INDEX: 26.63 KG/M2 | WEIGHT: 156 LBS | SYSTOLIC BLOOD PRESSURE: 134 MMHG | HEIGHT: 64 IN | DIASTOLIC BLOOD PRESSURE: 86 MMHG | HEART RATE: 83 BPM

## 2021-06-02 DIAGNOSIS — Z09 POSTOP CHECK: Primary | ICD-10-CM

## 2021-06-02 PROCEDURE — 99024 POSTOP FOLLOW-UP VISIT: CPT | Performed by: SURGERY

## 2021-06-03 RX ORDER — ATORVASTATIN CALCIUM 40 MG/1
TABLET, FILM COATED ORAL
Qty: 90 TABLET | Refills: 0 | Status: SHIPPED | OUTPATIENT
Start: 2021-06-03 | End: 2021-08-26

## 2021-06-04 NOTE — PROGRESS NOTES
HPI: Nursing notes reviewed. Patient reports no pain. No drainage. ROS:  10 point review of systems performed with pertinent positives in HPI    Phys:    Skin - no erythema, no drainage    Assesment: 66 yo s/p posterior shoulder cyst excision    Plan: 1. Doing well postop   2. Sutures removed   3. Activity as tolerated. Call with concerns.

## 2021-06-28 RX ORDER — LOSARTAN POTASSIUM 100 MG/1
TABLET ORAL
Qty: 90 TABLET | Refills: 0 | Status: SHIPPED | OUTPATIENT
Start: 2021-06-28 | End: 2021-09-20

## 2021-06-28 NOTE — TELEPHONE ENCOUNTER
LOV:  5/28/21 (VIRTUAL)    Future Appointments   Date Time Provider John Irizarry   7/2/2021 10:45 AM Yamileth Camargo MD [facility]   7/28/2021  9:20 AM SAI Srinivasan - [facility]

## 2021-07-02 ENCOUNTER — OFFICE VISIT (OUTPATIENT)
Dept: PAIN MANAGEMENT | Age: 71
End: 2021-07-02
Payer: MEDICARE

## 2021-07-02 VITALS
WEIGHT: 157.4 LBS | BODY MASS INDEX: 27.02 KG/M2 | DIASTOLIC BLOOD PRESSURE: 83 MMHG | SYSTOLIC BLOOD PRESSURE: 169 MMHG | TEMPERATURE: 97.5 F | HEART RATE: 88 BPM

## 2021-07-02 DIAGNOSIS — M47.812 OSTEOARTHRITIS OF CERVICAL SPINE, UNSPECIFIED SPINAL OSTEOARTHRITIS COMPLICATION STATUS: ICD-10-CM

## 2021-07-02 DIAGNOSIS — G89.4 CHRONIC PAIN SYNDROME: ICD-10-CM

## 2021-07-02 DIAGNOSIS — M48.07 SPINAL STENOSIS OF LUMBOSACRAL REGION: ICD-10-CM

## 2021-07-02 DIAGNOSIS — M96.1 FAILED NECK SYNDROME: ICD-10-CM

## 2021-07-02 DIAGNOSIS — M48.061 DEGENERATIVE LUMBAR SPINAL STENOSIS: ICD-10-CM

## 2021-07-02 DIAGNOSIS — M96.1 FAILED BACK SURGICAL SYNDROME: ICD-10-CM

## 2021-07-02 DIAGNOSIS — M79.7 FIBROMYALGIA: ICD-10-CM

## 2021-07-02 PROCEDURE — 4040F PNEUMOC VAC/ADMIN/RCVD: CPT | Performed by: INTERNAL MEDICINE

## 2021-07-02 PROCEDURE — 1090F PRES/ABSN URINE INCON ASSESS: CPT | Performed by: INTERNAL MEDICINE

## 2021-07-02 PROCEDURE — 3017F COLORECTAL CA SCREEN DOC REV: CPT | Performed by: INTERNAL MEDICINE

## 2021-07-02 PROCEDURE — G8428 CUR MEDS NOT DOCUMENT: HCPCS | Performed by: INTERNAL MEDICINE

## 2021-07-02 PROCEDURE — G8399 PT W/DXA RESULTS DOCUMENT: HCPCS | Performed by: INTERNAL MEDICINE

## 2021-07-02 PROCEDURE — 99213 OFFICE O/P EST LOW 20 MIN: CPT | Performed by: INTERNAL MEDICINE

## 2021-07-02 PROCEDURE — 1123F ACP DISCUSS/DSCN MKR DOCD: CPT | Performed by: INTERNAL MEDICINE

## 2021-07-02 RX ORDER — MELOXICAM 15 MG/1
15 TABLET ORAL DAILY
Qty: 30 TABLET | Refills: 1 | Status: SHIPPED | OUTPATIENT
Start: 2021-07-02 | End: 2021-08-05 | Stop reason: SDUPTHER

## 2021-07-02 RX ORDER — OXYCODONE AND ACETAMINOPHEN 7.5; 325 MG/1; MG/1
1 TABLET ORAL EVERY 6 HOURS PRN
Qty: 75 TABLET | Refills: 0 | Status: SHIPPED | OUTPATIENT
Start: 2021-07-02 | End: 2021-08-05 | Stop reason: SDUPTHER

## 2021-07-02 NOTE — PROGRESS NOTES
Beto Gibson  1950  [id number]      HISTORY OF PRESENT ILLNESS:  Ms. Camelia Sanchez is a 65 y.o. female returns for a follow up visit for pain management  She has a diagnosis of   1. Chronic pain syndrome    2. Fibromyalgia    3. Spinal stenosis of lumbosacral region    4. Degenerative lumbar spinal stenosis    5. Failed neck syndrome    6. Osteoarthritis of cervical spine, unspecified spinal osteoarthritis complication status    7. Failed back surgical syndrome    . She complains of pain in the lower back with radiation to right thigh She rates the pain 8/10 and describes it as aching, burning. Current treatment regimen has helped relieve about 50% of the pain. She denies any side effects from the current pain regimen. Patient reports that since the last follow up visit the physical functioning is unchanged, family/social relationships are unchanged, mood is worse sleep patterns are worse, and that the overall functioning is unchanged. Patient denies misusing/abusing her narcotic pain medications or using any illegal drugs. There are No indicators for possible drug abuse, addiction or diversion problems. Patient reports she is doing fair, had a fall and hurt her buttock. She complains she is having increase pain. She says she is using Mobic along with Neurontin and Percocet 2-3 per day. She denies any constipation symptoms. She complains the pain is worse by the evening. She says she has bene out of medications for 1-2 days due to increase pain. ALLERGIES: Patients list of allergies were reviewed     MEDICATIONS: Ms. Camelia Sanchez list of medications were reviewed. Her current medications are   Outpatient Medications Prior to Visit   Medication Sig Dispense Refill    losartan (COZAAR) 100 MG tablet TAKE 1 TABLET BY MOUTH EVERY DAY 90 tablet 0    LEVEMIR FLEXTOUCH 100 UNIT/ML injection pen INJECT 20 UNITS SUBCUTANEOUSLY TWICE A DAY 5 pen 0    atorvastatin (LIPITOR) 40 MG tablet TAKE 1 TABLET BY MOUTH EVERY DAY 90 tablet 0    oxyCODONE-acetaminophen (PERCOCET) 7.5-325 MG per tablet Take 1 tablet by mouth every 6 hours as needed for Pain (max 2-3/day) for up to 35 days. 75 tablet 0    meloxicam (MOBIC) 15 MG tablet Take 1 tablet by mouth daily 30 tablet 1    VITAMIN D PO Take by mouth daily      MAGNESIUM PO Take by mouth daily      CALCIUM-VITAMIN D PO Take by mouth daily      traZODone (DESYREL) 100 MG tablet Take 100 mg by mouth daily      OLANZapine (ZYPREXA) 2.5 MG tablet TAKE 1 TABLET BY MOUTH EVERY DAY AT NIGHT 30 tablet 5    divalproex (DEPAKOTE ER) 500 MG extended release tablet TAKE 1 TABLET BY MOUTH EVERY DAY      oxybutynin (DITROPAN XL) 10 MG extended release tablet Take 1 tablet by mouth daily 90 tablet 3    levothyroxine (SYNTHROID) 75 MCG tablet Take 1 tablet by mouth daily 90 tablet 3    blood glucose monitor kit and supplies Test 4 times a day & as needed for symptoms of irregular blood glucose. 1 kit 0    blood glucose monitor strips Test 4 times a day & as needed for symptoms of irregular blood glucose. Dispense sufficient amount for indicated testing frequency plus additional to accommodate PRN testing needs. 400 strip 0    insulin aspart (NOVOLOG FLEXPEN) 100 UNIT/ML injection pen INJECT 5 UNITS INTO THE SKIN 3 TIMES DAILY (BEFORE MEALS) 5 pen 0    amLODIPine (NORVASC) 5 MG tablet TAKE 1 TABLET BY MOUTH EVERY DAY 90 tablet 0    sertraline (ZOLOFT) 100 MG tablet Take 1 tablet by mouth 2 times daily (Patient taking differently: Take 200 mg by mouth nightly ) 60 tablet 1    Handicap Placard MISC by Does not apply route 1 each 0    Needles & Syringes MISC Inject 1 each into the skin 3 times daily 100 each 3    gabapentin (NEURONTIN) 400 MG capsule Take 1 tablet po AM, take 2 tablets po PM 90 capsule 1     No facility-administered medications prior to visit.         REVIEW OF SYSTEMS:    Respiratory: Negative for apnea, chest tightness and shortness of breath or change in baseline breathing. PHYSICAL EXAM:   Nursing note and vitals reviewed. BP (!) 169/83   Pulse 88   Temp 97.5 °F (36.4 °C) (Temporal)   Wt 157 lb 6.4 oz (71.4 kg)   BMI 27.02 kg/m²   Constitutional: She appears well-developed and well-nourished. No acute distress. Cardiovascular: Normal rate, regular rhythm, normal heart sounds, and does not have murmur. Pulmonary/Chest: Effort normal. No respiratory distress. She does not have wheezes in the lung fields. She has no rales. Neurological/Psychiatric:She is alert and oriented to person, place, and time. Coordination is  normal.  Her mood isAppropriate and affect is Neutral/Euthymic(normal) . Her  Other: using a cane    IMPRESSION:   1. Chronic pain syndrome    2. Fibromyalgia    3. Spinal stenosis of lumbosacral region    4. Degenerative lumbar spinal stenosis    5. Failed neck syndrome    6. Osteoarthritis of cervical spine, unspecified spinal osteoarthritis complication status    7. Failed back surgical syndrome        PLAN:  Informed verbal consent was obtained  [place] was obtained and reviewed  for the last one year and no indicators of drug misuse  were found. Any other controlled substance prescriptions  seen on the record have been accounted for, I am aware of the patient receiving these medications. Anand Billing [place] record will be rechecked as part of office protocol.    -continue with Percocet 2-3 per day  -She was advised to increase fluids ( 5-7  glasses of fluid daily), limit caffeine, avoid cheese products, increase dietary fiber, increase activity and exercise as tolerated and relax regularly and enjoy meals   -walking/stretching exercises as advised    -Urine drug screen with GC/MS for opiates and drugs of abuse was ordered and will follow up on results.       Current Outpatient Medications   Medication Sig Dispense Refill    losartan (COZAAR) 100 MG tablet TAKE 1 TABLET BY MOUTH EVERY DAY 90 tablet 0    LEVEMIR FLEXTOUCH 100 UNIT/ML injection pen INJECT 20 UNITS SUBCUTANEOUSLY TWICE A DAY 5 pen 0    atorvastatin (LIPITOR) 40 MG tablet TAKE 1 TABLET BY MOUTH EVERY DAY 90 tablet 0    oxyCODONE-acetaminophen (PERCOCET) 7.5-325 MG per tablet Take 1 tablet by mouth every 6 hours as needed for Pain (max 2-3/day) for up to 35 days. 75 tablet 0    meloxicam (MOBIC) 15 MG tablet Take 1 tablet by mouth daily 30 tablet 1    VITAMIN D PO Take by mouth daily      MAGNESIUM PO Take by mouth daily      CALCIUM-VITAMIN D PO Take by mouth daily      traZODone (DESYREL) 100 MG tablet Take 100 mg by mouth daily      OLANZapine (ZYPREXA) 2.5 MG tablet TAKE 1 TABLET BY MOUTH EVERY DAY AT NIGHT 30 tablet 5    divalproex (DEPAKOTE ER) 500 MG extended release tablet TAKE 1 TABLET BY MOUTH EVERY DAY      oxybutynin (DITROPAN XL) 10 MG extended release tablet Take 1 tablet by mouth daily 90 tablet 3    levothyroxine (SYNTHROID) 75 MCG tablet Take 1 tablet by mouth daily 90 tablet 3    blood glucose monitor kit and supplies Test 4 times a day & as needed for symptoms of irregular blood glucose. 1 kit 0    blood glucose monitor strips Test 4 times a day & as needed for symptoms of irregular blood glucose. Dispense sufficient amount for indicated testing frequency plus additional to accommodate PRN testing needs.  400 strip 0    insulin aspart (NOVOLOG FLEXPEN) 100 UNIT/ML injection pen INJECT 5 UNITS INTO THE SKIN 3 TIMES DAILY (BEFORE MEALS) 5 pen 0    amLODIPine (NORVASC) 5 MG tablet TAKE 1 TABLET BY MOUTH EVERY DAY 90 tablet 0    sertraline (ZOLOFT) 100 MG tablet Take 1 tablet by mouth 2 times daily (Patient taking differently: Take 200 mg by mouth nightly ) 60 tablet 1    Handicap Placard MISC by Does not apply route 1 each 0    Needles & Syringes MISC Inject 1 each into the skin 3 times daily 100 each 3    gabapentin (NEURONTIN) 400 MG capsule Take 1 tablet po AM, take 2 tablets po PM 90 capsule 1     No current facility-administered medications for this visit. I will continue her current medication regimen  which is part of the above treatment schedule. It has been helping with Ms. Adrian's chronic  medical problems which for this visit include:   Diagnoses of Chronic pain syndrome, Fibromyalgia, Spinal stenosis of lumbosacral region, Degenerative lumbar spinal stenosis, Failed neck syndrome, Osteoarthritis of cervical spine, unspecified spinal osteoarthritis complication status, and Failed back surgical syndrome were pertinent to this visit. Risks and benefits of the medications and other alternative treatments  including no treatment were discussed with the patient. The common side effects of these medications were also explained to the patient. Informed verbal consent was obtained. Goals of current treatment regimen include improvement in pain, restoration of functioning- with focus on improvement in physical performance, general activity, work or disability,emotional distress, health care utilization and  decreased medication consumption. Will continue to monitor progress towards achieving/maintaining therapeutic goals with special emphasis on  1. Improvement in perceived interfernce  of pain with ADL's. Ability to do home exercises independently. Ability to do household chores indoor and/or outdoor work and social and leisure activities. Improve psychosocial and physical functioning. - she is showing progression towards this treatment goal with the current regimen. She was advised against drinking alcohol with the narcotic pain medicines, advised against driving or handling machinery while adjusting the dose of medicines or if having cognitive  issues related to the current medications. Risk of overdose and death, if medicines not taken as prescribed, were also discussed. If the patient develops new symptoms or if the symptoms worsen, the patient should call the office.     While transcribing every attempt was made to maintain the accuracy of the note in terms of it's contents,there may have been some errors made inadvertently. Thank you for allowing me to participate in the care of this patient.     Saulo Kaye MD.    Cc: SAI Mueller - GIULIANA Hui catheter problem

## 2021-07-27 ENCOUNTER — TELEPHONE (OUTPATIENT)
Dept: FAMILY MEDICINE CLINIC | Age: 71
End: 2021-07-27

## 2021-07-27 NOTE — TELEPHONE ENCOUNTER
----- Message from Aftab Jones sent at 7/27/2021  9:09 AM EDT -----  Subject: Message to Provider    QUESTIONS  Information for Provider? pt was seen in ER 7/26/2021 - broke right   shoulder after falling - released 7/26/2021 from [facility] - pt. is   in a lot of pain. pain medication was given, but pt doesn't feel like it   is helping at all. Cancelling [facility] 7/28/2021 will call back to reschedule   once pt is able to figure out the shoulder pain.   ---------------------------------------------------------------------------  --------------  CALL BACK INFO  What is the best way for the office to contact you? OK to leave message on   voicemail  Preferred Call Back Phone Number? [phone]  ---------------------------------------------------------------------------  --------------  SCRIPT ANSWERS  Relationship to Patient?  Self

## 2021-07-30 DIAGNOSIS — E10.9 TYPE 1 DIABETES MELLITUS WITHOUT COMPLICATION (HCC): ICD-10-CM

## 2021-07-30 RX ORDER — INSULIN DETEMIR 100 [IU]/ML
INJECTION, SOLUTION SUBCUTANEOUS
Qty: 5 PEN | Refills: 0 | Status: SHIPPED | OUTPATIENT
Start: 2021-07-30 | End: 2021-09-17 | Stop reason: SDUPTHER

## 2021-07-30 RX ORDER — INSULIN ASPART 100 [IU]/ML
INJECTION, SOLUTION INTRAVENOUS; SUBCUTANEOUS
Qty: 5 PEN | Refills: 0 | Status: SHIPPED | OUTPATIENT
Start: 2021-07-30 | End: 2021-12-10

## 2021-07-30 NOTE — TELEPHONE ENCOUNTER
----- Message from Casie Brown sent at 7/30/2021 11:39 AM EDT -----  Subject: Refill Request    QUESTIONS  Name of Medication? LEVEMIR FLEXTOUCH 100 UNIT/ML injection pen  Patient-reported dosage and instructions? 20 units am and pm  How many days do you have left? 1  Preferred Pharmacy? [id number]  Pharmacy phone number (if available)? [phone]  ---------------------------------------------------------------------------  --------------,  Name of Medication? insulin aspart (NOVOLOG FLEXPEN) 100 UNIT/ML injection   pen  Patient-reported dosage and instructions? as needed then 5 units  How many days do you have left? 1  Preferred Pharmacy? [id number]  Pharmacy phone number (if available)? [phone]  ---------------------------------------------------------------------------  --------------  Deannie Sandhoff INFO  What is the best way for the office to contact you? OK to leave message on   voicemail  Preferred Call Back Phone Number?  [phone]

## 2021-07-30 NOTE — TELEPHONE ENCOUNTER
Future Appointments   Date Time Provider John Irizarry   8/6/2021 11:45 AM Bernetta Boxer, MD [facility]     LOV 4/28/2021

## 2021-08-05 ENCOUNTER — OFFICE VISIT (OUTPATIENT)
Dept: PAIN MANAGEMENT | Age: 71
End: 2021-08-05
Payer: MEDICARE

## 2021-08-05 VITALS
HEART RATE: 107 BPM | BODY MASS INDEX: 25.75 KG/M2 | TEMPERATURE: 97.5 F | WEIGHT: 150 LBS | SYSTOLIC BLOOD PRESSURE: 141 MMHG | DIASTOLIC BLOOD PRESSURE: 81 MMHG

## 2021-08-05 DIAGNOSIS — M96.1 FAILED BACK SURGICAL SYNDROME: ICD-10-CM

## 2021-08-05 DIAGNOSIS — M48.061 DEGENERATIVE LUMBAR SPINAL STENOSIS: ICD-10-CM

## 2021-08-05 DIAGNOSIS — M48.07 SPINAL STENOSIS OF LUMBOSACRAL REGION: ICD-10-CM

## 2021-08-05 DIAGNOSIS — G89.4 CHRONIC PAIN SYNDROME: ICD-10-CM

## 2021-08-05 DIAGNOSIS — M96.1 FAILED NECK SYNDROME: ICD-10-CM

## 2021-08-05 DIAGNOSIS — M79.7 FIBROMYALGIA: ICD-10-CM

## 2021-08-05 DIAGNOSIS — M47.812 OSTEOARTHRITIS OF CERVICAL SPINE, UNSPECIFIED SPINAL OSTEOARTHRITIS COMPLICATION STATUS: ICD-10-CM

## 2021-08-05 PROCEDURE — 4040F PNEUMOC VAC/ADMIN/RCVD: CPT | Performed by: INTERNAL MEDICINE

## 2021-08-05 PROCEDURE — 1036F TOBACCO NON-USER: CPT | Performed by: INTERNAL MEDICINE

## 2021-08-05 PROCEDURE — G8427 DOCREV CUR MEDS BY ELIG CLIN: HCPCS | Performed by: INTERNAL MEDICINE

## 2021-08-05 PROCEDURE — 3017F COLORECTAL CA SCREEN DOC REV: CPT | Performed by: INTERNAL MEDICINE

## 2021-08-05 PROCEDURE — G8399 PT W/DXA RESULTS DOCUMENT: HCPCS | Performed by: INTERNAL MEDICINE

## 2021-08-05 PROCEDURE — G8417 CALC BMI ABV UP PARAM F/U: HCPCS | Performed by: INTERNAL MEDICINE

## 2021-08-05 PROCEDURE — 1123F ACP DISCUSS/DSCN MKR DOCD: CPT | Performed by: INTERNAL MEDICINE

## 2021-08-05 PROCEDURE — 99213 OFFICE O/P EST LOW 20 MIN: CPT | Performed by: INTERNAL MEDICINE

## 2021-08-05 PROCEDURE — 1090F PRES/ABSN URINE INCON ASSESS: CPT | Performed by: INTERNAL MEDICINE

## 2021-08-05 RX ORDER — MELOXICAM 15 MG/1
15 TABLET ORAL DAILY
Qty: 30 TABLET | Refills: 1 | Status: SHIPPED | OUTPATIENT
Start: 2021-08-05 | End: 2021-09-02 | Stop reason: SDUPTHER

## 2021-08-05 RX ORDER — OXYCODONE AND ACETAMINOPHEN 7.5; 325 MG/1; MG/1
1 TABLET ORAL EVERY 6 HOURS PRN
Qty: 70 TABLET | Refills: 0 | Status: SHIPPED | OUTPATIENT
Start: 2021-08-05 | End: 2021-09-02 | Stop reason: SDUPTHER

## 2021-08-05 RX ORDER — GABAPENTIN 400 MG/1
CAPSULE ORAL
Qty: 90 CAPSULE | Refills: 1 | Status: SHIPPED | OUTPATIENT
Start: 2021-08-05 | End: 2021-09-02 | Stop reason: SDUPTHER

## 2021-08-05 NOTE — PROGRESS NOTES
Eula Augustin  1950  [id number]      HISTORY OF PRESENT ILLNESS:  Ms. Cosme Neves is a 65 y.o. female returns for a follow up visit for pain management  She has a diagnosis of   1. Chronic pain syndrome    2. Fibromyalgia    3. Spinal stenosis of lumbosacral region    4. Osteoarthritis of cervical spine, unspecified spinal osteoarthritis complication status    5. Failed neck syndrome    6. Degenerative lumbar spinal stenosis    7. Failed back surgical syndrome    . She complains of pain in the right shoulder, lower back, left hip She rates the pain 10/10 and describes it as burning. Current treatment regimen has helped relieve about 40% of the pain. She denies any side effects from the current pain regimen. Patient reports that since the last follow up visit the physical functioning is unchanged, family/social relationships are unchanged, mood is unchanged sleep patterns are unchanged, and that the overall functioning is unchanged. Patient denies misusing/abusing her narcotic pain medications or using any illegal drugs. There are No indicators for possible drug abuse, addiction or diversion problems. Patient reports she had a fall and had broke her right shoulder. She state she is going for surgery tomorrow. She mentions she is using Mobic along with Percocet 2-3 per day, had to take some more. She says she is still using Neurontin. She denies any side effects with it. ALLERGIES: Patients list of allergies were reviewed     MEDICATIONS: Ms. Cosme Neves list of medications were reviewed. Her current medications are   Outpatient Medications Prior to Visit   Medication Sig Dispense Refill    insulin detemir (LEVEMIR FLEXTOUCH) 100 UNIT/ML injection pen INJECT 20 UNITS SUBCUTANEOUSLY TWICE A DAY 5 pen 0    insulin aspart (NOVOLOG FLEXPEN) 100 UNIT/ML injection pen INJECT 5 UNITS INTO THE SKIN 3 TIMES DAILY (BEFORE MEALS) 5 pen 0    oxyCODONE-acetaminophen (PERCOCET) 7.5-325 MG per tablet Take 1 tablet by mouth every 6 hours as needed for Pain (max 2-3/day) for up to 35 days. 75 tablet 0    meloxicam (MOBIC) 15 MG tablet Take 1 tablet by mouth daily 30 tablet 1    losartan (COZAAR) 100 MG tablet TAKE 1 TABLET BY MOUTH EVERY DAY 90 tablet 0    atorvastatin (LIPITOR) 40 MG tablet TAKE 1 TABLET BY MOUTH EVERY DAY 90 tablet 0    VITAMIN D PO Take by mouth daily      MAGNESIUM PO Take by mouth daily      CALCIUM-VITAMIN D PO Take by mouth daily      traZODone (DESYREL) 100 MG tablet Take 100 mg by mouth daily      OLANZapine (ZYPREXA) 2.5 MG tablet TAKE 1 TABLET BY MOUTH EVERY DAY AT NIGHT 30 tablet 5    divalproex (DEPAKOTE ER) 500 MG extended release tablet TAKE 1 TABLET BY MOUTH EVERY DAY      oxybutynin (DITROPAN XL) 10 MG extended release tablet Take 1 tablet by mouth daily 90 tablet 3    levothyroxine (SYNTHROID) 75 MCG tablet Take 1 tablet by mouth daily 90 tablet 3    blood glucose monitor kit and supplies Test 4 times a day & as needed for symptoms of irregular blood glucose. 1 kit 0    blood glucose monitor strips Test 4 times a day & as needed for symptoms of irregular blood glucose. Dispense sufficient amount for indicated testing frequency plus additional to accommodate PRN testing needs. 400 strip 0    amLODIPine (NORVASC) 5 MG tablet TAKE 1 TABLET BY MOUTH EVERY DAY 90 tablet 0    sertraline (ZOLOFT) 100 MG tablet Take 1 tablet by mouth 2 times daily (Patient taking differently: Take 200 mg by mouth nightly ) 60 tablet 1    Handicap Placard MISC by Does not apply route 1 each 0    Needles & Syringes MISC Inject 1 each into the skin 3 times daily 100 each 3    gabapentin (NEURONTIN) 400 MG capsule Take 1 tablet po AM, take 2 tablets po PM 90 capsule 1     No facility-administered medications prior to visit. REVIEW OF SYSTEMS:    Respiratory: Negative for apnea, chest tightness and shortness of breath or change in baseline breathing.       PHYSICAL EXAM:   Nursing note and vitals reviewed. BP (!) 141/81   Pulse 107   Temp 97.5 °F (36.4 °C)   Wt 150 lb (68 kg)   BMI 25.75 kg/m²   Constitutional: She appears well-developed and well-nourished. No acute distress. Cardiovascular: Normal rate, regular rhythm, normal heart sounds, and does not have murmur. Pulmonary/Chest: Effort normal. No respiratory distress. She does not have wheezes in the lung fields. She has no rales. Neurological/Psychiatric:She is alert and oriented to person, place, and time. Coordination is  normal.  Her mood isAppropriate and affect is Neutral/Euthymic(normal) . Her  Other: right arm sling    IMPRESSION:   1. Chronic pain syndrome    2. Fibromyalgia    3. Spinal stenosis of lumbosacral region    4. Osteoarthritis of cervical spine, unspecified spinal osteoarthritis complication status    5. Failed neck syndrome    6. Degenerative lumbar spinal stenosis    7. Failed back surgical syndrome        PLAN:  Informed verbal consent was obtained  -Interm history reviewed    -Xray's and scans reviewed  -Continue with Percocet 7.5 mg 2-3 per day  -Continue with Mobic and Neurontin   -Patient's urine drug screen results with GC/MS confirmation were obtained and reviewed and were negative for any illicit drugs. Prescribed medications were within acceptable range. Current Outpatient Medications   Medication Sig Dispense Refill    insulin detemir (LEVEMIR FLEXTOUCH) 100 UNIT/ML injection pen INJECT 20 UNITS SUBCUTANEOUSLY TWICE A DAY 5 pen 0    insulin aspart (NOVOLOG FLEXPEN) 100 UNIT/ML injection pen INJECT 5 UNITS INTO THE SKIN 3 TIMES DAILY (BEFORE MEALS) 5 pen 0    oxyCODONE-acetaminophen (PERCOCET) 7.5-325 MG per tablet Take 1 tablet by mouth every 6 hours as needed for Pain (max 2-3/day) for up to 35 days.  75 tablet 0    meloxicam (MOBIC) 15 MG tablet Take 1 tablet by mouth daily 30 tablet 1    losartan (COZAAR) 100 MG tablet TAKE 1 TABLET BY MOUTH EVERY DAY 90 tablet 0    atorvastatin (LIPITOR) 40 MG tablet TAKE 1 TABLET BY MOUTH EVERY DAY 90 tablet 0    VITAMIN D PO Take by mouth daily      MAGNESIUM PO Take by mouth daily      CALCIUM-VITAMIN D PO Take by mouth daily      traZODone (DESYREL) 100 MG tablet Take 100 mg by mouth daily      OLANZapine (ZYPREXA) 2.5 MG tablet TAKE 1 TABLET BY MOUTH EVERY DAY AT NIGHT 30 tablet 5    divalproex (DEPAKOTE ER) 500 MG extended release tablet TAKE 1 TABLET BY MOUTH EVERY DAY      oxybutynin (DITROPAN XL) 10 MG extended release tablet Take 1 tablet by mouth daily 90 tablet 3    levothyroxine (SYNTHROID) 75 MCG tablet Take 1 tablet by mouth daily 90 tablet 3    blood glucose monitor kit and supplies Test 4 times a day & as needed for symptoms of irregular blood glucose. 1 kit 0    blood glucose monitor strips Test 4 times a day & as needed for symptoms of irregular blood glucose. Dispense sufficient amount for indicated testing frequency plus additional to accommodate PRN testing needs. 400 strip 0    amLODIPine (NORVASC) 5 MG tablet TAKE 1 TABLET BY MOUTH EVERY DAY 90 tablet 0    sertraline (ZOLOFT) 100 MG tablet Take 1 tablet by mouth 2 times daily (Patient taking differently: Take 200 mg by mouth nightly ) 60 tablet 1    Handicap Placard MISC by Does not apply route 1 each 0    Needles & Syringes MISC Inject 1 each into the skin 3 times daily 100 each 3    gabapentin (NEURONTIN) 400 MG capsule Take 1 tablet po AM, take 2 tablets po PM 90 capsule 1     No current facility-administered medications for this visit. I will continue her current medication regimen  which is part of the above treatment schedule. It has been helping with Ms. Adrian's chronic  medical problems which for this visit include:   Diagnoses of Chronic pain syndrome, Fibromyalgia, Spinal stenosis of lumbosacral region, Osteoarthritis of cervical spine, unspecified spinal osteoarthritis complication status, Failed neck syndrome, Degenerative lumbar spinal stenosis, and Failed back surgical syndrome were pertinent to this visit. Risks and benefits of the medications and other alternative treatments  including no treatment were discussed with the patient. The common side effects of these medications were also explained to the patient. Informed verbal consent was obtained. Goals of current treatment regimen include improvement in pain, restoration of functioning- with focus on improvement in physical performance, general activity, work or disability,emotional distress, health care utilization and  decreased medication consumption. Will continue to monitor progress towards achieving/maintaining therapeutic goals with special emphasis on  1. Improvement in perceived interfernce  of pain with ADL's. Ability to do home exercises independently. Ability to do household chores indoor and/or outdoor work and social and leisure activities. Improve psychosocial and physical functioning. - she is showing progression towards this treatment goal with the current regimen. She was advised against drinking alcohol with the narcotic pain medicines, advised against driving or handling machinery while adjusting the dose of medicines or if having cognitive  issues related to the current medications. Risk of overdose and death, if medicines not taken as prescribed, were also discussed. If the patient develops new symptoms or if the symptoms worsen, the patient should call the office. While transcribing every attempt was made to maintain the accuracy of the note in terms of it's contents,there may have been some errors made inadvertently. Thank you for allowing me to participate in the care of this patient.     Anna Shah MD.    Cc: SAI Lockett - CNP

## 2021-09-02 ENCOUNTER — OFFICE VISIT (OUTPATIENT)
Dept: PAIN MANAGEMENT | Age: 71
End: 2021-09-02
Payer: MEDICARE

## 2021-09-02 VITALS
TEMPERATURE: 98.6 F | WEIGHT: 150.4 LBS | BODY MASS INDEX: 25.82 KG/M2 | OXYGEN SATURATION: 98 % | DIASTOLIC BLOOD PRESSURE: 93 MMHG | SYSTOLIC BLOOD PRESSURE: 174 MMHG | HEART RATE: 78 BPM

## 2021-09-02 DIAGNOSIS — M96.1 FAILED BACK SURGICAL SYNDROME: ICD-10-CM

## 2021-09-02 DIAGNOSIS — M79.7 FIBROMYALGIA: ICD-10-CM

## 2021-09-02 DIAGNOSIS — M96.1 FAILED NECK SYNDROME: ICD-10-CM

## 2021-09-02 DIAGNOSIS — G89.4 CHRONIC PAIN SYNDROME: ICD-10-CM

## 2021-09-02 DIAGNOSIS — M48.07 SPINAL STENOSIS OF LUMBOSACRAL REGION: ICD-10-CM

## 2021-09-02 DIAGNOSIS — M47.812 OSTEOARTHRITIS OF CERVICAL SPINE, UNSPECIFIED SPINAL OSTEOARTHRITIS COMPLICATION STATUS: ICD-10-CM

## 2021-09-02 DIAGNOSIS — M48.061 DEGENERATIVE LUMBAR SPINAL STENOSIS: ICD-10-CM

## 2021-09-02 PROCEDURE — 3017F COLORECTAL CA SCREEN DOC REV: CPT | Performed by: INTERNAL MEDICINE

## 2021-09-02 PROCEDURE — 4040F PNEUMOC VAC/ADMIN/RCVD: CPT | Performed by: INTERNAL MEDICINE

## 2021-09-02 PROCEDURE — G8417 CALC BMI ABV UP PARAM F/U: HCPCS | Performed by: INTERNAL MEDICINE

## 2021-09-02 PROCEDURE — 1036F TOBACCO NON-USER: CPT | Performed by: INTERNAL MEDICINE

## 2021-09-02 PROCEDURE — 1090F PRES/ABSN URINE INCON ASSESS: CPT | Performed by: INTERNAL MEDICINE

## 2021-09-02 PROCEDURE — G8399 PT W/DXA RESULTS DOCUMENT: HCPCS | Performed by: INTERNAL MEDICINE

## 2021-09-02 PROCEDURE — 99213 OFFICE O/P EST LOW 20 MIN: CPT | Performed by: INTERNAL MEDICINE

## 2021-09-02 PROCEDURE — G8427 DOCREV CUR MEDS BY ELIG CLIN: HCPCS | Performed by: INTERNAL MEDICINE

## 2021-09-02 PROCEDURE — 1123F ACP DISCUSS/DSCN MKR DOCD: CPT | Performed by: INTERNAL MEDICINE

## 2021-09-02 RX ORDER — MELOXICAM 15 MG/1
15 TABLET ORAL DAILY
Qty: 30 TABLET | Refills: 1 | Status: SHIPPED | OUTPATIENT
Start: 2021-09-02 | End: 2021-09-27 | Stop reason: SDUPTHER

## 2021-09-02 RX ORDER — OXYCODONE AND ACETAMINOPHEN 7.5; 325 MG/1; MG/1
1 TABLET ORAL EVERY 6 HOURS PRN
Qty: 70 TABLET | Refills: 0 | Status: SHIPPED | OUTPATIENT
Start: 2021-09-02 | End: 2021-09-27 | Stop reason: SDUPTHER

## 2021-09-02 RX ORDER — GABAPENTIN 400 MG/1
CAPSULE ORAL
Qty: 90 CAPSULE | Refills: 1 | Status: SHIPPED | OUTPATIENT
Start: 2021-09-02 | End: 2021-09-27 | Stop reason: SDUPTHER

## 2021-09-02 NOTE — PROGRESS NOTES
Marie Villa  1950  [id number]      HISTORY OF PRESENT ILLNESS:  Ms. Ramiro Lafleur is a 65 y.o. female returns for a follow up visit for pain management  She has a diagnosis of   1. Chronic pain syndrome    2. Fibromyalgia    3. Osteoarthritis of cervical spine, unspecified spinal osteoarthritis complication status    4. Failed neck syndrome    5. Spinal stenosis of lumbosacral region    6. Degenerative lumbar spinal stenosis    7. Failed back surgical syndrome    . She complains of pain in the right arm, right hand, lower bac She rates the pain 5/10 and describes it as sharp, aching, burning, numbness, pins and needles. Current treatment regimen has helped relieve about 40% of the pain. She denies any side effects from the current pain regimen. Patient reports that since the last follow up visit the physical functioning is worse, family/social relationships are unchanged, mood is worse sleep patterns are worse, and that the overall functioning is worse. Patient denies misusing/abusing her narcotic pain medications or using any illegal drugs. There are No indicators for possible drug abuse, addiction or diversion problems. Patient states she had shoulder surgery recently, status post fracture with hardware. She states her arm aches a lot. Ms. Ramiro Lafleur reports she is doing Physical therapy. Patient mentions she is using Percocet 2-3 per day along with Mobic. Patient denies any constipation symptoms. She says she is having numbness in the right hand also. ALLERGIES: Patients list of allergies were reviewed     MEDICATIONS: Ms. Ramiro Lafleur list of medications were reviewed. Her current medications are   Outpatient Medications Prior to Visit   Medication Sig Dispense Refill    atorvastatin (LIPITOR) 40 MG tablet TAKE 1 TABLET BY MOUTH EVERY DAY 90 tablet 0    oxyCODONE-acetaminophen (PERCOCET) 7.5-325 MG per tablet Take 1 tablet by mouth every 6 hours as needed for Pain (max 2-3/day) for up to 35 days. 70 tablet 0    meloxicam (MOBIC) 15 MG tablet Take 1 tablet by mouth daily 30 tablet 1    gabapentin (NEURONTIN) 400 MG capsule Take 1 tablet po AM, take 2 tablets po PM 90 capsule 1    insulin detemir (LEVEMIR FLEXTOUCH) 100 UNIT/ML injection pen INJECT 20 UNITS SUBCUTANEOUSLY TWICE A DAY 5 pen 0    insulin aspart (NOVOLOG FLEXPEN) 100 UNIT/ML injection pen INJECT 5 UNITS INTO THE SKIN 3 TIMES DAILY (BEFORE MEALS) 5 pen 0    losartan (COZAAR) 100 MG tablet TAKE 1 TABLET BY MOUTH EVERY DAY 90 tablet 0    VITAMIN D PO Take by mouth daily      MAGNESIUM PO Take by mouth daily      CALCIUM-VITAMIN D PO Take by mouth daily      traZODone (DESYREL) 100 MG tablet Take 100 mg by mouth daily      OLANZapine (ZYPREXA) 2.5 MG tablet TAKE 1 TABLET BY MOUTH EVERY DAY AT NIGHT 30 tablet 5    divalproex (DEPAKOTE ER) 500 MG extended release tablet TAKE 1 TABLET BY MOUTH EVERY DAY      oxybutynin (DITROPAN XL) 10 MG extended release tablet Take 1 tablet by mouth daily 90 tablet 3    levothyroxine (SYNTHROID) 75 MCG tablet Take 1 tablet by mouth daily 90 tablet 3    blood glucose monitor kit and supplies Test 4 times a day & as needed for symptoms of irregular blood glucose. 1 kit 0    blood glucose monitor strips Test 4 times a day & as needed for symptoms of irregular blood glucose. Dispense sufficient amount for indicated testing frequency plus additional to accommodate PRN testing needs. 400 strip 0    amLODIPine (NORVASC) 5 MG tablet TAKE 1 TABLET BY MOUTH EVERY DAY 90 tablet 0    sertraline (ZOLOFT) 100 MG tablet Take 1 tablet by mouth 2 times daily (Patient taking differently: Take 200 mg by mouth nightly ) 60 tablet 1    Handicap Placard MISC by Does not apply route 1 each 0    Needles & Syringes MISC Inject 1 each into the skin 3 times daily 100 each 3     No facility-administered medications prior to visit.         REVIEW OF SYSTEMS:    Respiratory: Negative for apnea, chest tightness and shortness of breath or change in baseline breathing. PHYSICAL EXAM:   Nursing note and vitals reviewed. BP (!) 179/87   Pulse 78   Temp 98.6 °F (37 °C) (Temporal)   Wt 150 lb 6.4 oz (68.2 kg)   SpO2 98%   BMI 25.82 kg/m²   Constitutional: She appears well-developed and well-nourished. No acute distress. Cardiovascular: Normal rate, regular rhythm, normal heart sounds, and does not have murmur. Pulmonary/Chest: Effort normal. No respiratory distress. She does not have wheezes in the lung fields. She has no rales. Neurological/Psychiatric:She is alert and oriented to person, place, and time. Coordination is  normal.  Her mood isAppropriate and affect is Neutral/Euthymic(normal) . Her  Other; decrease ROM right shoulder    IMPRESSION:   1. Chronic pain syndrome    2. Fibromyalgia    3. Osteoarthritis of cervical spine, unspecified spinal osteoarthritis complication status    4. Failed neck syndrome    5. Spinal stenosis of lumbosacral region    6. Degenerative lumbar spinal stenosis    7. Failed back surgical syndrome        PLAN:  Informed verbal consent was obtained  -ROM/Stretching exercises as advised   -She was advised to increase fluids ( 5-7  glasses of fluid daily), limit caffeine, avoid cheese products, increase dietary fiber, increase activity and exercise as tolerated and relax regularly and enjoy meals   -Walking 20-30 minutes daily as tolerated   -Continue with Percocet 2-3 per day     Current Outpatient Medications   Medication Sig Dispense Refill    atorvastatin (LIPITOR) 40 MG tablet TAKE 1 TABLET BY MOUTH EVERY DAY 90 tablet 0    oxyCODONE-acetaminophen (PERCOCET) 7.5-325 MG per tablet Take 1 tablet by mouth every 6 hours as needed for Pain (max 2-3/day) for up to 35 days.  70 tablet 0    meloxicam (MOBIC) 15 MG tablet Take 1 tablet by mouth daily 30 tablet 1    gabapentin (NEURONTIN) 400 MG capsule Take 1 tablet po AM, take 2 tablets po PM 90 capsule 1    insulin detemir (LEVEMIR FLEXTOUCH) 100 UNIT/ML injection pen INJECT 20 UNITS SUBCUTANEOUSLY TWICE A DAY 5 pen 0    insulin aspart (NOVOLOG FLEXPEN) 100 UNIT/ML injection pen INJECT 5 UNITS INTO THE SKIN 3 TIMES DAILY (BEFORE MEALS) 5 pen 0    losartan (COZAAR) 100 MG tablet TAKE 1 TABLET BY MOUTH EVERY DAY 90 tablet 0    VITAMIN D PO Take by mouth daily      MAGNESIUM PO Take by mouth daily      CALCIUM-VITAMIN D PO Take by mouth daily      traZODone (DESYREL) 100 MG tablet Take 100 mg by mouth daily      OLANZapine (ZYPREXA) 2.5 MG tablet TAKE 1 TABLET BY MOUTH EVERY DAY AT NIGHT 30 tablet 5    divalproex (DEPAKOTE ER) 500 MG extended release tablet TAKE 1 TABLET BY MOUTH EVERY DAY      oxybutynin (DITROPAN XL) 10 MG extended release tablet Take 1 tablet by mouth daily 90 tablet 3    levothyroxine (SYNTHROID) 75 MCG tablet Take 1 tablet by mouth daily 90 tablet 3    blood glucose monitor kit and supplies Test 4 times a day & as needed for symptoms of irregular blood glucose. 1 kit 0    blood glucose monitor strips Test 4 times a day & as needed for symptoms of irregular blood glucose. Dispense sufficient amount for indicated testing frequency plus additional to accommodate PRN testing needs. 400 strip 0    amLODIPine (NORVASC) 5 MG tablet TAKE 1 TABLET BY MOUTH EVERY DAY 90 tablet 0    sertraline (ZOLOFT) 100 MG tablet Take 1 tablet by mouth 2 times daily (Patient taking differently: Take 200 mg by mouth nightly ) 60 tablet 1    Handicap Placard MISC by Does not apply route 1 each 0    Needles & Syringes MISC Inject 1 each into the skin 3 times daily 100 each 3     No current facility-administered medications for this visit. I will continue her current medication regimen  which is part of the above treatment schedule. It has been helping with Ms. Adrian's chronic  medical problems which for this visit include:   Diagnoses of Chronic pain syndrome, Fibromyalgia, Osteoarthritis of cervical spine, unspecified spinal osteoarthritis complication status, Failed neck syndrome, Spinal stenosis of lumbosacral region, Degenerative lumbar spinal stenosis, and Failed back surgical syndrome were pertinent to this visit. Risks and benefits of the medications and other alternative treatments  including no treatment were discussed with the patient. The common side effects of these medications were also explained to the patient. Informed verbal consent was obtained. Goals of current treatment regimen include improvement in pain, restoration of functioning- with focus on improvement in physical performance, general activity, work or disability,emotional distress, health care utilization and  decreased medication consumption. Will continue to monitor progress towards achieving/maintaining therapeutic goals with special emphasis on  1. Improvement in perceived interfernce  of pain with ADL's. Ability to do home exercises independently. Ability to do household chores indoor and/or outdoor work and social and leisure activities. Improve psychosocial and physical functioning. - she is showing progression towards this treatment goal with the current regimen. She was advised against drinking alcohol with the narcotic pain medicines, advised against driving or handling machinery while adjusting the dose of medicines or if having cognitive  issues related to the current medications. Risk of overdose and death, if medicines not taken as prescribed, were also discussed. If the patient develops new symptoms or if the symptoms worsen, the patient should call the office. While transcribing every attempt was made to maintain the accuracy of the note in terms of it's contents,there may have been some errors made inadvertently. Thank you for allowing me to participate in the care of this patient.     Mira Manley MD.    Cc: Simi Nobles, SAI - CNP

## 2021-09-17 RX ORDER — INSULIN DETEMIR 100 [IU]/ML
INJECTION, SOLUTION SUBCUTANEOUS
Qty: 5 PEN | Refills: 0 | Status: SHIPPED | OUTPATIENT
Start: 2021-09-17 | End: 2021-10-29

## 2021-09-17 NOTE — TELEPHONE ENCOUNTER
----- Message from Nabeel Hutchinson sent at 9/17/2021  9:27 AM EDT -----  Subject: Refill Request    QUESTIONS  Name of Medication? insulin detemir (LEVEMIR FLEXTOUCH) 100 UNIT/ML   injection pen  Patient-reported dosage and instructions? INJECT 20 UNITS SUBCUTANEOUSLY   TWICE A DAY  How many days do you have left? 1  Preferred Pharmacy? [facility]/PHARMACY #[id number]  Pharmacy phone number (if available)? [phone]  ---------------------------------------------------------------------------  --------------  Vincent SCHAFFER  What is the best way for the office to contact you? OK to leave message on   voicemail  Preferred Call Back Phone Number?  [phone]

## 2021-09-17 NOTE — TELEPHONE ENCOUNTER
Future Appointments   Date Time Provider John Irizarry   9/27/2021 10:15 AM Chava Romero MD [facility]     4/28/2021

## 2021-09-20 RX ORDER — LOSARTAN POTASSIUM 100 MG/1
TABLET ORAL
Qty: 90 TABLET | Refills: 0 | Status: SHIPPED | OUTPATIENT
Start: 2021-09-20 | End: 2021-10-29 | Stop reason: SDUPTHER

## 2021-09-20 NOTE — TELEPHONE ENCOUNTER
Last ov 04/28/2021   Future Appointments   Date Time Provider John Irizarry   9/27/2021 10:15 AM Arely Baker MD [facility]

## 2021-09-27 ENCOUNTER — OFFICE VISIT (OUTPATIENT)
Dept: PAIN MANAGEMENT | Age: 71
End: 2021-09-27
Payer: MEDICARE

## 2021-09-27 VITALS
BODY MASS INDEX: 25.51 KG/M2 | OXYGEN SATURATION: 97 % | SYSTOLIC BLOOD PRESSURE: 138 MMHG | DIASTOLIC BLOOD PRESSURE: 79 MMHG | HEART RATE: 85 BPM | WEIGHT: 148.6 LBS | TEMPERATURE: 97.2 F

## 2021-09-27 DIAGNOSIS — M48.07 SPINAL STENOSIS OF LUMBOSACRAL REGION: ICD-10-CM

## 2021-09-27 DIAGNOSIS — M96.1 FAILED BACK SURGICAL SYNDROME: ICD-10-CM

## 2021-09-27 DIAGNOSIS — G89.4 CHRONIC PAIN SYNDROME: ICD-10-CM

## 2021-09-27 DIAGNOSIS — M79.7 FIBROMYALGIA: ICD-10-CM

## 2021-09-27 DIAGNOSIS — M48.061 DEGENERATIVE LUMBAR SPINAL STENOSIS: ICD-10-CM

## 2021-09-27 DIAGNOSIS — M96.1 FAILED NECK SYNDROME: ICD-10-CM

## 2021-09-27 DIAGNOSIS — M47.812 OSTEOARTHRITIS OF CERVICAL SPINE, UNSPECIFIED SPINAL OSTEOARTHRITIS COMPLICATION STATUS: ICD-10-CM

## 2021-09-27 PROCEDURE — 3017F COLORECTAL CA SCREEN DOC REV: CPT | Performed by: INTERNAL MEDICINE

## 2021-09-27 PROCEDURE — 1090F PRES/ABSN URINE INCON ASSESS: CPT | Performed by: INTERNAL MEDICINE

## 2021-09-27 PROCEDURE — 99213 OFFICE O/P EST LOW 20 MIN: CPT | Performed by: INTERNAL MEDICINE

## 2021-09-27 PROCEDURE — G8399 PT W/DXA RESULTS DOCUMENT: HCPCS | Performed by: INTERNAL MEDICINE

## 2021-09-27 PROCEDURE — 1123F ACP DISCUSS/DSCN MKR DOCD: CPT | Performed by: INTERNAL MEDICINE

## 2021-09-27 PROCEDURE — 1036F TOBACCO NON-USER: CPT | Performed by: INTERNAL MEDICINE

## 2021-09-27 PROCEDURE — G8417 CALC BMI ABV UP PARAM F/U: HCPCS | Performed by: INTERNAL MEDICINE

## 2021-09-27 PROCEDURE — G8427 DOCREV CUR MEDS BY ELIG CLIN: HCPCS | Performed by: INTERNAL MEDICINE

## 2021-09-27 PROCEDURE — 4040F PNEUMOC VAC/ADMIN/RCVD: CPT | Performed by: INTERNAL MEDICINE

## 2021-09-27 RX ORDER — GABAPENTIN 400 MG/1
CAPSULE ORAL
Qty: 90 CAPSULE | Refills: 1 | Status: SHIPPED | OUTPATIENT
Start: 2021-09-27 | End: 2021-12-13 | Stop reason: SDUPTHER

## 2021-09-27 RX ORDER — MELOXICAM 15 MG/1
15 TABLET ORAL DAILY
Qty: 30 TABLET | Refills: 1 | Status: SHIPPED | OUTPATIENT
Start: 2021-09-27 | End: 2021-12-13 | Stop reason: SDUPTHER

## 2021-09-27 RX ORDER — OXYCODONE AND ACETAMINOPHEN 7.5; 325 MG/1; MG/1
1 TABLET ORAL EVERY 6 HOURS PRN
Qty: 70 TABLET | Refills: 0 | Status: SHIPPED | OUTPATIENT
Start: 2021-09-27 | End: 2021-11-01 | Stop reason: SDUPTHER

## 2021-09-27 NOTE — PROGRESS NOTES
Myron Conn  1950  [id number]      HISTORY OF PRESENT ILLNESS:  Ms. Wendy Guzman is a 65 y.o. female returns for a follow up visit for pain management  She has a diagnosis of   1. Chronic pain syndrome    2. Fibromyalgia    3. Osteoarthritis of cervical spine, unspecified spinal osteoarthritis complication status    4. Failed neck syndrome    5. Spinal stenosis of lumbosacral region    6. Degenerative lumbar spinal stenosis    7. Failed back surgical syndrome    . She complains of pain in the Low back, left hip  She rates the pain 4/10 and describes it as sharp, aching, burning. Current treatment regimen has helped relieve about 60% of the pain. She denies any side effects from the current pain regimen. Patient reports that since the last follow up visit the physical functioning is unchanged, family/social relationships are unchanged, mood is unchanged sleep patterns are worse, and that the overall functioning is unchanged. Patient denies misusing/abusing her narcotic pain medications or using any illegal drugs. There are No indicators for possible drug abuse, addiction or diversion problems. Patient states she has been doing fair. Ms. Wendy Guzman says her shoulder is getting better but having some nerve pain in the right hand and arm. She mentions she is still using Mobic along with Neurontin and Percocet 2 per day. Patient denies any constipation symptoms. She reports she has been managing light house chores. ALLERGIES: Patients list of allergies were reviewed     MEDICATIONS: Ms. Wendy Guzman list of medications were reviewed. Her current medications are   Outpatient Medications Prior to Visit   Medication Sig Dispense Refill    losartan (COZAAR) 100 MG tablet TAKE 1 TABLET BY MOUTH EVERY DAY 90 tablet 0    insulin detemir (LEVEMIR FLEXTOUCH) 100 UNIT/ML injection pen INJECT 20 UNITS SUBCUTANEOUSLY TWICE A DAY 5 pen 0    oxyCODONE-acetaminophen (PERCOCET) 7.5-325 MG per tablet Take 1 tablet by mouth every 6 hours as needed for Pain (max 2-3/day) for up to 35 days. 70 tablet 0    meloxicam (MOBIC) 15 MG tablet Take 1 tablet by mouth daily 30 tablet 1    gabapentin (NEURONTIN) 400 MG capsule Take 1 tablet po AM, take 2 tablets po PM 90 capsule 1    atorvastatin (LIPITOR) 40 MG tablet TAKE 1 TABLET BY MOUTH EVERY DAY 90 tablet 0    insulin aspart (NOVOLOG FLEXPEN) 100 UNIT/ML injection pen INJECT 5 UNITS INTO THE SKIN 3 TIMES DAILY (BEFORE MEALS) 5 pen 0    VITAMIN D PO Take by mouth daily      MAGNESIUM PO Take by mouth daily      CALCIUM-VITAMIN D PO Take by mouth daily      traZODone (DESYREL) 100 MG tablet Take 100 mg by mouth daily      OLANZapine (ZYPREXA) 2.5 MG tablet TAKE 1 TABLET BY MOUTH EVERY DAY AT NIGHT 30 tablet 5    divalproex (DEPAKOTE ER) 500 MG extended release tablet TAKE 1 TABLET BY MOUTH EVERY DAY      oxybutynin (DITROPAN XL) 10 MG extended release tablet Take 1 tablet by mouth daily 90 tablet 3    levothyroxine (SYNTHROID) 75 MCG tablet Take 1 tablet by mouth daily 90 tablet 3    blood glucose monitor kit and supplies Test 4 times a day & as needed for symptoms of irregular blood glucose. 1 kit 0    blood glucose monitor strips Test 4 times a day & as needed for symptoms of irregular blood glucose. Dispense sufficient amount for indicated testing frequency plus additional to accommodate PRN testing needs. 400 strip 0    amLODIPine (NORVASC) 5 MG tablet TAKE 1 TABLET BY MOUTH EVERY DAY 90 tablet 0    sertraline (ZOLOFT) 100 MG tablet Take 1 tablet by mouth 2 times daily (Patient taking differently: Take 200 mg by mouth nightly ) 60 tablet 1    Handicap Placard MISC by Does not apply route 1 each 0    Needles & Syringes MISC Inject 1 each into the skin 3 times daily 100 each 3     No facility-administered medications prior to visit.         REVIEW OF SYSTEMS:    Respiratory: Negative for apnea, chest tightness and shortness of breath or change in baseline breathing. PHYSICAL EXAM:   Nursing note and vitals reviewed. /79   Pulse 85   Temp 97.2 °F (36.2 °C) (Temporal)   Wt 148 lb 9.6 oz (67.4 kg)   SpO2 97%   BMI 25.51 kg/m²   Constitutional: She appears well-developed and well-nourished. No acute distress. Cardiovascular: Normal rate, regular rhythm, normal heart sounds, and does not have murmur. Pulmonary/Chest: Effort normal. No respiratory distress. She does not have wheezes in the lung fields. She has no rales. Neurological/Psychiatric:She is alert and oriented to person, place, and time. Coordination is  normal.  Her mood isAppropriate and affect is Neutral/Euthymic(normal) . Her  Other: using a cane    IMPRESSION:   1. Chronic pain syndrome    2. Fibromyalgia    3. Osteoarthritis of cervical spine, unspecified spinal osteoarthritis complication status    4. Failed neck syndrome    5. Spinal stenosis of lumbosacral region    6. Degenerative lumbar spinal stenosis    7. Failed back surgical syndrome        PLAN:  Informed verbal consent was obtained  -ROM/strethcing exercises   -Continue with Percocet 1-2 per day   -She was advised to increase fluids ( 5-7  glasses of fluid daily), limit caffeine, avoid cheese products, increase dietary fiber, increase activity and exercise as tolerated and relax regularly and enjoy meals   -Walking as tolerated 20 minutes daily    Current Outpatient Medications   Medication Sig Dispense Refill    losartan (COZAAR) 100 MG tablet TAKE 1 TABLET BY MOUTH EVERY DAY 90 tablet 0    insulin detemir (LEVEMIR FLEXTOUCH) 100 UNIT/ML injection pen INJECT 20 UNITS SUBCUTANEOUSLY TWICE A DAY 5 pen 0    oxyCODONE-acetaminophen (PERCOCET) 7.5-325 MG per tablet Take 1 tablet by mouth every 6 hours as needed for Pain (max 2-3/day) for up to 35 days.  70 tablet 0    meloxicam (MOBIC) 15 MG tablet Take 1 tablet by mouth daily 30 tablet 1    gabapentin (NEURONTIN) 400 MG capsule Take 1 tablet po AM, take 2 tablets po PM 90 capsule 1    atorvastatin (LIPITOR) 40 MG tablet TAKE 1 TABLET BY MOUTH EVERY DAY 90 tablet 0    insulin aspart (NOVOLOG FLEXPEN) 100 UNIT/ML injection pen INJECT 5 UNITS INTO THE SKIN 3 TIMES DAILY (BEFORE MEALS) 5 pen 0    VITAMIN D PO Take by mouth daily      MAGNESIUM PO Take by mouth daily      CALCIUM-VITAMIN D PO Take by mouth daily      traZODone (DESYREL) 100 MG tablet Take 100 mg by mouth daily      OLANZapine (ZYPREXA) 2.5 MG tablet TAKE 1 TABLET BY MOUTH EVERY DAY AT NIGHT 30 tablet 5    divalproex (DEPAKOTE ER) 500 MG extended release tablet TAKE 1 TABLET BY MOUTH EVERY DAY      oxybutynin (DITROPAN XL) 10 MG extended release tablet Take 1 tablet by mouth daily 90 tablet 3    levothyroxine (SYNTHROID) 75 MCG tablet Take 1 tablet by mouth daily 90 tablet 3    blood glucose monitor kit and supplies Test 4 times a day & as needed for symptoms of irregular blood glucose. 1 kit 0    blood glucose monitor strips Test 4 times a day & as needed for symptoms of irregular blood glucose. Dispense sufficient amount for indicated testing frequency plus additional to accommodate PRN testing needs. 400 strip 0    amLODIPine (NORVASC) 5 MG tablet TAKE 1 TABLET BY MOUTH EVERY DAY 90 tablet 0    sertraline (ZOLOFT) 100 MG tablet Take 1 tablet by mouth 2 times daily (Patient taking differently: Take 200 mg by mouth nightly ) 60 tablet 1    Handicap Placard MISC by Does not apply route 1 each 0    Needles & Syringes MISC Inject 1 each into the skin 3 times daily 100 each 3     No current facility-administered medications for this visit. I will continue her current medication regimen  which is part of the above treatment schedule. It has been helping with Ms. Adrian's chronic  medical problems which for this visit include:   Diagnoses of Chronic pain syndrome, Fibromyalgia, Osteoarthritis of cervical spine, unspecified spinal osteoarthritis complication status, Failed neck syndrome, Spinal stenosis

## 2021-10-22 DIAGNOSIS — F32.A DEPRESSION, UNSPECIFIED DEPRESSION TYPE: ICD-10-CM

## 2021-10-22 RX ORDER — OLANZAPINE 2.5 MG/1
TABLET ORAL
Qty: 90 TABLET | Refills: 1 | Status: SHIPPED | OUTPATIENT
Start: 2021-10-22 | End: 2022-05-10

## 2021-10-22 NOTE — TELEPHONE ENCOUNTER
----- Message from Rebecca Santamariatner sent at 10/22/2021 11:08 AM EDT -----  Subject: Refill Request    QUESTIONS  Name of Medication? divalproex (DEPAKOTE ER) 500 MG extended release   tablet  Patient-reported dosage and instructions? 500mg, 1 pill daily  How many days do you have left? 6  Preferred Pharmacy? [facility]/PHARMACY #[id number]  Pharmacy phone number (if available)? [phone]  ---------------------------------------------------------------------------  --------------,  Name of Medication? Other - cholesterol medication  Patient-reported dosage and instructions? unknown dosage, takes once daily  How many days do you have left? 0  Preferred Pharmacy? [facility]/PHARMACY #[id number]  Pharmacy phone number (if available)? [phone]  Additional Information for Provider? Pt can't remember the name of the   cholesterol medication she takes but she is out & needs a refill.   ---------------------------------------------------------------------------  --------------  CALL BACK INFO  What is the best way for the office to contact you? OK to leave message on   voicemail  Preferred Call Back Phone Number?  [phone]

## 2021-10-22 NOTE — TELEPHONE ENCOUNTER
Future Appointments   Date Time Provider John Irizarry   11/1/2021  1:00 PM Zully Talbert MD [facility]   11/10/2021  1:00 PM SAI [place] - [facility]ULIANA FORTUNE     [address] [place] [address] 4/28/2021

## 2021-10-29 ENCOUNTER — TELEPHONE (OUTPATIENT)
Dept: FAMILY MEDICINE CLINIC | Age: 71
End: 2021-10-29

## 2021-10-29 RX ORDER — ATORVASTATIN CALCIUM 40 MG/1
TABLET, FILM COATED ORAL
Qty: 90 TABLET | Refills: 0 | Status: SHIPPED | OUTPATIENT
Start: 2021-10-29 | End: 2021-12-10 | Stop reason: SDUPTHER

## 2021-10-29 RX ORDER — LOSARTAN POTASSIUM 100 MG/1
TABLET ORAL
Qty: 90 TABLET | Refills: 0 | Status: SHIPPED | OUTPATIENT
Start: 2021-10-29 | End: 2022-03-09 | Stop reason: SDUPTHER

## 2021-10-29 NOTE — TELEPHONE ENCOUNTER
Refill  90 day RX  Losartan 100 mg  Atorvastatin 40 mg       4/28/2021 last ov  Future Appointments   Date Time Provider John Irizarry   11/1/2021  1:00 PM Ti Laurent MD [facility]   11/10/2021  1:00 PM Mike Hashimoto, APRN - CNP [facility] [place] [facility]

## 2021-11-01 ENCOUNTER — OFFICE VISIT (OUTPATIENT)
Dept: PAIN MANAGEMENT | Age: 71
End: 2021-11-01
Payer: MEDICARE

## 2021-11-01 VITALS
SYSTOLIC BLOOD PRESSURE: 145 MMHG | WEIGHT: 148.6 LBS | BODY MASS INDEX: 25.51 KG/M2 | HEART RATE: 81 BPM | OXYGEN SATURATION: 96 % | DIASTOLIC BLOOD PRESSURE: 78 MMHG

## 2021-11-01 DIAGNOSIS — M96.1 FAILED BACK SURGICAL SYNDROME: ICD-10-CM

## 2021-11-01 DIAGNOSIS — M48.061 DEGENERATIVE LUMBAR SPINAL STENOSIS: ICD-10-CM

## 2021-11-01 DIAGNOSIS — M47.812 OSTEOARTHRITIS OF CERVICAL SPINE, UNSPECIFIED SPINAL OSTEOARTHRITIS COMPLICATION STATUS: ICD-10-CM

## 2021-11-01 DIAGNOSIS — G89.4 CHRONIC PAIN SYNDROME: ICD-10-CM

## 2021-11-01 DIAGNOSIS — M48.07 SPINAL STENOSIS OF LUMBOSACRAL REGION: ICD-10-CM

## 2021-11-01 DIAGNOSIS — M96.1 FAILED NECK SYNDROME: ICD-10-CM

## 2021-11-01 DIAGNOSIS — M79.7 FIBROMYALGIA: ICD-10-CM

## 2021-11-01 PROCEDURE — G8427 DOCREV CUR MEDS BY ELIG CLIN: HCPCS | Performed by: INTERNAL MEDICINE

## 2021-11-01 PROCEDURE — G8484 FLU IMMUNIZE NO ADMIN: HCPCS | Performed by: INTERNAL MEDICINE

## 2021-11-01 PROCEDURE — G8417 CALC BMI ABV UP PARAM F/U: HCPCS | Performed by: INTERNAL MEDICINE

## 2021-11-01 PROCEDURE — 1123F ACP DISCUSS/DSCN MKR DOCD: CPT | Performed by: INTERNAL MEDICINE

## 2021-11-01 PROCEDURE — 4040F PNEUMOC VAC/ADMIN/RCVD: CPT | Performed by: INTERNAL MEDICINE

## 2021-11-01 PROCEDURE — 99213 OFFICE O/P EST LOW 20 MIN: CPT | Performed by: INTERNAL MEDICINE

## 2021-11-01 PROCEDURE — 1090F PRES/ABSN URINE INCON ASSESS: CPT | Performed by: INTERNAL MEDICINE

## 2021-11-01 PROCEDURE — 1036F TOBACCO NON-USER: CPT | Performed by: INTERNAL MEDICINE

## 2021-11-01 PROCEDURE — 3017F COLORECTAL CA SCREEN DOC REV: CPT | Performed by: INTERNAL MEDICINE

## 2021-11-01 PROCEDURE — G8399 PT W/DXA RESULTS DOCUMENT: HCPCS | Performed by: INTERNAL MEDICINE

## 2021-11-01 RX ORDER — OXYCODONE AND ACETAMINOPHEN 7.5; 325 MG/1; MG/1
1 TABLET ORAL EVERY 6 HOURS PRN
Qty: 90 TABLET | Refills: 0 | Status: SHIPPED | OUTPATIENT
Start: 2021-11-01 | End: 2021-12-13 | Stop reason: SDUPTHER

## 2021-11-01 NOTE — PROGRESS NOTES
Bala Corona  1950  [id number]    HISTORY OF PRESENT ILLNESS:  Ms. Leyda Kay is a 66 y.o. female returns for a follow up visit for multiple medical problems. Her current presenting problems are   1. Chronic pain syndrome    2. Failed neck syndrome    3. Failed back surgical syndrome    4. Fibromyalgia    5. Spinal stenosis of lumbosacral region    6. Osteoarthritis of cervical spine, unspecified spinal osteoarthritis complication status    7. Degenerative lumbar spinal stenosis    . As per information/history obtained from the PADT(patient assessment and documentation tool) - She complains of pain in the lower back with radiation to the upper leg Right She rates the pain 5/10 and describes it as aching, burning. Pain is made worse by: movement, standing, lifting. Current treatment regimen has helped relieve about 40% of the pain. She denies side effects from the current pain regimen. Patient reports that since the last follow up visit the physical functioning is unchanged, family/social relationships are unchanged, mood is unchanged and sleep patterns are worse, and that the overall functioning is unchanged. Patient denies neurological bowel or bladder. Patient denies misusing/abusing her narcotic pain medications or using any illegal drugs. There are No indicators for possible drug abuse, addiction or diversion problems. Upon obtaining the medical history from Ms. Leyda Kay regarding today's office visit for her presenting problems, patient states She is doing PT for the arm and shoulder. She says she is using Mobic along with Percocet and Neurontin. She denies any side effects. She complains her neck. Back both hurts. She reports she is managing her house chores. ALLERGIES: Patients list of allergies were reviewed     MEDICATIONS: Ms. Leyda Kay list of medications were reviewed. Her current medications are   Outpatient Medications Prior to Visit   Medication Sig Dispense Refill    LEVEMIR FLEXTOUCH 100 UNIT/ML injection pen INJECT 20 UNITS SUBCUTANEOUSLY TWICE A DAY 5 pen 5    atorvastatin (LIPITOR) 40 MG tablet TAKE 1 TABLET BY MOUTH EVERY DAY 90 tablet 0    losartan (COZAAR) 100 MG tablet TAKE 1 TABLET BY MOUTH EVERY DAY 90 tablet 0    OLANZapine (ZYPREXA) 2.5 MG tablet TAKE 1 TABLET BY MOUTH EVERY DAY AT NIGHT 90 tablet 1    oxyCODONE-acetaminophen (PERCOCET) 7.5-325 MG per tablet Take 1 tablet by mouth every 6 hours as needed for Pain (max 2-3/day) for up to 35 days. 70 tablet 0    meloxicam (MOBIC) 15 MG tablet Take 1 tablet by mouth daily 30 tablet 1    gabapentin (NEURONTIN) 400 MG capsule Take 1 tablet po AM, take 2 tablets po PM 90 capsule 1    insulin aspart (NOVOLOG FLEXPEN) 100 UNIT/ML injection pen INJECT 5 UNITS INTO THE SKIN 3 TIMES DAILY (BEFORE MEALS) 5 pen 0    VITAMIN D PO Take by mouth daily      MAGNESIUM PO Take by mouth daily      CALCIUM-VITAMIN D PO Take by mouth daily      traZODone (DESYREL) 100 MG tablet Take 100 mg by mouth daily      divalproex (DEPAKOTE ER) 500 MG extended release tablet TAKE 1 TABLET BY MOUTH EVERY DAY      oxybutynin (DITROPAN XL) 10 MG extended release tablet Take 1 tablet by mouth daily 90 tablet 3    levothyroxine (SYNTHROID) 75 MCG tablet Take 1 tablet by mouth daily 90 tablet 3    blood glucose monitor kit and supplies Test 4 times a day & as needed for symptoms of irregular blood glucose. 1 kit 0    blood glucose monitor strips Test 4 times a day & as needed for symptoms of irregular blood glucose. Dispense sufficient amount for indicated testing frequency plus additional to accommodate PRN testing needs.  400 strip 0    amLODIPine (NORVASC) 5 MG tablet TAKE 1 TABLET BY MOUTH EVERY DAY 90 tablet 0    sertraline (ZOLOFT) 100 MG tablet Take 1 tablet by mouth 2 times daily (Patient taking differently: Take 200 mg by mouth nightly ) 60 tablet 1    Handicap Placard MISC by Does not apply route 1 each 0    [place] & Syringes MISC Inject 1 each into the skin 3 times daily 100 each 3     No facility-administered medications prior to visit. REVIEW OF SYSTEMS:    Respiratory: Negative for apnea, chest tightness and shortness of breath or change in baseline breathing. PHYSICAL EXAM:   Nursing note and vitals reviewed. BP (!) 145/78   Pulse 81   Wt 148 lb 9.6 oz (67.4 kg)   SpO2 96%   BMI 25.51 kg/m²   Constitutional: She appears well-developed and well-nourished. No acute distress. Cardiovascular: Normal rate, regular rhythm, normal heart sounds, and does not have murmur. Pulmonary/Chest: Effort normal. No respiratory distress. She does not have wheezes in the lung fields. She has no rales. Neurological/Psychiatric:She is alert and oriented to person, place, and time. Coordination is  normal.  Her mood isAppropriate and affect is Neutral/Euthymic(normal) . IMPRESSION:   1. Chronic pain syndrome    2. Failed neck syndrome    3. Failed back surgical syndrome    4. Fibromyalgia    5. Spinal stenosis of lumbosacral region    6. Osteoarthritis of cervical spine, unspecified spinal osteoarthritis complication status    7. Degenerative lumbar spinal stenosis        PLAN:  Informed verbal consent was obtained  -[place] was obtained and reviewed  for the last one year and no indicators of drug misuse  were found. Any other controlled substance prescriptions  seen on the record have been accounted for, I am aware of the patient receiving these medications. Therese Deng [place] record will be rechecked as part of office protocol.     -ROM/Stretching exercises as advised   -Continue with Mobic PRN along with Percocet 2 per day and Neurontin   -She was advised to increase fluids ( 5-7  glasses of fluid daily), limit caffeine, avoid cheese products, increase dietary fiber, increase activity and exercise as tolerated and relax regularly and enjoy meals      Current Outpatient Medications   Medication Sig Dispense Refill    LEVEMIR FLEXTOUCH 100 UNIT/ML injection pen INJECT 20 UNITS SUBCUTANEOUSLY TWICE A DAY 5 pen 5    atorvastatin (LIPITOR) 40 MG tablet TAKE 1 TABLET BY MOUTH EVERY DAY 90 tablet 0    losartan (COZAAR) 100 MG tablet TAKE 1 TABLET BY MOUTH EVERY DAY 90 tablet 0    OLANZapine (ZYPREXA) 2.5 MG tablet TAKE 1 TABLET BY MOUTH EVERY DAY AT NIGHT 90 tablet 1    oxyCODONE-acetaminophen (PERCOCET) 7.5-325 MG per tablet Take 1 tablet by mouth every 6 hours as needed for Pain (max 2-3/day) for up to 35 days. 70 tablet 0    meloxicam (MOBIC) 15 MG tablet Take 1 tablet by mouth daily 30 tablet 1    gabapentin (NEURONTIN) 400 MG capsule Take 1 tablet po AM, take 2 tablets po PM 90 capsule 1    insulin aspart (NOVOLOG FLEXPEN) 100 UNIT/ML injection pen INJECT 5 UNITS INTO THE SKIN 3 TIMES DAILY (BEFORE MEALS) 5 pen 0    VITAMIN D PO Take by mouth daily      MAGNESIUM PO Take by mouth daily      CALCIUM-VITAMIN D PO Take by mouth daily      traZODone (DESYREL) 100 MG tablet Take 100 mg by mouth daily      divalproex (DEPAKOTE ER) 500 MG extended release tablet TAKE 1 TABLET BY MOUTH EVERY DAY      oxybutynin (DITROPAN XL) 10 MG extended release tablet Take 1 tablet by mouth daily 90 tablet 3    levothyroxine (SYNTHROID) 75 MCG tablet Take 1 tablet by mouth daily 90 tablet 3    blood glucose monitor kit and supplies Test 4 times a day & as needed for symptoms of irregular blood glucose. 1 kit 0    blood glucose monitor strips Test 4 times a day & as needed for symptoms of irregular blood glucose. Dispense sufficient amount for indicated testing frequency plus additional to accommodate PRN testing needs.  400 strip 0    amLODIPine (NORVASC) 5 MG tablet TAKE 1 TABLET BY MOUTH EVERY DAY 90 tablet 0    sertraline (ZOLOFT) 100 MG tablet Take 1 tablet by mouth 2 times daily (Patient taking differently: Take 200 mg by mouth nightly ) 60 tablet 1    Handicap Placard MISC by Does not apply route 1 each 0    Needles & Syringes MISC Inject 1 each into the skin 3 times daily 100 each 3     No current facility-administered medications for this visit. I will continue her current medication regimen  which is part of the above treatment schedule. It has been helping with Ms. Adrian's chronic  medical problems which for this visit include:   Diagnoses of Chronic pain syndrome, Failed neck syndrome, Failed back surgical syndrome, Fibromyalgia, Spinal stenosis of lumbosacral region, Osteoarthritis of cervical spine, unspecified spinal osteoarthritis complication status, and Degenerative lumbar spinal stenosis were pertinent to this visit. Risks and benefits of the medications and other alternative treatments  including no treatment were discussed with the patient. The common side effects of these medications were also explained to the patient. Informed verbal consent was obtained. Goals of current treatment regimen include improvement in pain, restoration of functioning- with focus on improvement in physical performance, general activity, work or disability,emotional distress, health care utilization and  decreased medication consumption. Will continue to monitor progress towards achieving/maintaining therapeutic goals with special emphasis on  1. Improvement in perceived interfernce  of pain with ADL's. Ability to do home exercises independently. Ability to do household chores indoor and/or outdoor work and social and leisure activities. Improve psychosocial and physical functioning. - she is showing progression towards this treatment goal with the current regimen. She was advised against drinking alcohol with the narcotic pain medicines, advised against driving or handling machinery while adjusting the dose of medicines or if having cognitive  issues related to the current medications. Risk of overdose and death, if medicines not taken as prescribed, were also discussed.  If the patient develops new symptoms or if the symptoms worsen, the patient should call the office. While transcribing every attempt was made to maintain the accuracy of the note in terms of it's contents,there may have been some errors made inadvertently. Thank you for allowing me to participate in the care of this patient.     Henok Pena MD.    Cc: SAI Gee - CNP

## 2021-11-10 ENCOUNTER — OFFICE VISIT (OUTPATIENT)
Dept: FAMILY MEDICINE CLINIC | Age: 71
End: 2021-11-10
Payer: MEDICARE

## 2021-11-10 VITALS
RESPIRATION RATE: 16 BRPM | WEIGHT: 149.6 LBS | HEIGHT: 64 IN | OXYGEN SATURATION: 96 % | HEART RATE: 74 BPM | BODY MASS INDEX: 25.54 KG/M2 | DIASTOLIC BLOOD PRESSURE: 80 MMHG | SYSTOLIC BLOOD PRESSURE: 118 MMHG | TEMPERATURE: 98.4 F

## 2021-11-10 DIAGNOSIS — F39 MOOD DISORDER (HCC): ICD-10-CM

## 2021-11-10 DIAGNOSIS — Z79.4 TYPE 2 DIABETES MELLITUS WITH OTHER SPECIFIED COMPLICATION, WITH LONG-TERM CURRENT USE OF INSULIN (HCC): Primary | ICD-10-CM

## 2021-11-10 DIAGNOSIS — E11.69 TYPE 2 DIABETES MELLITUS WITH OTHER SPECIFIED COMPLICATION, WITH LONG-TERM CURRENT USE OF INSULIN (HCC): Primary | ICD-10-CM

## 2021-11-10 DIAGNOSIS — E03.9 HYPOTHYROIDISM, UNSPECIFIED TYPE: ICD-10-CM

## 2021-11-10 DIAGNOSIS — E53.8 B12 DEFICIENCY: ICD-10-CM

## 2021-11-10 DIAGNOSIS — Z12.31 ENCOUNTER FOR SCREENING MAMMOGRAM FOR BREAST CANCER: ICD-10-CM

## 2021-11-10 DIAGNOSIS — E10.9 TYPE 1 DIABETES MELLITUS WITHOUT COMPLICATION (HCC): ICD-10-CM

## 2021-11-10 LAB
A/G RATIO: 2.3 (ref 1.1–2.2)
ALBUMIN SERPL-MCNC: 4.6 G/DL (ref 3.4–5)
ALP BLD-CCNC: 131 U/L (ref 40–129)
ALT SERPL-CCNC: 10 U/L (ref 10–40)
ANION GAP SERPL CALCULATED.3IONS-SCNC: 14 MMOL/L (ref 3–16)
AST SERPL-CCNC: 15 U/L (ref 15–37)
BASOPHILS ABSOLUTE: 0 K/UL (ref 0–0.2)
BASOPHILS RELATIVE PERCENT: 0.5 %
BILIRUB SERPL-MCNC: 0.5 MG/DL (ref 0–1)
BUN BLDV-MCNC: 13 MG/DL (ref 7–20)
CALCIUM SERPL-MCNC: 9.5 MG/DL (ref 8.3–10.6)
CHLORIDE BLD-SCNC: 92 MMOL/L (ref 99–110)
CHOLESTEROL, TOTAL: 180 MG/DL (ref 0–199)
CO2: 29 MMOL/L (ref 21–32)
CREAT SERPL-MCNC: 0.6 MG/DL (ref 0.6–1.2)
EOSINOPHILS ABSOLUTE: 0.1 K/UL (ref 0–0.6)
EOSINOPHILS RELATIVE PERCENT: 2.3 %
FOLATE: 13.12 NG/ML (ref 4.78–24.2)
GFR AFRICAN AMERICAN: >60
GFR NON-AFRICAN AMERICAN: >60
GLUCOSE BLD-MCNC: 216 MG/DL (ref 70–99)
HBA1C MFR BLD: 9 %
HCT VFR BLD CALC: 38.1 % (ref 36–48)
HDLC SERPL-MCNC: 64 MG/DL (ref 40–60)
HEMOGLOBIN: 13 G/DL (ref 12–16)
LDL CHOLESTEROL CALCULATED: 100 MG/DL
LYMPHOCYTES ABSOLUTE: 1.1 K/UL (ref 1–5.1)
LYMPHOCYTES RELATIVE PERCENT: 22 %
MAGNESIUM: 1.9 MG/DL (ref 1.8–2.4)
MCH RBC QN AUTO: 28.4 PG (ref 26–34)
MCHC RBC AUTO-ENTMCNC: 34.2 G/DL (ref 31–36)
MCV RBC AUTO: 83.2 FL (ref 80–100)
MONOCYTES ABSOLUTE: 0.3 K/UL (ref 0–1.3)
MONOCYTES RELATIVE PERCENT: 5.7 %
NEUTROPHILS ABSOLUTE: 3.3 K/UL (ref 1.7–7.7)
NEUTROPHILS RELATIVE PERCENT: 69.5 %
PDW BLD-RTO: 12.9 % (ref 12.4–15.4)
PLATELET # BLD: 193 K/UL (ref 135–450)
PMV BLD AUTO: 8.5 FL (ref 5–10.5)
POTASSIUM SERPL-SCNC: 4.2 MMOL/L (ref 3.5–5.1)
RBC # BLD: 4.58 M/UL (ref 4–5.2)
SODIUM BLD-SCNC: 135 MMOL/L (ref 136–145)
TOTAL PROTEIN: 6.6 G/DL (ref 6.4–8.2)
TRIGL SERPL-MCNC: 80 MG/DL (ref 0–150)
TSH SERPL DL<=0.05 MIU/L-ACNC: 0.29 UIU/ML (ref 0.27–4.2)
VITAMIN B-12: 957 PG/ML (ref 211–911)
VLDLC SERPL CALC-MCNC: 16 MG/DL
WBC # BLD: 4.8 K/UL (ref 4–11)

## 2021-11-10 PROCEDURE — 4040F PNEUMOC VAC/ADMIN/RCVD: CPT | Performed by: NURSE PRACTITIONER

## 2021-11-10 PROCEDURE — G8399 PT W/DXA RESULTS DOCUMENT: HCPCS | Performed by: NURSE PRACTITIONER

## 2021-11-10 PROCEDURE — 1090F PRES/ABSN URINE INCON ASSESS: CPT | Performed by: NURSE PRACTITIONER

## 2021-11-10 PROCEDURE — 1123F ACP DISCUSS/DSCN MKR DOCD: CPT | Performed by: NURSE PRACTITIONER

## 2021-11-10 PROCEDURE — G8427 DOCREV CUR MEDS BY ELIG CLIN: HCPCS | Performed by: NURSE PRACTITIONER

## 2021-11-10 PROCEDURE — 2022F DILAT RTA XM EVC RTNOPTHY: CPT | Performed by: NURSE PRACTITIONER

## 2021-11-10 PROCEDURE — 3017F COLORECTAL CA SCREEN DOC REV: CPT | Performed by: NURSE PRACTITIONER

## 2021-11-10 PROCEDURE — 82044 UR ALBUMIN SEMIQUANTITATIVE: CPT | Performed by: NURSE PRACTITIONER

## 2021-11-10 PROCEDURE — G8417 CALC BMI ABV UP PARAM F/U: HCPCS | Performed by: NURSE PRACTITIONER

## 2021-11-10 PROCEDURE — 3052F HG A1C>EQUAL 8.0%<EQUAL 9.0%: CPT | Performed by: NURSE PRACTITIONER

## 2021-11-10 PROCEDURE — G8484 FLU IMMUNIZE NO ADMIN: HCPCS | Performed by: NURSE PRACTITIONER

## 2021-11-10 PROCEDURE — 99214 OFFICE O/P EST MOD 30 MIN: CPT | Performed by: NURSE PRACTITIONER

## 2021-11-10 PROCEDURE — 1036F TOBACCO NON-USER: CPT | Performed by: NURSE PRACTITIONER

## 2021-11-10 PROCEDURE — 83036 HEMOGLOBIN GLYCOSYLATED A1C: CPT | Performed by: NURSE PRACTITIONER

## 2021-11-10 RX ORDER — DIVALPROEX SODIUM 500 MG/1
TABLET, EXTENDED RELEASE ORAL
Qty: 90 TABLET | Refills: 3 | Status: SHIPPED | OUTPATIENT
Start: 2021-11-10 | End: 2022-11-03

## 2021-11-10 ASSESSMENT — PATIENT HEALTH QUESTIONNAIRE - PHQ9
2. FEELING DOWN, DEPRESSED OR HOPELESS: 1
1. LITTLE INTEREST OR PLEASURE IN DOING THINGS: 1
SUM OF ALL RESPONSES TO PHQ9 QUESTIONS 1 & 2: 2
SUM OF ALL RESPONSES TO PHQ QUESTIONS 1-9: 2

## 2021-11-10 ASSESSMENT — ENCOUNTER SYMPTOMS
NAUSEA: 0
ROS SKIN COMMENTS: CYST
DIARRHEA: 0
BACK PAIN: 1
COUGH: 0
SHORTNESS OF BREATH: 0
COLOR CHANGE: 0
VOMITING: 0

## 2021-11-10 NOTE — PROGRESS NOTES
11/10/2021     Chief Complaint   Patient presents with    Diabetes    Discuss Medications     patient wants to know how much calcium she should take; should she still take magnesium     Bala Corona (:  1950) is a 66 y.o. female, here for evaluation of the following medical concerns:    HPI  Treatment Adherence:   Medication compliance:  compliant all of the time  Diet compliance:  compliant most of the time  Weight trend: decreasing  Current exercise: plans to start walking  Barriers: stress     Diabetes Mellitus Type 2: Current symptoms/problems include none  Home blood sugar records: she tests 4 times daily- fasting and before each meal  Any episodes of hypoglycemia? no  Eye exam current (within one year): no  Tobacco history: She  reports that she has never smoked. She has never used smokeless tobacco.   Daily Aspirin? Yes  Hemoglobin A1c is trending up. Has been less physically active over the past 4 months since she fractured her humerus. Still having pain in that shoulder.     Hemoglobin A1C   Date Value Ref Range Status   11/10/2021 9.0 % Final   2021 7.9 % Final   2020 8.4 See comment % Final     Comment:     Comment:  Diagnosis of Diabetes: > or = 6.5%  Increased risk of diabetes (Prediabetes): 5.7-6.4%  Glycemic Control: Nonpregnant Adults: <7.0%                    Pregnant: <6.0%         Hypertension:  Home blood pressure monitoring: No.  She is not adherent to a low sodium diet. Patient denies chest pain. Current treatment: amlodipine 5 mg     Hyperlipidemia:  No new myalgias or GI upset on atorvastatin (Lipitor).    Hypothyroidism: Recent symptoms: none. She denies hair loss. Patient is  taking her medication consistently on an empty stomach.  bSy           Lab Results   Component Value Date     TSHREFLEX 0.65 06/10/2019            Lab Results   Component Value Date     TSH 0.91 10/12/2020     TSH 4.99 (H) 2020     TSH 1.69 2016      Mood Disorder: Worried about her son's health problems and she has chronic back pain which causes insomnia. Psychologist quit so not seeing a year ag. . Her psychiatrist is Dr. Aman Dong. Chronic depression- taking Zoloft and Zyprexa and Depakote. No Suicidal plans currently- has in the past.      Due for for labs       Review of Systems   Constitutional: Negative for chills, fatigue and fever. Respiratory: Negative for cough and shortness of breath. Cardiovascular: Negative for chest pain and leg swelling. Gastrointestinal: Negative for diarrhea, nausea and vomiting. Endocrine: Negative for cold intolerance, heat intolerance, polydipsia, polyphagia and polyuria. Genitourinary:        Urge incontinence   Musculoskeletal: Positive for arthralgias (right shoulder pain- fell in July and fractured the humerus) and back pain. Skin: Negative for color change and pallor. Cyst   Neurological: Negative for dizziness and headaches. Psychiatric/Behavioral: Positive for dysphoric mood and sleep disturbance. All other systems reviewed and are negative. Prior to Visit Medications    Medication Sig Taking? Authorizing Provider   divalproex (DEPAKOTE ER) 500 MG extended release tablet TAKE 1 TABLET BY MOUTH EVERY DAY Yes SAI Thornton CNP   oxyCODONE-acetaminophen (PERCOCET) 7.5-325 MG per tablet Take 1 tablet by mouth every 6 hours as needed for Pain (max 2-3/day) for up to 42 days.  Yes Romayne Amsterdam, MD   LEVEMIR FLEXTOUCH 100 UNIT/ML injection pen INJECT 20 UNITS SUBCUTANEOUSLY TWICE A DAY Yes SAI Thornton CNP   atorvastatin (LIPITOR) 40 MG tablet TAKE 1 TABLET BY MOUTH EVERY DAY Yes SAI Thornton CNP   losartan (COZAAR) 100 MG tablet TAKE 1 TABLET BY MOUTH EVERY DAY Yes SAI Thornton CNP   OLANZapine (ZYPREXA) 2.5 MG tablet TAKE 1 TABLET BY MOUTH EVERY DAY AT NIGHT Yes SAI Thornton CNP   meloxicam (MOBIC) 15 MG tablet Take 1 tablet by mouth daily Yes Demetra Beach MD   gabapentin (NEURONTIN) 400 MG capsule Take 1 tablet po AM, take 2 tablets po PM Yes Demetra Beach MD   insulin aspart (NOVOLOG FLEXPEN) 100 UNIT/ML injection pen INJECT 5 UNITS INTO THE SKIN 3 TIMES DAILY (BEFORE MEALS) Yes SAI Calloway CNP   VITAMIN D PO Take by mouth daily Yes Historical Provider, MD   CALCIUM-VITAMIN D PO Take by mouth daily Yes Historical Provider, MD   traZODone (DESYREL) 100 MG tablet Take 100 mg by mouth daily Yes Historical Provider, MD   oxybutynin (DITROPAN XL) 10 MG extended release tablet Take 1 tablet by mouth daily Yes SAI Calloway CNP   levothyroxine (SYNTHROID) 75 MCG tablet Take 1 tablet by mouth daily Yes SAI Calloway CNP   blood glucose monitor kit and supplies Test 4 times a day & as needed for symptoms of irregular blood glucose. Yes SAI Calloway CNP   blood glucose monitor strips Test 4 times a day & as needed for symptoms of irregular blood glucose. Dispense sufficient amount for indicated testing frequency plus additional to accommodate PRN testing needs.  Yes SAI Calloway CNP   sertraline (ZOLOFT) 100 MG tablet Take 1 tablet by mouth 2 times daily  Patient taking differently: Take 200 mg by mouth nightly  Yes SAI Dodd CNP   Handicap Placard MISC by Does not apply route Yes Theodore Peck MD   [address] 1 each into the skin 3 times daily Yes Theodore Peck MD   MAGNESIUM PO Take by mouth daily  Patient not taking: Reported on 11/10/2021  Historical Provider, MD   OLANZapine (ZYPREXA) 2.5 MG tablet TAKE 1 TABLET BY MOUTH EVERY DAY AT NIGHT  SAI Calloway CNP   amLODIPine (NORVASC) 5 MG tablet TAKE 1 TABLET BY MOUTH EVERY DAY  SAI Dodd CNP        Social History     Tobacco Use    Smoking status: Never Smoker    Smokeless tobacco: Never Used   Substance Use Topics    Alcohol use: Not Currently Alcohol/week: 2.0 standard drinks     Types: 2 Glasses of wine per week        Vitals:    11/10/21 1301   BP: 118/80   Site: Right Upper Arm   Position: Sitting   Cuff Size: Medium Adult   Pulse: 74   Resp: 16   Temp: 98.4 °F (36.9 °C)   TempSrc: Temporal   SpO2: 96%   Weight: 149 lb 9.6 oz (67.9 kg)   Height: 5' 4\" (1.626 m)     Estimated body mass index is 25.68 kg/m² as calculated from the following:    Height as of this encounter: 5' 4\" (1.626 m). Weight as of this encounter: 149 lb 9.6 oz (67.9 kg). Physical Exam  Vitals and nursing note reviewed. Constitutional:       General: She is not in acute distress. Appearance: Normal appearance. She is well-developed. She is not ill-appearing, toxic-appearing or diaphoretic. HENT:      Head: Normocephalic and atraumatic. Eyes:      General: No scleral icterus. Right eye: No discharge. Left eye: No discharge. Extraocular Movements: Extraocular movements intact. Conjunctiva/sclera: Conjunctivae normal.      Pupils: Pupils are equal, round, and reactive to light. Cardiovascular:      Rate and Rhythm: Normal rate and regular rhythm. Heart sounds: Normal heart sounds, S1 normal and S2 normal. No murmur heard. No friction rub. No gallop. Pulmonary:      Effort: Pulmonary effort is normal. No respiratory distress. Breath sounds: Normal breath sounds. No stridor. No wheezing, rhonchi or rales. Abdominal:      General: Abdomen is flat. Bowel sounds are normal. There is no distension. Palpations: Abdomen is soft. Tenderness: There is no abdominal tenderness. Musculoskeletal:        Arms:    Neurological:      General: No focal deficit present. Mental Status: She is alert and oriented to person, place, and time. Mental status is at baseline. Cranial Nerves: No cranial nerve deficit. Psychiatric:         Speech: Speech normal.         ASSESSMENT/PLAN:  1.  Type 2 diabetes mellitus with other specified complication, with long-term current use of insulin ([facility].)- worsening control, discussed. Increase activity. Discussed adjusting insulin, wants to try better exercise. Will recheck in 3 months  - CBC Auto Differential; Future  - Comprehensive Metabolic Panel; Future  - Lipid Panel; Future  - POCT glycosylated hemoglobin (Hb A1C)  - POCT microalbumin    2. Hypothyroidism, unspecified type  - TSH without Reflex; Future    3. B12 deficiency  - VITAMIN B12 & FOLATE; Future  - MAGNESIUM; Future    4. Mood disorder ([facility].)- stable    5. Encounter for screening mammogram for breast cancer  - MIGUEL ANGEL DIGITAL SCREEN W OR WO CAD BILATERAL; Future      Return in about 3 months (around 2/10/2022). An electronic signature was used to authenticate this note.     --SAI Neville - CNP on 11/10/2021 at 8:42 PM

## 2021-12-09 DIAGNOSIS — E10.9 TYPE 1 DIABETES MELLITUS WITHOUT COMPLICATION (HCC): ICD-10-CM

## 2021-12-09 NOTE — TELEPHONE ENCOUNTER
LOV 11/10/2021    Future Appointments   Date Time Provider John Irizarry   12/13/2021 11:00 AM Chava Romero MD [facility]

## 2021-12-10 RX ORDER — ATORVASTATIN CALCIUM 40 MG/1
TABLET, FILM COATED ORAL
Qty: 90 TABLET | Refills: 0 | Status: SHIPPED | OUTPATIENT
Start: 2021-12-10 | End: 2022-05-11 | Stop reason: SDUPTHER

## 2021-12-10 RX ORDER — INSULIN ASPART 100 [IU]/ML
INJECTION, SOLUTION INTRAVENOUS; SUBCUTANEOUS
Qty: 5 PEN | Refills: 1 | Status: SHIPPED | OUTPATIENT
Start: 2021-12-10

## 2021-12-10 NOTE — TELEPHONE ENCOUNTER
LOV 11/10/2021    Future Appointments   Date Time Provider John Irizarry   12/13/2021 11:00 AM Lashonda Ha MD [facility]

## 2021-12-13 ENCOUNTER — OFFICE VISIT (OUTPATIENT)
Dept: PAIN MANAGEMENT | Age: 71
End: 2021-12-13
Payer: MEDICARE

## 2021-12-13 VITALS
TEMPERATURE: 97.3 F | HEART RATE: 74 BPM | WEIGHT: 149 LBS | OXYGEN SATURATION: 97 % | BODY MASS INDEX: 25.58 KG/M2 | DIASTOLIC BLOOD PRESSURE: 90 MMHG | SYSTOLIC BLOOD PRESSURE: 160 MMHG

## 2021-12-13 DIAGNOSIS — M79.7 FIBROMYALGIA: ICD-10-CM

## 2021-12-13 DIAGNOSIS — G89.4 CHRONIC PAIN SYNDROME: ICD-10-CM

## 2021-12-13 DIAGNOSIS — M48.07 SPINAL STENOSIS OF LUMBOSACRAL REGION: ICD-10-CM

## 2021-12-13 DIAGNOSIS — M96.1 FAILED BACK SURGICAL SYNDROME: ICD-10-CM

## 2021-12-13 DIAGNOSIS — M47.812 OSTEOARTHRITIS OF CERVICAL SPINE, UNSPECIFIED SPINAL OSTEOARTHRITIS COMPLICATION STATUS: ICD-10-CM

## 2021-12-13 DIAGNOSIS — M48.061 DEGENERATIVE LUMBAR SPINAL STENOSIS: ICD-10-CM

## 2021-12-13 DIAGNOSIS — M96.1 FAILED NECK SYNDROME: ICD-10-CM

## 2021-12-13 PROCEDURE — G8399 PT W/DXA RESULTS DOCUMENT: HCPCS | Performed by: INTERNAL MEDICINE

## 2021-12-13 PROCEDURE — G8484 FLU IMMUNIZE NO ADMIN: HCPCS | Performed by: INTERNAL MEDICINE

## 2021-12-13 PROCEDURE — G8417 CALC BMI ABV UP PARAM F/U: HCPCS | Performed by: INTERNAL MEDICINE

## 2021-12-13 PROCEDURE — 1123F ACP DISCUSS/DSCN MKR DOCD: CPT | Performed by: INTERNAL MEDICINE

## 2021-12-13 PROCEDURE — 1090F PRES/ABSN URINE INCON ASSESS: CPT | Performed by: INTERNAL MEDICINE

## 2021-12-13 PROCEDURE — 4040F PNEUMOC VAC/ADMIN/RCVD: CPT | Performed by: INTERNAL MEDICINE

## 2021-12-13 PROCEDURE — 3017F COLORECTAL CA SCREEN DOC REV: CPT | Performed by: INTERNAL MEDICINE

## 2021-12-13 PROCEDURE — 99213 OFFICE O/P EST LOW 20 MIN: CPT | Performed by: INTERNAL MEDICINE

## 2021-12-13 PROCEDURE — 1036F TOBACCO NON-USER: CPT | Performed by: INTERNAL MEDICINE

## 2021-12-13 PROCEDURE — G8427 DOCREV CUR MEDS BY ELIG CLIN: HCPCS | Performed by: INTERNAL MEDICINE

## 2021-12-13 RX ORDER — GABAPENTIN 400 MG/1
CAPSULE ORAL
Qty: 90 CAPSULE | Refills: 1 | Status: SHIPPED | OUTPATIENT
Start: 2021-12-13 | End: 2022-01-17 | Stop reason: SDUPTHER

## 2021-12-13 RX ORDER — OXYCODONE AND ACETAMINOPHEN 7.5; 325 MG/1; MG/1
1 TABLET ORAL EVERY 6 HOURS PRN
Qty: 70 TABLET | Refills: 0 | Status: SHIPPED | OUTPATIENT
Start: 2021-12-13 | End: 2022-01-17 | Stop reason: SDUPTHER

## 2021-12-13 RX ORDER — MELOXICAM 15 MG/1
15 TABLET ORAL DAILY
Qty: 30 TABLET | Refills: 1 | Status: SHIPPED | OUTPATIENT
Start: 2021-12-13 | End: 2022-02-21 | Stop reason: SDUPTHER

## 2021-12-13 NOTE — PROGRESS NOTES
[facility] [place]  1950  [id number]    HISTORY OF PRESENT ILLNESS:  Ms. Judie Flores is a 66 y.o. female returns for a follow up visit for multiple medical problems. Her current presenting problems are   1. Chronic pain syndrome    2. Failed back surgical syndrome    3. Spinal stenosis of lumbosacral region    4. Degenerative lumbar spinal stenosis    5. Failed neck syndrome    6. Fibromyalgia    7. Osteoarthritis of cervical spine, unspecified spinal osteoarthritis complication status    . As per information/history obtained from the PADT(patient assessment and documentation tool) - She complains of pain in the neck, shoulders Bilateral, lower back and lower leg Left. She rates the pain 5/10 and describes it as aching, stabbing. Pain is made worse by: movement, standing, lifting. Current treatment regimen has helped relieve about 50% of the pain. She denies side effects from the current pain regimen. Patient reports that since the last follow up visit the physical functioning is unchanged, family/social relationships are unchanged, mood is worse and sleep patterns are worse, and that the overall functioning is unchanged. Patient denies neurological bowel or bladder. Patient denies misusing/abusing her narcotic pain medications or using any illegal drugs. There are No indicators for possible drug abuse, addiction or diversion problems. Upon obtaining the medical history from Ms. Judie Flores regarding today's office visit for her presenting problems, patient states she has been doing fair, her pain has been baseline. Ms. Judie Flores states she is having pain in the right shoulder still, she is doing physical therapy, status post surgery. Patient denies any constipation symptoms. She mentions she is using Percocet 2 per day mostly along with the other adjuvants. ALLERGIES: Patients list of allergies were reviewed     MEDICATIONS: Ms. Judie Flores list of medications were reviewed. Her current medications are   Outpatient Medications Prior to Visit   Medication Sig Dispense Refill    NOVOLOG FLEXPEN 100 UNIT/ML injection pen INJECT 5 UNITS INTO THE SKIN 3 TIMES DAILY (BEFORE MEALS) 5 pen 1    atorvastatin (LIPITOR) 40 MG tablet TAKE 1 TABLET BY MOUTH EVERY DAY 90 tablet 0    divalproex (DEPAKOTE ER) 500 MG extended release tablet TAKE 1 TABLET BY MOUTH EVERY DAY 90 tablet 3    oxyCODONE-acetaminophen (PERCOCET) 7.5-325 MG per tablet Take 1 tablet by mouth every 6 hours as needed for Pain (max 2-3/day) for up to 42 days. 90 tablet 0    LEVEMIR FLEXTOUCH 100 UNIT/ML injection pen INJECT 20 UNITS SUBCUTANEOUSLY TWICE A DAY 5 pen 5    losartan (COZAAR) 100 MG tablet TAKE 1 TABLET BY MOUTH EVERY DAY 90 tablet 0    OLANZapine (ZYPREXA) 2.5 MG tablet TAKE 1 TABLET BY MOUTH EVERY DAY AT NIGHT 90 tablet 1    meloxicam (MOBIC) 15 MG tablet Take 1 tablet by mouth daily 30 tablet 1    gabapentin (NEURONTIN) 400 MG capsule Take 1 tablet po AM, take 2 tablets po PM 90 capsule 1    VITAMIN D PO Take by mouth daily      MAGNESIUM PO Take by mouth daily       CALCIUM-VITAMIN D PO Take by mouth daily      traZODone (DESYREL) 100 MG tablet Take 100 mg by mouth daily      oxybutynin (DITROPAN XL) 10 MG extended release tablet Take 1 tablet by mouth daily 90 tablet 3    levothyroxine (SYNTHROID) 75 MCG tablet Take 1 tablet by mouth daily 90 tablet 3    blood glucose monitor kit and supplies Test 4 times a day & as needed for symptoms of irregular blood glucose. 1 kit 0    blood glucose monitor strips Test 4 times a day & as needed for symptoms of irregular blood glucose. Dispense sufficient amount for indicated testing frequency plus additional to accommodate PRN testing needs.  400 strip 0    amLODIPine (NORVASC) 5 MG tablet TAKE 1 TABLET BY MOUTH EVERY DAY 90 tablet 0    sertraline (ZOLOFT) 100 MG tablet Take 1 tablet by mouth 2 times daily (Patient taking differently: Take 200 mg by mouth nightly ) 60 (LIPITOR) 40 MG tablet TAKE 1 TABLET BY MOUTH EVERY DAY 90 tablet 0    divalproex (DEPAKOTE ER) 500 MG extended release tablet TAKE 1 TABLET BY MOUTH EVERY DAY 90 tablet 3    oxyCODONE-acetaminophen (PERCOCET) 7.5-325 MG per tablet Take 1 tablet by mouth every 6 hours as needed for Pain (max 2-3/day) for up to 42 days. 90 tablet 0    LEVEMIR FLEXTOUCH 100 UNIT/ML injection pen INJECT 20 UNITS SUBCUTANEOUSLY TWICE A DAY 5 pen 5    losartan (COZAAR) 100 MG tablet TAKE 1 TABLET BY MOUTH EVERY DAY 90 tablet 0    OLANZapine (ZYPREXA) 2.5 MG tablet TAKE 1 TABLET BY MOUTH EVERY DAY AT NIGHT 90 tablet 1    meloxicam (MOBIC) 15 MG tablet Take 1 tablet by mouth daily 30 tablet 1    gabapentin (NEURONTIN) 400 MG capsule Take 1 tablet po AM, take 2 tablets po PM 90 capsule 1    VITAMIN D PO Take by mouth daily      MAGNESIUM PO Take by mouth daily       CALCIUM-VITAMIN D PO Take by mouth daily      traZODone (DESYREL) 100 MG tablet Take 100 mg by mouth daily      oxybutynin (DITROPAN XL) 10 MG extended release tablet Take 1 tablet by mouth daily 90 tablet 3    levothyroxine (SYNTHROID) 75 MCG tablet Take 1 tablet by mouth daily 90 tablet 3    blood glucose monitor kit and supplies Test 4 times a day & as needed for symptoms of irregular blood glucose. 1 kit 0    blood glucose monitor strips Test 4 times a day & as needed for symptoms of irregular blood glucose. Dispense sufficient amount for indicated testing frequency plus additional to accommodate PRN testing needs. 400 strip 0    amLODIPine (NORVASC) 5 MG tablet TAKE 1 TABLET BY MOUTH EVERY DAY 90 tablet 0    sertraline (ZOLOFT) 100 MG tablet Take 1 tablet by mouth 2 times daily (Patient taking differently: Take 200 mg by mouth nightly ) 60 tablet 1    Handicap Placard MISC by Does not apply route 1 each 0    Needles & Syringes MISC Inject 1 each into the skin 3 times daily 100 each 3     No current facility-administered medications for this visit.      I will continue her current medication regimen  which is part of the above treatment schedule. It has been helping with Ms. Adrian's chronic  medical problems which for this visit include:   Diagnoses of Chronic pain syndrome, Failed back surgical syndrome, Spinal stenosis of lumbosacral region, Degenerative lumbar spinal stenosis, Failed neck syndrome, Fibromyalgia, and Osteoarthritis of cervical spine, unspecified spinal osteoarthritis complication status were pertinent to this visit. Risks and benefits of the medications and other alternative treatments  including no treatment were discussed with the patient. The common side effects of these medications were also explained to the patient. Informed verbal consent was obtained. Goals of current treatment regimen include improvement in pain, restoration of functioning- with focus on improvement in physical performance, general activity, work or disability,emotional distress, health care utilization and  decreased medication consumption. Will continue to monitor progress towards achieving/maintaining therapeutic goals with special emphasis on  1. Improvement in perceived interfernce  of pain with ADL's. Ability to do home exercises independently. Ability to do household chores indoor and/or outdoor work and social and leisure activities. Improve psychosocial and physical functioning. - she is showing progression towards this treatment goal with the current regimen. She was advised against drinking alcohol with the narcotic pain medicines, advised against driving or handling machinery while adjusting the dose of medicines or if having cognitive  issues related to the current medications. Risk of overdose and death, if medicines not taken as prescribed, were also discussed. If the patient develops new symptoms or if the symptoms worsen, the patient should call the office.     While transcribing every attempt was made to maintain the accuracy of the note in terms of it's contents,there may have been some errors made inadvertently. Thank you for allowing me to participate in the care of this patient.     Zully Talbert MD.    Cc: SAI Moeller - CNP

## 2022-01-17 ENCOUNTER — OFFICE VISIT (OUTPATIENT)
Dept: PAIN MANAGEMENT | Age: 72
End: 2022-01-17
Payer: MEDICARE

## 2022-01-17 VITALS
WEIGHT: 151 LBS | OXYGEN SATURATION: 96 % | SYSTOLIC BLOOD PRESSURE: 132 MMHG | BODY MASS INDEX: 25.78 KG/M2 | HEIGHT: 64 IN | DIASTOLIC BLOOD PRESSURE: 84 MMHG | HEART RATE: 84 BPM

## 2022-01-17 DIAGNOSIS — G89.4 CHRONIC PAIN SYNDROME: ICD-10-CM

## 2022-01-17 DIAGNOSIS — M48.07 SPINAL STENOSIS OF LUMBOSACRAL REGION: ICD-10-CM

## 2022-01-17 DIAGNOSIS — M96.1 FAILED NECK SYNDROME: ICD-10-CM

## 2022-01-17 DIAGNOSIS — M48.061 DEGENERATIVE LUMBAR SPINAL STENOSIS: ICD-10-CM

## 2022-01-17 DIAGNOSIS — M47.812 OSTEOARTHRITIS OF CERVICAL SPINE, UNSPECIFIED SPINAL OSTEOARTHRITIS COMPLICATION STATUS: ICD-10-CM

## 2022-01-17 DIAGNOSIS — M96.1 FAILED BACK SURGICAL SYNDROME: ICD-10-CM

## 2022-01-17 DIAGNOSIS — M79.7 FIBROMYALGIA: ICD-10-CM

## 2022-01-17 PROCEDURE — G8417 CALC BMI ABV UP PARAM F/U: HCPCS | Performed by: INTERNAL MEDICINE

## 2022-01-17 PROCEDURE — G8427 DOCREV CUR MEDS BY ELIG CLIN: HCPCS | Performed by: INTERNAL MEDICINE

## 2022-01-17 PROCEDURE — 1090F PRES/ABSN URINE INCON ASSESS: CPT | Performed by: INTERNAL MEDICINE

## 2022-01-17 PROCEDURE — 1123F ACP DISCUSS/DSCN MKR DOCD: CPT | Performed by: INTERNAL MEDICINE

## 2022-01-17 PROCEDURE — 4040F PNEUMOC VAC/ADMIN/RCVD: CPT | Performed by: INTERNAL MEDICINE

## 2022-01-17 PROCEDURE — 1036F TOBACCO NON-USER: CPT | Performed by: INTERNAL MEDICINE

## 2022-01-17 PROCEDURE — 3017F COLORECTAL CA SCREEN DOC REV: CPT | Performed by: INTERNAL MEDICINE

## 2022-01-17 PROCEDURE — 99213 OFFICE O/P EST LOW 20 MIN: CPT | Performed by: INTERNAL MEDICINE

## 2022-01-17 PROCEDURE — G8484 FLU IMMUNIZE NO ADMIN: HCPCS | Performed by: INTERNAL MEDICINE

## 2022-01-17 PROCEDURE — G8399 PT W/DXA RESULTS DOCUMENT: HCPCS | Performed by: INTERNAL MEDICINE

## 2022-01-17 RX ORDER — GABAPENTIN 400 MG/1
CAPSULE ORAL
Qty: 90 CAPSULE | Refills: 1 | Status: SHIPPED | OUTPATIENT
Start: 2022-01-17 | End: 2022-02-21 | Stop reason: SDUPTHER

## 2022-01-17 RX ORDER — OXYCODONE AND ACETAMINOPHEN 7.5; 325 MG/1; MG/1
1 TABLET ORAL EVERY 6 HOURS PRN
Qty: 70 TABLET | Refills: 0 | Status: SHIPPED | OUTPATIENT
Start: 2022-01-17 | End: 2022-02-21 | Stop reason: SDUPTHER

## 2022-01-17 NOTE — PROGRESS NOTES
Outpatient Medications Prior to Visit   Medication Sig Dispense Refill    oxyCODONE-acetaminophen (PERCOCET) 7.5-325 MG per tablet Take 1 tablet by mouth every 6 hours as needed for Pain (max 2-3/day) for up to 35 days. 70 tablet 0    meloxicam (MOBIC) 15 MG tablet Take 1 tablet by mouth daily 30 tablet 1    gabapentin (NEURONTIN) 400 MG capsule Take 1 tablet po AM, take 2 tablets po PM 90 capsule 1    NOVOLOG FLEXPEN 100 UNIT/ML injection pen INJECT 5 UNITS INTO THE SKIN 3 TIMES DAILY (BEFORE MEALS) 5 pen 1    atorvastatin (LIPITOR) 40 MG tablet TAKE 1 TABLET BY MOUTH EVERY DAY 90 tablet 0    divalproex (DEPAKOTE ER) 500 MG extended release tablet TAKE 1 TABLET BY MOUTH EVERY DAY 90 tablet 3    LEVEMIR FLEXTOUCH 100 UNIT/ML injection pen INJECT 20 UNITS SUBCUTANEOUSLY TWICE A DAY 5 pen 5    losartan (COZAAR) 100 MG tablet TAKE 1 TABLET BY MOUTH EVERY DAY 90 tablet 0    OLANZapine (ZYPREXA) 2.5 MG tablet TAKE 1 TABLET BY MOUTH EVERY DAY AT NIGHT 90 tablet 1    VITAMIN D PO Take by mouth daily      MAGNESIUM PO Take by mouth daily       CALCIUM-VITAMIN D PO Take by mouth daily      traZODone (DESYREL) 100 MG tablet Take 100 mg by mouth daily      oxybutynin (DITROPAN XL) 10 MG extended release tablet Take 1 tablet by mouth daily 90 tablet 3    levothyroxine (SYNTHROID) 75 MCG tablet Take 1 tablet by mouth daily 90 tablet 3    blood glucose monitor kit and supplies Test 4 times a day & as needed for symptoms of irregular blood glucose. 1 kit 0    blood glucose monitor strips Test 4 times a day & as needed for symptoms of irregular blood glucose. Dispense sufficient amount for indicated testing frequency plus additional to accommodate PRN testing needs.  400 strip 0    amLODIPine (NORVASC) 5 MG tablet TAKE 1 TABLET BY MOUTH EVERY DAY 90 tablet 0    sertraline (ZOLOFT) 100 MG tablet Take 1 tablet by mouth 2 times daily (Patient taking differently: Take 200 mg by mouth nightly ) 60 tablet 1    1    gabapentin (NEURONTIN) 400 MG capsule Take 1 tablet po AM, take 2 tablets po PM 90 capsule 1    NOVOLOG FLEXPEN 100 UNIT/ML injection pen INJECT 5 UNITS INTO THE SKIN 3 TIMES DAILY (BEFORE MEALS) 5 pen 1    atorvastatin (LIPITOR) 40 MG tablet TAKE 1 TABLET BY MOUTH EVERY DAY 90 tablet 0    divalproex (DEPAKOTE ER) 500 MG extended release tablet TAKE 1 TABLET BY MOUTH EVERY DAY 90 tablet 3    LEVEMIR FLEXTOUCH 100 UNIT/ML injection pen INJECT 20 UNITS SUBCUTANEOUSLY TWICE A DAY 5 pen 5    losartan (COZAAR) 100 MG tablet TAKE 1 TABLET BY MOUTH EVERY DAY 90 tablet 0    OLANZapine (ZYPREXA) 2.5 MG tablet TAKE 1 TABLET BY MOUTH EVERY DAY AT NIGHT 90 tablet 1    VITAMIN D PO Take by mouth daily      MAGNESIUM PO Take by mouth daily       CALCIUM-VITAMIN D PO Take by mouth daily      traZODone (DESYREL) 100 MG tablet Take 100 mg by mouth daily      oxybutynin (DITROPAN XL) 10 MG extended release tablet Take 1 tablet by mouth daily 90 tablet 3    levothyroxine (SYNTHROID) 75 MCG tablet Take 1 tablet by mouth daily 90 tablet 3    blood glucose monitor kit and supplies Test 4 times a day & as needed for symptoms of irregular blood glucose. 1 kit 0    blood glucose monitor strips Test 4 times a day & as needed for symptoms of irregular blood glucose. Dispense sufficient amount for indicated testing frequency plus additional to accommodate PRN testing needs. 400 strip 0    amLODIPine (NORVASC) 5 MG tablet TAKE 1 TABLET BY MOUTH EVERY DAY 90 tablet 0    sertraline (ZOLOFT) 100 MG tablet Take 1 tablet by mouth 2 times daily (Patient taking differently: Take 200 mg by mouth nightly ) 60 tablet 1    Handicap Placard MISC by Does not apply route 1 each 0    Needles & Syringes MISC Inject 1 each into the skin 3 times daily 100 each 3     No current facility-administered medications for this visit. I will continue her current medication regimen  which is part of the above treatment schedule.  It has been helping with Ms. Adrian's chronic  medical problems which for this visit include:   Diagnoses of Chronic pain syndrome, Degenerative lumbar spinal stenosis, Osteoarthritis of cervical spine, unspecified spinal osteoarthritis complication status, Failed back surgical syndrome, Failed neck syndrome, Spinal stenosis of lumbosacral region, and Fibromyalgia were pertinent to this visit. Risks and benefits of the medications and other alternative treatments  including no treatment were discussed with the patient. The common side effects of these medications were also explained to the patient. Informed verbal consent was obtained. Goals of current treatment regimen include improvement in pain, restoration of functioning- with focus on improvement in physical performance, general activity, work or disability,emotional distress, health care utilization and  decreased medication consumption. Will continue to monitor progress towards achieving/maintaining therapeutic goals with special emphasis on  1. Improvement in perceived interfernce  of pain with ADL's. Ability to do home exercises independently. Ability to do household chores indoor and/or outdoor work and social and leisure activities. Improve psychosocial and physical functioning. - she is showing progression towards this treatment goal with the current regimen. She was advised against drinking alcohol with the narcotic pain medicines, advised against driving or handling machinery while adjusting the dose of medicines or if having cognitive  issues related to the current medications. Risk of overdose and death, if medicines not taken as prescribed, were also discussed. If the patient develops new symptoms or if the symptoms worsen, the patient should call the office. While transcribing every attempt was made to maintain the accuracy of the note in terms of it's contents,there may have been some errors made inadvertently.   Thank you for allowing me to participate in the care of this patient.     Jenny Christy MD.    Cc: SAI Howard - CNP

## 2022-02-21 ENCOUNTER — OFFICE VISIT (OUTPATIENT)
Dept: PAIN MANAGEMENT | Age: 72
End: 2022-02-21
Payer: MEDICARE

## 2022-02-21 VITALS
SYSTOLIC BLOOD PRESSURE: 122 MMHG | WEIGHT: 150 LBS | OXYGEN SATURATION: 98 % | RESPIRATION RATE: 16 BRPM | BODY MASS INDEX: 25.61 KG/M2 | HEART RATE: 80 BPM | DIASTOLIC BLOOD PRESSURE: 80 MMHG | HEIGHT: 64 IN

## 2022-02-21 DIAGNOSIS — M48.061 DEGENERATIVE LUMBAR SPINAL STENOSIS: ICD-10-CM

## 2022-02-21 DIAGNOSIS — M48.07 SPINAL STENOSIS OF LUMBOSACRAL REGION: ICD-10-CM

## 2022-02-21 DIAGNOSIS — M79.7 FIBROMYALGIA: ICD-10-CM

## 2022-02-21 DIAGNOSIS — M96.1 FAILED BACK SURGICAL SYNDROME: ICD-10-CM

## 2022-02-21 DIAGNOSIS — M47.812 OSTEOARTHRITIS OF CERVICAL SPINE, UNSPECIFIED SPINAL OSTEOARTHRITIS COMPLICATION STATUS: ICD-10-CM

## 2022-02-21 DIAGNOSIS — G89.4 CHRONIC PAIN SYNDROME: ICD-10-CM

## 2022-02-21 DIAGNOSIS — M96.1 FAILED NECK SYNDROME: ICD-10-CM

## 2022-02-21 PROCEDURE — G8399 PT W/DXA RESULTS DOCUMENT: HCPCS | Performed by: INTERNAL MEDICINE

## 2022-02-21 PROCEDURE — 3017F COLORECTAL CA SCREEN DOC REV: CPT | Performed by: INTERNAL MEDICINE

## 2022-02-21 PROCEDURE — G8484 FLU IMMUNIZE NO ADMIN: HCPCS | Performed by: INTERNAL MEDICINE

## 2022-02-21 PROCEDURE — 4040F PNEUMOC VAC/ADMIN/RCVD: CPT | Performed by: INTERNAL MEDICINE

## 2022-02-21 PROCEDURE — 1090F PRES/ABSN URINE INCON ASSESS: CPT | Performed by: INTERNAL MEDICINE

## 2022-02-21 PROCEDURE — 1036F TOBACCO NON-USER: CPT | Performed by: INTERNAL MEDICINE

## 2022-02-21 PROCEDURE — 1123F ACP DISCUSS/DSCN MKR DOCD: CPT | Performed by: INTERNAL MEDICINE

## 2022-02-21 PROCEDURE — 99213 OFFICE O/P EST LOW 20 MIN: CPT | Performed by: INTERNAL MEDICINE

## 2022-02-21 PROCEDURE — G8427 DOCREV CUR MEDS BY ELIG CLIN: HCPCS | Performed by: INTERNAL MEDICINE

## 2022-02-21 PROCEDURE — G8417 CALC BMI ABV UP PARAM F/U: HCPCS | Performed by: INTERNAL MEDICINE

## 2022-02-21 RX ORDER — OXYCODONE AND ACETAMINOPHEN 7.5; 325 MG/1; MG/1
1 TABLET ORAL EVERY 6 HOURS PRN
Qty: 90 TABLET | Refills: 0 | Status: SHIPPED | OUTPATIENT
Start: 2022-02-21 | End: 2022-03-28 | Stop reason: SDUPTHER

## 2022-02-21 RX ORDER — GABAPENTIN 400 MG/1
CAPSULE ORAL
Qty: 90 CAPSULE | Refills: 1 | Status: SHIPPED | OUTPATIENT
Start: 2022-02-21 | End: 2022-03-28 | Stop reason: SDUPTHER

## 2022-02-21 RX ORDER — OXYCODONE AND ACETAMINOPHEN 7.5; 325 MG/1; MG/1
1 TABLET ORAL EVERY 6 HOURS PRN
Qty: 70 TABLET | Refills: 0 | Status: SHIPPED | OUTPATIENT
Start: 2022-02-21 | End: 2022-02-21 | Stop reason: CLARIF

## 2022-02-21 RX ORDER — MELOXICAM 15 MG/1
15 TABLET ORAL DAILY
Qty: 30 TABLET | Refills: 1 | Status: SHIPPED | OUTPATIENT
Start: 2022-02-21 | End: 2022-03-28 | Stop reason: SDUPTHER

## 2022-02-21 NOTE — PROGRESS NOTES
Ioana Coyle  1950  [id number]    HISTORY OF PRESENT ILLNESS:  Ms. John Bethea is a 66 y.o. female returns for a follow up visit for multiple medical problems. Her current presenting problems are   1. Chronic pain syndrome    2. Osteoarthritis of cervical spine, unspecified spinal osteoarthritis complication status    3. Failed neck syndrome    4. Fibromyalgia    5. Degenerative lumbar spinal stenosis    6. Failed back surgical syndrome    7. Spinal stenosis of lumbosacral region    . As per information/history obtained from the PADT(patient assessment and documentation tool) - She complains of pain in the shoulders Left, buttocks, hips Left and knees Left with radiation to the hips Left She rates the pain 5/10 and describes it as aching, numbness. Pain is made worse by: walking, standing, sitting, bending. Current treatment regimen has helped relieve about 40% of the pain. She denies side effects from the current pain regimen. Patient reports that since the last follow up visit the physical functioning is worse, family/social relationships are unchanged, mood is worse and sleep patterns are worse, and that the overall functioning is worse. Patient denies neurological bowel or bladder. Patient denies misusing/abusing her narcotic pain medications or using any illegal drugs. There are No indicators for possible drug abuse, addiction or diversion problems. Upon obtaining the medical history from Ms. John Bethea regarding today's office visit for her presenting problems, patient states she is having increase pain in the back and left leg. She denies any accident, injury or trauma. She says she is still hurting and the pain has not gotten much better. She mentions she is using Percocet 1-2 per day, but feels she needs extra one at times. She reports she has been using Mobic along with Neurontin. She denies any constipation symptoms.        ALLERGIES: Patients list of allergies were reviewed MEDICATIONS: Ms. Valdez Blood list of medications were reviewed. Her current medications are   Outpatient Medications Prior to Visit   Medication Sig Dispense Refill    NOVOLOG FLEXPEN 100 UNIT/ML injection pen INJECT 5 UNITS INTO THE SKIN 3 TIMES DAILY (BEFORE MEALS) 5 pen 1    atorvastatin (LIPITOR) 40 MG tablet TAKE 1 TABLET BY MOUTH EVERY DAY 90 tablet 0    divalproex (DEPAKOTE ER) 500 MG extended release tablet TAKE 1 TABLET BY MOUTH EVERY DAY 90 tablet 3    LEVEMIR FLEXTOUCH 100 UNIT/ML injection pen INJECT 20 UNITS SUBCUTANEOUSLY TWICE A DAY 5 pen 5    losartan (COZAAR) 100 MG tablet TAKE 1 TABLET BY MOUTH EVERY DAY 90 tablet 0    OLANZapine (ZYPREXA) 2.5 MG tablet TAKE 1 TABLET BY MOUTH EVERY DAY AT NIGHT 90 tablet 1    VITAMIN D PO Take by mouth daily      MAGNESIUM PO Take by mouth daily       CALCIUM-VITAMIN D PO Take by mouth daily      traZODone (DESYREL) 100 MG tablet Take 100 mg by mouth daily      oxybutynin (DITROPAN XL) 10 MG extended release tablet Take 1 tablet by mouth daily 90 tablet 3    levothyroxine (SYNTHROID) 75 MCG tablet Take 1 tablet by mouth daily 90 tablet 3    blood glucose monitor kit and supplies Test 4 times a day & as needed for symptoms of irregular blood glucose. 1 kit 0    blood glucose monitor strips Test 4 times a day & as needed for symptoms of irregular blood glucose. Dispense sufficient amount for indicated testing frequency plus additional to accommodate PRN testing needs.  400 strip 0    amLODIPine (NORVASC) 5 MG tablet TAKE 1 TABLET BY MOUTH EVERY DAY 90 tablet 0    sertraline (ZOLOFT) 100 MG tablet Take 1 tablet by mouth 2 times daily (Patient taking differently: Take 200 mg by mouth nightly ) 60 tablet 1    Handicap Placard MISC by Does not apply route 1 each 0    Needles & Syringes MISC Inject 1 each into the skin 3 times daily 100 each 3    oxyCODONE-acetaminophen (PERCOCET) 7.5-325 MG per tablet Take 1 tablet by mouth every 6 hours as needed for Pain (max 2-3/day) for up to 35 days. 70 tablet 0    gabapentin (NEURONTIN) 400 MG capsule Take 1 tablet po AM, take 2 tablets po PM 90 capsule 1    meloxicam (MOBIC) 15 MG tablet Take 1 tablet by mouth daily 30 tablet 1     No facility-administered medications prior to visit. REVIEW OF SYSTEMS:    Respiratory: Negative for apnea, chest tightness and shortness of breath or change in baseline breathing. PHYSICAL EXAM:   Nursing note and vitals reviewed. /80   Pulse 80   Resp 16   Ht 5' 4\" (1.626 m)   Wt 150 lb (68 kg)   SpO2 98%   Breastfeeding No   BMI 25.75 kg/m²   Constitutional: She appears well-developed and well-nourished. No acute distress. Cardiovascular: Normal rate, regular rhythm, normal heart sounds, and does not have murmur. Pulmonary/Chest: Effort normal. No respiratory distress. She does not have wheezes in the lung fields. She has no rales. Neurological/Psychiatric:She is alert and oriented to person, place, and time. Coordination is  normal.  Her mood isAppropriate and affect is Flat/blunted . IMPRESSION:   1. Chronic pain syndrome    2. Osteoarthritis of cervical spine, unspecified spinal osteoarthritis complication status    3. Failed neck syndrome    4. Fibromyalgia    5. Degenerative lumbar spinal stenosis    6. Failed back surgical syndrome    7. Spinal stenosis of lumbosacral region        PLAN:  Informed verbal consent was obtained  -[place] was obtained and reviewed  for the last one year and no indicators of drug misuse  were found. Any other controlled substance prescriptions  seen on the record have been accounted for, I am aware of the patient receiving these medications. [place] Spence  [place] record will be rechecked as part of office protocol.    -Continue with Percocet 2-3 per day   -Jose exercises given   -Continue with all other adjuvants as before    Current Outpatient Medications   Medication Sig Dispense Refill    gabapentin (NEURONTIN) 400 MG times daily 100 each 3     No current facility-administered medications for this visit. I will continue her current medication regimen  which is part of the above treatment schedule. It has been helping with Ms. Adrian's chronic  medical problems which for this visit include:   Diagnoses of Chronic pain syndrome, Osteoarthritis of cervical spine, unspecified spinal osteoarthritis complication status, Failed neck syndrome, Fibromyalgia, Degenerative lumbar spinal stenosis, Failed back surgical syndrome, and Spinal stenosis of lumbosacral region were pertinent to this visit. Risks and benefits of the medications and other alternative treatments  including no treatment were discussed with the patient. The common side effects of these medications were also explained to the patient. Informed verbal consent was obtained. Goals of current treatment regimen include improvement in pain, restoration of functioning- with focus on improvement in physical performance, general activity, work or disability,emotional distress, health care utilization and  decreased opioid medication consumption- titrating to the lowest effective dose. Will continue to monitor progress towards achieving/maintaining therapeutic goals with special emphasis on  1. Improvement in perceived interfernce  of pain with ADL's. Ability to do home exercises independently. Ability to do household chores indoor and/or outdoor work and social and leisure activities. Improve psychosocial and physical functioning. - she is showing progression towards this treatment goal with the current regimen. She was advised against drinking alcohol with the narcotic pain medicines, advised against driving or handling machinery while adjusting the dose of medicines or if having cognitive  issues related to the current medications. Risk of overdose and death, if medicines not taken as prescribed, were also discussed.  If the patient develops new symptoms or if the

## 2022-02-23 NOTE — TELEPHONE ENCOUNTER
Patient stated that per [facility] a steroid would be sent to her pharmacy after her appt on 02/21/2022. Patient would like prescription to be sent to [facility] [address] [place] [address]. Andie Nolasco [phone] Fatuma Clancy [phone] (Ph: [phone])    Patient is requesting a callback.

## 2022-02-24 RX ORDER — METHYLPREDNISOLONE 4 MG/1
TABLET ORAL
Qty: 1 KIT | Refills: 0 | Status: SHIPPED | OUTPATIENT
Start: 2022-02-24 | End: 2022-03-02

## 2022-03-05 DIAGNOSIS — N39.3 STRESS INCONTINENCE: ICD-10-CM

## 2022-03-07 RX ORDER — OXYBUTYNIN CHLORIDE 10 MG/1
TABLET, EXTENDED RELEASE ORAL
Qty: 90 TABLET | Refills: 3 | Status: SHIPPED | OUTPATIENT
Start: 2022-03-07 | End: 2022-06-28

## 2022-03-07 NOTE — TELEPHONE ENCOUNTER
Last ov 11/10/2021   Future Appointments   Date Time Provider John Irizarry   3/28/2022 10:45 AM MD BRITTNEE [facility]

## 2022-03-09 RX ORDER — LOSARTAN POTASSIUM 100 MG/1
TABLET ORAL
Qty: 90 TABLET | Refills: 0 | Status: SHIPPED | OUTPATIENT
Start: 2022-03-09 | End: 2022-06-07

## 2022-03-09 NOTE — TELEPHONE ENCOUNTER
LOV 11/10/2021    Future Appointments   Date Time Provider John Irizarry   3/28/2022 10:45 AM Aristeo Jauregui MD [facility]

## 2022-03-28 ENCOUNTER — OFFICE VISIT (OUTPATIENT)
Dept: PAIN MANAGEMENT | Age: 72
End: 2022-03-28
Payer: MEDICARE

## 2022-03-28 VITALS
WEIGHT: 149 LBS | SYSTOLIC BLOOD PRESSURE: 138 MMHG | BODY MASS INDEX: 25.44 KG/M2 | DIASTOLIC BLOOD PRESSURE: 85 MMHG | HEART RATE: 86 BPM | HEIGHT: 64 IN

## 2022-03-28 DIAGNOSIS — M79.7 FIBROMYALGIA: ICD-10-CM

## 2022-03-28 DIAGNOSIS — M47.812 OSTEOARTHRITIS OF CERVICAL SPINE, UNSPECIFIED SPINAL OSTEOARTHRITIS COMPLICATION STATUS: ICD-10-CM

## 2022-03-28 DIAGNOSIS — M96.1 FAILED NECK SYNDROME: ICD-10-CM

## 2022-03-28 DIAGNOSIS — G89.4 CHRONIC PAIN SYNDROME: ICD-10-CM

## 2022-03-28 DIAGNOSIS — M48.061 DEGENERATIVE LUMBAR SPINAL STENOSIS: ICD-10-CM

## 2022-03-28 DIAGNOSIS — M96.1 FAILED BACK SURGICAL SYNDROME: ICD-10-CM

## 2022-03-28 DIAGNOSIS — M48.07 SPINAL STENOSIS OF LUMBOSACRAL REGION: ICD-10-CM

## 2022-03-28 PROCEDURE — 1090F PRES/ABSN URINE INCON ASSESS: CPT | Performed by: INTERNAL MEDICINE

## 2022-03-28 PROCEDURE — G8427 DOCREV CUR MEDS BY ELIG CLIN: HCPCS | Performed by: INTERNAL MEDICINE

## 2022-03-28 PROCEDURE — G8417 CALC BMI ABV UP PARAM F/U: HCPCS | Performed by: INTERNAL MEDICINE

## 2022-03-28 PROCEDURE — 99213 OFFICE O/P EST LOW 20 MIN: CPT | Performed by: INTERNAL MEDICINE

## 2022-03-28 PROCEDURE — 1123F ACP DISCUSS/DSCN MKR DOCD: CPT | Performed by: INTERNAL MEDICINE

## 2022-03-28 PROCEDURE — G8399 PT W/DXA RESULTS DOCUMENT: HCPCS | Performed by: INTERNAL MEDICINE

## 2022-03-28 PROCEDURE — 3017F COLORECTAL CA SCREEN DOC REV: CPT | Performed by: INTERNAL MEDICINE

## 2022-03-28 PROCEDURE — 4040F PNEUMOC VAC/ADMIN/RCVD: CPT | Performed by: INTERNAL MEDICINE

## 2022-03-28 PROCEDURE — 1036F TOBACCO NON-USER: CPT | Performed by: INTERNAL MEDICINE

## 2022-03-28 PROCEDURE — G8484 FLU IMMUNIZE NO ADMIN: HCPCS | Performed by: INTERNAL MEDICINE

## 2022-03-28 RX ORDER — GABAPENTIN 400 MG/1
CAPSULE ORAL
Qty: 90 CAPSULE | Refills: 1 | Status: SHIPPED | OUTPATIENT
Start: 2022-03-28 | End: 2022-04-25 | Stop reason: SDUPTHER

## 2022-03-28 RX ORDER — OXYCODONE AND ACETAMINOPHEN 7.5; 325 MG/1; MG/1
1 TABLET ORAL EVERY 6 HOURS PRN
Qty: 90 TABLET | Refills: 0 | Status: SHIPPED | OUTPATIENT
Start: 2022-03-28 | End: 2022-04-25 | Stop reason: SDUPTHER

## 2022-03-28 RX ORDER — MELOXICAM 15 MG/1
15 TABLET ORAL DAILY
Qty: 30 TABLET | Refills: 1 | Status: SHIPPED | OUTPATIENT
Start: 2022-03-28 | End: 2022-04-25 | Stop reason: SDUPTHER

## 2022-03-28 NOTE — PROGRESS NOTES
Carmel Tolentino  1950  [id number]      HISTORY OF PRESENT ILLNESS:  Ms. Tanika Mcginnis is a 66 y.o. female returns for a follow up visit for pain management  She has a diagnosis of   1. Chronic pain syndrome    2. Fibromyalgia    3. Osteoarthritis of cervical spine, unspecified spinal osteoarthritis complication status    4. Degenerative lumbar spinal stenosis    5. Spinal stenosis of lumbosacral region    6. Failed neck syndrome    7. Failed back surgical syndrome    . She complains of pain in the Low back, left shoulder, left upper leg  She rates the pain 6/10 and describes it as aching. Current treatment regimen has helped relieve about 40% of the pain. She denies any side effects from the current pain regimen. Patient reports that since the last follow up visit the physical functioning is worse, family/social relationships are unchanged, mood is worse sleep patterns are worse, and that the overall functioning is unchanged. Patient denies misusing/abusing her narcotic pain medications or using any illegal drugs. There are No indicators for possible drug abuse, addiction or diversion problems. Patient states her back is hurting more. She complains of increased pain with changes in weather. Extreme temperatures- cold and damp weather causes increased pain. She says she is working part time still on and off. She denies any side effects. She reports she is using Percocet 2-3 per day along with Mobic. She denies any constipation symptoms. She reports her breathing has been okay. She states she is doing her stretching exercises as advised     ALLERGIES: Patients list of allergies were reviewed     MEDICATIONS: Ms. Tanika Mcginnis list of medications were reviewed. Her current medications are   Outpatient Medications Prior to Visit   Medication Sig Dispense Refill    losartan (COZAAR) 100 MG tablet TAKE 1 TABLET BY MOUTH EVERY DAY 90 tablet 0    oxybutynin (DITROPAN-XL) 10 MG extended release tablet TAKE 1 TABLET BY MOUTH EVERY DAY 90 tablet 3    NOVOLOG FLEXPEN 100 UNIT/ML injection pen INJECT 5 UNITS INTO THE SKIN 3 TIMES DAILY (BEFORE MEALS) 5 pen 1    atorvastatin (LIPITOR) 40 MG tablet TAKE 1 TABLET BY MOUTH EVERY DAY 90 tablet 0    divalproex (DEPAKOTE ER) 500 MG extended release tablet TAKE 1 TABLET BY MOUTH EVERY DAY 90 tablet 3    LEVEMIR FLEXTOUCH 100 UNIT/ML injection pen INJECT 20 UNITS SUBCUTANEOUSLY TWICE A DAY 5 pen 5    OLANZapine (ZYPREXA) 2.5 MG tablet TAKE 1 TABLET BY MOUTH EVERY DAY AT NIGHT 90 tablet 1    VITAMIN D PO Take by mouth daily      MAGNESIUM PO Take by mouth daily       CALCIUM-VITAMIN D PO Take by mouth daily      traZODone (DESYREL) 100 MG tablet Take 100 mg by mouth daily      levothyroxine (SYNTHROID) 75 MCG tablet Take 1 tablet by mouth daily 90 tablet 3    blood glucose monitor kit and supplies Test 4 times a day & as needed for symptoms of irregular blood glucose. 1 kit 0    blood glucose monitor strips Test 4 times a day & as needed for symptoms of irregular blood glucose. Dispense sufficient amount for indicated testing frequency plus additional to accommodate PRN testing needs. 400 strip 0    amLODIPine (NORVASC) 5 MG tablet TAKE 1 TABLET BY MOUTH EVERY DAY 90 tablet 0    sertraline (ZOLOFT) 100 MG tablet Take 1 tablet by mouth 2 times daily (Patient taking differently: Take 200 mg by mouth nightly ) 60 tablet 1    Handicap Placard MISC by Does not apply route 1 each 0    Needles & Syringes MISC Inject 1 each into the skin 3 times daily 100 each 3    gabapentin (NEURONTIN) 400 MG capsule Take 1 tablet po AM, take 2 tablets po PM 90 capsule 1    meloxicam (MOBIC) 15 MG tablet Take 1 tablet by mouth daily 30 tablet 1    oxyCODONE-acetaminophen (PERCOCET) 7.5-325 MG per tablet Take 1 tablet by mouth every 6 hours as needed for Pain (max 2-3/day) for up to 35 days. 90 tablet 0     No facility-administered medications prior to visit.         REVIEW OF SYSTEMS: Respiratory: Negative for apnea, chest tightness and shortness of breath or change in baseline breathing. PHYSICAL EXAM:   Nursing note and vitals reviewed. /85   Pulse 86   Ht 5' 4\" (1.626 m)   Wt 149 lb (67.6 kg)   BMI 25.58 kg/m²   Constitutional: She appears well-developed and well-nourished. No acute distress. Cardiovascular: Normal rate, regular rhythm, normal heart sounds, and does not have murmur. Pulmonary/Chest: Effort normal. No respiratory distress. She does not have wheezes in the lung fields. She has no rales. Neurological/Psychiatric:She is alert and oriented to person, place, and time. Coordination is  normal.  Her mood isAppropriate and affect is Neutral/Euthymic(normal) . Her    IMPRESSION:   1. Chronic pain syndrome    2. Fibromyalgia    3. Osteoarthritis of cervical spine, unspecified spinal osteoarthritis complication status    4. Degenerative lumbar spinal stenosis    5. Spinal stenosis of lumbosacral region    6. Failed neck syndrome    7. Failed back surgical syndrome        PLAN:  Informed verbal consent was obtained  ROM/Stretching exercises as advised   -She was advised to increase fluids ( 5-7  glasses of fluid daily), limit caffeine, avoid cheese products, increase dietary fiber, increase activity and exercise as tolerated and relax regularly and enjoy meals   -Continue with Percocet 2-3 per day   -Walking as tolerated   -Continue with all other adjuvants   -Consider TPI if symptoms continues    Current Outpatient Medications   Medication Sig Dispense Refill    gabapentin (NEURONTIN) 400 MG capsule Take 1 tablet po AM, take 2 tablets po PM 90 capsule 1    meloxicam (MOBIC) 15 MG tablet Take 1 tablet by mouth daily 30 tablet 1    oxyCODONE-acetaminophen (PERCOCET) 7.5-325 MG per tablet Take 1 tablet by mouth every 6 hours as needed for Pain (max 2-3/day) for up to 35 days.  90 tablet 0    losartan (COZAAR) 100 MG tablet TAKE 1 TABLET BY MOUTH EVERY DAY 90 tablet 0    oxybutynin (DITROPAN-XL) 10 MG extended release tablet TAKE 1 TABLET BY MOUTH EVERY DAY 90 tablet 3    NOVOLOG FLEXPEN 100 UNIT/ML injection pen INJECT 5 UNITS INTO THE SKIN 3 TIMES DAILY (BEFORE MEALS) 5 pen 1    atorvastatin (LIPITOR) 40 MG tablet TAKE 1 TABLET BY MOUTH EVERY DAY 90 tablet 0    divalproex (DEPAKOTE ER) 500 MG extended release tablet TAKE 1 TABLET BY MOUTH EVERY DAY 90 tablet 3    LEVEMIR FLEXTOUCH 100 UNIT/ML injection pen INJECT 20 UNITS SUBCUTANEOUSLY TWICE A DAY 5 pen 5    OLANZapine (ZYPREXA) 2.5 MG tablet TAKE 1 TABLET BY MOUTH EVERY DAY AT NIGHT 90 tablet 1    VITAMIN D PO Take by mouth daily      MAGNESIUM PO Take by mouth daily       CALCIUM-VITAMIN D PO Take by mouth daily      traZODone (DESYREL) 100 MG tablet Take 100 mg by mouth daily      levothyroxine (SYNTHROID) 75 MCG tablet Take 1 tablet by mouth daily 90 tablet 3    blood glucose monitor kit and supplies Test 4 times a day & as needed for symptoms of irregular blood glucose. 1 kit 0    blood glucose monitor strips Test 4 times a day & as needed for symptoms of irregular blood glucose. Dispense sufficient amount for indicated testing frequency plus additional to accommodate PRN testing needs. 400 strip 0    amLODIPine (NORVASC) 5 MG tablet TAKE 1 TABLET BY MOUTH EVERY DAY 90 tablet 0    sertraline (ZOLOFT) 100 MG tablet Take 1 tablet by mouth 2 times daily (Patient taking differently: Take 200 mg by mouth nightly ) 60 tablet 1    Handicap Placard MISC by Does not apply route 1 each 0    Needles & Syringes MISC Inject 1 each into the skin 3 times daily 100 each 3     No current facility-administered medications for this visit. I will continue her current medication regimen  which is part of the above treatment schedule. It has been helping with Ms. Adrian's chronic  medical problems which for this visit include:   Diagnoses of Chronic pain syndrome, Fibromyalgia, Osteoarthritis of cervical spine, unspecified spinal osteoarthritis complication status, Degenerative lumbar spinal stenosis, Spinal stenosis of lumbosacral region, Failed neck syndrome, and Failed back surgical syndrome were pertinent to this visit. Risks and benefits of the medications and other alternative treatments  including no treatment were discussed with the patient. The common side effects of these medications were also explained to the patient. Informed verbal consent was obtained. Goals of current treatment regimen include improvement in pain, restoration of functioning- with focus on improvement in physical performance, general activity, work or disability,emotional distress, health care utilization and  decreased medication consumption. Will continue to monitor progress towards achieving/maintaining therapeutic goals with special emphasis on  1. Improvement in perceived interfernce  of pain with ADL's. Ability to do home exercises independently. Ability to do household chores indoor and/or outdoor work and social and leisure activities. Improve psychosocial and physical functioning. - she is showing progression towards this treatment goal with the current regimen. 2. Ability to focus/concentrate at work and perform the duties required of her at work  Sit through a workday without lower extremity symptoms. Stand 30-60 minutes without lower extremity symptoms. Ability to lift up to 10-20 lbs. Ability to go up and down stairs. Sit 30-60 minutes  Without having to stand up frequently. - she is maintaining/progressing towards her work related goals with the current regimen. She was advised against drinking alcohol with the narcotic pain medicines, advised against driving or handling machinery while adjusting the dose of medicines or if having cognitive  issues related to the current medications. Risk of overdose and death, if medicines not taken as prescribed, were also discussed.  If the patient develops new symptoms or if the symptoms worsen, the patient should call the office. While transcribing every attempt was made to maintain the accuracy of the note in terms of it's contents,there may have been some errors made inadvertently. Thank you for allowing me to participate in the care of this patient.     Dee Jeffries MD.    Cc: Gris Whittington, SAI - CNP

## 2022-04-25 ENCOUNTER — OFFICE VISIT (OUTPATIENT)
Dept: PAIN MANAGEMENT | Age: 72
End: 2022-04-25
Payer: MEDICARE

## 2022-04-25 VITALS
WEIGHT: 150 LBS | HEIGHT: 64 IN | SYSTOLIC BLOOD PRESSURE: 141 MMHG | HEART RATE: 87 BPM | OXYGEN SATURATION: 95 % | BODY MASS INDEX: 25.61 KG/M2 | DIASTOLIC BLOOD PRESSURE: 82 MMHG

## 2022-04-25 DIAGNOSIS — M47.812 OSTEOARTHRITIS OF CERVICAL SPINE, UNSPECIFIED SPINAL OSTEOARTHRITIS COMPLICATION STATUS: ICD-10-CM

## 2022-04-25 DIAGNOSIS — G89.4 CHRONIC PAIN SYNDROME: ICD-10-CM

## 2022-04-25 DIAGNOSIS — M54.50 CHRONIC LOW BACK PAIN, UNSPECIFIED BACK PAIN LATERALITY, UNSPECIFIED WHETHER SCIATICA PRESENT: ICD-10-CM

## 2022-04-25 DIAGNOSIS — M96.1 FAILED NECK SYNDROME: ICD-10-CM

## 2022-04-25 DIAGNOSIS — M48.061 DEGENERATIVE LUMBAR SPINAL STENOSIS: ICD-10-CM

## 2022-04-25 DIAGNOSIS — G56.22 ULNAR NEUROPATHY AT ELBOW, LEFT: ICD-10-CM

## 2022-04-25 DIAGNOSIS — M79.7 FIBROMYALGIA: ICD-10-CM

## 2022-04-25 DIAGNOSIS — G56.03 BILATERAL CARPAL TUNNEL SYNDROME: ICD-10-CM

## 2022-04-25 DIAGNOSIS — M96.1 FAILED BACK SURGICAL SYNDROME: ICD-10-CM

## 2022-04-25 DIAGNOSIS — M48.07 SPINAL STENOSIS OF LUMBOSACRAL REGION: ICD-10-CM

## 2022-04-25 DIAGNOSIS — G89.29 CHRONIC LOW BACK PAIN, UNSPECIFIED BACK PAIN LATERALITY, UNSPECIFIED WHETHER SCIATICA PRESENT: ICD-10-CM

## 2022-04-25 PROCEDURE — G8399 PT W/DXA RESULTS DOCUMENT: HCPCS | Performed by: INTERNAL MEDICINE

## 2022-04-25 PROCEDURE — 3017F COLORECTAL CA SCREEN DOC REV: CPT | Performed by: INTERNAL MEDICINE

## 2022-04-25 PROCEDURE — 4040F PNEUMOC VAC/ADMIN/RCVD: CPT | Performed by: INTERNAL MEDICINE

## 2022-04-25 PROCEDURE — 1090F PRES/ABSN URINE INCON ASSESS: CPT | Performed by: INTERNAL MEDICINE

## 2022-04-25 PROCEDURE — 99213 OFFICE O/P EST LOW 20 MIN: CPT | Performed by: INTERNAL MEDICINE

## 2022-04-25 PROCEDURE — 1036F TOBACCO NON-USER: CPT | Performed by: INTERNAL MEDICINE

## 2022-04-25 PROCEDURE — 1123F ACP DISCUSS/DSCN MKR DOCD: CPT | Performed by: INTERNAL MEDICINE

## 2022-04-25 PROCEDURE — G8417 CALC BMI ABV UP PARAM F/U: HCPCS | Performed by: INTERNAL MEDICINE

## 2022-04-25 PROCEDURE — G8427 DOCREV CUR MEDS BY ELIG CLIN: HCPCS | Performed by: INTERNAL MEDICINE

## 2022-04-25 RX ORDER — MELOXICAM 15 MG/1
15 TABLET ORAL DAILY
Qty: 30 TABLET | Refills: 1 | Status: SHIPPED | OUTPATIENT
Start: 2022-04-25 | End: 2022-05-23 | Stop reason: SDUPTHER

## 2022-04-25 RX ORDER — OXYCODONE AND ACETAMINOPHEN 7.5; 325 MG/1; MG/1
1 TABLET ORAL EVERY 6 HOURS PRN
Qty: 70 TABLET | Refills: 0 | Status: SHIPPED | OUTPATIENT
Start: 2022-04-25 | End: 2022-05-23 | Stop reason: SDUPTHER

## 2022-04-25 RX ORDER — GABAPENTIN 400 MG/1
CAPSULE ORAL
Qty: 90 CAPSULE | Refills: 1 | Status: SHIPPED | OUTPATIENT
Start: 2022-04-25 | End: 2022-05-23 | Stop reason: SDUPTHER

## 2022-04-25 NOTE — PROGRESS NOTES
Myron Conn  1950  [id number]      HISTORY OF PRESENT ILLNESS:  Ms. Wendy Guzman is a 66 y.o. female returns for a follow up visit for pain management  She has a diagnosis of   1. Chronic pain syndrome    2. Bilateral carpal tunnel syndrome    3. Ulnar neuropathy at elbow, left    4. Chronic low back pain, unspecified back pain laterality, unspecified whether sciatica present    5. Fibromyalgia    6. Osteoarthritis of cervical spine, unspecified spinal osteoarthritis complication status    7. Degenerative lumbar spinal stenosis    8. Spinal stenosis of lumbosacral region    9. Failed neck syndrome    10. Failed back surgical syndrome    . She complains of pain in the Mid backm right upper arm and left hand She rates the pain 5/10 and describes it as sharp, aching, burning. Current treatment regimen has helped relieve about 30% of the pain. She denies any side effects from the current pain regimen. Patient reports that since the last follow up visit the physical functioning is unchanged, family/social relationships are unchanged, mood is unchanged sleep patterns are unchanged, and that the overall functioning is unchanged. Patient denies misusing/abusing her narcotic pain medications or using any illegal drugs. There are No indicators for possible drug abuse, addiction or diversion problems. Patient states she is doing fair, pain has been baseline and tolerable with the medications. She says she is using Percocet 2-3 per day. She denies any constipation symptoms. She reports she has been managing her ADLs and house chores. ALLERGIES: Patients list of allergies were reviewed     MEDICATIONS: Ms. Wendy Guzman list of medications were reviewed. Her current medications are   Outpatient Medications Prior to Visit   Medication Sig Dispense Refill    losartan (COZAAR) 100 MG tablet TAKE 1 TABLET BY MOUTH EVERY DAY 90 tablet 0    oxybutynin (DITROPAN-XL) 10 MG extended release tablet TAKE 1 TABLET BY MOUTH EVERY DAY 90 tablet 3    NOVOLOG FLEXPEN 100 UNIT/ML injection pen INJECT 5 UNITS INTO THE SKIN 3 TIMES DAILY (BEFORE MEALS) 5 pen 1    atorvastatin (LIPITOR) 40 MG tablet TAKE 1 TABLET BY MOUTH EVERY DAY 90 tablet 0    divalproex (DEPAKOTE ER) 500 MG extended release tablet TAKE 1 TABLET BY MOUTH EVERY DAY 90 tablet 3    LEVEMIR FLEXTOUCH 100 UNIT/ML injection pen INJECT 20 UNITS SUBCUTANEOUSLY TWICE A DAY 5 pen 5    OLANZapine (ZYPREXA) 2.5 MG tablet TAKE 1 TABLET BY MOUTH EVERY DAY AT NIGHT 90 tablet 1    VITAMIN D PO Take by mouth daily      MAGNESIUM PO Take by mouth daily       CALCIUM-VITAMIN D PO Take by mouth daily      traZODone (DESYREL) 100 MG tablet Take 100 mg by mouth daily      levothyroxine (SYNTHROID) 75 MCG tablet Take 1 tablet by mouth daily 90 tablet 3    blood glucose monitor kit and supplies Test 4 times a day & as needed for symptoms of irregular blood glucose. 1 kit 0    blood glucose monitor strips Test 4 times a day & as needed for symptoms of irregular blood glucose. Dispense sufficient amount for indicated testing frequency plus additional to accommodate PRN testing needs. 400 strip 0    amLODIPine (NORVASC) 5 MG tablet TAKE 1 TABLET BY MOUTH EVERY DAY 90 tablet 0    sertraline (ZOLOFT) 100 MG tablet Take 1 tablet by mouth 2 times daily (Patient taking differently: Take 200 mg by mouth nightly ) 60 tablet 1    Handicap Placard MISC by Does not apply route 1 each 0    Needles & Syringes MISC Inject 1 each into the skin 3 times daily 100 each 3    gabapentin (NEURONTIN) 400 MG capsule Take 1 tablet po AM, take 2 tablets po PM 90 capsule 1    meloxicam (MOBIC) 15 MG tablet Take 1 tablet by mouth daily 30 tablet 1    oxyCODONE-acetaminophen (PERCOCET) 7.5-325 MG per tablet Take 1 tablet by mouth every 6 hours as needed for Pain (max 2-3/day) for up to 35 days. 90 tablet 0     No facility-administered medications prior to visit.         REVIEW OF SYSTEMS: Respiratory: Negative for apnea, chest tightness and shortness of breath or change in baseline breathing. PHYSICAL EXAM:   Nursing note and vitals reviewed. BP (!) 141/82 (Site: Left Upper Arm, Position: Sitting)   Pulse 87   Ht 5' 4\" (1.626 m)   Wt 150 lb (68 kg)   SpO2 95%   BMI 25.75 kg/m²   Constitutional: She appears well-developed and well-nourished. No acute distress. Cardiovascular: Normal rate, regular rhythm, normal heart sounds, and does not have murmur. Pulmonary/Chest: Effort normal. No respiratory distress. She does not have wheezes in the lung fields. She has no rales. Neurological/Psychiatric:She is alert and oriented to person, place, and time. Coordination is  normal.  Her mood isAppropriate and affect is Neutral/Euthymic(normal) . IMPRESSION:   1. Chronic pain syndrome    2. Bilateral carpal tunnel syndrome    3. Ulnar neuropathy at elbow, left    4. Chronic low back pain, unspecified back pain laterality, unspecified whether sciatica present    5. Fibromyalgia    6. Osteoarthritis of cervical spine, unspecified spinal osteoarthritis complication status    7. Degenerative lumbar spinal stenosis    8. Spinal stenosis of lumbosacral region    9. Failed neck syndrome    10.  Failed back surgical syndrome        PLAN:  Informed verbal consent was obtained  -ROM/Stretching exercises as advised   -She was advised to increase fluids ( 5-7  glasses of fluid daily), limit caffeine, avoid cheese products, increase dietary fiber, increase activity and exercise as tolerated and relax regularly and enjoy meals   -Continue with Percocet 2-3 per day   -Walking as tolerated 20-30 mg   -Continue with Mobic and Neurontin    Current Outpatient Medications   Medication Sig Dispense Refill    gabapentin (NEURONTIN) 400 MG capsule Take 1 tablet po AM, take 2 tablets po PM 90 capsule 1    meloxicam (MOBIC) 15 MG tablet Take 1 tablet by mouth daily 30 tablet 1    oxyCODONE-acetaminophen (PERCOCET) 7.5-325 MG per tablet Take 1 tablet by mouth every 6 hours as needed for Pain (max 2-3/day) for up to 28 days. 70 tablet 0    losartan (COZAAR) 100 MG tablet TAKE 1 TABLET BY MOUTH EVERY DAY 90 tablet 0    oxybutynin (DITROPAN-XL) 10 MG extended release tablet TAKE 1 TABLET BY MOUTH EVERY DAY 90 tablet 3    NOVOLOG FLEXPEN 100 UNIT/ML injection pen INJECT 5 UNITS INTO THE SKIN 3 TIMES DAILY (BEFORE MEALS) 5 pen 1    atorvastatin (LIPITOR) 40 MG tablet TAKE 1 TABLET BY MOUTH EVERY DAY 90 tablet 0    divalproex (DEPAKOTE ER) 500 MG extended release tablet TAKE 1 TABLET BY MOUTH EVERY DAY 90 tablet 3    LEVEMIR FLEXTOUCH 100 UNIT/ML injection pen INJECT 20 UNITS SUBCUTANEOUSLY TWICE A DAY 5 pen 5    OLANZapine (ZYPREXA) 2.5 MG tablet TAKE 1 TABLET BY MOUTH EVERY DAY AT NIGHT 90 tablet 1    VITAMIN D PO Take by mouth daily      MAGNESIUM PO Take by mouth daily       CALCIUM-VITAMIN D PO Take by mouth daily      traZODone (DESYREL) 100 MG tablet Take 100 mg by mouth daily      levothyroxine (SYNTHROID) 75 MCG tablet Take 1 tablet by mouth daily 90 tablet 3    blood glucose monitor kit and supplies Test 4 times a day & as needed for symptoms of irregular blood glucose. 1 kit 0    blood glucose monitor strips Test 4 times a day & as needed for symptoms of irregular blood glucose. Dispense sufficient amount for indicated testing frequency plus additional to accommodate PRN testing needs. 400 strip 0    amLODIPine (NORVASC) 5 MG tablet TAKE 1 TABLET BY MOUTH EVERY DAY 90 tablet 0    sertraline (ZOLOFT) 100 MG tablet Take 1 tablet by mouth 2 times daily (Patient taking differently: Take 200 mg by mouth nightly ) 60 tablet 1    Handicap Placard MISC by Does not apply route 1 each 0    Needles & Syringes MISC Inject 1 each into the skin 3 times daily 100 each 3     No current facility-administered medications for this visit.      I will continue her current medication regimen  which is part of the above treatment schedule. It has been helping with Ms. Adrian's chronic  medical problems which for this visit include:   Diagnoses of Chronic pain syndrome, Bilateral carpal tunnel syndrome, Ulnar neuropathy at elbow, left, Chronic low back pain, unspecified back pain laterality, unspecified whether sciatica present, Fibromyalgia, Osteoarthritis of cervical spine, unspecified spinal osteoarthritis complication status, Degenerative lumbar spinal stenosis, Spinal stenosis of lumbosacral region, Failed neck syndrome, and Failed back surgical syndrome were pertinent to this visit. Risks and benefits of the medications and other alternative treatments  including no treatment were discussed with the patient. The common side effects of these medications were also explained to the patient. Informed verbal consent was obtained. Goals of current treatment regimen include improvement in pain, restoration of functioning- with focus on improvement in physical performance, general activity, work or disability,emotional distress, health care utilization and  decreased medication consumption. Will continue to monitor progress towards achieving/maintaining therapeutic goals with special emphasis on  1. Improvement in perceived interfernce  of pain with ADL's. Ability to do home exercises independently. Ability to do household chores indoor and/or outdoor work and social and leisure activities. Improve psychosocial and physical functioning. - she is showing progression towards this treatment goal with the current regimen. She was advised against drinking alcohol with the narcotic pain medicines, advised against driving or handling machinery while adjusting the dose of medicines or if having cognitive  issues related to the current medications. Risk of overdose and death, if medicines not taken as prescribed, were also discussed.  If the patient develops new symptoms or if the symptoms worsen, the patient should call the office. While transcribing every attempt was made to maintain the accuracy of the note in terms of it's contents,there may have been some errors made inadvertently. Thank you for allowing me to participate in the care of this patient.     Asuncion Lee MD.    Cc: Thee Nation, SAI - CNP

## 2022-05-08 DIAGNOSIS — F32.A DEPRESSION, UNSPECIFIED DEPRESSION TYPE: ICD-10-CM

## 2022-05-09 NOTE — TELEPHONE ENCOUNTER
Future Appointments   Date Time Provider John Irizarry   5/23/2022 11:45 AM Joaquin Morales MD [facility]     LOV 11/10/2021

## 2022-05-10 RX ORDER — OLANZAPINE 2.5 MG/1
TABLET ORAL
Qty: 90 TABLET | Refills: 0 | Status: SHIPPED | OUTPATIENT
Start: 2022-05-10 | End: 2022-06-14

## 2022-05-11 RX ORDER — ATORVASTATIN CALCIUM 40 MG/1
TABLET, FILM COATED ORAL
Qty: 90 TABLET | Refills: 0 | Status: SHIPPED | OUTPATIENT
Start: 2022-05-11 | End: 2022-06-14

## 2022-05-11 NOTE — TELEPHONE ENCOUNTER
LOV 11/10/2021    Future Appointments   Date Time Provider John Irizarry   5/23/2022 11:45 AM Vinicio Roberto MD [facility] Minocycline Counseling: Patient advised regarding possible photosensitivity and discoloration of the teeth, skin, lips, tongue and gums.  Patient instructed to avoid sunlight, if possible.  When exposed to sunlight, patients should wear protective clothing, sunglasses, and sunscreen.  The patient was instructed to call the office immediately if the following severe adverse effects occur:  hearing changes, easy bruising/bleeding, severe headache, or vision changes.  The patient verbalized understanding of the proper use and possible adverse effects of minocycline.  All of the patient's questions and concerns were addressed.

## 2022-05-23 ENCOUNTER — OFFICE VISIT (OUTPATIENT)
Dept: PAIN MANAGEMENT | Age: 72
End: 2022-05-23
Payer: MEDICARE

## 2022-05-23 VITALS
DIASTOLIC BLOOD PRESSURE: 82 MMHG | BODY MASS INDEX: 25.78 KG/M2 | SYSTOLIC BLOOD PRESSURE: 159 MMHG | WEIGHT: 150.2 LBS | HEART RATE: 78 BPM

## 2022-05-23 DIAGNOSIS — G56.03 BILATERAL CARPAL TUNNEL SYNDROME: ICD-10-CM

## 2022-05-23 DIAGNOSIS — M47.812 OSTEOARTHRITIS OF CERVICAL SPINE, UNSPECIFIED SPINAL OSTEOARTHRITIS COMPLICATION STATUS: ICD-10-CM

## 2022-05-23 DIAGNOSIS — G56.22 ULNAR NEUROPATHY AT ELBOW, LEFT: ICD-10-CM

## 2022-05-23 DIAGNOSIS — G89.4 CHRONIC PAIN SYNDROME: ICD-10-CM

## 2022-05-23 DIAGNOSIS — M96.1 FAILED BACK SURGICAL SYNDROME: ICD-10-CM

## 2022-05-23 DIAGNOSIS — M48.061 DEGENERATIVE LUMBAR SPINAL STENOSIS: ICD-10-CM

## 2022-05-23 DIAGNOSIS — M96.1 FAILED NECK SYNDROME: ICD-10-CM

## 2022-05-23 DIAGNOSIS — M79.7 FIBROMYALGIA: ICD-10-CM

## 2022-05-23 DIAGNOSIS — M48.07 SPINAL STENOSIS OF LUMBOSACRAL REGION: ICD-10-CM

## 2022-05-23 PROCEDURE — 1123F ACP DISCUSS/DSCN MKR DOCD: CPT | Performed by: INTERNAL MEDICINE

## 2022-05-23 PROCEDURE — 1036F TOBACCO NON-USER: CPT | Performed by: INTERNAL MEDICINE

## 2022-05-23 PROCEDURE — G8417 CALC BMI ABV UP PARAM F/U: HCPCS | Performed by: INTERNAL MEDICINE

## 2022-05-23 PROCEDURE — 1090F PRES/ABSN URINE INCON ASSESS: CPT | Performed by: INTERNAL MEDICINE

## 2022-05-23 PROCEDURE — 3017F COLORECTAL CA SCREEN DOC REV: CPT | Performed by: INTERNAL MEDICINE

## 2022-05-23 PROCEDURE — G8399 PT W/DXA RESULTS DOCUMENT: HCPCS | Performed by: INTERNAL MEDICINE

## 2022-05-23 PROCEDURE — G8427 DOCREV CUR MEDS BY ELIG CLIN: HCPCS | Performed by: INTERNAL MEDICINE

## 2022-05-23 PROCEDURE — 99213 OFFICE O/P EST LOW 20 MIN: CPT | Performed by: INTERNAL MEDICINE

## 2022-05-23 RX ORDER — MELOXICAM 15 MG/1
15 TABLET ORAL DAILY
Qty: 30 TABLET | Refills: 1 | Status: SHIPPED | OUTPATIENT
Start: 2022-05-23 | End: 2022-06-27 | Stop reason: SDUPTHER

## 2022-05-23 RX ORDER — OXYCODONE AND ACETAMINOPHEN 7.5; 325 MG/1; MG/1
1 TABLET ORAL EVERY 6 HOURS PRN
Qty: 70 TABLET | Refills: 0 | Status: SHIPPED | OUTPATIENT
Start: 2022-05-23 | End: 2022-06-27 | Stop reason: SDUPTHER

## 2022-05-23 RX ORDER — GABAPENTIN 400 MG/1
CAPSULE ORAL
Qty: 90 CAPSULE | Refills: 1 | Status: SHIPPED | OUTPATIENT
Start: 2022-05-23 | End: 2022-06-27 | Stop reason: SDUPTHER

## 2022-05-23 NOTE — PROGRESS NOTES
Lakeshia Kapoor  1950  [id number]      HISTORY OF PRESENT ILLNESS:  Ms. Shaneka Coyle is a 66 y.o. female returns for a follow up visit for pain management  She has a diagnosis of   1. Chronic pain syndrome    2. Osteoarthritis of cervical spine, unspecified spinal osteoarthritis complication status    3. Ulnar neuropathy at elbow, left    4. Failed neck syndrome    5. Spinal stenosis of lumbosacral region    6. Fibromyalgia    7. Bilateral carpal tunnel syndrome    8. Degenerative lumbar spinal stenosis    9. Chronic low back pain, unspecified back pain laterality, unspecified whether sciatica present    10. Failed back surgical syndrome    . She complains of pain in the left shoulder, lower back with radiation to the left lower leg  She rates the pain 4/10 and describes it as aching, burning, numbness. Current treatment regimen has helped relieve about 50% of the pain. She denies any side effects from the current pain regimen. Patient reports that since the last follow up visit the physical functioning is unchanged, family/social relationships are unchanged, mood is unchanged sleep patterns are unchanged, and that the overall functioning is unchanged. Patient denies misusing/abusing her narcotic pain medications or using any illegal drugs. There are No indicators for possible drug abuse, addiction or diversion problems, patient states she has been doing fair, her pain has been baseline, somewhat tolerable with the medications. Ms. Shaneka Coyle mentions she is using Mobic along with Percocet 2-3 per day and Neurontin. She says she is using Analgesics. Patient denies any constipation symptoms. She reports she has been walking some daily and managing light house chores. ALLERGIES: Patients list of allergies were reviewed     MEDICATIONS: Ms. Shaneka Coyle list of medications were reviewed. Her current medications are   Outpatient Medications Prior to Visit   Medication Sig Dispense Refill    atorvastatin (LIPITOR) 40 MG tablet TAKE 1 TABLET BY MOUTH EVERY DAY 90 tablet 0    OLANZapine (ZYPREXA) 2.5 MG tablet TAKE 1 TABLET BY MOUTH EVERY DAY AT NIGHT 90 tablet 0    gabapentin (NEURONTIN) 400 MG capsule Take 1 tablet po AM, take 2 tablets po PM 90 capsule 1    meloxicam (MOBIC) 15 MG tablet Take 1 tablet by mouth daily 30 tablet 1    oxyCODONE-acetaminophen (PERCOCET) 7.5-325 MG per tablet Take 1 tablet by mouth every 6 hours as needed for Pain (max 2-3/day) for up to 28 days. 70 tablet 0    losartan (COZAAR) 100 MG tablet TAKE 1 TABLET BY MOUTH EVERY DAY 90 tablet 0    oxybutynin (DITROPAN-XL) 10 MG extended release tablet TAKE 1 TABLET BY MOUTH EVERY DAY 90 tablet 3    NOVOLOG FLEXPEN 100 UNIT/ML injection pen INJECT 5 UNITS INTO THE SKIN 3 TIMES DAILY (BEFORE MEALS) 5 pen 1    divalproex (DEPAKOTE ER) 500 MG extended release tablet TAKE 1 TABLET BY MOUTH EVERY DAY 90 tablet 3    LEVEMIR FLEXTOUCH 100 UNIT/ML injection pen INJECT 20 UNITS SUBCUTANEOUSLY TWICE A DAY 5 pen 5    VITAMIN D PO Take by mouth daily      MAGNESIUM PO Take by mouth daily       CALCIUM-VITAMIN D PO Take by mouth daily      traZODone (DESYREL) 100 MG tablet Take 100 mg by mouth daily      levothyroxine (SYNTHROID) 75 MCG tablet Take 1 tablet by mouth daily 90 tablet 3    blood glucose monitor kit and supplies Test 4 times a day & as needed for symptoms of irregular blood glucose. 1 kit 0    blood glucose monitor strips Test 4 times a day & as needed for symptoms of irregular blood glucose. Dispense sufficient amount for indicated testing frequency plus additional to accommodate PRN testing needs.  400 strip 0    amLODIPine (NORVASC) 5 MG tablet TAKE 1 TABLET BY MOUTH EVERY DAY 90 tablet 0    sertraline (ZOLOFT) 100 MG tablet Take 1 tablet by mouth 2 times daily (Patient taking differently: Take 200 mg by mouth nightly ) 60 tablet 1    Handicap Placard MISC by Does not apply route 1 each 0    Carbon & Syringes MISC Inject 1 each into the skin 3 times daily 100 each 3     No facility-administered medications prior to visit. REVIEW OF SYSTEMS:    Respiratory: Negative for apnea, chest tightness and shortness of breath or change in baseline breathing. PHYSICAL EXAM:   Nursing note and vitals reviewed. BP (!) 159/82   Pulse 78   Wt 150 lb 3.2 oz (68.1 kg)   BMI 25.78 kg/m²   Constitutional: She appears well-developed and well-nourished. No acute distress. Cardiovascular: Normal rate, regular rhythm, normal heart sounds, and does not have murmur. Pulmonary/Chest: Effort normal. No respiratory distress. She does not have wheezes in the lung fields. She has no rales. Neurological/Psychiatric:She is alert and oriented to person, place, and time. Coordination is  normal.  Her mood isAppropriate and affect is Neutral/Euthymic(normal) . Her    IMPRESSION:   1. Chronic pain syndrome    2. Osteoarthritis of cervical spine, unspecified spinal osteoarthritis complication status    3. Ulnar neuropathy at elbow, left    4. Failed neck syndrome    5. Spinal stenosis of lumbosacral region    6. Fibromyalgia    7. Bilateral carpal tunnel syndrome    8. Degenerative lumbar spinal stenosis    9. Failed back surgical syndrome        PLAN:  Informed verbal consent was obtained  -[place] record was obtained and reviewed  for the last one year and no indicators of drug misuse  were found. Any other controlled substance prescriptions  seen on the record have been accounted for, I am aware of the patient receiving these medications. Marielena Cunnignham [place] record will be rechecked as part of office protocol.     -ROM/Stretching exercises as advised   -She was advised to increase fluids ( 5-7  glasses of fluid daily), limit caffeine, avoid cheese products, increase dietary fiber, increase activity and exercise as tolerated and relax regularly and enjoy meals   -Walking as tolerated    -Continue with Percocet 2-3 per day   -Urine drug screen with GC/MS for opiates and drugs of abuse was ordered and will follow up on results. Current Outpatient Medications   Medication Sig Dispense Refill    atorvastatin (LIPITOR) 40 MG tablet TAKE 1 TABLET BY MOUTH EVERY DAY 90 tablet 0    OLANZapine (ZYPREXA) 2.5 MG tablet TAKE 1 TABLET BY MOUTH EVERY DAY AT NIGHT 90 tablet 0    gabapentin (NEURONTIN) 400 MG capsule Take 1 tablet po AM, take 2 tablets po PM 90 capsule 1    meloxicam (MOBIC) 15 MG tablet Take 1 tablet by mouth daily 30 tablet 1    oxyCODONE-acetaminophen (PERCOCET) 7.5-325 MG per tablet Take 1 tablet by mouth every 6 hours as needed for Pain (max 2-3/day) for up to 28 days. 70 tablet 0    losartan (COZAAR) 100 MG tablet TAKE 1 TABLET BY MOUTH EVERY DAY 90 tablet 0    oxybutynin (DITROPAN-XL) 10 MG extended release tablet TAKE 1 TABLET BY MOUTH EVERY DAY 90 tablet 3    NOVOLOG FLEXPEN 100 UNIT/ML injection pen INJECT 5 UNITS INTO THE SKIN 3 TIMES DAILY (BEFORE MEALS) 5 pen 1    divalproex (DEPAKOTE ER) 500 MG extended release tablet TAKE 1 TABLET BY MOUTH EVERY DAY 90 tablet 3    LEVEMIR FLEXTOUCH 100 UNIT/ML injection pen INJECT 20 UNITS SUBCUTANEOUSLY TWICE A DAY 5 pen 5    VITAMIN D PO Take by mouth daily      MAGNESIUM PO Take by mouth daily       CALCIUM-VITAMIN D PO Take by mouth daily      traZODone (DESYREL) 100 MG tablet Take 100 mg by mouth daily      levothyroxine (SYNTHROID) 75 MCG tablet Take 1 tablet by mouth daily 90 tablet 3    blood glucose monitor kit and supplies Test 4 times a day & as needed for symptoms of irregular blood glucose. 1 kit 0    blood glucose monitor strips Test 4 times a day & as needed for symptoms of irregular blood glucose. Dispense sufficient amount for indicated testing frequency plus additional to accommodate PRN testing needs.  400 strip 0    amLODIPine (NORVASC) 5 MG tablet TAKE 1 TABLET BY MOUTH EVERY DAY 90 tablet 0    sertraline (ZOLOFT) 100 MG tablet Take 1 tablet by mouth 2 times daily (Patient taking differently: Take 200 mg by mouth nightly ) 60 tablet 1    Handicap Placard MISC by Does not apply route 1 each 0    Needles & Syringes MISC Inject 1 each into the skin 3 times daily 100 each 3     No current facility-administered medications for this visit. I will continue her current medication regimen  which is part of the above treatment schedule. It has been helping with Ms. Adrian's chronic  medical problems which for this visit include:   Diagnoses of Chronic pain syndrome, Osteoarthritis of cervical spine, unspecified spinal osteoarthritis complication status, Ulnar neuropathy at elbow, left, Failed neck syndrome, Spinal stenosis of lumbosacral region, Fibromyalgia, Bilateral carpal tunnel syndrome, Degenerative lumbar spinal stenosis, Chronic low back pain, unspecified back pain laterality, unspecified whether sciatica present, and Failed back surgical syndrome were pertinent to this visit. Risks and benefits of the medications and other alternative treatments  including no treatment were discussed with the patient. The common side effects of these medications were also explained to the patient. Informed verbal consent was obtained. Goals of current treatment regimen include improvement in pain, restoration of functioning- with focus on improvement in physical performance, general activity, work or disability,emotional distress, health care utilization and  decreased medication consumption. Will continue to monitor progress towards achieving/maintaining therapeutic goals with special emphasis on  1. Improvement in perceived interfernce  of pain with ADL's. Ability to do home exercises independently. Ability to do household chores indoor and/or outdoor work and social and leisure activities. Improve psychosocial and physical functioning. - she is showing progression towards this treatment goal with the current regimen.      She was advised against drinking alcohol with the narcotic pain medicines, advised against driving or handling machinery while adjusting the dose of medicines or if having cognitive  issues related to the current medications. Risk of overdose and death, if medicines not taken as prescribed, were also discussed. If the patient develops new symptoms or if the symptoms worsen, the patient should call the office. While transcribing every attempt was made to maintain the accuracy of the note in terms of it's contents,there may have been some errors made inadvertently. Thank you for allowing me to participate in the care of this patient.     Arely Baker MD.    Cc: SAI Garland - CNP

## 2022-06-01 ENCOUNTER — TELEPHONE (OUTPATIENT)
Dept: OTHER | Facility: CLINIC | Age: 72
End: 2022-06-01

## 2022-06-01 NOTE — TELEPHONE ENCOUNTER
Ashish Blue was contacted today as part of [employer] to schedule a mammogram.         I left a message reminding the patient that they have an open order from [place], SAI - CNP and need to contact [employer] or myself to schedule a mammogram.    \[facility]\"" Layer  [phone]

## 2022-06-07 ENCOUNTER — TELEPHONE (OUTPATIENT)
Dept: FAMILY MEDICINE CLINIC | Age: 72
End: 2022-06-07

## 2022-06-07 DIAGNOSIS — E03.9 HYPOTHYROIDISM, UNSPECIFIED TYPE: Primary | ICD-10-CM

## 2022-06-07 DIAGNOSIS — E03.9 HYPOTHYROIDISM, UNSPECIFIED TYPE: ICD-10-CM

## 2022-06-07 DIAGNOSIS — Z79.4 TYPE 2 DIABETES MELLITUS WITH OTHER SPECIFIED COMPLICATION, WITH LONG-TERM CURRENT USE OF INSULIN (HCC): ICD-10-CM

## 2022-06-07 DIAGNOSIS — E11.69 TYPE 2 DIABETES MELLITUS WITH OTHER SPECIFIED COMPLICATION, WITH LONG-TERM CURRENT USE OF INSULIN (HCC): ICD-10-CM

## 2022-06-07 DIAGNOSIS — I10 ESSENTIAL HYPERTENSION: ICD-10-CM

## 2022-06-07 RX ORDER — LOSARTAN POTASSIUM 100 MG/1
TABLET ORAL
Qty: 90 TABLET | Refills: 0 | Status: SHIPPED | OUTPATIENT
Start: 2022-06-07 | End: 2022-06-14

## 2022-06-07 RX ORDER — LEVOTHYROXINE SODIUM 0.07 MG/1
TABLET ORAL
Qty: 90 TABLET | Refills: 3 | Status: SHIPPED | OUTPATIENT
Start: 2022-06-07

## 2022-06-07 NOTE — TELEPHONE ENCOUNTER
LOV 11/10/2021    Future Appointments   Date Time Provider John Irizarry   6/27/2022  9:30 AM Veronica Barillas MD [facility]

## 2022-06-07 NOTE — TELEPHONE ENCOUNTER
Request for Insulin 31g X 5mm rx. Future Appointments   Date Time Provider John Edie   6/27/2022  9:30 AM Tonie Roldan MD [facility]   6/28/2022  8:40 AM SAI Bronson - [facility]   Past appointment was 11/10/21.     Patient's pharmacy is [facility]

## 2022-06-07 NOTE — TELEPHONE ENCOUNTER
Patient is scheduled for   Future Appointments   Date Time Provider John Irizarry   6/27/2022  9:30 AM Meryl Kapoor MD [facility]   6/28/2022  8:40 AM SAI Davidson - [facility]

## 2022-06-07 NOTE — TELEPHONE ENCOUNTER
Patient has an appointment on   Future Appointments   Date Time Provider John Irizarry   6/27/2022  9:30 AM Bisi Puente MD [facility]   6/28/2022  8:40 AM SAI Yap - [facility]   Do you want patient to get labs before her appt?

## 2022-06-13 DIAGNOSIS — F32.A DEPRESSION, UNSPECIFIED DEPRESSION TYPE: ICD-10-CM

## 2022-06-13 NOTE — TELEPHONE ENCOUNTER
Future Appointments   Date Time Provider John Irizarry   6/27/2022  9:30 AM Krystyna Heath MD [facility]   6/28/2022  8:40 AM SAI Orlando - CNP [facility] [place] [facility]     LOV 11/10/2021

## 2022-06-14 RX ORDER — OLANZAPINE 2.5 MG/1
TABLET ORAL
Qty: 90 TABLET | Refills: 0 | Status: SHIPPED | OUTPATIENT
Start: 2022-06-14 | End: 2022-10-13 | Stop reason: SDUPTHER

## 2022-06-14 RX ORDER — LOSARTAN POTASSIUM 100 MG/1
TABLET ORAL
Qty: 90 TABLET | Refills: 0 | Status: SHIPPED | OUTPATIENT
Start: 2022-06-14

## 2022-06-14 RX ORDER — ATORVASTATIN CALCIUM 40 MG/1
TABLET, FILM COATED ORAL
Qty: 90 TABLET | Refills: 0 | Status: SHIPPED | OUTPATIENT
Start: 2022-06-14 | End: 2022-10-13 | Stop reason: SDUPTHER

## 2022-06-14 RX ORDER — INSULIN DETEMIR 100 [IU]/ML
INJECTION, SOLUTION SUBCUTANEOUS
Qty: 15 PEN | Refills: 5 | Status: SHIPPED | OUTPATIENT
Start: 2022-06-14

## 2022-06-15 ENCOUNTER — OFFICE VISIT (OUTPATIENT)
Dept: FAMILY MEDICINE CLINIC | Age: 72
End: 2022-06-15
Payer: MEDICARE

## 2022-06-15 ENCOUNTER — TELEPHONE (OUTPATIENT)
Dept: OTHER | Facility: CLINIC | Age: 72
End: 2022-06-15

## 2022-06-15 VITALS
SYSTOLIC BLOOD PRESSURE: 124 MMHG | HEART RATE: 86 BPM | RESPIRATION RATE: 18 BRPM | DIASTOLIC BLOOD PRESSURE: 82 MMHG | WEIGHT: 151 LBS | OXYGEN SATURATION: 97 % | BODY MASS INDEX: 25.92 KG/M2
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
## 2022-06-15 DIAGNOSIS — S40.022A CONTUSION OF LEFT UPPER ARM, INITIAL ENCOUNTER: ICD-10-CM

## 2022-06-15 DIAGNOSIS — Z91.81 AT HIGH RISK FOR FALLS: ICD-10-CM

## 2022-06-15 DIAGNOSIS — F32.A DEPRESSION, UNSPECIFIED DEPRESSION TYPE: ICD-10-CM

## 2022-06-15 DIAGNOSIS — S80.811A ABRASION OF ANTERIOR RIGHT LOWER LEG, INITIAL ENCOUNTER: ICD-10-CM

## 2022-06-15 DIAGNOSIS — S81.811A SKIN TEAR OF RIGHT LOWER LEG WITHOUT COMPLICATION, INITIAL ENCOUNTER: Primary | ICD-10-CM

## 2022-06-15 DIAGNOSIS — I10 ESSENTIAL HYPERTENSION: ICD-10-CM

## 2022-06-15 PROCEDURE — 90471 IMMUNIZATION ADMIN: CPT | Performed by: NURSE PRACTITIONER

## 2022-06-15 PROCEDURE — G8427 DOCREV CUR MEDS BY ELIG CLIN: HCPCS | Performed by: NURSE PRACTITIONER

## 2022-06-15 PROCEDURE — 3017F COLORECTAL CA SCREEN DOC REV: CPT | Performed by: NURSE PRACTITIONER

## 2022-06-15 PROCEDURE — G8417 CALC BMI ABV UP PARAM F/U: HCPCS | Performed by: NURSE PRACTITIONER

## 2022-06-15 PROCEDURE — 1123F ACP DISCUSS/DSCN MKR DOCD: CPT | Performed by: NURSE PRACTITIONER

## 2022-06-15 PROCEDURE — 1090F PRES/ABSN URINE INCON ASSESS: CPT | Performed by: NURSE PRACTITIONER

## 2022-06-15 PROCEDURE — 99214 OFFICE O/P EST MOD 30 MIN: CPT | Performed by: NURSE PRACTITIONER

## 2022-06-15 PROCEDURE — G8399 PT W/DXA RESULTS DOCUMENT: HCPCS | Performed by: NURSE PRACTITIONER

## 2022-06-15 PROCEDURE — 1036F TOBACCO NON-USER: CPT | Performed by: NURSE PRACTITIONER

## 2022-06-15 PROCEDURE — 90714 TD VACC NO PRESV 7 YRS+ IM: CPT | Performed by: NURSE PRACTITIONER

## 2022-06-15 RX ORDER — SERTRALINE HYDROCHLORIDE 100 MG/1
100 TABLET, FILM COATED ORAL 2 TIMES DAILY
Qty: 60 TABLET | Refills: 0 | Status: SHIPPED | OUTPATIENT
Start: 2022-06-15 | End: 2022-07-12 | Stop reason: SDUPTHER

## 2022-06-15 RX ORDER — AMLODIPINE BESYLATE 5 MG/1
TABLET ORAL
Qty: 90 TABLET | Refills: 0 | Status: SHIPPED | OUTPATIENT
Start: 2022-06-15 | End: 2022-09-07

## 2022-06-15 RX ORDER — TRAZODONE HYDROCHLORIDE 100 MG/1
100 TABLET ORAL DAILY
Qty: 30 TABLET | Refills: 0 | Status: SHIPPED | OUTPATIENT
Start: 2022-06-15 | End: 2022-07-12 | Stop reason: SDUPTHER

## 2022-06-15 ASSESSMENT — PATIENT HEALTH QUESTIONNAIRE - PHQ9
9. THOUGHTS THAT YOU WOULD BE BETTER OFF DEAD, OR OF HURTING YOURSELF: 0
6. FEELING BAD ABOUT YOURSELF - OR THAT YOU ARE A FAILURE OR HAVE LET YOURSELF OR YOUR FAMILY DOWN: 0
7. TROUBLE CONCENTRATING ON THINGS, SUCH AS READING THE NEWSPAPER OR WATCHING TELEVISION: 0
2. FEELING DOWN, DEPRESSED OR HOPELESS: 2
SUM OF ALL RESPONSES TO PHQ QUESTIONS 1-9: 10
SUM OF ALL RESPONSES TO PHQ QUESTIONS 1-9: 10
SUM OF ALL RESPONSES TO PHQ9 QUESTIONS 1 & 2: 2
5. POOR APPETITE OR OVEREATING: 2
1. LITTLE INTEREST OR PLEASURE IN DOING THINGS: 0
SUM OF ALL RESPONSES TO PHQ QUESTIONS 1-9: 10
3. TROUBLE FALLING OR STAYING ASLEEP: 3
10. IF YOU CHECKED OFF ANY PROBLEMS, HOW DIFFICULT HAVE THESE PROBLEMS MADE IT FOR YOU TO DO YOUR WORK, TAKE CARE OF THINGS AT HOME, OR GET ALONG WITH OTHER PEOPLE: 0
8. MOVING OR SPEAKING SO SLOWLY THAT OTHER PEOPLE COULD HAVE NOTICED. OR THE OPPOSITE, BEING SO FIGETY OR RESTLESS THAT YOU HAVE BEEN MOVING AROUND A LOT MORE THAN USUAL: 0
4. FEELING TIRED OR HAVING LITTLE ENERGY: 3
SUM OF ALL RESPONSES TO PHQ QUESTIONS 1-9: 10

## 2022-06-15 ASSESSMENT — ENCOUNTER SYMPTOMS
GASTROINTESTINAL NEGATIVE: 1
RESPIRATORY NEGATIVE: 1

## 2022-06-15 NOTE — TELEPHONE ENCOUNTER
Patient had left a message. I returned her call and she stated she had just fallen and described an abrasion on her shin. She didn't think anything was broken. I asked her if she needed to call 911 and she stated she would probably just need an antibiotic, because \"it looks a little deep. \" She then requested a refill on her sertraline. I advised Chase Armendariz to call [address] [place] office. I provided the number.  She stated she was calling the office now and would call me back to schedule her mammogram.     Sunday RAZA Howell

## 2022-06-15 NOTE — PROGRESS NOTES
On the basis of positive falls risk screening, assessment and plan is as follows: patient declines any further evaluation/treatment for increased falls risk. .   6/15/2022    This is a 66 y.o. female   Chief Complaint   Patient presents with    Laceration     right lower leg. states she tripped over the vanity drawer and has a large cut on her shin. has multiple smaller cuts. Yosvany Spivey is seen today for a a fall. She tripped over an open vanity door that she forgot to close. She landed striking her left arm. She also obtained lacerations to her right lower leg. She is able to move her arm without pain. No numbness or tingling. She does have a contusion to the left upper arm. She also notes a large skin tear to the shin of the right lower leg and a few other minor abrasions also to the right lower leg. She washed these with soap and water and bandaged them then went to work. She denies any pain with ambulation. No current drainage or bleeding. She has not needed anything for pain.        Patient Active Problem List   Diagnosis    Mood disorder ([address]    Hypertension    Spinal stenosis    Chronic back pain    Retinopathy    IDDM (insulin dependent diabetes mellitus)    Family history of melanoma    Osteopenia    Type 1 diabetes mellitus without complication (HCC)    Cervical osteoarthritis    Dilated cbd, acquired    Gallstone pancreatitis    Osteoarthrosis    Bilateral carpal tunnel syndrome    Ulnar neuropathy at elbow, left    Degenerative lumbar spinal stenosis    Hyponatremia    Wound drainage    Superficial incisional infection of surgical site    Post-operative pain    Staph aureus infection    Chronic pain syndrome    Bilateral sensorineural hearing loss    Balance disorder    Post-nasal drip    Hypothyroidism    Stress incontinence    Skin cyst       Current Outpatient Medications   Medication Sig Dispense Refill    OLANZapine (ZYPREXA) 2.5 MG tablet TAKE 1 TABLET BY MOUTH EVERY DAY AT NIGHT 90 tablet 0    LEVEMIR FLEXTOUCH 100 UNIT/ML injection pen INJECT 20 UNITS SUBCUTANEOUSLY TWICE A DAY 15 pen 5    atorvastatin (LIPITOR) 40 MG tablet TAKE 1 TABLET BY MOUTH EVERY DAY 90 tablet 0    losartan (COZAAR) 100 MG tablet TAKE 1 TABLET BY MOUTH EVERY DAY 90 tablet 0    levothyroxine (SYNTHROID) 75 MCG tablet TAKE 1 TABLET BY MOUTH EVERY DAY 90 tablet 3    Insulin Pen Needle 31G X 5 MM MISC 1 each by Does not apply route 4 times daily 120 each 5    gabapentin (NEURONTIN) 400 MG capsule Take 1 tablet po AM, take 2 tablets po PM 90 capsule 1    meloxicam (MOBIC) 15 MG tablet Take 1 tablet by mouth daily 30 tablet 1    oxyCODONE-acetaminophen (PERCOCET) 7.5-325 MG per tablet Take 1 tablet by mouth every 6 hours as needed for Pain (max 2-3/day) for up to 28 days. 70 tablet 0    oxybutynin (DITROPAN-XL) 10 MG extended release tablet TAKE 1 TABLET BY MOUTH EVERY DAY 90 tablet 3    NOVOLOG FLEXPEN 100 UNIT/ML injection pen INJECT 5 UNITS INTO THE SKIN 3 TIMES DAILY (BEFORE MEALS) 5 pen 1    divalproex (DEPAKOTE ER) 500 MG extended release tablet TAKE 1 TABLET BY MOUTH EVERY DAY 90 tablet 3    VITAMIN D PO Take by mouth daily      MAGNESIUM PO Take by mouth daily       CALCIUM-VITAMIN D PO Take by mouth daily      traZODone (DESYREL) 100 MG tablet Take 100 mg by mouth daily      blood glucose monitor kit and supplies Test 4 times a day & as needed for symptoms of irregular blood glucose. 1 kit 0    blood glucose monitor strips Test 4 times a day & as needed for symptoms of irregular blood glucose. Dispense sufficient amount for indicated testing frequency plus additional to accommodate PRN testing needs.  400 strip 0    amLODIPine (NORVASC) 5 MG tablet TAKE 1 TABLET BY MOUTH EVERY DAY 90 tablet 0    sertraline (ZOLOFT) 100 MG tablet Take 1 tablet by mouth 2 times daily (Patient taking differently: Take 200 mg by mouth nightly ) 60 tablet 1    Handicap Placard MISC by Does not apply route 1 each 0    Needles & Syringes MISC Inject 1 each into the skin 3 times daily 100 each 3     No current facility-administered medications for this visit. Allergies   Allergen Reactions    Sulfa Antibiotics Hives    Sulfa Antibiotics        /82 (Site: Left Upper Arm, Position: Sitting)   Pulse 86   Resp 18   Wt 151 lb (68.5 kg)   SpO2 97%   BMI 25.92 kg/m²     Social History     Tobacco Use    Smoking status: Never Smoker    Smokeless tobacco: Never Used   Substance Use Topics    Alcohol use: Not Currently     Alcohol/week: 2.0 standard drinks     Types: 2 Glasses of wine per week       Review of Systems   Constitutional: Negative for activity change and fever. Respiratory: Negative. Cardiovascular: Negative. Hypertension on amlodipine and losartan, taking medications regularly. Gastrointestinal: Negative. Endocrine:        Diabetes previously uncontrolled due for follow-up with primary care. Reports taking all of her medications   Musculoskeletal: Positive for myalgias. Negative for arthralgias, gait problem and joint swelling. Skin: Positive for wound. Neurological: Negative. Psychiatric/Behavioral: Negative. Some depressed mood, on multiple medications. Does have upcoming appointment scheduled with PCP but does need refills prior to this appointment       Physical Exam  Vitals and nursing note reviewed. Constitutional:       General: She is not in acute distress. Appearance: She is well-developed. She is not diaphoretic. HENT:      Head: Normocephalic and atraumatic. Right Ear: External ear normal.      Left Ear: External ear normal.      Nose: Nose normal.      Mouth/Throat:      Pharynx: No oropharyngeal exudate. Eyes:      General:         Right eye: No discharge. Left eye: No discharge. Conjunctiva/sclera: Conjunctivae normal.   Cardiovascular:      Rate and Rhythm: Normal rate and regular rhythm. Heart sounds: Normal heart sounds. No murmur heard. No friction rub. No gallop. Pulmonary:      Effort: Pulmonary effort is normal. No respiratory distress. Breath sounds: Normal breath sounds. No wheezing or rales. Abdominal:      General: Bowel sounds are normal. There is no distension. Palpations: Abdomen is soft. Tenderness: There is no abdominal tenderness. Musculoskeletal:         General: Normal range of motion. Left upper arm: Tenderness present. No swelling or edema. Arms:       Cervical back: Normal range of motion and neck supple. Left lower leg: Laceration and tenderness present. No swelling or bony tenderness. No edema. Legs:    Lymphadenopathy:      Cervical: No cervical adenopathy. Skin:     General: Skin is warm and dry. Coloration: Skin is not pale. Findings: No erythema or rash. Neurological:      Mental Status: She is alert and oriented to person, place, and time. Motor: No abnormal muscle tone. Coordination: Coordination normal.   Psychiatric:         Behavior: Behavior normal.         Thought Content: Thought content normal.         Judgment: Judgment normal.         Diagnosis       ICD-10-CM    1. Skin tear of right lower leg without complication, initial encounter  S81.811A    2. Abrasion of anterior right lower leg, initial encounter  S80.811A    3. Contusion of left upper arm, initial encounter  S40.022A    4. Fall on same level from slipping, tripping and stumbling with subsequent striking against furniture, initial encounter  W01.190A    5. Depression, unspecified depression type  F32. A    6. Essential hypertension  I10         Plan    Lacerations clean and dressed.    Skin tear cleaned, triple antibiotic applied and dressed  Home instructions reviewed for wound management including to watch for infection  Refilled medications as needed  Tdap today due to multiple lacerations  Supportive care for contusion on upper arm  Schedule and keep scheduled appointment with Yanelis Spears for follow-up for chronic conditions  Refill medications until she can make this appointment    Orders Placed This Encounter   Procedures    Td, TDVAX, (age 9 yrs+), IM       Orders Placed This Encounter   Medications    sertraline (ZOLOFT) 100 MG tablet     Sig: Take 1 tablet by mouth 2 times daily     Dispense:  60 tablet     Refill:  0    amLODIPine (NORVASC) 5 MG tablet     Sig: TAKE 1 TABLET BY MOUTH EVERY DAY     Dispense:  90 tablet     Refill:  0    traZODone (DESYREL) 100 MG tablet     Sig: Take 1 tablet by mouth daily     Dispense:  30 tablet     Refill:  0       Patient Education:  Wound care    Return for As scheduled with Yanelis Spears.

## 2022-06-27 ENCOUNTER — OFFICE VISIT (OUTPATIENT)
Dept: PAIN MANAGEMENT | Age: 72
End: 2022-06-27
Payer: MEDICARE

## 2022-06-27 VITALS
HEART RATE: 89 BPM | HEIGHT: 64 IN | DIASTOLIC BLOOD PRESSURE: 76 MMHG | SYSTOLIC BLOOD PRESSURE: 127 MMHG | BODY MASS INDEX: 25.95 KG/M2 | WEIGHT: 152 LBS | OXYGEN SATURATION: 95 %

## 2022-06-27 DIAGNOSIS — E03.9 HYPOTHYROIDISM, UNSPECIFIED TYPE: ICD-10-CM

## 2022-06-27 DIAGNOSIS — E11.69 TYPE 2 DIABETES MELLITUS WITH OTHER SPECIFIED COMPLICATION, WITH LONG-TERM CURRENT USE OF INSULIN (HCC): ICD-10-CM

## 2022-06-27 DIAGNOSIS — M96.1 FAILED NECK SYNDROME: ICD-10-CM

## 2022-06-27 DIAGNOSIS — M79.7 FIBROMYALGIA: ICD-10-CM

## 2022-06-27 DIAGNOSIS — G56.22 ULNAR NEUROPATHY AT ELBOW, LEFT: ICD-10-CM

## 2022-06-27 DIAGNOSIS — M47.812 OSTEOARTHRITIS OF CERVICAL SPINE, UNSPECIFIED SPINAL OSTEOARTHRITIS COMPLICATION STATUS: ICD-10-CM

## 2022-06-27 DIAGNOSIS — M48.07 SPINAL STENOSIS OF LUMBOSACRAL REGION: ICD-10-CM

## 2022-06-27 DIAGNOSIS — M48.061 DEGENERATIVE LUMBAR SPINAL STENOSIS: ICD-10-CM

## 2022-06-27 DIAGNOSIS — G56.03 BILATERAL CARPAL TUNNEL SYNDROME: ICD-10-CM

## 2022-06-27 DIAGNOSIS — Z79.4 TYPE 2 DIABETES MELLITUS WITH OTHER SPECIFIED COMPLICATION, WITH LONG-TERM CURRENT USE OF INSULIN (HCC): ICD-10-CM

## 2022-06-27 DIAGNOSIS — I10 ESSENTIAL HYPERTENSION: ICD-10-CM

## 2022-06-27 DIAGNOSIS — G89.4 CHRONIC PAIN SYNDROME: ICD-10-CM

## 2022-06-27 DIAGNOSIS — M96.1 FAILED BACK SURGICAL SYNDROME: ICD-10-CM

## 2022-06-27 LAB
A/G RATIO: 1.8 (ref 1.1–2.2)
ALBUMIN SERPL-MCNC: 4.2 G/DL (ref 3.4–5)
ALP BLD-CCNC: 100 U/L (ref 40–129)
ALT SERPL-CCNC: 10 U/L (ref 10–40)
ANION GAP SERPL CALCULATED.3IONS-SCNC: 17 MMOL/L (ref 3–16)
AST SERPL-CCNC: 19 U/L (ref 15–37)
BASOPHILS ABSOLUTE: 0 K/UL (ref 0–0.2)
BASOPHILS RELATIVE PERCENT: 0.6 %
BILIRUB SERPL-MCNC: 0.5 MG/DL (ref 0–1)
BUN BLDV-MCNC: 16 MG/DL (ref 7–20)
CALCIUM SERPL-MCNC: 9.3 MG/DL (ref 8.3–10.6)
CHLORIDE BLD-SCNC: 94 MMOL/L (ref 99–110)
CHOLESTEROL, TOTAL: 151 MG/DL (ref 0–199)
CO2: 28 MMOL/L (ref 21–32)
CREAT SERPL-MCNC: 0.7 MG/DL (ref 0.6–1.2)
EOSINOPHILS ABSOLUTE: 0.2 K/UL (ref 0–0.6)
EOSINOPHILS RELATIVE PERCENT: 4 %
GFR AFRICAN AMERICAN: >60
GFR NON-AFRICAN AMERICAN: >60
GLUCOSE BLD-MCNC: 90 MG/DL (ref 70–99)
HCT VFR BLD CALC: 36.3 % (ref 36–48)
HDLC SERPL-MCNC: 59 MG/DL (ref 40–60)
HEMOGLOBIN: 12.7 G/DL (ref 12–16)
LDL CHOLESTEROL CALCULATED: 76 MG/DL
LYMPHOCYTES ABSOLUTE: 1.3 K/UL (ref 1–5.1)
LYMPHOCYTES RELATIVE PERCENT: 30.7 %
MCH RBC QN AUTO: 29.9 PG (ref 26–34)
MCHC RBC AUTO-ENTMCNC: 35 G/DL (ref 31–36)
MCV RBC AUTO: 85.4 FL (ref 80–100)
MONOCYTES ABSOLUTE: 0.3 K/UL (ref 0–1.3)
MONOCYTES RELATIVE PERCENT: 7.6 %
NEUTROPHILS ABSOLUTE: 2.5 K/UL (ref 1.7–7.7)
NEUTROPHILS RELATIVE PERCENT: 57.1 %
PDW BLD-RTO: 13.5 % (ref 12.4–15.4)
PLATELET # BLD: 215 K/UL (ref 135–450)
PMV BLD AUTO: 7.6 FL (ref 5–10.5)
POTASSIUM SERPL-SCNC: 4.3 MMOL/L (ref 3.5–5.1)
RBC # BLD: 4.25 M/UL (ref 4–5.2)
SODIUM BLD-SCNC: 139 MMOL/L (ref 136–145)
TOTAL PROTEIN: 6.6 G/DL (ref 6.4–8.2)
TRIGL SERPL-MCNC: 80 MG/DL (ref 0–150)
TSH SERPL DL<=0.05 MIU/L-ACNC: 1.57 UIU/ML (ref 0.27–4.2)
VLDLC SERPL CALC-MCNC: 16 MG/DL
WBC # BLD: 4.3 K/UL (ref 4–11)

## 2022-06-27 PROCEDURE — 1123F ACP DISCUSS/DSCN MKR DOCD: CPT | Performed by: INTERNAL MEDICINE

## 2022-06-27 PROCEDURE — G8427 DOCREV CUR MEDS BY ELIG CLIN: HCPCS | Performed by: INTERNAL MEDICINE

## 2022-06-27 PROCEDURE — G8399 PT W/DXA RESULTS DOCUMENT: HCPCS | Performed by: INTERNAL MEDICINE

## 2022-06-27 PROCEDURE — 3017F COLORECTAL CA SCREEN DOC REV: CPT | Performed by: INTERNAL MEDICINE

## 2022-06-27 PROCEDURE — 99213 OFFICE O/P EST LOW 20 MIN: CPT | Performed by: INTERNAL MEDICINE

## 2022-06-27 PROCEDURE — 1036F TOBACCO NON-USER: CPT | Performed by: INTERNAL MEDICINE

## 2022-06-27 PROCEDURE — 1090F PRES/ABSN URINE INCON ASSESS: CPT | Performed by: INTERNAL MEDICINE

## 2022-06-27 PROCEDURE — G8417 CALC BMI ABV UP PARAM F/U: HCPCS | Performed by: INTERNAL MEDICINE

## 2022-06-27 RX ORDER — OXYCODONE AND ACETAMINOPHEN 7.5; 325 MG/1; MG/1
1 TABLET ORAL EVERY 6 HOURS PRN
Qty: 90 TABLET | Refills: 0 | Status: SHIPPED | OUTPATIENT
Start: 2022-06-27 | End: 2022-08-01 | Stop reason: SDUPTHER

## 2022-06-27 RX ORDER — GABAPENTIN 400 MG/1
CAPSULE ORAL
Qty: 90 CAPSULE | Refills: 1 | Status: SHIPPED | OUTPATIENT
Start: 2022-06-27 | End: 2022-08-01 | Stop reason: SDUPTHER

## 2022-06-27 RX ORDER — OXYCODONE AND ACETAMINOPHEN 7.5; 325 MG/1; MG/1
1 TABLET ORAL EVERY 6 HOURS PRN
Qty: 70 TABLET | Refills: 0 | Status: SHIPPED | OUTPATIENT
Start: 2022-06-27 | End: 2022-06-27 | Stop reason: CLARIF

## 2022-06-27 RX ORDER — MELOXICAM 15 MG/1
15 TABLET ORAL DAILY
Qty: 30 TABLET | Refills: 1 | Status: SHIPPED | OUTPATIENT
Start: 2022-06-27 | End: 2022-08-01 | Stop reason: SDUPTHER

## 2022-06-27 NOTE — PROGRESS NOTES
Beto Gibson  1950  [id number]      HISTORY OF PRESENT ILLNESS:  Ms. Camelia Sanchez is a 66 y.o. female returns for a follow up visit for pain management  She has a diagnosis of   1. Chronic pain syndrome    2. Osteoarthritis of cervical spine, unspecified spinal osteoarthritis complication status    3. Ulnar neuropathy at elbow, left    4. Failed neck syndrome    5. Spinal stenosis of lumbosacral region    6. Fibromyalgia    7. Failed back surgical syndrome    8. Bilateral carpal tunnel syndrome    9. Degenerative lumbar spinal stenosis    10. Chronic low back pain, unspecified back pain laterality, unspecified whether sciatica present    . She complains of pain in the lower back, pelvic area, left hip with radiation to the left upper leg, left knee, left lower leg, left ankle  She rates the pain 8/10 and describes it as sharp, aching, burning. Current treatment regimen has helped relieve about 30% of the pain. She denies any side effects from the current pain regimen. Patient reports that since the last follow up visit the physical functioning is worse, family/social relationships are worse, mood is worse sleep patterns are worse, and that the overall functioning is worse. Patient denies misusing/abusing her narcotic pain medications or using any illegal drugs. There are No indicators for possible drug abuse, addiction or diversion problems, patient states she has been having increase pain. Ms. Camelia Sanchez states she had a fall, she tripped over a drawer, she states she hit her right leg and scraped her shin bone and hurt her back also. Patient mentions she is using Mobic along with Neurontin and Percocet 2-3 per day. Patient reports her weight has been stable. She reports she is working 10-15 hours a week still. ALLERGIES: Patients list of allergies were reviewed     MEDICATIONS: Ms. Camelia Sanchez list of medications were reviewed. Her current medications are   Outpatient Medications Prior to Visit   Medication Sig Dispense Refill    sertraline (ZOLOFT) 100 MG tablet Take 1 tablet by mouth 2 times daily 60 tablet 0    amLODIPine (NORVASC) 5 MG tablet TAKE 1 TABLET BY MOUTH EVERY DAY 90 tablet 0    traZODone (DESYREL) 100 MG tablet Take 1 tablet by mouth daily 30 tablet 0    OLANZapine (ZYPREXA) 2.5 MG tablet TAKE 1 TABLET BY MOUTH EVERY DAY AT NIGHT 90 tablet 0    LEVEMIR FLEXTOUCH 100 UNIT/ML injection pen INJECT 20 UNITS SUBCUTANEOUSLY TWICE A DAY 15 pen 5    atorvastatin (LIPITOR) 40 MG tablet TAKE 1 TABLET BY MOUTH EVERY DAY 90 tablet 0    losartan (COZAAR) 100 MG tablet TAKE 1 TABLET BY MOUTH EVERY DAY 90 tablet 0    levothyroxine (SYNTHROID) 75 MCG tablet TAKE 1 TABLET BY MOUTH EVERY DAY 90 tablet 3    Insulin Pen Needle 31G X 5 MM MISC 1 each by Does not apply route 4 times daily 120 each 5    gabapentin (NEURONTIN) 400 MG capsule Take 1 tablet po AM, take 2 tablets po PM 90 capsule 1    meloxicam (MOBIC) 15 MG tablet Take 1 tablet by mouth daily 30 tablet 1    oxybutynin (DITROPAN-XL) 10 MG extended release tablet TAKE 1 TABLET BY MOUTH EVERY DAY 90 tablet 3    NOVOLOG FLEXPEN 100 UNIT/ML injection pen INJECT 5 UNITS INTO THE SKIN 3 TIMES DAILY (BEFORE MEALS) 5 pen 1    divalproex (DEPAKOTE ER) 500 MG extended release tablet TAKE 1 TABLET BY MOUTH EVERY DAY 90 tablet 3    VITAMIN D PO Take by mouth daily      MAGNESIUM PO Take by mouth daily       CALCIUM-VITAMIN D PO Take by mouth daily      blood glucose monitor kit and supplies Test 4 times a day & as needed for symptoms of irregular blood glucose. 1 kit 0    blood glucose monitor strips Test 4 times a day & as needed for symptoms of irregular blood glucose. Dispense sufficient amount for indicated testing frequency plus additional to accommodate PRN testing needs.  400 strip 0    Handicap Placard MISC by Does not apply route 1 each 0    Needles & Syringes MISC Inject 1 each into the skin 3 times daily 100 each 3     No facility-administered medications prior to visit. REVIEW OF SYSTEMS:    Respiratory: Negative for apnea, chest tightness and shortness of breath or change in baseline breathing. PHYSICAL EXAM:   Nursing note and vitals reviewed. /76 (Site: Left Upper Arm, Position: Sitting)   Pulse 89   Ht 5' 4\" (1.626 m)   Wt 152 lb (68.9 kg)   SpO2 95%   BMI 26.09 kg/m²   Constitutional: She appears well-developed and well-nourished. No acute distress. Cardiovascular: Normal rate, regular rhythm, normal heart sounds, and does not have murmur. Pulmonary/Chest: Effort normal. No respiratory distress. She does not have wheezes in the lung fields. She has no rales. Neurological/Psychiatric:She is alert and oriented to person, place, and time. Coordination is  normal.  Her mood isAppropriate and affect is Neutral/Euthymic(normal) . Her  Other: using a cane     IMPRESSION:   1. Chronic pain syndrome    2. Osteoarthritis of cervical spine, unspecified spinal osteoarthritis complication status    3. Ulnar neuropathy at elbow, left    4. Failed neck syndrome    5. Spinal stenosis of lumbosacral region    6. Fibromyalgia    7. Failed back surgical syndrome    8. Bilateral carpal tunnel syndrome    9.  Degenerative lumbar spinal stenosis        PLAN:  Informed verbal consent was obtained  -Interm history reviewed    -Continue with Mobic and Neurontin   -ROM/Stretching exercises as advised   -Continue with Percocet 2-3 per day   -She was advised to increase fluids ( 5-7  glasses of fluid daily), limit caffeine, avoid cheese products, increase dietary fiber, increase activity and exercise as tolerated and relax regularly and enjoy meals   -Monitor pain symptoms, if continues will consider TPI lumbar spine      Current Outpatient Medications   Medication Sig Dispense Refill    sertraline (ZOLOFT) 100 MG tablet Take 1 tablet by mouth 2 times daily 60 tablet 0    amLODIPine (NORVASC) 5 MG tablet TAKE 1 TABLET BY MOUTH EVERY DAY 90 tablet 0    traZODone (DESYREL) 100 MG tablet Take 1 tablet by mouth daily 30 tablet 0    OLANZapine (ZYPREXA) 2.5 MG tablet TAKE 1 TABLET BY MOUTH EVERY DAY AT NIGHT 90 tablet 0    LEVEMIR FLEXTOUCH 100 UNIT/ML injection pen INJECT 20 UNITS SUBCUTANEOUSLY TWICE A DAY 15 pen 5    atorvastatin (LIPITOR) 40 MG tablet TAKE 1 TABLET BY MOUTH EVERY DAY 90 tablet 0    losartan (COZAAR) 100 MG tablet TAKE 1 TABLET BY MOUTH EVERY DAY 90 tablet 0    levothyroxine (SYNTHROID) 75 MCG tablet TAKE 1 TABLET BY MOUTH EVERY DAY 90 tablet 3    Insulin Pen Needle 31G X 5 MM MISC 1 each by Does not apply route 4 times daily 120 each 5    gabapentin (NEURONTIN) 400 MG capsule Take 1 tablet po AM, take 2 tablets po PM 90 capsule 1    meloxicam (MOBIC) 15 MG tablet Take 1 tablet by mouth daily 30 tablet 1    oxybutynin (DITROPAN-XL) 10 MG extended release tablet TAKE 1 TABLET BY MOUTH EVERY DAY 90 tablet 3    NOVOLOG FLEXPEN 100 UNIT/ML injection pen INJECT 5 UNITS INTO THE SKIN 3 TIMES DAILY (BEFORE MEALS) 5 pen 1    divalproex (DEPAKOTE ER) 500 MG extended release tablet TAKE 1 TABLET BY MOUTH EVERY DAY 90 tablet 3    VITAMIN D PO Take by mouth daily      MAGNESIUM PO Take by mouth daily       CALCIUM-VITAMIN D PO Take by mouth daily      blood glucose monitor kit and supplies Test 4 times a day & as needed for symptoms of irregular blood glucose. 1 kit 0    blood glucose monitor strips Test 4 times a day & as needed for symptoms of irregular blood glucose. Dispense sufficient amount for indicated testing frequency plus additional to accommodate PRN testing needs. 400 strip 0    Handicap Placard MISC by Does not apply route 1 each 0    Needles & Syringes MISC Inject 1 each into the skin 3 times daily 100 each 3     No current facility-administered medications for this visit. I will continue her current medication regimen  which is part of the above treatment schedule.  It has been helping with Ms. Adrian's chronic  medical problems which for this visit include:   Diagnoses of Chronic pain syndrome, Osteoarthritis of cervical spine, unspecified spinal osteoarthritis complication status, Ulnar neuropathy at elbow, left, Failed neck syndrome, Spinal stenosis of lumbosacral region, Fibromyalgia, Failed back surgical syndrome, Bilateral carpal tunnel syndrome, Degenerative lumbar spinal stenosis, and Chronic low back pain, unspecified back pain laterality, unspecified whether sciatica present were pertinent to this visit. Risks and benefits of the medications and other alternative treatments  including no treatment were discussed with the patient. The common side effects of these medications were also explained to the patient. Informed verbal consent was obtained. Goals of current treatment regimen include improvement in pain, restoration of functioning- with focus on improvement in physical performance, general activity, work or disability,emotional distress, health care utilization and  decreased medication consumption. Will continue to monitor progress towards achieving/maintaining therapeutic goals with special emphasis on  1. Improvement in perceived interfernce  of pain with ADL's. Ability to do home exercises independently. Ability to do household chores indoor and/or outdoor work and social and leisure activities. Improve psychosocial and physical functioning. - she is showing progression towards this treatment goal with the current regimen. 2. Ability to focus/concentrate at work and perform the duties required of her at work  Sit through a workday without lower extremity symptoms. Stand 30-60 minutes without lower extremity symptoms. Ability to lift up to 10-20 lbs. Ability to go up and down stairs. Sit 30-60 minutes  Without having to stand up frequently. - she is maintaining/progressing towards her work related goals with the current regimen.      She was advised against drinking alcohol with the narcotic pain medicines, advised against driving or handling machinery while adjusting the dose of medicines or if having cognitive  issues related to the current medications. Risk of overdose and death, if medicines not taken as prescribed, were also discussed. If the patient develops new symptoms or if the symptoms worsen, the patient should call the office. While transcribing every attempt was made to maintain the accuracy of the note in terms of it's contents,there may have been some errors made inadvertently. Thank you for allowing me to participate in the care of this patient.     Anna Shah MD.    Cc: Jessica Kim, SAI - CNP

## 2022-06-28 ENCOUNTER — OFFICE VISIT (OUTPATIENT)
Dept: FAMILY MEDICINE CLINIC | Age: 72
End: 2022-06-28
Payer: MEDICARE

## 2022-06-28 VITALS
SYSTOLIC BLOOD PRESSURE: 134 MMHG | HEART RATE: 76 BPM | WEIGHT: 148 LBS | DIASTOLIC BLOOD PRESSURE: 72 MMHG | BODY MASS INDEX: 25.27 KG/M2 | HEIGHT: 64 IN | RESPIRATION RATE: 16 BRPM | TEMPERATURE: 97.7 F | OXYGEN SATURATION: 95 %

## 2022-06-28 DIAGNOSIS — E78.5 DYSLIPIDEMIA: ICD-10-CM

## 2022-06-28 DIAGNOSIS — E03.9 HYPOTHYROIDISM, UNSPECIFIED TYPE: ICD-10-CM

## 2022-06-28 DIAGNOSIS — E11.69 TYPE 2 DIABETES MELLITUS WITH OTHER SPECIFIED COMPLICATION, WITH LONG-TERM CURRENT USE OF INSULIN (HCC): ICD-10-CM

## 2022-06-28 DIAGNOSIS — Z79.4 TYPE 2 DIABETES MELLITUS WITH OTHER SPECIFIED COMPLICATION, WITH LONG-TERM CURRENT USE OF INSULIN (HCC): ICD-10-CM

## 2022-06-28 DIAGNOSIS — L08.9 INFECTED SKIN TEAR: ICD-10-CM

## 2022-06-28 DIAGNOSIS — N39.3 STRESS INCONTINENCE: ICD-10-CM

## 2022-06-28 DIAGNOSIS — N89.8 VAGINAL ODOR: ICD-10-CM

## 2022-06-28 DIAGNOSIS — T14.8XXA INFECTED SKIN TEAR: ICD-10-CM

## 2022-06-28 DIAGNOSIS — Z00.00 INITIAL MEDICARE ANNUAL WELLNESS VISIT: Primary | ICD-10-CM

## 2022-06-28 DIAGNOSIS — F39 MOOD DISORDER (HCC): ICD-10-CM

## 2022-06-28 DIAGNOSIS — I10 PRIMARY HYPERTENSION: ICD-10-CM

## 2022-06-28 LAB
ESTIMATED AVERAGE GLUCOSE: 182.9 MG/DL
HBA1C MFR BLD: 8 %

## 2022-06-28 PROCEDURE — G8399 PT W/DXA RESULTS DOCUMENT: HCPCS | Performed by: NURSE PRACTITIONER

## 2022-06-28 PROCEDURE — 1036F TOBACCO NON-USER: CPT | Performed by: NURSE PRACTITIONER

## 2022-06-28 PROCEDURE — G8417 CALC BMI ABV UP PARAM F/U: HCPCS | Performed by: NURSE PRACTITIONER

## 2022-06-28 PROCEDURE — 99214 OFFICE O/P EST MOD 30 MIN: CPT | Performed by: NURSE PRACTITIONER

## 2022-06-28 PROCEDURE — 2022F DILAT RTA XM EVC RTNOPTHY: CPT | Performed by: NURSE PRACTITIONER

## 2022-06-28 PROCEDURE — 3052F HG A1C>EQUAL 8.0%<EQUAL 9.0%: CPT | Performed by: NURSE PRACTITIONER

## 2022-06-28 PROCEDURE — 3017F COLORECTAL CA SCREEN DOC REV: CPT | Performed by: NURSE PRACTITIONER

## 2022-06-28 PROCEDURE — G0438 PPPS, INITIAL VISIT: HCPCS | Performed by: NURSE PRACTITIONER

## 2022-06-28 PROCEDURE — 1123F ACP DISCUSS/DSCN MKR DOCD: CPT | Performed by: NURSE PRACTITIONER

## 2022-06-28 PROCEDURE — G8427 DOCREV CUR MEDS BY ELIG CLIN: HCPCS | Performed by: NURSE PRACTITIONER

## 2022-06-28 PROCEDURE — 1090F PRES/ABSN URINE INCON ASSESS: CPT | Performed by: NURSE PRACTITIONER

## 2022-06-28 RX ORDER — CEPHALEXIN 500 MG/1
500 CAPSULE ORAL 2 TIMES DAILY
Qty: 14 CAPSULE | Refills: 0 | Status: SHIPPED | OUTPATIENT
Start: 2022-06-28 | End: 2022-07-05

## 2022-06-28 RX ORDER — OXYBUTYNIN CHLORIDE 15 MG/1
15 TABLET, EXTENDED RELEASE ORAL DAILY
Qty: 90 TABLET | Refills: 0 | Status: SHIPPED | OUTPATIENT
Start: 2022-06-28 | End: 2022-07-21 | Stop reason: SDUPTHER

## 2022-06-28 ASSESSMENT — ANXIETY QUESTIONNAIRES
IF YOU CHECKED OFF ANY PROBLEMS ON THIS QUESTIONNAIRE, HOW DIFFICULT HAVE THESE PROBLEMS MADE IT FOR YOU TO DO YOUR WORK, TAKE CARE OF THINGS AT HOME, OR GET ALONG WITH OTHER PEOPLE: NOT DIFFICULT AT ALL
4. TROUBLE RELAXING: 0
2. NOT BEING ABLE TO STOP OR CONTROL WORRYING: 0
GAD7 TOTAL SCORE: 0
3. WORRYING TOO MUCH ABOUT DIFFERENT THINGS: 0
6. BECOMING EASILY ANNOYED OR IRRITABLE: 0
1. FEELING NERVOUS, ANXIOUS, OR ON EDGE: 0
7. FEELING AFRAID AS IF SOMETHING AWFUL MIGHT HAPPEN: 0
5. BEING SO RESTLESS THAT IT IS HARD TO SIT STILL: 0

## 2022-06-28 ASSESSMENT — PATIENT HEALTH QUESTIONNAIRE - PHQ9
5. POOR APPETITE OR OVEREATING: 0
6. FEELING BAD ABOUT YOURSELF - OR THAT YOU ARE A FAILURE OR HAVE LET YOURSELF OR YOUR FAMILY DOWN: 0
10. IF YOU CHECKED OFF ANY PROBLEMS, HOW DIFFICULT HAVE THESE PROBLEMS MADE IT FOR YOU TO DO YOUR WORK, TAKE CARE OF THINGS AT HOME, OR GET ALONG WITH OTHER PEOPLE: 0
3. TROUBLE FALLING OR STAYING ASLEEP: 0
1. LITTLE INTEREST OR PLEASURE IN DOING THINGS: 1
7. TROUBLE CONCENTRATING ON THINGS, SUCH AS READING THE NEWSPAPER OR WATCHING TELEVISION: 0
SUM OF ALL RESPONSES TO PHQ QUESTIONS 1-9: 2
8. MOVING OR SPEAKING SO SLOWLY THAT OTHER PEOPLE COULD HAVE NOTICED. OR THE OPPOSITE, BEING SO FIGETY OR RESTLESS THAT YOU HAVE BEEN MOVING AROUND A LOT MORE THAN USUAL: 0
SUM OF ALL RESPONSES TO PHQ QUESTIONS 1-9: 2
SUM OF ALL RESPONSES TO PHQ9 QUESTIONS 1 & 2: 2
SUM OF ALL RESPONSES TO PHQ QUESTIONS 1-9: 2
4. FEELING TIRED OR HAVING LITTLE ENERGY: 0
9. THOUGHTS THAT YOU WOULD BE BETTER OFF DEAD, OR OF HURTING YOURSELF: 0
2. FEELING DOWN, DEPRESSED OR HOPELESS: 1
SUM OF ALL RESPONSES TO PHQ QUESTIONS 1-9: 2

## 2022-06-28 NOTE — PROGRESS NOTES
saw Gregg Maciel for skin tear right lower extremity. Worried it could be infected. Does still have pain and some drainage and redness. She has been covering it with neosporin and dry dressing. Vaginal odor- wears pad all of the time due to urinary leakage- sour smell. No itching, irritation or discharge. X 6-8 months. Has a lot of urinary leaking and urgency- takes oxybutynin. Treatment Adherence:   Medication compliance:  compliant all of the time  Diet compliance:  compliant most of the time  Weight trend: decreasing  Current exercise: plans to start walking  Barriers: stress     Diabetes Mellitus Type 2: Current symptoms/problems include none  Home blood sugar records: she tests 4 times daily- fasting and before each meal  Any episodes of hypoglycemia? no  Eye exam current (within one year): no  Tobacco history: She  reports that she has never smoked. She has never used smokeless tobacco.   Daily Aspirin? Yes    Hemoglobin A1C   Date Value Ref Range Status   06/27/2022 8.0 See comment % Final     Comment:     Comment:  Diagnosis of Diabetes: > or = 6.5%  Increased risk of diabetes (Prediabetes): 5.7-6.4%  Glycemic Control: Nonpregnant Adults: <7.0%                    Pregnant: <6.0%       11/10/2021 9.0 % Final   04/28/2021 7.9 % Final     Hypertension:  Home blood pressure monitoring: No.  She is not adherent to a low sodium diet. Patient denies chest pain.  Current treatment: amlodipine 5 mg     Hyperlipidemia:  No new myalgias or GI upset on atorvastatin (Lipitor).      Cholesterol, Total   Date Value Ref Range Status   06/27/2022 151 0 - 199 mg/dL Final     LDL Calculated   Date Value Ref Range Status   06/27/2022 76 <100 mg/dL Final     HDL   Date Value Ref Range Status   06/27/2022 59 40 - 60 mg/dL Final     Triglycerides   Date Value Ref Range Status   06/27/2022 80 0 - 150 mg/dL Final          Hypothyroidism: Recent symptoms: none. She denies hair loss.  Patient is  taking her medication consistently on an empty stomach. bSy  TSH   Date Value Ref Range Status   06/27/2022 1.57 0.27 - 4.20 uIU/mL Final     Mood Disorder:  stable, stressors: her health and her son. Her psychiatrist is Dr. Anna Ryan. Chronic depression- taking Zoloft and Zyprexa and Depakote. No Suicidal plans currently- has in the past.        Patient's complete Health Risk Assessment and screening values have been reviewed and are found in Flowsheets. The following problems were reviewed today and where indicated follow up appointments were made and/or referrals ordered. Positive Risk Factor Screenings with Interventions:    Fall Risk:  Do you feel unsteady or are you worried about falling? : (!) yes  2 or more falls in past year?: (!) yes  Fall with injury in past year?: (!) yes  Timed Up and Go Test > 12 seconds?: no  Fall Risk Interventions:    · Home safety tips provided              Opioid Risk: (Low risk score <55) Opioid risk score: 24    Patient is low risk for opioid use disorder or overdose. Last PDMP [place] Records as Reviewed:  Review User Review Instant Review Result   Leonel Dias 5/23/2022 12:02 PM Reviewed PDMP [1]     Last Controlled Substance Monitoring Documentation      Office Visit from 4/12/2019 in [address] [facility] The Prescription Monitoring Report for this patient was reviewed today.  filed at 04/12/2019 [address] [place] [address] and ACP:  General  In general, how would you say your health is?: Very Good  In the past 7 days, have you experienced any of the following: New or Increased Pain, New or Increased Fatigue, Loneliness, Social Isolation, Stress or Anger?: (!) Yes  Select all that apply: (!) Loneliness,New or Increased Pain  Do you get the social and emotional support that you need?: Yes  Do you have a Living Will?: (!) No    Advance Directives     Power of  Living Will ACP-Advance Directive ACP-Power of     Not on File [place] on 07/02/18 Filed Not on [facility] Interventions:  · has living will    Health Habits/Nutrition:     Physical Activity: Insufficiently Active    Days of Exercise per Week: 3 days    Minutes of Exercise per Session: 30 min     Have you lost any weight without trying in the past 3 months?: No  Body mass index: (!) 25.4  Have you seen the dentist within the past year?: (!) No    Health Habits/Nutrition Interventions:  · Dental exam overdue:  patient declines dental evaluation    Hearing/Vision:  Do you or your family notice any trouble with your hearing that hasn't been managed with hearing aids?: (!) Yes  Do you have difficulty driving, watching TV, or doing any of your daily activities because of your eyesight?: No  Have you had an eye exam within the past year?: (!) No  No exam data present    Hearing/Vision Interventions:  · Hearing concerns:  patient declines any further evaluation/treatment for hearing issues  · Vision concerns:  has appointment with [facility]            Objective   Vitals:    06/28/22 0854 06/28/22 0856 06/28/22 0912   BP: (!) 153/84 (!) 149/79 134/72   Site: Left Upper Arm Left Upper Arm    Position: Sitting Sitting    Pulse: 76     Resp: 16     Temp: 97.7 °F (36.5 °C)     TempSrc: Temporal     SpO2: 95%     Weight: 148 lb (67.1 kg)     Height: 5' 4\" (1.626 m)        Body mass index is 25.4 kg/m². Allergies   Allergen Reactions    Sulfa Antibiotics Hives    Sulfa Antibiotics      Prior to Visit Medications    Medication Sig Taking?  Authorizing Provider   cephALEXin (KEFLEX) 500 MG capsule Take 1 capsule by mouth 2 times daily for 7 days Yes SAI Syed CNP   oxybutynin (DITROPAN XL) 15 MG extended release tablet Take 1 tablet by mouth daily Yes SAI Syed CNP   gabapentin (NEURONTIN) 400 MG capsule Take 1 tablet po AM, take 2 tablets po PM Yes Bernetta Boxer, MD   meloxicam (MOBIC) 15 MG tablet Take 1 tablet by mouth daily Yes Bernetta Boxer, MD   oxyCODONE-acetaminophen (PERCOCET) 7.5-325 MG per tablet Take 1 tablet by mouth every 6 hours as needed for Pain (max 2-3/day) for up to 35 days. Yes Saulo Kaye MD   sertraline (ZOLOFT) 100 MG tablet Take 1 tablet by mouth 2 times daily Yes SAI Crandall CNP   amLODIPine (NORVASC) 5 MG tablet TAKE 1 TABLET BY MOUTH EVERY DAY Yes SAI Crandall CNP   traZODone (DESYREL) 100 MG tablet Take 1 tablet by mouth daily Yes SAI Crandall CNP   OLANZapine (ZYPREXA) 2.5 MG tablet TAKE 1 TABLET BY MOUTH EVERY DAY AT NIGHT Yes SAI Jimenez CNP   LEVEMIR FLEXTOUCH 100 UNIT/ML injection pen INJECT 20 UNITS SUBCUTANEOUSLY TWICE A DAY Yes SAI Jimenez CNP   atorvastatin (LIPITOR) 40 MG tablet TAKE 1 TABLET BY MOUTH EVERY DAY Yes SAI Jimenez CNP   losartan (COZAAR) 100 MG tablet TAKE 1 TABLET BY MOUTH EVERY DAY Yes SAI Jimenez CNP   levothyroxine (SYNTHROID) 75 MCG tablet TAKE 1 TABLET BY MOUTH EVERY DAY Yes SAI Jimenez CNP   Insulin Pen Needle 31G X 5 MM MISC 1 each by Does not apply route 4 times daily Yes SAI Jimenez CNP   NOVOLOG FLEXPEN 100 UNIT/ML injection pen INJECT 5 UNITS INTO THE SKIN 3 TIMES DAILY (BEFORE MEALS) Yes SAI Jimenez CNP   divalproex (DEPAKOTE ER) 500 MG extended release tablet TAKE 1 TABLET BY MOUTH EVERY DAY Yes SAI Jimenez CNP   VITAMIN D PO Take by mouth daily Yes Historical Provider, MD   MAGNESIUM PO Take by mouth daily  Yes Historical Provider, MD   CALCIUM-VITAMIN D PO Take by mouth daily Yes Historical Provider, MD   blood glucose monitor kit and supplies Test 4 times a day & as needed for symptoms of irregular blood glucose. Yes SAI Jimenez CNP   blood glucose monitor strips Test 4 times a day & as needed for symptoms of irregular blood glucose.  Dispense sufficient amount for indicated testing frequency plus additional to accommodate PRN testing needs.  Yes SAI Cavazos CNP   Handicap Placard MISC by Does not apply route Yes Carol Stevenson MD   [address] 1 each into the skin 3 times daily Yes Carol Stevenson MD   OLANZapine (ZYPREXA) 2.5 MG tablet TAKE 1 TABLET BY MOUTH EVERY DAY AT NIGHT  SAI Cavazos CNP       CareTeam (Including outside providers/suppliers regularly involved in providing care):   Patient Care Team:  SAI Cavazos CNP as PCP - General (Nurse Practitioner Family)  SAI Cavazos CNP as PCP - REHABILITATION [facility] Empaneled Provider  Christian Collier Che, MD as Consulting Physician (Family Medicine)  Davida De La Rosa MD as Surgeon (General Surgery)     Reviewed and updated this visit:  Tobacco  Allergies  Meds  Problems  Med Hx  Surg Hx  Soc Hx  Fam Hx

## 2022-06-28 NOTE — PATIENT INSTRUCTIONS
- start the antibiotic for the leg  - follow up 1 week so I can look at the leg again  - increase oxybutynin for OAB  - Diabetes numbers are improving    Personalized Preventive Plan for Poppy Mcknight - 6/28/2022  Medicare offers a range of preventive health benefits. Some of the tests and screenings are paid in full while other may be subject to a deductible, co-insurance, and/or copay. Some of these benefits include a comprehensive review of your medical history including lifestyle, illnesses that may run in your family, and various assessments and screenings as appropriate. After reviewing your medical record and screening and assessments performed today your provider may have ordered immunizations, labs, imaging, and/or referrals for you. A list of these orders (if applicable) as well as your Preventive Care list are included within your After Visit Summary for your review. Other Preventive Recommendations:    · A preventive eye exam performed by an eye specialist is recommended every 1-2 years to screen for glaucoma; cataracts, macular degeneration, and other eye disorders. · A preventive dental visit is recommended every 6 months. · Try to get at least 150 minutes of exercise per week or 10,000 steps per day on a pedometer . · Order or download the FREE \"Exercise & Physical Activity: Your Everyday Guide\" from [employer]. Call 7-719.723.1805 or search [employer] online. · You need 0481-6762 mg of calcium and 4706-6732 IU of vitamin D per day. It is possible to meet your calcium requirement with diet alone, but a vitamin D supplement is usually necessary to meet this goal.  · When exposed to the sun, use a sunscreen that protects against both UVA and UVB radiation with an SPF of 30 or greater. Reapply every 2 to 3 hours or after sweating, drying off with a towel, or swimming. · Always wear a seat belt when traveling in a car.  Always wear a helmet when riding a bicycle or motorcycle.

## 2022-06-29 LAB
CANDIDA SPECIES, DNA PROBE: NORMAL
GARDNERELLA VAGINALIS, DNA PROBE: NORMAL
TRICHOMONAS VAGINALIS DNA: NORMAL

## 2022-06-30 DIAGNOSIS — N39.45 CONTINUOUS LEAKAGE OF URINE: Primary | ICD-10-CM

## 2022-07-06 ENCOUNTER — OFFICE VISIT (OUTPATIENT)
Dept: FAMILY MEDICINE CLINIC | Age: 72
End: 2022-07-06
Payer: MEDICARE

## 2022-07-06 VITALS
DIASTOLIC BLOOD PRESSURE: 76 MMHG | BODY MASS INDEX: 25.4 KG/M2 | SYSTOLIC BLOOD PRESSURE: 144 MMHG | OXYGEN SATURATION: 96 % | WEIGHT: 148 LBS | RESPIRATION RATE: 16 BRPM | HEART RATE: 83 BPM | TEMPERATURE: 97.1 F

## 2022-07-06 DIAGNOSIS — S81.811D NONINFECTED SKIN TEAR OF RIGHT LOWER EXTREMITY, SUBSEQUENT ENCOUNTER: Primary | ICD-10-CM

## 2022-07-06 PROCEDURE — 3017F COLORECTAL CA SCREEN DOC REV: CPT | Performed by: NURSE PRACTITIONER

## 2022-07-06 PROCEDURE — G8417 CALC BMI ABV UP PARAM F/U: HCPCS | Performed by: NURSE PRACTITIONER

## 2022-07-06 PROCEDURE — G8399 PT W/DXA RESULTS DOCUMENT: HCPCS | Performed by: NURSE PRACTITIONER

## 2022-07-06 PROCEDURE — 1036F TOBACCO NON-USER: CPT | Performed by: NURSE PRACTITIONER

## 2022-07-06 PROCEDURE — G8427 DOCREV CUR MEDS BY ELIG CLIN: HCPCS | Performed by: NURSE PRACTITIONER

## 2022-07-06 PROCEDURE — 99213 OFFICE O/P EST LOW 20 MIN: CPT | Performed by: NURSE PRACTITIONER

## 2022-07-06 PROCEDURE — 1090F PRES/ABSN URINE INCON ASSESS: CPT | Performed by: NURSE PRACTITIONER

## 2022-07-06 PROCEDURE — 1123F ACP DISCUSS/DSCN MKR DOCD: CPT | Performed by: NURSE PRACTITIONER

## 2022-07-06 SDOH — ECONOMIC STABILITY: TRANSPORTATION INSECURITY
IN THE PAST 12 MONTHS, HAS THE LACK OF TRANSPORTATION KEPT YOU FROM MEDICAL APPOINTMENTS OR FROM GETTING MEDICATIONS?: NO

## 2022-07-06 SDOH — ECONOMIC STABILITY: FOOD INSECURITY: WITHIN THE PAST 12 MONTHS, THE FOOD YOU BOUGHT JUST DIDN'T LAST AND YOU DIDN'T HAVE MONEY TO GET MORE.: NEVER TRUE

## 2022-07-06 SDOH — ECONOMIC STABILITY: INCOME INSECURITY: IN THE LAST 12 MONTHS, WAS THERE A TIME WHEN YOU WERE NOT ABLE TO PAY THE MORTGAGE OR RENT ON TIME?: NO

## 2022-07-06 SDOH — ECONOMIC STABILITY: FOOD INSECURITY: WITHIN THE PAST 12 MONTHS, YOU WORRIED THAT YOUR FOOD WOULD RUN OUT BEFORE YOU GOT MONEY TO BUY MORE.: NEVER TRUE

## 2022-07-06 SDOH — ECONOMIC STABILITY: HOUSING INSECURITY
IN THE LAST 12 MONTHS, WAS THERE A TIME WHEN YOU DID NOT HAVE A STEADY PLACE TO SLEEP OR SLEPT IN A SHELTER (INCLUDING NOW)?: NO

## 2022-07-06 ASSESSMENT — SOCIAL DETERMINANTS OF HEALTH (SDOH): HOW HARD IS IT FOR YOU TO PAY FOR THE VERY BASICS LIKE FOOD, HOUSING, MEDICAL CARE, AND HEATING?: NOT HARD AT ALL

## 2022-07-06 ASSESSMENT — ENCOUNTER SYMPTOMS: COLOR CHANGE: 0

## 2022-07-06 NOTE — PROGRESS NOTES
2022     Chief Complaint   Patient presents with    Other     fOLLOW UP ON LEG [address] (:  1950) is a 66 y.o. female, here for evaluation of the following medical concerns:    HPI    Benson Mckeon on 22- saw Lexus Anderson for skin tear right lower extremity. I saw her last week and put her on Keflex. The wound is improving, redness is resolving. Scant amount of serous drainage. No fever or leg swelling. She is keeping covered with a dry dressing and Neosporin during the day and keeping it open to air at nighttime. No other concerns were voiced today.     Review of Systems   Constitutional: Negative for chills, diaphoresis, fatigue and fever. Skin: Positive for wound. Negative for color change, pallor and rash. Prior to Visit Medications    Medication Sig Taking? Authorizing Provider   oxybutynin (DITROPAN XL) 15 MG extended release tablet Take 1 tablet by mouth daily  SAI Joseph CNP   gabapentin (NEURONTIN) 400 MG capsule Take 1 tablet po AM, take 2 tablets po PM  Larry De Los Santos MD   meloxicam (MOBIC) 15 MG tablet Take 1 tablet by mouth daily  Lasha Herrera MD   oxyCODONE-acetaminophen (PERCOCET) 7.5-325 MG per tablet Take 1 tablet by mouth every 6 hours as needed for Pain (max 2-3/day) for up to 35 days.   Lasha Herrera MD   sertraline (ZOLOFT) 100 MG tablet Take 1 tablet by mouth 2 times daily  SAI Miller CNP   amLODIPine (NORVASC) 5 MG tablet TAKE 1 TABLET BY MOUTH EVERY DAY  SAI Miller CNP   traZODone (DESYREL) 100 MG tablet Take 1 tablet by mouth daily  SAI Miller CNP   OLANZapine (ZYPREXA) 2.5 MG tablet TAKE 1 TABLET BY MOUTH EVERY DAY AT [facility], APRN - CNP   LEVEMIR FLEXTOUCH 100 UNIT/ML injection pen INJECT 20 UNITS SUBCUTANEOUSLY TWICE A DAY  SAI Joseph CNP   atorvastatin (LIPITOR) 40 MG tablet TAKE 1 TABLET BY MOUTH EVERY DAY  SAI Joseph CNP   losartan (COZAAR) 100 MG tablet TAKE 1 TABLET BY MOUTH EVERY DAY  SAI Thornton - CNP   levothyroxine (SYNTHROID) 75 MCG tablet TAKE 1 TABLET BY MOUTH EVERY DAY  SAI Thornton CNP   Insulin Pen Needle 31G X 5 MM MISC 1 each by Does not apply route 4 times daily  SAI Thornton CNP   NOVOLOG FLEXPEN 100 UNIT/ML injection pen INJECT 5 UNITS INTO THE SKIN 3 TIMES DAILY (BEFORE MEALS)  SAI Thornton CNP   divalproex (DEPAKOTE ER) 500 MG extended release tablet TAKE 1 TABLET BY MOUTH EVERY DAY  SAI Doshi CNP   VITAMIN D PO Take by mouth daily  Historical Provider, MD   MAGNESIUM PO Take by mouth daily   Historical Provider, MD   CALCIUM-VITAMIN D PO Take by mouth daily  Historical Provider, MD   blood glucose monitor kit and supplies Test 4 times a day & as needed for symptoms of irregular blood glucose. SAI Thornton CNP   blood glucose monitor strips Test 4 times a day & as needed for symptoms of irregular blood glucose. Dispense sufficient amount for indicated testing frequency plus additional to accommodate PRN testing needs.   SAI Thornton CNP   OLANZapine (ZYPREXA) 2.5 MG tablet TAKE 1 TABLET BY MOUTH EVERY DAY AT NIGHT  SAI Thornton CNP   Handicap Placard MISC by Does not apply route  Juno Lawler MD   Needles & Syringes MISC Inject 1 each into the skin 3 times daily  Juno Lawler MD        Social History     Tobacco Use    Smoking status: Never Smoker    Smokeless tobacco: Never Used   Substance Use Topics    Alcohol use: Not Currently     Alcohol/week: 2.0 standard drinks     Types: 2 Glasses of wine per week        Vitals:    07/06/22 1043 07/06/22 1045   BP: (!) 146/77 (!) 144/76   Site: Left Upper Arm Left Upper Arm   Position: Sitting Sitting   Pulse: 83    Resp: 16    Temp: 97.1 °F (36.2 °C)    TempSrc: Temporal    SpO2: 96%    Weight: 148 lb (67.1 kg)      Estimated body mass index is 25.4 kg/m² as calculated from the following:    Height as of 6/28/22: 5' 4\" (1.626 m). Weight as of this encounter: 148 lb (67.1 kg). Physical Exam  Vitals and nursing note reviewed. Constitutional:       General: She is not in acute distress. Appearance: Normal appearance. She is well-developed. She is not ill-appearing, toxic-appearing or diaphoretic. HENT:      Head: Normocephalic and atraumatic. Eyes:      Extraocular Movements: Extraocular movements intact. Pupils: Pupils are equal, round, and reactive to light. Cardiovascular:      Rate and Rhythm: Normal rate and regular rhythm. Heart sounds: Normal heart sounds, S1 normal and S2 normal. No murmur heard. No friction rub. No gallop. Pulmonary:      Effort: Pulmonary effort is normal. No respiratory distress. Breath sounds: Normal breath sounds. Skin:            Comments: Abrasion is healing- surrounding erythema is resolving. There is no swelling or drainage. Neurological:      General: No focal deficit present. Mental Status: She is alert and oriented to person, place, and time. Mental status is at baseline. Cranial Nerves: No cranial nerve deficit. Psychiatric:         Speech: Speech normal.         ASSESSMENT/PLAN:  1. Noninfected skin tear of right lower extremity, subsequent encounter- no longer appears acutely infected, continue local wound care measures- gentle cleansing daily with warm water and mild soap. Neosporin and dry dressing during the day and open to air at night. Discussed return precautions. Return if symptoms worsen or fail to improve. An electronic signature was used to authenticate this note.     --SAI Adkins - CNP on 7/6/2022 at 1:24 PM

## 2022-07-06 NOTE — PATIENT INSTRUCTIONS
· Continue to keep wound covered during the day and open at night  · Monitor symptoms for any signs of infection call  · Routine follow up 4 months

## 2022-07-08 DIAGNOSIS — F32.A DEPRESSION, UNSPECIFIED DEPRESSION TYPE: ICD-10-CM

## 2022-07-08 NOTE — TELEPHONE ENCOUNTER
6/15/2022    Future Appointments   Date Time Provider John Irizarry   8/1/2022  9:00 AM Zully Talbert MD [facility]   11/8/2022 11:00 AM SAI Radford - [facility]E

## 2022-07-11 RX ORDER — TRAZODONE HYDROCHLORIDE 100 MG/1
TABLET ORAL
Qty: 90 TABLET | Refills: 1 | OUTPATIENT
Start: 2022-07-11

## 2022-07-11 RX ORDER — SERTRALINE HYDROCHLORIDE 100 MG/1
TABLET, FILM COATED ORAL
Qty: 60 TABLET | Refills: 0 | OUTPATIENT
Start: 2022-07-11

## 2022-07-11 NOTE — TELEPHONE ENCOUNTER
Her psychiatrist is managing these medications. Please call her to see what is needed, is she no longer seeing her psych?

## 2022-07-12 RX ORDER — SERTRALINE HYDROCHLORIDE 100 MG/1
100 TABLET, FILM COATED ORAL 2 TIMES DAILY
Qty: 180 TABLET | Refills: 0 | Status: SHIPPED | OUTPATIENT
Start: 2022-07-12 | End: 2022-10-11

## 2022-07-12 RX ORDER — TRAZODONE HYDROCHLORIDE 100 MG/1
100 TABLET ORAL DAILY
Qty: 90 TABLET | Refills: 0 | Status: SHIPPED | OUTPATIENT
Start: 2022-07-12 | End: 2022-10-18

## 2022-07-15 NOTE — TELEPHONE ENCOUNTER
Future Appointments   Date Time Provider John Irizarry   4/23/2021  8:45 AM Thanh Stauffer MD [facility]   last ov 7/27/20
[facility]
PATIENT DUE FOR FOLLOW UP APPOINTMENT PLEASE CALL TO SCHEDULE
Patient scheduled.
Ambulatory

## 2022-07-21 DIAGNOSIS — N39.3 STRESS INCONTINENCE: ICD-10-CM

## 2022-07-21 RX ORDER — OXYBUTYNIN CHLORIDE 15 MG/1
15 TABLET, EXTENDED RELEASE ORAL DAILY
Qty: 90 TABLET | Refills: 0 | Status: SHIPPED | OUTPATIENT
Start: 2022-07-21 | End: 2022-08-09 | Stop reason: SINTOL

## 2022-07-21 NOTE — TELEPHONE ENCOUNTER
LOV 7/6/2022    Future Appointments   Date Time Provider John Edie   8/1/2022  9:00 AM Ariel Alexander MD [facility]   8/9/2022 11:15 AM Ct Colin MD [facility] UROGYN [facility]   11/8/2022 11:00 AM SAI Pino - CNP [facility] [place] - [facility]

## 2022-08-01 ENCOUNTER — OFFICE VISIT (OUTPATIENT)
Dept: PAIN MANAGEMENT | Age: 72
End: 2022-08-01
Payer: MEDICARE

## 2022-08-01 VITALS
SYSTOLIC BLOOD PRESSURE: 136 MMHG | HEIGHT: 64 IN | OXYGEN SATURATION: 97 % | WEIGHT: 148 LBS | BODY MASS INDEX: 25.27 KG/M2 | HEART RATE: 83 BPM | DIASTOLIC BLOOD PRESSURE: 70 MMHG

## 2022-08-01 DIAGNOSIS — M48.061 DEGENERATIVE LUMBAR SPINAL STENOSIS: ICD-10-CM

## 2022-08-01 DIAGNOSIS — M48.07 SPINAL STENOSIS OF LUMBOSACRAL REGION: ICD-10-CM

## 2022-08-01 DIAGNOSIS — G56.03 BILATERAL CARPAL TUNNEL SYNDROME: ICD-10-CM

## 2022-08-01 DIAGNOSIS — M96.1 FAILED NECK SYNDROME: ICD-10-CM

## 2022-08-01 DIAGNOSIS — G56.22 ULNAR NEUROPATHY AT ELBOW, LEFT: ICD-10-CM

## 2022-08-01 DIAGNOSIS — M79.7 FIBROMYALGIA: ICD-10-CM

## 2022-08-01 DIAGNOSIS — M47.812 OSTEOARTHRITIS OF CERVICAL SPINE, UNSPECIFIED SPINAL OSTEOARTHRITIS COMPLICATION STATUS: ICD-10-CM

## 2022-08-01 DIAGNOSIS — G89.4 CHRONIC PAIN SYNDROME: ICD-10-CM

## 2022-08-01 DIAGNOSIS — M96.1 FAILED BACK SURGICAL SYNDROME: ICD-10-CM

## 2022-08-01 PROCEDURE — G8427 DOCREV CUR MEDS BY ELIG CLIN: HCPCS | Performed by: INTERNAL MEDICINE

## 2022-08-01 PROCEDURE — 1090F PRES/ABSN URINE INCON ASSESS: CPT | Performed by: INTERNAL MEDICINE

## 2022-08-01 PROCEDURE — 1123F ACP DISCUSS/DSCN MKR DOCD: CPT | Performed by: INTERNAL MEDICINE

## 2022-08-01 PROCEDURE — 3017F COLORECTAL CA SCREEN DOC REV: CPT | Performed by: INTERNAL MEDICINE

## 2022-08-01 PROCEDURE — 1036F TOBACCO NON-USER: CPT | Performed by: INTERNAL MEDICINE

## 2022-08-01 PROCEDURE — G8399 PT W/DXA RESULTS DOCUMENT: HCPCS | Performed by: INTERNAL MEDICINE

## 2022-08-01 PROCEDURE — 99213 OFFICE O/P EST LOW 20 MIN: CPT | Performed by: INTERNAL MEDICINE

## 2022-08-01 PROCEDURE — G8417 CALC BMI ABV UP PARAM F/U: HCPCS | Performed by: INTERNAL MEDICINE

## 2022-08-01 RX ORDER — GABAPENTIN 400 MG/1
CAPSULE ORAL
Qty: 90 CAPSULE | Refills: 1 | Status: SHIPPED | OUTPATIENT
Start: 2022-08-01 | End: 2022-09-02 | Stop reason: SDUPTHER

## 2022-08-01 RX ORDER — OXYCODONE AND ACETAMINOPHEN 7.5; 325 MG/1; MG/1
1 TABLET ORAL EVERY 6 HOURS PRN
Qty: 90 TABLET | Refills: 0 | Status: SHIPPED | OUTPATIENT
Start: 2022-08-01 | End: 2022-09-02 | Stop reason: SDUPTHER

## 2022-08-01 RX ORDER — MELOXICAM 15 MG/1
15 TABLET ORAL DAILY
Qty: 30 TABLET | Refills: 1 | Status: SHIPPED | OUTPATIENT
Start: 2022-08-01 | End: 2022-09-02 | Stop reason: SDUPTHER

## 2022-08-01 NOTE — PROGRESS NOTES
Rubi Nagel  1950  [id number]      HISTORY OF PRESENT ILLNESS:  Ms. Benito Dubon is a 66 y.o. female returns for a follow up visit for pain management  She has a diagnosis of   1. Chronic pain syndrome    2. Failed neck syndrome    3. Failed back surgical syndrome    4. Osteoarthritis of cervical spine, unspecified spinal osteoarthritis complication status    5. Bilateral carpal tunnel syndrome    6. Spinal stenosis of lumbosacral region    7. Ulnar neuropathy at elbow, left    8. Degenerative lumbar spinal stenosis    9. Chronic low back pain, unspecified back pain laterality, unspecified whether sciatica present    10. Fibromyalgia    . She complains of pain in the  upper back, lower back with radiation to the left shoulder, left hip, left upper leg, thigh  She rates the pain 6/10 and describes it as aching, burning, pins and needles. Current treatment regimen has helped relieve about 40% of the pain. She denies any side effects from the current pain regimen. Patient reports that since the last follow up visit the physical functioning is worse, family/social relationships are unchanged, mood is worse sleep patterns are worse, and that the overall functioning is worse. Patient denies misusing/abusing her narcotic pain medications or using any illegal drugs. There are No indicators for possible drug abuse, addiction or diversion problems, patient states she has been having pain in the left hip and leg. Ms. Benito Dubon says walking is painful. Patient states she is using Mobic along with Percocet and Neurontin. Patient denies any side effects with the medications. She states she is doing her stretching exercises. ALLERGIES: Patients list of allergies were reviewed     MEDICATIONS: Ms. Benito Dubon list of medications were reviewed. Her current medications are   Outpatient Medications Prior to Visit   Medication Sig Dispense Refill    oxybutynin (DITROPAN XL) 15 MG extended release tablet Take 1 tablet by mouth in the morning. 90 tablet 0    sertraline (ZOLOFT) 100 MG tablet Take 1 tablet by mouth 2 times daily 180 tablet 0    traZODone (DESYREL) 100 MG tablet Take 1 tablet by mouth daily 90 tablet 0    gabapentin (NEURONTIN) 400 MG capsule Take 1 tablet po AM, take 2 tablets po PM 90 capsule 1    meloxicam (MOBIC) 15 MG tablet Take 1 tablet by mouth daily 30 tablet 1    oxyCODONE-acetaminophen (PERCOCET) 7.5-325 MG per tablet Take 1 tablet by mouth every 6 hours as needed for Pain (max 2-3/day) for up to 35 days. 90 tablet 0    amLODIPine (NORVASC) 5 MG tablet TAKE 1 TABLET BY MOUTH EVERY DAY 90 tablet 0    OLANZapine (ZYPREXA) 2.5 MG tablet TAKE 1 TABLET BY MOUTH EVERY DAY AT NIGHT 90 tablet 0    LEVEMIR FLEXTOUCH 100 UNIT/ML injection pen INJECT 20 UNITS SUBCUTANEOUSLY TWICE A DAY 15 pen 5    atorvastatin (LIPITOR) 40 MG tablet TAKE 1 TABLET BY MOUTH EVERY DAY 90 tablet 0    losartan (COZAAR) 100 MG tablet TAKE 1 TABLET BY MOUTH EVERY DAY 90 tablet 0    levothyroxine (SYNTHROID) 75 MCG tablet TAKE 1 TABLET BY MOUTH EVERY DAY 90 tablet 3    Insulin Pen Needle 31G X 5 MM MISC 1 each by Does not apply route 4 times daily 120 each 5    NOVOLOG FLEXPEN 100 UNIT/ML injection pen INJECT 5 UNITS INTO THE SKIN 3 TIMES DAILY (BEFORE MEALS) 5 pen 1    divalproex (DEPAKOTE ER) 500 MG extended release tablet TAKE 1 TABLET BY MOUTH EVERY DAY 90 tablet 3    VITAMIN D PO Take by mouth daily      MAGNESIUM PO Take by mouth daily       CALCIUM-VITAMIN D PO Take by mouth daily      blood glucose monitor kit and supplies Test 4 times a day & as needed for symptoms of irregular blood glucose. 1 kit 0    blood glucose monitor strips Test 4 times a day & as needed for symptoms of irregular blood glucose. Dispense sufficient amount for indicated testing frequency plus additional to accommodate PRN testing needs.  400 strip 0    Handicap Placard MISC by Does not apply route 1 each 0    Needles & Syringes MISC Inject 1 each into the skin 3 times daily 100 each 3     No facility-administered medications prior to visit. REVIEW OF SYSTEMS:    Respiratory: Negative for apnea, chest tightness and shortness of breath or change in baseline breathing. PHYSICAL EXAM:   Nursing note and vitals reviewed. /70   Pulse 83   Ht 5' 4\" (1.626 m)   Wt 148 lb (67.1 kg)   SpO2 97%   BMI 25.40 kg/m²   Constitutional: She appears well-developed and well-nourished. No acute distress. Cardiovascular: Normal rate, regular rhythm, normal heart sounds, and does not have murmur. Pulmonary/Chest: Effort normal. No respiratory distress. She does not have wheezes in the lung fields. She has no rales. Neurological/Psychiatric:She is alert and oriented to person, place, and time. Coordination is  normal.  Her mood isAppropriate and affect is Neutral/Euthymic(normal) . Her    IMPRESSION:   1. Chronic pain syndrome    2. Failed neck syndrome    3. Failed back surgical syndrome    4. Osteoarthritis of cervical spine, unspecified spinal osteoarthritis complication status    5. Bilateral carpal tunnel syndrome    6. Spinal stenosis of lumbosacral region    7. Ulnar neuropathy at elbow, left    8. Degenerative lumbar spinal stenosis    9. Fibromyalgia        PLAN:  Informed verbal consent was obtained  -ROM/Stretching exercises as advised   -Walking as tolerated    -Continue with Percocet 2-3 per day   -She was advised to increase fluids ( 5-7  glasses of fluid daily), limit caffeine, avoid cheese products, increase dietary fiber, increase activity and exercise as tolerated and relax regularly and enjoy meals      Current Outpatient Medications   Medication Sig Dispense Refill    oxybutynin (DITROPAN XL) 15 MG extended release tablet Take 1 tablet by mouth in the morning.  90 tablet 0    sertraline (ZOLOFT) 100 MG tablet Take 1 tablet by mouth 2 times daily 180 tablet 0    traZODone (DESYREL) 100 MG tablet Take 1 tablet by mouth daily 90 tablet 0 gabapentin (NEURONTIN) 400 MG capsule Take 1 tablet po AM, take 2 tablets po PM 90 capsule 1    meloxicam (MOBIC) 15 MG tablet Take 1 tablet by mouth daily 30 tablet 1    oxyCODONE-acetaminophen (PERCOCET) 7.5-325 MG per tablet Take 1 tablet by mouth every 6 hours as needed for Pain (max 2-3/day) for up to 35 days. 90 tablet 0    amLODIPine (NORVASC) 5 MG tablet TAKE 1 TABLET BY MOUTH EVERY DAY 90 tablet 0    OLANZapine (ZYPREXA) 2.5 MG tablet TAKE 1 TABLET BY MOUTH EVERY DAY AT NIGHT 90 tablet 0    LEVEMIR FLEXTOUCH 100 UNIT/ML injection pen INJECT 20 UNITS SUBCUTANEOUSLY TWICE A DAY 15 pen 5    atorvastatin (LIPITOR) 40 MG tablet TAKE 1 TABLET BY MOUTH EVERY DAY 90 tablet 0    losartan (COZAAR) 100 MG tablet TAKE 1 TABLET BY MOUTH EVERY DAY 90 tablet 0    levothyroxine (SYNTHROID) 75 MCG tablet TAKE 1 TABLET BY MOUTH EVERY DAY 90 tablet 3    Insulin Pen Needle 31G X 5 MM MISC 1 each by Does not apply route 4 times daily 120 each 5    NOVOLOG FLEXPEN 100 UNIT/ML injection pen INJECT 5 UNITS INTO THE SKIN 3 TIMES DAILY (BEFORE MEALS) 5 pen 1    divalproex (DEPAKOTE ER) 500 MG extended release tablet TAKE 1 TABLET BY MOUTH EVERY DAY 90 tablet 3    VITAMIN D PO Take by mouth daily      MAGNESIUM PO Take by mouth daily       CALCIUM-VITAMIN D PO Take by mouth daily      blood glucose monitor kit and supplies Test 4 times a day & as needed for symptoms of irregular blood glucose. 1 kit 0    blood glucose monitor strips Test 4 times a day & as needed for symptoms of irregular blood glucose. Dispense sufficient amount for indicated testing frequency plus additional to accommodate PRN testing needs. 400 strip 0    Handicap Placard MISC by Does not apply route 1 each 0    Needles & Syringes MISC Inject 1 each into the skin 3 times daily 100 each 3     No current facility-administered medications for this visit. I will continue her current medication regimen  which is part of the above treatment schedule.  It has been helping with Ms. Adrian's chronic  medical problems which for this visit include:   Diagnoses of Chronic pain syndrome, Failed neck syndrome, Failed back surgical syndrome, Osteoarthritis of cervical spine, unspecified spinal osteoarthritis complication status, Bilateral carpal tunnel syndrome, Spinal stenosis of lumbosacral region, Ulnar neuropathy at elbow, left, Degenerative lumbar spinal stenosis, Chronic low back pain, unspecified back pain laterality, unspecified whether sciatica present, and Fibromyalgia were pertinent to this visit. Risks and benefits of the medications and other alternative treatments  including no treatment were discussed with the patient. The common side effects of these medications were also explained to the patient. Informed verbal consent was obtained. Goals of current treatment regimen include improvement in pain, restoration of functioning- with focus on improvement in physical performance, general activity, work or disability,emotional distress, health care utilization and  decreased medication consumption. Will continue to monitor progress towards achieving/maintaining therapeutic goals with special emphasis on  1. Improvement in perceived interfernce  of pain with ADL's. Ability to do home exercises independently. Ability to do household chores indoor and/or outdoor work and social and leisure activities. Improve psychosocial and physical functioning. - she is showing progression towards this treatment goal with the current regimen. She was advised against drinking alcohol with the narcotic pain medicines, advised against driving or handling machinery while adjusting the dose of medicines or if having cognitive  issues related to the current medications. Risk of overdose and death, if medicines not taken as prescribed, were also discussed. If the patient develops new symptoms or if the symptoms worsen, the patient should call the office.     While transcribing every attempt was made to maintain the accuracy of the note in terms of it's contents,there may have been some errors made inadvertently. Thank you for allowing me to participate in the care of this patient.     Lashonda Ha MD.    Cc: SAI Servin - CNP

## 2022-08-09 ENCOUNTER — OFFICE VISIT (OUTPATIENT)
Dept: UROGYNECOLOGY | Age: 72
End: 2022-08-09
Payer: MEDICARE

## 2022-08-09 VITALS
RESPIRATION RATE: 18 BRPM | TEMPERATURE: 98.1 F | DIASTOLIC BLOOD PRESSURE: 89 MMHG | HEART RATE: 88 BPM | SYSTOLIC BLOOD PRESSURE: 166 MMHG | OXYGEN SATURATION: 99 %

## 2022-08-09 DIAGNOSIS — R35.0 URINARY FREQUENCY: ICD-10-CM

## 2022-08-09 DIAGNOSIS — N39.41 URGE INCONTINENCE: Primary | ICD-10-CM

## 2022-08-09 DIAGNOSIS — N89.8 VAGINAL ODOR: ICD-10-CM

## 2022-08-09 DIAGNOSIS — R39.15 URINARY URGENCY: ICD-10-CM

## 2022-08-09 DIAGNOSIS — N95.2 VAGINAL ATROPHY: ICD-10-CM

## 2022-08-09 LAB
BILIRUBIN, POC: NORMAL
BLOOD URINE, POC: NORMAL
CLARITY, POC: NORMAL
COLOR, POC: YELLOW
EMPTY COUGH STRESS TEST: NORMAL
FIRST SENSATION: 120 CC
FULL COUGH STRESS TEST: NORMAL
GLUCOSE URINE, POC: NORMAL
KETONES, POC: NORMAL
LEUKOCYTE EST, POC: NORMAL
MAX SENSATION: 500 CC
NITRATE, URINE POC: NORMAL
NITRITE, POC: NORMAL
PH, POC: 6.5
POST VOID RESIDUAL (PVR): 130 ML
PROTEIN, POC: NORMAL
RBC URINE, POC: NORMAL
SECOND SENSATION: 275 CC
SPASM: NORMAL
SPECIFIC GRAVITY, POC: 1.01
UROBILINOGEN, POC: NORMAL
WBC URINE, POC: NORMAL

## 2022-08-09 PROCEDURE — 81002 URINALYSIS NONAUTO W/O SCOPE: CPT | Performed by: OBSTETRICS & GYNECOLOGY

## 2022-08-09 PROCEDURE — 99203 OFFICE O/P NEW LOW 30 MIN: CPT | Performed by: OBSTETRICS & GYNECOLOGY

## 2022-08-09 PROCEDURE — G8417 CALC BMI ABV UP PARAM F/U: HCPCS | Performed by: OBSTETRICS & GYNECOLOGY

## 2022-08-09 PROCEDURE — 1090F PRES/ABSN URINE INCON ASSESS: CPT | Performed by: OBSTETRICS & GYNECOLOGY

## 2022-08-09 PROCEDURE — 0509F URINE INCON PLAN DOCD: CPT | Performed by: OBSTETRICS & GYNECOLOGY

## 2022-08-09 PROCEDURE — G8427 DOCREV CUR MEDS BY ELIG CLIN: HCPCS | Performed by: OBSTETRICS & GYNECOLOGY

## 2022-08-09 PROCEDURE — 51725 SIMPLE CYSTOMETROGRAM: CPT | Performed by: OBSTETRICS & GYNECOLOGY

## 2022-08-09 RX ORDER — ESTRADIOL 0.1 MG/G
0.25 CREAM VAGINAL DAILY
Qty: 30 G | Refills: 0 | Status: SHIPPED | OUTPATIENT
Start: 2022-08-09 | End: 2022-11-03

## 2022-08-09 ASSESSMENT — ENCOUNTER SYMPTOMS
EYE ITCHING: 1
BACK PAIN: 1

## 2022-08-09 NOTE — PROGRESS NOTES
8/9/2022      HPI:     Name: Dania Martinez  YOB: 1950    CC: Patient is a 66 y.o. female who is seen in consultation from Celestine King   for evaluation of  incontinence . HPI:  Bladder control problem: Yes   How many months have you had a bladder problem? years  Do you use pads to absorb lost urine? Yes. If yes how many pads do you wear a day? 5   How many trips do you make to the bathroom during the day? 4-5  How many times do you wake at night to go to the bathroom? rarely  Do you ever wet the bed while asleep? No  Are there times when you cannot make it to the bathroom on time? Yes  Does sound, sight, or feel of running water cause you to lose urine? No  How many ounces of liquid do you consume daily? 6-7 glasses  How many drinks containing caffeine do you consume daily? 2 cups coffee  Which best describes urine loss: (Check all that apply)  [x] I lose urine during coughing, sneezing, running, lifting  [x] I lose urine with changes in posture, standing, walking  [x] I lose urine continuously such that I am constantly wet  [x] I have sudden, urgent needs without the ability to make it to the bathroom  Have you seen a physician for complaints of urine loss? No  Have you taken medication to prevent urine loss? Yes If yes, what meds? Oxybutynin     Bladder emptying problems:  Yes   How long have you had bladder emptying problems? 3 years  Do you notice any dribbling of urine when you stand after passing urine? Yes  Do you usually have difficulty starting your urine stream? Yes  Do you have to assume abnormal positions to urinate? No   Do you have to strain to empty your bladder? Yes  Do you feel as if your bladder is empty after passing urine? No  Is your urine flow: weak, dribbling    Prolapse/Vaginal Support problems: No  Bowel problem(s): No   Sexual History:  reports that she is not currently sexually active and has had partner(s) who are male.   Pelvic Pain:  No  Ob/Gyn History:    OB History    Para Term  AB Living   1 1 1     1   SAB IAB Ectopic Molar Multiple Live Births             1      # Outcome Date GA Lbr Abimael/2nd Weight Sex Delivery Anes PTL Lv   1 Term      Vag-Spont   LISE     Past Medical History:   Past Medical History:   Diagnosis Date    Chronic back pain     Depression     Diabetes mellitus ([address]     Hyperlipidemia     Hypertension     Retinopathy     Seasonal allergies     Spinal stenosis     Thyroid disease     hypothyroidism    Type II or unspecified type diabetes mellitus without mention of complication, not stated as uncontrolled      Past Surgical History:   Past Surgical History:   Procedure Laterality Date    BACK SURGERY      CARPAL TUNNEL RELEASE Right     CERVICAL DISC SURGERY      CHOLECYSTECTOMY  2017    EYE SURGERY      LASER    HYSTERECTOMY (CERVIX STATUS UNKNOWN)      HYSTERECTOMY (CERVIX STATUS UNKNOWN)      OTHER SURGICAL HISTORY N/A 2018    EXTREME LATERAL INTERBODY FUSION LEFT APPROACH L2-3, L3-4,    OTHER SURGICAL HISTORY  2018     LUMBAR WOUND INCISION AND DRAINAGE / WASHOUT     OVARY REMOVAL      IL LAMNOTMY W/DCMPRSN NRV EACH ADDL CRVCL/LMBR N/A 2018    LUMBAR WOUND INCISION AND DRAINAGE / WASHOUT performed by Alexandre Lancaster MD at [facility] Left 2021    EXCISION OF CYST ON LEFT SHOULDER performed by Yasmine Ba MD at [address]    c-spine    SPINAL FUSION      lumbar-spine per Dr Steph Willson. TONSILLECTOMY AND ADENOIDECTOMY      TUBAL LIGATION       Allergies: Allergies   Allergen Reactions    Sulfa Antibiotics Hives    Sulfa Antibiotics      Current Medications:  Current Outpatient Medications   Medication Sig Dispense Refill    estradiol (ESTRACE VAGINAL) 0.1 MG/GM vaginal cream Place 0.25 g vaginally in the morning.  Apply 0.25g with fingertip to the vaginal opening every night at bedtime for 2 weeks, then decrease to twice weekly. . 30 g 0    mirabegron (MYRBETRIQ) 25 MG TB24 Take 1 tablet by mouth in the morning. 30 tablet 1    oxyCODONE-acetaminophen (PERCOCET) 7.5-325 MG per tablet Take 1 tablet by mouth every 6 hours as needed for Pain (max 2-3/day) for up to 35 days. 90 tablet 0    gabapentin (NEURONTIN) 400 MG capsule Take 1 tablet po AM, take 2 tablets po PM 90 capsule 1    meloxicam (MOBIC) 15 MG tablet Take 1 tablet by mouth in the morning. 30 tablet 1    sertraline (ZOLOFT) 100 MG tablet Take 1 tablet by mouth 2 times daily 180 tablet 0    traZODone (DESYREL) 100 MG tablet Take 1 tablet by mouth daily 90 tablet 0    amLODIPine (NORVASC) 5 MG tablet TAKE 1 TABLET BY MOUTH EVERY DAY 90 tablet 0    OLANZapine (ZYPREXA) 2.5 MG tablet TAKE 1 TABLET BY MOUTH EVERY DAY AT NIGHT 90 tablet 0    LEVEMIR FLEXTOUCH 100 UNIT/ML injection pen INJECT 20 UNITS SUBCUTANEOUSLY TWICE A DAY 15 pen 5    atorvastatin (LIPITOR) 40 MG tablet TAKE 1 TABLET BY MOUTH EVERY DAY 90 tablet 0    losartan (COZAAR) 100 MG tablet TAKE 1 TABLET BY MOUTH EVERY DAY 90 tablet 0    levothyroxine (SYNTHROID) 75 MCG tablet TAKE 1 TABLET BY MOUTH EVERY DAY 90 tablet 3    Insulin Pen Needle 31G X 5 MM MISC 1 each by Does not apply route 4 times daily 120 each 5    NOVOLOG FLEXPEN 100 UNIT/ML injection pen INJECT 5 UNITS INTO THE SKIN 3 TIMES DAILY (BEFORE MEALS) 5 pen 1    divalproex (DEPAKOTE ER) 500 MG extended release tablet TAKE 1 TABLET BY MOUTH EVERY DAY 90 tablet 3    VITAMIN D PO Take by mouth daily      MAGNESIUM PO Take by mouth daily       CALCIUM-VITAMIN D PO Take by mouth daily      blood glucose monitor kit and supplies Test 4 times a day & as needed for symptoms of irregular blood glucose. 1 kit 0    blood glucose monitor strips Test 4 times a day & as needed for symptoms of irregular blood glucose. Dispense sufficient amount for indicated testing frequency plus additional to accommodate PRN testing needs.  400 strip 0    Handicap Placard MISC by Does not apply route 1 each 0    Needles & Syringes MISC Inject 1 each into the skin 3 times daily 100 each 3     No current facility-administered medications for this visit. Social History:   Social History     Socioeconomic History    Marital status:      Spouse name: Not on file    Number of children: Not on file    Years of education: Not on file    Highest education level: Not on file   Occupational History    Occupation:  [employer]. Tobacco Use    Smoking status: Never    Smokeless tobacco: Never   Vaping Use    Vaping Use: Never used   Substance and Sexual Activity    Alcohol use: Not Currently     Alcohol/week: 2.0 standard drinks     Types: 2 Glasses of wine per week    Drug use: No    Sexual activity: Not Currently     Partners: Male   Other Topics Concern    Not on file   Social History Narrative    ** Merged History Encounter **          Social Determinants of Health     Financial Resource Strain: Low Risk     Difficulty of Paying Living Expenses: Not hard at all   Food Insecurity: No Food Insecurity    Worried About Running Out of Food in the Last Year: Never true    Ran Out of Food in the Last Year: Never true   Transportation Needs: No Transportation Needs    Lack of Transportation (Medical): No    Lack of Transportation (Non-Medical): No   Physical Activity: Insufficiently Active    Days of Exercise per Week: 3 days    Minutes of Exercise per Session: 30 min   Stress: Not on file   Social Connections: Not on file   Intimate Partner Violence: Not on file   Housing Stability: Unknown    Unable to Pay for Housing in the Last Year: No    Number of Places Lived in the Last Year: Not on file    Unstable Housing in the Last Year: No     Family History:   Family History   Problem Relation Age of Onset    Cancer Mother         melanoma    Diabetes Father      Review of Systems:  Review of Systems   Eyes:  Positive for itching. Genitourinary:  Positive for enuresis and vaginal bleeding. Musculoskeletal:  Positive for arthralgias, back pain, myalgias, neck pain and neck stiffness. Skin:  Positive for wound. Allergic/Immunologic: Positive for environmental allergies. Neurological:  Positive for headaches. Psychiatric/Behavioral:  Positive for agitation. The patient is nervous/anxious. All other systems reviewed and are negative. Objective:     Vital Signs  There were no vitals filed for this visit. Physical Exam  Physical Exam  HENT:      Head: Normocephalic and atraumatic. Eyes:      Conjunctiva/sclera: Conjunctivae normal.   Pulmonary:      Effort: Pulmonary effort is normal.   Abdominal:      Palpations: Abdomen is soft. Genitourinary:     Comments: Vaginal atrophy  Musculoskeletal:      Cervical back: Normal range of motion and neck supple. Skin:     General: Skin is warm and dry. Neurological:      Mental Status: She is alert and oriented to person, place, and time. Office Fill Study/Urine Dip:     Using sterile technique a manometry catheter was placed. Patient's bladder was filled with sterile water by gravity. Capacity and storage volumes were measured. Spasms assessed. Catheter was removed. Stress urinary incontinence was assessed.     Results for POC orders placed in visit on 08/09/22   POCT Urinalysis no Micro   Result Value Ref Range    Color, UA yellow     Clarity, UA cloudy     Glucose, UA POC neg     Bilirubin, UA neg     Ketones, UA neg     Spec Grav, UA 1.015     Blood, UA POC neg     pH, UA 6.5     Protein, UA POC neg     Urobilinogen, UA neg     Leukocytes, UA neg     Nitrite, UA neg        Cystometrogram   WBC Urine, POC   Date Value Ref Range Status   08/09/2022 neg  Final     RBC, UA   Date Value Ref Range Status   07/19/2018 0-2 0 - 2 /HPF Final     RBC Urine, POC   Date Value Ref Range Status   08/09/2022 neg  Final     Nitrate, UA POC   Date Value Ref Range Status   08/09/2022 neg  Final     post void residual   Date Value Ref Range Status   08/09/2022 130 ml Final     FIRST SENSATION   Date Value Ref Range Status   08/09/2022 120 cc Final     SECOND SENSATION   Date Value Ref Range Status   08/09/2022 275 cc Final     MAX SENSATION   Date Value Ref Range Status   08/09/2022 500 cc Final     EMPTY COUGH STRESS TEST   Date Value Ref Range Status   08/09/2022 neg  Final     FULL COUGH STRESS TEST   Date Value Ref Range Status   08/09/2022 pos  Corrected     Comment:     leaked when coughing while standing, could not stop stream once it started     SPASM   Date Value Ref Range Status   08/09/2022 neg  Final        POPQ and Pelvic Exam:     Pelvic Organ Prolapse Quantification  Anterior Wall (Aa): -2 Anterior Wall (Ba): -2   Cervix or Cuff (C): -6     Genital Hiatus (gh): 2 Perineal Body (pb): 3   Total Vaginal Length (tvl): 8     Posterior Wall (Ap): -2 Posterior Wall (Bp): -2   No data recorded     No data recorded     Assessment/Plan:     Nancy Calvin is a 66 y.o. female with   1. Urge incontinence    2. Urinary frequency    3. Urinary urgency    4. Vaginal atrophy    5. Vaginal odor    Old records reviewed, outside records reviewed    Margarita Lambert presents today complaining of several things. First she asked complaining of a vaginal odor. I believe this is mostly related to a shift in bacterial beata. She also has vaginal atrophy and I think both would respond well to estrogen cream.  Prescription was sent and she was given the risk and benefits of both. She also has urinary urgency and frequency. She had previously been on oxybutynin but this caused significant dry mouth. I am going to have her try Myrbetriq and then follow-up for a cystourethroscopy in the near future. Orders Placed This Encounter   Procedures    POCT Urinalysis no Micro    Cystometrogram     Orders Placed This Encounter   Medications    estradiol (ESTRACE VAGINAL) 0.1 MG/GM vaginal cream     Sig: Place 0.25 g vaginally in the morning.  Apply 0.25g with fingertip to the vaginal opening every night at bedtime for 2 weeks, then decrease to twice weekly. .     Dispense:  30 g     Refill:  0    mirabegron (MYRBETRIQ) 25 MG TB24     Sig: Take 1 tablet by mouth in the morning.      Dispense:  30 tablet     Refill:  1       Hayden Perea MD

## 2022-08-26 ENCOUNTER — OFFICE VISIT (OUTPATIENT)
Dept: UROGYNECOLOGY | Age: 72
End: 2022-08-26
Payer: MEDICARE

## 2022-08-26 VITALS
HEART RATE: 86 BPM | DIASTOLIC BLOOD PRESSURE: 79 MMHG | SYSTOLIC BLOOD PRESSURE: 133 MMHG | OXYGEN SATURATION: 97 % | RESPIRATION RATE: 15 BRPM | TEMPERATURE: 97.7 F

## 2022-08-26 DIAGNOSIS — N39.0 ACUTE UTI: Primary | ICD-10-CM

## 2022-08-26 DIAGNOSIS — R39.89 BLADDER PAIN: ICD-10-CM

## 2022-08-26 LAB
BILIRUBIN, POC: NORMAL
BLOOD URINE, POC: NORMAL
CLARITY, POC: NORMAL
COLOR, POC: NORMAL
GLUCOSE URINE, POC: NORMAL
KETONES, POC: NORMAL
LEUKOCYTE EST, POC: NORMAL
NITRITE, POC: NORMAL
PH, POC: 7
PROTEIN, POC: NORMAL
SPECIFIC GRAVITY, POC: 1.01
UROBILINOGEN, POC: NORMAL

## 2022-08-26 PROCEDURE — 1036F TOBACCO NON-USER: CPT | Performed by: NURSE PRACTITIONER

## 2022-08-26 PROCEDURE — 3017F COLORECTAL CA SCREEN DOC REV: CPT | Performed by: NURSE PRACTITIONER

## 2022-08-26 PROCEDURE — G8427 DOCREV CUR MEDS BY ELIG CLIN: HCPCS | Performed by: NURSE PRACTITIONER

## 2022-08-26 PROCEDURE — 1123F ACP DISCUSS/DSCN MKR DOCD: CPT | Performed by: NURSE PRACTITIONER

## 2022-08-26 PROCEDURE — 1090F PRES/ABSN URINE INCON ASSESS: CPT | Performed by: NURSE PRACTITIONER

## 2022-08-26 PROCEDURE — 51701 INSERT BLADDER CATHETER: CPT | Performed by: NURSE PRACTITIONER

## 2022-08-26 PROCEDURE — 99213 OFFICE O/P EST LOW 20 MIN: CPT | Performed by: NURSE PRACTITIONER

## 2022-08-26 PROCEDURE — 81002 URINALYSIS NONAUTO W/O SCOPE: CPT | Performed by: NURSE PRACTITIONER

## 2022-08-26 PROCEDURE — G8399 PT W/DXA RESULTS DOCUMENT: HCPCS | Performed by: NURSE PRACTITIONER

## 2022-08-26 PROCEDURE — G8417 CALC BMI ABV UP PARAM F/U: HCPCS | Performed by: NURSE PRACTITIONER

## 2022-08-26 RX ORDER — NITROFURANTOIN 25; 75 MG/1; MG/1
100 CAPSULE ORAL 2 TIMES DAILY
Qty: 14 CAPSULE | Refills: 0 | Status: SHIPPED | OUTPATIENT
Start: 2022-08-26 | End: 2022-09-02

## 2022-08-26 NOTE — PROGRESS NOTES
Paula Mendez MD at [address]    c-spine    SPINAL FUSION  2003    lumbar-spine per Dr Jessica Whittington. TONSILLECTOMY AND ADENOIDECTOMY  1953    TUBAL LIGATION  1976     Allergies: Allergies   Allergen Reactions    Sulfa Antibiotics Hives    Sulfa Antibiotics      Current Medications:  Current Outpatient Medications   Medication Sig Dispense Refill    nitrofurantoin, macrocrystal-monohydrate, (MACROBID) 100 MG capsule Take 1 capsule by mouth 2 times daily for 7 days 14 capsule 0    estradiol (ESTRACE VAGINAL) 0.1 MG/GM vaginal cream Place 0.25 g vaginally in the morning. Apply 0.25g with fingertip to the vaginal opening every night at bedtime for 2 weeks, then decrease to twice weekly. . 30 g 0    mirabegron (MYRBETRIQ) 25 MG TB24 Take 1 tablet by mouth in the morning. 30 tablet 1    oxyCODONE-acetaminophen (PERCOCET) 7.5-325 MG per tablet Take 1 tablet by mouth every 6 hours as needed for Pain (max 2-3/day) for up to 35 days. 90 tablet 0    gabapentin (NEURONTIN) 400 MG capsule Take 1 tablet po AM, take 2 tablets po PM 90 capsule 1    meloxicam (MOBIC) 15 MG tablet Take 1 tablet by mouth in the morning.  30 tablet 1    sertraline (ZOLOFT) 100 MG tablet Take 1 tablet by mouth 2 times daily 180 tablet 0    traZODone (DESYREL) 100 MG tablet Take 1 tablet by mouth daily 90 tablet 0    amLODIPine (NORVASC) 5 MG tablet TAKE 1 TABLET BY MOUTH EVERY DAY 90 tablet 0    OLANZapine (ZYPREXA) 2.5 MG tablet TAKE 1 TABLET BY MOUTH EVERY DAY AT NIGHT 90 tablet 0    LEVEMIR FLEXTOUCH 100 UNIT/ML injection pen INJECT 20 UNITS SUBCUTANEOUSLY TWICE A DAY 15 pen 5    atorvastatin (LIPITOR) 40 MG tablet TAKE 1 TABLET BY MOUTH EVERY DAY 90 tablet 0    losartan (COZAAR) 100 MG tablet TAKE 1 TABLET BY MOUTH EVERY DAY 90 tablet 0    levothyroxine (SYNTHROID) 75 MCG tablet TAKE 1 TABLET BY MOUTH EVERY DAY 90 tablet 3    Insulin Pen Needle 31G X 5 MM MISC 1 each by Does not apply route 4 times daily 120 each 5 NOVOLOG FLEXPEN 100 UNIT/ML injection pen INJECT 5 UNITS INTO THE SKIN 3 TIMES DAILY (BEFORE MEALS) 5 pen 1    divalproex (DEPAKOTE ER) 500 MG extended release tablet TAKE 1 TABLET BY MOUTH EVERY DAY 90 tablet 3    VITAMIN D PO Take by mouth daily      MAGNESIUM PO Take by mouth daily       CALCIUM-VITAMIN D PO Take by mouth daily      blood glucose monitor kit and supplies Test 4 times a day & as needed for symptoms of irregular blood glucose. 1 kit 0    blood glucose monitor strips Test 4 times a day & as needed for symptoms of irregular blood glucose. Dispense sufficient amount for indicated testing frequency plus additional to accommodate PRN testing needs. 400 strip 0    Handicap Placard MISC by Does not apply route 1 each 0    Needles & Syringes MISC Inject 1 each into the skin 3 times daily 100 each 3     No current facility-administered medications for this visit. Social History:   Social History     Socioeconomic History    Marital status:      Spouse name: Not on file    Number of children: Not on file    Years of education: Not on file    Highest education level: Not on file   Occupational History    Occupation: floral manager [employer]     Tobacco Use    Smoking status: Never    Smokeless tobacco: Never   Vaping Use    Vaping Use: Never used   Substance and Sexual Activity    Alcohol use: Not Currently     Alcohol/week: 2.0 standard drinks     Types: 2 Glasses of wine per week    Drug use: No    Sexual activity: Not Currently     Partners: Male   Other Topics Concern    Not on file   Social History Narrative    ** Merged History Encounter **          Social Determinants of Health     Financial Resource Strain: Low Risk     Difficulty of Paying Living Expenses: Not hard at all   Food Insecurity: No Food Insecurity    Worried About Running Out of Food in the Last Year: Never true    Ran Out of Food in the Last Year: Never true   Transportation Needs: No Transportation Needs    Lack of is a 66 y.o. female with   1. Acute UTI    2. Bladder pain      -Acute UTI: She will begin treatment with macrobid while awaiting culture. Encouraged to stay hydrated. Call if fever or worsening condition. Keep her already scheduled follow up next month  SAI Edwards CNP        Orders Placed This Encounter   Procedures    Culture, Urine     Order Specific Question:   Specify (ex-cath, midstream, cysto, etc)?      Answer:   straight cath    POCT Urinalysis no Micro    NM INSERT,NON-INDWELLING BLADDER CATHETER       Orders Placed This Encounter   Medications    nitrofurantoin, macrocrystal-monohydrate, (MACROBID) 100 MG capsule     Sig: Take 1 capsule by mouth 2 times daily for 7 days     Dispense:  14 capsule     Refill:  0         SAI Edwards CNP

## 2022-08-28 LAB
ORGANISM: ABNORMAL
URINE CULTURE, ROUTINE: ABNORMAL

## 2022-08-29 ENCOUNTER — TELEPHONE (OUTPATIENT)
Dept: UROGYNECOLOGY | Age: 72
End: 2022-08-29

## 2022-08-29 RX ORDER — SOLIFENACIN SUCCINATE 5 MG/1
5 TABLET, FILM COATED ORAL DAILY
Qty: 30 TABLET | Refills: 0 | Status: SHIPPED | OUTPATIENT
Start: 2022-08-29 | End: 2022-09-28

## 2022-08-29 NOTE — TELEPHONE ENCOUNTER
Spoke with patient who would like to try a new medication for her bladder issues, says Myrbetriq is too expensive. Has had dry mouth with Oxybutynin in the past, will to try Vesicare per Malika Roche, CAHRLEY recommendations. Instructed use of [facility] and patient verbalized understanding. Will let us know if she experiences any poor side effects on this medication.

## 2022-08-29 NOTE — RESULT ENCOUNTER NOTE
Notified patient of positive urine culture, she confirmed she was taking her antibiotic and feeling better. No questions at this time.

## 2022-09-02 ENCOUNTER — OFFICE VISIT (OUTPATIENT)
Dept: PAIN MANAGEMENT | Age: 72
End: 2022-09-02
Payer: MEDICARE

## 2022-09-02 VITALS
BODY MASS INDEX: 24.79 KG/M2 | OXYGEN SATURATION: 97 % | DIASTOLIC BLOOD PRESSURE: 76 MMHG | HEART RATE: 81 BPM | WEIGHT: 144.4 LBS | SYSTOLIC BLOOD PRESSURE: 137 MMHG

## 2022-09-02 DIAGNOSIS — G56.03 BILATERAL CARPAL TUNNEL SYNDROME: ICD-10-CM

## 2022-09-02 DIAGNOSIS — G89.4 CHRONIC PAIN SYNDROME: ICD-10-CM

## 2022-09-02 DIAGNOSIS — M48.07 SPINAL STENOSIS OF LUMBOSACRAL REGION: ICD-10-CM

## 2022-09-02 DIAGNOSIS — M79.7 FIBROMYALGIA: ICD-10-CM

## 2022-09-02 DIAGNOSIS — G56.22 ULNAR NEUROPATHY AT ELBOW, LEFT: ICD-10-CM

## 2022-09-02 DIAGNOSIS — M48.061 DEGENERATIVE LUMBAR SPINAL STENOSIS: ICD-10-CM

## 2022-09-02 DIAGNOSIS — M47.812 OSTEOARTHRITIS OF CERVICAL SPINE, UNSPECIFIED SPINAL OSTEOARTHRITIS COMPLICATION STATUS: ICD-10-CM

## 2022-09-02 DIAGNOSIS — M96.1 FAILED NECK SYNDROME: ICD-10-CM

## 2022-09-02 DIAGNOSIS — M96.1 FAILED BACK SURGICAL SYNDROME: ICD-10-CM

## 2022-09-02 PROCEDURE — 99213 OFFICE O/P EST LOW 20 MIN: CPT | Performed by: INTERNAL MEDICINE

## 2022-09-02 PROCEDURE — G8420 CALC BMI NORM PARAMETERS: HCPCS | Performed by: INTERNAL MEDICINE

## 2022-09-02 PROCEDURE — 3017F COLORECTAL CA SCREEN DOC REV: CPT | Performed by: INTERNAL MEDICINE

## 2022-09-02 PROCEDURE — G8428 CUR MEDS NOT DOCUMENT: HCPCS | Performed by: INTERNAL MEDICINE

## 2022-09-02 PROCEDURE — 1036F TOBACCO NON-USER: CPT | Performed by: INTERNAL MEDICINE

## 2022-09-02 PROCEDURE — 1090F PRES/ABSN URINE INCON ASSESS: CPT | Performed by: INTERNAL MEDICINE

## 2022-09-02 PROCEDURE — G8399 PT W/DXA RESULTS DOCUMENT: HCPCS | Performed by: INTERNAL MEDICINE

## 2022-09-02 PROCEDURE — 1123F ACP DISCUSS/DSCN MKR DOCD: CPT | Performed by: INTERNAL MEDICINE

## 2022-09-02 RX ORDER — MELOXICAM 15 MG/1
15 TABLET ORAL DAILY
Qty: 30 TABLET | Refills: 1 | Status: SHIPPED | OUTPATIENT
Start: 2022-09-02 | End: 2022-10-07 | Stop reason: SDUPTHER

## 2022-09-02 RX ORDER — OXYCODONE AND ACETAMINOPHEN 7.5; 325 MG/1; MG/1
1 TABLET ORAL EVERY 6 HOURS PRN
Qty: 90 TABLET | Refills: 0 | Status: SHIPPED | OUTPATIENT
Start: 2022-09-02 | End: 2022-10-07 | Stop reason: SDUPTHER

## 2022-09-02 RX ORDER — GABAPENTIN 400 MG/1
CAPSULE ORAL
Qty: 90 CAPSULE | Refills: 1 | Status: SHIPPED | OUTPATIENT
Start: 2022-09-02 | End: 2022-10-07 | Stop reason: SDUPTHER

## 2022-09-02 NOTE — PROGRESS NOTES
Dania Brain  1950  [id number]      HISTORY OF PRESENT ILLNESS:  Ms. Afshin Neville is a 66 y.o. female returns for a follow up visit for pain management  She has a diagnosis of   1. Chronic pain syndrome    2. Chronic low back pain, unspecified back pain laterality, unspecified whether sciatica present    3. Failed neck syndrome    4. Failed back surgical syndrome    5. Osteoarthritis of cervical spine, unspecified spinal osteoarthritis complication status    6. Bilateral carpal tunnel syndrome    7. Spinal stenosis of lumbosacral region    8. Ulnar neuropathy at elbow, left    9. Fibromyalgia    10. Degenerative lumbar spinal stenosis    . She complains of pain in the  Neck, Low back, left shoulder, left upper leg   She rates the pain 6/10 and describes it as sharp, aching, burning. Current treatment regimen has helped relieve about 50% of the pain. She denies any side effects from the current pain regimen. Patient reports that since the last follow up visit the physical functioning is worse, family/social relationships are unchanged, mood is worse sleep patterns are worse, and that the overall functioning is worse. Patient denies misusing/abusing her narcotic pain medications or using any illegal drugs. There are No indicators for possible drug abuse, addiction or diversion problems. Patient complains her back hurts and has been having some bladder problems. She mentions she is getting a work up done. . She reports she is having pain in the left leg and groin. She says she is using Mobic along with Percocet 2-3 per day. She denies any constipation symptoms. She states her weight has been stable. ALLERGIES: Patients list of allergies were reviewed     MEDICATIONS: Ms. Afshin Neville list of medications were reviewed. Her current medications are   Outpatient Medications Prior to Visit   Medication Sig Dispense Refill    solifenacin (VESICARE) 5 MG tablet Take 1 tablet by mouth daily 30 tablet 0 nitrofurantoin, macrocrystal-monohydrate, (MACROBID) 100 MG capsule Take 1 capsule by mouth 2 times daily for 7 days 14 capsule 0    estradiol (ESTRACE VAGINAL) 0.1 MG/GM vaginal cream Place 0.25 g vaginally in the morning. Apply 0.25g with fingertip to the vaginal opening every night at bedtime for 2 weeks, then decrease to twice weekly. . 30 g 0    mirabegron (MYRBETRIQ) 25 MG TB24 Take 1 tablet by mouth in the morning. 30 tablet 1    oxyCODONE-acetaminophen (PERCOCET) 7.5-325 MG per tablet Take 1 tablet by mouth every 6 hours as needed for Pain (max 2-3/day) for up to 35 days. 90 tablet 0    gabapentin (NEURONTIN) 400 MG capsule Take 1 tablet po AM, take 2 tablets po PM 90 capsule 1    meloxicam (MOBIC) 15 MG tablet Take 1 tablet by mouth in the morning.  30 tablet 1    sertraline (ZOLOFT) 100 MG tablet Take 1 tablet by mouth 2 times daily 180 tablet 0    traZODone (DESYREL) 100 MG tablet Take 1 tablet by mouth daily 90 tablet 0    amLODIPine (NORVASC) 5 MG tablet TAKE 1 TABLET BY MOUTH EVERY DAY 90 tablet 0    OLANZapine (ZYPREXA) 2.5 MG tablet TAKE 1 TABLET BY MOUTH EVERY DAY AT NIGHT 90 tablet 0    LEVEMIR FLEXTOUCH 100 UNIT/ML injection pen INJECT 20 UNITS SUBCUTANEOUSLY TWICE A DAY 15 pen 5    atorvastatin (LIPITOR) 40 MG tablet TAKE 1 TABLET BY MOUTH EVERY DAY 90 tablet 0    losartan (COZAAR) 100 MG tablet TAKE 1 TABLET BY MOUTH EVERY DAY 90 tablet 0    levothyroxine (SYNTHROID) 75 MCG tablet TAKE 1 TABLET BY MOUTH EVERY DAY 90 tablet 3    Insulin Pen Needle 31G X 5 MM MISC 1 each by Does not apply route 4 times daily 120 each 5    NOVOLOG FLEXPEN 100 UNIT/ML injection pen INJECT 5 UNITS INTO THE SKIN 3 TIMES DAILY (BEFORE MEALS) 5 pen 1    divalproex (DEPAKOTE ER) 500 MG extended release tablet TAKE 1 TABLET BY MOUTH EVERY DAY 90 tablet 3    VITAMIN D PO Take by mouth daily      MAGNESIUM PO Take by mouth daily       CALCIUM-VITAMIN D PO Take by mouth daily      blood glucose monitor kit and supplies Test 4 times a day & as needed for symptoms of irregular blood glucose. 1 kit 0    blood glucose monitor strips Test 4 times a day & as needed for symptoms of irregular blood glucose. Dispense sufficient amount for indicated testing frequency plus additional to accommodate PRN testing needs. 400 strip 0    Handicap Placard MISC by Does not apply route 1 each 0    Needles & Syringes MISC Inject 1 each into the skin 3 times daily 100 each 3     No facility-administered medications prior to visit. REVIEW OF SYSTEMS:    Respiratory: Negative for apnea, chest tightness and shortness of breath or change in baseline breathing. PHYSICAL EXAM:   Nursing note and vitals reviewed. /76   Pulse 81   Wt 144 lb 6.4 oz (65.5 kg)   SpO2 97%   BMI 24.79 kg/m²   Constitutional: She appears well-developed and well-nourished. No acute distress. Cardiovascular: Normal rate, regular rhythm, normal heart sounds, and does not have murmur. Pulmonary/Chest: Effort normal. No respiratory distress. She does not have wheezes in the lung fields. She has no rales. Neurological/Psychiatric:She is alert and oriented to person, place, and time. Coordination is  normal.  Her mood isAppropriate and affect is Neutral/Euthymic(normal) . IMPRESSION:   1. Chronic pain syndrome    2. Failed neck syndrome    3. Failed back surgical syndrome    4. Osteoarthritis of cervical spine, unspecified spinal osteoarthritis complication status    5. Bilateral carpal tunnel syndrome    6. Spinal stenosis of lumbosacral region    7. Ulnar neuropathy at elbow, left    8. Fibromyalgia    9. Degenerative lumbar spinal stenosis        PLAN:  Informed verbal consent was obtained  -[place] was obtained and reviewed  for the last one year and no indicators of drug misuse  were found. Any other controlled substance prescriptions  seen on the record have been accounted for, I am aware of the patient receiving these medications. Henrique Escobar  [place] record will be rechecked as part of office protocol. -ROM/Stretching exercises as advised   -Continue with Percocet 2-3 per day   -Continue with Mobic and Neurontin   -Walking as tolerated 20-30 minutes daily    Current Outpatient Medications   Medication Sig Dispense Refill    meloxicam (MOBIC) 15 MG tablet Take 1 tablet by mouth daily 30 tablet 1    gabapentin (NEURONTIN) 400 MG capsule Take 1 tablet po AM, take 2 tablets po PM 90 capsule 1    oxyCODONE-acetaminophen (PERCOCET) 7.5-325 MG per tablet Take 1 tablet by mouth every 6 hours as needed for Pain (max 2-3/day) for up to 35 days. 90 tablet 0    solifenacin (VESICARE) 5 MG tablet Take 1 tablet by mouth daily 30 tablet 0    nitrofurantoin, macrocrystal-monohydrate, (MACROBID) 100 MG capsule Take 1 capsule by mouth 2 times daily for 7 days 14 capsule 0    estradiol (ESTRACE VAGINAL) 0.1 MG/GM vaginal cream Place 0.25 g vaginally in the morning. Apply 0.25g with fingertip to the vaginal opening every night at bedtime for 2 weeks, then decrease to twice weekly. . 30 g 0    mirabegron (MYRBETRIQ) 25 MG TB24 Take 1 tablet by mouth in the morning.  30 tablet 1    sertraline (ZOLOFT) 100 MG tablet Take 1 tablet by mouth 2 times daily 180 tablet 0    traZODone (DESYREL) 100 MG tablet Take 1 tablet by mouth daily 90 tablet 0    amLODIPine (NORVASC) 5 MG tablet TAKE 1 TABLET BY MOUTH EVERY DAY 90 tablet 0    OLANZapine (ZYPREXA) 2.5 MG tablet TAKE 1 TABLET BY MOUTH EVERY DAY AT NIGHT 90 tablet 0    LEVEMIR FLEXTOUCH 100 UNIT/ML injection pen INJECT 20 UNITS SUBCUTANEOUSLY TWICE A DAY 15 pen 5    atorvastatin (LIPITOR) 40 MG tablet TAKE 1 TABLET BY MOUTH EVERY DAY 90 tablet 0    losartan (COZAAR) 100 MG tablet TAKE 1 TABLET BY MOUTH EVERY DAY 90 tablet 0    levothyroxine (SYNTHROID) 75 MCG tablet TAKE 1 TABLET BY MOUTH EVERY DAY 90 tablet 3    Insulin Pen Needle 31G X 5 MM MISC 1 each by Does not apply route 4 times daily 120 each 5    NOVOLOG FLEXPEN 100 UNIT/ML injection pen INJECT 5 UNITS INTO THE SKIN 3 TIMES DAILY (BEFORE MEALS) 5 pen 1    divalproex (DEPAKOTE ER) 500 MG extended release tablet TAKE 1 TABLET BY MOUTH EVERY DAY 90 tablet 3    VITAMIN D PO Take by mouth daily      MAGNESIUM PO Take by mouth daily       CALCIUM-VITAMIN D PO Take by mouth daily      blood glucose monitor kit and supplies Test 4 times a day & as needed for symptoms of irregular blood glucose. 1 kit 0    blood glucose monitor strips Test 4 times a day & as needed for symptoms of irregular blood glucose. Dispense sufficient amount for indicated testing frequency plus additional to accommodate PRN testing needs. 400 strip 0    Handicap Placard MISC by Does not apply route 1 each 0    Needles & Syringes MISC Inject 1 each into the skin 3 times daily 100 each 3     No current facility-administered medications for this visit. I will continue her current medication regimen  which is part of the above treatment schedule. It has been helping with Ms. Adrian's chronic  medical problems which for this visit include:   Diagnoses of Chronic pain syndrome, Chronic low back pain, unspecified back pain laterality, unspecified whether sciatica present, Failed neck syndrome, Failed back surgical syndrome, Osteoarthritis of cervical spine, unspecified spinal osteoarthritis complication status, Bilateral carpal tunnel syndrome, Spinal stenosis of lumbosacral region, Ulnar neuropathy at elbow, left, Fibromyalgia, and Degenerative lumbar spinal stenosis were pertinent to this visit. Risks and benefits of the medications and other alternative treatments  including no treatment were discussed with the patient. The common side effects of these medications were also explained to the patient. Informed verbal consent was obtained.    Goals of current treatment regimen include improvement in pain, restoration of functioning- with focus on improvement in physical performance, general activity, work or disability,emotional distress, health care utilization and  decreased medication consumption. Will continue to monitor progress towards achieving/maintaining therapeutic goals with special emphasis on  1. Improvement in perceived interfernce  of pain with ADL's. Ability to do home exercises independently. Ability to do household chores indoor and/or outdoor work and social and leisure activities. Improve psychosocial and physical functioning. - she is showing progression towards this treatment goal with the current regimen. She was advised against drinking alcohol with the narcotic pain medicines, advised against driving or handling machinery while adjusting the dose of medicines or if having cognitive  issues related to the current medications. Risk of overdose and death, if medicines not taken as prescribed, were also discussed. If the patient develops new symptoms or if the symptoms worsen, the patient should call the office. While transcribing every attempt was made to maintain the accuracy of the note in terms of it's contents,there may have been some errors made inadvertently. Thank you for allowing me to participate in the care of this patient.     Miguelito Ray MD.    Cc: Virgen Yates, SAI - CNP

## 2022-09-07 DIAGNOSIS — I10 ESSENTIAL HYPERTENSION: ICD-10-CM

## 2022-09-07 RX ORDER — AMLODIPINE BESYLATE 5 MG/1
TABLET ORAL
Qty: 90 TABLET | Refills: 0 | Status: SHIPPED | OUTPATIENT
Start: 2022-09-07 | End: 2022-11-03

## 2022-09-07 NOTE — TELEPHONE ENCOUNTER
LOV 7/6/2022    Future Appointments   Date Time Provider John Irizarry   9/27/2022 11:30 AM MD REGINALDO Good UROGYN [facility]   10/7/2022  9:00 AM Robby Abbott MD R BANK PAIN [facility]   11/8/2022 11:00 AM SAI Jimenez - CNP [facility] [place] [facility]

## 2022-09-09 ENCOUNTER — TELEPHONE (OUTPATIENT)
Dept: UROGYNECOLOGY | Age: 72
End: 2022-09-09

## 2022-09-09 NOTE — TELEPHONE ENCOUNTER
Pt called and reports that the medication is making her \"spacey\", and blurry eyes. Confirmed that it's the myrbetriq that she is taking. I advised her to stop taking it, and do not take the vesicare either until she knows if the symptoms subside after stopping.  She was agreeable

## 2022-09-26 ENCOUNTER — HOSPITAL ENCOUNTER (OUTPATIENT)
Dept: GENERAL RADIOLOGY | Age: 72
Discharge: HOME | End: 2022-09-26
Payer: MEDICARE

## 2022-09-26 DIAGNOSIS — M51.36 ANNULAR TEAR OF LUMBAR DISC: ICD-10-CM

## 2022-09-26 PROCEDURE — 72100 X-RAY EXAM L-S SPINE 2/3 VWS: CPT

## 2022-09-27 ENCOUNTER — PROCEDURE VISIT (OUTPATIENT)
Dept: UROGYNECOLOGY | Age: 72
End: 2022-09-27
Payer: MEDICARE

## 2022-09-27 VITALS
TEMPERATURE: 97.5 F | DIASTOLIC BLOOD PRESSURE: 75 MMHG | RESPIRATION RATE: 16 BRPM | OXYGEN SATURATION: 99 % | HEART RATE: 86 BPM | SYSTOLIC BLOOD PRESSURE: 133 MMHG

## 2022-09-27 DIAGNOSIS — R39.15 URINARY URGENCY: ICD-10-CM

## 2022-09-27 DIAGNOSIS — N39.41 URGE INCONTINENCE: ICD-10-CM

## 2022-09-27 DIAGNOSIS — N95.2 VAGINAL ATROPHY: ICD-10-CM

## 2022-09-27 DIAGNOSIS — R35.0 URINARY FREQUENCY: ICD-10-CM

## 2022-09-27 DIAGNOSIS — R39.89 BLADDER PAIN: Primary | ICD-10-CM

## 2022-09-27 DIAGNOSIS — N89.8 VAGINAL ODOR: ICD-10-CM

## 2022-09-27 PROCEDURE — 52285 CYSTOSCOPY AND TREATMENT: CPT | Performed by: OBSTETRICS & GYNECOLOGY

## 2022-09-27 PROCEDURE — G8399 PT W/DXA RESULTS DOCUMENT: HCPCS | Performed by: OBSTETRICS & GYNECOLOGY

## 2022-09-27 PROCEDURE — G8427 DOCREV CUR MEDS BY ELIG CLIN: HCPCS | Performed by: OBSTETRICS & GYNECOLOGY

## 2022-09-27 PROCEDURE — 1123F ACP DISCUSS/DSCN MKR DOCD: CPT | Performed by: OBSTETRICS & GYNECOLOGY

## 2022-09-27 PROCEDURE — 3017F COLORECTAL CA SCREEN DOC REV: CPT | Performed by: OBSTETRICS & GYNECOLOGY

## 2022-09-27 PROCEDURE — 1090F PRES/ABSN URINE INCON ASSESS: CPT | Performed by: OBSTETRICS & GYNECOLOGY

## 2022-09-27 PROCEDURE — G8420 CALC BMI NORM PARAMETERS: HCPCS | Performed by: OBSTETRICS & GYNECOLOGY

## 2022-09-27 PROCEDURE — 1036F TOBACCO NON-USER: CPT | Performed by: OBSTETRICS & GYNECOLOGY

## 2022-09-27 PROCEDURE — 0509F URINE INCON PLAN DOCD: CPT | Performed by: OBSTETRICS & GYNECOLOGY

## 2022-09-27 PROCEDURE — 99213 OFFICE O/P EST LOW 20 MIN: CPT | Performed by: OBSTETRICS & GYNECOLOGY

## 2022-09-27 RX ORDER — NITROFURANTOIN 25; 75 MG/1; MG/1
100 CAPSULE ORAL 2 TIMES DAILY
Qty: 2 CAPSULE | Refills: 0 | Status: SHIPPED | OUTPATIENT
Start: 2022-09-27 | End: 2022-09-28

## 2022-09-27 NOTE — LETTER
[facility]  [address] [place] [address]  Phone: [phone]  Fax: [phone]    Amarilis Montano MD    2022     Sanju Sy, APRN - [facility]  [address] [address]    Patient: Martin Santizo   MR Number: [id number]   YOB: 1950   Date of Visit: 2022       Dear Sanju Sy: Thank you for referring Martin Santizo to me for evaluation/treatment. Below are the relevant portions of my assessment and plan of care. If you have questions, please do not hesitate to call me. I look forward to following Julius Morales along with you. Sincerely,      Amarilis Montano MD   2022     HPI:     Name: Martin Santizo  YOB: 1950    CC: Martin Santizo is a 66 y.o. female presenting for an evaluation of urge incontinence . HPI: How long have you had this problem? years  Please rate the severity of your problem: moderate  Anything make it better?     Myrbetriq was too expensive and noticed side effects with it and was scared to take Vesicare, on Estrogen cream currently    Ob/Gyn History:    OB History    Para Term  AB Living   1 1 1     1   SAB IAB Ectopic Molar Multiple Live Births             1      # Outcome Date GA Lbr Abimael/2nd Weight Sex Delivery Anes PTL Lv   1 Term      Vag-Spont   LISE     Past Medical History:   Past Medical History:   Diagnosis Date    Chronic back pain     Depression     Diabetes mellitus ([address]     Hyperlipidemia     Hypertension     Retinopathy     Seasonal allergies     Spinal stenosis     Thyroid disease     hypothyroidism    Type II or unspecified type diabetes mellitus without mention of complication, not stated as uncontrolled      Past Surgical History:   Past Surgical History:   Procedure Laterality Date    BACK SURGERY      CARPAL TUNNEL RELEASE Right     CERVICAL [address] SURGERY      CHOLECYSTECTOMY  2017  EYE SURGERY      LASER    HYSTERECTOMY (CERVIX STATUS UNKNOWN)      HYSTERECTOMY (CERVIX STATUS UNKNOWN)  1993    OTHER SURGICAL HISTORY N/A 06/27/2018    EXTREME LATERAL INTERBODY FUSION LEFT APPROACH L2-3, L3-4,    OTHER SURGICAL HISTORY  07/20/2018     LUMBAR WOUND INCISION AND DRAINAGE / WASHOUT     OVARY REMOVAL  1993    AZ LAMNOTMY W/DCMPRSN NRV EACH ADDL CRVCL/LMBR N/A 7/20/2018    LUMBAR WOUND INCISION AND DRAINAGE / WASHOUT performed by Chele Peraza MD at [address] [facility] Left 5/12/2021    EXCISION OF CYST ON LEFT SHOULDER performed by Sharath Hurst MD at [address] [place] [address]     c-spine    SPINAL FUSION  2003    lumbar-spine per Dr Sandra Figueredo.  TONSILLECTOMY AND ADENOIDECTOMY  1953    TUBAL LIGATION  1976     Current Medications:  Current Outpatient Medications   Medication Sig Dispense Refill    nitrofurantoin, macrocrystal-monohydrate, (MACROBID) 100 MG capsule Take 1 capsule by mouth 2 times daily for 1 day 2 capsule 0    amLODIPine (NORVASC) 5 MG tablet TAKE 1 TABLET BY MOUTH EVERY DAY 90 tablet 0    meloxicam (MOBIC) 15 MG tablet Take 1 tablet by mouth daily 30 tablet 1    gabapentin (NEURONTIN) 400 MG capsule Take 1 tablet po AM, take 2 tablets po PM 90 capsule 1    oxyCODONE-acetaminophen (PERCOCET) 7.5-325 MG per tablet Take 1 tablet by mouth every 6 hours as needed for Pain (max 2-3/day) for up to 35 days. 90 tablet 0    estradiol (ESTRACE VAGINAL) 0.1 MG/GM vaginal cream Place 0.25 g vaginally in the morning. Apply 0.25g with fingertip to the vaginal opening every night at bedtime for 2 weeks, then decrease to twice weekly. . 30 g 0    sertraline (ZOLOFT) 100 MG tablet Take 1 tablet by mouth 2 times daily 180 tablet 0    traZODone (DESYREL) 100 MG tablet Take 1 tablet by mouth daily 90 tablet 0    OLANZapine (ZYPREXA) 2.5 MG tablet TAKE 1 TABLET BY MOUTH EVERY DAY AT NIGHT 90 tablet 0    LEVEMIR FLEXTOUCH 100 UNIT/ML injection pen INJECT 20 UNITS SUBCUTANEOUSLY TWICE A DAY 15 pen 5    atorvastatin (LIPITOR) 40 MG tablet TAKE 1 TABLET BY MOUTH EVERY DAY 90 tablet 0    losartan (COZAAR) 100 MG tablet TAKE 1 TABLET BY MOUTH EVERY DAY 90 tablet 0    levothyroxine (SYNTHROID) 75 MCG tablet TAKE 1 TABLET BY MOUTH EVERY DAY 90 tablet 3    Insulin Pen Needle 31G X 5 MM MISC 1 each by Does not apply route 4 times daily 120 each 5    NOVOLOG FLEXPEN 100 UNIT/ML injection pen INJECT 5 UNITS INTO THE SKIN 3 TIMES DAILY (BEFORE MEALS) 5 pen 1    divalproex (DEPAKOTE ER) 500 MG extended release tablet TAKE 1 TABLET BY MOUTH EVERY DAY 90 tablet 3    VITAMIN D PO Take by mouth daily      MAGNESIUM PO Take by mouth daily       CALCIUM-VITAMIN D PO Take by mouth daily      blood glucose monitor kit and supplies Test 4 times a day & as needed for symptoms of irregular blood glucose. 1 kit 0    blood glucose monitor strips Test 4 times a day & as needed for symptoms of irregular blood glucose. Dispense sufficient amount for indicated testing frequency plus additional to accommodate PRN testing needs. 400 strip 0    Handicap Placard MISC by Does not apply route 1 each 0    Needles & Syringes MISC Inject 1 each into the skin 3 times daily 100 each 3    solifenacin (VESICARE) 5 MG tablet Take 1 tablet by mouth daily (Patient not taking: Reported on 9/27/2022) 30 tablet 0     No current facility-administered medications for this visit. 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     Tobacco Use    Smoking status: Never    Smokeless tobacco: Never   Vaping Use    Vaping Use: Never used   Substance and urethroscope was used to examine the urethra. A 70-degree cystoscope was used to evaluate the bladder. FINDINGS:  1. Urethra was normal  2. Bladder had trabeculations mild  3. Trigone appeared Normal  4. Ureters illustrated bilateral efflux  5. Patient exhibited spasms during the study no        No results found for this visit on 09/27/22. Assessment/Plan:     Zhou Cobb is a 66 y.o. female with   1. Bladder pain    2. Urge incontinence    3. Urinary frequency    4. Urinary urgency    5. Vaginal odor    6. Vaginal atrophy    Old records reviewed, outside records reviewed    Alex Huber presents today as a follow-up for urinary incontinence as well as a vaginal odor and vaginal atrophy. The estrogen cream seems to be working well for her vaginal atrophy and odor. She does continue to complain of urinary incontinence that sounds like urge incontinence. She did leak on her original office study but with stress incontinence to the lab large bladder volume. She had tried oxybutynin which she thought helped but caused significant dry mouth. She tried Myrbetriq which she states did not help at all. Because her history sounds like urge incontinence but she did have stress on her office filling study I am going to have her return for urodynamic assessment.     Orders Placed This Encounter   Procedures    NV CYSTOSCOPY,RX FEMALE URETHRAL SYND       Orders Placed This Encounter   Medications    nitrofurantoin, macrocrystal-monohydrate, (MACROBID) 100 MG capsule     Sig: Take 1 capsule by mouth 2 times daily for 1 day     Dispense:  2 capsule     Refill:  0       Jessica Allen MD

## 2022-09-27 NOTE — PROGRESS NOTES
2022     HPI:     Name: Prashanth Avelar  YOB: 1950    CC: Prashanth Avelar is a 66 y.o. female presenting for an evaluation of urge incontinence . HPI: How long have you had this problem? years  Please rate the severity of your problem: moderate  Anything make it better? Myrbetriq was too expensive and noticed side effects with it and was scared to take Vesicare, on Estrogen cream currently    Ob/Gyn History:    OB History    Para Term  AB Living   1 1 1     1   SAB IAB Ectopic Molar Multiple Live Births             1      # Outcome Date GA Lbr Abimael/2nd Weight Sex Delivery Anes PTL Lv   1 Term      Vag-Spont   LISE     Past Medical History:   Past Medical History:   Diagnosis Date    Chronic back pain     Depression     Diabetes mellitus ([address]     Hyperlipidemia     Hypertension     Retinopathy     Seasonal allergies     Spinal stenosis     Thyroid disease     hypothyroidism    Type II or unspecified type diabetes mellitus without mention of complication, not stated as uncontrolled      Past Surgical History:   Past Surgical History:   Procedure Laterality Date    BACK SURGERY      CARPAL TUNNEL RELEASE Right     CERVICAL DISC SURGERY      CHOLECYSTECTOMY  2017    EYE SURGERY      LASER    HYSTERECTOMY (CERVIX STATUS UNKNOWN)      HYSTERECTOMY (CERVIX STATUS UNKNOWN)      OTHER SURGICAL HISTORY N/A 2018    EXTREME LATERAL INTERBODY FUSION LEFT APPROACH L2-3, L3-4,    OTHER SURGICAL HISTORY  2018     LUMBAR WOUND INCISION AND DRAINAGE / WASHOUT     OVARY REMOVAL      MO LAMNOTMY W/DCMPRSN NRV EACH ADDL CRVCL/LMBR N/A 2018    LUMBAR WOUND INCISION AND DRAINAGE / WASHOUT performed by Stewart Barnes MD at [address] Left 2021    EXCISION OF CYST ON LEFT SHOULDER performed by Thierno Rubin MD at [address]    c-spine    SPINAL FUSION      lumbar-spine per Dr Chelsea Mcnulty. TONSILLECTOMY AND ADENOIDECTOMY  1953    TUBAL LIGATION  1976     Current Medications:  Current Outpatient Medications   Medication Sig Dispense Refill    nitrofurantoin, macrocrystal-monohydrate, (MACROBID) 100 MG capsule Take 1 capsule by mouth 2 times daily for 1 day 2 capsule 0    amLODIPine (NORVASC) 5 MG tablet TAKE 1 TABLET BY MOUTH EVERY DAY 90 tablet 0    meloxicam (MOBIC) 15 MG tablet Take 1 tablet by mouth daily 30 tablet 1    gabapentin (NEURONTIN) 400 MG capsule Take 1 tablet po AM, take 2 tablets po PM 90 capsule 1    oxyCODONE-acetaminophen (PERCOCET) 7.5-325 MG per tablet Take 1 tablet by mouth every 6 hours as needed for Pain (max 2-3/day) for up to 35 days. 90 tablet 0    estradiol (ESTRACE VAGINAL) 0.1 MG/GM vaginal cream Place 0.25 g vaginally in the morning. Apply 0.25g with fingertip to the vaginal opening every night at bedtime for 2 weeks, then decrease to twice weekly. . 30 g 0    sertraline (ZOLOFT) 100 MG tablet Take 1 tablet by mouth 2 times daily 180 tablet 0    traZODone (DESYREL) 100 MG tablet Take 1 tablet by mouth daily 90 tablet 0    OLANZapine (ZYPREXA) 2.5 MG tablet TAKE 1 TABLET BY MOUTH EVERY DAY AT NIGHT 90 tablet 0    LEVEMIR FLEXTOUCH 100 UNIT/ML injection pen INJECT 20 UNITS SUBCUTANEOUSLY TWICE A DAY 15 pen 5    atorvastatin (LIPITOR) 40 MG tablet TAKE 1 TABLET BY MOUTH EVERY DAY 90 tablet 0    losartan (COZAAR) 100 MG tablet TAKE 1 TABLET BY MOUTH EVERY DAY 90 tablet 0    levothyroxine (SYNTHROID) 75 MCG tablet TAKE 1 TABLET BY MOUTH EVERY DAY 90 tablet 3    Insulin Pen Needle 31G X 5 MM MISC 1 each by Does not apply route 4 times daily 120 each 5    NOVOLOG FLEXPEN 100 UNIT/ML injection pen INJECT 5 UNITS INTO THE SKIN 3 TIMES DAILY (BEFORE MEALS) 5 pen 1    divalproex (DEPAKOTE ER) 500 MG extended release tablet TAKE 1 TABLET BY MOUTH EVERY DAY 90 tablet 3    VITAMIN D PO Take by mouth daily      MAGNESIUM PO Take by mouth daily       CALCIUM-VITAMIN D PO Take by mouth daily      blood glucose monitor kit and supplies Test 4 times a day & as needed for symptoms of irregular blood glucose. 1 kit 0    blood glucose monitor strips Test 4 times a day & as needed for symptoms of irregular blood glucose. Dispense sufficient amount for indicated testing frequency plus additional to accommodate PRN testing needs. 400 strip 0    Handicap Placard MISC by Does not apply route 1 each 0    Needles & Syringes MISC Inject 1 each into the skin 3 times daily 100 each 3    solifenacin (VESICARE) 5 MG tablet Take 1 tablet by mouth daily (Patient not taking: Reported on 9/27/2022) 30 tablet 0     No current facility-administered medications for this visit. Allergies: Allergies   Allergen Reactions    Sulfa Antibiotics Hives    Sulfa Antibiotics      Social History:   Social History     Socioeconomic History    Marital status:      Spouse name: Not on file    Number of children: Not on file    Years of education: Not on file    Highest education level: Not on file   Occupational History    Occupation:  [employer]. Tobacco Use    Smoking status: Never    Smokeless tobacco: Never   Vaping Use    Vaping Use: Never used   Substance and Sexual Activity    Alcohol use: Not Currently     Alcohol/week: 2.0 standard drinks     Types: 2 Glasses of wine per week    Drug use: No    Sexual activity: Not Currently     Partners: Male   Other Topics Concern    Not on file   Social History Narrative    ** Merged History Encounter **          Social Determinants of Health     Financial Resource Strain: Low Risk     Difficulty of Paying Living Expenses: Not hard at all   Food Insecurity: No Food Insecurity    Worried About Running Out of Food in the Last Year: Never true    Ran Out of Food in the Last Year: Never true   Transportation Needs: No Transportation Needs    Lack of Transportation (Medical): No    Lack of Transportation (Non-Medical):  No   Physical Activity: Insufficiently Active Days of Exercise per Week: 3 days    Minutes of Exercise per Session: 30 min   Stress: Not on file   Social Connections: Not on file   Intimate Partner Violence: Not on file   Housing Stability: Unknown    Unable to Pay for Housing in the Last Year: No    Number of Places Lived in the Last Year: Not on file    Unstable Housing in the Last Year: No     Family History:   Family History   Problem Relation Age of Onset    Cancer Mother         melanoma    Diabetes Father          Objective:     Vital Signs  Vitals:    09/27/22 1147   BP: 133/75   Pulse: 86   Resp: 16   Temp: 97.5 °F (36.4 °C)   SpO2: 99%      Physical Exam  Physical Exam  HENT:      Head: Normocephalic and atraumatic. Eyes:      Conjunctiva/sclera: Conjunctivae normal.   Pulmonary:      Effort: Pulmonary effort is normal.   Abdominal:      Palpations: Abdomen is soft. Musculoskeletal:      Cervical back: Normal range of motion and neck supple. Skin:     General: Skin is warm and dry. Neurological:      Mental Status: She is alert and oriented to person, place, and time. Procedure: The urethral was anesthesized with topical lidocaine jelly and dilated to a #14 [place]. A 0-degree urethroscope was used to examine the urethra. A 70-degree cystoscope was used to evaluate the bladder. FINDINGS:  1. Urethra was normal  2. Bladder had trabeculations mild  3. Trigone appeared Normal  4. Ureters illustrated bilateral efflux  5. Patient exhibited spasms during the study no        No results found for this visit on 09/27/22. Assessment/Plan:     Alex Rodriguez is a 66 y.o. female with   1. Bladder pain    2. Urge incontinence    3. Urinary frequency    4. Urinary urgency    5. Vaginal odor    6. Vaginal atrophy    Old records reviewed, outside records reviewed    Luna Rios presents today as a follow-up for urinary incontinence as well as a vaginal odor and vaginal atrophy.   The estrogen cream seems to be working well for her vaginal atrophy and odor. She does continue to complain of urinary incontinence that sounds like urge incontinence. She did leak on her original office study but with stress incontinence to the lab large bladder volume. She had tried oxybutynin which she thought helped but caused significant dry mouth. She tried Myrbetriq which she states did not help at all. Because her history sounds like urge incontinence but she did have stress on her office filling study I am going to have her return for urodynamic assessment.     Orders Placed This Encounter   Procedures    MS CYSTOSCOPY,RX FEMALE URETHRAL SYND       Orders Placed This Encounter   Medications    nitrofurantoin, macrocrystal-monohydrate, (MACROBID) 100 MG capsule     Sig: Take 1 capsule by mouth 2 times daily for 1 day     Dispense:  2 capsule     Refill:  0       Saima Buckley MD

## 2022-09-28 ENCOUNTER — HOSPITAL ENCOUNTER (OUTPATIENT)
Dept: MAMMOGRAPHY | Age: 72
Discharge: HOME | End: 2022-09-28
Payer: MEDICARE

## 2022-09-28 DIAGNOSIS — Z12.31 ENCOUNTER FOR SCREENING MAMMOGRAM FOR BREAST CANCER: ICD-10-CM

## 2022-09-28 PROCEDURE — 77063 BREAST TOMOSYNTHESIS BI: CPT

## 2022-10-07 ENCOUNTER — OFFICE VISIT (OUTPATIENT)
Dept: PAIN MANAGEMENT | Age: 72
End: 2022-10-07
Payer: MEDICARE

## 2022-10-07 VITALS
WEIGHT: 149.8 LBS | DIASTOLIC BLOOD PRESSURE: 82 MMHG | OXYGEN SATURATION: 98 % | SYSTOLIC BLOOD PRESSURE: 140 MMHG | BODY MASS INDEX: 25.71 KG/M2 | HEART RATE: 86 BPM

## 2022-10-07 DIAGNOSIS — M47.812 OSTEOARTHRITIS OF CERVICAL SPINE, UNSPECIFIED SPINAL OSTEOARTHRITIS COMPLICATION STATUS: ICD-10-CM

## 2022-10-07 DIAGNOSIS — M48.061 DEGENERATIVE LUMBAR SPINAL STENOSIS: ICD-10-CM

## 2022-10-07 DIAGNOSIS — M54.50 CHRONIC LOW BACK PAIN, UNSPECIFIED BACK PAIN LATERALITY, UNSPECIFIED WHETHER SCIATICA PRESENT: ICD-10-CM

## 2022-10-07 DIAGNOSIS — G56.03 BILATERAL CARPAL TUNNEL SYNDROME: ICD-10-CM

## 2022-10-07 DIAGNOSIS — G56.22 ULNAR NEUROPATHY AT ELBOW, LEFT: ICD-10-CM

## 2022-10-07 DIAGNOSIS — M79.7 FIBROMYALGIA: ICD-10-CM

## 2022-10-07 DIAGNOSIS — G89.29 CHRONIC LOW BACK PAIN, UNSPECIFIED BACK PAIN LATERALITY, UNSPECIFIED WHETHER SCIATICA PRESENT: ICD-10-CM

## 2022-10-07 DIAGNOSIS — M48.07 SPINAL STENOSIS OF LUMBOSACRAL REGION: ICD-10-CM

## 2022-10-07 DIAGNOSIS — M96.1 FAILED NECK SYNDROME: ICD-10-CM

## 2022-10-07 DIAGNOSIS — M96.1 FAILED BACK SURGICAL SYNDROME: ICD-10-CM

## 2022-10-07 DIAGNOSIS — G89.4 CHRONIC PAIN SYNDROME: ICD-10-CM

## 2022-10-07 PROCEDURE — G8417 CALC BMI ABV UP PARAM F/U: HCPCS | Performed by: INTERNAL MEDICINE

## 2022-10-07 PROCEDURE — 1090F PRES/ABSN URINE INCON ASSESS: CPT | Performed by: INTERNAL MEDICINE

## 2022-10-07 PROCEDURE — 1036F TOBACCO NON-USER: CPT | Performed by: INTERNAL MEDICINE

## 2022-10-07 PROCEDURE — G8399 PT W/DXA RESULTS DOCUMENT: HCPCS | Performed by: INTERNAL MEDICINE

## 2022-10-07 PROCEDURE — 99213 OFFICE O/P EST LOW 20 MIN: CPT | Performed by: INTERNAL MEDICINE

## 2022-10-07 PROCEDURE — 1123F ACP DISCUSS/DSCN MKR DOCD: CPT | Performed by: INTERNAL MEDICINE

## 2022-10-07 PROCEDURE — G8427 DOCREV CUR MEDS BY ELIG CLIN: HCPCS | Performed by: INTERNAL MEDICINE

## 2022-10-07 PROCEDURE — 3017F COLORECTAL CA SCREEN DOC REV: CPT | Performed by: INTERNAL MEDICINE

## 2022-10-07 PROCEDURE — G8484 FLU IMMUNIZE NO ADMIN: HCPCS | Performed by: INTERNAL MEDICINE

## 2022-10-07 RX ORDER — GABAPENTIN 400 MG/1
CAPSULE ORAL
Qty: 90 CAPSULE | Refills: 1 | Status: SHIPPED | OUTPATIENT
Start: 2022-10-07 | End: 2022-12-23

## 2022-10-07 RX ORDER — MELOXICAM 15 MG/1
15 TABLET ORAL DAILY
Qty: 30 TABLET | Refills: 1 | Status: SHIPPED | OUTPATIENT
Start: 2022-10-07

## 2022-10-07 RX ORDER — OXYCODONE AND ACETAMINOPHEN 7.5; 325 MG/1; MG/1
1 TABLET ORAL EVERY 6 HOURS PRN
Qty: 90 TABLET | Refills: 0 | Status: SHIPPED | OUTPATIENT
Start: 2022-10-07 | End: 2022-11-11

## 2022-10-07 NOTE — PROGRESS NOTES
Mercedes Blend  1950  [id number]      HISTORY OF PRESENT ILLNESS:  Ms. Jennifer Hernandez is a 74 y.o. female returns for a follow up visit for pain management  She has a diagnosis of   1. Chronic pain syndrome    2. Failed neck syndrome    3. Failed back surgical syndrome    4. Spinal stenosis of lumbosacral region    5. Bilateral carpal tunnel syndrome    6. Osteoarthritis of cervical spine, unspecified spinal osteoarthritis complication status    7. Ulnar neuropathy at elbow, left    8. Fibromyalgia    9. Degenerative lumbar spinal stenosis    10. Chronic low back pain, unspecified back pain laterality, unspecified whether sciatica present    . She complains of pain in the  Neck, low back, left upper leg   She rates the pain 6/10 and describes it as sharp, aching, burning. Current treatment regimen has helped relieve about 40% of the pain. She denies any side effects from the current pain regimen. Patient reports that since the last follow up visit the physical functioning is unchanged, family/social relationships are unchanged, mood is unchanged sleep patterns are unchanged, and that the overall functioning is worse. Patient denies misusing/abusing her narcotic pain medications or using any illegal drugs. There are No indicators for possible drug abuse, addiction or diversion problems. Patient states she has been doing fair, pain has been worse in the back and legs. She says she is seeing a chiropractor. She mentions she had xrays done. She reports she is using Mobic along with Percocet 2-3 per day. She denies any constipation symptos. She reports her weight has been stable. ALLERGIES: Patients list of allergies were reviewed     MEDICATIONS: Ms. Jennifer Hernandez list of medications were reviewed. Her current medications are   Outpatient Medications Prior to Visit   Medication Sig Dispense Refill    amLODIPine (NORVASC) 5 MG tablet TAKE 1 TABLET BY MOUTH EVERY DAY 90 tablet 0    meloxicam (MOBIC) 15 MG tablet Take 1 tablet by mouth daily 30 tablet 1    gabapentin (NEURONTIN) 400 MG capsule Take 1 tablet po AM, take 2 tablets po PM 90 capsule 1    oxyCODONE-acetaminophen (PERCOCET) 7.5-325 MG per tablet Take 1 tablet by mouth every 6 hours as needed for Pain (max 2-3/day) for up to 35 days. 90 tablet 0    estradiol (ESTRACE VAGINAL) 0.1 MG/GM vaginal cream Place 0.25 g vaginally in the morning. Apply 0.25g with fingertip to the vaginal opening every night at bedtime for 2 weeks, then decrease to twice weekly. . 30 g 0    sertraline (ZOLOFT) 100 MG tablet Take 1 tablet by mouth 2 times daily 180 tablet 0    traZODone (DESYREL) 100 MG tablet Take 1 tablet by mouth daily 90 tablet 0    OLANZapine (ZYPREXA) 2.5 MG tablet TAKE 1 TABLET BY MOUTH EVERY DAY AT NIGHT 90 tablet 0    LEVEMIR FLEXTOUCH 100 UNIT/ML injection pen INJECT 20 UNITS SUBCUTANEOUSLY TWICE A DAY 15 pen 5    atorvastatin (LIPITOR) 40 MG tablet TAKE 1 TABLET BY MOUTH EVERY DAY 90 tablet 0    losartan (COZAAR) 100 MG tablet TAKE 1 TABLET BY MOUTH EVERY DAY 90 tablet 0    levothyroxine (SYNTHROID) 75 MCG tablet TAKE 1 TABLET BY MOUTH EVERY DAY 90 tablet 3    Insulin Pen Needle 31G X 5 MM MISC 1 each by Does not apply route 4 times daily 120 each 5    NOVOLOG FLEXPEN 100 UNIT/ML injection pen INJECT 5 UNITS INTO THE SKIN 3 TIMES DAILY (BEFORE MEALS) 5 pen 1    divalproex (DEPAKOTE ER) 500 MG extended release tablet TAKE 1 TABLET BY MOUTH EVERY DAY 90 tablet 3    VITAMIN D PO Take by mouth daily      MAGNESIUM PO Take by mouth daily       CALCIUM-VITAMIN D PO Take by mouth daily      blood glucose monitor kit and supplies Test 4 times a day & as needed for symptoms of irregular blood glucose. 1 kit 0    blood glucose monitor strips Test 4 times a day & as needed for symptoms of irregular blood glucose. Dispense sufficient amount for indicated testing frequency plus additional to accommodate PRN testing needs.  400 strip 0    Handicap Placard MISC by Does not apply route 1 each 0    Needles & Syringes MISC Inject 1 each into the skin 3 times daily 100 each 3    solifenacin (VESICARE) 5 MG tablet Take 1 tablet by mouth daily (Patient not taking: Reported on 9/27/2022) 30 tablet 0     No facility-administered medications prior to visit. REVIEW OF SYSTEMS:    Respiratory: Negative for apnea, chest tightness and shortness of breath or change in baseline breathing. PHYSICAL EXAM:   Nursing note and vitals reviewed. BP (!) 140/82   Pulse 86   Wt 149 lb 12.8 oz (67.9 kg)   SpO2 98%   BMI 25.71 kg/m²   Constitutional: She appears well-developed and well-nourished. No acute distress. Cardiovascular: Normal rate, regular rhythm, normal heart sounds, and does not have murmur. Pulmonary/Chest: Effort normal. No respiratory distress. She does not have wheezes in the lung fields. She has no rales. Neurological/Psychiatric:She is alert and oriented to person, place, and time. Coordination is  normal.  Her mood isAppropriate and affect is Neutral/Euthymic(normal) . IMPRESSION:   1. Chronic pain syndrome    2. Failed neck syndrome    3. Failed back surgical syndrome    4. Spinal stenosis of lumbosacral region    5. Bilateral carpal tunnel syndrome    6. Osteoarthritis of cervical spine, unspecified spinal osteoarthritis complication status    7. Ulnar neuropathy at elbow, left    8. Fibromyalgia    9. Degenerative lumbar spinal stenosis    10.  Chronic low back pain, unspecified back pain laterality, unspecified whether sciatica present        PLAN:  Informed verbal consent was obtained  -ROM/stretching exercises as advised   -She was advised to increase fluids ( 5-7  glasses of fluid daily), limit caffeine, avoid cheese products, increase dietary fiber, increase activity and exercise as tolerated and relax regularly and enjoy meals   -Continue with Percocet 2-3 per day   -Walking as tolerated   -Xray of Lumbar spine reviewed, shows hardware intact with DDD and migration of screw into L1-L2 space with compression of L1   Current Outpatient Medications   Medication Sig Dispense Refill    amLODIPine (NORVASC) 5 MG tablet TAKE 1 TABLET BY MOUTH EVERY DAY 90 tablet 0    meloxicam (MOBIC) 15 MG tablet Take 1 tablet by mouth daily 30 tablet 1    gabapentin (NEURONTIN) 400 MG capsule Take 1 tablet po AM, take 2 tablets po PM 90 capsule 1    oxyCODONE-acetaminophen (PERCOCET) 7.5-325 MG per tablet Take 1 tablet by mouth every 6 hours as needed for Pain (max 2-3/day) for up to 35 days. 90 tablet 0    estradiol (ESTRACE VAGINAL) 0.1 MG/GM vaginal cream Place 0.25 g vaginally in the morning. Apply 0.25g with fingertip to the vaginal opening every night at bedtime for 2 weeks, then decrease to twice weekly. . 30 g 0    sertraline (ZOLOFT) 100 MG tablet Take 1 tablet by mouth 2 times daily 180 tablet 0    traZODone (DESYREL) 100 MG tablet Take 1 tablet by mouth daily 90 tablet 0    OLANZapine (ZYPREXA) 2.5 MG tablet TAKE 1 TABLET BY MOUTH EVERY DAY AT NIGHT 90 tablet 0    LEVEMIR FLEXTOUCH 100 UNIT/ML injection pen INJECT 20 UNITS SUBCUTANEOUSLY TWICE A DAY 15 pen 5    atorvastatin (LIPITOR) 40 MG tablet TAKE 1 TABLET BY MOUTH EVERY DAY 90 tablet 0    losartan (COZAAR) 100 MG tablet TAKE 1 TABLET BY MOUTH EVERY DAY 90 tablet 0    levothyroxine (SYNTHROID) 75 MCG tablet TAKE 1 TABLET BY MOUTH EVERY DAY 90 tablet 3    Insulin Pen Needle 31G X 5 MM MISC 1 each by Does not apply route 4 times daily 120 each 5    NOVOLOG FLEXPEN 100 UNIT/ML injection pen INJECT 5 UNITS INTO THE SKIN 3 TIMES DAILY (BEFORE MEALS) 5 pen 1    divalproex (DEPAKOTE ER) 500 MG extended release tablet TAKE 1 TABLET BY MOUTH EVERY DAY 90 tablet 3    VITAMIN D PO Take by mouth daily      MAGNESIUM PO Take by mouth daily       CALCIUM-VITAMIN D PO Take by mouth daily      blood glucose monitor kit and supplies Test 4 times a day & as needed for symptoms of irregular blood glucose.  1 kit 0    blood glucose monitor strips Test 4 times a day & as needed for symptoms of irregular blood glucose. Dispense sufficient amount for indicated testing frequency plus additional to accommodate PRN testing needs. 400 strip 0    Handicap Placard MISC by Does not apply route 1 each 0    Needles & Syringes MISC Inject 1 each into the skin 3 times daily 100 each 3    solifenacin (VESICARE) 5 MG tablet Take 1 tablet by mouth daily (Patient not taking: Reported on 9/27/2022) 30 tablet 0     No current facility-administered medications for this visit. I will continue her current medication regimen  which is part of the above treatment schedule. It has been helping with Ms. Adrian's chronic  medical problems which for this visit include:   Diagnoses of Chronic pain syndrome, Failed neck syndrome, Failed back surgical syndrome, Spinal stenosis of lumbosacral region, Bilateral carpal tunnel syndrome, Osteoarthritis of cervical spine, unspecified spinal osteoarthritis complication status, Ulnar neuropathy at elbow, left, Fibromyalgia, Degenerative lumbar spinal stenosis, and Chronic low back pain, unspecified back pain laterality, unspecified whether sciatica present were pertinent to this visit. Risks and benefits of the medications and other alternative treatments  including no treatment were discussed with the patient. The common side effects of these medications were also explained to the patient. Informed verbal consent was obtained. Goals of current treatment regimen include improvement in pain, restoration of functioning- with focus on improvement in physical performance, general activity, work or disability,emotional distress, health care utilization and  decreased medication consumption. Will continue to monitor progress towards achieving/maintaining therapeutic goals with special emphasis on  1. Improvement in perceived interfernce  of pain with ADL's. Ability to do home exercises independently.  Ability to do household chores indoor and/or outdoor work and social and leisure activities. Improve psychosocial and physical functioning. - she is showing progression towards this treatment goal with the current regimen. She was advised against drinking alcohol with the narcotic pain medicines, advised against driving or handling machinery while adjusting the dose of medicines or if having cognitive  issues related to the current medications. Risk of overdose and death, if medicines not taken as prescribed, were also discussed. If the patient develops new symptoms or if the symptoms worsen, the patient should call the office. While transcribing every attempt was made to maintain the accuracy of the note in terms of it's contents,there may have been some errors made inadvertently. Thank you for allowing me to participate in the care of this patient.     Gin Osuna MD.    Cc: SAI Ayon - CNP

## 2022-10-09 DIAGNOSIS — F32.A DEPRESSION, UNSPECIFIED DEPRESSION TYPE: ICD-10-CM

## 2022-10-10 NOTE — TELEPHONE ENCOUNTER
Last ov 07/06/2022   Future Appointments   Date Time Provider John Edie   11/8/2022 11:00 AM SAI Mae - CNP [facility] [place] [facility]   11/11/2022 10:00 AM Melonie Funes MD [facility]   11/28/2022 10:30 AM SAI Mcgraw CNP [facility]

## 2022-10-11 ENCOUNTER — TELEPHONE (OUTPATIENT)
Dept: FAMILY MEDICINE CLINIC | Age: 72
End: 2022-10-11

## 2022-10-11 RX ORDER — SERTRALINE HYDROCHLORIDE 100 MG/1
TABLET, FILM COATED ORAL
Qty: 180 TABLET | Refills: 0 | Status: SHIPPED | OUTPATIENT
Start: 2022-10-11 | End: 2022-11-03

## 2022-10-11 NOTE — TELEPHONE ENCOUNTER
Patient states that she would like to have a referral for [facility]. She is experiencing back pain and had an x-ray last week that she reports showed screws in her back had been displaced.      7/6/2022 last ov    Future Appointments   Date Time Provider John Irizarry   11/8/2022 11:00 AM SAI Rondon CNP [place] [facility]   11/11/2022 10:00 AM Amanda Dietrich MD [facility]   11/28/2022 10:30 AM SAI Bennett CNP [facility]

## 2022-10-12 DIAGNOSIS — R93.7 ABNORMAL X-RAY OF LUMBAR SPINE: ICD-10-CM

## 2022-10-12 DIAGNOSIS — M96.1 FAILED BACK SYNDROME OF LUMBAR SPINE: Primary | ICD-10-CM

## 2022-10-12 NOTE — TELEPHONE ENCOUNTER
[id number] Rui Bradley MD   [facility], [facility]   Ph: [phone]

## 2022-10-13 DIAGNOSIS — F32.A DEPRESSION, UNSPECIFIED DEPRESSION TYPE: ICD-10-CM

## 2022-10-13 RX ORDER — OLANZAPINE 2.5 MG/1
TABLET ORAL
Qty: 90 TABLET | Refills: 0 | Status: SHIPPED | OUTPATIENT
Start: 2022-10-13 | End: 2022-11-03

## 2022-10-13 RX ORDER — ATORVASTATIN CALCIUM 40 MG/1
TABLET, FILM COATED ORAL
Qty: 90 TABLET | Refills: 0 | Status: SHIPPED | OUTPATIENT
Start: 2022-10-13 | End: 2022-11-03

## 2022-10-13 NOTE — TELEPHONE ENCOUNTER
LOV 7/6/2022    Future Appointments   Date Time Provider John Edie   11/9/2022 11:00 AM SAI Salgado - GIULIANA ACUNA [place] [facility]   11/11/2022 10:00 AM Michael Ramey MD [facility]   11/28/2022 10:30 AM SAI Park - GIULIANA Olson [facility]

## 2022-10-18 RX ORDER — TRAZODONE HYDROCHLORIDE 100 MG/1
TABLET ORAL
Qty: 90 TABLET | Refills: 1 | Status: SHIPPED | OUTPATIENT
Start: 2022-10-18

## 2022-11-03 DIAGNOSIS — F32.A DEPRESSION, UNSPECIFIED DEPRESSION TYPE: ICD-10-CM

## 2022-11-03 DIAGNOSIS — I10 ESSENTIAL HYPERTENSION: ICD-10-CM

## 2022-11-03 RX ORDER — ATORVASTATIN CALCIUM 40 MG/1
TABLET, FILM COATED ORAL
Qty: 90 TABLET | Refills: 1 | Status: SHIPPED | OUTPATIENT
Start: 2022-11-03

## 2022-11-03 RX ORDER — AMLODIPINE BESYLATE 5 MG/1
TABLET ORAL
Qty: 90 TABLET | Refills: 0 | Status: SHIPPED | OUTPATIENT
Start: 2022-11-03

## 2022-11-03 RX ORDER — SERTRALINE HYDROCHLORIDE 100 MG/1
TABLET, FILM COATED ORAL
Qty: 180 TABLET | Refills: 1 | Status: SHIPPED | OUTPATIENT
Start: 2022-11-03

## 2022-11-03 RX ORDER — DIVALPROEX SODIUM 500 MG/1
TABLET, EXTENDED RELEASE ORAL
Qty: 90 TABLET | Refills: 1 | Status: SHIPPED | OUTPATIENT
Start: 2022-11-03

## 2022-11-03 RX ORDER — ESTRADIOL 0.1 MG/G
0.25 CREAM VAGINAL DAILY
Qty: 42.5 G | Refills: 2 | Status: SHIPPED | OUTPATIENT
Start: 2022-11-03

## 2022-11-03 RX ORDER — OLANZAPINE 2.5 MG/1
TABLET ORAL
Qty: 90 TABLET | Refills: 1 | Status: SHIPPED | OUTPATIENT
Start: 2022-11-03

## 2022-11-03 NOTE — TELEPHONE ENCOUNTER
7/6/2022    ALL TO EARLY FOR REFILL EXCEPT [facility]     Future Appointments   Date Time Provider John Edie   11/9/2022 11:00 AM SAI Storm CNP [place] [facility]   11/11/2022 10:00 AM Hanny Cantrell MD [facility]   11/28/2022 10:30 AM SAI Zaman CNP [facility]

## 2022-11-03 NOTE — TELEPHONE ENCOUNTER
Patient called the office and stated she took some time off work and started taking the oxybutynin again and noticed it has really started helping. She said we cancelled the refill and she is wondering if she can get this filled. Advised patient someone from office will call her back. She is asking it be sent to the [facility] on [address]. Please advise.

## 2022-11-08 ENCOUNTER — TELEPHONE (OUTPATIENT)
Dept: UROGYNECOLOGY | Age: 72
End: 2022-11-08

## 2022-11-08 NOTE — TELEPHONE ENCOUNTER
Left  for patient letting her know I spoke with Dr. Dicky Phoenix and he is okay with us sending in a prescription for Oxybutynin if this not giving her some relief in bladder symptoms and not causing the dry mouth that she had previously gotten. Requested patient call office back to speak about plan.

## 2022-11-09 ENCOUNTER — OFFICE VISIT (OUTPATIENT)
Dept: FAMILY MEDICINE CLINIC | Age: 72
End: 2022-11-09
Payer: MEDICARE

## 2022-11-09 VITALS
SYSTOLIC BLOOD PRESSURE: 147 MMHG | RESPIRATION RATE: 16 BRPM | WEIGHT: 147 LBS | HEART RATE: 73 BPM | DIASTOLIC BLOOD PRESSURE: 79 MMHG | OXYGEN SATURATION: 98 % | TEMPERATURE: 97.1 F | BODY MASS INDEX: 25.23 KG/M2

## 2022-11-09 DIAGNOSIS — Z79.4 TYPE 2 DIABETES MELLITUS WITH OTHER SPECIFIED COMPLICATION, WITH LONG-TERM CURRENT USE OF INSULIN (HCC): Primary | ICD-10-CM

## 2022-11-09 DIAGNOSIS — E03.9 HYPOTHYROIDISM, UNSPECIFIED TYPE: ICD-10-CM

## 2022-11-09 DIAGNOSIS — I10 PRIMARY HYPERTENSION: ICD-10-CM

## 2022-11-09 DIAGNOSIS — E11.69 TYPE 2 DIABETES MELLITUS WITH OTHER SPECIFIED COMPLICATION, WITH LONG-TERM CURRENT USE OF INSULIN (HCC): Primary | ICD-10-CM

## 2022-11-09 DIAGNOSIS — E78.5 DYSLIPIDEMIA: ICD-10-CM

## 2022-11-09 DIAGNOSIS — M96.1 FAILED BACK SYNDROME OF LUMBAR SPINE: ICD-10-CM

## 2022-11-09 DIAGNOSIS — F32.A DEPRESSION, UNSPECIFIED DEPRESSION TYPE: ICD-10-CM

## 2022-11-09 LAB — HBA1C MFR BLD: 7.7 %

## 2022-11-09 PROCEDURE — 3074F SYST BP LT 130 MM HG: CPT | Performed by: NURSE PRACTITIONER

## 2022-11-09 PROCEDURE — G8484 FLU IMMUNIZE NO ADMIN: HCPCS | Performed by: NURSE PRACTITIONER

## 2022-11-09 PROCEDURE — 3017F COLORECTAL CA SCREEN DOC REV: CPT | Performed by: NURSE PRACTITIONER

## 2022-11-09 PROCEDURE — 1090F PRES/ABSN URINE INCON ASSESS: CPT | Performed by: NURSE PRACTITIONER

## 2022-11-09 PROCEDURE — 2022F DILAT RTA XM EVC RTNOPTHY: CPT | Performed by: NURSE PRACTITIONER

## 2022-11-09 PROCEDURE — G8399 PT W/DXA RESULTS DOCUMENT: HCPCS | Performed by: NURSE PRACTITIONER

## 2022-11-09 PROCEDURE — 3051F HG A1C>EQUAL 7.0%<8.0%: CPT | Performed by: NURSE PRACTITIONER

## 2022-11-09 PROCEDURE — 1036F TOBACCO NON-USER: CPT | Performed by: NURSE PRACTITIONER

## 2022-11-09 PROCEDURE — 3078F DIAST BP <80 MM HG: CPT | Performed by: NURSE PRACTITIONER

## 2022-11-09 PROCEDURE — 99214 OFFICE O/P EST MOD 30 MIN: CPT | Performed by: NURSE PRACTITIONER

## 2022-11-09 PROCEDURE — G8427 DOCREV CUR MEDS BY ELIG CLIN: HCPCS | Performed by: NURSE PRACTITIONER

## 2022-11-09 PROCEDURE — G8417 CALC BMI ABV UP PARAM F/U: HCPCS | Performed by: NURSE PRACTITIONER

## 2022-11-09 PROCEDURE — 1123F ACP DISCUSS/DSCN MKR DOCD: CPT | Performed by: NURSE PRACTITIONER

## 2022-11-09 PROCEDURE — 83036 HEMOGLOBIN GLYCOSYLATED A1C: CPT | Performed by: NURSE PRACTITIONER

## 2022-11-09 RX ORDER — LOSARTAN POTASSIUM 100 MG/1
100 TABLET ORAL DAILY
Qty: 90 TABLET | Refills: 3 | Status: SHIPPED | OUTPATIENT
Start: 2022-11-09

## 2022-11-09 ASSESSMENT — ANXIETY QUESTIONNAIRES
IF YOU CHECKED OFF ANY PROBLEMS ON THIS QUESTIONNAIRE, HOW DIFFICULT HAVE THESE PROBLEMS MADE IT FOR YOU TO DO YOUR WORK, TAKE CARE OF THINGS AT HOME, OR GET ALONG WITH OTHER PEOPLE: NOT DIFFICULT AT ALL
2. NOT BEING ABLE TO STOP OR CONTROL WORRYING: 3
7. FEELING AFRAID AS IF SOMETHING AWFUL MIGHT HAPPEN: 1
3. WORRYING TOO MUCH ABOUT DIFFERENT THINGS: 3
6. BECOMING EASILY ANNOYED OR IRRITABLE: 3
1. FEELING NERVOUS, ANXIOUS, OR ON EDGE: 2
GAD7 TOTAL SCORE: 16
5. BEING SO RESTLESS THAT IT IS HARD TO SIT STILL: 2
4. TROUBLE RELAXING: 2

## 2022-11-09 ASSESSMENT — PATIENT HEALTH QUESTIONNAIRE - PHQ9
SUM OF ALL RESPONSES TO PHQ QUESTIONS 1-9: 2
DEPRESSION UNABLE TO ASSESS: FUNCTIONAL CAPACITY MOTIVATION LIMITS ACCURACY
SUM OF ALL RESPONSES TO PHQ QUESTIONS 1-9: 2
10. IF YOU CHECKED OFF ANY PROBLEMS, HOW DIFFICULT HAVE THESE PROBLEMS MADE IT FOR YOU TO DO YOUR WORK, TAKE CARE OF THINGS AT HOME, OR GET ALONG WITH OTHER PEOPLE: 1
SUM OF ALL RESPONSES TO PHQ QUESTIONS 1-9: 15
SUM OF ALL RESPONSES TO PHQ9 QUESTIONS 1 & 2: 5
SUM OF ALL RESPONSES TO PHQ QUESTIONS 1-9: 2
5. POOR APPETITE OR OVEREATING: 1
7. TROUBLE CONCENTRATING ON THINGS, SUCH AS READING THE NEWSPAPER OR WATCHING TELEVISION: 1
6. FEELING BAD ABOUT YOURSELF - OR THAT YOU ARE A FAILURE OR HAVE LET YOURSELF OR YOUR FAMILY DOWN: 3
SUM OF ALL RESPONSES TO PHQ QUESTIONS 1-9: 15
1. LITTLE INTEREST OR PLEASURE IN DOING THINGS: 3
SUM OF ALL RESPONSES TO PHQ QUESTIONS 1-9: 14
3. TROUBLE FALLING OR STAYING ASLEEP: 1
1. LITTLE INTEREST OR PLEASURE IN DOING THINGS: 0
2. FEELING DOWN, DEPRESSED OR HOPELESS: 2
9. THOUGHTS THAT YOU WOULD BE BETTER OFF DEAD, OR OF HURTING YOURSELF: 1
SUM OF ALL RESPONSES TO PHQ QUESTIONS 1-9: 2
4. FEELING TIRED OR HAVING LITTLE ENERGY: 1
8. MOVING OR SPEAKING SO SLOWLY THAT OTHER PEOPLE COULD HAVE NOTICED. OR THE OPPOSITE, BEING SO FIGETY OR RESTLESS THAT YOU HAVE BEEN MOVING AROUND A LOT MORE THAN USUAL: 2
SUM OF ALL RESPONSES TO PHQ9 QUESTIONS 1 & 2: 2
2. FEELING DOWN, DEPRESSED OR HOPELESS: 2
SUM OF ALL RESPONSES TO PHQ QUESTIONS 1-9: 15

## 2022-11-09 ASSESSMENT — COLUMBIA-SUICIDE SEVERITY RATING SCALE - C-SSRS
1. WITHIN THE PAST MONTH, HAVE YOU WISHED YOU WERE DEAD OR WISHED YOU COULD GO TO SLEEP AND NOT WAKE UP?: NO
2. HAVE YOU ACTUALLY HAD ANY THOUGHTS OF KILLING YOURSELF?: NO

## 2022-11-09 ASSESSMENT — ENCOUNTER SYMPTOMS
BACK PAIN: 1
SHORTNESS OF BREATH: 0
COUGH: 0
VOMITING: 0
NAUSEA: 0
DIARRHEA: 0

## 2022-11-09 NOTE — PATIENT INSTRUCTIONS
Continue current medications  Discuss with Dr. Max Jacinto gabapentin dose  Keep eye on BP  Follow with me in 3 months     February 15th @ 11:00 am

## 2022-11-09 NOTE — PROGRESS NOTES
denies hair loss. Patient is  taking her medication consistently on an empty stomach. bSy        TSH   Date Value Ref Range Status   06/27/2022 1.57 0.27 - 4.20 uIU/mL Final      Mood Disorder:  increased over the last month or so. She has been dealing with increased chronic low back pain which is radiating down the left leg. She had lumbar x-ray done which showed that her screws have moved. She is scheduled to see Renetta on the 28th of this month. The pain can be quite significant at times and this increases her depression. Passive SI without intention or plan. Not consistent with her antidepressants. Wants to just continue same dose for now and thinks that things will get better when she gets her back pain improved. PHQ Scores 11/9/2022 11/9/2022 6/28/2022 6/15/2022 11/10/2021 3/18/2020 7/21/2017   PHQ2 Score 2 5 2 2 2 0 0   PHQ9 Score 2 15 2 10 2 0 0     Interpretation of Total Score Depression Severity: 1-4 = Minimal depression, 5-9 = Mild depression, 10-14 = Moderate depression, 15-19 = Moderately severe depression, 20-27 = Severe depression     Review of Systems   Constitutional:  Negative for chills, diaphoresis, fatigue and fever. Respiratory:  Negative for cough and shortness of breath. Cardiovascular:  Negative for chest pain and leg swelling. Gastrointestinal:  Negative for diarrhea, nausea and vomiting. Musculoskeletal:  Positive for back pain and gait problem. Neurological:  Negative for dizziness and headaches. Psychiatric/Behavioral:  Positive for decreased concentration, dysphoric mood and sleep disturbance (back pain). All other systems reviewed and are negative. Prior to Visit Medications    Medication Sig Taking?  Authorizing Provider   losartan (COZAAR) 100 MG tablet Take 1 tablet by mouth daily Yes Becky Prema, APRN - CNP   OLANZapine (ZYPREXA) 2.5 MG tablet TAKE 1 TABLET BY MOUTH EVERY DAY AT NIGHT Yes Becky Prema, APRN - CNP   divalproex (DEPAKOTE ER) 500 MG extended release tablet TAKE 1 TABLET BY MOUTH EVERY DAY Yes SAI Stone CNP   amLODIPine (NORVASC) 5 MG tablet TAKE 1 TABLET BY MOUTH EVERY DAY Yes SAI Stone CNP   estradiol (ESTRACE) 0.1 MG/GM vaginal cream PLACE 0.25 G VAGINALLY IN THE MORNING. APPLY 0.25G WITH FINGERTIP TO THE VAGINAL OPENING EVERY NIGHT AT BEDTIME FOR 2 WEEKS, THEN DECREASE TO TWICE WEEKLY. . Yes Jocelyn Byers MD   atorvastatin (LIPITOR) 40 MG tablet TAKE 1 TABLET BY MOUTH EVERY DAY Yes SAI Stone CNP   sertraline (ZOLOFT) 100 MG tablet TAKE 1 TABLET BY MOUTH TWICE A DAY Yes SAI Stone CNP   traZODone (DESYREL) 100 MG tablet TAKE 1 TABLET BY MOUTH EVERY DAY Yes Terri NeitherSAI CNP   oxyCODONE-acetaminophen (PERCOCET) 7.5-325 MG per tablet Take 1 tablet by mouth every 6 hours as needed for Pain (max 2-3/day) for up to 35 days. Yes Dominic Christianson MD   meloxicam (MOBIC) 15 MG tablet Take 1 tablet by mouth daily Yes Dominic Christianson MD   gabapentin (NEURONTIN) 400 MG capsule Take 1 tablet po AM, take 2 tablets po PM Yes Dominic Christianson MD   LEVEMIR FLEXTOUCH 100 UNIT/ML injection pen INJECT 20 UNITS SUBCUTANEOUSLY TWICE A DAY Yes Terri NeitherSAI CNP   levothyroxine (SYNTHROID) 75 MCG tablet TAKE 1 TABLET BY MOUTH EVERY DAY Yes SAI Stone CNP   Insulin Pen Needle 31G X 5 MM MISC 1 each by Does not apply route 4 times daily Yes Terri Neither, SAI - CNP   NOVOLOG FLEXPEN 100 UNIT/ML injection pen INJECT 5 UNITS INTO THE SKIN 3 TIMES DAILY (BEFORE MEALS) Yes Terri Neither, APRN - CNP   VITAMIN D PO Take by mouth daily Yes Historical Provider, MD   MAGNESIUM PO Take by mouth daily  Yes Historical Provider, MD   CALCIUM-VITAMIN D PO Take by mouth daily Yes Historical Provider, MD   blood glucose monitor kit and supplies Test 4 times a day & as needed for symptoms of irregular blood glucose.  Yes SAI Dickerson CNP   blood glucose monitor strips Test 4 times a day & as needed for symptoms of irregular blood glucose. Dispense sufficient amount for indicated testing frequency plus additional to accommodate PRN testing needs. Yes SAI Dickerson CNP   Handicap Placard MISC by Does not apply route Yes Kumar Almanzar MD   Needles & Syringes MISC Inject 1 each into the skin 3 times daily Yes Kumar Almanzar MD   solifenacin (VESICARE) 5 MG tablet Take 1 tablet by mouth daily  Patient not taking: Reported on 9/27/2022  SAI Boogie CNP   OLANZapine (ZYPREXA) 2.5 MG tablet TAKE 1 TABLET BY MOUTH EVERY DAY AT NIGHT  SAI Dickerson CNP        Social History     Tobacco Use    Smoking status: Never    Smokeless tobacco: Never   Substance Use Topics    Alcohol use: Not Currently     Alcohol/week: 2.0 standard drinks     Types: 2 Glasses of wine per week        Vitals:    11/09/22 1053 11/09/22 1101   BP: (!) 159/80 (!) 147/79   Site: Left Upper Arm Left Upper Arm   Position: Sitting Sitting   Pulse: 73    Resp: 16    Temp: 97.1 °F (36.2 °C)    TempSrc: Temporal    SpO2: 98%    Weight: 147 lb (66.7 kg)      Estimated body mass index is 25.23 kg/m² as calculated from the following:    Height as of 8/1/22: 5' 4\" (1.626 m). Weight as of this encounter: 147 lb (66.7 kg). Physical Exam  Vitals and nursing note reviewed. Constitutional:       General: She is not in acute distress. Appearance: Normal appearance. She is well-developed. She is not ill-appearing, toxic-appearing or diaphoretic. HENT:      Head: Normocephalic and atraumatic. Eyes:      Extraocular Movements: Extraocular movements intact. Conjunctiva/sclera: Conjunctivae normal.      Pupils: Pupils are equal, round, and reactive to light. Cardiovascular:      Rate and Rhythm: Normal rate and regular rhythm. Heart sounds: Normal heart sounds, S1 normal and S2 normal. No murmur heard. No friction rub. No gallop. Pulmonary:      Effort: Pulmonary effort is normal. No respiratory distress. Breath sounds: Normal breath sounds. No stridor. No wheezing or rales. Neurological:      General: No focal deficit present. Mental Status: She is alert and oriented to person, place, and time. Mental status is at baseline. Cranial Nerves: No cranial nerve deficit. Psychiatric:         Mood and Affect: Mood normal.         Speech: Speech normal.         Behavior: Behavior normal.         Thought Content: Thought content normal.         Judgment: Judgment normal.       ASSESSMENT/PLAN:  1. Type 2 diabetes mellitus with other specified complication, with long-term current use of insulin ([facility] [address]- uncontrolled, but improving. A1C trending down over the last year. Will continue current regimen  - POCT glycosylated hemoglobin (Hb A1C)    2. Primary hypertension- elevated today, has been well controlled for past few years. Probably high due to pain. Will continue same regimen and monitor    3. Dyslipidemia- controlled, on Lipitor which will be continued     4. Hypothyroidism, unspecified type- controlled, continue levothyroxine 74 mcg daily    5. Depression, unspecified depression type- increased symptoms secondary to increased back pain. Will continue current medication. Call with any worsening of symptoms    6. Failed back syndrome of lumbar spine- has appointment with pain management tomorrow, recommended discussing gabapentin dose      Return in about 3 months (around 2/9/2023). An electronic signature was used to authenticate this note.     --SAI Rodriguez - CNP on 11/9/2022 at 1:06 PM

## 2022-11-10 ENCOUNTER — TELEPHONE (OUTPATIENT)
Dept: UROGYNECOLOGY | Age: 72
End: 2022-11-10

## 2022-11-10 RX ORDER — OXYBUTYNIN CHLORIDE 10 MG/1
10 TABLET, EXTENDED RELEASE ORAL DAILY
Qty: 30 TABLET | Refills: 2 | Status: SHIPPED | OUTPATIENT
Start: 2022-11-10

## 2022-11-10 NOTE — TELEPHONE ENCOUNTER
Called patient to discuss plan, patient would like to continue using Oxybutynin at this time because she says this is the only thing that helps her bladder issues. States it is not causing her dry mouth at this time, but will let us know if it does. Per Dr. Comfort Montes, okay to send in Ditropan XL 10mg. Patient also let this RN know she has a lot going on right now trying to take care of her back pain she is having and is planning on going to the [address] [place] [address], unsure if she should keep her Urodynamics appointment. Let patient know that was up to her, we could keep appointment  for now and if it gets closer and patient decides she is doing well on the Oxybutynin and wants to hold off then that is okay and she can call office to reschedule. Patient agreed with plan and will follow up soon.

## 2022-11-10 NOTE — TELEPHONE ENCOUNTER
JENNIE with the Initial Care Patient Line at [phone] for them to call me or call the patient to get her scheduled with Dr. Abby Gaviria, neurosurgeon, with [facility]. Neurology states she needs to see a neurosurgeon.  Will update the referral to reflect that she will be seeing this specialist normal...

## 2022-11-11 ENCOUNTER — OFFICE VISIT (OUTPATIENT)
Dept: PAIN MANAGEMENT | Age: 72
End: 2022-11-11
Payer: MEDICARE

## 2022-11-11 VITALS
HEART RATE: 75 BPM | SYSTOLIC BLOOD PRESSURE: 131 MMHG | WEIGHT: 145 LBS | DIASTOLIC BLOOD PRESSURE: 83 MMHG | BODY MASS INDEX: 24.89 KG/M2

## 2022-11-11 DIAGNOSIS — M47.812 OSTEOARTHRITIS OF CERVICAL SPINE, UNSPECIFIED SPINAL OSTEOARTHRITIS COMPLICATION STATUS: ICD-10-CM

## 2022-11-11 DIAGNOSIS — M79.7 FIBROMYALGIA: ICD-10-CM

## 2022-11-11 DIAGNOSIS — M48.07 SPINAL STENOSIS OF LUMBOSACRAL REGION: ICD-10-CM

## 2022-11-11 DIAGNOSIS — G56.22 ULNAR NEUROPATHY AT ELBOW, LEFT: ICD-10-CM

## 2022-11-11 DIAGNOSIS — M48.061 DEGENERATIVE LUMBAR SPINAL STENOSIS: ICD-10-CM

## 2022-11-11 DIAGNOSIS — G89.4 CHRONIC PAIN SYNDROME: ICD-10-CM

## 2022-11-11 DIAGNOSIS — M96.1 FAILED NECK SYNDROME: ICD-10-CM

## 2022-11-11 DIAGNOSIS — G56.03 BILATERAL CARPAL TUNNEL SYNDROME: ICD-10-CM

## 2022-11-11 DIAGNOSIS — M96.1 FAILED BACK SURGICAL SYNDROME: ICD-10-CM

## 2022-11-11 PROCEDURE — G8420 CALC BMI NORM PARAMETERS: HCPCS | Performed by: INTERNAL MEDICINE

## 2022-11-11 PROCEDURE — G8427 DOCREV CUR MEDS BY ELIG CLIN: HCPCS | Performed by: INTERNAL MEDICINE

## 2022-11-11 PROCEDURE — 1036F TOBACCO NON-USER: CPT | Performed by: INTERNAL MEDICINE

## 2022-11-11 PROCEDURE — 1123F ACP DISCUSS/DSCN MKR DOCD: CPT | Performed by: INTERNAL MEDICINE

## 2022-11-11 PROCEDURE — 1090F PRES/ABSN URINE INCON ASSESS: CPT | Performed by: INTERNAL MEDICINE

## 2022-11-11 PROCEDURE — G8399 PT W/DXA RESULTS DOCUMENT: HCPCS | Performed by: INTERNAL MEDICINE

## 2022-11-11 PROCEDURE — 99213 OFFICE O/P EST LOW 20 MIN: CPT | Performed by: INTERNAL MEDICINE

## 2022-11-11 PROCEDURE — 3078F DIAST BP <80 MM HG: CPT | Performed by: INTERNAL MEDICINE

## 2022-11-11 PROCEDURE — 3017F COLORECTAL CA SCREEN DOC REV: CPT | Performed by: INTERNAL MEDICINE

## 2022-11-11 PROCEDURE — G8484 FLU IMMUNIZE NO ADMIN: HCPCS | Performed by: INTERNAL MEDICINE

## 2022-11-11 PROCEDURE — 3074F SYST BP LT 130 MM HG: CPT | Performed by: INTERNAL MEDICINE

## 2022-11-11 RX ORDER — GABAPENTIN 400 MG/1
CAPSULE ORAL
Qty: 120 CAPSULE | Refills: 1 | Status: SHIPPED | OUTPATIENT
Start: 2022-11-11 | End: 2023-01-27

## 2022-11-11 RX ORDER — OXYCODONE AND ACETAMINOPHEN 7.5; 325 MG/1; MG/1
1 TABLET ORAL EVERY 6 HOURS PRN
Qty: 90 TABLET | Refills: 0 | Status: SHIPPED | OUTPATIENT
Start: 2022-11-11 | End: 2022-12-16

## 2022-11-11 RX ORDER — MELOXICAM 15 MG/1
15 TABLET ORAL DAILY
Qty: 30 TABLET | Refills: 1 | Status: SHIPPED | OUTPATIENT
Start: 2022-11-11

## 2022-11-11 NOTE — PROGRESS NOTES
Renny Corbett  1950  [id number]      HISTORY OF PRESENT ILLNESS:  Ms. Ivelisse Mullins is a 74 y.o. female returns for a follow up visit for pain management  She has a diagnosis of   1. Chronic pain syndrome    2. Failed neck syndrome    3. Failed back surgical syndrome    4. Spinal stenosis of lumbosacral region    5. Bilateral carpal tunnel syndrome    6. Osteoarthritis of cervical spine, unspecified spinal osteoarthritis complication status    7. Fibromyalgia    8. Degenerative lumbar spinal stenosis    9. Ulnar neuropathy at elbow, left    . She complains of pain in the  Low back, left leg   She rates the pain 6/10 and describes it as sharp, aching, burning. Current treatment regimen has helped relieve about 50% of the pain. She denies any side effects from the current pain regimen. Patient reports that since the last follow up visit the physical functioning is worse, family/social relationships are unchanged, mood is worse sleep patterns are worse, and that the overall functioning is worse. Patient denies misusing/abusing her narcotic pain medications or using any illegal drugs. There are No indicators for possible drug abuse, addiction or diversion problems. Patient complains she is having increase pain in the back. She states she has an appointment to see the neurosurgeon. She mentions she is using using Mobic along with Neurontin 1200 mg and Percocet 2-3 per day. He says he is using all his psychotropics from her np. ALLERGIES: Patients list of allergies were reviewed     MEDICATIONS: Ms. Ivelisse Mullins list of medications were reviewed. Her current medications are   Outpatient Medications Prior to Visit   Medication Sig Dispense Refill    oxybutynin (DITROPAN XL) 10 MG extended release tablet Take 1 tablet by mouth daily 30 tablet 2    losartan (COZAAR) 100 MG tablet Take 1 tablet by mouth daily 90 tablet 3    OLANZapine (ZYPREXA) 2.5 MG tablet TAKE 1 TABLET BY MOUTH EVERY DAY AT NIGHT 90 tablet 1    divalproex (DEPAKOTE ER) 500 MG extended release tablet TAKE 1 TABLET BY MOUTH EVERY DAY 90 tablet 1    amLODIPine (NORVASC) 5 MG tablet TAKE 1 TABLET BY MOUTH EVERY DAY 90 tablet 0    estradiol (ESTRACE) 0.1 MG/GM vaginal cream PLACE 0.25 G VAGINALLY IN THE MORNING. APPLY 0.25G WITH FINGERTIP TO THE VAGINAL OPENING EVERY NIGHT AT BEDTIME FOR 2 WEEKS, THEN DECREASE TO TWICE WEEKLY. . 42.5 g 2    atorvastatin (LIPITOR) 40 MG tablet TAKE 1 TABLET BY MOUTH EVERY DAY 90 tablet 1    sertraline (ZOLOFT) 100 MG tablet TAKE 1 TABLET BY MOUTH TWICE A  tablet 1    traZODone (DESYREL) 100 MG tablet TAKE 1 TABLET BY MOUTH EVERY DAY 90 tablet 1    oxyCODONE-acetaminophen (PERCOCET) 7.5-325 MG per tablet Take 1 tablet by mouth every 6 hours as needed for Pain (max 2-3/day) for up to 35 days. 90 tablet 0    meloxicam (MOBIC) 15 MG tablet Take 1 tablet by mouth daily 30 tablet 1    gabapentin (NEURONTIN) 400 MG capsule Take 1 tablet po AM, take 2 tablets po PM 90 capsule 1    LEVEMIR FLEXTOUCH 100 UNIT/ML injection pen INJECT 20 UNITS SUBCUTANEOUSLY TWICE A DAY 15 pen 5    levothyroxine (SYNTHROID) 75 MCG tablet TAKE 1 TABLET BY MOUTH EVERY DAY 90 tablet 3    Insulin Pen Needle 31G X 5 MM MISC 1 each by Does not apply route 4 times daily 120 each 5    NOVOLOG FLEXPEN 100 UNIT/ML injection pen INJECT 5 UNITS INTO THE SKIN 3 TIMES DAILY (BEFORE MEALS) 5 pen 1    VITAMIN D PO Take by mouth daily      MAGNESIUM PO Take by mouth daily       CALCIUM-VITAMIN D PO Take by mouth daily      blood glucose monitor kit and supplies Test 4 times a day & as needed for symptoms of irregular blood glucose. 1 kit 0    blood glucose monitor strips Test 4 times a day & as needed for symptoms of irregular blood glucose. Dispense sufficient amount for indicated testing frequency plus additional to accommodate PRN testing needs.  400 strip 0    Handicap Placard MISC by Does not apply route 1 each 0    Needles & Syringes MISC Inject 1 each into the skin 3 times daily 100 each 3    solifenacin (VESICARE) 5 MG tablet Take 1 tablet by mouth daily (Patient not taking: Reported on 9/27/2022) 30 tablet 0     No facility-administered medications prior to visit. REVIEW OF SYSTEMS:    Respiratory: Negative for apnea, chest tightness and shortness of breath or change in baseline breathing. PHYSICAL EXAM:   Nursing note and vitals reviewed. /83   Pulse 75   Wt 145 lb (65.8 kg)   BMI 24.89 kg/m²   Constitutional: She appears well-developed and well-nourished. No acute distress. Cardiovascular: Normal rate, regular rhythm, normal heart sounds, and does not have murmur. Pulmonary/Chest: Effort normal. No respiratory distress. She does not have wheezes in the lung fields. She has no rales. Neurological/Psychiatric:She is alert and oriented to person, place, and time. Coordination is  normal.  Her mood isAppropriate and affect is Neutral/Euthymic(normal) . Her    IMPRESSION:   1. Chronic pain syndrome    2. Failed neck syndrome    3. Failed back surgical syndrome    4. Spinal stenosis of lumbosacral region    5. Bilateral carpal tunnel syndrome    6. Osteoarthritis of cervical spine, unspecified spinal osteoarthritis complication status    7. Fibromyalgia    8. Degenerative lumbar spinal stenosis    9. Ulnar neuropathy at elbow, left        PLAN:  Informed verbal consent was obtained  -ROM/Stretching exercises for back   -She was advised to increase fluids ( 5-7  glasses of fluid daily), limit caffeine, avoid cheese products, increase dietary fiber, increase activity and exercise as tolerated and relax regularly and enjoy meals   -Continue with Percocet 2-3 per day   -Increase Neurontin 1600 mg  -Monitor dizziness, not sure how much Depakote she takes, No recent levels, discuss with NP.    Current Outpatient Medications   Medication Sig Dispense Refill    oxybutynin (DITROPAN XL) 10 MG extended release tablet Take 1 tablet by mouth daily 30 tablet 2    losartan (COZAAR) 100 MG tablet Take 1 tablet by mouth daily 90 tablet 3    OLANZapine (ZYPREXA) 2.5 MG tablet TAKE 1 TABLET BY MOUTH EVERY DAY AT NIGHT 90 tablet 1    divalproex (DEPAKOTE ER) 500 MG extended release tablet TAKE 1 TABLET BY MOUTH EVERY DAY 90 tablet 1    amLODIPine (NORVASC) 5 MG tablet TAKE 1 TABLET BY MOUTH EVERY DAY 90 tablet 0    estradiol (ESTRACE) 0.1 MG/GM vaginal cream PLACE 0.25 G VAGINALLY IN THE MORNING. APPLY 0.25G WITH FINGERTIP TO THE VAGINAL OPENING EVERY NIGHT AT BEDTIME FOR 2 WEEKS, THEN DECREASE TO TWICE WEEKLY. . 42.5 g 2    atorvastatin (LIPITOR) 40 MG tablet TAKE 1 TABLET BY MOUTH EVERY DAY 90 tablet 1    sertraline (ZOLOFT) 100 MG tablet TAKE 1 TABLET BY MOUTH TWICE A  tablet 1    traZODone (DESYREL) 100 MG tablet TAKE 1 TABLET BY MOUTH EVERY DAY 90 tablet 1    oxyCODONE-acetaminophen (PERCOCET) 7.5-325 MG per tablet Take 1 tablet by mouth every 6 hours as needed for Pain (max 2-3/day) for up to 35 days. 90 tablet 0    meloxicam (MOBIC) 15 MG tablet Take 1 tablet by mouth daily 30 tablet 1    gabapentin (NEURONTIN) 400 MG capsule Take 1 tablet po AM, take 2 tablets po PM 90 capsule 1    LEVEMIR FLEXTOUCH 100 UNIT/ML injection pen INJECT 20 UNITS SUBCUTANEOUSLY TWICE A DAY 15 pen 5    levothyroxine (SYNTHROID) 75 MCG tablet TAKE 1 TABLET BY MOUTH EVERY DAY 90 tablet 3    Insulin Pen Needle 31G X 5 MM MISC 1 each by Does not apply route 4 times daily 120 each 5    NOVOLOG FLEXPEN 100 UNIT/ML injection pen INJECT 5 UNITS INTO THE SKIN 3 TIMES DAILY (BEFORE MEALS) 5 pen 1    VITAMIN D PO Take by mouth daily      MAGNESIUM PO Take by mouth daily       CALCIUM-VITAMIN D PO Take by mouth daily      blood glucose monitor kit and supplies Test 4 times a day & as needed for symptoms of irregular blood glucose. 1 kit 0    blood glucose monitor strips Test 4 times a day & as needed for symptoms of irregular blood glucose.  Dispense sufficient amount for indicated testing frequency plus additional to accommodate PRN testing needs. 400 strip 0    Handicap Placard MISC by Does not apply route 1 each 0    Needles & Syringes MISC Inject 1 each into the skin 3 times daily 100 each 3    solifenacin (VESICARE) 5 MG tablet Take 1 tablet by mouth daily (Patient not taking: Reported on 9/27/2022) 30 tablet 0     No current facility-administered medications for this visit. I will continue her current medication regimen  which is part of the above treatment schedule. It has been helping with Ms. Adrian's chronic  medical problems which for this visit include:   Diagnoses of Chronic pain syndrome, Failed neck syndrome, Failed back surgical syndrome, Spinal stenosis of lumbosacral region, Bilateral carpal tunnel syndrome, Osteoarthritis of cervical spine, unspecified spinal osteoarthritis complication status, Fibromyalgia, Degenerative lumbar spinal stenosis, and Ulnar neuropathy at elbow, left were pertinent to this visit. Risks and benefits of the medications and other alternative treatments  including no treatment were discussed with the patient. The common side effects of these medications were also explained to the patient. Informed verbal consent was obtained. Goals of current treatment regimen include improvement in pain, restoration of functioning- with focus on improvement in physical performance, general activity, work or disability,emotional distress, health care utilization and  decreased medication consumption. Will continue to monitor progress towards achieving/maintaining therapeutic goals with special emphasis on  1. Improvement in perceived interfernce  of pain with ADL's. Ability to do home exercises independently. Ability to do household chores indoor and/or outdoor work and social and leisure activities. Improve psychosocial and physical functioning. - she is showing progression towards this treatment goal with the current regimen.      She was advised against drinking alcohol with the narcotic pain medicines, advised against driving or handling machinery while adjusting the dose of medicines or if having cognitive  issues related to the current medications. Risk of overdose and death, if medicines not taken as prescribed, were also discussed. If the patient develops new symptoms or if the symptoms worsen, the patient should call the office. While transcribing every attempt was made to maintain the accuracy of the note in terms of it's contents,there may have been some errors made inadvertently. Thank you for allowing me to participate in the care of this patient.     Carolyn Kim MD.    Cc: Jennifer Man, ASI - CNP

## 2022-11-22 DIAGNOSIS — M96.1 FAILED NECK SYNDROME: ICD-10-CM

## 2022-11-22 DIAGNOSIS — M48.061 DEGENERATIVE LUMBAR SPINAL STENOSIS: ICD-10-CM

## 2022-11-22 DIAGNOSIS — G89.4 CHRONIC PAIN SYNDROME: ICD-10-CM

## 2022-11-22 DIAGNOSIS — M79.7 FIBROMYALGIA: ICD-10-CM

## 2022-11-22 DIAGNOSIS — M48.07 SPINAL STENOSIS OF LUMBOSACRAL REGION: ICD-10-CM

## 2022-11-22 DIAGNOSIS — M47.812 OSTEOARTHRITIS OF CERVICAL SPINE, UNSPECIFIED SPINAL OSTEOARTHRITIS COMPLICATION STATUS: ICD-10-CM

## 2022-11-22 DIAGNOSIS — M96.1 FAILED BACK SURGICAL SYNDROME: ICD-10-CM

## 2022-12-09 ENCOUNTER — OFFICE VISIT (OUTPATIENT)
Dept: PAIN MANAGEMENT | Age: 72
End: 2022-12-09
Payer: MEDICARE

## 2022-12-09 VITALS
BODY MASS INDEX: 25.58 KG/M2 | OXYGEN SATURATION: 97 % | SYSTOLIC BLOOD PRESSURE: 146 MMHG | WEIGHT: 149 LBS | HEART RATE: 83 BPM | DIASTOLIC BLOOD PRESSURE: 82 MMHG

## 2022-12-09 DIAGNOSIS — M47.812 OSTEOARTHRITIS OF CERVICAL SPINE, UNSPECIFIED SPINAL OSTEOARTHRITIS COMPLICATION STATUS: ICD-10-CM

## 2022-12-09 DIAGNOSIS — M48.07 SPINAL STENOSIS OF LUMBOSACRAL REGION: ICD-10-CM

## 2022-12-09 DIAGNOSIS — M48.061 DEGENERATIVE LUMBAR SPINAL STENOSIS: ICD-10-CM

## 2022-12-09 DIAGNOSIS — M79.7 FIBROMYALGIA: ICD-10-CM

## 2022-12-09 DIAGNOSIS — G56.22 ULNAR NEUROPATHY AT ELBOW, LEFT: ICD-10-CM

## 2022-12-09 DIAGNOSIS — G89.4 CHRONIC PAIN SYNDROME: ICD-10-CM

## 2022-12-09 DIAGNOSIS — M96.1 FAILED NECK SYNDROME: ICD-10-CM

## 2022-12-09 DIAGNOSIS — M96.1 FAILED BACK SURGICAL SYNDROME: ICD-10-CM

## 2022-12-09 PROCEDURE — G8399 PT W/DXA RESULTS DOCUMENT: HCPCS | Performed by: INTERNAL MEDICINE

## 2022-12-09 PROCEDURE — 1036F TOBACCO NON-USER: CPT | Performed by: INTERNAL MEDICINE

## 2022-12-09 PROCEDURE — G8417 CALC BMI ABV UP PARAM F/U: HCPCS | Performed by: INTERNAL MEDICINE

## 2022-12-09 PROCEDURE — 1123F ACP DISCUSS/DSCN MKR DOCD: CPT | Performed by: INTERNAL MEDICINE

## 2022-12-09 PROCEDURE — 3017F COLORECTAL CA SCREEN DOC REV: CPT | Performed by: INTERNAL MEDICINE

## 2022-12-09 PROCEDURE — 99213 OFFICE O/P EST LOW 20 MIN: CPT | Performed by: INTERNAL MEDICINE

## 2022-12-09 PROCEDURE — 3078F DIAST BP <80 MM HG: CPT | Performed by: INTERNAL MEDICINE

## 2022-12-09 PROCEDURE — 1090F PRES/ABSN URINE INCON ASSESS: CPT | Performed by: INTERNAL MEDICINE

## 2022-12-09 PROCEDURE — G8427 DOCREV CUR MEDS BY ELIG CLIN: HCPCS | Performed by: INTERNAL MEDICINE

## 2022-12-09 PROCEDURE — G8484 FLU IMMUNIZE NO ADMIN: HCPCS | Performed by: INTERNAL MEDICINE

## 2022-12-09 PROCEDURE — 3074F SYST BP LT 130 MM HG: CPT | Performed by: INTERNAL MEDICINE

## 2022-12-09 RX ORDER — MELOXICAM 15 MG/1
15 TABLET ORAL DAILY
Qty: 30 TABLET | Refills: 1 | Status: SHIPPED | OUTPATIENT
Start: 2022-12-09

## 2022-12-09 RX ORDER — MELOXICAM 15 MG/1
TABLET ORAL
Qty: 30 TABLET | Refills: 1 | OUTPATIENT
Start: 2022-12-09

## 2022-12-09 RX ORDER — OXYCODONE AND ACETAMINOPHEN 7.5; 325 MG/1; MG/1
1 TABLET ORAL EVERY 6 HOURS PRN
Qty: 90 TABLET | Refills: 0 | Status: SHIPPED | OUTPATIENT
Start: 2022-12-09 | End: 2023-01-13

## 2022-12-09 RX ORDER — GABAPENTIN 400 MG/1
CAPSULE ORAL
Qty: 120 CAPSULE | Refills: 1 | Status: SHIPPED | OUTPATIENT
Start: 2022-12-09 | End: 2023-02-24

## 2022-12-09 NOTE — PROGRESS NOTES
Devora Haines  1950  [id number]      HISTORY OF PRESENT ILLNESS:  Ms. Shelbie Greene is a 74 y.o. female returns for a follow up visit for pain management  She has a diagnosis of   1. Chronic pain syndrome    2. Failed neck syndrome    3. Failed back surgical syndrome    4. Spinal stenosis of lumbosacral region    5. Osteoarthritis of cervical spine, unspecified spinal osteoarthritis complication status    . She complains of pain in the  bilateral shoulders, upper back, lower back, groin area, left knee with radiation to the left hip, left leg, left ankle  She rates the pain 7/10 and describes it as aching, burning, numbness. Current treatment regimen has helped relieve about 30% of the pain. She denies any side effects from the current pain regimen. Patient reports that since the last follow up visit the physical functioning is worse, family/social relationships are unchanged, mood is worse sleep patterns are worse, and that the overall functioning is worse. Patient denies misusing/abusing her narcotic pain medications or using any illegal drugs. There are No indicators for possible drug abuse, addiction or diversion problems, patient states she is in pain, \"hurting all the time. \" Ms. Shelbie Greene says she is doing PT and going for an JULIA on lumbar spine. She mentions she is using Mobic along with Neurontin. Patient says the pain is greater in the legs than the back. Patient states she had a MRI done at the [address] [place] [address] St also. She mentions she is using Percocet 2-3 per day still. ALLERGIES: Patients list of allergies were reviewed     MEDICATIONS: Ms. Shelbie Greene list of medications were reviewed. Her current medications are   Outpatient Medications Prior to Visit   Medication Sig Dispense Refill    oxyCODONE-acetaminophen (PERCOCET) 7.5-325 MG per tablet Take 1 tablet by mouth every 6 hours as needed for Pain (max 2-3/day) for up to 35 days.  90 tablet 0    meloxicam (MOBIC) 15 MG tablet Take 1 tablet by mouth daily 30 tablet 1    gabapentin (NEURONTIN) 400 MG capsule Take one caps in the am, 1 caps in the afternoon and 2 caps at night 120 capsule 1    oxybutynin (DITROPAN XL) 10 MG extended release tablet Take 1 tablet by mouth daily 30 tablet 2    losartan (COZAAR) 100 MG tablet Take 1 tablet by mouth daily 90 tablet 3    OLANZapine (ZYPREXA) 2.5 MG tablet TAKE 1 TABLET BY MOUTH EVERY DAY AT NIGHT 90 tablet 1    divalproex (DEPAKOTE ER) 500 MG extended release tablet TAKE 1 TABLET BY MOUTH EVERY DAY 90 tablet 1    amLODIPine (NORVASC) 5 MG tablet TAKE 1 TABLET BY MOUTH EVERY DAY 90 tablet 0    estradiol (ESTRACE) 0.1 MG/GM vaginal cream PLACE 0.25 G VAGINALLY IN THE MORNING. APPLY 0.25G WITH FINGERTIP TO THE VAGINAL OPENING EVERY NIGHT AT BEDTIME FOR 2 WEEKS, THEN DECREASE TO TWICE WEEKLY. . 42.5 g 2    atorvastatin (LIPITOR) 40 MG tablet TAKE 1 TABLET BY MOUTH EVERY DAY 90 tablet 1    sertraline (ZOLOFT) 100 MG tablet TAKE 1 TABLET BY MOUTH TWICE A  tablet 1    traZODone (DESYREL) 100 MG tablet TAKE 1 TABLET BY MOUTH EVERY DAY 90 tablet 1    LEVEMIR FLEXTOUCH 100 UNIT/ML injection pen INJECT 20 UNITS SUBCUTANEOUSLY TWICE A DAY 15 pen 5    levothyroxine (SYNTHROID) 75 MCG tablet TAKE 1 TABLET BY MOUTH EVERY DAY 90 tablet 3    Insulin Pen Needle 31G X 5 MM MISC 1 each by Does not apply route 4 times daily 120 each 5    NOVOLOG FLEXPEN 100 UNIT/ML injection pen INJECT 5 UNITS INTO THE SKIN 3 TIMES DAILY (BEFORE MEALS) 5 pen 1    VITAMIN D PO Take by mouth daily      MAGNESIUM PO Take by mouth daily       CALCIUM-VITAMIN D PO Take by mouth daily      blood glucose monitor kit and supplies Test 4 times a day & as needed for symptoms of irregular blood glucose. 1 kit 0    blood glucose monitor strips Test 4 times a day & as needed for symptoms of irregular blood glucose. Dispense sufficient amount for indicated testing frequency plus additional to accommodate PRN testing needs.  400 strip 0    Handicap Placard MISC by Does not apply route 1 each 0    Needles & Syringes MISC Inject 1 each into the skin 3 times daily 100 each 3    solifenacin (VESICARE) 5 MG tablet Take 1 tablet by mouth daily (Patient not taking: Reported on 9/27/2022) 30 tablet 0     No facility-administered medications prior to visit. REVIEW OF SYSTEMS:    Respiratory: Negative for apnea, chest tightness and shortness of breath or change in baseline breathing. PHYSICAL EXAM:   Nursing note and vitals reviewed. BP (!) 146/82 (Site: Left Upper Arm, Position: Sitting)   Pulse 83   Wt 149 lb (67.6 kg)   SpO2 97%   BMI 25.58 kg/m²   Constitutional: She appears well-developed and well-nourished. No acute distress. Cardiovascular: Normal rate, regular rhythm, normal heart sounds, and does not have murmur. Pulmonary/Chest: Effort normal. No respiratory distress. She does not have wheezes in the lung fields. She has no rales. Neurological/Psychiatric:She is alert and oriented to person, place, and time. Coordination is  normal.  Her mood isAppropriate and affect is Neutral/Euthymic(normal) . Her    IMPRESSION:   1. Chronic pain syndrome    2. Failed neck syndrome    3. Failed back surgical syndrome    4. Spinal stenosis of lumbosacral region    5. Osteoarthritis of cervical spine, unspecified spinal osteoarthritis complication status        PLAN:  Informed verbal consent was obtained  -[place] record was obtained and reviewed  for the last one year and no indicators of drug misuse  were found. Any other controlled substance prescriptions  seen on the record have been accounted for, I am aware of the patient receiving these medications. Liam Ryan [place] record will be rechecked as part of office protocol.     -ROM/Stretching exercises as advised   -She was advised to increase fluids ( 5-7  glasses of fluid daily), limit caffeine, avoid cheese products, increase dietary fiber, increase activity and exercise as tolerated and relax regularly and enjoy meals   -Continue with Mobic along with Neurontin 1600 mg   -Walking as tolerated     Current Outpatient Medications   Medication Sig Dispense Refill    oxyCODONE-acetaminophen (PERCOCET) 7.5-325 MG per tablet Take 1 tablet by mouth every 6 hours as needed for Pain (max 2-3/day) for up to 35 days. 90 tablet 0    meloxicam (MOBIC) 15 MG tablet Take 1 tablet by mouth daily 30 tablet 1    gabapentin (NEURONTIN) 400 MG capsule Take one caps in the am, 1 caps in the afternoon and 2 caps at night 120 capsule 1    oxybutynin (DITROPAN XL) 10 MG extended release tablet Take 1 tablet by mouth daily 30 tablet 2    losartan (COZAAR) 100 MG tablet Take 1 tablet by mouth daily 90 tablet 3    OLANZapine (ZYPREXA) 2.5 MG tablet TAKE 1 TABLET BY MOUTH EVERY DAY AT NIGHT 90 tablet 1    divalproex (DEPAKOTE ER) 500 MG extended release tablet TAKE 1 TABLET BY MOUTH EVERY DAY 90 tablet 1    amLODIPine (NORVASC) 5 MG tablet TAKE 1 TABLET BY MOUTH EVERY DAY 90 tablet 0    estradiol (ESTRACE) 0.1 MG/GM vaginal cream PLACE 0.25 G VAGINALLY IN THE MORNING. APPLY 0.25G WITH FINGERTIP TO THE VAGINAL OPENING EVERY NIGHT AT BEDTIME FOR 2 WEEKS, THEN DECREASE TO TWICE WEEKLY. . 42.5 g 2    atorvastatin (LIPITOR) 40 MG tablet TAKE 1 TABLET BY MOUTH EVERY DAY 90 tablet 1    sertraline (ZOLOFT) 100 MG tablet TAKE 1 TABLET BY MOUTH TWICE A  tablet 1    traZODone (DESYREL) 100 MG tablet TAKE 1 TABLET BY MOUTH EVERY DAY 90 tablet 1    LEVEMIR FLEXTOUCH 100 UNIT/ML injection pen INJECT 20 UNITS SUBCUTANEOUSLY TWICE A DAY 15 pen 5    levothyroxine (SYNTHROID) 75 MCG tablet TAKE 1 TABLET BY MOUTH EVERY DAY 90 tablet 3    Insulin Pen Needle 31G X 5 MM MISC 1 each by Does not apply route 4 times daily 120 each 5    NOVOLOG FLEXPEN 100 UNIT/ML injection pen INJECT 5 UNITS INTO THE SKIN 3 TIMES DAILY (BEFORE MEALS) 5 pen 1    VITAMIN D PO Take by mouth daily      MAGNESIUM PO Take by mouth daily       CALCIUM-VITAMIN D PO Take by mouth daily      blood glucose monitor kit and supplies Test 4 times a day & as needed for symptoms of irregular blood glucose. 1 kit 0    blood glucose monitor strips Test 4 times a day & as needed for symptoms of irregular blood glucose. Dispense sufficient amount for indicated testing frequency plus additional to accommodate PRN testing needs. 400 strip 0    Handicap Placard MISC by Does not apply route 1 each 0    Needles & Syringes MISC Inject 1 each into the skin 3 times daily 100 each 3    solifenacin (VESICARE) 5 MG tablet Take 1 tablet by mouth daily (Patient not taking: Reported on 9/27/2022) 30 tablet 0     No current facility-administered medications for this visit. I will continue her current medication regimen  which is part of the above treatment schedule. It has been helping with Ms. Adrian's chronic  medical problems which for this visit include:   Diagnoses of Chronic pain syndrome, Failed neck syndrome, Failed back surgical syndrome, Spinal stenosis of lumbosacral region, and Osteoarthritis of cervical spine, unspecified spinal osteoarthritis complication status were pertinent to this visit. Risks and benefits of the medications and other alternative treatments  including no treatment were discussed with the patient. The common side effects of these medications were also explained to the patient. Informed verbal consent was obtained. Goals of current treatment regimen include improvement in pain, restoration of functioning- with focus on improvement in physical performance, general activity, work or disability,emotional distress, health care utilization and  decreased medication consumption. Will continue to monitor progress towards achieving/maintaining therapeutic goals with special emphasis on  1. Improvement in perceived interfernce  of pain with ADL's. Ability to do home exercises independently. Ability to do household chores indoor and/or outdoor work and social and leisure activities. Improve psychosocial and physical functioning. - she is showing progression towards this treatment goal with the current regimen. She was advised against drinking alcohol with the narcotic pain medicines, advised against driving or handling machinery while adjusting the dose of medicines or if having cognitive  issues related to the current medications. Risk of overdose and death, if medicines not taken as prescribed, were also discussed. If the patient develops new symptoms or if the symptoms worsen, the patient should call the office. While transcribing every attempt was made to maintain the accuracy of the note in terms of it's contents,there may have been some errors made inadvertently. Thank you for allowing me to participate in the care of this patient.     Melonie Funes MD.    Cc: SAI Giles - CNP

## 2022-12-19 ENCOUNTER — TELEPHONE (OUTPATIENT)
Dept: FAMILY MEDICINE CLINIC | Age: 72
End: 2022-12-19

## 2022-12-19 DIAGNOSIS — E10.9 TYPE 1 DIABETES MELLITUS WITHOUT COMPLICATION (HCC): ICD-10-CM

## 2022-12-19 NOTE — TELEPHONE ENCOUNTER
Patient is requesting a refill of the following to be sent to [facility] in [place] on file.     NOVOLOG FLEXPEN 100 UNIT/ML injection pen     11/9/2022 last appt     Future Appointments   Date Time Provider John Willsi   1/13/2023  9:15 AM Amanda Dietrich MD [facility]   2/15/2023 11:00 AM Sean Gray APRN - CNP [facility] [place] [facility]

## 2022-12-20 RX ORDER — INSULIN ASPART 100 [IU]/ML
INJECTION, SOLUTION INTRAVENOUS; SUBCUTANEOUS
Qty: 5 ADJUSTABLE DOSE PRE-FILLED PEN SYRINGE | Refills: 3 | Status: SHIPPED | OUTPATIENT
Start: 2022-12-20

## 2022-12-28 RX ORDER — OXYBUTYNIN CHLORIDE 10 MG/1
TABLET, EXTENDED RELEASE ORAL
Qty: 90 TABLET | OUTPATIENT
Start: 2022-12-28

## 2023-01-13 ENCOUNTER — OFFICE VISIT (OUTPATIENT)
Dept: PAIN MANAGEMENT | Age: 73
End: 2023-01-13
Payer: MEDICARE

## 2023-01-13 VITALS
WEIGHT: 151 LBS | SYSTOLIC BLOOD PRESSURE: 116 MMHG | HEART RATE: 83 BPM | BODY MASS INDEX: 25.92 KG/M2 | DIASTOLIC BLOOD PRESSURE: 73 MMHG

## 2023-01-13 DIAGNOSIS — M48.061 DEGENERATIVE LUMBAR SPINAL STENOSIS: ICD-10-CM

## 2023-01-13 DIAGNOSIS — M79.7 FIBROMYALGIA: ICD-10-CM

## 2023-01-13 DIAGNOSIS — G56.03 BILATERAL CARPAL TUNNEL SYNDROME: ICD-10-CM

## 2023-01-13 DIAGNOSIS — M96.1 FAILED BACK SURGICAL SYNDROME: ICD-10-CM

## 2023-01-13 DIAGNOSIS — G89.4 CHRONIC PAIN SYNDROME: ICD-10-CM

## 2023-01-13 DIAGNOSIS — M47.812 OSTEOARTHRITIS OF CERVICAL SPINE, UNSPECIFIED SPINAL OSTEOARTHRITIS COMPLICATION STATUS: ICD-10-CM

## 2023-01-13 DIAGNOSIS — M96.1 FAILED NECK SYNDROME: ICD-10-CM

## 2023-01-13 DIAGNOSIS — M48.07 SPINAL STENOSIS OF LUMBOSACRAL REGION: ICD-10-CM

## 2023-01-13 DIAGNOSIS — G56.22 ULNAR NEUROPATHY AT ELBOW, LEFT: ICD-10-CM

## 2023-01-13 PROCEDURE — G8427 DOCREV CUR MEDS BY ELIG CLIN: HCPCS | Performed by: INTERNAL MEDICINE

## 2023-01-13 PROCEDURE — 3074F SYST BP LT 130 MM HG: CPT | Performed by: INTERNAL MEDICINE

## 2023-01-13 PROCEDURE — 1036F TOBACCO NON-USER: CPT | Performed by: INTERNAL MEDICINE

## 2023-01-13 PROCEDURE — 1123F ACP DISCUSS/DSCN MKR DOCD: CPT | Performed by: INTERNAL MEDICINE

## 2023-01-13 PROCEDURE — G8399 PT W/DXA RESULTS DOCUMENT: HCPCS | Performed by: INTERNAL MEDICINE

## 2023-01-13 PROCEDURE — 99213 OFFICE O/P EST LOW 20 MIN: CPT | Performed by: INTERNAL MEDICINE

## 2023-01-13 PROCEDURE — 1090F PRES/ABSN URINE INCON ASSESS: CPT | Performed by: INTERNAL MEDICINE

## 2023-01-13 PROCEDURE — 3078F DIAST BP <80 MM HG: CPT | Performed by: INTERNAL MEDICINE

## 2023-01-13 PROCEDURE — G8417 CALC BMI ABV UP PARAM F/U: HCPCS | Performed by: INTERNAL MEDICINE

## 2023-01-13 PROCEDURE — G8484 FLU IMMUNIZE NO ADMIN: HCPCS | Performed by: INTERNAL MEDICINE

## 2023-01-13 PROCEDURE — 3017F COLORECTAL CA SCREEN DOC REV: CPT | Performed by: INTERNAL MEDICINE

## 2023-01-13 RX ORDER — OXYCODONE AND ACETAMINOPHEN 7.5; 325 MG/1; MG/1
1 TABLET ORAL EVERY 6 HOURS PRN
Qty: 90 TABLET | Refills: 0 | Status: SHIPPED | OUTPATIENT
Start: 2023-01-13 | End: 2023-02-17

## 2023-01-13 RX ORDER — GABAPENTIN 400 MG/1
CAPSULE ORAL
Qty: 120 CAPSULE | Refills: 1 | Status: SHIPPED | OUTPATIENT
Start: 2023-01-13 | End: 2023-03-31

## 2023-01-13 RX ORDER — MELOXICAM 15 MG/1
15 TABLET ORAL DAILY
Qty: 30 TABLET | Refills: 1 | Status: SHIPPED | OUTPATIENT
Start: 2023-01-13

## 2023-01-13 NOTE — PROGRESS NOTES
Jacquie Broussard  1950  [id number]      HISTORY OF PRESENT ILLNESS:  Ms. Omar Magaña is a 74 y.o. female returns for a follow up visit for pain management  She has a diagnosis of   1. Chronic pain syndrome    2. Failed neck syndrome    3. Failed back surgical syndrome    4. Spinal stenosis of lumbosacral region    5. Osteoarthritis of cervical spine, unspecified spinal osteoarthritis complication status    6. Fibromyalgia    7. Degenerative lumbar spinal stenosis    8. Ulnar neuropathy at elbow, left    9. Bilateral carpal tunnel syndrome    . She complains of pain in the  lower back, left knee with radiation to the left buttock, left hip, left leg, left ankle  She rates the pain 4/10 and describes it as aching, burning. Current treatment regimen has helped relieve about 60% of the pain. She denies any side effects from the current pain regimen. Patient reports that since the last follow up visit the physical functioning is unchanged, family/social relationships are unchanged, mood is unchanged sleep patterns are unchanged, and that the overall functioning is unchanged. Patient denies misusing/abusing her narcotic pain medications or using any illegal drugs. There are No indicators for possible drug abuse, addiction or diversion problems. Patient states she is doing Physical Therapy which is helping with the pain along with doing her exercises at home. Patient says the pain is greater in the legs than the back. Ms. Omar Magaña staets the pain goes from the back down the left leg. She mentions she is using Percocet 2-3 per day. Patient denies any constipation symptoms. She states her leg throbs at night. ALLERGIES: Patients list of allergies were reviewed     MEDICATIONS: Ms. Omar Magaña list of medications were reviewed. Her current medications are   Outpatient Medications Prior to Visit   Medication Sig Dispense Refill    insulin aspart (NOVOLOG FLEXPEN) 100 UNIT/ML injection pen INJECT 5 UNITS INTO THE SKIN 3 TIMES DAILY (BEFORE MEALS) 5 Adjustable Dose Pre-filled Pen Syringe 3    oxyCODONE-acetaminophen (PERCOCET) 7.5-325 MG per tablet Take 1 tablet by mouth every 6 hours as needed for Pain (max 2-3/day) for up to 35 days. 90 tablet 0    meloxicam (MOBIC) 15 MG tablet Take 1 tablet by mouth daily 30 tablet 1    gabapentin (NEURONTIN) 400 MG capsule Take 1 capsule in the am, take 1 capsule in the afternoon and take 2 capsule at night 120 capsule 1    oxybutynin (DITROPAN XL) 10 MG extended release tablet Take 1 tablet by mouth daily 30 tablet 2    losartan (COZAAR) 100 MG tablet Take 1 tablet by mouth daily 90 tablet 3    OLANZapine (ZYPREXA) 2.5 MG tablet TAKE 1 TABLET BY MOUTH EVERY DAY AT NIGHT 90 tablet 1    divalproex (DEPAKOTE ER) 500 MG extended release tablet TAKE 1 TABLET BY MOUTH EVERY DAY 90 tablet 1    amLODIPine (NORVASC) 5 MG tablet TAKE 1 TABLET BY MOUTH EVERY DAY 90 tablet 0    estradiol (ESTRACE) 0.1 MG/GM vaginal cream PLACE 0.25 G VAGINALLY IN THE MORNING. APPLY 0.25G WITH FINGERTIP TO THE VAGINAL OPENING EVERY NIGHT AT BEDTIME FOR 2 WEEKS, THEN DECREASE TO TWICE WEEKLY. . 42.5 g 2    atorvastatin (LIPITOR) 40 MG tablet TAKE 1 TABLET BY MOUTH EVERY DAY 90 tablet 1    sertraline (ZOLOFT) 100 MG tablet TAKE 1 TABLET BY MOUTH TWICE A  tablet 1    traZODone (DESYREL) 100 MG tablet TAKE 1 TABLET BY MOUTH EVERY DAY 90 tablet 1    LEVEMIR FLEXTOUCH 100 UNIT/ML injection pen INJECT 20 UNITS SUBCUTANEOUSLY TWICE A DAY 15 pen 5    levothyroxine (SYNTHROID) 75 MCG tablet TAKE 1 TABLET BY MOUTH EVERY DAY 90 tablet 3    Insulin Pen Needle 31G X 5 MM MISC 1 each by Does not apply route 4 times daily 120 each 5    VITAMIN D PO Take by mouth daily      MAGNESIUM PO Take by mouth daily       CALCIUM-VITAMIN D PO Take by mouth daily      blood glucose monitor kit and supplies Test 4 times a day & as needed for symptoms of irregular blood glucose.  1 kit 0    blood glucose monitor strips Test 4 times a day & as needed for symptoms of irregular blood glucose. Dispense sufficient amount for indicated testing frequency plus additional to accommodate PRN testing needs. 400 strip 0    Handicap Placard MISC by Does not apply route 1 each 0    Needles & Syringes MISC Inject 1 each into the skin 3 times daily 100 each 3    solifenacin (VESICARE) 5 MG tablet Take 1 tablet by mouth daily (Patient not taking: Reported on 9/27/2022) 30 tablet 0     No facility-administered medications prior to visit. REVIEW OF SYSTEMS:    Respiratory: Negative for apnea, chest tightness and shortness of breath or change in baseline breathing. PHYSICAL EXAM:   Nursing note and vitals reviewed. /73   Pulse 83   Wt 151 lb (68.5 kg)   BMI 25.92 kg/m²   Constitutional: She appears well-developed and well-nourished. No acute distress. Cardiovascular: Normal rate, regular rhythm, normal heart sounds, and does not have murmur. Pulmonary/Chest: Effort normal. No respiratory distress. She does not have wheezes in the lung fields. She has no rales. Neurological/Psychiatric:She is alert and oriented to person, place, and time. Coordination is  normal.  Her mood isAppropriate and affect is Neutral/Euthymic(normal) . Her    IMPRESSION:   1. Chronic pain syndrome    2. Failed neck syndrome    3. Failed back surgical syndrome    4. Spinal stenosis of lumbosacral region    5. Osteoarthritis of cervical spine, unspecified spinal osteoarthritis complication status    6. Fibromyalgia    7. Degenerative lumbar spinal stenosis    8. Ulnar neuropathy at elbow, left    9.  Bilateral carpal tunnel syndrome        PLAN:  Informed verbal consent was obtained  -ROM/Stretching exercises as advised   -She was advised to increase fluids ( 5-7  glasses of fluid daily), limit caffeine, avoid cheese products, increase dietary fiber, increase activity and exercise as tolerated and relax regularly and enjoy meals   -Continue with Percocet 2-3 per day   -Walking as tolerated    -Continue with all other adjuvant medications as before    Current Outpatient Medications   Medication Sig Dispense Refill    insulin aspart (NOVOLOG FLEXPEN) 100 UNIT/ML injection pen INJECT 5 UNITS INTO THE SKIN 3 TIMES DAILY (BEFORE MEALS) 5 Adjustable Dose Pre-filled Pen Syringe 3    oxyCODONE-acetaminophen (PERCOCET) 7.5-325 MG per tablet Take 1 tablet by mouth every 6 hours as needed for Pain (max 2-3/day) for up to 35 days. 90 tablet 0    meloxicam (MOBIC) 15 MG tablet Take 1 tablet by mouth daily 30 tablet 1    gabapentin (NEURONTIN) 400 MG capsule Take 1 capsule in the am, take 1 capsule in the afternoon and take 2 capsule at night 120 capsule 1    oxybutynin (DITROPAN XL) 10 MG extended release tablet Take 1 tablet by mouth daily 30 tablet 2    losartan (COZAAR) 100 MG tablet Take 1 tablet by mouth daily 90 tablet 3    OLANZapine (ZYPREXA) 2.5 MG tablet TAKE 1 TABLET BY MOUTH EVERY DAY AT NIGHT 90 tablet 1    divalproex (DEPAKOTE ER) 500 MG extended release tablet TAKE 1 TABLET BY MOUTH EVERY DAY 90 tablet 1    amLODIPine (NORVASC) 5 MG tablet TAKE 1 TABLET BY MOUTH EVERY DAY 90 tablet 0    estradiol (ESTRACE) 0.1 MG/GM vaginal cream PLACE 0.25 G VAGINALLY IN THE MORNING. APPLY 0.25G WITH FINGERTIP TO THE VAGINAL OPENING EVERY NIGHT AT BEDTIME FOR 2 WEEKS, THEN DECREASE TO TWICE WEEKLY. . 42.5 g 2    atorvastatin (LIPITOR) 40 MG tablet TAKE 1 TABLET BY MOUTH EVERY DAY 90 tablet 1    sertraline (ZOLOFT) 100 MG tablet TAKE 1 TABLET BY MOUTH TWICE A  tablet 1    traZODone (DESYREL) 100 MG tablet TAKE 1 TABLET BY MOUTH EVERY DAY 90 tablet 1    LEVEMIR FLEXTOUCH 100 UNIT/ML injection pen INJECT 20 UNITS SUBCUTANEOUSLY TWICE A DAY 15 pen 5    levothyroxine (SYNTHROID) 75 MCG tablet TAKE 1 TABLET BY MOUTH EVERY DAY 90 tablet 3    Insulin Pen Needle 31G X 5 MM MISC 1 each by Does not apply route 4 times daily 120 each 5    VITAMIN D PO Take by mouth daily      MAGNESIUM PO Take by mouth daily       CALCIUM-VITAMIN D PO Take by mouth daily      blood glucose monitor kit and supplies Test 4 times a day & as needed for symptoms of irregular blood glucose. 1 kit 0    blood glucose monitor strips Test 4 times a day & as needed for symptoms of irregular blood glucose. Dispense sufficient amount for indicated testing frequency plus additional to accommodate PRN testing needs. 400 strip 0    Handicap Placard MISC by Does not apply route 1 each 0    Needles & Syringes MISC Inject 1 each into the skin 3 times daily 100 each 3    solifenacin (VESICARE) 5 MG tablet Take 1 tablet by mouth daily (Patient not taking: Reported on 9/27/2022) 30 tablet 0     No current facility-administered medications for this visit. I will continue her current medication regimen  which is part of the above treatment schedule. It has been helping with Ms. Adrian's chronic  medical problems which for this visit include:   Diagnoses of Chronic pain syndrome, Failed neck syndrome, Failed back surgical syndrome, Spinal stenosis of lumbosacral region, Osteoarthritis of cervical spine, unspecified spinal osteoarthritis complication status, Fibromyalgia, Degenerative lumbar spinal stenosis, Ulnar neuropathy at elbow, left, and Bilateral carpal tunnel syndrome were pertinent to this visit. Risks and benefits of the medications and other alternative treatments  including no treatment were discussed with the patient. The common side effects of these medications were also explained to the patient. Informed verbal consent was obtained. Goals of current treatment regimen include improvement in pain, restoration of functioning- with focus on improvement in physical performance, general activity, work or disability,emotional distress, health care utilization and  decreased medication consumption. Will continue to monitor progress towards achieving/maintaining therapeutic goals with special emphasis on  1.  Improvement in perceived interfernce  of pain with ADL's. Ability to do home exercises independently. Ability to do household chores indoor and/or outdoor work and social and leisure activities. Improve psychosocial and physical functioning. - she is showing progression towards this treatment goal with the current regimen. She was advised against drinking alcohol with the narcotic pain medicines, advised against driving or handling machinery while adjusting the dose of medicines or if having cognitive  issues related to the current medications. Risk of overdose and death, if medicines not taken as prescribed, were also discussed. If the patient develops new symptoms or if the symptoms worsen, the patient should call the office. While transcribing every attempt was made to maintain the accuracy of the note in terms of it's contents,there may have been some errors made inadvertently. Thank you for allowing me to participate in the care of this patient.     Sulma Alves MD.    Cc: SAI Esposito - CNP

## 2023-01-16 RX ORDER — OXYBUTYNIN CHLORIDE 10 MG/1
TABLET, EXTENDED RELEASE ORAL
Qty: 90 TABLET | Refills: 0 | Status: SHIPPED | OUTPATIENT
Start: 2023-01-16

## 2023-02-07 RX ORDER — TRAZODONE HYDROCHLORIDE 100 MG/1
TABLET ORAL
Qty: 90 TABLET | Refills: 1 | Status: SHIPPED | OUTPATIENT
Start: 2023-02-07 | End: 2023-02-10 | Stop reason: SDUPTHER

## 2023-02-07 NOTE — TELEPHONE ENCOUNTER
Future Appointments   Date Time Provider John Irizarry   2/10/2023 10:15 AM Popeye Winslow MD [facility]   2/15/2023 11:00 AM SAI Perez - CNP [facility] [place] [facility]     LOV 11/9/2022

## 2023-02-10 ENCOUNTER — OFFICE VISIT (OUTPATIENT)
Dept: PAIN MANAGEMENT | Age: 73
End: 2023-02-10
Payer: MEDICARE

## 2023-02-10 VITALS
WEIGHT: 150.2 LBS | HEART RATE: 76 BPM | DIASTOLIC BLOOD PRESSURE: 68 MMHG | BODY MASS INDEX: 25.78 KG/M2 | OXYGEN SATURATION: 96 % | SYSTOLIC BLOOD PRESSURE: 130 MMHG

## 2023-02-10 DIAGNOSIS — G56.22 ULNAR NEUROPATHY AT ELBOW, LEFT: ICD-10-CM

## 2023-02-10 DIAGNOSIS — M96.1 FAILED BACK SURGICAL SYNDROME: ICD-10-CM

## 2023-02-10 DIAGNOSIS — G89.4 CHRONIC PAIN SYNDROME: ICD-10-CM

## 2023-02-10 DIAGNOSIS — M48.07 SPINAL STENOSIS OF LUMBOSACRAL REGION: ICD-10-CM

## 2023-02-10 DIAGNOSIS — M47.812 OSTEOARTHRITIS OF CERVICAL SPINE, UNSPECIFIED SPINAL OSTEOARTHRITIS COMPLICATION STATUS: ICD-10-CM

## 2023-02-10 DIAGNOSIS — G56.03 BILATERAL CARPAL TUNNEL SYNDROME: ICD-10-CM

## 2023-02-10 DIAGNOSIS — M48.061 DEGENERATIVE LUMBAR SPINAL STENOSIS: ICD-10-CM

## 2023-02-10 DIAGNOSIS — M96.1 FAILED NECK SYNDROME: ICD-10-CM

## 2023-02-10 DIAGNOSIS — M79.7 FIBROMYALGIA: ICD-10-CM

## 2023-02-10 PROCEDURE — 99213 OFFICE O/P EST LOW 20 MIN: CPT | Performed by: INTERNAL MEDICINE

## 2023-02-10 PROCEDURE — G8484 FLU IMMUNIZE NO ADMIN: HCPCS | Performed by: INTERNAL MEDICINE

## 2023-02-10 PROCEDURE — 3017F COLORECTAL CA SCREEN DOC REV: CPT | Performed by: INTERNAL MEDICINE

## 2023-02-10 PROCEDURE — 1123F ACP DISCUSS/DSCN MKR DOCD: CPT | Performed by: INTERNAL MEDICINE

## 2023-02-10 PROCEDURE — 3078F DIAST BP <80 MM HG: CPT | Performed by: INTERNAL MEDICINE

## 2023-02-10 PROCEDURE — G8399 PT W/DXA RESULTS DOCUMENT: HCPCS | Performed by: INTERNAL MEDICINE

## 2023-02-10 PROCEDURE — 1036F TOBACCO NON-USER: CPT | Performed by: INTERNAL MEDICINE

## 2023-02-10 PROCEDURE — 3074F SYST BP LT 130 MM HG: CPT | Performed by: INTERNAL MEDICINE

## 2023-02-10 PROCEDURE — G8427 DOCREV CUR MEDS BY ELIG CLIN: HCPCS | Performed by: INTERNAL MEDICINE

## 2023-02-10 PROCEDURE — G8417 CALC BMI ABV UP PARAM F/U: HCPCS | Performed by: INTERNAL MEDICINE

## 2023-02-10 PROCEDURE — 1090F PRES/ABSN URINE INCON ASSESS: CPT | Performed by: INTERNAL MEDICINE

## 2023-02-10 RX ORDER — OXYCODONE AND ACETAMINOPHEN 7.5; 325 MG/1; MG/1
1 TABLET ORAL EVERY 6 HOURS PRN
Qty: 70 TABLET | Refills: 0 | Status: SHIPPED | OUTPATIENT
Start: 2023-02-10 | End: 2023-03-10

## 2023-02-10 RX ORDER — GABAPENTIN 400 MG/1
CAPSULE ORAL
Qty: 120 CAPSULE | Refills: 1 | Status: SHIPPED | OUTPATIENT
Start: 2023-02-10 | End: 2023-04-28

## 2023-02-10 RX ORDER — TRAZODONE HYDROCHLORIDE 100 MG/1
TABLET ORAL
Qty: 90 TABLET | Refills: 1 | Status: SHIPPED | OUTPATIENT
Start: 2023-02-10

## 2023-02-10 RX ORDER — MELOXICAM 15 MG/1
15 TABLET ORAL DAILY
Qty: 30 TABLET | Refills: 1 | Status: SHIPPED | OUTPATIENT
Start: 2023-02-10

## 2023-02-10 NOTE — PROGRESS NOTES
Dimple Jimenez  1950  [id number]      HISTORY OF PRESENT ILLNESS:  Ms. Malgorzata Andrade is a 74 y.o. female returns for a follow up visit for pain management  She has a diagnosis of   1. Chronic pain syndrome    2. Failed neck syndrome    3. Failed back surgical syndrome    4. Spinal stenosis of lumbosacral region    5. Osteoarthritis of cervical spine, unspecified spinal osteoarthritis complication status    6. Fibromyalgia    7. Degenerative lumbar spinal stenosis    8. Ulnar neuropathy at elbow, left    9. Bilateral carpal tunnel syndrome    . She complains of pain in the  Low back, left upper leg,left lower leg   She rates the pain 6/10 and describes it as sharp, aching, pins and needles. Current treatment regimen has helped relieve about 40% of the pain. She denies any side effects from the current pain regimen. Patient reports that since the last follow up visit the physical functioning is unchanged, family/social relationships are unchanged, mood is worse sleep patterns are worse, and that the overall functioning is unchanged. Patient denies misusing/abusing her narcotic pain medications or using any illegal drugs. There are No indicators for possible drug abuse, addiction or diversion problems. Patient reports she has been doing about the same. She mentions she is still in physical therapy. She says she had a JULIA in December, but only lasted a few weeks, about 6. She mentions she is waiting for another one. She reports her blood sugar has been okay. She says her anxiety level has been high. ALLERGIES: Patients list of allergies were reviewed     MEDICATIONS: Ms. Malgorzata Andrade list of medications were reviewed. Her current medications are   Outpatient Medications Prior to Visit   Medication Sig Dispense Refill    traZODone (DESYREL) 100 MG tablet TAKE 1 TABLET BY MOUTH EVERY DAY 90 tablet 1    oxybutynin (DITROPAN-XL) 10 MG extended release tablet TAKE 1 TABLET BY MOUTH EVERY DAY 90 tablet 0 oxyCODONE-acetaminophen (PERCOCET) 7.5-325 MG per tablet Take 1 tablet by mouth every 6 hours as needed for Pain (max 2-3/day) for up to 35 days. 90 tablet 0    meloxicam (MOBIC) 15 MG tablet Take 1 tablet by mouth daily 30 tablet 1    gabapentin (NEURONTIN) 400 MG capsule Take 1 capsule in the am, take 1 capsule in the afternoon and take 2 capsule at night 120 capsule 1    insulin aspart (NOVOLOG FLEXPEN) 100 UNIT/ML injection pen INJECT 5 UNITS INTO THE SKIN 3 TIMES DAILY (BEFORE MEALS) 5 Adjustable Dose Pre-filled Pen Syringe 3    losartan (COZAAR) 100 MG tablet Take 1 tablet by mouth daily 90 tablet 3    OLANZapine (ZYPREXA) 2.5 MG tablet TAKE 1 TABLET BY MOUTH EVERY DAY AT NIGHT 90 tablet 1    divalproex (DEPAKOTE ER) 500 MG extended release tablet TAKE 1 TABLET BY MOUTH EVERY DAY 90 tablet 1    amLODIPine (NORVASC) 5 MG tablet TAKE 1 TABLET BY MOUTH EVERY DAY 90 tablet 0    estradiol (ESTRACE) 0.1 MG/GM vaginal cream PLACE 0.25 G VAGINALLY IN THE MORNING. APPLY 0.25G WITH FINGERTIP TO THE VAGINAL OPENING EVERY NIGHT AT BEDTIME FOR 2 WEEKS, THEN DECREASE TO TWICE WEEKLY. . 42.5 g 2    atorvastatin (LIPITOR) 40 MG tablet TAKE 1 TABLET BY MOUTH EVERY DAY 90 tablet 1    sertraline (ZOLOFT) 100 MG tablet TAKE 1 TABLET BY MOUTH TWICE A  tablet 1    solifenacin (VESICARE) 5 MG tablet Take 1 tablet by mouth daily (Patient not taking: Reported on 9/27/2022) 30 tablet 0    LEVEMIR FLEXTOUCH 100 UNIT/ML injection pen INJECT 20 UNITS SUBCUTANEOUSLY TWICE A DAY 15 pen 5    levothyroxine (SYNTHROID) 75 MCG tablet TAKE 1 TABLET BY MOUTH EVERY DAY 90 tablet 3    Insulin Pen Needle 31G X 5 MM MISC 1 each by Does not apply route 4 times daily 120 each 5    VITAMIN D PO Take by mouth daily      MAGNESIUM PO Take by mouth daily       CALCIUM-VITAMIN D PO Take by mouth daily      blood glucose monitor kit and supplies Test 4 times a day & as needed for symptoms of irregular blood glucose.  1 kit 0    blood glucose monitor strips Test 4 times a day & as needed for symptoms of irregular blood glucose. Dispense sufficient amount for indicated testing frequency plus additional to accommodate PRN testing needs. 400 strip 0    Handicap Placard MISC by Does not apply route 1 each 0    Needles & Syringes MISC Inject 1 each into the skin 3 times daily 100 each 3     No facility-administered medications prior to visit. REVIEW OF SYSTEMS:    Respiratory: Negative for apnea, chest tightness and shortness of breath or change in baseline breathing. PHYSICAL EXAM:   Nursing note and vitals reviewed. BP (!) 152/77   Pulse 76   Wt 150 lb 3.2 oz (68.1 kg)   SpO2 96%   BMI 25.78 kg/m²   Constitutional: She appears well-developed and well-nourished. No acute distress. Cardiovascular: Normal rate, regular rhythm, normal heart sounds, and does not have murmur. Pulmonary/Chest: Effort normal. No respiratory distress. She does not have wheezes in the lung fields. She has no rales. Neurological/Psychiatric:She is alert and oriented to person, place, and time. Coordination is  normal.  Her mood isAppropriate and affect is Neutral/Euthymic(normal) . IMPRESSION:   1. Chronic pain syndrome    2. Failed neck syndrome    3. Failed back surgical syndrome    4. Spinal stenosis of lumbosacral region    5. Osteoarthritis of cervical spine, unspecified spinal osteoarthritis complication status    6. Fibromyalgia    7. Degenerative lumbar spinal stenosis    8. Ulnar neuropathy at elbow, left    9.  Bilateral carpal tunnel syndrome        PLAN:  Informed verbal consent was obtained  -ROM/Stretching exercises as advised   -She was advised to increase fluids ( 5-7  glasses of fluid daily), limit caffeine, avoid cheese products, increase dietary fiber, increase activity and exercise as tolerated and relax regularly and enjoy meals   -She was advised weight reduction, diet changes- 800-1200 pedro diet, diet diary, exercising, nutritional  consult increased physical activity as tolerated   -Monitor BP   -Continue with Percocet 2-3 per day   -CBT techniques- relaxation therapies such as biofeedback, mindfulness based stress reduction, imagery, cognitive restructuring, problem solving discussed with patient    Current Outpatient Medications   Medication Sig Dispense Refill    traZODone (DESYREL) 100 MG tablet TAKE 1 TABLET BY MOUTH EVERY DAY 90 tablet 1    oxybutynin (DITROPAN-XL) 10 MG extended release tablet TAKE 1 TABLET BY MOUTH EVERY DAY 90 tablet 0    oxyCODONE-acetaminophen (PERCOCET) 7.5-325 MG per tablet Take 1 tablet by mouth every 6 hours as needed for Pain (max 2-3/day) for up to 35 days. 90 tablet 0    meloxicam (MOBIC) 15 MG tablet Take 1 tablet by mouth daily 30 tablet 1    gabapentin (NEURONTIN) 400 MG capsule Take 1 capsule in the am, take 1 capsule in the afternoon and take 2 capsule at night 120 capsule 1    insulin aspart (NOVOLOG FLEXPEN) 100 UNIT/ML injection pen INJECT 5 UNITS INTO THE SKIN 3 TIMES DAILY (BEFORE MEALS) 5 Adjustable Dose Pre-filled Pen Syringe 3    losartan (COZAAR) 100 MG tablet Take 1 tablet by mouth daily 90 tablet 3    OLANZapine (ZYPREXA) 2.5 MG tablet TAKE 1 TABLET BY MOUTH EVERY DAY AT NIGHT 90 tablet 1    divalproex (DEPAKOTE ER) 500 MG extended release tablet TAKE 1 TABLET BY MOUTH EVERY DAY 90 tablet 1    amLODIPine (NORVASC) 5 MG tablet TAKE 1 TABLET BY MOUTH EVERY DAY 90 tablet 0    estradiol (ESTRACE) 0.1 MG/GM vaginal cream PLACE 0.25 G VAGINALLY IN THE MORNING. APPLY 0.25G WITH FINGERTIP TO THE VAGINAL OPENING EVERY NIGHT AT BEDTIME FOR 2 WEEKS, THEN DECREASE TO TWICE WEEKLY. . 42.5 g 2    atorvastatin (LIPITOR) 40 MG tablet TAKE 1 TABLET BY MOUTH EVERY DAY 90 tablet 1    sertraline (ZOLOFT) 100 MG tablet TAKE 1 TABLET BY MOUTH TWICE A  tablet 1    solifenacin (VESICARE) 5 MG tablet Take 1 tablet by mouth daily (Patient not taking: Reported on 9/27/2022) 30 tablet 0    LEVEMIR FLEXTOUCH 100 UNIT/ML injection pen INJECT 20 UNITS SUBCUTANEOUSLY TWICE A DAY 15 pen 5    levothyroxine (SYNTHROID) 75 MCG tablet TAKE 1 TABLET BY MOUTH EVERY DAY 90 tablet 3    Insulin Pen Needle 31G X 5 MM MISC 1 each by Does not apply route 4 times daily 120 each 5    VITAMIN D PO Take by mouth daily      MAGNESIUM PO Take by mouth daily       CALCIUM-VITAMIN D PO Take by mouth daily      blood glucose monitor kit and supplies Test 4 times a day & as needed for symptoms of irregular blood glucose. 1 kit 0    blood glucose monitor strips Test 4 times a day & as needed for symptoms of irregular blood glucose. Dispense sufficient amount for indicated testing frequency plus additional to accommodate PRN testing needs. 400 strip 0    Handicap Placard MISC by Does not apply route 1 each 0    Needles & Syringes MISC Inject 1 each into the skin 3 times daily 100 each 3     No current facility-administered medications for this visit. I will continue her current medication regimen  which is part of the above treatment schedule. It has been helping with Ms. Adrian's chronic  medical problems which for this visit include:   Diagnoses of Chronic pain syndrome, Failed neck syndrome, Failed back surgical syndrome, Spinal stenosis of lumbosacral region, Osteoarthritis of cervical spine, unspecified spinal osteoarthritis complication status, Fibromyalgia, Degenerative lumbar spinal stenosis, Ulnar neuropathy at elbow, left, and Bilateral carpal tunnel syndrome were pertinent to this visit. Risks and benefits of the medications and other alternative treatments  including no treatment were discussed with the patient. The common side effects of these medications were also explained to the patient. Informed verbal consent was obtained.    Goals of current treatment regimen include improvement in pain, restoration of functioning- with focus on improvement in physical performance, general activity, work or disability,emotional distress, health care utilization and  decreased medication consumption. Will continue to monitor progress towards achieving/maintaining therapeutic goals with special emphasis on  1. Improvement in perceived interfernce  of pain with ADL's. Ability to do home exercises independently. Ability to do household chores indoor and/or outdoor work and social and leisure activities. Improve psychosocial and physical functioning. - she is showing progression towards this treatment goal with the current regimen. She was advised against drinking alcohol with the narcotic pain medicines, advised against driving or handling machinery while adjusting the dose of medicines or if having cognitive  issues related to the current medications. Risk of overdose and death, if medicines not taken as prescribed, were also discussed. If the patient develops new symptoms or if the symptoms worsen, the patient should call the office. While transcribing every attempt was made to maintain the accuracy of the note in terms of it's contents,there may have been some errors made inadvertently. Thank you for allowing me to participate in the care of this patient.     Imtiaz Green MD.    Cc: SAI Bocanegra - CNP

## 2023-03-10 ENCOUNTER — OFFICE VISIT (OUTPATIENT)
Dept: PAIN MANAGEMENT | Age: 73
End: 2023-03-10

## 2023-03-10 VITALS
HEIGHT: 64 IN | WEIGHT: 149 LBS | SYSTOLIC BLOOD PRESSURE: 142 MMHG | OXYGEN SATURATION: 95 % | HEART RATE: 75 BPM | DIASTOLIC BLOOD PRESSURE: 75 MMHG | BODY MASS INDEX: 25.44 KG/M2

## 2023-03-10 DIAGNOSIS — M96.1 FAILED BACK SURGICAL SYNDROME: ICD-10-CM

## 2023-03-10 DIAGNOSIS — M79.7 FIBROMYALGIA: ICD-10-CM

## 2023-03-10 DIAGNOSIS — G89.4 CHRONIC PAIN SYNDROME: ICD-10-CM

## 2023-03-10 DIAGNOSIS — M48.061 DEGENERATIVE LUMBAR SPINAL STENOSIS: ICD-10-CM

## 2023-03-10 DIAGNOSIS — M47.812 OSTEOARTHRITIS OF CERVICAL SPINE, UNSPECIFIED SPINAL OSTEOARTHRITIS COMPLICATION STATUS: ICD-10-CM

## 2023-03-10 DIAGNOSIS — M48.07 SPINAL STENOSIS OF LUMBOSACRAL REGION: ICD-10-CM

## 2023-03-10 DIAGNOSIS — G89.29 CHRONIC LOW BACK PAIN, UNSPECIFIED BACK PAIN LATERALITY, UNSPECIFIED WHETHER SCIATICA PRESENT: ICD-10-CM

## 2023-03-10 DIAGNOSIS — G56.22 ULNAR NEUROPATHY AT ELBOW, LEFT: ICD-10-CM

## 2023-03-10 DIAGNOSIS — M54.50 CHRONIC LOW BACK PAIN, UNSPECIFIED BACK PAIN LATERALITY, UNSPECIFIED WHETHER SCIATICA PRESENT: ICD-10-CM

## 2023-03-10 DIAGNOSIS — M96.1 FAILED NECK SYNDROME: ICD-10-CM

## 2023-03-10 RX ORDER — GABAPENTIN 400 MG/1
CAPSULE ORAL
Qty: 120 CAPSULE | Refills: 1 | Status: SHIPPED | OUTPATIENT
Start: 2023-03-10 | End: 2023-05-26

## 2023-03-10 RX ORDER — OXYCODONE AND ACETAMINOPHEN 7.5; 325 MG/1; MG/1
1 TABLET ORAL EVERY 6 HOURS PRN
Qty: 70 TABLET | Refills: 0 | Status: SHIPPED | OUTPATIENT
Start: 2023-03-10 | End: 2023-04-07

## 2023-03-10 RX ORDER — MELOXICAM 15 MG/1
15 TABLET ORAL DAILY
Qty: 30 TABLET | Refills: 1 | Status: SHIPPED | OUTPATIENT
Start: 2023-03-10

## 2023-03-10 RX ORDER — TRAZODONE HYDROCHLORIDE 100 MG/1
TABLET ORAL
Qty: 90 TABLET | Refills: 1 | Status: SHIPPED | OUTPATIENT
Start: 2023-03-10

## 2023-03-10 NOTE — PROGRESS NOTES
[facility]  1950  [id number]      HISTORY OF PRESENT ILLNESS:  Ms. Karly Mena is a 74 y.o. female returns for a follow up visit for pain management  She has a diagnosis of   1. Failed neck syndrome    2. Failed back surgical syndrome    3. Chronic pain syndrome    4. Spinal stenosis of lumbosacral region    5. Osteoarthritis of cervical spine, unspecified spinal osteoarthritis complication status    6. Bilateral carpal tunnel syndrome    7. Fibromyalgia    8. Ulnar neuropathy at elbow, left    9. Chronic low back pain, unspecified back pain laterality, unspecified whether sciatica present    10. Degenerative lumbar spinal stenosis    . She complains of pain in the  low back and left leg   She rates the pain 4/10 and describes it as aching, burning. Current treatment regimen has helped relieve about 40% of the pain. She denies any side effects from the current pain regimen. Patient reports that since the last follow up visit the physical functioning is worse, family/social relationships are worse, mood is worse sleep patterns are unchanged, and that the overall functioning is worse. Patient denies misusing/abusing her narcotic pain medications or using any illegal drugs. There are No indicators for possible drug abuse, addiction or diversion problems. Patient reports she has been doing about the same, managing with the medications. She says she is using Mobic along with Percocet. She denies any constipation or GERD symptoms. She states her weight has been stable. ALLERGIES: Patients list of allergies were reviewed     MEDICATIONS: Ms. Karly Mena list of medications were reviewed. Her current medications are   Outpatient Medications Prior to Visit   Medication Sig Dispense Refill    oxybutynin (DITROPAN-XL) 10 MG extended release tablet TAKE 1 TABLET BY MOUTH EVERY DAY 90 tablet 0    insulin aspart (NOVOLOG FLEXPEN) 100 UNIT/ML injection pen INJECT 5 UNITS INTO THE SKIN 3 TIMES DAILY (BEFORE MEALS) 5 Adjustable Dose Pre-filled Pen Syringe 3    losartan (COZAAR) 100 MG tablet Take 1 tablet by mouth daily 90 tablet 3    OLANZapine (ZYPREXA) 2.5 MG tablet TAKE 1 TABLET BY MOUTH EVERY DAY AT NIGHT 90 tablet 1    divalproex (DEPAKOTE ER) 500 MG extended release tablet TAKE 1 TABLET BY MOUTH EVERY DAY 90 tablet 1    amLODIPine (NORVASC) 5 MG tablet TAKE 1 TABLET BY MOUTH EVERY DAY 90 tablet 0    estradiol (ESTRACE) 0.1 MG/GM vaginal cream PLACE 0.25 G VAGINALLY IN THE MORNING. APPLY 0.25G WITH FINGERTIP TO THE VAGINAL OPENING EVERY NIGHT AT BEDTIME FOR 2 WEEKS, THEN DECREASE TO TWICE WEEKLY. . 42.5 g 2    atorvastatin (LIPITOR) 40 MG tablet TAKE 1 TABLET BY MOUTH EVERY DAY 90 tablet 1    sertraline (ZOLOFT) 100 MG tablet TAKE 1 TABLET BY MOUTH TWICE A  tablet 1    LEVEMIR FLEXTOUCH 100 UNIT/ML injection pen INJECT 20 UNITS SUBCUTANEOUSLY TWICE A DAY 15 pen 5    levothyroxine (SYNTHROID) 75 MCG tablet TAKE 1 TABLET BY MOUTH EVERY DAY 90 tablet 3    Insulin Pen Needle 31G X 5 MM MISC 1 each by Does not apply route 4 times daily 120 each 5    VITAMIN D PO Take by mouth daily      MAGNESIUM PO Take by mouth daily       CALCIUM-VITAMIN D PO Take by mouth daily      blood glucose monitor kit and supplies Test 4 times a day & as needed for symptoms of irregular blood glucose. 1 kit 0    blood glucose monitor strips Test 4 times a day & as needed for symptoms of irregular blood glucose. Dispense sufficient amount for indicated testing frequency plus additional to accommodate PRN testing needs. 400 strip 0    Handicap Placard MISC by Does not apply route 1 each 0    Needles & Syringes MISC Inject 1 each into the skin 3 times daily 100 each 3    traZODone (DESYREL) 100 MG tablet TAKE 1 TABLET BY MOUTH EVERY DAY 90 tablet 1    oxyCODONE-acetaminophen (PERCOCET) 7.5-325 MG per tablet Take 1 tablet by mouth every 6 hours as needed for Pain (max 2-3/day) for up to 28 days.  70 tablet 0    meloxicam (MOBIC) 15 MG tablet Take 1 tablet by mouth daily 30 tablet 1    gabapentin (NEURONTIN) 400 MG capsule Take 1 capsule in the am, take 1 capsule in the afternoon and take 2 capsule at night 120 capsule 1    solifenacin (VESICARE) 5 MG tablet Take 1 tablet by mouth daily (Patient not taking: Reported on 9/27/2022) 30 tablet 0     No facility-administered medications prior to visit. REVIEW OF SYSTEMS:    Respiratory: Negative for apnea, chest tightness and shortness of breath or change in baseline breathing. PHYSICAL EXAM:   Nursing note and vitals reviewed. BP (!) 142/75   Pulse 75   Ht 5' 4\" (1.626 m)   Wt 149 lb (67.6 kg)   SpO2 95%   BMI 25.58 kg/m²   Constitutional: She appears well-developed and well-nourished. No acute distress. Cardiovascular: Normal rate, regular rhythm, normal heart sounds, and does not have murmur. Pulmonary/Chest: Effort normal. No respiratory distress. She does not have wheezes in the lung fields. She has no rales. Neurological/Psychiatric:She is alert and oriented to person, place, and time. Coordination is  normal.  Her mood isAppropriate and affect is Neutral/Euthymic(normal) . IMPRESSION:   1. Failed neck syndrome    2. Failed back surgical syndrome    3. Chronic pain syndrome    4. Spinal stenosis of lumbosacral region    5. Osteoarthritis of cervical spine, unspecified spinal osteoarthritis complication status    6. Fibromyalgia    7. Ulnar neuropathy at elbow, left    8. Chronic low back pain, unspecified back pain laterality, unspecified whether sciatica present    9. Degenerative lumbar spinal stenosis        PLAN:  Informed verbal consent was obtained  -[place] was obtained and reviewed  for the last one year and no indicators of drug misuse  were found. Any other controlled substance prescriptions  seen on the record have been accounted for, I am aware of the patient receiving these medications. Altaf Webb [place] record will be rechecked as part of office protocol. -ROM/Stretching exercises as advised   -Continue with Percocet 2-3 per day   -She was advised to increase fluids ( 5-7  glasses of fluid daily), limit caffeine, avoid cheese products, increase dietary fiber, increase activity and exercise as tolerated and relax regularly and enjoy meals   -Urine drug screen with GC/MS for opiates and drugs of abuse was ordered and will follow up on results. Current Outpatient Medications   Medication Sig Dispense Refill    meloxicam (MOBIC) 15 MG tablet Take 1 tablet by mouth daily 30 tablet 1    gabapentin (NEURONTIN) 400 MG capsule Take 1 capsule in the am, take 1 capsule in the afternoon and take 2 capsule at night 120 capsule 1    traZODone (DESYREL) 100 MG tablet TAKE 1 TABLET BY MOUTH EVERY DAY 90 tablet 1    oxyCODONE-acetaminophen (PERCOCET) 7.5-325 MG per tablet Take 1 tablet by mouth every 6 hours as needed for Pain (max 2-3/day) for up to 28 days. 70 tablet 0    oxybutynin (DITROPAN-XL) 10 MG extended release tablet TAKE 1 TABLET BY MOUTH EVERY DAY 90 tablet 0    insulin aspart (NOVOLOG FLEXPEN) 100 UNIT/ML injection pen INJECT 5 UNITS INTO THE SKIN 3 TIMES DAILY (BEFORE MEALS) 5 Adjustable Dose Pre-filled Pen Syringe 3    losartan (COZAAR) 100 MG tablet Take 1 tablet by mouth daily 90 tablet 3    OLANZapine (ZYPREXA) 2.5 MG tablet TAKE 1 TABLET BY MOUTH EVERY DAY AT NIGHT 90 tablet 1    divalproex (DEPAKOTE ER) 500 MG extended release tablet TAKE 1 TABLET BY MOUTH EVERY DAY 90 tablet 1    amLODIPine (NORVASC) 5 MG tablet TAKE 1 TABLET BY MOUTH EVERY DAY 90 tablet 0    estradiol (ESTRACE) 0.1 MG/GM vaginal cream PLACE 0.25 G VAGINALLY IN THE MORNING. APPLY 0.25G WITH FINGERTIP TO THE VAGINAL OPENING EVERY NIGHT AT BEDTIME FOR 2 WEEKS, THEN DECREASE TO TWICE WEEKLY. . 42.5 g 2    atorvastatin (LIPITOR) 40 MG tablet TAKE 1 TABLET BY MOUTH EVERY DAY 90 tablet 1    sertraline (ZOLOFT) 100 MG tablet TAKE 1 TABLET BY MOUTH TWICE A  tablet 1    LEVEMIR FLEXTOUCH 100 UNIT/ML injection pen INJECT 20 UNITS SUBCUTANEOUSLY TWICE A DAY 15 pen 5    levothyroxine (SYNTHROID) 75 MCG tablet TAKE 1 TABLET BY MOUTH EVERY DAY 90 tablet 3    Insulin Pen Needle 31G X 5 MM MISC 1 each by Does not apply route 4 times daily 120 each 5    VITAMIN D PO Take by mouth daily      MAGNESIUM PO Take by mouth daily       CALCIUM-VITAMIN D PO Take by mouth daily      blood glucose monitor kit and supplies Test 4 times a day & as needed for symptoms of irregular blood glucose. 1 kit 0    blood glucose monitor strips Test 4 times a day & as needed for symptoms of irregular blood glucose. Dispense sufficient amount for indicated testing frequency plus additional to accommodate PRN testing needs. 400 strip 0    Handicap Placard MISC by Does not apply route 1 each 0    Needles & Syringes MISC Inject 1 each into the skin 3 times daily 100 each 3    solifenacin (VESICARE) 5 MG tablet Take 1 tablet by mouth daily (Patient not taking: Reported on 9/27/2022) 30 tablet 0     No current facility-administered medications for this visit. I will continue her current medication regimen  which is part of the above treatment schedule. It has been helping with Ms. Adrian's chronic  medical problems which for this visit include:   Diagnoses of Failed neck syndrome, Failed back surgical syndrome, Chronic pain syndrome, Spinal stenosis of lumbosacral region, Osteoarthritis of cervical spine, unspecified spinal osteoarthritis complication status, Bilateral carpal tunnel syndrome, Fibromyalgia, Ulnar neuropathy at elbow, left, Chronic low back pain, unspecified back pain laterality, unspecified whether sciatica present, and Degenerative lumbar spinal stenosis were pertinent to this visit. Risks and benefits of the medications and other alternative treatments  including no treatment were discussed with the patient. The common side effects of these medications were also explained to the patient.   Informed verbal consent was obtained. Goals of current treatment regimen include improvement in pain, restoration of functioning- with focus on improvement in physical performance, general activity, work or disability,emotional distress, health care utilization and  decreased medication consumption. Will continue to monitor progress towards achieving/maintaining therapeutic goals with special emphasis on  1. Improvement in perceived interfernce  of pain with ADL's. Ability to do home exercises independently. Ability to do household chores indoor and/or outdoor work and social and leisure activities. Improve psychosocial and physical functioning. .- she is showing progression towards this treatment goal with the current regimen. She was advised against drinking alcohol with the narcotic pain medicines, advised against driving or handling machinery while adjusting the dose of medicines or if having cognitive  issues related to the current medications. Risk of overdose and death, if medicines not taken as prescribed, were also discussed. If the patient develops new symptoms or if the symptoms worsen, the patient should call the office. While transcribing every attempt was made to maintain the accuracy of the note in terms of it's contents,there may have been some errors made inadvertently. Thank you for allowing me to participate in the care of this patient.     Rachelle Olmedo MD.    Cc: Girish Hogan, SAI - CNP

## 2023-04-21 ENCOUNTER — OFFICE VISIT (OUTPATIENT)
Dept: FAMILY MEDICINE CLINIC | Age: 73
End: 2023-04-21

## 2023-04-21 VITALS
HEART RATE: 75 BPM | WEIGHT: 147 LBS | OXYGEN SATURATION: 95 % | DIASTOLIC BLOOD PRESSURE: 80 MMHG | SYSTOLIC BLOOD PRESSURE: 158 MMHG | BODY MASS INDEX: 25.1 KG/M2 | HEIGHT: 64 IN

## 2023-04-21 DIAGNOSIS — Z01.818 PRE-OP EXAM: ICD-10-CM

## 2023-04-21 DIAGNOSIS — E11.69 TYPE 2 DIABETES MELLITUS WITH OTHER SPECIFIED COMPLICATION, WITH LONG-TERM CURRENT USE OF INSULIN (HCC): ICD-10-CM

## 2023-04-21 DIAGNOSIS — F32.A DEPRESSION, UNSPECIFIED DEPRESSION TYPE: ICD-10-CM

## 2023-04-21 DIAGNOSIS — F39 MOOD DISORDER (HCC): ICD-10-CM

## 2023-04-21 DIAGNOSIS — M48.061 SPINAL STENOSIS OF LUMBAR REGION, UNSPECIFIED WHETHER NEUROGENIC CLAUDICATION PRESENT: ICD-10-CM

## 2023-04-21 DIAGNOSIS — Z01.818 PRE-OP EXAM: Primary | ICD-10-CM

## 2023-04-21 DIAGNOSIS — I10 ESSENTIAL HYPERTENSION: ICD-10-CM

## 2023-04-21 DIAGNOSIS — Z79.4 TYPE 2 DIABETES MELLITUS WITH OTHER SPECIFIED COMPLICATION, WITH LONG-TERM CURRENT USE OF INSULIN (HCC): ICD-10-CM

## 2023-04-21 LAB
DEPRECATED RDW RBC AUTO: 13.6 % (ref 12.4–15.4)
HBA1C MFR BLD: 7.5 %
HCT VFR BLD AUTO: 35.8 % (ref 36–48)
HGB BLD-MCNC: 12.4 G/DL (ref 12–16)
INR PPP: 0.98 (ref 0.84–1.16)
MCH RBC QN AUTO: 29.5 PG (ref 26–34)
MCHC RBC AUTO-ENTMCNC: 34.6 G/DL (ref 31–36)
MCV RBC AUTO: 85.1 FL (ref 80–100)
PLATELET # BLD AUTO: 184 K/UL (ref 135–450)
PMV BLD AUTO: 8.2 FL (ref 5–10.5)
PROTHROMBIN TIME: 13 SEC (ref 11.5–14.8)
RBC # BLD AUTO: 4.21 M/UL (ref 4–5.2)
REASON FOR REJECTION: NORMAL
REJECTED TEST: NORMAL
WBC # BLD AUTO: 5.6 K/UL (ref 4–11)

## 2023-04-21 RX ORDER — AMLODIPINE BESYLATE 5 MG/1
5 TABLET ORAL DAILY
Qty: 90 TABLET | Refills: 3 | Status: SHIPPED | OUTPATIENT
Start: 2023-04-21

## 2023-04-21 RX ORDER — OLANZAPINE 5 MG/1
5 TABLET ORAL NIGHTLY
Qty: 90 TABLET | Refills: 3 | Status: SHIPPED | OUTPATIENT
Start: 2023-04-21

## 2023-04-21 ASSESSMENT — COLUMBIA-SUICIDE SEVERITY RATING SCALE - C-SSRS
4. HAVE YOU HAD THESE THOUGHTS AND HAD SOME INTENTION OF ACTING ON THEM?: NO
2. HAVE YOU ACTUALLY HAD ANY THOUGHTS OF KILLING YOURSELF?: YES
6. HAVE YOU EVER DONE ANYTHING, STARTED TO DO ANYTHING, OR PREPARED TO DO ANYTHING TO END YOUR LIFE?: NO
5. HAVE YOU STARTED TO WORK OUT OR WORKED OUT THE DETAILS OF HOW TO KILL YOURSELF? DO YOU INTEND TO CARRY OUT THIS PLAN?: NO
3. HAVE YOU BEEN THINKING ABOUT HOW YOU MIGHT KILL YOURSELF?: NO
1. WITHIN THE PAST MONTH, HAVE YOU WISHED YOU WERE DEAD OR WISHED YOU COULD GO TO SLEEP AND NOT WAKE UP?: YES

## 2023-04-21 ASSESSMENT — ENCOUNTER SYMPTOMS
COUGH: 0
DIARRHEA: 0
NAUSEA: 0
BACK PAIN: 1
SHORTNESS OF BREATH: 0
VOMITING: 0

## 2023-04-21 ASSESSMENT — PATIENT HEALTH QUESTIONNAIRE - PHQ9
7. TROUBLE CONCENTRATING ON THINGS, SUCH AS READING THE NEWSPAPER OR WATCHING TELEVISION: 3
1. LITTLE INTEREST OR PLEASURE IN DOING THINGS: 3
5. POOR APPETITE OR OVEREATING: 1
8. MOVING OR SPEAKING SO SLOWLY THAT OTHER PEOPLE COULD HAVE NOTICED. OR THE OPPOSITE, BEING SO FIGETY OR RESTLESS THAT YOU HAVE BEEN MOVING AROUND A LOT MORE THAN USUAL: 3
9. THOUGHTS THAT YOU WOULD BE BETTER OFF DEAD, OR OF HURTING YOURSELF: 3
SUM OF ALL RESPONSES TO PHQ QUESTIONS 1-9: 22
4. FEELING TIRED OR HAVING LITTLE ENERGY: 3
SUM OF ALL RESPONSES TO PHQ QUESTIONS 1-9: 25
6. FEELING BAD ABOUT YOURSELF - OR THAT YOU ARE A FAILURE OR HAVE LET YOURSELF OR YOUR FAMILY DOWN: 3
2. FEELING DOWN, DEPRESSED OR HOPELESS: 3
SUM OF ALL RESPONSES TO PHQ9 QUESTIONS 1 & 2: 6
SUM OF ALL RESPONSES TO PHQ QUESTIONS 1-9: 25
SUM OF ALL RESPONSES TO PHQ QUESTIONS 1-9: 25
10. IF YOU CHECKED OFF ANY PROBLEMS, HOW DIFFICULT HAVE THESE PROBLEMS MADE IT FOR YOU TO DO YOUR WORK, TAKE CARE OF THINGS AT HOME, OR GET ALONG WITH OTHER PEOPLE: 3
3. TROUBLE FALLING OR STAYING ASLEEP: 3

## 2023-04-21 NOTE — PATIENT INSTRUCTIONS
Eat and drink nothing after midnight the night before the planned procedure. Stop all aspirin, ibuprofen, aleve (tylenol/acetaminophen is ok) for seven days prior to the procedure.     Hold Levemir morning of surgery  Reduce Levemir dose to 10 units the evening before surgery     Increase the Zyprexa to 5 mg daily for depression

## 2023-04-21 NOTE — PROGRESS NOTES
Negative for chest pain and leg swelling. Gastrointestinal:  Negative for diarrhea, nausea and vomiting. Musculoskeletal:  Positive for back pain and gait problem. Neurological:  Negative for dizziness and headaches. Psychiatric/Behavioral:  Positive for decreased concentration, dysphoric mood and sleep disturbance (back pain). All other systems reviewed and are negative. PHYSICAL EXAM:    BP (!) 158/80 (Site: Left Upper Arm, Position: Sitting, Cuff Size: Large Adult)   Pulse 75   Ht 5' 4\" (1.626 m)   Wt 147 lb (66.7 kg)   SpO2 95%   BMI 25.23 kg/m²     Physical Exam  Vitals and nursing note reviewed. Constitutional:       General: She is not in acute distress. Appearance: Normal appearance. She is not ill-appearing, toxic-appearing or diaphoretic. HENT:      Head: Normocephalic and atraumatic. Right Ear: External ear normal.      Left Ear: External ear normal.   Cardiovascular:      Rate and Rhythm: Normal rate and regular rhythm. Heart sounds: Normal heart sounds. No murmur heard. No friction rub. No gallop. Pulmonary:      Effort: Pulmonary effort is normal. No respiratory distress. Breath sounds: Normal breath sounds. No stridor. No wheezing, rhonchi or rales. Neurological:      General: No focal deficit present. Mental Status: She is alert and oriented to person, place, and time. Mental status is at baseline. Cranial Nerves: No cranial nerve deficit. DATA:  EKG:  Date:  04/21/2023  I have reviewed EKG with the following interpretation:  Impression:  Sinus  Rhythm  - occasional PAC     # PACs = 1.  -Left axis -anterior fascicular block. ASSESSMENT AND PLAN:  1. Pre-op exam  - EKG 12 Lead  - Basic Metabolic Panel; Future  - CBC; Future  - Protime-INR; Future  - APTT; Future    2. Spinal stenosis of lumbar region, unspecified whether neurogenic claudication present  - Basic Metabolic Panel; Future  - CBC; Future  - Protime-INR;  Future  -

## 2023-04-22 LAB
ANION GAP SERPL CALCULATED.3IONS-SCNC: 10 MMOL/L (ref 3–16)
APTT BLD: 34.2 SEC (ref 22.7–35.9)
BUN SERPL-MCNC: 12 MG/DL (ref 7–20)
CALCIUM SERPL-MCNC: 9.5 MG/DL (ref 8.3–10.6)
CHLORIDE SERPL-SCNC: 98 MMOL/L (ref 99–110)
CO2 SERPL-SCNC: 30 MMOL/L (ref 21–32)
CREAT SERPL-MCNC: 0.7 MG/DL (ref 0.6–1.2)
GFR SERPLBLD CREATININE-BSD FMLA CKD-EPI: >60 ML/MIN/{1.73_M2}
GLUCOSE SERPL-MCNC: 82 MG/DL (ref 70–99)
POTASSIUM SERPL-SCNC: 3.7 MMOL/L (ref 3.5–5.1)
SODIUM SERPL-SCNC: 138 MMOL/L (ref 136–145)

## 2023-04-24 ENCOUNTER — TELEPHONE (OUTPATIENT)
Dept: FAMILY MEDICINE CLINIC | Age: 73
End: 2023-04-24

## 2023-05-15 ENCOUNTER — OFFICE VISIT (OUTPATIENT)
Dept: PAIN MANAGEMENT | Age: 73
End: 2023-05-15
Payer: MEDICARE

## 2023-05-15 VITALS
HEART RATE: 87 BPM | BODY MASS INDEX: 24.55 KG/M2 | WEIGHT: 143 LBS | DIASTOLIC BLOOD PRESSURE: 85 MMHG | SYSTOLIC BLOOD PRESSURE: 162 MMHG

## 2023-05-15 DIAGNOSIS — M96.1 FAILED NECK SYNDROME: ICD-10-CM

## 2023-05-15 DIAGNOSIS — G56.22 ULNAR NEUROPATHY AT ELBOW, LEFT: ICD-10-CM

## 2023-05-15 DIAGNOSIS — M48.061 DEGENERATIVE LUMBAR SPINAL STENOSIS: ICD-10-CM

## 2023-05-15 DIAGNOSIS — G56.03 BILATERAL CARPAL TUNNEL SYNDROME: ICD-10-CM

## 2023-05-15 DIAGNOSIS — G89.4 CHRONIC PAIN SYNDROME: ICD-10-CM

## 2023-05-15 DIAGNOSIS — M79.7 FIBROMYALGIA: ICD-10-CM

## 2023-05-15 DIAGNOSIS — M47.812 OSTEOARTHRITIS OF CERVICAL SPINE, UNSPECIFIED SPINAL OSTEOARTHRITIS COMPLICATION STATUS: ICD-10-CM

## 2023-05-15 DIAGNOSIS — M96.1 FAILED BACK SURGICAL SYNDROME: ICD-10-CM

## 2023-05-15 DIAGNOSIS — M48.07 SPINAL STENOSIS OF LUMBOSACRAL REGION: ICD-10-CM

## 2023-05-15 PROCEDURE — 1090F PRES/ABSN URINE INCON ASSESS: CPT | Performed by: INTERNAL MEDICINE

## 2023-05-15 PROCEDURE — 3077F SYST BP >= 140 MM HG: CPT | Performed by: INTERNAL MEDICINE

## 2023-05-15 PROCEDURE — 3017F COLORECTAL CA SCREEN DOC REV: CPT | Performed by: INTERNAL MEDICINE

## 2023-05-15 PROCEDURE — 1123F ACP DISCUSS/DSCN MKR DOCD: CPT | Performed by: INTERNAL MEDICINE

## 2023-05-15 PROCEDURE — 3079F DIAST BP 80-89 MM HG: CPT | Performed by: INTERNAL MEDICINE

## 2023-05-15 PROCEDURE — 99213 OFFICE O/P EST LOW 20 MIN: CPT | Performed by: INTERNAL MEDICINE

## 2023-05-15 PROCEDURE — G8399 PT W/DXA RESULTS DOCUMENT: HCPCS | Performed by: INTERNAL MEDICINE

## 2023-05-15 PROCEDURE — G8427 DOCREV CUR MEDS BY ELIG CLIN: HCPCS | Performed by: INTERNAL MEDICINE

## 2023-05-15 PROCEDURE — G8420 CALC BMI NORM PARAMETERS: HCPCS | Performed by: INTERNAL MEDICINE

## 2023-05-15 PROCEDURE — 1036F TOBACCO NON-USER: CPT | Performed by: INTERNAL MEDICINE

## 2023-05-15 RX ORDER — GABAPENTIN 400 MG/1
CAPSULE ORAL
Qty: 120 CAPSULE | Refills: 1 | Status: SHIPPED | OUTPATIENT
Start: 2023-05-15 | End: 2023-07-31

## 2023-05-15 RX ORDER — TRAZODONE HYDROCHLORIDE 100 MG/1
100 TABLET ORAL NIGHTLY
Qty: 90 TABLET | Refills: 0 | Status: SHIPPED | OUTPATIENT
Start: 2023-05-15

## 2023-05-15 RX ORDER — OXYCODONE AND ACETAMINOPHEN 7.5; 325 MG/1; MG/1
1 TABLET ORAL EVERY 6 HOURS PRN
Qty: 70 TABLET | Refills: 0 | Status: SHIPPED | OUTPATIENT
Start: 2023-05-15 | End: 2023-06-12

## 2023-05-15 RX ORDER — MELOXICAM 15 MG/1
15 TABLET ORAL DAILY
Qty: 30 TABLET | Refills: 1 | Status: SHIPPED | OUTPATIENT
Start: 2023-05-15

## 2023-05-15 NOTE — PROGRESS NOTES
Addi Nash  1950  [id number]      HISTORY OF PRESENT ILLNESS:  Ms. Mari Moon is a 74 y.o. female returns for a follow up visit for pain management  She has a diagnosis of   1. Chronic pain syndrome    2. Ulnar neuropathy at elbow, left    3. Fibromyalgia    4. Chronic low back pain, unspecified back pain laterality, unspecified whether sciatica present    5. Failed neck syndrome    6. Bilateral carpal tunnel syndrome    7. Degenerative lumbar spinal stenosis    8. Spinal stenosis of lumbosacral region    9. Failed back surgical syndrome    10. Osteoarthritis of cervical spine, unspecified spinal osteoarthritis complication status    . She complains of pain in the  Low back, left upper leg, left knee   She rates the pain 6/10 and describes it as aching, pins and needles. Current treatment regimen has helped relieve about 50% of the pain. She denies any side effects from the current pain regimen. Patient reports that since the last follow up visit the physical functioning is unchanged, family/social relationships are unchanged, mood is unchanged sleep patterns are unchanged, and that the overall functioning is unchanged. Patient denies misusing/abusing her narcotic pain medications or using any illegal drugs. There are No indicators for possible drug abuse, addiction or diversion problems. Patient states she has been doing fair. Ms. Mari Moon states she had decompression surgery done on her back around 2 weeks ago, she states she is doing a little better. Patient states she is using Neurontin along with the other adjuvants and Percocet 2-3 per day. Patient states she had a bladder infection, she states she was admitted again for changes in MS, she was given Oxycodone post surgery. ALLERGIES: Patients list of allergies were reviewed     MEDICATIONS: Ms. Mari Moon list of medications were reviewed. Her current medications are   Outpatient Medications Prior to Visit   Medication Sig Dispense

## 2023-05-30 ENCOUNTER — OFFICE VISIT (OUTPATIENT)
Dept: FAMILY MEDICINE CLINIC | Age: 73
End: 2023-05-30

## 2023-05-30 VITALS
RESPIRATION RATE: 16 BRPM | HEART RATE: 98 BPM | SYSTOLIC BLOOD PRESSURE: 152 MMHG | BODY MASS INDEX: 24.72 KG/M2 | OXYGEN SATURATION: 98 % | TEMPERATURE: 97.1 F | DIASTOLIC BLOOD PRESSURE: 83 MMHG | WEIGHT: 144 LBS

## 2023-05-30 DIAGNOSIS — I10 ESSENTIAL HYPERTENSION: ICD-10-CM

## 2023-05-30 DIAGNOSIS — N30.00 ACUTE CYSTITIS WITHOUT HEMATURIA: ICD-10-CM

## 2023-05-30 DIAGNOSIS — F32.A DEPRESSION, UNSPECIFIED DEPRESSION TYPE: ICD-10-CM

## 2023-05-30 DIAGNOSIS — I10 ESSENTIAL HYPERTENSION: Primary | ICD-10-CM

## 2023-05-30 DIAGNOSIS — F39 MOOD DISORDER (HCC): ICD-10-CM

## 2023-05-30 LAB
ANION GAP SERPL CALCULATED.3IONS-SCNC: 12 MMOL/L (ref 3–16)
BILIRUBIN, POC: NEGATIVE
BLOOD URINE, POC: NEGATIVE
BUN SERPL-MCNC: 12 MG/DL (ref 7–20)
CALCIUM SERPL-MCNC: 9.5 MG/DL (ref 8.3–10.6)
CHLORIDE SERPL-SCNC: 95 MMOL/L (ref 99–110)
CLARITY, POC: NORMAL
CO2 SERPL-SCNC: 28 MMOL/L (ref 21–32)
COLOR, POC: NORMAL
CREAT SERPL-MCNC: 0.7 MG/DL (ref 0.6–1.2)
CREATININE URINE POCT: NORMAL
GFR SERPLBLD CREATININE-BSD FMLA CKD-EPI: >60 ML/MIN/{1.73_M2}
GLUCOSE SERPL-MCNC: 135 MG/DL (ref 70–99)
GLUCOSE URINE, POC: NEGATIVE
KETONES, POC: NEGATIVE
LEUKOCYTE EST, POC: NORMAL
MICROALBUMIN/CREAT 24H UR: NORMAL MG/G{CREAT}
MICROALBUMIN/CREAT UR-RTO: NORMAL
NITRITE, POC: NEGATIVE
PH, POC: 7.5
POTASSIUM SERPL-SCNC: 3.7 MMOL/L (ref 3.5–5.1)
PROTEIN, POC: NEGATIVE
SODIUM SERPL-SCNC: 135 MMOL/L (ref 136–145)
SPECIFIC GRAVITY, POC: 1.01
UROBILINOGEN, POC: NORMAL

## 2023-05-30 RX ORDER — AMLODIPINE BESYLATE 10 MG/1
10 TABLET ORAL DAILY
Qty: 90 TABLET | Refills: 3 | Status: SHIPPED | OUTPATIENT
Start: 2023-05-30

## 2023-05-30 RX ORDER — CEFUROXIME AXETIL 250 MG/1
250 TABLET ORAL 2 TIMES DAILY
Qty: 6 TABLET | Refills: 0 | Status: SHIPPED | OUTPATIENT
Start: 2023-05-30 | End: 2023-06-02 | Stop reason: SDUPTHER

## 2023-05-30 SDOH — ECONOMIC STABILITY: INCOME INSECURITY: HOW HARD IS IT FOR YOU TO PAY FOR THE VERY BASICS LIKE FOOD, HOUSING, MEDICAL CARE, AND HEATING?: NOT HARD AT ALL

## 2023-05-30 SDOH — ECONOMIC STABILITY: FOOD INSECURITY: WITHIN THE PAST 12 MONTHS, THE FOOD YOU BOUGHT JUST DIDN'T LAST AND YOU DIDN'T HAVE MONEY TO GET MORE.: NEVER TRUE

## 2023-05-30 SDOH — ECONOMIC STABILITY: FOOD INSECURITY: WITHIN THE PAST 12 MONTHS, YOU WORRIED THAT YOUR FOOD WOULD RUN OUT BEFORE YOU GOT MONEY TO BUY MORE.: NEVER TRUE

## 2023-05-30 ASSESSMENT — PATIENT HEALTH QUESTIONNAIRE - PHQ9
7. TROUBLE CONCENTRATING ON THINGS, SUCH AS READING THE NEWSPAPER OR WATCHING TELEVISION: 0
3. TROUBLE FALLING OR STAYING ASLEEP: 3
4. FEELING TIRED OR HAVING LITTLE ENERGY: 3
6. FEELING BAD ABOUT YOURSELF - OR THAT YOU ARE A FAILURE OR HAVE LET YOURSELF OR YOUR FAMILY DOWN: 3
5. POOR APPETITE OR OVEREATING: 0
SUM OF ALL RESPONSES TO PHQ QUESTIONS 1-9: 12
9. THOUGHTS THAT YOU WOULD BE BETTER OFF DEAD, OR OF HURTING YOURSELF: 1
10. IF YOU CHECKED OFF ANY PROBLEMS, HOW DIFFICULT HAVE THESE PROBLEMS MADE IT FOR YOU TO DO YOUR WORK, TAKE CARE OF THINGS AT HOME, OR GET ALONG WITH OTHER PEOPLE: 1
SUM OF ALL RESPONSES TO PHQ QUESTIONS 1-9: 13
2. FEELING DOWN, DEPRESSED OR HOPELESS: 1
8. MOVING OR SPEAKING SO SLOWLY THAT OTHER PEOPLE COULD HAVE NOTICED. OR THE OPPOSITE, BEING SO FIGETY OR RESTLESS THAT YOU HAVE BEEN MOVING AROUND A LOT MORE THAN USUAL: 0
1. LITTLE INTEREST OR PLEASURE IN DOING THINGS: 2
SUM OF ALL RESPONSES TO PHQ QUESTIONS 1-9: 13
SUM OF ALL RESPONSES TO PHQ9 QUESTIONS 1 & 2: 3
SUM OF ALL RESPONSES TO PHQ QUESTIONS 1-9: 13

## 2023-05-30 ASSESSMENT — ANXIETY QUESTIONNAIRES
GAD7 TOTAL SCORE: 8
3. WORRYING TOO MUCH ABOUT DIFFERENT THINGS: 0
4. TROUBLE RELAXING: 3
2. NOT BEING ABLE TO STOP OR CONTROL WORRYING: 0
IF YOU CHECKED OFF ANY PROBLEMS ON THIS QUESTIONNAIRE, HOW DIFFICULT HAVE THESE PROBLEMS MADE IT FOR YOU TO DO YOUR WORK, TAKE CARE OF THINGS AT HOME, OR GET ALONG WITH OTHER PEOPLE: NOT DIFFICULT AT ALL
7. FEELING AFRAID AS IF SOMETHING AWFUL MIGHT HAPPEN: 0
6. BECOMING EASILY ANNOYED OR IRRITABLE: 1
1. FEELING NERVOUS, ANXIOUS, OR ON EDGE: 1
5. BEING SO RESTLESS THAT IT IS HARD TO SIT STILL: 3

## 2023-05-30 ASSESSMENT — ENCOUNTER SYMPTOMS
NAUSEA: 0
SHORTNESS OF BREATH: 0
BACK PAIN: 1
VOMITING: 0
DIARRHEA: 0
COUGH: 0

## 2023-05-30 ASSESSMENT — COLUMBIA-SUICIDE SEVERITY RATING SCALE - C-SSRS
1. WITHIN THE PAST MONTH, HAVE YOU WISHED YOU WERE DEAD OR WISHED YOU COULD GO TO SLEEP AND NOT WAKE UP?: NO
6. HAVE YOU EVER DONE ANYTHING, STARTED TO DO ANYTHING, OR PREPARED TO DO ANYTHING TO END YOUR LIFE?: NO
2. HAVE YOU ACTUALLY HAD ANY THOUGHTS OF KILLING YOURSELF?: NO

## 2023-05-30 NOTE — PROGRESS NOTES
2023     Chief Complaint   Patient presents with    Other     Follow up after Sugsundeep- slight ankle swelling - balance is off but stated that she is not dizzy thinks its due to her back        Rock Hopper (:  1950) is a 74 y.o. female, here for evaluation of the following medical concerns:    HPI  Here for hospital follow up. She underwent lumbar decompression surgery on 2023 then shortly after that developed confusion. Was found to have UTI, went to ER at [facility] on 2023 and was diagnosed with urinary tract infection. She was prescribed Ceftin. Her symptoms worsened. Brought her back to the hospital following day. She was admitted 2023 through 2023. Urine culture positive for E. coli, pansensitive. Treated with IV antibiotics. Her left leg pain has improved significantly since undergoing lumbar decompression. Her chronic low back pain persist.  She has continued intermittent imbalance with her gait which she reports is a chronic issue and she attributes that to her back. This is unchanged from baseline. She denies any chest pain, shortness of breath. She denies any irritative voiding symptoms. Seems back to baseline cognitively. Her depression symptoms are chronic and persistent. Depakote was discontinued while she was admitted in the hospital, her ammonia levels were elevated and it was thought to be secondary to the valproic acid. She does admit to passive SI without intent or plan. Her family is protective factor. She is open to getting back into psychiatry and psychotherapy. Plans to go back to work soon, she states she had been cleared by her surgeon to go back and thinks this will help with her depression. Works as an  for [employer]. Review of Systems   Constitutional:  Negative for chills, diaphoresis, fatigue and fever. Respiratory:  Negative for cough and shortness of breath.

## 2023-05-30 NOTE — PATIENT INSTRUCTIONS
Increase the amlodipine to 10 mg daily for your uncontrolled blood pressure. You can take two of your 5 mg tablets daily.   Start Ceftin (antibiotic) for possible UTI, will send urine culture  Blood work today    Make appointment with psychiatrist for depression  Make appointment to start PT as Dr. Daria Hussein recommended  Psychiatry appointment    [address]   [phone] [address] [facility] [address], [address] [facility]    Dr. Jethro Flores   [phone] [address], [address] [facility]  Address: [address] F[address]  Phone: [phone] 28, [address] [facility] [address]  [phone]    Dr. Chioma Perkins   [phone] [address] [place] [address] [place] [address] Psychiatry  [address] [address] [phone]    Dr. Liane Peck   [phone]  [address] for Psychoanalysis   [phone] [id number] [facility]    Dr. Enoch Sauceda [phone]  Dr. Jeremy Moscoso [phone]

## 2023-06-02 DIAGNOSIS — N30.00 ACUTE CYSTITIS WITHOUT HEMATURIA: ICD-10-CM

## 2023-06-02 LAB
BACTERIA UR CULT: ABNORMAL
ORGANISM: ABNORMAL

## 2023-06-02 RX ORDER — CEFUROXIME AXETIL 250 MG/1
250 TABLET ORAL 2 TIMES DAILY
Qty: 14 TABLET | Refills: 0 | Status: SHIPPED | OUTPATIENT
Start: 2023-06-02 | End: 2023-06-09

## 2023-06-14 ENCOUNTER — TELEPHONE (OUTPATIENT)
Dept: FAMILY MEDICINE CLINIC | Age: 73
End: 2023-06-14

## 2023-06-15 RX ORDER — ESTRADIOL 0.1 MG/G
0.25 CREAM VAGINAL DAILY
Qty: 42.5 G | Refills: 2 | OUTPATIENT
Start: 2023-06-15

## 2023-06-15 RX ORDER — NITROFURANTOIN 25; 75 MG/1; MG/1
100 CAPSULE ORAL 2 TIMES DAILY
Qty: 14 CAPSULE | Refills: 0 | Status: SHIPPED | OUTPATIENT
Start: 2023-06-15 | End: 2023-06-22

## 2023-06-15 RX ORDER — OXYBUTYNIN CHLORIDE 10 MG/1
TABLET, EXTENDED RELEASE ORAL
Qty: 90 TABLET | Refills: 0 | OUTPATIENT
Start: 2023-06-15

## 2023-06-15 RX ORDER — ATORVASTATIN CALCIUM 40 MG/1
40 TABLET, FILM COATED ORAL DAILY
Qty: 90 TABLET | Refills: 1 | Status: SHIPPED | OUTPATIENT
Start: 2023-06-15

## 2023-06-23 ENCOUNTER — OFFICE VISIT (OUTPATIENT)
Dept: FAMILY MEDICINE CLINIC | Age: 73
End: 2023-06-23

## 2023-06-23 VITALS
RESPIRATION RATE: 16 BRPM | DIASTOLIC BLOOD PRESSURE: 70 MMHG | BODY MASS INDEX: 25.06 KG/M2 | WEIGHT: 146 LBS | SYSTOLIC BLOOD PRESSURE: 138 MMHG | TEMPERATURE: 97.1 F | OXYGEN SATURATION: 96 % | HEART RATE: 80 BPM

## 2023-06-23 DIAGNOSIS — N30.00 ACUTE CYSTITIS WITHOUT HEMATURIA: ICD-10-CM

## 2023-06-23 DIAGNOSIS — R42 DIZZINESS: ICD-10-CM

## 2023-06-23 DIAGNOSIS — R53.83 OTHER FATIGUE: ICD-10-CM

## 2023-06-23 DIAGNOSIS — N30.00 ACUTE CYSTITIS WITHOUT HEMATURIA: Primary | ICD-10-CM

## 2023-06-23 DIAGNOSIS — I10 ESSENTIAL HYPERTENSION: ICD-10-CM

## 2023-06-23 LAB
BILIRUBIN, POC: NEGATIVE
BLOOD URINE, POC: NEGATIVE
CLARITY, POC: NORMAL
COLOR, POC: NORMAL
DEPRECATED RDW RBC AUTO: 13.3 % (ref 12.4–15.4)
GLUCOSE URINE, POC: NORMAL
HCT VFR BLD AUTO: 38.8 % (ref 36–48)
HGB BLD-MCNC: 13.8 G/DL (ref 12–16)
KETONES, POC: NEGATIVE
LEUKOCYTE EST, POC: NEGATIVE
MCH RBC QN AUTO: 30 PG (ref 26–34)
MCHC RBC AUTO-ENTMCNC: 35.5 G/DL (ref 31–36)
MCV RBC AUTO: 84.5 FL (ref 80–100)
NITRITE, POC: NEGATIVE
PH, POC: 6.5
PLATELET # BLD AUTO: 203 K/UL (ref 135–450)
PMV BLD AUTO: 8.2 FL (ref 5–10.5)
PROTEIN, POC: NEGATIVE
RBC # BLD AUTO: 4.6 M/UL (ref 4–5.2)
SPECIFIC GRAVITY, POC: 1.01
UROBILINOGEN, POC: NORMAL
WBC # BLD AUTO: 7 K/UL (ref 4–11)

## 2023-06-23 RX ORDER — CLONAZEPAM 0.5 MG/1
TABLET ORAL
COMMUNITY
Start: 2023-06-09

## 2023-06-23 SDOH — ECONOMIC STABILITY: FOOD INSECURITY: WITHIN THE PAST 12 MONTHS, YOU WORRIED THAT YOUR FOOD WOULD RUN OUT BEFORE YOU GOT MONEY TO BUY MORE.: NEVER TRUE

## 2023-06-23 SDOH — ECONOMIC STABILITY: FOOD INSECURITY: WITHIN THE PAST 12 MONTHS, THE FOOD YOU BOUGHT JUST DIDN'T LAST AND YOU DIDN'T HAVE MONEY TO GET MORE.: NEVER TRUE

## 2023-06-23 SDOH — ECONOMIC STABILITY: INCOME INSECURITY: HOW HARD IS IT FOR YOU TO PAY FOR THE VERY BASICS LIKE FOOD, HOUSING, MEDICAL CARE, AND HEATING?: NOT HARD AT ALL

## 2023-06-23 ASSESSMENT — PATIENT HEALTH QUESTIONNAIRE - PHQ9
SUM OF ALL RESPONSES TO PHQ QUESTIONS 1-9: 10
SUM OF ALL RESPONSES TO PHQ9 QUESTIONS 1 & 2: 1
SUM OF ALL RESPONSES TO PHQ QUESTIONS 1-9: 2
SUM OF ALL RESPONSES TO PHQ9 QUESTIONS 1 & 2: 2
10. IF YOU CHECKED OFF ANY PROBLEMS, HOW DIFFICULT HAVE THESE PROBLEMS MADE IT FOR YOU TO DO YOUR WORK, TAKE CARE OF THINGS AT HOME, OR GET ALONG WITH OTHER PEOPLE: 1
1. LITTLE INTEREST OR PLEASURE IN DOING THINGS: 1
DEPRESSION UNABLE TO ASSESS: FUNCTIONAL CAPACITY MOTIVATION LIMITS ACCURACY
2. FEELING DOWN, DEPRESSED OR HOPELESS: 1
SUM OF ALL RESPONSES TO PHQ QUESTIONS 1-9: 10
4. FEELING TIRED OR HAVING LITTLE ENERGY: 3
SUM OF ALL RESPONSES TO PHQ QUESTIONS 1-9: 2
8. MOVING OR SPEAKING SO SLOWLY THAT OTHER PEOPLE COULD HAVE NOTICED. OR THE OPPOSITE, BEING SO FIGETY OR RESTLESS THAT YOU HAVE BEEN MOVING AROUND A LOT MORE THAN USUAL: 0
7. TROUBLE CONCENTRATING ON THINGS, SUCH AS READING THE NEWSPAPER OR WATCHING TELEVISION: 1
6. FEELING BAD ABOUT YOURSELF - OR THAT YOU ARE A FAILURE OR HAVE LET YOURSELF OR YOUR FAMILY DOWN: 0
DEPRESSION UNABLE TO ASSESS: FUNCTIONAL CAPACITY MOTIVATION LIMITS ACCURACY
1. LITTLE INTEREST OR PLEASURE IN DOING THINGS: 0
SUM OF ALL RESPONSES TO PHQ QUESTIONS 1-9: 2
5. POOR APPETITE OR OVEREATING: 2
3. TROUBLE FALLING OR STAYING ASLEEP: 3
SUM OF ALL RESPONSES TO PHQ QUESTIONS 1-9: 2
SUM OF ALL RESPONSES TO PHQ QUESTIONS 1-9: 10
2. FEELING DOWN, DEPRESSED OR HOPELESS: 1
SUM OF ALL RESPONSES TO PHQ QUESTIONS 1-9: 10
9. THOUGHTS THAT YOU WOULD BE BETTER OFF DEAD, OR OF HURTING YOURSELF: 0

## 2023-06-23 ASSESSMENT — ENCOUNTER SYMPTOMS
BACK PAIN: 1
COUGH: 0
NAUSEA: 0
VOMITING: 0
DIARRHEA: 0
SHORTNESS OF BREATH: 0

## 2023-06-23 ASSESSMENT — ANXIETY QUESTIONNAIRES
4. TROUBLE RELAXING: 0
IF YOU CHECKED OFF ANY PROBLEMS ON THIS QUESTIONNAIRE, HOW DIFFICULT HAVE THESE PROBLEMS MADE IT FOR YOU TO DO YOUR WORK, TAKE CARE OF THINGS AT HOME, OR GET ALONG WITH OTHER PEOPLE: NOT DIFFICULT AT ALL
6. BECOMING EASILY ANNOYED OR IRRITABLE: 0
1. FEELING NERVOUS, ANXIOUS, OR ON EDGE: 0
GAD7 TOTAL SCORE: 0
5. BEING SO RESTLESS THAT IT IS HARD TO SIT STILL: 0
3. WORRYING TOO MUCH ABOUT DIFFERENT THINGS: 0
7. FEELING AFRAID AS IF SOMETHING AWFUL MIGHT HAPPEN: 0
2. NOT BEING ABLE TO STOP OR CONTROL WORRYING: 0

## 2023-06-23 ASSESSMENT — COLUMBIA-SUICIDE SEVERITY RATING SCALE - C-SSRS
6. HAVE YOU EVER DONE ANYTHING, STARTED TO DO ANYTHING, OR PREPARED TO DO ANYTHING TO END YOUR LIFE?: NO
1. WITHIN THE PAST MONTH, HAVE YOU WISHED YOU WERE DEAD OR WISHED YOU COULD GO TO SLEEP AND NOT WAKE UP?: NO
2. HAVE YOU ACTUALLY HAD ANY THOUGHTS OF KILLING YOURSELF?: NO

## 2023-06-23 NOTE — PATIENT INSTRUCTIONS
- stop the nitrofurantoin (antibiotic)  - push fluids  - keep eye on glucose  - blood work today  - call Monday with update

## 2023-06-23 NOTE — PROGRESS NOTES
2023     Chief Complaint   Patient presents with    Other     Light headed , not  feeling great very tired , no appetite        Dulcy Devan (:  1950) is a 74 y.o. female, here for evaluation of the following medical concerns:    HPI  Has been feeling unwell for the past 4 to 5 days. Saw a new psychiatrist last week and a change \"a bunch of medications\" (trazodone stopped, due to sertraline decreased to 100 mg, olanzapine stopped. She was supposed to start Seroquel but could not get it from the pharmacy due to cost.  She complains of dizzy, tired and decreased appetite. Denies fever, abdominal pain or chest pain. She has also been on nitrofurantoin for the last week for a persistent UTI. Blood sugar was around 200 this morning. She still been taking her medications consistently. Dr. Niraj Davis (psych)    [facility] Scores 2023   PHQ2 Score 1 2 3 6 2 5 2   PHQ9 Score 10 2 13 25 2 15 2     Interpretation of Total Score Depression Severity: 1-4 = Minimal depression, 5-9 = Mild depression, 10-14 = Moderate depression, 15-19 = Moderately severe depression, 20-27 = Severe depression     Review of Systems   Constitutional:  Positive for appetite change and fatigue. Negative for chills, diaphoresis and fever. Respiratory:  Negative for cough and shortness of breath. Cardiovascular:  Negative for chest pain and leg swelling. Gastrointestinal:  Negative for diarrhea, nausea and vomiting. Musculoskeletal:  Positive for back pain and gait problem. Neurological:  Positive for dizziness. Negative for headaches. Psychiatric/Behavioral:  Positive for decreased concentration, dysphoric mood and sleep disturbance (back pain). All other systems reviewed and are negative. Prior to Visit Medications    Medication Sig Taking?  Authorizing Provider   clonazePAM (KLONOPIN) 0.5 MG tablet TAKE 1 TABLET BY MOUTH THREE TIMES A

## 2023-06-24 LAB
ANION GAP SERPL CALCULATED.3IONS-SCNC: 14 MMOL/L (ref 3–16)
BUN SERPL-MCNC: 14 MG/DL (ref 7–20)
CALCIUM SERPL-MCNC: 9.8 MG/DL (ref 8.3–10.6)
CHLORIDE SERPL-SCNC: 93 MMOL/L (ref 99–110)
CO2 SERPL-SCNC: 25 MMOL/L (ref 21–32)
CREAT SERPL-MCNC: 0.7 MG/DL (ref 0.6–1.2)
GFR SERPLBLD CREATININE-BSD FMLA CKD-EPI: >60 ML/MIN/{1.73_M2}
GLUCOSE SERPL-MCNC: 328 MG/DL (ref 70–99)
POTASSIUM SERPL-SCNC: 4 MMOL/L (ref 3.5–5.1)
SODIUM SERPL-SCNC: 132 MMOL/L (ref 136–145)
TSH SERPL DL<=0.005 MIU/L-ACNC: 0.55 UIU/ML (ref 0.27–4.2)

## 2023-07-10 ENCOUNTER — OFFICE VISIT (OUTPATIENT)
Dept: PAIN MANAGEMENT | Age: 73
End: 2023-07-10
Payer: MEDICARE

## 2023-07-10 VITALS
SYSTOLIC BLOOD PRESSURE: 113 MMHG | HEART RATE: 95 BPM | WEIGHT: 151 LBS | BODY MASS INDEX: 25.92 KG/M2 | DIASTOLIC BLOOD PRESSURE: 68 MMHG

## 2023-07-10 DIAGNOSIS — M48.07 SPINAL STENOSIS OF LUMBOSACRAL REGION: ICD-10-CM

## 2023-07-10 DIAGNOSIS — M96.1 FAILED NECK SYNDROME: ICD-10-CM

## 2023-07-10 DIAGNOSIS — M96.1 FAILED BACK SURGICAL SYNDROME: ICD-10-CM

## 2023-07-10 DIAGNOSIS — M79.7 FIBROMYALGIA: ICD-10-CM

## 2023-07-10 DIAGNOSIS — G56.22 ULNAR NEUROPATHY AT ELBOW, LEFT: ICD-10-CM

## 2023-07-10 DIAGNOSIS — M48.061 DEGENERATIVE LUMBAR SPINAL STENOSIS: ICD-10-CM

## 2023-07-10 DIAGNOSIS — G89.4 CHRONIC PAIN SYNDROME: ICD-10-CM

## 2023-07-10 PROCEDURE — G8399 PT W/DXA RESULTS DOCUMENT: HCPCS | Performed by: INTERNAL MEDICINE

## 2023-07-10 PROCEDURE — 1036F TOBACCO NON-USER: CPT | Performed by: INTERNAL MEDICINE

## 2023-07-10 PROCEDURE — 3074F SYST BP LT 130 MM HG: CPT | Performed by: INTERNAL MEDICINE

## 2023-07-10 PROCEDURE — 3078F DIAST BP <80 MM HG: CPT | Performed by: INTERNAL MEDICINE

## 2023-07-10 PROCEDURE — 99213 OFFICE O/P EST LOW 20 MIN: CPT | Performed by: INTERNAL MEDICINE

## 2023-07-10 PROCEDURE — 1090F PRES/ABSN URINE INCON ASSESS: CPT | Performed by: INTERNAL MEDICINE

## 2023-07-10 PROCEDURE — G8417 CALC BMI ABV UP PARAM F/U: HCPCS | Performed by: INTERNAL MEDICINE

## 2023-07-10 PROCEDURE — G8427 DOCREV CUR MEDS BY ELIG CLIN: HCPCS | Performed by: INTERNAL MEDICINE

## 2023-07-10 PROCEDURE — 3017F COLORECTAL CA SCREEN DOC REV: CPT | Performed by: INTERNAL MEDICINE

## 2023-07-10 PROCEDURE — 1123F ACP DISCUSS/DSCN MKR DOCD: CPT | Performed by: INTERNAL MEDICINE

## 2023-07-10 RX ORDER — QUETIAPINE 150 MG/1
150 TABLET, FILM COATED, EXTENDED RELEASE ORAL NIGHTLY
COMMUNITY
End: 2023-07-11

## 2023-07-10 RX ORDER — OXYCODONE AND ACETAMINOPHEN 7.5; 325 MG/1; MG/1
1 TABLET ORAL EVERY 6 HOURS PRN
Qty: 70 TABLET | Refills: 0 | Status: SHIPPED | OUTPATIENT
Start: 2023-07-10 | End: 2023-08-07

## 2023-07-10 RX ORDER — GABAPENTIN 400 MG/1
CAPSULE ORAL
Qty: 120 CAPSULE | Refills: 1 | Status: SHIPPED | OUTPATIENT
Start: 2023-07-10 | End: 2023-09-25

## 2023-07-10 RX ORDER — MELOXICAM 15 MG/1
15 TABLET ORAL DAILY
Qty: 30 TABLET | Refills: 1 | Status: SHIPPED | OUTPATIENT
Start: 2023-07-10

## 2023-07-10 NOTE — PROGRESS NOTES
Amirah Hearn  1950  [id number]      HISTORY OF PRESENT ILLNESS:  Ms. Bridger Rendon is a 74 y.o. female returns for a follow up visit for pain management  She has a diagnosis of   1. Chronic pain syndrome    2. Spinal stenosis of lumbosacral region    3. Failed neck syndrome    4. Failed back surgical syndrome    5. Fibromyalgia    6. Degenerative lumbar spinal stenosis    7. Ulnar neuropathy at elbow, left    . She complains of pain in the  Low back, buttock   She rates the pain 6/10 and describes it as aching. Current treatment regimen has helped relieve about 40% of the pain. She denies any side effects from the current pain regimen. Patient reports that since the last follow up visit the physical functioning is worse, family/social relationships are unchanged, mood is worse sleep patterns are worse, and that the overall functioning is worse. Patient denies misusing/abusing her narcotic pain medications or using any illegal drugs. There are No indicators for possible drug abuse, addiction or diversion problems. Patient states she has been doing fair and the pain has been about the same. She says she is using Percocet along with Neurontin She reports she is having side effects with her psychotropics, it will be discuss with psychiatrist. She complains of swelling in the legs. ALLERGIES: Patients list of allergies were reviewed     MEDICATIONS: Ms. Bridger Rendon list of medications were reviewed. Her current medications are   Outpatient Medications Prior to Visit   Medication Sig Dispense Refill    QUEtiapine (SEROQUEL XR) 150 MG TB24 extended release tablet Take 1 tablet by mouth nightly      clonazePAM (KLONOPIN) 0.5 MG tablet TAKE 1 TABLET BY MOUTH THREE TIMES A DAY AS NEEDED FOR ANXIETY      levothyroxine (SYNTHROID) 75 MCG tablet TAKE 1 TABLET BY MOUTH EVERY DAY 90 tablet 3    atorvastatin (LIPITOR) 40 MG tablet Take 1 tablet by mouth daily 90 tablet 1    meloxicam (MOBIC) 15 MG tablet Take 1

## 2023-07-11 ENCOUNTER — OFFICE VISIT (OUTPATIENT)
Dept: FAMILY MEDICINE CLINIC | Age: 73
End: 2023-07-11

## 2023-07-11 VITALS
DIASTOLIC BLOOD PRESSURE: 71 MMHG | WEIGHT: 146 LBS | OXYGEN SATURATION: 97 % | RESPIRATION RATE: 16 BRPM | HEART RATE: 94 BPM | BODY MASS INDEX: 25.06 KG/M2 | SYSTOLIC BLOOD PRESSURE: 121 MMHG | TEMPERATURE: 97.1 F

## 2023-07-11 DIAGNOSIS — T88.7XXA MEDICATION SIDE EFFECTS: ICD-10-CM

## 2023-07-11 DIAGNOSIS — F39 MOOD DISORDER (HCC): ICD-10-CM

## 2023-07-11 DIAGNOSIS — N39.0 URINARY TRACT INFECTION WITHOUT HEMATURIA, SITE UNSPECIFIED: Primary | ICD-10-CM

## 2023-07-11 DIAGNOSIS — F32.A DEPRESSION, UNSPECIFIED DEPRESSION TYPE: ICD-10-CM

## 2023-07-11 LAB
BILIRUBIN, POC: NORMAL
BLOOD URINE, POC: NEGATIVE
CLARITY, POC: NORMAL
COLOR, POC: NORMAL
GLUCOSE URINE, POC: NORMAL
KETONES, POC: NORMAL
LEUKOCYTE EST, POC: NORMAL
NITRITE, POC: NEGATIVE
PH, POC: 5.5
PROTEIN, POC: NEGATIVE
SPECIFIC GRAVITY, POC: 1.02
UROBILINOGEN, POC: NORMAL

## 2023-07-11 SDOH — ECONOMIC STABILITY: FOOD INSECURITY: WITHIN THE PAST 12 MONTHS, THE FOOD YOU BOUGHT JUST DIDN'T LAST AND YOU DIDN'T HAVE MONEY TO GET MORE.: NEVER TRUE

## 2023-07-11 SDOH — ECONOMIC STABILITY: FOOD INSECURITY: WITHIN THE PAST 12 MONTHS, YOU WORRIED THAT YOUR FOOD WOULD RUN OUT BEFORE YOU GOT MONEY TO BUY MORE.: NEVER TRUE

## 2023-07-11 SDOH — ECONOMIC STABILITY: INCOME INSECURITY: HOW HARD IS IT FOR YOU TO PAY FOR THE VERY BASICS LIKE FOOD, HOUSING, MEDICAL CARE, AND HEATING?: NOT HARD AT ALL

## 2023-07-11 ASSESSMENT — ANXIETY QUESTIONNAIRES
4. TROUBLE RELAXING: 0
2. NOT BEING ABLE TO STOP OR CONTROL WORRYING: 0
7. FEELING AFRAID AS IF SOMETHING AWFUL MIGHT HAPPEN: 0
IF YOU CHECKED OFF ANY PROBLEMS ON THIS QUESTIONNAIRE, HOW DIFFICULT HAVE THESE PROBLEMS MADE IT FOR YOU TO DO YOUR WORK, TAKE CARE OF THINGS AT HOME, OR GET ALONG WITH OTHER PEOPLE: NOT DIFFICULT AT ALL
3. WORRYING TOO MUCH ABOUT DIFFERENT THINGS: 0
6. BECOMING EASILY ANNOYED OR IRRITABLE: 0
1. FEELING NERVOUS, ANXIOUS, OR ON EDGE: 0
GAD7 TOTAL SCORE: 0
5. BEING SO RESTLESS THAT IT IS HARD TO SIT STILL: 0

## 2023-07-11 ASSESSMENT — PATIENT HEALTH QUESTIONNAIRE - PHQ9
SUM OF ALL RESPONSES TO PHQ QUESTIONS 1-9: 0
7. TROUBLE CONCENTRATING ON THINGS, SUCH AS READING THE NEWSPAPER OR WATCHING TELEVISION: 0
8. MOVING OR SPEAKING SO SLOWLY THAT OTHER PEOPLE COULD HAVE NOTICED. OR THE OPPOSITE, BEING SO FIGETY OR RESTLESS THAT YOU HAVE BEEN MOVING AROUND A LOT MORE THAN USUAL: 0
2. FEELING DOWN, DEPRESSED OR HOPELESS: 0
9. THOUGHTS THAT YOU WOULD BE BETTER OFF DEAD, OR OF HURTING YOURSELF: 0
3. TROUBLE FALLING OR STAYING ASLEEP: 0
10. IF YOU CHECKED OFF ANY PROBLEMS, HOW DIFFICULT HAVE THESE PROBLEMS MADE IT FOR YOU TO DO YOUR WORK, TAKE CARE OF THINGS AT HOME, OR GET ALONG WITH OTHER PEOPLE: 0
SUM OF ALL RESPONSES TO PHQ QUESTIONS 1-9: 0
SUM OF ALL RESPONSES TO PHQ QUESTIONS 1-9: 0
4. FEELING TIRED OR HAVING LITTLE ENERGY: 0
5. POOR APPETITE OR OVEREATING: 0
DEPRESSION UNABLE TO ASSESS: FUNCTIONAL CAPACITY MOTIVATION LIMITS ACCURACY
6. FEELING BAD ABOUT YOURSELF - OR THAT YOU ARE A FAILURE OR HAVE LET YOURSELF OR YOUR FAMILY DOWN: 0
SUM OF ALL RESPONSES TO PHQ9 QUESTIONS 1 & 2: 0
1. LITTLE INTEREST OR PLEASURE IN DOING THINGS: 0
SUM OF ALL RESPONSES TO PHQ QUESTIONS 1-9: 0

## 2023-07-11 NOTE — PROGRESS NOTES
type    2. Mood disorder ([address] [facility])    3. Urinary tract infection without hematuria, site unspecified  - POCT Urinalysis No Micro (Auto)  - Culture, Urine    4. Medication side effects    Will send urine culture to r/o UTI  Pt wants to get off of Seroquel and Klonopin  Wants to get back on Olanzapine and Trazodone  Resume Olanzapine 2.5 mg daily and Trazodone 50 mg QHS  She will continue Zoloft 100 mg daily  Follow up in 2 weeks for recheck     Time spent: 35 minutes    Return in about 2 weeks (around 7/25/2023). An electronic signature was used to authenticate this note.     --SAI Ibarra - CNP on 7/12/2023 at 6:51 AM

## 2023-07-11 NOTE — PATIENT INSTRUCTIONS
Continue sertraline 100 mg daily  Resume Trazodone 1/2 tablet (50 mg) nightly   Resume olanzapine 2.5 mg daily (1/2 tablet) daily   Stop the clonazepam (as needed anxiety medication  Stop the quetiapine   Continue to wear the compression stockings, put them on in the morning and take them off at night time.    Elevate the legs when you are sitting  Follow up in 2 weeks   July 2t5th at 2:40

## 2023-07-12 ASSESSMENT — ENCOUNTER SYMPTOMS
DIARRHEA: 0
NAUSEA: 0
BACK PAIN: 1
SHORTNESS OF BREATH: 0
VOMITING: 0
COUGH: 0
BLOOD IN STOOL: 0
CONSTIPATION: 0

## 2023-07-12 NOTE — TELEPHONE ENCOUNTER
LOV 7/11/2023    Future Appointments   Date Time Provider [address]   7/25/2023  3:20 PM SAI Cade - CNP [facility] [place] - [place]   8/7/2023  9:45 AM Marva Bryan MD [facility]

## 2023-07-14 LAB
BACTERIA UR CULT: ABNORMAL
ORGANISM: ABNORMAL

## 2023-07-14 RX ORDER — CIPROFLOXACIN 250 MG/1
250 TABLET, FILM COATED ORAL 2 TIMES DAILY
Qty: 14 TABLET | Refills: 0 | Status: SHIPPED | OUTPATIENT
Start: 2023-07-14 | End: 2023-07-21

## 2023-07-17 ENCOUNTER — TELEPHONE (OUTPATIENT)
Dept: PAIN MANAGEMENT | Age: 73
End: 2023-07-17

## 2023-07-17 DIAGNOSIS — G89.4 CHRONIC PAIN SYNDROME: ICD-10-CM

## 2023-07-17 DIAGNOSIS — M48.07 SPINAL STENOSIS OF LUMBOSACRAL REGION: ICD-10-CM

## 2023-07-17 DIAGNOSIS — M96.1 FAILED BACK SURGICAL SYNDROME: ICD-10-CM

## 2023-07-17 DIAGNOSIS — M96.1 FAILED NECK SYNDROME: ICD-10-CM

## 2023-07-17 DIAGNOSIS — G56.22 ULNAR NEUROPATHY AT ELBOW, LEFT: ICD-10-CM

## 2023-07-17 DIAGNOSIS — M79.7 FIBROMYALGIA: ICD-10-CM

## 2023-07-17 DIAGNOSIS — M48.061 DEGENERATIVE LUMBAR SPINAL STENOSIS: ICD-10-CM

## 2023-07-17 RX ORDER — OXYCODONE AND ACETAMINOPHEN 7.5; 325 MG/1; MG/1
1 TABLET ORAL EVERY 6 HOURS PRN
Qty: 53 TABLET | Refills: 0 | Status: SHIPPED | OUTPATIENT
Start: 2023-07-17 | End: 2023-08-07 | Stop reason: SDUPTHER

## 2023-07-17 NOTE — TELEPHONE ENCOUNTER
Pt said the percocet script was never sent to the pharmacy.  In the pt chart it says the transmission failed to pharmacy          Please advise

## 2023-07-25 ENCOUNTER — OFFICE VISIT (OUTPATIENT)
Dept: FAMILY MEDICINE CLINIC | Age: 73
End: 2023-07-25

## 2023-07-25 VITALS
DIASTOLIC BLOOD PRESSURE: 66 MMHG | SYSTOLIC BLOOD PRESSURE: 112 MMHG | WEIGHT: 146 LBS | HEART RATE: 97 BPM | RESPIRATION RATE: 16 BRPM | OXYGEN SATURATION: 97 % | TEMPERATURE: 97.1 F | BODY MASS INDEX: 25.06 KG/M2

## 2023-07-25 DIAGNOSIS — Z79.4 TYPE 2 DIABETES MELLITUS WITH OTHER SPECIFIED COMPLICATION, WITH LONG-TERM CURRENT USE OF INSULIN (HCC): ICD-10-CM

## 2023-07-25 DIAGNOSIS — E11.69 TYPE 2 DIABETES MELLITUS WITH OTHER SPECIFIED COMPLICATION, WITH LONG-TERM CURRENT USE OF INSULIN (HCC): ICD-10-CM

## 2023-07-25 DIAGNOSIS — Z98.890 S/P LUMBAR LAMINECTOMY: ICD-10-CM

## 2023-07-25 DIAGNOSIS — F51.01 PRIMARY INSOMNIA: ICD-10-CM

## 2023-07-25 DIAGNOSIS — G89.29 CHRONIC BILATERAL LOW BACK PAIN WITHOUT SCIATICA: ICD-10-CM

## 2023-07-25 DIAGNOSIS — M54.50 CHRONIC BILATERAL LOW BACK PAIN WITHOUT SCIATICA: ICD-10-CM

## 2023-07-25 DIAGNOSIS — F39 MOOD DISORDER (HCC): ICD-10-CM

## 2023-07-25 DIAGNOSIS — N39.0 RECURRENT UTI: Primary | ICD-10-CM

## 2023-07-25 LAB
BILIRUBIN, POC: NORMAL
BLOOD URINE, POC: NEGATIVE
CLARITY, POC: NORMAL
COLOR, POC: NORMAL
GLUCOSE URINE, POC: NORMAL
HBA1C MFR BLD: 9.4 %
KETONES, POC: NORMAL
LEUKOCYTE EST, POC: NORMAL
NITRITE, POC: NEGATIVE
PH, POC: 5
PROTEIN, POC: NEGATIVE
SPECIFIC GRAVITY, POC: 1.02
UROBILINOGEN, POC: NORMAL

## 2023-07-25 RX ORDER — TRAZODONE HYDROCHLORIDE 100 MG/1
100 TABLET ORAL NIGHTLY
Qty: 90 TABLET | Refills: 2 | Status: SHIPPED | OUTPATIENT
Start: 2023-07-25

## 2023-07-25 RX ORDER — TRAZODONE HYDROCHLORIDE 50 MG/1
50 TABLET ORAL NIGHTLY
COMMUNITY
End: 2023-07-25

## 2023-07-25 ASSESSMENT — ENCOUNTER SYMPTOMS
COUGH: 0
SHORTNESS OF BREATH: 0
VOMITING: 0
NAUSEA: 0
DIARRHEA: 0
BACK PAIN: 1
CONSTIPATION: 0
BLOOD IN STOOL: 0

## 2023-07-25 NOTE — PROGRESS NOTES
feeling well for a few weeks. We will plan to continue current regimen and recheck her A1c in 3 months  - POCT glycosylated hemoglobin (Hb A1C)      Return in about 3 months (around 10/25/2023). An electronic signature was used to authenticate this note.     --SAI Oviedo - CNP on 7/26/2023 at 6:43 AM

## 2023-07-26 NOTE — PATIENT INSTRUCTIONS
- Make appointment with urologist  -Increase trazodone to 100 mg nightly  -Be more consistent with your insulin and diet  -With me in 3 months or sooner if needed

## 2023-07-27 LAB — BACTERIA UR CULT: NORMAL

## 2023-07-27 NOTE — PATIENT INSTRUCTIONS
Better diet  Recheck in 3 months  Blood work today   Schedule mammogram
4 = No assist / stand by assistance

## 2023-08-07 ENCOUNTER — OFFICE VISIT (OUTPATIENT)
Dept: PAIN MANAGEMENT | Age: 73
End: 2023-08-07
Payer: MEDICARE

## 2023-08-07 VITALS
HEART RATE: 90 BPM | DIASTOLIC BLOOD PRESSURE: 71 MMHG | BODY MASS INDEX: 25.06 KG/M2 | OXYGEN SATURATION: 96 % | SYSTOLIC BLOOD PRESSURE: 125 MMHG | WEIGHT: 146 LBS

## 2023-08-07 DIAGNOSIS — M96.1 FAILED NECK SYNDROME: ICD-10-CM

## 2023-08-07 DIAGNOSIS — G56.22 ULNAR NEUROPATHY AT ELBOW, LEFT: ICD-10-CM

## 2023-08-07 DIAGNOSIS — G56.03 BILATERAL CARPAL TUNNEL SYNDROME: ICD-10-CM

## 2023-08-07 DIAGNOSIS — M48.061 DEGENERATIVE LUMBAR SPINAL STENOSIS: ICD-10-CM

## 2023-08-07 DIAGNOSIS — M48.07 SPINAL STENOSIS OF LUMBOSACRAL REGION: ICD-10-CM

## 2023-08-07 DIAGNOSIS — M96.1 FAILED BACK SURGICAL SYNDROME: ICD-10-CM

## 2023-08-07 DIAGNOSIS — G89.4 CHRONIC PAIN SYNDROME: ICD-10-CM

## 2023-08-07 DIAGNOSIS — M79.7 FIBROMYALGIA: ICD-10-CM

## 2023-08-07 PROCEDURE — 3017F COLORECTAL CA SCREEN DOC REV: CPT | Performed by: INTERNAL MEDICINE

## 2023-08-07 PROCEDURE — 1036F TOBACCO NON-USER: CPT | Performed by: INTERNAL MEDICINE

## 2023-08-07 PROCEDURE — G8417 CALC BMI ABV UP PARAM F/U: HCPCS | Performed by: INTERNAL MEDICINE

## 2023-08-07 PROCEDURE — 99213 OFFICE O/P EST LOW 20 MIN: CPT | Performed by: INTERNAL MEDICINE

## 2023-08-07 PROCEDURE — 3078F DIAST BP <80 MM HG: CPT | Performed by: INTERNAL MEDICINE

## 2023-08-07 PROCEDURE — 3074F SYST BP LT 130 MM HG: CPT | Performed by: INTERNAL MEDICINE

## 2023-08-07 PROCEDURE — G8427 DOCREV CUR MEDS BY ELIG CLIN: HCPCS | Performed by: INTERNAL MEDICINE

## 2023-08-07 PROCEDURE — 1123F ACP DISCUSS/DSCN MKR DOCD: CPT | Performed by: INTERNAL MEDICINE

## 2023-08-07 PROCEDURE — G8399 PT W/DXA RESULTS DOCUMENT: HCPCS | Performed by: INTERNAL MEDICINE

## 2023-08-07 PROCEDURE — 1090F PRES/ABSN URINE INCON ASSESS: CPT | Performed by: INTERNAL MEDICINE

## 2023-08-07 RX ORDER — MELOXICAM 15 MG/1
15 TABLET ORAL DAILY
Qty: 30 TABLET | Refills: 1 | Status: SHIPPED | OUTPATIENT
Start: 2023-08-07

## 2023-08-07 RX ORDER — OXYCODONE AND ACETAMINOPHEN 7.5; 325 MG/1; MG/1
1 TABLET ORAL EVERY 6 HOURS PRN
Qty: 84 TABLET | Refills: 0 | Status: SHIPPED | OUTPATIENT
Start: 2023-08-07 | End: 2023-09-04

## 2023-08-07 RX ORDER — GABAPENTIN 400 MG/1
CAPSULE ORAL
Qty: 120 CAPSULE | Refills: 1 | Status: SHIPPED | OUTPATIENT
Start: 2023-08-07 | End: 2023-10-23

## 2023-08-07 NOTE — PROGRESS NOTES
Buddy Leonard  1950  [id number]      HISTORY OF PRESENT ILLNESS:  Ms. Lexie Gusman is a 74 y.o. female returns for a follow up visit for pain management  She has a diagnosis of   1. Chronic pain syndrome    2. Failed back surgical syndrome    3. Ulnar neuropathy at elbow, left    4. Spinal stenosis of lumbosacral region    5. Fibromyalgia    6. Bilateral carpal tunnel syndrome    7. Failed neck syndrome    8. Degenerative lumbar spinal stenosis    . She complains of pain in the  lower back, pelvic area  She rates the pain 5/10 and describes it as aching, pins and needles. Current treatment regimen has helped relieve about 50% of the pain. She denies any side effects from the current pain regimen. Patient reports that since the last follow up visit the physical functioning is unchanged, family/social relationships are unchanged, mood is unchanged sleep patterns are unchanged, and that the overall functioning is unchanged. Patient denies misusing/abusing her narcotic pain medications or using any illegal drugs. There are No indicators for possible drug abuse, addiction or diversion problems. Patient states she has been doing fair. Ms. Lexie Gusman states she took herself off the Physic medications, she states she was having problems with them. Patient states she has been compliant with her medications. She mentions she is using Percocet 3 per day mostly, she states it is helping some. Patient states she is thinking of Volunteering. ALLERGIES: Patients list of allergies were reviewed     MEDICATIONS: Ms. Lexie Gusman list of medications were reviewed. Her current medications are   Outpatient Medications Prior to Visit   Medication Sig Dispense Refill    traZODone (DESYREL) 100 MG tablet Take 1 tablet by mouth nightly 90 tablet 2    oxyCODONE-acetaminophen (PERCOCET) 7.5-325 MG per tablet Take 1 tablet by mouth every 6 hours as needed for Pain (max 2-3/day) for up to 21 days.  53 tablet 0    insulin

## 2023-08-31 ENCOUNTER — TELEMEDICINE (OUTPATIENT)
Dept: FAMILY MEDICINE CLINIC | Age: 73
End: 2023-08-31

## 2023-08-31 DIAGNOSIS — R39.15 URINARY URGENCY: Primary | ICD-10-CM

## 2023-08-31 DIAGNOSIS — Z87.440 HISTORY OF RECURRENT UTIS: ICD-10-CM

## 2023-08-31 RX ORDER — CIPROFLOXACIN 250 MG/1
250 TABLET, FILM COATED ORAL 2 TIMES DAILY
Qty: 14 TABLET | Refills: 0 | Status: SHIPPED | OUTPATIENT
Start: 2023-08-31 | End: 2023-09-07

## 2023-08-31 RX ORDER — CLONAZEPAM 0.5 MG/1
TABLET ORAL
COMMUNITY
Start: 2023-08-24 | End: 2023-09-01

## 2023-08-31 NOTE — PROGRESS NOTES
2023    TELEHEALTH EVALUATION -- Audio/Visual (During [id number]-57 public health emergency)    HPI:    Elena Marquez (:  1950) has requested an audio/video evaluation for the following concern(s):    Chief Complaint   Patient presents with    Urinary Frequency     Having urinary urgency and incontinence      Had back procedure done in May and had AMS afterwards, had UTI and was hospitalized for 4 days, had infection until a few weeks ago, had bladder procedure schedule for 23. Had chronic bladder issues when she was younger and had bladder stretched. States that she often has no sx when she has UTI's. Denies hematuria or strong odor, frequency has increased, wears pad for urinary leakage. Denies dysuria. States that DM is under control. Review of Systems   Genitourinary:  Positive for frequency and urgency. Negative for dysuria and hematuria. Positive for incontinence, negative for urine strong odor. Allergies   Allergen Reactions    Sulfa Antibiotics Hives       PHYSICAL EXAMINATION:  [ INSTRUCTIONS:  \"[x]\" Indicates a positive item  \"[]\" Indicates a negative item  -- DELETE ALL ITEMS NOT EXAMINED]  Vital Signs: (As obtained by patient/caregiver or practitioner observation)    Height  -             Weight -              Blood pressure-        Heart rate-     Temperature-      Constitutional: [x] Appears well-developed and well-nourished [x] No apparent distress      [] Abnormal-   Mental status  [x] Alert and awake  [x] Oriented to person/place/time [x]Able to follow commands      Eyes:  EOM    [x]  Normal  [] Abnormal-  Sclera  []  Normal  [] Abnormal -         Discharge []  None visible  [] Abnormal -    HENT:   [x] Normocephalic, atraumatic.   [] Abnormal   [] Mouth/Throat: Mucous membranes are moist.     External Ears [x] Normal  [] Abnormal-     Neck: [x] No visualized mass     Pulmonary/Chest: [x] Respiratory effort normal.  [x] No visualized signs of difficulty

## 2023-09-01 ENCOUNTER — OFFICE VISIT (OUTPATIENT)
Dept: PAIN MANAGEMENT | Age: 73
End: 2023-09-01
Payer: MEDICARE

## 2023-09-01 ENCOUNTER — NURSE ONLY (OUTPATIENT)
Dept: FAMILY MEDICINE CLINIC | Age: 73
End: 2023-09-01

## 2023-09-01 VITALS
OXYGEN SATURATION: 97 % | HEART RATE: 93 BPM | WEIGHT: 151 LBS | DIASTOLIC BLOOD PRESSURE: 84 MMHG | BODY MASS INDEX: 25.92 KG/M2 | SYSTOLIC BLOOD PRESSURE: 152 MMHG

## 2023-09-01 DIAGNOSIS — R39.15 URINARY URGENCY: ICD-10-CM

## 2023-09-01 DIAGNOSIS — M79.7 FIBROMYALGIA: ICD-10-CM

## 2023-09-01 DIAGNOSIS — M48.061 DEGENERATIVE LUMBAR SPINAL STENOSIS: ICD-10-CM

## 2023-09-01 DIAGNOSIS — R39.15 URINARY URGENCY: Primary | ICD-10-CM

## 2023-09-01 DIAGNOSIS — G56.22 ULNAR NEUROPATHY AT ELBOW, LEFT: ICD-10-CM

## 2023-09-01 DIAGNOSIS — M96.1 FAILED NECK SYNDROME: ICD-10-CM

## 2023-09-01 DIAGNOSIS — M96.1 FAILED BACK SURGICAL SYNDROME: ICD-10-CM

## 2023-09-01 DIAGNOSIS — G89.4 CHRONIC PAIN SYNDROME: ICD-10-CM

## 2023-09-01 DIAGNOSIS — M48.07 SPINAL STENOSIS OF LUMBOSACRAL REGION: ICD-10-CM

## 2023-09-01 LAB
BILIRUBIN, POC: NORMAL
BILIRUBIN, POC: NORMAL
BLOOD URINE, POC: NORMAL
BLOOD URINE, POC: NORMAL
CLARITY, POC: NORMAL
CLARITY, POC: NORMAL
COLOR, POC: NORMAL
COLOR, POC: NORMAL
GLUCOSE URINE, POC: NORMAL
GLUCOSE URINE, POC: NORMAL
KETONES, POC: NORMAL
KETONES, POC: NORMAL
LEUKOCYTE EST, POC: NORMAL
LEUKOCYTE EST, POC: NORMAL
NITRITE, POC: NORMAL
NITRITE, POC: NORMAL
PH, POC: 7
PH, POC: 7.5
PROTEIN, POC: NORMAL
PROTEIN, POC: NORMAL
SPECIFIC GRAVITY, POC: 1.01
SPECIFIC GRAVITY, POC: 1.02
UROBILINOGEN, POC: 0.2
UROBILINOGEN, POC: NORMAL

## 2023-09-01 PROCEDURE — 99213 OFFICE O/P EST LOW 20 MIN: CPT | Performed by: INTERNAL MEDICINE

## 2023-09-01 PROCEDURE — 1123F ACP DISCUSS/DSCN MKR DOCD: CPT | Performed by: INTERNAL MEDICINE

## 2023-09-01 PROCEDURE — 3017F COLORECTAL CA SCREEN DOC REV: CPT | Performed by: INTERNAL MEDICINE

## 2023-09-01 PROCEDURE — 81002 URINALYSIS NONAUTO W/O SCOPE: CPT | Performed by: FAMILY MEDICINE

## 2023-09-01 PROCEDURE — G8417 CALC BMI ABV UP PARAM F/U: HCPCS | Performed by: INTERNAL MEDICINE

## 2023-09-01 PROCEDURE — 3074F SYST BP LT 130 MM HG: CPT | Performed by: INTERNAL MEDICINE

## 2023-09-01 PROCEDURE — 3078F DIAST BP <80 MM HG: CPT | Performed by: INTERNAL MEDICINE

## 2023-09-01 PROCEDURE — 1090F PRES/ABSN URINE INCON ASSESS: CPT | Performed by: INTERNAL MEDICINE

## 2023-09-01 PROCEDURE — G8399 PT W/DXA RESULTS DOCUMENT: HCPCS | Performed by: INTERNAL MEDICINE

## 2023-09-01 PROCEDURE — G8427 DOCREV CUR MEDS BY ELIG CLIN: HCPCS | Performed by: INTERNAL MEDICINE

## 2023-09-01 PROCEDURE — 1036F TOBACCO NON-USER: CPT | Performed by: INTERNAL MEDICINE

## 2023-09-01 RX ORDER — MELOXICAM 15 MG/1
15 TABLET ORAL DAILY
Qty: 30 TABLET | Refills: 1 | Status: SHIPPED | OUTPATIENT
Start: 2023-09-01

## 2023-09-01 RX ORDER — GABAPENTIN 400 MG/1
CAPSULE ORAL
Qty: 120 CAPSULE | Refills: 1 | Status: SHIPPED | OUTPATIENT
Start: 2023-09-01 | End: 2023-11-17

## 2023-09-01 RX ORDER — NALOXONE HYDROCHLORIDE 4 MG/.1ML
1 SPRAY NASAL PRN
Qty: 1 EACH | Refills: 0 | Status: SHIPPED | OUTPATIENT
Start: 2023-09-01

## 2023-09-01 RX ORDER — OXYCODONE AND ACETAMINOPHEN 7.5; 325 MG/1; MG/1
1 TABLET ORAL EVERY 6 HOURS PRN
Qty: 84 TABLET | Refills: 0 | Status: SHIPPED | OUTPATIENT
Start: 2023-09-01 | End: 2023-09-29

## 2023-09-03 LAB — BACTERIA UR CULT: NORMAL

## 2023-09-05 NOTE — PROGRESS NOTES
mouth nightly 90 tablet 2    insulin detemir (LEVEMIR FLEXTOUCH) 100 UNIT/ML injection pen INJECT 20 UNITS SUBCUTANEOUSLY TWICE A DAY 15 Adjustable Dose Pre-filled Pen Syringe 2    levothyroxine (SYNTHROID) 75 MCG tablet TAKE 1 TABLET BY MOUTH EVERY DAY 90 tablet 3    atorvastatin (LIPITOR) 40 MG tablet Take 1 tablet by mouth daily 90 tablet 1    naloxone 4 MG/0.1ML LIQD nasal spray 1 spray by Nasal route as needed for Opioid Reversal 1 each 0    amLODIPine (NORVASC) 10 MG tablet Take 1 tablet by mouth daily 90 tablet 3    OLANZapine (ZYPREXA) 5 MG tablet Take 1 tablet by mouth nightly 90 tablet 3    oxybutynin (DITROPAN-XL) 10 MG extended release tablet TAKE 1 TABLET BY MOUTH EVERY DAY 90 tablet 0    insulin aspart (NOVOLOG FLEXPEN) 100 UNIT/ML injection pen INJECT 5 UNITS INTO THE SKIN 3 TIMES DAILY (BEFORE MEALS) 5 Adjustable Dose Pre-filled Pen Syringe 3    losartan (COZAAR) 100 MG tablet Take 1 tablet by mouth daily 90 tablet 3    estradiol (ESTRACE) 0.1 MG/GM vaginal cream PLACE 0.25 G VAGINALLY IN THE MORNING. APPLY 0.25G WITH FINGERTIP TO THE VAGINAL OPENING EVERY NIGHT AT BEDTIME FOR 2 WEEKS, THEN DECREASE TO TWICE WEEKLY. . 42.5 g 2    sertraline (ZOLOFT) 100 MG tablet TAKE 1 TABLET BY MOUTH TWICE A DAY (Patient taking differently: 1 tablet every morning (before breakfast)) 180 tablet 1    Insulin Pen Needle 31G X 5 MM MISC 1 each by Does not apply route 4 times daily 120 each 5    VITAMIN D PO Take by mouth daily      MAGNESIUM PO Take by mouth daily       CALCIUM-VITAMIN D PO Take by mouth daily      blood glucose monitor kit and supplies Test 4 times a day & as needed for symptoms of irregular blood glucose. 1 kit 0    blood glucose monitor strips Test 4 times a day & as needed for symptoms of irregular blood glucose. Dispense sufficient amount for indicated testing frequency plus additional to accommodate PRN testing needs.  400 strip 0    Handicap Placard MISC by Does not apply route 1 each 0    [place]

## 2023-09-08 ENCOUNTER — OFFICE VISIT (OUTPATIENT)
Dept: FAMILY MEDICINE CLINIC | Age: 73
End: 2023-09-08

## 2023-09-08 VITALS
WEIGHT: 149 LBS | BODY MASS INDEX: 25.58 KG/M2 | RESPIRATION RATE: 16 BRPM | SYSTOLIC BLOOD PRESSURE: 120 MMHG | HEART RATE: 96 BPM | DIASTOLIC BLOOD PRESSURE: 78 MMHG | OXYGEN SATURATION: 97 % | TEMPERATURE: 97.1 F

## 2023-09-08 DIAGNOSIS — Z79.4 TYPE 2 DIABETES MELLITUS WITH OTHER SPECIFIED COMPLICATION, WITH LONG-TERM CURRENT USE OF INSULIN (HCC): ICD-10-CM

## 2023-09-08 DIAGNOSIS — F32.A DEPRESSION, UNSPECIFIED DEPRESSION TYPE: ICD-10-CM

## 2023-09-08 DIAGNOSIS — Z01.818 PREOP EXAMINATION: Primary | ICD-10-CM

## 2023-09-08 DIAGNOSIS — E11.69 TYPE 2 DIABETES MELLITUS WITH OTHER SPECIFIED COMPLICATION, WITH LONG-TERM CURRENT USE OF INSULIN (HCC): ICD-10-CM

## 2023-09-08 DIAGNOSIS — N39.0 RECURRENT UTI: ICD-10-CM

## 2023-09-08 LAB — HBA1C MFR BLD: 8.5 %

## 2023-09-08 SDOH — ECONOMIC STABILITY: FOOD INSECURITY: WITHIN THE PAST 12 MONTHS, YOU WORRIED THAT YOUR FOOD WOULD RUN OUT BEFORE YOU GOT MONEY TO BUY MORE.: NEVER TRUE

## 2023-09-08 SDOH — ECONOMIC STABILITY: INCOME INSECURITY: HOW HARD IS IT FOR YOU TO PAY FOR THE VERY BASICS LIKE FOOD, HOUSING, MEDICAL CARE, AND HEATING?: NOT HARD AT ALL

## 2023-09-08 SDOH — ECONOMIC STABILITY: FOOD INSECURITY: WITHIN THE PAST 12 MONTHS, THE FOOD YOU BOUGHT JUST DIDN'T LAST AND YOU DIDN'T HAVE MONEY TO GET MORE.: NEVER TRUE

## 2023-09-08 ASSESSMENT — PATIENT HEALTH QUESTIONNAIRE - PHQ9
SUM OF ALL RESPONSES TO PHQ QUESTIONS 1-9: 0
4. FEELING TIRED OR HAVING LITTLE ENERGY: 0
1. LITTLE INTEREST OR PLEASURE IN DOING THINGS: 0
SUM OF ALL RESPONSES TO PHQ QUESTIONS 1-9: 0
9. THOUGHTS THAT YOU WOULD BE BETTER OFF DEAD, OR OF HURTING YOURSELF: 0
6. FEELING BAD ABOUT YOURSELF - OR THAT YOU ARE A FAILURE OR HAVE LET YOURSELF OR YOUR FAMILY DOWN: 0
SUM OF ALL RESPONSES TO PHQ QUESTIONS 1-9: 0
SUM OF ALL RESPONSES TO PHQ QUESTIONS 1-9: 0
3. TROUBLE FALLING OR STAYING ASLEEP: 0
8. MOVING OR SPEAKING SO SLOWLY THAT OTHER PEOPLE COULD HAVE NOTICED. OR THE OPPOSITE, BEING SO FIGETY OR RESTLESS THAT YOU HAVE BEEN MOVING AROUND A LOT MORE THAN USUAL: 0
10. IF YOU CHECKED OFF ANY PROBLEMS, HOW DIFFICULT HAVE THESE PROBLEMS MADE IT FOR YOU TO DO YOUR WORK, TAKE CARE OF THINGS AT HOME, OR GET ALONG WITH OTHER PEOPLE: 0
DEPRESSION UNABLE TO ASSESS: FUNCTIONAL CAPACITY MOTIVATION LIMITS ACCURACY
SUM OF ALL RESPONSES TO PHQ9 QUESTIONS 1 & 2: 0
5. POOR APPETITE OR OVEREATING: 0
2. FEELING DOWN, DEPRESSED OR HOPELESS: 0
7. TROUBLE CONCENTRATING ON THINGS, SUCH AS READING THE NEWSPAPER OR WATCHING TELEVISION: 0

## 2023-09-08 ASSESSMENT — ENCOUNTER SYMPTOMS
VOMITING: 0
BACK PAIN: 1
DIARRHEA: 0
CONSTIPATION: 0
BLOOD IN STOOL: 0
SHORTNESS OF BREATH: 0
NAUSEA: 0
COUGH: 0

## 2023-09-08 ASSESSMENT — ANXIETY QUESTIONNAIRES
5. BEING SO RESTLESS THAT IT IS HARD TO SIT STILL: 0
4. TROUBLE RELAXING: 0
6. BECOMING EASILY ANNOYED OR IRRITABLE: 0
7. FEELING AFRAID AS IF SOMETHING AWFUL MIGHT HAPPEN: 0
3. WORRYING TOO MUCH ABOUT DIFFERENT THINGS: 0
IF YOU CHECKED OFF ANY PROBLEMS ON THIS QUESTIONNAIRE, HOW DIFFICULT HAVE THESE PROBLEMS MADE IT FOR YOU TO DO YOUR WORK, TAKE CARE OF THINGS AT HOME, OR GET ALONG WITH OTHER PEOPLE: NOT DIFFICULT AT ALL
GAD7 TOTAL SCORE: 0
2. NOT BEING ABLE TO STOP OR CONTROL WORRYING: 0
1. FEELING NERVOUS, ANXIOUS, OR ON EDGE: 0

## 2023-09-08 NOTE — PROGRESS NOTES
[facility]   Telephone: [phone]  Fax: [phone]  Preoperative History & Physical        DIAGNOSIS: Recurrent urinary tract infection    PROCEDURE: Cystoscopy with possible bladder biopsy      History Obtained From:  patient    HISTORY OF PRESENT ILLNESS:    The patient is a 74 y.o. female with significant past medical history of chronic back pain, depression, diabetes mellitus type 2, hypertension, hyperlipidemia, thyroidism who presents for preoperative examination for above procedure. Surgery scheduled for 9/21/2023 with Dr. Slava James at urology group. Has noticed increasing depression symptoms, reports this is a hard time of the year for her. She is requesting to increase her sertraline back up to 200 mg a day, she is currently on 100 mg a day. We will go ahead and increase that dose of medication. A1c is 8.5. Compliant with her insulin regimen.   We will refer her back to endocrinology  Past Medical History:   Diagnosis Date    Chronic back pain     Depression     Diabetes mellitus ([address])     Hyperlipidemia     Hypertension     Retinopathy     Seasonal allergies     Spinal stenosis     Thyroid disease     hypothyroidism    Type II or unspecified type diabetes mellitus without mention of complication, not stated as uncontrolled      Past Surgical History:   Procedure Laterality Date    BACK SURGERY      CARPAL TUNNEL RELEASE Right     CERVICAL DISC SURGERY      CHOLECYSTECTOMY  09/2017    EYE SURGERY      LASER    HYSTERECTOMY (CERVIX STATUS UNKNOWN)      HYSTERECTOMY (CERVIX STATUS UNKNOWN)  1993    OTHER SURGICAL HISTORY N/A 06/27/2018    EXTREME LATERAL INTERBODY FUSION LEFT APPROACH L2-3, L3-4,    OTHER SURGICAL HISTORY  07/20/2018     LUMBAR WOUND INCISION AND DRAINAGE / WASHOUT     OVARY REMOVAL Bilateral 1993    CO LAMNOTMY W/DCMPRSN NRV EACH ADDL CRVCL/LMBR N/A 07/20/2018    LUMBAR WOUND INCISION AND DRAINAGE / WASHOUT performed by Raad Dillon MD at [address] [place] [address]

## 2023-09-08 NOTE — PATIENT INSTRUCTIONS
Eat and drink nothing after midnight the night before the planned procedure. Stop all aspirin, ibuprofen, aleve (tylenol/acetaminophen is ok) for seven days prior to the procedure.     Take half dose of Lantus the night before surgery

## 2023-09-19 ENCOUNTER — TELEPHONE (OUTPATIENT)
Dept: FAMILY MEDICINE CLINIC | Age: 73
End: 2023-09-19

## 2023-09-19 NOTE — TELEPHONE ENCOUNTER
Bina Franco with the Urology group called to request the patient's pre-op H & P from 09/08/23 and her EKG from 04/21/23 be faxed to them at [phone].

## 2023-09-29 ENCOUNTER — OFFICE VISIT (OUTPATIENT)
Dept: PAIN MANAGEMENT | Age: 73
End: 2023-09-29
Payer: MEDICARE

## 2023-09-29 VITALS
BODY MASS INDEX: 26.61 KG/M2 | HEART RATE: 95 BPM | WEIGHT: 155 LBS | OXYGEN SATURATION: 96 % | DIASTOLIC BLOOD PRESSURE: 77 MMHG | SYSTOLIC BLOOD PRESSURE: 143 MMHG

## 2023-09-29 DIAGNOSIS — M48.061 DEGENERATIVE LUMBAR SPINAL STENOSIS: ICD-10-CM

## 2023-09-29 DIAGNOSIS — M96.1 FAILED BACK SURGICAL SYNDROME: ICD-10-CM

## 2023-09-29 DIAGNOSIS — M48.07 SPINAL STENOSIS OF LUMBOSACRAL REGION: ICD-10-CM

## 2023-09-29 DIAGNOSIS — M96.1 FAILED NECK SYNDROME: ICD-10-CM

## 2023-09-29 DIAGNOSIS — G89.4 CHRONIC PAIN SYNDROME: ICD-10-CM

## 2023-09-29 DIAGNOSIS — G56.22 ULNAR NEUROPATHY AT ELBOW, LEFT: ICD-10-CM

## 2023-09-29 DIAGNOSIS — M79.7 FIBROMYALGIA: ICD-10-CM

## 2023-09-29 PROCEDURE — 99213 OFFICE O/P EST LOW 20 MIN: CPT | Performed by: INTERNAL MEDICINE

## 2023-09-29 PROCEDURE — 1036F TOBACCO NON-USER: CPT | Performed by: INTERNAL MEDICINE

## 2023-09-29 PROCEDURE — G8399 PT W/DXA RESULTS DOCUMENT: HCPCS | Performed by: INTERNAL MEDICINE

## 2023-09-29 PROCEDURE — 3078F DIAST BP <80 MM HG: CPT | Performed by: INTERNAL MEDICINE

## 2023-09-29 PROCEDURE — G8427 DOCREV CUR MEDS BY ELIG CLIN: HCPCS | Performed by: INTERNAL MEDICINE

## 2023-09-29 PROCEDURE — 1090F PRES/ABSN URINE INCON ASSESS: CPT | Performed by: INTERNAL MEDICINE

## 2023-09-29 PROCEDURE — 3077F SYST BP >= 140 MM HG: CPT | Performed by: INTERNAL MEDICINE

## 2023-09-29 PROCEDURE — 1123F ACP DISCUSS/DSCN MKR DOCD: CPT | Performed by: INTERNAL MEDICINE

## 2023-09-29 PROCEDURE — G8417 CALC BMI ABV UP PARAM F/U: HCPCS | Performed by: INTERNAL MEDICINE

## 2023-09-29 PROCEDURE — 3017F COLORECTAL CA SCREEN DOC REV: CPT | Performed by: INTERNAL MEDICINE

## 2023-09-29 RX ORDER — GABAPENTIN 400 MG/1
400 CAPSULE ORAL 2 TIMES DAILY
Qty: 60 CAPSULE | Refills: 1 | Status: SHIPPED | OUTPATIENT
Start: 2023-09-29 | End: 2023-11-28

## 2023-09-29 RX ORDER — MELOXICAM 15 MG/1
15 TABLET ORAL DAILY
Qty: 30 TABLET | Refills: 1 | Status: SHIPPED | OUTPATIENT
Start: 2023-09-29

## 2023-09-29 RX ORDER — OXYCODONE AND ACETAMINOPHEN 7.5; 325 MG/1; MG/1
1 TABLET ORAL EVERY 6 HOURS PRN
Qty: 84 TABLET | Refills: 0 | Status: SHIPPED | OUTPATIENT
Start: 2023-09-29 | End: 2023-10-27

## 2023-09-29 NOTE — PROGRESS NOTES
nasal spray 1 spray by Nasal route as needed for Opioid Reversal 1 each 0    traZODone (DESYREL) 100 MG tablet Take 1 tablet by mouth nightly 90 tablet 2    insulin detemir (LEVEMIR FLEXTOUCH) 100 UNIT/ML injection pen INJECT 20 UNITS SUBCUTANEOUSLY TWICE A DAY 15 Adjustable Dose Pre-filled Pen Syringe 2    levothyroxine (SYNTHROID) 75 MCG tablet TAKE 1 TABLET BY MOUTH EVERY DAY 90 tablet 3    atorvastatin (LIPITOR) 40 MG tablet Take 1 tablet by mouth daily 90 tablet 1    naloxone 4 MG/0.1ML LIQD nasal spray 1 spray by Nasal route as needed for Opioid Reversal 1 each 0    amLODIPine (NORVASC) 10 MG tablet Take 1 tablet by mouth daily 90 tablet 3    OLANZapine (ZYPREXA) 5 MG tablet Take 1 tablet by mouth nightly 90 tablet 3    oxybutynin (DITROPAN-XL) 10 MG extended release tablet TAKE 1 TABLET BY MOUTH EVERY DAY 90 tablet 0    insulin aspart (NOVOLOG FLEXPEN) 100 UNIT/ML injection pen INJECT 5 UNITS INTO THE SKIN 3 TIMES DAILY (BEFORE MEALS) 5 Adjustable Dose Pre-filled Pen Syringe 3    losartan (COZAAR) 100 MG tablet Take 1 tablet by mouth daily 90 tablet 3    estradiol (ESTRACE) 0.1 MG/GM vaginal cream PLACE 0.25 G VAGINALLY IN THE MORNING. APPLY 0.25G WITH FINGERTIP TO THE VAGINAL OPENING EVERY NIGHT AT BEDTIME FOR 2 WEEKS, THEN DECREASE TO TWICE WEEKLY. . 42.5 g 2    sertraline (ZOLOFT) 100 MG tablet TAKE 1 TABLET BY MOUTH TWICE A DAY (Patient taking differently: 1 tablet every morning (before breakfast)) 180 tablet 1    Insulin Pen Needle 31G X 5 MM MISC 1 each by Does not apply route 4 times daily 120 each 5    VITAMIN D PO Take by mouth daily      MAGNESIUM PO Take by mouth daily       CALCIUM-VITAMIN D PO Take by mouth daily      blood glucose monitor kit and supplies Test 4 times a day & as needed for symptoms of irregular blood glucose. 1 kit 0    blood glucose monitor strips Test 4 times a day & as needed for symptoms of irregular blood glucose.  Dispense sufficient amount for indicated testing frequency

## 2023-10-16 NOTE — TELEPHONE ENCOUNTER
Future Appointments   Date Time Provider [address]   12/8/2023  1:00 PM Tj Henley APRN - CNP [facility] [place] - [place]   1/9/2024  1:20 PM Dawson Bray MD AND ENDO [facility]     LOV 9/8/2023

## 2023-10-27 ENCOUNTER — TELEPHONE (OUTPATIENT)
Dept: FAMILY MEDICINE CLINIC | Age: 73
End: 2023-10-27

## 2023-10-27 DIAGNOSIS — F32.A DEPRESSION, UNSPECIFIED DEPRESSION TYPE: ICD-10-CM

## 2023-10-27 RX ORDER — SERTRALINE HYDROCHLORIDE 100 MG/1
100 TABLET, FILM COATED ORAL 2 TIMES DAILY
Qty: 180 TABLET | Refills: 1 | Status: SHIPPED | OUTPATIENT
Start: 2023-10-27

## 2023-10-27 NOTE — TELEPHONE ENCOUNTER
Patient called  She said her and [place] discussed [place] taking over her Sertraline    Sertraline 100 mg before 1 before breakfast and 1 before bed #180    [facility] [address] [facility] pharmacy    9/8/2023 last ov  Future Appointments   Date Time Provider [address]   10/30/2023  9:15 AM Jeremy Strickland MD [facility]   12/8/2023  9:40 AM Lorean Councilman, APRN - [facility] [place] [place] - [place]   1/9/2024  1:20 PM Mingo Pan, Glee Sicard, MD AND [facility]

## 2023-10-30 ENCOUNTER — OFFICE VISIT (OUTPATIENT)
Dept: PAIN MANAGEMENT | Age: 73
End: 2023-10-30
Payer: MEDICARE

## 2023-10-30 VITALS
HEART RATE: 86 BPM | BODY MASS INDEX: 25.75 KG/M2 | SYSTOLIC BLOOD PRESSURE: 137 MMHG | DIASTOLIC BLOOD PRESSURE: 74 MMHG | WEIGHT: 150 LBS | OXYGEN SATURATION: 96 %

## 2023-10-30 DIAGNOSIS — M48.061 DEGENERATIVE LUMBAR SPINAL STENOSIS: ICD-10-CM

## 2023-10-30 DIAGNOSIS — M79.7 FIBROMYALGIA: ICD-10-CM

## 2023-10-30 DIAGNOSIS — G89.4 CHRONIC PAIN SYNDROME: ICD-10-CM

## 2023-10-30 DIAGNOSIS — M96.1 FAILED BACK SURGICAL SYNDROME: ICD-10-CM

## 2023-10-30 DIAGNOSIS — G56.22 ULNAR NEUROPATHY AT ELBOW, LEFT: ICD-10-CM

## 2023-10-30 DIAGNOSIS — M96.1 FAILED NECK SYNDROME: ICD-10-CM

## 2023-10-30 DIAGNOSIS — M48.07 SPINAL STENOSIS OF LUMBOSACRAL REGION: ICD-10-CM

## 2023-10-30 PROCEDURE — 3017F COLORECTAL CA SCREEN DOC REV: CPT | Performed by: INTERNAL MEDICINE

## 2023-10-30 PROCEDURE — 1123F ACP DISCUSS/DSCN MKR DOCD: CPT | Performed by: INTERNAL MEDICINE

## 2023-10-30 PROCEDURE — 3078F DIAST BP <80 MM HG: CPT | Performed by: INTERNAL MEDICINE

## 2023-10-30 PROCEDURE — 1036F TOBACCO NON-USER: CPT | Performed by: INTERNAL MEDICINE

## 2023-10-30 PROCEDURE — G8399 PT W/DXA RESULTS DOCUMENT: HCPCS | Performed by: INTERNAL MEDICINE

## 2023-10-30 PROCEDURE — G8427 DOCREV CUR MEDS BY ELIG CLIN: HCPCS | Performed by: INTERNAL MEDICINE

## 2023-10-30 PROCEDURE — G8417 CALC BMI ABV UP PARAM F/U: HCPCS | Performed by: INTERNAL MEDICINE

## 2023-10-30 PROCEDURE — G8484 FLU IMMUNIZE NO ADMIN: HCPCS | Performed by: INTERNAL MEDICINE

## 2023-10-30 PROCEDURE — 1090F PRES/ABSN URINE INCON ASSESS: CPT | Performed by: INTERNAL MEDICINE

## 2023-10-30 PROCEDURE — 3075F SYST BP GE 130 - 139MM HG: CPT | Performed by: INTERNAL MEDICINE

## 2023-10-30 PROCEDURE — 99213 OFFICE O/P EST LOW 20 MIN: CPT | Performed by: INTERNAL MEDICINE

## 2023-10-30 RX ORDER — OXYCODONE AND ACETAMINOPHEN 7.5; 325 MG/1; MG/1
1 TABLET ORAL EVERY 6 HOURS PRN
Qty: 84 TABLET | Refills: 0 | Status: SHIPPED | OUTPATIENT
Start: 2023-10-30 | End: 2023-11-27

## 2023-10-30 RX ORDER — MELOXICAM 15 MG/1
15 TABLET ORAL DAILY
Qty: 30 TABLET | Refills: 1 | Status: SHIPPED | OUTPATIENT
Start: 2023-10-30

## 2023-10-30 RX ORDER — GABAPENTIN 400 MG/1
400 CAPSULE ORAL 2 TIMES DAILY
Qty: 60 CAPSULE | Refills: 1 | Status: SHIPPED | OUTPATIENT
Start: 2023-10-30 | End: 2023-12-29

## 2023-11-10 RX ORDER — LOSARTAN POTASSIUM 100 MG/1
100 TABLET ORAL DAILY
Qty: 90 TABLET | Refills: 3 | Status: SHIPPED | OUTPATIENT
Start: 2023-11-10

## 2023-11-10 NOTE — TELEPHONE ENCOUNTER
LOV 9/8/2023    Future Appointments   Date Time Provider [address]   12/8/2023  9:40 AM Marianna Payette, APRN - CNP [facility] [place] - T[place]   1/9/2024  1:20 PM Tali Becerra MD [facility]

## 2023-11-27 ENCOUNTER — OFFICE VISIT (OUTPATIENT)
Dept: PAIN MANAGEMENT | Age: 73
End: 2023-11-27
Payer: MEDICARE

## 2023-11-27 VITALS
DIASTOLIC BLOOD PRESSURE: 77 MMHG | OXYGEN SATURATION: 96 % | SYSTOLIC BLOOD PRESSURE: 130 MMHG | HEART RATE: 88 BPM | WEIGHT: 154 LBS | BODY MASS INDEX: 26.43 KG/M2

## 2023-11-27 DIAGNOSIS — M96.1 FAILED NECK SYNDROME: ICD-10-CM

## 2023-11-27 DIAGNOSIS — M96.1 FAILED BACK SURGICAL SYNDROME: ICD-10-CM

## 2023-11-27 DIAGNOSIS — M48.061 DEGENERATIVE LUMBAR SPINAL STENOSIS: ICD-10-CM

## 2023-11-27 DIAGNOSIS — M79.7 FIBROMYALGIA: ICD-10-CM

## 2023-11-27 DIAGNOSIS — M48.07 SPINAL STENOSIS OF LUMBOSACRAL REGION: ICD-10-CM

## 2023-11-27 DIAGNOSIS — G56.22 ULNAR NEUROPATHY AT ELBOW, LEFT: ICD-10-CM

## 2023-11-27 DIAGNOSIS — G89.4 CHRONIC PAIN SYNDROME: ICD-10-CM

## 2023-11-27 PROCEDURE — G8484 FLU IMMUNIZE NO ADMIN: HCPCS | Performed by: INTERNAL MEDICINE

## 2023-11-27 PROCEDURE — 1123F ACP DISCUSS/DSCN MKR DOCD: CPT | Performed by: INTERNAL MEDICINE

## 2023-11-27 PROCEDURE — 3078F DIAST BP <80 MM HG: CPT | Performed by: INTERNAL MEDICINE

## 2023-11-27 PROCEDURE — 99213 OFFICE O/P EST LOW 20 MIN: CPT | Performed by: INTERNAL MEDICINE

## 2023-11-27 PROCEDURE — G8417 CALC BMI ABV UP PARAM F/U: HCPCS | Performed by: INTERNAL MEDICINE

## 2023-11-27 PROCEDURE — G8428 CUR MEDS NOT DOCUMENT: HCPCS | Performed by: INTERNAL MEDICINE

## 2023-11-27 PROCEDURE — 3074F SYST BP LT 130 MM HG: CPT | Performed by: INTERNAL MEDICINE

## 2023-11-27 PROCEDURE — 1036F TOBACCO NON-USER: CPT | Performed by: INTERNAL MEDICINE

## 2023-11-27 PROCEDURE — 3017F COLORECTAL CA SCREEN DOC REV: CPT | Performed by: INTERNAL MEDICINE

## 2023-11-27 PROCEDURE — 1090F PRES/ABSN URINE INCON ASSESS: CPT | Performed by: INTERNAL MEDICINE

## 2023-11-27 PROCEDURE — G8399 PT W/DXA RESULTS DOCUMENT: HCPCS | Performed by: INTERNAL MEDICINE

## 2023-11-27 RX ORDER — OXYCODONE AND ACETAMINOPHEN 7.5; 325 MG/1; MG/1
1 TABLET ORAL EVERY 6 HOURS PRN
Qty: 105 TABLET | Refills: 0 | Status: SHIPPED | OUTPATIENT
Start: 2023-11-27 | End: 2023-12-27

## 2023-11-27 RX ORDER — GABAPENTIN 400 MG/1
400 CAPSULE ORAL 2 TIMES DAILY
Qty: 60 CAPSULE | Refills: 1 | Status: SHIPPED | OUTPATIENT
Start: 2023-11-27 | End: 2024-01-26

## 2023-11-27 RX ORDER — MELOXICAM 15 MG/1
15 TABLET ORAL DAILY
Qty: 30 TABLET | Refills: 1 | Status: SHIPPED | OUTPATIENT
Start: 2023-11-27

## 2023-12-08 ENCOUNTER — OFFICE VISIT (OUTPATIENT)
Dept: FAMILY MEDICINE CLINIC | Age: 73
End: 2023-12-08

## 2023-12-08 VITALS
BODY MASS INDEX: 26.09 KG/M2 | DIASTOLIC BLOOD PRESSURE: 62 MMHG | SYSTOLIC BLOOD PRESSURE: 120 MMHG | HEART RATE: 86 BPM | OXYGEN SATURATION: 97 % | WEIGHT: 152 LBS | RESPIRATION RATE: 16 BRPM

## 2023-12-08 DIAGNOSIS — F41.9 ANXIETY: ICD-10-CM

## 2023-12-08 DIAGNOSIS — Z00.00 MEDICARE ANNUAL WELLNESS VISIT, SUBSEQUENT: Primary | ICD-10-CM

## 2023-12-08 DIAGNOSIS — E16.2 HYPOGLYCEMIA: ICD-10-CM

## 2023-12-08 DIAGNOSIS — N39.0 RECURRENT UTI: ICD-10-CM

## 2023-12-08 DIAGNOSIS — F11.20 OPIOID DEPENDENCE WITH CURRENT USE (HCC): ICD-10-CM

## 2023-12-08 DIAGNOSIS — E11.69 TYPE 2 DIABETES MELLITUS WITH OTHER SPECIFIED COMPLICATION, WITH LONG-TERM CURRENT USE OF INSULIN (HCC): ICD-10-CM

## 2023-12-08 DIAGNOSIS — G47.9 SLEEP DISTURBANCE: ICD-10-CM

## 2023-12-08 DIAGNOSIS — Z79.4 TYPE 2 DIABETES MELLITUS WITH OTHER SPECIFIED COMPLICATION, WITH LONG-TERM CURRENT USE OF INSULIN (HCC): ICD-10-CM

## 2023-12-08 DIAGNOSIS — E03.9 HYPOTHYROIDISM, UNSPECIFIED TYPE: ICD-10-CM

## 2023-12-08 LAB
BILIRUBIN, POC: NORMAL
BLOOD URINE, POC: NORMAL
CLARITY, POC: NORMAL
COLOR, POC: NORMAL
GLUCOSE URINE, POC: NORMAL
HBA1C MFR BLD: 8.9 %
KETONES, POC: NORMAL
LEUKOCYTE EST, POC: NORMAL
NITRITE, POC: NORMAL
PH, POC: 5.5
PROTEIN, POC: NORMAL
SPECIFIC GRAVITY, POC: 1.01
UROBILINOGEN, POC: NORMAL

## 2023-12-08 RX ORDER — HYDROXYZINE PAMOATE 25 MG/1
25 CAPSULE ORAL 2 TIMES DAILY PRN
Qty: 60 CAPSULE | Refills: 1 | Status: SHIPPED | OUTPATIENT
Start: 2023-12-08

## 2023-12-08 ASSESSMENT — ANXIETY QUESTIONNAIRES
IF YOU CHECKED OFF ANY PROBLEMS ON THIS QUESTIONNAIRE, HOW DIFFICULT HAVE THESE PROBLEMS MADE IT FOR YOU TO DO YOUR WORK, TAKE CARE OF THINGS AT HOME, OR GET ALONG WITH OTHER PEOPLE: SOMEWHAT DIFFICULT
2. NOT BEING ABLE TO STOP OR CONTROL WORRYING: 2
5. BEING SO RESTLESS THAT IT IS HARD TO SIT STILL: 0
7. FEELING AFRAID AS IF SOMETHING AWFUL MIGHT HAPPEN: 0
GAD7 TOTAL SCORE: 7
3. WORRYING TOO MUCH ABOUT DIFFERENT THINGS: 2
4. TROUBLE RELAXING: 2
6. BECOMING EASILY ANNOYED OR IRRITABLE: 0
1. FEELING NERVOUS, ANXIOUS, OR ON EDGE: 1

## 2023-12-08 ASSESSMENT — PATIENT HEALTH QUESTIONNAIRE - PHQ9
SUM OF ALL RESPONSES TO PHQ QUESTIONS 1-9: 0
SUM OF ALL RESPONSES TO PHQ9 QUESTIONS 1 & 2: 0
SUM OF ALL RESPONSES TO PHQ QUESTIONS 1-9: 0
4. FEELING TIRED OR HAVING LITTLE ENERGY: 0
10. IF YOU CHECKED OFF ANY PROBLEMS, HOW DIFFICULT HAVE THESE PROBLEMS MADE IT FOR YOU TO DO YOUR WORK, TAKE CARE OF THINGS AT HOME, OR GET ALONG WITH OTHER PEOPLE: 0
2. FEELING DOWN, DEPRESSED OR HOPELESS: 0
SUM OF ALL RESPONSES TO PHQ QUESTIONS 1-9: 0
9. THOUGHTS THAT YOU WOULD BE BETTER OFF DEAD, OR OF HURTING YOURSELF: 0
7. TROUBLE CONCENTRATING ON THINGS, SUCH AS READING THE NEWSPAPER OR WATCHING TELEVISION: 0
6. FEELING BAD ABOUT YOURSELF - OR THAT YOU ARE A FAILURE OR HAVE LET YOURSELF OR YOUR FAMILY DOWN: 0
5. POOR APPETITE OR OVEREATING: 0
SUM OF ALL RESPONSES TO PHQ QUESTIONS 1-9: 0
8. MOVING OR SPEAKING SO SLOWLY THAT OTHER PEOPLE COULD HAVE NOTICED. OR THE OPPOSITE, BEING SO FIGETY OR RESTLESS THAT YOU HAVE BEEN MOVING AROUND A LOT MORE THAN USUAL: 0
3. TROUBLE FALLING OR STAYING ASLEEP: 0
1. LITTLE INTEREST OR PLEASURE IN DOING THINGS: 0

## 2023-12-08 NOTE — PROGRESS NOTES
rhonchi, normal air movement, no respiratory distress  Cardiovascular: normal rate and normal S1 and S2       Allergies   Allergen Reactions    Sulfa Antibiotics Hives     Prior to Visit Medications    Medication Sig Taking? Authorizing Provider   hydrOXYzine pamoate (VISTARIL) 25 MG capsule Take 1 capsule by mouth 2 times daily as needed for Anxiety Yes SAI Paredes CNP   Continuous Blood Gluc Sensor (FREESTYLE WILLIAN 2 SENSOR) [facility] E11.9 Yes SAI Johnson CNP   Continuous Blood Gluc  (FREESTYLE WILLIAN 2 READER) MEEK Use for glucose monitoring E11.9 Yes SAI Johnson CNP   gabapentin (NEURONTIN) 400 MG capsule Take 1 capsule by mouth 2 times daily for 60 days. Yes Anum Carballo MD   meloxicam (MOBIC) 15 MG tablet Take 1 tablet by mouth daily Yes Anum Carballo MD   oxyCODONE-acetaminophen (PERCOCET) 7.5-325 MG per tablet Take 1 tablet by mouth every 6 hours as needed for Pain for up to 30 days.  Yes Anum Carballo MD   losartan (COZAAR) 100 MG tablet TAKE 1 TABLET BY MOUTH EVERY DAY Yes Seven Tran APRN - CNP   sertraline (ZOLOFT) 100 MG tablet Take 1 tablet by mouth 2 times daily Yes SAI Johnson CNP   insulin detemir (LEVEMIR FLEXPEN) 100 UNIT/ML injection pen INJECT 1200 [facility] Yes SAI Paredes CNP   traZODone (DESYREL) 100 MG tablet Take 1 tablet by mouth nightly Yes SAI Johnson CNP   levothyroxine (SYNTHROID) 75 MCG tablet TAKE 1 TABLET BY MOUTH EVERY DAY Yes SAI Paredse CNP   atorvastatin (LIPITOR) 40 MG tablet Take 1 tablet by mouth daily Yes SAI Johnson CNP   naloxone 4 MG/0.1ML LIQD nasal spray 1 spray by Nasal route as needed for Opioid Reversal Yes Anum Carballo MD   amLODIPine (NORVASC) 10 MG tablet Take 1 tablet by mouth daily Yes SAI Johnson CNP   OLANZapine [facility])

## 2024-01-02 RX ORDER — ATORVASTATIN CALCIUM 40 MG/1
40 TABLET, FILM COATED ORAL DAILY
Qty: 90 TABLET | Refills: 1 | Status: SHIPPED | OUTPATIENT
Start: 2024-01-02

## 2024-01-04 ENCOUNTER — OFFICE VISIT (OUTPATIENT)
Dept: PAIN MANAGEMENT | Age: 74
End: 2024-01-04
Payer: COMMERCIAL

## 2024-01-04 VITALS
SYSTOLIC BLOOD PRESSURE: 117 MMHG | OXYGEN SATURATION: 94 % | DIASTOLIC BLOOD PRESSURE: 75 MMHG | WEIGHT: 147 LBS | HEART RATE: 90 BPM | BODY MASS INDEX: 25.23 KG/M2

## 2024-01-04 DIAGNOSIS — M96.1 FAILED NECK SYNDROME: ICD-10-CM

## 2024-01-04 DIAGNOSIS — G89.4 CHRONIC PAIN SYNDROME: ICD-10-CM

## 2024-01-04 DIAGNOSIS — M79.7 FIBROMYALGIA: ICD-10-CM

## 2024-01-04 DIAGNOSIS — M48.07 SPINAL STENOSIS OF LUMBOSACRAL REGION: ICD-10-CM

## 2024-01-04 DIAGNOSIS — M48.061 DEGENERATIVE LUMBAR SPINAL STENOSIS: ICD-10-CM

## 2024-01-04 DIAGNOSIS — M96.1 FAILED BACK SURGICAL SYNDROME: ICD-10-CM

## 2024-01-04 DIAGNOSIS — G56.22 ULNAR NEUROPATHY AT ELBOW, LEFT: ICD-10-CM

## 2024-01-04 PROCEDURE — 3017F COLORECTAL CA SCREEN DOC REV: CPT | Performed by: INTERNAL MEDICINE

## 2024-01-04 PROCEDURE — 1123F ACP DISCUSS/DSCN MKR DOCD: CPT | Performed by: INTERNAL MEDICINE

## 2024-01-04 PROCEDURE — 99213 OFFICE O/P EST LOW 20 MIN: CPT | Performed by: INTERNAL MEDICINE

## 2024-01-04 PROCEDURE — 1036F TOBACCO NON-USER: CPT | Performed by: INTERNAL MEDICINE

## 2024-01-04 PROCEDURE — G8417 CALC BMI ABV UP PARAM F/U: HCPCS | Performed by: INTERNAL MEDICINE

## 2024-01-04 PROCEDURE — 1090F PRES/ABSN URINE INCON ASSESS: CPT | Performed by: INTERNAL MEDICINE

## 2024-01-04 PROCEDURE — G8399 PT W/DXA RESULTS DOCUMENT: HCPCS | Performed by: INTERNAL MEDICINE

## 2024-01-04 PROCEDURE — G8484 FLU IMMUNIZE NO ADMIN: HCPCS | Performed by: INTERNAL MEDICINE

## 2024-01-04 PROCEDURE — 3078F DIAST BP <80 MM HG: CPT | Performed by: INTERNAL MEDICINE

## 2024-01-04 PROCEDURE — G8427 DOCREV CUR MEDS BY ELIG CLIN: HCPCS | Performed by: INTERNAL MEDICINE

## 2024-01-04 PROCEDURE — 3074F SYST BP LT 130 MM HG: CPT | Performed by: INTERNAL MEDICINE

## 2024-01-04 RX ORDER — MELOXICAM 15 MG/1
15 TABLET ORAL DAILY
Qty: 30 TABLET | Refills: 1 | Status: SHIPPED | OUTPATIENT
Start: 2024-01-04

## 2024-01-04 RX ORDER — OXYCODONE AND ACETAMINOPHEN 7.5; 325 MG/1; MG/1
1 TABLET ORAL 3 TIMES DAILY PRN
Qty: 84 TABLET | Refills: 0 | Status: SHIPPED | OUTPATIENT
Start: 2024-01-04 | End: 2024-02-01

## 2024-01-04 RX ORDER — GABAPENTIN 400 MG/1
400 CAPSULE ORAL 2 TIMES DAILY
Qty: 60 CAPSULE | Refills: 1 | Status: SHIPPED | OUTPATIENT
Start: 2024-01-04 | End: 2024-03-04

## 2024-01-04 NOTE — PROGRESS NOTES
Dose Pre-filled Pen Syringe 3    estradiol (ESTRACE) 0.1 MG/GM vaginal cream PLACE 0.25 G VAGINALLY IN THE MORNING. APPLY 0.25G WITH FINGERTIP TO THE VAGINAL OPENING EVERY NIGHT AT BEDTIME FOR 2 WEEKS, THEN DECREASE TO TWICE WEEKLY.. 42.5 g 2    Insulin Pen Needle 31G X 5 MM MISC 1 each by Does not apply route 4 times daily 120 each 5    VITAMIN D PO Take by mouth daily      MAGNESIUM PO Take by mouth daily       CALCIUM-VITAMIN D PO Take by mouth daily      blood glucose monitor kit and supplies Test 4 times a day & as needed for symptoms of irregular blood glucose. 1 kit 0    blood glucose monitor strips Test 4 times a day & as needed for symptoms of irregular blood glucose. Dispense sufficient amount for indicated testing frequency plus additional to accommodate PRN testing needs. 400 strip 0    Handicap Placard MISC by Does not apply route 1 each 0    Needles & Syringes MISC Inject 1 each into the skin 3 times daily 100 each 3     No current facility-administered medications for this visit.       General Goals of current treatment regimen include improvement in pain, restoration of functioning- with focus on improvement in physical performance, general activity, work or disability,emotional distress, health care utilization and  decreased medication consumption.   Will continue to monitor progress towards achieving/maintaining therapeutic goals with special emphasis on  1. Improvement in perceived interfernce  of pain with ADL's. Ability to do home exercises independently. Ability to do household chores indoor and/or outdoor work and social and leisure activities.Improve psychosocial and physical functioning. - she is maintaining her treatment goal with the current regimen. .        She was advised against drinking alcohol with the narcotic pain medicines, advised against driving or handling machinery while adjusting the dose of medicines or if having cognitive  issues related to the current medications.Risk of

## 2024-01-10 NOTE — PROGRESS NOTES
TELE HEALTH VISIT (AUDIO-VISUAL)    Pursuant to the emergency declaration under the [facility] waiver authority and the [employer] Act, this Virtual  Visit was conducted, with patient's/legal guardian's consent, to reduce the patient's risk of exposure to COVID-19 and provide continuity of care for an established patient. Service is  provided through a video synchronous discussion virtually to substitute for in-person clinic visit. Due to this being a TeleHealth encounter (During VDZXW-71 public health emergency), evaluation of the following organ systems was limited: Vitals/Constitutional/EENT/Resp/CV/GI//MS/Neuro/Skin/Wfrm-Rfal-Tqz. David Crooked  1950  [id number]    Ms. Tee Olguin is being seen virtually for a follow up visit using one of the following techniques  Face time  Informed verbal consent to the virtual visit was obtained from Ms. Tee Olguin. Risks associated with HIPPA compliance with the virtual visit was explained to the patient. Ms. Tee Olguin is at her residence and Dr. Nyla Rogers is in his office. HISTORY OF PRESENT ILLNESS:  Ms. Tee Olguin is a 68 y.o. female  being assessed for a follow up visit for pain management for evaluation of ongoing care regarding her symptoms and monitoring of compliance with long term use high risk medications. She has a diagnosis of   1. Chronic pain syndrome    2. Fibromyalgia    3. Spinal stenosis of lumbosacral region    4. Degenerative lumbar spinal stenosis    5. Osteoarthritis of cervical spine, unspecified spinal osteoarthritis complication status    . She complains of pain in the lower back  with radiation to the buttocks, hips Left, upper leg Left, knees Left and lower leg Left . She rates the pain 6/10 and describes it as burning. Current treatment regimen has helped relieve about 40% of the pain.   She denies any side effects from the current pain regimen. Patient reports that since the last follow up visit the physical functioning is unchanged, family/social relationships are unchanged, mood is unchanged sleep patterns are unchanged, and that the overall functioning is unchanged. Patient denies misusing/abusing her narcotic pain medications or using any illegal drugs. There are No indicators for possible drug abuse, addiction or diversion problems. Ms. Debra Pittman states she has been doing about the same, pain has been baseline. She reports her back hurts worse than her legs. Patient states she is managing light house chores. Patient mentions she is using Neurontin along with Mobic and Wellbutrin. Patient denies any constipation symptoms. ALLERGIES: Patients list of allergies were reviewed     MEDICATIONS: Ms. Debra Pittman list of medications were reviewed. Her current medications are   Outpatient Medications Prior to Visit   Medication Sig Dispense Refill    amLODIPine (NORVASC) 5 MG tablet TAKE 1 TABLET BY MOUTH EVERY DAY 90 tablet 0    atorvastatin (LIPITOR) 40 MG tablet TAKE 1 TABLET BY MOUTH EVERY DAY 90 tablet 0    losartan (COZAAR) 100 MG tablet TAKE 1 TABLET BY MOUTH EVERY DAY 90 tablet 0    SYNTHROID 75 MCG tablet Take 1 tablet by mouth daily Take with water on an empty stomach- wait 30 minutes before eating or taking other meds.  30 tablet 1    buPROPion (WELLBUTRIN XL) 150 MG extended release tablet TAKE 1 TABLET BY MOUTH EVERY DAY IN THE MORNING 30 tablet 0    gabapentin (NEURONTIN) 400 MG capsule Take 1 tablet po AM, take 2 tablets po PM 90 capsule 0    meloxicam (MOBIC) 15 MG tablet Take 1 tablet by mouth daily 30 tablet 1    LEVEMIR FLEXTOUCH 100 UNIT/ML injection pen INJECT 20 UNITS INTO THE SKIN 2 TIMES DAILY 5 pen 2    OLANZapine (ZYPREXA) 2.5 MG tablet TAKE 1 TABLET BY MOUTH EVERY DAY AT NIGHT 30 tablet 1    sertraline (ZOLOFT) 100 MG tablet Take 1 tablet by mouth 2 times daily 60 tablet 1    insulin aspart (NOVOLOG) 100 UNIT/ML injection pen INJECT 5 UNITS INTO THE SKIN 3 TIMES DAILY (BEFORE MEALS) 6 pen 2    Handicap Placard MISC by Does not apply route 1 each 0    NOVOLOG FLEXPEN 100 UNIT/ML injection pen INJECT 5 UNITS INTO THE SKIN 3 TIMES DAILY (BEFORE MEALS) 5 pen 0    calcium carbonate (OSCAL) 500 MG TABS tablet Take 500 mg by mouth daily      Needles & Syringes MISC Inject 1 each into the skin 3 times daily 100 each 3     No facility-administered medications prior to visit. SOCIAL/FAMILY/PAST MEDICAL HISTORY: Ms. Maggie See, family and past medical history was reviewed. REVIEW OF SYSTEMS:    Respiratory: Negative for apnea, chest tightness and shortness of breath or change in baseline breathing. Gastrointestinal: Negative for nausea, vomiting, abdominal pain, diarrhea, constipation, blood in stool and abdominal distention. PHYSICAL EXAM:   Nursing note and vitals per patient reviewed. There were no vitals taken for this visit. Constitutional: She appears well-developed and well-nourished. No acute distress. No respiratory distress. Skin: Skin appears to be warm and dry. No rashes or any other marks noted. She is not diaphoretic. Respiratory/Pulmonary: NO conversational dyspnea, no accessory muscle use, no coughing during exam. She does not appear to be in labored breathing. Neurological/Psychiatric:She is alert and oriented to person, place, and time. Coordination is  normal.  Her mood isAppropriate and affect is Flat/blunted. Musculoskeletal / Extremities: Range of motion is normal. Gait is normal, assistive devices use: none. IMPRESSION:   1. Chronic pain syndrome    2. Fibromyalgia    3. Spinal stenosis of lumbosacral region    4. Degenerative lumbar spinal stenosis    5. Osteoarthritis of cervical spine, unspecified spinal osteoarthritis complication status    6. Failed neck syndrome    7.  Failed back surgical syndrome        PLAN:  Informed verbal consent regarding treatment was obtained  -walking/stretching exercises as advised    -Adv Biofeedback, relaxation and meditation techniques. Referral to psychologist for CBT was also discussed with patient  -She was advised to increase fluids ( 5-7  glasses of fluid daily), limit caffeine, avoid cheese products, increase dietary fiber, increase activity and exercise as tolerated and relax regularly and enjoy meals   -walking/stretching exercises as advised    -continue with Percocet 2-3 per day     Current Outpatient Medications   Medication Sig Dispense Refill    amLODIPine (NORVASC) 5 MG tablet TAKE 1 TABLET BY MOUTH EVERY DAY 90 tablet 0    atorvastatin (LIPITOR) 40 MG tablet TAKE 1 TABLET BY MOUTH EVERY DAY 90 tablet 0    losartan (COZAAR) 100 MG tablet TAKE 1 TABLET BY MOUTH EVERY DAY 90 tablet 0    SYNTHROID 75 MCG tablet Take 1 tablet by mouth daily Take with water on an empty stomach- wait 30 minutes before eating or taking other meds.  30 tablet 1    buPROPion (WELLBUTRIN XL) 150 MG extended release tablet TAKE 1 TABLET BY MOUTH EVERY DAY IN THE MORNING 30 tablet 0    gabapentin (NEURONTIN) 400 MG capsule Take 1 tablet po AM, take 2 tablets po PM 90 capsule 0    meloxicam (MOBIC) 15 MG tablet Take 1 tablet by mouth daily 30 tablet 1    LEVEMIR FLEXTOUCH 100 UNIT/ML injection pen INJECT 20 UNITS INTO THE SKIN 2 TIMES DAILY 5 pen 2    OLANZapine (ZYPREXA) 2.5 MG tablet TAKE 1 TABLET BY MOUTH EVERY DAY AT NIGHT 30 tablet 1    sertraline (ZOLOFT) 100 MG tablet Take 1 tablet by mouth 2 times daily 60 tablet 1    insulin aspart (NOVOLOG) 100 UNIT/ML injection pen INJECT 5 UNITS INTO THE SKIN 3 TIMES DAILY (BEFORE MEALS) 6 pen 2    Handicap Placard MISC by Does not apply route 1 each 0    NOVOLOG FLEXPEN 100 UNIT/ML injection pen INJECT 5 UNITS INTO THE SKIN 3 TIMES DAILY (BEFORE MEALS) 5 pen 0    calcium carbonate (OSCAL) 500 MG TABS tablet Take 500 mg by mouth daily      Needles & Syringes MISC Inject 1 each into the skin 3 times daily 100 each 3     No current facility-administered medications for this visit. I will continue her current medication regimen  which is part of the above treatment schedule. It has been helping with Ms. Adrian's chronic  medical problems which for this visit include:   Diagnoses of Chronic pain syndrome, Fibromyalgia, Spinal stenosis of lumbosacral region, Degenerative lumbar spinal stenosis, and Osteoarthritis of cervical spine, unspecified spinal osteoarthritis complication status were pertinent to this visit. Risks and benefits of the medications and other alternative treatments  including no treatment were discussed with the patient. The common side effects of these medications were also explained to the patient. Informed verbal consent was obtained. Goals of current treatment regimen include improvement in pain, restoration of functioning- with focus on improvement in physical performance, general activity, work or disability,emotional distress, health care utilization and  decreased medication consumption. Will continue to monitor progress towards achieving/maintaining therapeutic goals with special emphasis on  1. Improvement in perceived interfernce  of pain with ADL's. Ability to do home exercises independently. Ability to do household chores indoor and/or outdoor work and social and leisure activities. Improve psychosocial and physical functioning. - she is showing progression towards this treatment goal with the current regimen. She was advised against drinking alcohol with the narcotic pain medicines, advised against driving or handling machinery while adjusting the dose of medicines or if having cognitive  issues related to the current medications. Risk of overdose and death, if medicines not taken as prescribed, were also discussed. If the patient develops new symptoms or if the symptoms worsen, the patient should call the office.     While transcribing every attempt was made to maintain the accuracy of the note in terms of it's contents,there may have been some errors made inadvertently. Thank you for allowing me to participate in the care of this patient.     Paco Norwood MD.    Cc: Dr.Mary Nell Mercer, APRN - CNP - - -

## 2024-02-01 ENCOUNTER — OFFICE VISIT (OUTPATIENT)
Dept: PAIN MANAGEMENT | Age: 74
End: 2024-02-01
Payer: MEDICARE

## 2024-02-01 VITALS
OXYGEN SATURATION: 96 % | HEART RATE: 84 BPM | DIASTOLIC BLOOD PRESSURE: 72 MMHG | BODY MASS INDEX: 25.92 KG/M2 | SYSTOLIC BLOOD PRESSURE: 117 MMHG | WEIGHT: 151 LBS

## 2024-02-01 DIAGNOSIS — F39 MOOD DISORDER (HCC): ICD-10-CM

## 2024-02-01 DIAGNOSIS — F51.01 PRIMARY INSOMNIA: ICD-10-CM

## 2024-02-01 DIAGNOSIS — G89.4 CHRONIC PAIN SYNDROME: ICD-10-CM

## 2024-02-01 DIAGNOSIS — M48.07 SPINAL STENOSIS OF LUMBOSACRAL REGION: ICD-10-CM

## 2024-02-01 DIAGNOSIS — Z51.81 ENCOUNTER FOR THERAPEUTIC DRUG MONITORING: ICD-10-CM

## 2024-02-01 DIAGNOSIS — Z79.899 ENCOUNTER FOR LONG-TERM (CURRENT) USE OF HIGH-RISK MEDICATION: ICD-10-CM

## 2024-02-01 DIAGNOSIS — G56.03 BILATERAL CARPAL TUNNEL SYNDROME: ICD-10-CM

## 2024-02-01 DIAGNOSIS — M79.7 FIBROMYALGIA: ICD-10-CM

## 2024-02-01 DIAGNOSIS — M96.1 FAILED NECK SYNDROME: ICD-10-CM

## 2024-02-01 DIAGNOSIS — G56.22 ULNAR NEUROPATHY AT ELBOW, LEFT: ICD-10-CM

## 2024-02-01 DIAGNOSIS — M96.1 FAILED BACK SURGICAL SYNDROME: ICD-10-CM

## 2024-02-01 DIAGNOSIS — M48.061 DEGENERATIVE LUMBAR SPINAL STENOSIS: ICD-10-CM

## 2024-02-01 PROCEDURE — 3017F COLORECTAL CA SCREEN DOC REV: CPT | Performed by: INTERNAL MEDICINE

## 2024-02-01 PROCEDURE — G8399 PT W/DXA RESULTS DOCUMENT: HCPCS | Performed by: INTERNAL MEDICINE

## 2024-02-01 PROCEDURE — 1036F TOBACCO NON-USER: CPT | Performed by: INTERNAL MEDICINE

## 2024-02-01 PROCEDURE — G8484 FLU IMMUNIZE NO ADMIN: HCPCS | Performed by: INTERNAL MEDICINE

## 2024-02-01 PROCEDURE — 1123F ACP DISCUSS/DSCN MKR DOCD: CPT | Performed by: INTERNAL MEDICINE

## 2024-02-01 PROCEDURE — G8427 DOCREV CUR MEDS BY ELIG CLIN: HCPCS | Performed by: INTERNAL MEDICINE

## 2024-02-01 PROCEDURE — 3074F SYST BP LT 130 MM HG: CPT | Performed by: INTERNAL MEDICINE

## 2024-02-01 PROCEDURE — 3078F DIAST BP <80 MM HG: CPT | Performed by: INTERNAL MEDICINE

## 2024-02-01 PROCEDURE — 1090F PRES/ABSN URINE INCON ASSESS: CPT | Performed by: INTERNAL MEDICINE

## 2024-02-01 PROCEDURE — 99214 OFFICE O/P EST MOD 30 MIN: CPT | Performed by: INTERNAL MEDICINE

## 2024-02-01 PROCEDURE — G8417 CALC BMI ABV UP PARAM F/U: HCPCS | Performed by: INTERNAL MEDICINE

## 2024-02-01 RX ORDER — MELOXICAM 15 MG/1
15 TABLET ORAL DAILY
Qty: 30 TABLET | Refills: 1 | Status: SHIPPED | OUTPATIENT
Start: 2024-02-01

## 2024-02-01 RX ORDER — GABAPENTIN 400 MG/1
400 CAPSULE ORAL 3 TIMES DAILY
Qty: 90 CAPSULE | Refills: 1 | Status: SHIPPED | OUTPATIENT
Start: 2024-02-01 | End: 2024-04-01

## 2024-02-01 RX ORDER — OXYCODONE AND ACETAMINOPHEN 7.5; 325 MG/1; MG/1
1 TABLET ORAL 3 TIMES DAILY PRN
Qty: 84 TABLET | Refills: 0 | Status: SHIPPED | OUTPATIENT
Start: 2024-02-01 | End: 2024-02-29

## 2024-02-01 RX ORDER — METHYLPREDNISOLONE 4 MG/1
TABLET ORAL
Qty: 1 KIT | Refills: 0 | Status: SHIPPED | OUTPATIENT
Start: 2024-02-01 | End: 2024-02-07

## 2024-02-01 NOTE — PROGRESS NOTES
Raya Adrian  1950  [id number]    HISTORY OF PRESENT ILLNESS:  Ms. Adrian is a 73 y.o. female returns for a follow up visit for multiple medical problems.  Her  presenting problems are   1. Chronic pain syndrome    2. Failed neck syndrome    3. Failed back surgical syndrome    4. Degenerative lumbar spinal stenosis    5. Ulnar neuropathy at elbow, left    6. Fibromyalgia    7. Spinal stenosis of lumbosacral region    8. Bilateral carpal tunnel syndrome    9. Mood disorder (HCC)    10. Primary insomnia    11. Encounter for therapeutic drug monitoring    .    As per information/history obtained from the PADT(patient assessment and documentation tool) -  She complains of pain in the neck and lower back with radiation to the shoulders Right, buttocks, hips Right, upper leg Right, knees Right, lower leg Right, ankles Right, and feet Right She rates the pain 6/10 and describes it as sharp, aching, burning, numbness, pins and needles.  Pain is made worse by: movement, walking, standing, lifting.  Current treatment regimen has helped relieve about 60% of the pain.  She denies side effects from the current pain regimen.   Patient reports that since last follow up visit the physical functioning is worse, family/social relationships are unchanged, mood is unchanged sleep patterns are worse.  Ms. Adrian states that since starting the treatment with the current regimen the  overall functioning  in the above aspects is  worse,Patient denies neurological bowel or bladder. Patient denies misusing/abusing her narcotic pain medications or using any illegal drugs.  There are No indicators for possible drug abuse, addiction or diversion problems.     Upon obtaining the medical history from Ms. Adrian regarding today's office visit for her presenting problems, Patient reports she's having terrible sciatica symptoms, states its going on for 3 weeks. She denies constipation symptom. She mentions she is using

## 2024-02-03 DIAGNOSIS — G47.9 SLEEP DISTURBANCE: ICD-10-CM

## 2024-02-03 DIAGNOSIS — F41.9 ANXIETY: ICD-10-CM

## 2024-02-05 RX ORDER — HYDROXYZINE PAMOATE 25 MG/1
25 CAPSULE ORAL 2 TIMES DAILY PRN
Qty: 60 CAPSULE | Refills: 1 | Status: SHIPPED | OUTPATIENT
Start: 2024-02-05

## 2024-02-05 NOTE — TELEPHONE ENCOUNTER
Last ov 12/08/2023   Future Appointments   Date Time Provider Department Center   2/29/2024 12:00 PM Larry De Los Santos MD R [facility]   3/8/2024  8:20 AM Jana Rodriguez MD FF ENDO [facility]   4/10/2024 10:00 AM Batsheva Mora, SAI - [facility] [place]

## 2024-02-14 ENCOUNTER — TELEPHONE (OUTPATIENT)
Dept: FAMILY MEDICINE CLINIC | Age: 74
End: 2024-02-14

## 2024-02-14 RX ORDER — INSULIN GLARGINE 100 [IU]/ML
20 INJECTION, SOLUTION SUBCUTANEOUS 2 TIMES DAILY
Qty: 5 ADJUSTABLE DOSE PRE-FILLED PEN SYRINGE | Refills: 3 | Status: SHIPPED | OUTPATIENT
Start: 2024-02-14

## 2024-02-14 RX ORDER — INSULIN ASPART INJECTION 100 [IU]/ML
5 INJECTION, SOLUTION SUBCUTANEOUS
Qty: 3 ADJUSTABLE DOSE PRE-FILLED PEN SYRINGE | Refills: 3 | Status: SHIPPED | OUTPATIENT
Start: 2024-02-14

## 2024-02-14 NOTE — TELEPHONE ENCOUNTER
Call patient let her know that her insurance did not he will drink they denied her NovoLog FlexPen and Levemir FlexPen.  I sent an alternative for the NovoLog, Basaglar she will continue 20 units twice daily.  The alternative for the NovoLog is Fiasp FlexTouch pen she would continue 5 units with each meal. This short acting insulin works a bit faster than novolog so needs to be sure to eat quickly after taking it.

## 2024-02-29 ENCOUNTER — OFFICE VISIT (OUTPATIENT)
Dept: PAIN MANAGEMENT | Age: 74
End: 2024-02-29
Payer: MEDICARE

## 2024-02-29 VITALS
WEIGHT: 151 LBS | SYSTOLIC BLOOD PRESSURE: 149 MMHG | OXYGEN SATURATION: 98 % | HEART RATE: 90 BPM | BODY MASS INDEX: 25.92 KG/M2 | DIASTOLIC BLOOD PRESSURE: 83 MMHG

## 2024-02-29 DIAGNOSIS — M79.7 FIBROMYALGIA: ICD-10-CM

## 2024-02-29 DIAGNOSIS — G89.4 CHRONIC PAIN SYNDROME: ICD-10-CM

## 2024-02-29 DIAGNOSIS — M96.1 FAILED BACK SURGICAL SYNDROME: ICD-10-CM

## 2024-02-29 DIAGNOSIS — G56.22 ULNAR NEUROPATHY AT ELBOW, LEFT: ICD-10-CM

## 2024-02-29 DIAGNOSIS — M96.1 FAILED NECK SYNDROME: ICD-10-CM

## 2024-02-29 DIAGNOSIS — M48.061 DEGENERATIVE LUMBAR SPINAL STENOSIS: ICD-10-CM

## 2024-02-29 DIAGNOSIS — M48.07 SPINAL STENOSIS OF LUMBOSACRAL REGION: ICD-10-CM

## 2024-02-29 PROCEDURE — 1036F TOBACCO NON-USER: CPT | Performed by: INTERNAL MEDICINE

## 2024-02-29 PROCEDURE — 3017F COLORECTAL CA SCREEN DOC REV: CPT | Performed by: INTERNAL MEDICINE

## 2024-02-29 PROCEDURE — 1090F PRES/ABSN URINE INCON ASSESS: CPT | Performed by: INTERNAL MEDICINE

## 2024-02-29 PROCEDURE — G8484 FLU IMMUNIZE NO ADMIN: HCPCS | Performed by: INTERNAL MEDICINE

## 2024-02-29 PROCEDURE — 3077F SYST BP >= 140 MM HG: CPT | Performed by: INTERNAL MEDICINE

## 2024-02-29 PROCEDURE — 3079F DIAST BP 80-89 MM HG: CPT | Performed by: INTERNAL MEDICINE

## 2024-02-29 PROCEDURE — G8427 DOCREV CUR MEDS BY ELIG CLIN: HCPCS | Performed by: INTERNAL MEDICINE

## 2024-02-29 PROCEDURE — 99213 OFFICE O/P EST LOW 20 MIN: CPT | Performed by: INTERNAL MEDICINE

## 2024-02-29 PROCEDURE — 1123F ACP DISCUSS/DSCN MKR DOCD: CPT | Performed by: INTERNAL MEDICINE

## 2024-02-29 PROCEDURE — G8417 CALC BMI ABV UP PARAM F/U: HCPCS | Performed by: INTERNAL MEDICINE

## 2024-02-29 PROCEDURE — G8399 PT W/DXA RESULTS DOCUMENT: HCPCS | Performed by: INTERNAL MEDICINE

## 2024-02-29 RX ORDER — MELOXICAM 15 MG/1
15 TABLET ORAL DAILY
Qty: 30 TABLET | Refills: 1 | Status: SHIPPED | OUTPATIENT
Start: 2024-02-29

## 2024-02-29 RX ORDER — GABAPENTIN 400 MG/1
400 CAPSULE ORAL 3 TIMES DAILY
Qty: 90 CAPSULE | Refills: 1 | Status: SHIPPED | OUTPATIENT
Start: 2024-02-29 | End: 2024-04-29

## 2024-02-29 RX ORDER — OXYCODONE AND ACETAMINOPHEN 7.5; 325 MG/1; MG/1
1 TABLET ORAL 3 TIMES DAILY PRN
Qty: 84 TABLET | Refills: 0 | Status: SHIPPED | OUTPATIENT
Start: 2024-02-29 | End: 2024-03-28

## 2024-02-29 NOTE — PROGRESS NOTES
3. Spinal stenosis of lumbosacral region    4. Failed neck syndrome    5. Ulnar neuropathy at elbow, left    6. Failed back surgical syndrome    7. Fibromyalgia        PLAN:  Informed verbal consent was obtained.  Risks and benefits of the medications and other alternative treatments  including no treatment have been discussed with the patient. Any questions related to these were addressed. The common side effects of these medications were also explained to the patient.    -Going for JULIA tomorrow   -Continue with Mobic and Percocet along with Neurontin 1200 mg   -She was advised to increase fluids ( 5-7  glasses of fluid daily), limit caffeine, avoid cheese products, increase dietary fiber, increase activity and exercise as tolerated and relax regularly and enjoy meals    Current Outpatient Medications   Medication Sig Dispense Refill    gabapentin (NEURONTIN) 400 MG capsule Take 1 capsule by mouth 3 times daily for 60 days. 90 capsule 1    meloxicam (MOBIC) 15 MG tablet Take 1 tablet by mouth daily 30 tablet 1    oxyCODONE-acetaminophen (PERCOCET) 7.5-325 MG per tablet Take 1 tablet by mouth 3 times daily as needed for Pain for up to 28 days. 84 tablet 0    insulin glargine (BASAGLAR KWIKPEN) 100 UNIT/ML injection pen Inject 20 Units into the skin 2 times daily 5 Adjustable Dose Pre-filled Pen Syringe 3    Insulin Aspart, w/Niacinamide, (FIASP FLEXTOUCH) 100 UNIT/ML SOPN Inject 5 Units into the skin 3 times daily (before meals) 3 Adjustable Dose Pre-filled Pen Syringe 3    hydrOXYzine pamoate (VISTARIL) 25 MG capsule TAKE 1 CAPSULE BY MOUTH 2 TIMES DAILY AS NEEDED FOR ANXIETY 60 capsule 1    atorvastatin (LIPITOR) 40 MG tablet TAKE 1 TABLET BY MOUTH EVERY DAY 90 tablet 1    Continuous Blood Gluc Sensor (FREESTYLE WILLIAN 2 SENSOR) [facility] Use for glucose monitoring E11.9 2 each 5    Continuous Blood Gluc  (FREESTYLE WILLIAN 2 READER) MEEK Use for glucose monitoring E11.9 1 each 0    losartan (COZAAR) 100 MG

## 2024-03-28 ENCOUNTER — OFFICE VISIT (OUTPATIENT)
Dept: PAIN MANAGEMENT | Age: 74
End: 2024-03-28
Payer: MEDICARE

## 2024-03-28 VITALS
HEART RATE: 92 BPM | BODY MASS INDEX: 25.92 KG/M2 | WEIGHT: 151 LBS | SYSTOLIC BLOOD PRESSURE: 143 MMHG | DIASTOLIC BLOOD PRESSURE: 84 MMHG | OXYGEN SATURATION: 96 %

## 2024-03-28 DIAGNOSIS — M48.061 DEGENERATIVE LUMBAR SPINAL STENOSIS: ICD-10-CM

## 2024-03-28 DIAGNOSIS — M96.1 FAILED BACK SURGICAL SYNDROME: ICD-10-CM

## 2024-03-28 DIAGNOSIS — G56.22 ULNAR NEUROPATHY AT ELBOW, LEFT: ICD-10-CM

## 2024-03-28 DIAGNOSIS — M48.07 SPINAL STENOSIS OF LUMBOSACRAL REGION: ICD-10-CM

## 2024-03-28 DIAGNOSIS — M96.1 FAILED NECK SYNDROME: ICD-10-CM

## 2024-03-28 DIAGNOSIS — G89.4 CHRONIC PAIN SYNDROME: ICD-10-CM

## 2024-03-28 DIAGNOSIS — M79.7 FIBROMYALGIA: ICD-10-CM

## 2024-03-28 PROCEDURE — 3077F SYST BP >= 140 MM HG: CPT | Performed by: INTERNAL MEDICINE

## 2024-03-28 PROCEDURE — 1036F TOBACCO NON-USER: CPT | Performed by: INTERNAL MEDICINE

## 2024-03-28 PROCEDURE — 3017F COLORECTAL CA SCREEN DOC REV: CPT | Performed by: INTERNAL MEDICINE

## 2024-03-28 PROCEDURE — 99213 OFFICE O/P EST LOW 20 MIN: CPT | Performed by: INTERNAL MEDICINE

## 2024-03-28 PROCEDURE — G8427 DOCREV CUR MEDS BY ELIG CLIN: HCPCS | Performed by: INTERNAL MEDICINE

## 2024-03-28 PROCEDURE — 3079F DIAST BP 80-89 MM HG: CPT | Performed by: INTERNAL MEDICINE

## 2024-03-28 PROCEDURE — 1090F PRES/ABSN URINE INCON ASSESS: CPT | Performed by: INTERNAL MEDICINE

## 2024-03-28 PROCEDURE — G8399 PT W/DXA RESULTS DOCUMENT: HCPCS | Performed by: INTERNAL MEDICINE

## 2024-03-28 PROCEDURE — G8484 FLU IMMUNIZE NO ADMIN: HCPCS | Performed by: INTERNAL MEDICINE

## 2024-03-28 PROCEDURE — 1123F ACP DISCUSS/DSCN MKR DOCD: CPT | Performed by: INTERNAL MEDICINE

## 2024-03-28 PROCEDURE — G8417 CALC BMI ABV UP PARAM F/U: HCPCS | Performed by: INTERNAL MEDICINE

## 2024-03-28 RX ORDER — OXYCODONE AND ACETAMINOPHEN 7.5; 325 MG/1; MG/1
1 TABLET ORAL 3 TIMES DAILY PRN
Qty: 84 TABLET | Refills: 0 | Status: SHIPPED | OUTPATIENT
Start: 2024-03-28 | End: 2024-04-25

## 2024-03-28 RX ORDER — GABAPENTIN 400 MG/1
400 CAPSULE ORAL 3 TIMES DAILY
Qty: 90 CAPSULE | Refills: 1 | Status: SHIPPED | OUTPATIENT
Start: 2024-03-28 | End: 2024-05-27

## 2024-03-28 RX ORDER — MELOXICAM 15 MG/1
15 TABLET ORAL DAILY
Qty: 30 TABLET | Refills: 1 | Status: SHIPPED | OUTPATIENT
Start: 2024-03-28

## 2024-03-28 NOTE — PROGRESS NOTES
Rayamaribeth Adrian  1950  [id number]    HISTORY OF PRESENT ILLNESS:  Ms. Adrain is a 73 y.o. female returns for a follow up visit for multiple medical problems.  Her  presenting problems are   1. Chronic pain syndrome    2. Degenerative lumbar spinal stenosis    3. Spinal stenosis of lumbosacral region    4. Failed neck syndrome    5. Ulnar neuropathy at elbow, left    6. Primary insomnia    7. Mood disorder (HCC)    8. Fibromyalgia    9. Failed back surgical syndrome    .    As per information/history obtained from the PADT(patient assessment and documentation tool) -  She complains of pain in the lower back with radiation to the buttocks, hips Left, upper leg Left, knees Left, lower leg Left, and ankles Left She rates the pain 8/10 and describes it as sharp, aching, burning, numbness.  Pain is made worse by: movement, walking, standing, sitting, bending, lifting.  Current treatment regimen has helped relieve about 40% of the pain.  She denies side effects from the current pain regimen.   Patient reports that since last follow up visit the physical functioning is worse, family/social relationships are unchanged, mood is worse sleep patterns are worse.  Ms. Adrian states that since starting the treatment with the current regimen the  overall functioning  in the above aspects is  better,Patient denies neurological bowel or bladder. Patient denies misusing/abusing her narcotic pain medications or using any illegal drugs.  There are No indicators for possible drug abuse, addiction or diversion problems. Upon obtaining the medical history from Ms. Adrian regarding today's office visit for her presenting problems,Patient reports she has been having a lot of pain. She states she had a MRI done of the Lumbar spine. She mentions she will be seeing  next week. She complains the pain is greater in her back than legs. She says the pain is going in to the left foot. She denies any side effects. She

## 2024-04-25 ENCOUNTER — OFFICE VISIT (OUTPATIENT)
Dept: PAIN MANAGEMENT | Age: 74
End: 2024-04-25
Payer: MEDICARE

## 2024-04-25 VITALS
HEART RATE: 82 BPM | WEIGHT: 155 LBS | BODY MASS INDEX: 26.61 KG/M2 | DIASTOLIC BLOOD PRESSURE: 77 MMHG | OXYGEN SATURATION: 98 % | SYSTOLIC BLOOD PRESSURE: 131 MMHG

## 2024-04-25 DIAGNOSIS — M48.07 SPINAL STENOSIS OF LUMBOSACRAL REGION: ICD-10-CM

## 2024-04-25 DIAGNOSIS — M79.7 FIBROMYALGIA: ICD-10-CM

## 2024-04-25 DIAGNOSIS — G56.03 BILATERAL CARPAL TUNNEL SYNDROME: ICD-10-CM

## 2024-04-25 DIAGNOSIS — M96.1 FAILED BACK SURGICAL SYNDROME: ICD-10-CM

## 2024-04-25 DIAGNOSIS — M48.061 DEGENERATIVE LUMBAR SPINAL STENOSIS: ICD-10-CM

## 2024-04-25 DIAGNOSIS — G56.22 ULNAR NEUROPATHY AT ELBOW, LEFT: ICD-10-CM

## 2024-04-25 DIAGNOSIS — M96.1 FAILED NECK SYNDROME: ICD-10-CM

## 2024-04-25 DIAGNOSIS — G89.4 CHRONIC PAIN SYNDROME: ICD-10-CM

## 2024-04-25 PROCEDURE — G8399 PT W/DXA RESULTS DOCUMENT: HCPCS | Performed by: INTERNAL MEDICINE

## 2024-04-25 PROCEDURE — G8417 CALC BMI ABV UP PARAM F/U: HCPCS | Performed by: INTERNAL MEDICINE

## 2024-04-25 PROCEDURE — 1123F ACP DISCUSS/DSCN MKR DOCD: CPT | Performed by: INTERNAL MEDICINE

## 2024-04-25 PROCEDURE — G8427 DOCREV CUR MEDS BY ELIG CLIN: HCPCS | Performed by: INTERNAL MEDICINE

## 2024-04-25 PROCEDURE — 99213 OFFICE O/P EST LOW 20 MIN: CPT | Performed by: INTERNAL MEDICINE

## 2024-04-25 PROCEDURE — 3078F DIAST BP <80 MM HG: CPT | Performed by: INTERNAL MEDICINE

## 2024-04-25 PROCEDURE — 1090F PRES/ABSN URINE INCON ASSESS: CPT | Performed by: INTERNAL MEDICINE

## 2024-04-25 PROCEDURE — 1036F TOBACCO NON-USER: CPT | Performed by: INTERNAL MEDICINE

## 2024-04-25 PROCEDURE — 3017F COLORECTAL CA SCREEN DOC REV: CPT | Performed by: INTERNAL MEDICINE

## 2024-04-25 PROCEDURE — 3075F SYST BP GE 130 - 139MM HG: CPT | Performed by: INTERNAL MEDICINE

## 2024-04-25 RX ORDER — GABAPENTIN 400 MG/1
400 CAPSULE ORAL 3 TIMES DAILY
Qty: 90 CAPSULE | Refills: 1 | Status: SHIPPED | OUTPATIENT
Start: 2024-04-25 | End: 2024-06-24

## 2024-04-25 RX ORDER — OXYCODONE AND ACETAMINOPHEN 7.5; 325 MG/1; MG/1
1 TABLET ORAL 3 TIMES DAILY PRN
Qty: 84 TABLET | Refills: 0 | Status: SHIPPED | OUTPATIENT
Start: 2024-04-25 | End: 2024-05-23

## 2024-04-25 RX ORDER — DICLOFENAC SODIUM 75 MG/1
75 TABLET, DELAYED RELEASE ORAL 2 TIMES DAILY
COMMUNITY
Start: 2024-04-11

## 2024-04-25 NOTE — PROGRESS NOTES
Syringe 3    Insulin Aspart, w/Niacinamide, (FIASP FLEXTOUCH) 100 UNIT/ML SOPN Inject 5 Units into the skin 3 times daily (before meals) 3 Adjustable Dose Pre-filled Pen Syringe 3    hydrOXYzine pamoate (VISTARIL) 25 MG capsule TAKE 1 CAPSULE BY MOUTH 2 TIMES DAILY AS NEEDED FOR ANXIETY 60 capsule 1    atorvastatin (LIPITOR) 40 MG tablet TAKE 1 TABLET BY MOUTH EVERY DAY 90 tablet 1    Continuous Blood Gluc Sensor (FREESTYLE WILLIAN 2 SENSOR) MISC Use for glucose monitoring E11.9 2 each 5    Continuous Blood Gluc  (FREESTYLE WILLIAN 2 READER) MEEK Use for glucose monitoring E11.9 1 each 0    losartan (COZAAR) 100 MG tablet TAKE 1 TABLET BY MOUTH EVERY DAY 90 tablet 3    sertraline (ZOLOFT) 100 MG tablet Take 1 tablet by mouth 2 times daily 180 tablet 1    traZODone (DESYREL) 100 MG tablet Take 1 tablet by mouth nightly 90 tablet 2    levothyroxine (SYNTHROID) 75 MCG tablet TAKE 1 TABLET BY MOUTH EVERY DAY 90 tablet 3    naloxone 4 MG/0.1ML LIQD nasal spray 1 spray by Nasal route as needed for Opioid Reversal 1 each 0    amLODIPine (NORVASC) 10 MG tablet Take 1 tablet by mouth daily 90 tablet 3    OLANZapine (ZYPREXA) 5 MG tablet Take 1 tablet by mouth nightly 90 tablet 3    oxybutynin (DITROPAN-XL) 10 MG extended release tablet TAKE 1 TABLET BY MOUTH EVERY DAY 90 tablet 0    insulin aspart (NOVOLOG FLEXPEN) 100 UNIT/ML injection pen INJECT 5 UNITS INTO THE SKIN 3 TIMES DAILY (BEFORE MEALS) 5 Adjustable Dose Pre-filled Pen Syringe 3    estradiol (ESTRACE) 0.1 MG/GM vaginal cream PLACE 0.25 G VAGINALLY IN THE MORNING. APPLY 0.25G WITH FINGERTIP TO THE VAGINAL OPENING EVERY NIGHT AT BEDTIME FOR 2 WEEKS, THEN DECREASE TO TWICE WEEKLY.. 42.5 g 2    Insulin Pen Needle 31G X 5 MM MISC 1 each by Does not apply route 4 times daily 120 each 5    VITAMIN D PO Take by mouth daily      MAGNESIUM PO Take by mouth daily       CALCIUM-VITAMIN D PO Take by mouth daily      blood glucose monitor kit and supplies Test 4 times a day

## 2024-04-26 ENCOUNTER — TELEPHONE (OUTPATIENT)
Dept: FAMILY MEDICINE CLINIC | Age: 74
End: 2024-04-26

## 2024-04-26 DIAGNOSIS — F32.A DEPRESSION, UNSPECIFIED DEPRESSION TYPE: ICD-10-CM

## 2024-04-26 RX ORDER — SERTRALINE HYDROCHLORIDE 100 MG/1
100 TABLET, FILM COATED ORAL 2 TIMES DAILY
Qty: 180 TABLET | Refills: 1 | Status: SHIPPED | OUTPATIENT
Start: 2024-04-26 | End: 2024-05-03 | Stop reason: SDUPTHER

## 2024-04-26 NOTE — TELEPHONE ENCOUNTER
Patient is calling for a refill           sertraline (ZOLOFT) 100 MG tablet    Insulin Pen Needle 31G X 5 MM MISC    [facility] [address]   90 days

## 2024-05-01 ENCOUNTER — TELEPHONE (OUTPATIENT)
Dept: FAMILY MEDICINE CLINIC | Age: 74
End: 2024-05-01

## 2024-05-01 DIAGNOSIS — I10 ESSENTIAL HYPERTENSION: ICD-10-CM

## 2024-05-01 RX ORDER — AMLODIPINE BESYLATE 10 MG/1
10 TABLET ORAL DAILY
Qty: 90 TABLET | Refills: 3 | Status: SHIPPED | OUTPATIENT
Start: 2024-05-01

## 2024-05-01 NOTE — TELEPHONE ENCOUNTER
Patient needs a refill on Norvasc. They need a 90 day supply.     Mail order or local pharmacy: local    [facility] [address]    Last OV: 12/8/23    Future Appointments   Date Time Provider Department Center   5/23/2024 12:15 PM Larry De Los Santos MD [facility]     Pt is out of medication.

## 2024-05-02 NOTE — TELEPHONE ENCOUNTER
[id number] - [place] [address] [address] - P [phone] - F [phone] [[id number]]           Insulin Pen Needle 31G X 5 MM MISC [[id number]]       sertraline (ZOLOFT) 100 MG tablet [[id number]]       Pharmacy has not received these to refill

## 2024-05-03 RX ORDER — SERTRALINE HYDROCHLORIDE 100 MG/1
100 TABLET, FILM COATED ORAL 2 TIMES DAILY
Qty: 180 TABLET | Refills: 1 | Status: SHIPPED | OUTPATIENT
Start: 2024-05-03

## 2024-05-07 ENCOUNTER — OFFICE VISIT (OUTPATIENT)
Dept: FAMILY MEDICINE CLINIC | Age: 74
End: 2024-05-07
Payer: MEDICARE

## 2024-05-07 VITALS
DIASTOLIC BLOOD PRESSURE: 76 MMHG | SYSTOLIC BLOOD PRESSURE: 137 MMHG | WEIGHT: 157.2 LBS | OXYGEN SATURATION: 97 % | BODY MASS INDEX: 26.84 KG/M2 | HEART RATE: 86 BPM | HEIGHT: 64 IN

## 2024-05-07 DIAGNOSIS — M48.061 DEGENERATIVE LUMBAR SPINAL STENOSIS: ICD-10-CM

## 2024-05-07 DIAGNOSIS — N39.0 URINARY TRACT INFECTION WITHOUT HEMATURIA, SITE UNSPECIFIED: Primary | ICD-10-CM

## 2024-05-07 LAB
BILIRUBIN, POC: NORMAL
BLOOD URINE, POC: NORMAL
CLARITY, POC: NORMAL
COLOR, POC: NORMAL
GLUCOSE URINE, POC: NORMAL
KETONES, POC: NORMAL
LEUKOCYTE EST, POC: NORMAL
NITRITE, POC: NORMAL
PH, POC: 6
PROTEIN, POC: NORMAL
SPECIFIC GRAVITY, POC: 1.01
UROBILINOGEN, POC: 0.2

## 2024-05-07 PROCEDURE — G8427 DOCREV CUR MEDS BY ELIG CLIN: HCPCS | Performed by: SURGERY

## 2024-05-07 PROCEDURE — 1036F TOBACCO NON-USER: CPT | Performed by: SURGERY

## 2024-05-07 PROCEDURE — G8399 PT W/DXA RESULTS DOCUMENT: HCPCS | Performed by: SURGERY

## 2024-05-07 PROCEDURE — 1123F ACP DISCUSS/DSCN MKR DOCD: CPT | Performed by: SURGERY

## 2024-05-07 PROCEDURE — 1090F PRES/ABSN URINE INCON ASSESS: CPT | Performed by: SURGERY

## 2024-05-07 PROCEDURE — 99213 OFFICE O/P EST LOW 20 MIN: CPT | Performed by: SURGERY

## 2024-05-07 PROCEDURE — 81002 URINALYSIS NONAUTO W/O SCOPE: CPT | Performed by: SURGERY

## 2024-05-07 PROCEDURE — 3017F COLORECTAL CA SCREEN DOC REV: CPT | Performed by: SURGERY

## 2024-05-07 PROCEDURE — 3075F SYST BP GE 130 - 139MM HG: CPT | Performed by: SURGERY

## 2024-05-07 PROCEDURE — G8417 CALC BMI ABV UP PARAM F/U: HCPCS | Performed by: SURGERY

## 2024-05-07 PROCEDURE — 3078F DIAST BP <80 MM HG: CPT | Performed by: SURGERY

## 2024-05-07 RX ORDER — DICLOFENAC SODIUM 75 MG/1
75 TABLET, DELAYED RELEASE ORAL 2 TIMES DAILY
Qty: 60 TABLET | Refills: 0 | Status: SHIPPED | OUTPATIENT
Start: 2024-05-07

## 2024-05-07 ASSESSMENT — PATIENT HEALTH QUESTIONNAIRE - PHQ9
SUM OF ALL RESPONSES TO PHQ QUESTIONS 1-9: 21
6. FEELING BAD ABOUT YOURSELF - OR THAT YOU ARE A FAILURE OR HAVE LET YOURSELF OR YOUR FAMILY DOWN: NEARLY EVERY DAY
SUM OF ALL RESPONSES TO PHQ9 QUESTIONS 1 & 2: 6
SUM OF ALL RESPONSES TO PHQ QUESTIONS 1-9: 21
5. POOR APPETITE OR OVEREATING: NEARLY EVERY DAY
1. LITTLE INTEREST OR PLEASURE IN DOING THINGS: NEARLY EVERY DAY
7. TROUBLE CONCENTRATING ON THINGS, SUCH AS READING THE NEWSPAPER OR WATCHING TELEVISION: NEARLY EVERY DAY
10. IF YOU CHECKED OFF ANY PROBLEMS, HOW DIFFICULT HAVE THESE PROBLEMS MADE IT FOR YOU TO DO YOUR WORK, TAKE CARE OF THINGS AT HOME, OR GET ALONG WITH OTHER PEOPLE: SOMEWHAT DIFFICULT
3. TROUBLE FALLING OR STAYING ASLEEP: MORE THAN HALF THE DAYS
9. THOUGHTS THAT YOU WOULD BE BETTER OFF DEAD, OR OF HURTING YOURSELF: NOT AT ALL
4. FEELING TIRED OR HAVING LITTLE ENERGY: NEARLY EVERY DAY
8. MOVING OR SPEAKING SO SLOWLY THAT OTHER PEOPLE COULD HAVE NOTICED. OR THE OPPOSITE, BEING SO FIGETY OR RESTLESS THAT YOU HAVE BEEN MOVING AROUND A LOT MORE THAN USUAL: SEVERAL DAYS
SUM OF ALL RESPONSES TO PHQ QUESTIONS 1-9: 21
2. FEELING DOWN, DEPRESSED OR HOPELESS: NEARLY EVERY DAY
SUM OF ALL RESPONSES TO PHQ QUESTIONS 1-9: 21

## 2024-05-07 ASSESSMENT — COLUMBIA-SUICIDE SEVERITY RATING SCALE - C-SSRS
4. HAVE YOU HAD THESE THOUGHTS AND HAD SOME INTENTION OF ACTING ON THEM?: YES
2. HAVE YOU ACTUALLY HAD ANY THOUGHTS OF KILLING YOURSELF?: YES
7. DID THIS OCCUR IN THE LAST THREE MONTHS: NO
5. HAVE YOU STARTED TO WORK OUT OR WORKED OUT THE DETAILS OF HOW TO KILL YOURSELF? DO YOU INTEND TO CARRY OUT THIS PLAN?: NO
1. WITHIN THE PAST MONTH, HAVE YOU WISHED YOU WERE DEAD OR WISHED YOU COULD GO TO SLEEP AND NOT WAKE UP?: YES
3. HAVE YOU BEEN THINKING ABOUT HOW YOU MIGHT KILL YOURSELF?: YES
6. HAVE YOU EVER DONE ANYTHING, STARTED TO DO ANYTHING, OR PREPARED TO DO ANYTHING TO END YOUR LIFE?: YES

## 2024-05-07 ASSESSMENT — ENCOUNTER SYMPTOMS
NAUSEA: 0
COUGH: 0
SORE THROAT: 0
CONSTIPATION: 0
DIARRHEA: 0
ABDOMINAL PAIN: 0
BACK PAIN: 1
SHORTNESS OF BREATH: 0

## 2024-05-07 NOTE — PROGRESS NOTES
5/7/2024    This is a 73 y.o. female   Chief Complaint   Patient presents with    Urinary Tract Infection     Back pain, over not feeling good, urgent urine, lower lt abd pain   .    Has been dealing with back issues and is seeing neurosurg had testing done and will f/u with them on thursday. However she has had problems with her bladder as well and doesn't have typical symptoms of UTI any more. She tends to know she has one when she feels unwell and fatigued, which she has been feeling but isnt sure if its a UTI or from her pain.          Patient Active Problem List   Diagnosis    Mood disorder ([facility])    Hypertension    Spinal stenosis    Chronic back pain    Retinopathy    Type 2 diabetes mellitus with other specified complication, with long-term current use of insulin ([facility])    Family history of melanoma    Osteopenia    Type 1 diabetes mellitus without complication ([facility])    Cervical osteoarthritis    Dilated cbd, acquired    Gallstone pancreatitis    Osteoarthrosis    Bilateral carpal tunnel syndrome    Ulnar neuropathy at elbow, left    Degenerative lumbar spinal stenosis    Hyponatremia    Wound drainage    Superficial incisional infection of surgical site    Post-operative pain    Staph aureus infection    Chronic pain syndrome    Bilateral sensorineural hearing loss    Balance disorder    Post-nasal drip    Hypothyroidism    Stress incontinence    Skin cyst    Opioid dependence with current use ([facility])       Current Outpatient Medications   Medication Sig Dispense Refill    diclofenac (VOLTAREN) 75 MG EC tablet Take 1 tablet by mouth 2 times daily 60 tablet 0    Insulin Pen Needle 31G X 5 MM MISC 1 each by Does not apply route 4 times daily 120 each 5    sertraline (ZOLOFT) 100 MG tablet Take 1 tablet by mouth 2 times daily 180 tablet 1    amLODIPine (NORVASC) 10 MG tablet Take 1 tablet by mouth daily 90 tablet 3    gabapentin (NEURONTIN) 400 MG capsule Take 1 capsule by mouth 3 times daily for 60 days. 90

## 2024-05-10 ENCOUNTER — TELEPHONE (OUTPATIENT)
Dept: ENDOCRINOLOGY | Age: 74
End: 2024-05-10

## 2024-05-12 DIAGNOSIS — E03.9 HYPOTHYROIDISM, UNSPECIFIED TYPE: ICD-10-CM

## 2024-05-13 RX ORDER — LEVOTHYROXINE SODIUM 0.07 MG/1
TABLET ORAL
Qty: 90 TABLET | Refills: 3 | Status: SHIPPED | OUTPATIENT
Start: 2024-05-13

## 2024-05-13 NOTE — TELEPHONE ENCOUNTER
LOV 5/7/2024        Future Appointments   Date Time Provider Department Center   5/23/2024 12:15 PM Larry De Los Santos MD [facility]

## 2024-05-15 RX ORDER — ATORVASTATIN CALCIUM 40 MG/1
40 TABLET, FILM COATED ORAL DAILY
Qty: 90 TABLET | Refills: 1 | Status: SHIPPED | OUTPATIENT
Start: 2024-05-15

## 2024-05-15 NOTE — TELEPHONE ENCOUNTER
LOV 5/7/2024    Future Appointments   Date Time Provider Department Center   5/23/2024 12:15 PM Larry De Los Santos MD [facility]       
Ambulance

## 2024-05-17 ENCOUNTER — TELEPHONE (OUTPATIENT)
Dept: FAMILY MEDICINE CLINIC | Age: 74
End: 2024-05-17

## 2024-05-20 ENCOUNTER — HOSPITAL ENCOUNTER (OUTPATIENT)
Dept: GENERAL RADIOLOGY | Age: 74
Discharge: HOME | End: 2024-05-20
Attending: STUDENT IN AN ORGANIZED HEALTH CARE EDUCATION/TRAINING PROGRAM
Payer: MEDICARE

## 2024-05-20 ENCOUNTER — HOSPITAL ENCOUNTER (OUTPATIENT)
Dept: MRI IMAGING | Age: 74
Discharge: HOME | End: 2024-05-20
Attending: STUDENT IN AN ORGANIZED HEALTH CARE EDUCATION/TRAINING PROGRAM
Payer: MEDICARE

## 2024-05-20 DIAGNOSIS — M54.10 RADICULOPATHY, UNSPECIFIED SPINAL REGION: ICD-10-CM

## 2024-05-20 DIAGNOSIS — M54.12 CERVICAL RADICULOPATHY: ICD-10-CM

## 2024-05-20 DIAGNOSIS — G95.29 OTHER CORD COMPRESSION (HCC): ICD-10-CM

## 2024-05-20 DIAGNOSIS — Z01.818 PREOP EXAMINATION: ICD-10-CM

## 2024-05-20 PROCEDURE — 72040 X-RAY EXAM NECK SPINE 2-3 VW: CPT

## 2024-05-20 PROCEDURE — 72141 MRI NECK SPINE W/O DYE: CPT

## 2024-05-23 ENCOUNTER — OFFICE VISIT (OUTPATIENT)
Dept: PAIN MANAGEMENT | Age: 74
End: 2024-05-23
Payer: MEDICARE

## 2024-05-23 VITALS
BODY MASS INDEX: 26.61 KG/M2 | SYSTOLIC BLOOD PRESSURE: 129 MMHG | OXYGEN SATURATION: 96 % | DIASTOLIC BLOOD PRESSURE: 79 MMHG | HEART RATE: 79 BPM | WEIGHT: 155 LBS

## 2024-05-23 DIAGNOSIS — M79.7 FIBROMYALGIA: ICD-10-CM

## 2024-05-23 DIAGNOSIS — M48.061 DEGENERATIVE LUMBAR SPINAL STENOSIS: ICD-10-CM

## 2024-05-23 DIAGNOSIS — G56.03 BILATERAL CARPAL TUNNEL SYNDROME: ICD-10-CM

## 2024-05-23 DIAGNOSIS — G56.22 ULNAR NEUROPATHY AT ELBOW, LEFT: ICD-10-CM

## 2024-05-23 DIAGNOSIS — M48.07 SPINAL STENOSIS OF LUMBOSACRAL REGION: ICD-10-CM

## 2024-05-23 DIAGNOSIS — G89.4 CHRONIC PAIN SYNDROME: ICD-10-CM

## 2024-05-23 DIAGNOSIS — M96.1 FAILED NECK SYNDROME: ICD-10-CM

## 2024-05-23 DIAGNOSIS — M96.1 FAILED BACK SURGICAL SYNDROME: ICD-10-CM

## 2024-05-23 PROCEDURE — G8427 DOCREV CUR MEDS BY ELIG CLIN: HCPCS | Performed by: INTERNAL MEDICINE

## 2024-05-23 PROCEDURE — 3074F SYST BP LT 130 MM HG: CPT | Performed by: INTERNAL MEDICINE

## 2024-05-23 PROCEDURE — 1036F TOBACCO NON-USER: CPT | Performed by: INTERNAL MEDICINE

## 2024-05-23 PROCEDURE — 99213 OFFICE O/P EST LOW 20 MIN: CPT | Performed by: INTERNAL MEDICINE

## 2024-05-23 PROCEDURE — 3078F DIAST BP <80 MM HG: CPT | Performed by: INTERNAL MEDICINE

## 2024-05-23 PROCEDURE — 1123F ACP DISCUSS/DSCN MKR DOCD: CPT | Performed by: INTERNAL MEDICINE

## 2024-05-23 PROCEDURE — 3017F COLORECTAL CA SCREEN DOC REV: CPT | Performed by: INTERNAL MEDICINE

## 2024-05-23 PROCEDURE — G8417 CALC BMI ABV UP PARAM F/U: HCPCS | Performed by: INTERNAL MEDICINE

## 2024-05-23 PROCEDURE — 1090F PRES/ABSN URINE INCON ASSESS: CPT | Performed by: INTERNAL MEDICINE

## 2024-05-23 PROCEDURE — G8399 PT W/DXA RESULTS DOCUMENT: HCPCS | Performed by: INTERNAL MEDICINE

## 2024-05-23 RX ORDER — OXYCODONE AND ACETAMINOPHEN 7.5; 325 MG/1; MG/1
1 TABLET ORAL 3 TIMES DAILY PRN
Qty: 84 TABLET | Refills: 0 | Status: SHIPPED | OUTPATIENT
Start: 2024-05-23 | End: 2024-06-20

## 2024-05-23 RX ORDER — GABAPENTIN 400 MG/1
400 CAPSULE ORAL 3 TIMES DAILY
Qty: 90 CAPSULE | Refills: 1 | Status: SHIPPED | OUTPATIENT
Start: 2024-05-23 | End: 2024-07-22

## 2024-05-23 NOTE — PROGRESS NOTES
route 4 times daily 120 each 5    sertraline (ZOLOFT) 100 MG tablet Take 1 tablet by mouth 2 times daily 180 tablet 1    amLODIPine (NORVASC) 10 MG tablet Take 1 tablet by mouth daily 90 tablet 3    insulin glargine (BASAGLAR KWIKPEN) 100 UNIT/ML injection pen Inject 20 Units into the skin 2 times daily 5 Adjustable Dose Pre-filled Pen Syringe 3    Insulin Aspart, w/Niacinamide, (FIASP FLEXTOUCH) 100 UNIT/ML SOPN Inject 5 Units into the skin 3 times daily (before meals) 3 Adjustable Dose Pre-filled Pen Syringe 3    hydrOXYzine pamoate (VISTARIL) 25 MG capsule TAKE 1 CAPSULE BY MOUTH 2 TIMES DAILY AS NEEDED FOR ANXIETY 60 capsule 1    Continuous Blood Gluc Sensor (FREESTYLE WILLIAN 2 SENSOR) [facility] Use for glucose monitoring E11.9 2 each 5    Continuous Blood Gluc  (FREESTYLE WILLIAN 2 READER) [facility] Use for glucose monitoring E11.9 1 each 0    losartan (COZAAR) 100 MG tablet TAKE 1 TABLET BY MOUTH EVERY DAY 90 tablet 3    traZODone (DESYREL) 100 MG tablet Take 1 tablet by mouth nightly 90 tablet 2    naloxone 4 MG/0.1ML LIQD nasal spray 1 spray by Nasal route as needed for Opioid Reversal 1 each 0    OLANZapine (ZYPREXA) 5 MG tablet Take 1 tablet by mouth nightly 90 tablet 3    oxybutynin (DITROPAN-XL) 10 MG extended release tablet TAKE 1 TABLET BY MOUTH EVERY DAY 90 tablet 0    insulin aspart (NOVOLOG FLEXPEN) 100 UNIT/ML injection pen INJECT 5 UNITS INTO THE SKIN 3 TIMES DAILY (BEFORE MEALS) 5 Adjustable Dose Pre-filled Pen Syringe 3    estradiol (ESTRACE) 0.1 MG/GM vaginal cream PLACE 0.25 G VAGINALLY IN THE MORNING. APPLY 0.25G WITH FINGERTIP TO THE VAGINAL OPENING EVERY NIGHT AT BEDTIME FOR 2 WEEKS, THEN DECREASE TO TWICE WEEKLY.. 42.5 g 2    VITAMIN D PO Take by mouth daily      MAGNESIUM PO Take by mouth daily       CALCIUM-VITAMIN D PO Take by mouth daily      blood glucose monitor kit and supplies Test 4 times a day & as needed for symptoms of irregular blood glucose. 1 kit 0    blood glucose monitor strips

## 2024-06-03 ENCOUNTER — TELEPHONE (OUTPATIENT)
Dept: ENDOCRINOLOGY | Age: 74
End: 2024-06-03

## 2024-06-03 NOTE — TELEPHONE ENCOUNTER
New patient, [facility] rec'd.     Please schedule and let me know when appt is.    Remind them to bring all vitamins and supplements.  Thanks.    DXA needed?  No

## 2024-06-06 RX ORDER — M-VIT,TX,IRON,MINS/CALC/FOLIC 27MG-0.4MG
1 TABLET ORAL DAILY
COMMUNITY

## 2024-06-12 ENCOUNTER — TELEPHONE (OUTPATIENT)
Dept: FAMILY MEDICINE CLINIC | Age: 74
End: 2024-06-12

## 2024-06-12 DIAGNOSIS — G47.9 SLEEP DISTURBANCE: ICD-10-CM

## 2024-06-12 DIAGNOSIS — F41.9 ANXIETY: ICD-10-CM

## 2024-06-12 RX ORDER — HYDROXYZINE PAMOATE 25 MG/1
25 CAPSULE ORAL 2 TIMES DAILY PRN
Qty: 60 CAPSULE | Refills: 1 | Status: SHIPPED | OUTPATIENT
Start: 2024-06-12

## 2024-06-12 NOTE — TELEPHONE ENCOUNTER
Patient needs a refill on hydroxyzine. They need a 30 day supply.     Mail order or local pharmacy: local    Pharmacy:     Last OV: 5/7/24 w/Dr Staton    Future Appointments   Date Time Provider Department Center   6/20/2024  2:15 PM Larry De Los Santos MD [facility]   7/1/2024 10:00 AM Batsheva Mora APRN - CNP [facility]IOR [facility] [place] [facility]   7/3/2024 10:30 AM Chaparro Noel MD [facility] BH&O [facility]

## 2024-06-20 ENCOUNTER — OFFICE VISIT (OUTPATIENT)
Dept: PAIN MANAGEMENT | Age: 74
End: 2024-06-20
Payer: MEDICARE

## 2024-06-20 VITALS
OXYGEN SATURATION: 99 % | SYSTOLIC BLOOD PRESSURE: 111 MMHG | BODY MASS INDEX: 25.92 KG/M2 | WEIGHT: 151 LBS | DIASTOLIC BLOOD PRESSURE: 69 MMHG | HEART RATE: 69 BPM

## 2024-06-20 DIAGNOSIS — M96.1 FAILED BACK SURGICAL SYNDROME: ICD-10-CM

## 2024-06-20 DIAGNOSIS — M48.07 SPINAL STENOSIS OF LUMBOSACRAL REGION: ICD-10-CM

## 2024-06-20 DIAGNOSIS — M48.061 DEGENERATIVE LUMBAR SPINAL STENOSIS: ICD-10-CM

## 2024-06-20 DIAGNOSIS — M96.1 FAILED NECK SYNDROME: ICD-10-CM

## 2024-06-20 DIAGNOSIS — G89.4 CHRONIC PAIN SYNDROME: ICD-10-CM

## 2024-06-20 DIAGNOSIS — F39 MOOD DISORDER (HCC): ICD-10-CM

## 2024-06-20 DIAGNOSIS — G56.22 ULNAR NEUROPATHY AT ELBOW, LEFT: ICD-10-CM

## 2024-06-20 DIAGNOSIS — M79.7 FIBROMYALGIA: ICD-10-CM

## 2024-06-20 DIAGNOSIS — F51.01 PRIMARY INSOMNIA: ICD-10-CM

## 2024-06-20 PROCEDURE — 3074F SYST BP LT 130 MM HG: CPT | Performed by: INTERNAL MEDICINE

## 2024-06-20 PROCEDURE — 3017F COLORECTAL CA SCREEN DOC REV: CPT | Performed by: INTERNAL MEDICINE

## 2024-06-20 PROCEDURE — 1090F PRES/ABSN URINE INCON ASSESS: CPT | Performed by: INTERNAL MEDICINE

## 2024-06-20 PROCEDURE — 3078F DIAST BP <80 MM HG: CPT | Performed by: INTERNAL MEDICINE

## 2024-06-20 PROCEDURE — 1123F ACP DISCUSS/DSCN MKR DOCD: CPT | Performed by: INTERNAL MEDICINE

## 2024-06-20 PROCEDURE — 1036F TOBACCO NON-USER: CPT | Performed by: INTERNAL MEDICINE

## 2024-06-20 PROCEDURE — G8427 DOCREV CUR MEDS BY ELIG CLIN: HCPCS | Performed by: INTERNAL MEDICINE

## 2024-06-20 PROCEDURE — G8399 PT W/DXA RESULTS DOCUMENT: HCPCS | Performed by: INTERNAL MEDICINE

## 2024-06-20 PROCEDURE — G8417 CALC BMI ABV UP PARAM F/U: HCPCS | Performed by: INTERNAL MEDICINE

## 2024-06-20 PROCEDURE — 99214 OFFICE O/P EST MOD 30 MIN: CPT | Performed by: INTERNAL MEDICINE

## 2024-06-20 RX ORDER — METHYLPREDNISOLONE 4 MG/1
TABLET ORAL
Qty: 1 KIT | Refills: 0 | Status: SHIPPED | OUTPATIENT
Start: 2024-06-20 | End: 2024-06-26

## 2024-06-20 RX ORDER — OXYCODONE AND ACETAMINOPHEN 7.5; 325 MG/1; MG/1
1 TABLET ORAL EVERY 6 HOURS PRN
Qty: 100 TABLET | Refills: 0 | Status: SHIPPED | OUTPATIENT
Start: 2024-06-20 | End: 2024-07-18

## 2024-06-20 RX ORDER — GABAPENTIN 400 MG/1
400 CAPSULE ORAL 3 TIMES DAILY
Qty: 90 CAPSULE | Refills: 1 | Status: SHIPPED | OUTPATIENT
Start: 2024-06-20 | End: 2024-08-19

## 2024-06-20 NOTE — PROGRESS NOTES
sertraline (ZOLOFT) 100 MG tablet Take 1 tablet by mouth 2 times daily 180 tablet 1    amLODIPine (NORVASC) 10 MG tablet Take 1 tablet by mouth daily 90 tablet 3    insulin glargine (BASAGLAR KWIKPEN) 100 UNIT/ML injection pen Inject 20 Units into the skin 2 times daily 5 Adjustable Dose Pre-filled Pen Syringe 3    Insulin Aspart, w/Niacinamide, (FIASP FLEXTOUCH) 100 UNIT/ML SOPN Inject 5 Units into the skin 3 times daily (before meals) 3 Adjustable Dose Pre-filled Pen Syringe 3    Continuous Blood Gluc Sensor (FREESTYLE WILLIAN 2 SENSOR) [facility] Use for glucose monitoring E11.9 2 each 5    Continuous Blood Gluc  (FREESTYLE WILLIAN 2 READER) [facility] Use for glucose monitoring E11.9 1 each 0    losartan (COZAAR) 100 MG tablet TAKE 1 TABLET BY MOUTH EVERY DAY 90 tablet 3    traZODone (DESYREL) 100 MG tablet Take 1 tablet by mouth nightly 90 tablet 2    naloxone 4 MG/0.1ML LIQD nasal spray 1 spray by Nasal route as needed for Opioid Reversal 1 each 0    OLANZapine (ZYPREXA) 5 MG tablet Take 1 tablet by mouth nightly (Patient taking differently: Take 0.5 tablets by mouth nightly) 90 tablet 3    oxybutynin (DITROPAN-XL) 10 MG extended release tablet TAKE 1 TABLET BY MOUTH EVERY DAY 90 tablet 0    insulin aspart (NOVOLOG FLEXPEN) 100 UNIT/ML injection pen INJECT 5 UNITS INTO THE SKIN 3 TIMES DAILY (BEFORE MEALS) 5 Adjustable Dose Pre-filled Pen Syringe 3    estradiol (ESTRACE) 0.1 MG/GM vaginal cream PLACE 0.25 G VAGINALLY IN THE MORNING. APPLY 0.25G WITH FINGERTIP TO THE VAGINAL OPENING EVERY NIGHT AT BEDTIME FOR 2 WEEKS, THEN DECREASE TO TWICE WEEKLY.. 42.5 g 2    VITAMIN D PO Take by mouth daily      MAGNESIUM PO Take by mouth daily       CALCIUM-VITAMIN D PO Take 2 tablets by mouth daily Calcium 1200mg vit D 1000iu      blood glucose monitor kit and supplies Test 4 times a day & as needed for symptoms of irregular blood glucose. 1 kit 0    blood glucose monitor strips Test 4 times a day & as needed for symptoms of

## 2024-06-26 ENCOUNTER — TELEPHONE (OUTPATIENT)
Dept: PAIN MANAGEMENT | Age: 74
End: 2024-06-26

## 2024-06-26 RX ORDER — CELECOXIB 200 MG/1
CAPSULE ORAL
Qty: 12 CAPSULE | Refills: 0 | Status: SHIPPED | OUTPATIENT
Start: 2024-06-26

## 2024-06-26 NOTE — TELEPHONE ENCOUNTER
Per 6/20/24 office note start Celebrex 200 mg 3x per week     Medication is sent     Called patient informed her medication sent

## 2024-06-26 NOTE — TELEPHONE ENCOUNTER
Pt states at her last appointment RSM said he would prescribe her Celebrex but the pharmacy did not get script. She would like to know if he still is going to prescribe it for her. Please advise the pt.

## 2024-07-01 ENCOUNTER — OFFICE VISIT (OUTPATIENT)
Dept: FAMILY MEDICINE CLINIC | Age: 74
End: 2024-07-01
Payer: MEDICARE

## 2024-07-01 VITALS
RESPIRATION RATE: 16 BRPM | DIASTOLIC BLOOD PRESSURE: 78 MMHG | TEMPERATURE: 97 F | BODY MASS INDEX: 24.89 KG/M2 | SYSTOLIC BLOOD PRESSURE: 124 MMHG | WEIGHT: 145 LBS | OXYGEN SATURATION: 95 % | HEART RATE: 89 BPM

## 2024-07-01 DIAGNOSIS — Z79.4 TYPE 2 DIABETES MELLITUS WITH OTHER SPECIFIED COMPLICATION, WITH LONG-TERM CURRENT USE OF INSULIN (HCC): Primary | ICD-10-CM

## 2024-07-01 DIAGNOSIS — F11.20 OPIOID DEPENDENCE WITH CURRENT USE (HCC): ICD-10-CM

## 2024-07-01 DIAGNOSIS — M54.41 CHRONIC MIDLINE LOW BACK PAIN WITH RIGHT-SIDED SCIATICA: ICD-10-CM

## 2024-07-01 DIAGNOSIS — E11.69 TYPE 2 DIABETES MELLITUS WITH OTHER SPECIFIED COMPLICATION, WITH LONG-TERM CURRENT USE OF INSULIN (HCC): Primary | ICD-10-CM

## 2024-07-01 DIAGNOSIS — G47.9 SLEEP DISTURBANCE: ICD-10-CM

## 2024-07-01 DIAGNOSIS — F32.2 SEVERE DEPRESSION (HCC): ICD-10-CM

## 2024-07-01 DIAGNOSIS — G89.29 CHRONIC MIDLINE LOW BACK PAIN WITH RIGHT-SIDED SCIATICA: ICD-10-CM

## 2024-07-01 LAB — HBA1C MFR BLD: 8.8 %

## 2024-07-01 PROCEDURE — 3017F COLORECTAL CA SCREEN DOC REV: CPT | Performed by: NURSE PRACTITIONER

## 2024-07-01 PROCEDURE — G8420 CALC BMI NORM PARAMETERS: HCPCS | Performed by: NURSE PRACTITIONER

## 2024-07-01 PROCEDURE — 1123F ACP DISCUSS/DSCN MKR DOCD: CPT | Performed by: NURSE PRACTITIONER

## 2024-07-01 PROCEDURE — 99214 OFFICE O/P EST MOD 30 MIN: CPT | Performed by: NURSE PRACTITIONER

## 2024-07-01 PROCEDURE — 83036 HEMOGLOBIN GLYCOSYLATED A1C: CPT | Performed by: NURSE PRACTITIONER

## 2024-07-01 PROCEDURE — 3074F SYST BP LT 130 MM HG: CPT | Performed by: NURSE PRACTITIONER

## 2024-07-01 PROCEDURE — 3078F DIAST BP <80 MM HG: CPT | Performed by: NURSE PRACTITIONER

## 2024-07-01 PROCEDURE — G8427 DOCREV CUR MEDS BY ELIG CLIN: HCPCS | Performed by: NURSE PRACTITIONER

## 2024-07-01 PROCEDURE — 1036F TOBACCO NON-USER: CPT | Performed by: NURSE PRACTITIONER

## 2024-07-01 PROCEDURE — 3052F HG A1C>EQUAL 8.0%<EQUAL 9.0%: CPT | Performed by: NURSE PRACTITIONER

## 2024-07-01 PROCEDURE — 1090F PRES/ABSN URINE INCON ASSESS: CPT | Performed by: NURSE PRACTITIONER

## 2024-07-01 PROCEDURE — G8399 PT W/DXA RESULTS DOCUMENT: HCPCS | Performed by: NURSE PRACTITIONER

## 2024-07-01 PROCEDURE — 2022F DILAT RTA XM EVC RTNOPTHY: CPT | Performed by: NURSE PRACTITIONER

## 2024-07-01 RX ORDER — METHOCARBAMOL 750 MG/1
750 TABLET, FILM COATED ORAL 4 TIMES DAILY
COMMUNITY

## 2024-07-01 RX ORDER — MELOXICAM 15 MG/1
15 TABLET ORAL DAILY
COMMUNITY
Start: 2024-05-17 | End: 2024-07-01

## 2024-07-01 RX ORDER — INSULIN GLARGINE 100 [IU]/ML
21 INJECTION, SOLUTION SUBCUTANEOUS 2 TIMES DAILY
Qty: 5 ADJUSTABLE DOSE PRE-FILLED PEN SYRINGE | Refills: 3 | Status: SHIPPED | OUTPATIENT
Start: 2024-07-01

## 2024-07-01 RX ORDER — PREDNISONE 20 MG/1
40 TABLET ORAL DAILY
COMMUNITY
Start: 2024-06-29

## 2024-07-01 RX ORDER — CYCLOBENZAPRINE HCL 5 MG
TABLET ORAL
COMMUNITY
Start: 2024-02-27 | End: 2024-07-01

## 2024-07-01 SDOH — ECONOMIC STABILITY: FOOD INSECURITY: WITHIN THE PAST 12 MONTHS, YOU WORRIED THAT YOUR FOOD WOULD RUN OUT BEFORE YOU GOT MONEY TO BUY MORE.: NEVER TRUE

## 2024-07-01 SDOH — ECONOMIC STABILITY: FOOD INSECURITY: WITHIN THE PAST 12 MONTHS, THE FOOD YOU BOUGHT JUST DIDN'T LAST AND YOU DIDN'T HAVE MONEY TO GET MORE.: NEVER TRUE

## 2024-07-01 SDOH — ECONOMIC STABILITY: INCOME INSECURITY: HOW HARD IS IT FOR YOU TO PAY FOR THE VERY BASICS LIKE FOOD, HOUSING, MEDICAL CARE, AND HEATING?: NOT HARD AT ALL

## 2024-07-01 ASSESSMENT — COLUMBIA-SUICIDE SEVERITY RATING SCALE - C-SSRS
4. HAVE YOU HAD THESE THOUGHTS AND HAD SOME INTENTION OF ACTING ON THEM?: YES
2. HAVE YOU ACTUALLY HAD ANY THOUGHTS OF KILLING YOURSELF?: YES
4. HAVE YOU HAD THESE THOUGHTS AND HAD SOME INTENTION OF ACTING ON THEM?: YES
6. HAVE YOU EVER DONE ANYTHING, STARTED TO DO ANYTHING, OR PREPARED TO DO ANYTHING TO END YOUR LIFE?: NO
1. WITHIN THE PAST MONTH, HAVE YOU WISHED YOU WERE DEAD OR WISHED YOU COULD GO TO SLEEP AND NOT WAKE UP?: YES
5. HAVE YOU STARTED TO WORK OUT OR WORKED OUT THE DETAILS OF HOW TO KILL YOURSELF? DO YOU INTEND TO CARRY OUT THIS PLAN?: NO
2. HAVE YOU ACTUALLY HAD ANY THOUGHTS OF KILLING YOURSELF?: YES
5. HAVE YOU STARTED TO WORK OUT OR WORKED OUT THE DETAILS OF HOW TO KILL YOURSELF? DO YOU INTEND TO CARRY OUT THIS PLAN?: NO
6. HAVE YOU EVER DONE ANYTHING, STARTED TO DO ANYTHING, OR PREPARED TO DO ANYTHING TO END YOUR LIFE?: NO
3. HAVE YOU BEEN THINKING ABOUT HOW YOU MIGHT KILL YOURSELF?: YES
1. WITHIN THE PAST MONTH, HAVE YOU WISHED YOU WERE DEAD OR WISHED YOU COULD GO TO SLEEP AND NOT WAKE UP?: YES

## 2024-07-01 ASSESSMENT — ANXIETY QUESTIONNAIRES
2. NOT BEING ABLE TO STOP OR CONTROL WORRYING: NEARLY EVERY DAY
IF YOU CHECKED OFF ANY PROBLEMS ON THIS QUESTIONNAIRE, HOW DIFFICULT HAVE THESE PROBLEMS MADE IT FOR YOU TO DO YOUR WORK, TAKE CARE OF THINGS AT HOME, OR GET ALONG WITH OTHER PEOPLE: EXTREMELY DIFFICULT
4. TROUBLE RELAXING: NEARLY EVERY DAY
7. FEELING AFRAID AS IF SOMETHING AWFUL MIGHT HAPPEN: NOT AT ALL
6. BECOMING EASILY ANNOYED OR IRRITABLE: NEARLY EVERY DAY
GAD7 TOTAL SCORE: 18
3. WORRYING TOO MUCH ABOUT DIFFERENT THINGS: NEARLY EVERY DAY
5. BEING SO RESTLESS THAT IT IS HARD TO SIT STILL: NEARLY EVERY DAY
1. FEELING NERVOUS, ANXIOUS, OR ON EDGE: NEARLY EVERY DAY

## 2024-07-01 ASSESSMENT — ENCOUNTER SYMPTOMS
COUGH: 0
VOMITING: 0
NAUSEA: 0
SHORTNESS OF BREATH: 0
BACK PAIN: 1
BLOOD IN STOOL: 0
DIARRHEA: 0
CONSTIPATION: 0

## 2024-07-01 ASSESSMENT — PATIENT HEALTH QUESTIONNAIRE - PHQ9
7. TROUBLE CONCENTRATING ON THINGS, SUCH AS READING THE NEWSPAPER OR WATCHING TELEVISION: NEARLY EVERY DAY
4. FEELING TIRED OR HAVING LITTLE ENERGY: NEARLY EVERY DAY
SUM OF ALL RESPONSES TO PHQ QUESTIONS 1-9: 24
5. POOR APPETITE OR OVEREATING: NEARLY EVERY DAY
SUM OF ALL RESPONSES TO PHQ QUESTIONS 1-9: 27
1. LITTLE INTEREST OR PLEASURE IN DOING THINGS: NEARLY EVERY DAY
8. MOVING OR SPEAKING SO SLOWLY THAT OTHER PEOPLE COULD HAVE NOTICED. OR THE OPPOSITE, BEING SO FIGETY OR RESTLESS THAT YOU HAVE BEEN MOVING AROUND A LOT MORE THAN USUAL: NOT AT ALL
6. FEELING BAD ABOUT YOURSELF - OR THAT YOU ARE A FAILURE OR HAVE LET YOURSELF OR YOUR FAMILY DOWN: NEARLY EVERY DAY
8. MOVING OR SPEAKING SO SLOWLY THAT OTHER PEOPLE COULD HAVE NOTICED. OR THE OPPOSITE, BEING SO FIGETY OR RESTLESS THAT YOU HAVE BEEN MOVING AROUND A LOT MORE THAN USUAL: NEARLY EVERY DAY
9. THOUGHTS THAT YOU WOULD BE BETTER OFF DEAD, OR OF HURTING YOURSELF: NEARLY EVERY DAY
SUM OF ALL RESPONSES TO PHQ9 QUESTIONS 1 & 2: 6
1. LITTLE INTEREST OR PLEASURE IN DOING THINGS: NEARLY EVERY DAY
SUM OF ALL RESPONSES TO PHQ QUESTIONS 1-9: 18
SUM OF ALL RESPONSES TO PHQ9 QUESTIONS 1 & 2: 6
SUM OF ALL RESPONSES TO PHQ QUESTIONS 1-9: 21
SUM OF ALL RESPONSES TO PHQ QUESTIONS 1-9: 21
SUM OF ALL RESPONSES TO PHQ QUESTIONS 1-9: 27
2. FEELING DOWN, DEPRESSED OR HOPELESS: NEARLY EVERY DAY
4. FEELING TIRED OR HAVING LITTLE ENERGY: NEARLY EVERY DAY
6. FEELING BAD ABOUT YOURSELF - OR THAT YOU ARE A FAILURE OR HAVE LET YOURSELF OR YOUR FAMILY DOWN: NEARLY EVERY DAY
3. TROUBLE FALLING OR STAYING ASLEEP: NEARLY EVERY DAY
2. FEELING DOWN, DEPRESSED OR HOPELESS: NEARLY EVERY DAY
10. IF YOU CHECKED OFF ANY PROBLEMS, HOW DIFFICULT HAVE THESE PROBLEMS MADE IT FOR YOU TO DO YOUR WORK, TAKE CARE OF THINGS AT HOME, OR GET ALONG WITH OTHER PEOPLE: EXTREMELY DIFFICULT
9. THOUGHTS THAT YOU WOULD BE BETTER OFF DEAD, OR OF HURTING YOURSELF: NEARLY EVERY DAY
10. IF YOU CHECKED OFF ANY PROBLEMS, HOW DIFFICULT HAVE THESE PROBLEMS MADE IT FOR YOU TO DO YOUR WORK, TAKE CARE OF THINGS AT HOME, OR GET ALONG WITH OTHER PEOPLE: EXTREMELY DIFFICULT
7. TROUBLE CONCENTRATING ON THINGS, SUCH AS READING THE NEWSPAPER OR WATCHING TELEVISION: NEARLY EVERY DAY
5. POOR APPETITE OR OVEREATING: NOT AT ALL
SUM OF ALL RESPONSES TO PHQ QUESTIONS 1-9: 27
SUM OF ALL RESPONSES TO PHQ QUESTIONS 1-9: 21
3. TROUBLE FALLING OR STAYING ASLEEP: NEARLY EVERY DAY

## 2024-07-01 NOTE — PROGRESS NOTES
by Nasal route as needed for Opioid Reversal Yes Larry De Los Santos MD   OLANZapine (ZYPREXA) 5 MG tablet Take 1 tablet by mouth nightly  Patient taking differently: Take 0.5 tablets by mouth nightly Yes Batsheva Mora APRN - CNP   oxybutynin (DITROPAN-XL) 10 MG extended release tablet TAKE 1 TABLET BY MOUTH EVERY DAY Yes Bro Bahena MD   insulin aspart (NOVOLOG FLEXPEN) 100 UNIT/ML injection pen INJECT 5 UNITS INTO THE SKIN 3 TIMES DAILY (BEFORE MEALS) Yes Batsheva Mora APRN - CNP   estradiol (ESTRACE) 0.1 MG/GM vaginal cream PLACE 0.25 G VAGINALLY IN THE MORNING. APPLY 0.25G WITH FINGERTIP TO THE VAGINAL OPENING EVERY NIGHT AT BEDTIME FOR 2 WEEKS, THEN DECREASE TO TWICE WEEKLY.. Yes Bro Bahena MD   VITAMIN D PO Take by mouth daily Yes Irene Escobedo MD   MAGNESIUM PO Take by mouth daily  Yes Irene Escobedo MD   CALCIUM-VITAMIN D PO Take 2 tablets by mouth daily Calcium 1200mg vit D 1000iu Yes Irene Escobedo MD   blood glucose monitor kit and supplies Test 4 times a day & as needed for symptoms of irregular blood glucose. Yes Batsheva Mora APRN - CNP   blood glucose monitor strips Test 4 times a day & as needed for symptoms of irregular blood glucose. Dispense sufficient amount for indicated testing frequency plus additional to accommodate PRN testing needs. Yes Batsheva Mora APRN - CNP   Handicap Placard MISC by Does not apply route Yes Armin Salazar MD   Needles & Syringes MISC Inject 1 each into the skin 3 times daily Yes Armin Salazar MD        Social History     Tobacco Use    Smoking status: Never    Smokeless tobacco: Never   Substance Use Topics    Alcohol use: Not Currently     Alcohol/week: 2.0 standard drinks of alcohol     Types: 2 Glasses of wine per week        Vitals:    07/01/24 1007   BP: 124/78   Site: Left Upper Arm   Position: Sitting   Pulse: 89   Resp: 16   Temp: 97 °F (36.1 °C)   TempSrc: Temporal   SpO2: 95%   Weight:

## 2024-07-01 NOTE — PATIENT INSTRUCTIONS
- eat frequent meals, every 3-4 hours   - Increase Lantus 21 units twice daily  - A1C is 8.8  - check blood sugar at home  - please call me if you have any worsening of depression symptoms or suicidal thoughts  - recommend seeing your therapist   - I will refill your muscle relaxer when needed   - follow up 3 months

## 2024-07-03 ENCOUNTER — OFFICE VISIT (OUTPATIENT)
Dept: ENDOCRINOLOGY | Age: 74
End: 2024-07-03
Payer: MEDICARE

## 2024-07-03 ENCOUNTER — TELEPHONE (OUTPATIENT)
Dept: ENDOCRINOLOGY | Age: 74
End: 2024-07-03

## 2024-07-03 VITALS — WEIGHT: 144 LBS | BODY MASS INDEX: 26.5 KG/M2 | HEIGHT: 62 IN

## 2024-07-03 DIAGNOSIS — M85.80 OSTEOPENIA WITH HIGH RISK OF FRACTURE: ICD-10-CM

## 2024-07-03 DIAGNOSIS — E55.9 VITAMIN D DEFICIENCY: ICD-10-CM

## 2024-07-03 DIAGNOSIS — M89.9 DISEASE OF SKELETAL SYSTEM: Primary | ICD-10-CM

## 2024-07-03 PROCEDURE — G2211 COMPLEX E/M VISIT ADD ON: HCPCS | Performed by: INTERNAL MEDICINE

## 2024-07-03 PROCEDURE — 1036F TOBACCO NON-USER: CPT | Performed by: INTERNAL MEDICINE

## 2024-07-03 PROCEDURE — 1090F PRES/ABSN URINE INCON ASSESS: CPT | Performed by: INTERNAL MEDICINE

## 2024-07-03 PROCEDURE — 1123F ACP DISCUSS/DSCN MKR DOCD: CPT | Performed by: INTERNAL MEDICINE

## 2024-07-03 PROCEDURE — G8427 DOCREV CUR MEDS BY ELIG CLIN: HCPCS | Performed by: INTERNAL MEDICINE

## 2024-07-03 PROCEDURE — 3017F COLORECTAL CA SCREEN DOC REV: CPT | Performed by: INTERNAL MEDICINE

## 2024-07-03 PROCEDURE — 99205 OFFICE O/P NEW HI 60 MIN: CPT | Performed by: INTERNAL MEDICINE

## 2024-07-03 PROCEDURE — G8399 PT W/DXA RESULTS DOCUMENT: HCPCS | Performed by: INTERNAL MEDICINE

## 2024-07-03 PROCEDURE — G8417 CALC BMI ABV UP PARAM F/U: HCPCS | Performed by: INTERNAL MEDICINE

## 2024-07-03 RX ORDER — DENOSUMAB 60 MG/ML
60 INJECTION SUBCUTANEOUS ONCE
Qty: 1 ML | Refills: 0 | Status: SHIPPED | OUTPATIENT
Start: 2024-07-03 | End: 2024-07-03

## 2024-07-03 NOTE — PROGRESS NOTES
[facility]  [address] [place] [address] [address] [place] [address]  Phone [phone]  Fax [phone]    NAME:  GEORGE ADRIAN ('VARINDER')  :  1950  CONSULT DATE:    2024  MOST RECENT VISIT:  2024  TODAY'S DATE:  2024    Labs @ [facility] 2023    CONSULTATION REQUESTED BY: Izaiah Mora NP    PROBLEMS.  Low bone density by DXA 2015, lowest T-score -1.8 in the right femoral neck    Family history of osteoporosis, none    BMD decreased in the left total hip [id number], 4607-2119, lowest T-score -2.1    , age 69, fractured humerus (fell)  Surgical menopause age 43  Vitamin D deficiency (desirable 25-OH D is 30-60 ng/mL)     26 ng/mL 2024  Type 1 diabetes since   Arthritis    CURRENT MANAGEMENT FOR BONE HEALTH/OSTEOPOROSIS.  Calcium, 300 mg from low calcium foods, 600 mg milk, 600 mg cheese, some yogurt  , 200 mg dk green vegs   diet MVI Ca+D other    Calcium 1500+ 300 600 x 2  mg/d   Vitamin D  1000 500 x 2  IU/d   Exercise, walks 20 minutes 3 x weekly “when able”  Pharmacologic therapy: none    PREVIOUS BONE-ACTIVE MEDICATIONS. none    OTHER CURRENT MEDICATIONS (SELECTED): amlodipine, atorvastatin, gabapentin, oxycodone, losartan, trazodone, sertraline, olanzapine, hydroxyzine, diclofenac, thyroxine 75 mcg/d.  OTC MEDICATIONS (SELECTED): none    CHIEF COMPLAINT.  “Surgery scheduled”    HISTORY OF PRESENT ILLNESS: See problem list for chronic/inactive conditions.  Ms. Adrian is a 73-year-old woman who was found to have low bone density by DXA in 2015.    FOR FULL DETAILS OF FAMILY HISTORY, PAST MEDICAL AND SURGICAL HISTORY, SOCIAL HISTORY, SEE PATIENT QUESTIONNAIRE OF TODAY'S DATE.    FAMILY HISTORY.  Relevant hx in problem list and/or HPI. Otherwise not contributory.  PAST MEDICAL HISTORY.  Noted in health history form.   PAST SURGICAL HISTORY.  Noted in health history form.   SOCIAL HISTORY.

## 2024-07-06 DIAGNOSIS — G47.9 SLEEP DISTURBANCE: ICD-10-CM

## 2024-07-06 DIAGNOSIS — F41.9 ANXIETY: ICD-10-CM

## 2024-07-08 ENCOUNTER — TELEPHONE (OUTPATIENT)
Dept: FAMILY MEDICINE CLINIC | Age: 74
End: 2024-07-08

## 2024-07-08 ENCOUNTER — TELEPHONE (OUTPATIENT)
Dept: ENDOCRINOLOGY | Age: 74
End: 2024-07-08

## 2024-07-08 DIAGNOSIS — F51.01 PRIMARY INSOMNIA: ICD-10-CM

## 2024-07-08 RX ORDER — HYDROXYZINE PAMOATE 25 MG/1
25 CAPSULE ORAL 2 TIMES DAILY PRN
Qty: 180 CAPSULE | Refills: 1 | Status: SHIPPED | OUTPATIENT
Start: 2024-07-08

## 2024-07-08 RX ORDER — TRAZODONE HYDROCHLORIDE 100 MG/1
100 TABLET ORAL NIGHTLY
Qty: 90 TABLET | Refills: 2 | Status: SHIPPED | OUTPATIENT
Start: 2024-07-08

## 2024-07-08 RX ORDER — METHOCARBAMOL 750 MG/1
750 TABLET, FILM COATED ORAL 4 TIMES DAILY
Qty: 120 TABLET | Refills: 0 | Status: SHIPPED | OUTPATIENT
Start: 2024-07-08 | End: 2024-07-10 | Stop reason: SDUPTHER

## 2024-07-08 NOTE — TELEPHONE ENCOUNTER
LOV 7/1/2024    Future Appointments   Date Time Provider Department Center   7/18/2024  2:15 PM Larry De Los Santos MD [facility]   7/22/2024  9:20 AM Batsheva Mora APRN - [facility]

## 2024-07-08 NOTE — TELEPHONE ENCOUNTER
LVM for patient regarding Summary of Benefits for Prolia. No PA required. Patient can call their insurance for out of pocket cost before scheduling 1st Prolia injection.

## 2024-07-09 ENCOUNTER — TELEPHONE (OUTPATIENT)
Dept: FAMILY MEDICINE CLINIC | Age: 74
End: 2024-07-09

## 2024-07-09 NOTE — TELEPHONE ENCOUNTER
Patient called, a refill was sent for methocarbamol (ROBAXIN) 750 MG tablet, and she said it isn't covered by her insurance there. Can we send this to [facility] and cancel this one sent to [facility]?     Please use [facility] [id number] - [place], [place] - [address] P [phone] - F [phone] [[id number]]     7/1/2024    Future Appointments   Date Time Provider Department Center   7/10/2024  9:30 AM SCHEDULE, BONE HEALTH & OSTEOPOROSIS MHPHYS [facility]   7/18/2024  2:15 PM Larry De Los Santos MD [facility]   7/22/2024  9:20 AM Batsheva Mora APRN - [facility] [place]

## 2024-07-09 NOTE — TELEPHONE ENCOUNTER
7/1/2024    LAST FILLED:  6/5/2017    Future Appointments   Date Time Provider Department Center   7/18/2024  2:15 PM Larry De Los Santos MD [facility]   7/22/2024  9:20 AM Batsheva Mora APRN - [facility] [place]

## 2024-07-10 ENCOUNTER — NURSE ONLY (OUTPATIENT)
Dept: ENDOCRINOLOGY | Age: 74
End: 2024-07-10
Payer: MEDICARE

## 2024-07-10 DIAGNOSIS — M85.80 OSTEOPENIA WITH HIGH RISK OF FRACTURE: ICD-10-CM

## 2024-07-10 DIAGNOSIS — M89.9 DISEASE OF SKELETAL SYSTEM: Primary | ICD-10-CM

## 2024-07-10 PROCEDURE — 96372 THER/PROPH/DIAG INJ SC/IM: CPT | Performed by: INTERNAL MEDICINE

## 2024-07-10 RX ORDER — METHOCARBAMOL 750 MG/1
750 TABLET, FILM COATED ORAL 4 TIMES DAILY
Qty: 120 TABLET | Refills: 0 | Status: SHIPPED | OUTPATIENT
Start: 2024-07-10

## 2024-07-10 NOTE — PROGRESS NOTES
Informed patient if any signs of redness,rash,swelling or unusual symptoms occur, please contact the office. Prolia given per physician order.Patient to remain in the waiting room for approximately 20 minutes due to this is the first Prolia injection.

## 2024-07-18 ENCOUNTER — OFFICE VISIT (OUTPATIENT)
Dept: PAIN MANAGEMENT | Age: 74
End: 2024-07-18
Payer: MEDICARE

## 2024-07-18 VITALS
WEIGHT: 146 LBS | OXYGEN SATURATION: 97 % | HEART RATE: 91 BPM | SYSTOLIC BLOOD PRESSURE: 149 MMHG | BODY MASS INDEX: 26.36 KG/M2 | DIASTOLIC BLOOD PRESSURE: 84 MMHG

## 2024-07-18 DIAGNOSIS — M48.061 DEGENERATIVE LUMBAR SPINAL STENOSIS: ICD-10-CM

## 2024-07-18 DIAGNOSIS — G89.4 CHRONIC PAIN SYNDROME: ICD-10-CM

## 2024-07-18 DIAGNOSIS — M96.1 FAILED BACK SURGICAL SYNDROME: ICD-10-CM

## 2024-07-18 DIAGNOSIS — M48.07 SPINAL STENOSIS OF LUMBOSACRAL REGION: ICD-10-CM

## 2024-07-18 DIAGNOSIS — G56.22 ULNAR NEUROPATHY AT ELBOW, LEFT: ICD-10-CM

## 2024-07-18 DIAGNOSIS — M79.7 FIBROMYALGIA: ICD-10-CM

## 2024-07-18 DIAGNOSIS — M96.1 FAILED NECK SYNDROME: ICD-10-CM

## 2024-07-18 DIAGNOSIS — G56.03 BILATERAL CARPAL TUNNEL SYNDROME: ICD-10-CM

## 2024-07-18 PROCEDURE — 1036F TOBACCO NON-USER: CPT | Performed by: INTERNAL MEDICINE

## 2024-07-18 PROCEDURE — 3077F SYST BP >= 140 MM HG: CPT | Performed by: INTERNAL MEDICINE

## 2024-07-18 PROCEDURE — 1090F PRES/ABSN URINE INCON ASSESS: CPT | Performed by: INTERNAL MEDICINE

## 2024-07-18 PROCEDURE — 3017F COLORECTAL CA SCREEN DOC REV: CPT | Performed by: INTERNAL MEDICINE

## 2024-07-18 PROCEDURE — G8417 CALC BMI ABV UP PARAM F/U: HCPCS | Performed by: INTERNAL MEDICINE

## 2024-07-18 PROCEDURE — 3079F DIAST BP 80-89 MM HG: CPT | Performed by: INTERNAL MEDICINE

## 2024-07-18 PROCEDURE — G8399 PT W/DXA RESULTS DOCUMENT: HCPCS | Performed by: INTERNAL MEDICINE

## 2024-07-18 PROCEDURE — 1123F ACP DISCUSS/DSCN MKR DOCD: CPT | Performed by: INTERNAL MEDICINE

## 2024-07-18 PROCEDURE — G8427 DOCREV CUR MEDS BY ELIG CLIN: HCPCS | Performed by: INTERNAL MEDICINE

## 2024-07-18 PROCEDURE — 99213 OFFICE O/P EST LOW 20 MIN: CPT | Performed by: INTERNAL MEDICINE

## 2024-07-18 RX ORDER — OXYCODONE AND ACETAMINOPHEN 7.5; 325 MG/1; MG/1
1 TABLET ORAL EVERY 6 HOURS PRN
Qty: 100 TABLET | Refills: 0 | Status: SHIPPED | OUTPATIENT
Start: 2024-07-18 | End: 2024-08-15

## 2024-07-18 RX ORDER — GABAPENTIN 400 MG/1
400 CAPSULE ORAL 3 TIMES DAILY
Qty: 90 CAPSULE | Refills: 1 | Status: SHIPPED | OUTPATIENT
Start: 2024-07-18 | End: 2024-09-16

## 2024-07-18 NOTE — PROGRESS NOTES
Raya Adrian  1950  [id number]      HISTORY OF PRESENT ILLNESS:  Ms. Adrian is a 73 y.o. female returns for a follow up visit for pain management  She has a diagnosis of   1. Chronic pain syndrome    2. Degenerative lumbar spinal stenosis    3. Bilateral carpal tunnel syndrome    4. Spinal stenosis of lumbosacral region    5. Mood disorder (HCC)    6. Ulnar neuropathy at elbow, left    7. Failed neck syndrome    8. Fibromyalgia    9. 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      As per information/history obtained from the PADT(patient assessment and documentation tool) -  She complains of pain in the lower back with radiation to the buttocks, hips Left, upper leg Left, knees Left, lower leg Left, ankles Left, and feet Left She rates the pain 9/10 and describes it as aching, burning, numbness.  Pain is made worse by: movement, walking, standing, sitting, bending, lifting. She denies any side effects from the current pain regimen. Patient reports that since last follow up visit the physical functioning is worse, family/social relationships are worse, mood is worse sleep patterns are worse. Ms. Adrian states that since starting the treatment with the current regimen the  overall functioning  in the above aspects is  better, Patient denies misusing/abusing her narcotic pain medications or using any illegal drugs.  There are No indicators for possible drug abuse, addiction or diversion problems.   Upon obtaining the medical history from Ms. Adrian regarding today's office visit for her presenting problems, patient states she is not doing too well, she states she is going for back surgery on August 9th. Ms. Adrian mentions she is using Neurontin 1200 mg along with Percocet 3-4. She says she finished her Celebrex, she states it is not helping much. Patient complains of her right hip, buttock and leg hurting the most.       ALLERGIES: Patients list of allergies were reviewed

## 2024-07-19 RX ORDER — CELECOXIB 200 MG/1
CAPSULE ORAL
Qty: 12 CAPSULE | Refills: 0 | OUTPATIENT
Start: 2024-07-19

## 2024-07-22 ENCOUNTER — OFFICE VISIT (OUTPATIENT)
Dept: FAMILY MEDICINE CLINIC | Age: 74
End: 2024-07-22
Payer: MEDICARE

## 2024-07-22 VITALS
HEIGHT: 63 IN | TEMPERATURE: 97 F | OXYGEN SATURATION: 94 % | BODY MASS INDEX: 26.4 KG/M2 | HEART RATE: 88 BPM | DIASTOLIC BLOOD PRESSURE: 74 MMHG | WEIGHT: 149 LBS | SYSTOLIC BLOOD PRESSURE: 142 MMHG | RESPIRATION RATE: 16 BRPM

## 2024-07-22 DIAGNOSIS — Z01.818 PRE-OP EXAM: Primary | ICD-10-CM

## 2024-07-22 DIAGNOSIS — Z79.4 TYPE 2 DIABETES MELLITUS WITH OTHER SPECIFIED COMPLICATION, WITH LONG-TERM CURRENT USE OF INSULIN (HCC): ICD-10-CM

## 2024-07-22 DIAGNOSIS — M54.16 LUMBAR RADICULOPATHY: ICD-10-CM

## 2024-07-22 DIAGNOSIS — E11.69 TYPE 2 DIABETES MELLITUS WITH OTHER SPECIFIED COMPLICATION, WITH LONG-TERM CURRENT USE OF INSULIN (HCC): ICD-10-CM

## 2024-07-22 DIAGNOSIS — E03.9 HYPOTHYROIDISM, UNSPECIFIED TYPE: ICD-10-CM

## 2024-07-22 DIAGNOSIS — F32.2 SEVERE DEPRESSION (HCC): ICD-10-CM

## 2024-07-22 DIAGNOSIS — M48.07 SPINAL STENOSIS OF LUMBOSACRAL REGION: ICD-10-CM

## 2024-07-22 LAB
BILIRUBIN, POC: NEGATIVE
BLOOD URINE, POC: NEGATIVE
CLARITY, POC: NORMAL
COLOR, POC: NORMAL
GLUCOSE URINE, POC: NEGATIVE
KETONES, POC: NEGATIVE
LEUKOCYTE EST, POC: NEGATIVE
NITRITE, POC: NEGATIVE
PH, POC: 6
PROTEIN, POC: NEGATIVE
SPECIFIC GRAVITY, POC: 1.01
UROBILINOGEN, POC: NORMAL

## 2024-07-22 PROCEDURE — 3017F COLORECTAL CA SCREEN DOC REV: CPT | Performed by: NURSE PRACTITIONER

## 2024-07-22 PROCEDURE — 1036F TOBACCO NON-USER: CPT | Performed by: NURSE PRACTITIONER

## 2024-07-22 PROCEDURE — 2022F DILAT RTA XM EVC RTNOPTHY: CPT | Performed by: NURSE PRACTITIONER

## 2024-07-22 PROCEDURE — G8427 DOCREV CUR MEDS BY ELIG CLIN: HCPCS | Performed by: NURSE PRACTITIONER

## 2024-07-22 PROCEDURE — 81002 URINALYSIS NONAUTO W/O SCOPE: CPT | Performed by: NURSE PRACTITIONER

## 2024-07-22 PROCEDURE — 93000 ELECTROCARDIOGRAM COMPLETE: CPT | Performed by: NURSE PRACTITIONER

## 2024-07-22 PROCEDURE — 99214 OFFICE O/P EST MOD 30 MIN: CPT | Performed by: NURSE PRACTITIONER

## 2024-07-22 PROCEDURE — 1123F ACP DISCUSS/DSCN MKR DOCD: CPT | Performed by: NURSE PRACTITIONER

## 2024-07-22 PROCEDURE — 3052F HG A1C>EQUAL 8.0%<EQUAL 9.0%: CPT | Performed by: NURSE PRACTITIONER

## 2024-07-22 PROCEDURE — G8417 CALC BMI ABV UP PARAM F/U: HCPCS | Performed by: NURSE PRACTITIONER

## 2024-07-22 PROCEDURE — 3077F SYST BP >= 140 MM HG: CPT | Performed by: NURSE PRACTITIONER

## 2024-07-22 PROCEDURE — G8399 PT W/DXA RESULTS DOCUMENT: HCPCS | Performed by: NURSE PRACTITIONER

## 2024-07-22 PROCEDURE — 1090F PRES/ABSN URINE INCON ASSESS: CPT | Performed by: NURSE PRACTITIONER

## 2024-07-22 PROCEDURE — 3078F DIAST BP <80 MM HG: CPT | Performed by: NURSE PRACTITIONER

## 2024-07-22 RX ORDER — OXYCODONE HYDROCHLORIDE 5 MG/1
TABLET ORAL
COMMUNITY
Start: 2024-07-02

## 2024-07-22 NOTE — PATIENT INSTRUCTIONS
Eat and drink nothing after midnight the night before the planned procedure. Stop all aspirin, ibuprofen, aleve (tylenol/acetaminophen is ok) for seven days prior to the procedure.    Take half dose of your Lantus at night the night before surgery

## 2024-07-23 DIAGNOSIS — Z01.818 PRE-OP EXAM: ICD-10-CM

## 2024-07-23 DIAGNOSIS — M48.07 SPINAL STENOSIS OF LUMBOSACRAL REGION: ICD-10-CM

## 2024-07-23 DIAGNOSIS — E03.9 HYPOTHYROIDISM, UNSPECIFIED TYPE: ICD-10-CM

## 2024-07-23 PROBLEM — R09.82 POST-NASAL DRIP: Status: RESOLVED | Noted: 2019-07-10 | Resolved: 2024-07-23

## 2024-07-23 PROBLEM — T14.8XXA WOUND DRAINAGE: Status: RESOLVED | Noted: 2018-07-20 | Resolved: 2024-07-23

## 2024-07-23 PROBLEM — E87.1 HYPONATREMIA: Status: RESOLVED | Noted: 2018-07-20 | Resolved: 2024-07-23

## 2024-07-23 LAB
BASOPHILS # BLD: 0 K/UL (ref 0–0.2)
BASOPHILS NFR BLD: 0.4 %
DEPRECATED RDW RBC AUTO: 14 % (ref 12.4–15.4)
EOSINOPHIL # BLD: 0.1 K/UL (ref 0–0.6)
EOSINOPHIL NFR BLD: 1.9 %
HCT VFR BLD AUTO: 40.3 % (ref 36–48)
HGB BLD-MCNC: 13.6 G/DL (ref 12–16)
LYMPHOCYTES # BLD: 0.8 K/UL (ref 1–5.1)
LYMPHOCYTES NFR BLD: 17.2 %
MCH RBC QN AUTO: 29.2 PG (ref 26–34)
MCHC RBC AUTO-ENTMCNC: 33.8 G/DL (ref 31–36)
MCV RBC AUTO: 86.4 FL (ref 80–100)
MONOCYTES # BLD: 0.4 K/UL (ref 0–1.3)
MONOCYTES NFR BLD: 7.9 %
NEUTROPHILS # BLD: 3.4 K/UL (ref 1.7–7.7)
NEUTROPHILS NFR BLD: 72.6 %
PLATELET # BLD AUTO: 267 K/UL (ref 135–450)
PMV BLD AUTO: 8.7 FL (ref 5–10.5)
RBC # BLD AUTO: 4.66 M/UL (ref 4–5.2)
TSH SERPL DL<=0.005 MIU/L-ACNC: 1.12 UIU/ML (ref 0.27–4.2)
WBC # BLD AUTO: 4.7 K/UL (ref 4–11)

## 2024-07-23 ASSESSMENT — ENCOUNTER SYMPTOMS
SHORTNESS OF BREATH: 0
BACK PAIN: 1
NAUSEA: 0
COUGH: 0
VOMITING: 0
DIARRHEA: 0

## 2024-07-24 LAB
ALBUMIN SERPL-MCNC: 4.5 G/DL (ref 3.4–5)
ALBUMIN/GLOB SERPL: 1.8 {RATIO} (ref 1.1–2.2)
ALP SERPL-CCNC: 127 U/L (ref 40–129)
ALT SERPL-CCNC: 18 U/L (ref 10–40)
ANION GAP SERPL CALCULATED.3IONS-SCNC: 14 MMOL/L (ref 3–16)
AST SERPL-CCNC: 19 U/L (ref 15–37)
BILIRUB SERPL-MCNC: 0.6 MG/DL (ref 0–1)
BUN SERPL-MCNC: 8 MG/DL (ref 7–20)
CALCIUM SERPL-MCNC: 9.5 MG/DL (ref 8.3–10.6)
CHLORIDE SERPL-SCNC: 103 MMOL/L (ref 99–110)
CO2 SERPL-SCNC: 28 MMOL/L (ref 21–32)
CREAT SERPL-MCNC: 0.6 MG/DL (ref 0.6–1.2)
GFR SERPLBLD CREATININE-BSD FMLA CKD-EPI: >90 ML/MIN/{1.73_M2}
GLUCOSE SERPL-MCNC: 102 MG/DL (ref 70–99)
POTASSIUM SERPL-SCNC: 4.1 MMOL/L (ref 3.5–5.1)
PROT SERPL-MCNC: 7 G/DL (ref 6.4–8.2)
SODIUM SERPL-SCNC: 145 MMOL/L (ref 136–145)

## 2024-07-26 ENCOUNTER — TELEPHONE (OUTPATIENT)
Dept: FAMILY MEDICINE CLINIC | Age: 74
End: 2024-07-26

## 2024-07-26 NOTE — TELEPHONE ENCOUNTER
Pt called to see if Batsheva need to see her before clearing her for the surgery on 8/9.     Please call back and advise.

## 2024-07-29 ENCOUNTER — TELEPHONE (OUTPATIENT)
Dept: FAMILY MEDICINE CLINIC | Age: 74
End: 2024-07-29

## 2024-07-29 DIAGNOSIS — F32.A DEPRESSION, UNSPECIFIED DEPRESSION TYPE: ICD-10-CM

## 2024-07-29 RX ORDER — OLANZAPINE 5 MG/1
5 TABLET ORAL NIGHTLY
Qty: 90 TABLET | Refills: 3 | Status: SHIPPED | OUTPATIENT
Start: 2024-07-29

## 2024-07-29 NOTE — TELEPHONE ENCOUNTER
Patient requesting a refill of the following;    OLANZapine (ZYPREXA) 5 MG tablet     [id number] - [place], [place] - [facility] - P [phone] - F [phone] [[id number]]

## 2024-08-22 ENCOUNTER — OFFICE VISIT (OUTPATIENT)
Dept: PAIN MANAGEMENT | Age: 74
End: 2024-08-22
Payer: MEDICARE

## 2024-08-22 VITALS — OXYGEN SATURATION: 96 % | SYSTOLIC BLOOD PRESSURE: 120 MMHG | HEART RATE: 90 BPM | DIASTOLIC BLOOD PRESSURE: 73 MMHG

## 2024-08-22 DIAGNOSIS — G89.4 CHRONIC PAIN SYNDROME: ICD-10-CM

## 2024-08-22 DIAGNOSIS — M79.7 FIBROMYALGIA: ICD-10-CM

## 2024-08-22 DIAGNOSIS — M48.07 SPINAL STENOSIS OF LUMBOSACRAL REGION: ICD-10-CM

## 2024-08-22 DIAGNOSIS — M96.1 FAILED BACK SURGICAL SYNDROME: ICD-10-CM

## 2024-08-22 DIAGNOSIS — M48.061 DEGENERATIVE LUMBAR SPINAL STENOSIS: ICD-10-CM

## 2024-08-22 DIAGNOSIS — G56.22 ULNAR NEUROPATHY AT ELBOW, LEFT: ICD-10-CM

## 2024-08-22 DIAGNOSIS — M96.1 FAILED NECK SYNDROME: ICD-10-CM

## 2024-08-22 PROCEDURE — 1036F TOBACCO NON-USER: CPT | Performed by: INTERNAL MEDICINE

## 2024-08-22 PROCEDURE — 3017F COLORECTAL CA SCREEN DOC REV: CPT | Performed by: INTERNAL MEDICINE

## 2024-08-22 PROCEDURE — 3078F DIAST BP <80 MM HG: CPT | Performed by: INTERNAL MEDICINE

## 2024-08-22 PROCEDURE — 3074F SYST BP LT 130 MM HG: CPT | Performed by: INTERNAL MEDICINE

## 2024-08-22 PROCEDURE — G8399 PT W/DXA RESULTS DOCUMENT: HCPCS | Performed by: INTERNAL MEDICINE

## 2024-08-22 PROCEDURE — 99213 OFFICE O/P EST LOW 20 MIN: CPT | Performed by: INTERNAL MEDICINE

## 2024-08-22 PROCEDURE — 1090F PRES/ABSN URINE INCON ASSESS: CPT | Performed by: INTERNAL MEDICINE

## 2024-08-22 PROCEDURE — 1123F ACP DISCUSS/DSCN MKR DOCD: CPT | Performed by: INTERNAL MEDICINE

## 2024-08-22 PROCEDURE — G8427 DOCREV CUR MEDS BY ELIG CLIN: HCPCS | Performed by: INTERNAL MEDICINE

## 2024-08-22 PROCEDURE — G8417 CALC BMI ABV UP PARAM F/U: HCPCS | Performed by: INTERNAL MEDICINE

## 2024-08-23 NOTE — PROGRESS NOTES
Raya Adrian  1950  [id number]      HISTORY OF PRESENT ILLNESS:  Ms. Adrian is a 73 y.o. female returns for a follow up visit for pain management  She has a diagnosis of   1. Chronic pain syndrome    2. Degenerative lumbar spinal stenosis    3. Spinal stenosis of lumbosacral region    4. Ulnar neuropathy at elbow, left    5. Failed neck syndrome    6. Fibromyalgia    7. Failed back surgical syndrome    .      As per information/history obtained from the PADT(patient assessment and documentation tool) -  She complains of pain in the abdomen and lower back She rates the pain 8/10 and describes it as aching, burning.  Pain is made worse by: movement, walking, standing, sitting, bending, lifting. She denies any side effects from the current pain regimen. Patient reports that since last follow up visit the physical functioning is worse, family/social relationships are unchanged, mood is unchanged sleep patterns are unchanged. Ms. Adrian states that since starting the treatment with the current regimen the  overall functioning  in the above aspects is  better, Patient denies misusing/abusing her narcotic pain medications or using any illegal drugs.  There are No indicators for possible drug abuse, addiction or diversion problems. Upon obtaining the medical history from Ms. Adrian regarding today's office visit for her presenting problems, patient states it has been 2 weeks since back surgery, she states it was \"6 hours long,\" she says surgery has helped with the right leg but her back is still hurting. Ms. Adrian says she has a UTI also. Patient reports she has been walking and doing physical therapy. She mentions she is getting OxyContin along with Oxycodone for breakthrough pain from the Rehab center.       ALLERGIES: Patients list of allergies were reviewed     MEDICATIONS: Ms. Adrian list of medications were reviewed.Her current medications are   Outpatient Medications Prior to Visit  glucose monitor kit and supplies Test 4 times a day & as needed for symptoms of irregular blood glucose. 1 kit 0    blood glucose monitor strips Test 4 times a day & as needed for symptoms of irregular blood glucose. Dispense sufficient amount for indicated testing frequency plus additional to accommodate PRN testing needs. 400 strip 0    Handicap Placard MISC by Does not apply route 1 each 0    Needles & Syringes MISC Inject 1 each into the skin 3 times daily 100 each 3    denosumab (PROLIA) 60 MG/ML SOSY SC injection Inject 1 mL into the skin once for 1 dose 1 mL 0     No current facility-administered medications for this visit.       General Goals of current treatment regimen include improvement in pain, restoration of functioning- with focus on improvement in physical performance, general activity, work or disability,emotional distress, health care utilization and  decreased medication consumption.   Will continue to monitor progress towards achieving/maintaining therapeutic goals with special emphasis on  1. Improvement in perceived interfernce  of pain with ADL's. Ability to do home exercises independently. Ability to do household chores indoor and/or outdoor work and social and leisure activities.Improve psychosocial and physical functioning. - she is maintaining her treatment goal with the current regimen.      She was advised against drinking alcohol with the narcotic pain medicines, advised against driving or handling machinery while adjusting the dose of medicines or if having cognitive  issues related to the current medications.Risk of overdose and death, if medicines not taken as prescribed, were also discussed. If the patient develops new symptoms or if the symptoms worsen, the patient should call the office.    Thank you for allowing me to participate in the care of this patient.      Cc: , Batsheva Norton, APRN - CNP

## 2024-09-13 ENCOUNTER — TELEPHONE (OUTPATIENT)
Dept: PAIN MANAGEMENT | Age: 74
End: 2024-09-13

## 2024-09-13 ENCOUNTER — TELEPHONE (OUTPATIENT)
Dept: FAMILY MEDICINE CLINIC | Age: 74
End: 2024-09-13

## 2024-09-16 ENCOUNTER — TELEPHONE (OUTPATIENT)
Dept: FAMILY MEDICINE CLINIC | Age: 74
End: 2024-09-16

## 2024-09-18 ENCOUNTER — OFFICE VISIT (OUTPATIENT)
Dept: FAMILY MEDICINE CLINIC | Age: 74
End: 2024-09-18
Payer: MEDICARE

## 2024-09-18 VITALS
HEART RATE: 76 BPM | SYSTOLIC BLOOD PRESSURE: 130 MMHG | DIASTOLIC BLOOD PRESSURE: 70 MMHG | BODY MASS INDEX: 26.58 KG/M2 | TEMPERATURE: 97 F | WEIGHT: 150 LBS | OXYGEN SATURATION: 94 % | RESPIRATION RATE: 16 BRPM | HEIGHT: 63 IN

## 2024-09-18 DIAGNOSIS — Z98.1 S/P LUMBAR FUSION: ICD-10-CM

## 2024-09-18 DIAGNOSIS — E11.69 TYPE 2 DIABETES MELLITUS WITH OTHER SPECIFIED COMPLICATION, WITH LONG-TERM CURRENT USE OF INSULIN (HCC): ICD-10-CM

## 2024-09-18 DIAGNOSIS — Z79.4 TYPE 2 DIABETES MELLITUS WITH OTHER SPECIFIED COMPLICATION, WITH LONG-TERM CURRENT USE OF INSULIN (HCC): ICD-10-CM

## 2024-09-18 DIAGNOSIS — R60.0 PEDAL EDEMA: ICD-10-CM

## 2024-09-18 DIAGNOSIS — Z09 HOSPITAL DISCHARGE FOLLOW-UP: Primary | ICD-10-CM

## 2024-09-18 DIAGNOSIS — E03.9 HYPOTHYROIDISM, UNSPECIFIED TYPE: ICD-10-CM

## 2024-09-18 DIAGNOSIS — F41.9 ANXIETY: ICD-10-CM

## 2024-09-18 LAB
ANION GAP SERPL CALCULATED.3IONS-SCNC: 12 MMOL/L (ref 3–16)
BASOPHILS # BLD: 0 K/UL (ref 0–0.2)
BASOPHILS NFR BLD: 0.3 %
BUN SERPL-MCNC: 11 MG/DL (ref 7–20)
CALCIUM SERPL-MCNC: 8.9 MG/DL (ref 8.3–10.6)
CHLORIDE SERPL-SCNC: 98 MMOL/L (ref 99–110)
CO2 SERPL-SCNC: 23 MMOL/L (ref 21–32)
CREAT SERPL-MCNC: 0.8 MG/DL (ref 0.6–1.2)
DEPRECATED RDW RBC AUTO: 13.8 % (ref 12.4–15.4)
EOSINOPHIL # BLD: 0.1 K/UL (ref 0–0.6)
EOSINOPHIL NFR BLD: 1.5 %
GFR SERPLBLD CREATININE-BSD FMLA CKD-EPI: 76 ML/MIN/{1.73_M2}
GLUCOSE SERPL-MCNC: 155 MG/DL (ref 70–99)
HCT VFR BLD AUTO: 33.2 % (ref 36–48)
HGB BLD-MCNC: 10.9 G/DL (ref 12–16)
LYMPHOCYTES # BLD: 0.8 K/UL (ref 1–5.1)
LYMPHOCYTES NFR BLD: 10.9 %
MCH RBC QN AUTO: 28.5 PG (ref 26–34)
MCHC RBC AUTO-ENTMCNC: 32.9 G/DL (ref 31–36)
MCV RBC AUTO: 86.7 FL (ref 80–100)
MONOCYTES # BLD: 0.6 K/UL (ref 0–1.3)
MONOCYTES NFR BLD: 7.5 %
NEUTROPHILS # BLD: 6.1 K/UL (ref 1.7–7.7)
NEUTROPHILS NFR BLD: 79.8 %
PLATELET # BLD AUTO: 282 K/UL (ref 135–450)
PMV BLD AUTO: 8.4 FL (ref 5–10.5)
POTASSIUM SERPL-SCNC: 4.6 MMOL/L (ref 3.5–5.1)
RBC # BLD AUTO: 3.83 M/UL (ref 4–5.2)
SODIUM SERPL-SCNC: 133 MMOL/L (ref 136–145)
WBC # BLD AUTO: 7.6 K/UL (ref 4–11)

## 2024-09-18 PROCEDURE — G8427 DOCREV CUR MEDS BY ELIG CLIN: HCPCS | Performed by: NURSE PRACTITIONER

## 2024-09-18 PROCEDURE — 2022F DILAT RTA XM EVC RTNOPTHY: CPT | Performed by: NURSE PRACTITIONER

## 2024-09-18 PROCEDURE — 1036F TOBACCO NON-USER: CPT | Performed by: NURSE PRACTITIONER

## 2024-09-18 PROCEDURE — 1090F PRES/ABSN URINE INCON ASSESS: CPT | Performed by: NURSE PRACTITIONER

## 2024-09-18 PROCEDURE — 1111F DSCHRG MED/CURRENT MED MERGE: CPT | Performed by: NURSE PRACTITIONER

## 2024-09-18 PROCEDURE — 3017F COLORECTAL CA SCREEN DOC REV: CPT | Performed by: NURSE PRACTITIONER

## 2024-09-18 PROCEDURE — 3052F HG A1C>EQUAL 8.0%<EQUAL 9.0%: CPT | Performed by: NURSE PRACTITIONER

## 2024-09-18 PROCEDURE — G8399 PT W/DXA RESULTS DOCUMENT: HCPCS | Performed by: NURSE PRACTITIONER

## 2024-09-18 PROCEDURE — 99214 OFFICE O/P EST MOD 30 MIN: CPT | Performed by: NURSE PRACTITIONER

## 2024-09-18 PROCEDURE — G8417 CALC BMI ABV UP PARAM F/U: HCPCS | Performed by: NURSE PRACTITIONER

## 2024-09-18 PROCEDURE — 3075F SYST BP GE 130 - 139MM HG: CPT | Performed by: NURSE PRACTITIONER

## 2024-09-18 PROCEDURE — 1123F ACP DISCUSS/DSCN MKR DOCD: CPT | Performed by: NURSE PRACTITIONER

## 2024-09-18 PROCEDURE — 3078F DIAST BP <80 MM HG: CPT | Performed by: NURSE PRACTITIONER

## 2024-09-18 SDOH — ECONOMIC STABILITY: FOOD INSECURITY: WITHIN THE PAST 12 MONTHS, THE FOOD YOU BOUGHT JUST DIDN'T LAST AND YOU DIDN'T HAVE MONEY TO GET MORE.: NEVER TRUE

## 2024-09-18 SDOH — ECONOMIC STABILITY: INCOME INSECURITY: HOW HARD IS IT FOR YOU TO PAY FOR THE VERY BASICS LIKE FOOD, HOUSING, MEDICAL CARE, AND HEATING?: NOT HARD AT ALL

## 2024-09-18 SDOH — ECONOMIC STABILITY: FOOD INSECURITY: WITHIN THE PAST 12 MONTHS, YOU WORRIED THAT YOUR FOOD WOULD RUN OUT BEFORE YOU GOT MONEY TO BUY MORE.: NEVER TRUE

## 2024-09-18 ASSESSMENT — ENCOUNTER SYMPTOMS
COUGH: 0
SHORTNESS OF BREATH: 0
NAUSEA: 0
BACK PAIN: 1
DIARRHEA: 0
VOMITING: 0

## 2024-09-18 ASSESSMENT — PATIENT HEALTH QUESTIONNAIRE - PHQ9
SUM OF ALL RESPONSES TO PHQ QUESTIONS 1-9: 0
3. TROUBLE FALLING OR STAYING ASLEEP: NOT AT ALL
DEPRESSION UNABLE TO ASSESS: FUNCTIONAL CAPACITY MOTIVATION LIMITS ACCURACY
4. FEELING TIRED OR HAVING LITTLE ENERGY: NOT AT ALL
1. LITTLE INTEREST OR PLEASURE IN DOING THINGS: NOT AT ALL
2. FEELING DOWN, DEPRESSED OR HOPELESS: NOT AT ALL
SUM OF ALL RESPONSES TO PHQ9 QUESTIONS 1 & 2: 0
SUM OF ALL RESPONSES TO PHQ QUESTIONS 1-9: 0
7. TROUBLE CONCENTRATING ON THINGS, SUCH AS READING THE NEWSPAPER OR WATCHING TELEVISION: NOT AT ALL
SUM OF ALL RESPONSES TO PHQ QUESTIONS 1-9: 0
10. IF YOU CHECKED OFF ANY PROBLEMS, HOW DIFFICULT HAVE THESE PROBLEMS MADE IT FOR YOU TO DO YOUR WORK, TAKE CARE OF THINGS AT HOME, OR GET ALONG WITH OTHER PEOPLE: NOT DIFFICULT AT ALL
9. THOUGHTS THAT YOU WOULD BE BETTER OFF DEAD, OR OF HURTING YOURSELF: NOT AT ALL
SUM OF ALL RESPONSES TO PHQ QUESTIONS 1-9: 0
6. FEELING BAD ABOUT YOURSELF - OR THAT YOU ARE A FAILURE OR HAVE LET YOURSELF OR YOUR FAMILY DOWN: NOT AT ALL
8. MOVING OR SPEAKING SO SLOWLY THAT OTHER PEOPLE COULD HAVE NOTICED. OR THE OPPOSITE, BEING SO FIGETY OR RESTLESS THAT YOU HAVE BEEN MOVING AROUND A LOT MORE THAN USUAL: NOT AT ALL
5. POOR APPETITE OR OVEREATING: NOT AT ALL

## 2024-09-18 ASSESSMENT — ANXIETY QUESTIONNAIRES
IF YOU CHECKED OFF ANY PROBLEMS ON THIS QUESTIONNAIRE, HOW DIFFICULT HAVE THESE PROBLEMS MADE IT FOR YOU TO DO YOUR WORK, TAKE CARE OF THINGS AT HOME, OR GET ALONG WITH OTHER PEOPLE: NOT DIFFICULT AT ALL
7. FEELING AFRAID AS IF SOMETHING AWFUL MIGHT HAPPEN: NOT AT ALL
5. BEING SO RESTLESS THAT IT IS HARD TO SIT STILL: NOT AT ALL
1. FEELING NERVOUS, ANXIOUS, OR ON EDGE: NOT AT ALL
GAD7 TOTAL SCORE: 0
6. BECOMING EASILY ANNOYED OR IRRITABLE: NOT AT ALL
2. NOT BEING ABLE TO STOP OR CONTROL WORRYING: NOT AT ALL
3. WORRYING TOO MUCH ABOUT DIFFERENT THINGS: NOT AT ALL
4. TROUBLE RELAXING: NOT AT ALL

## 2024-09-20 ENCOUNTER — TELEPHONE (OUTPATIENT)
Dept: FAMILY MEDICINE CLINIC | Age: 74
End: 2024-09-20

## 2024-09-20 ENCOUNTER — OFFICE VISIT (OUTPATIENT)
Dept: PAIN MANAGEMENT | Age: 74
End: 2024-09-20
Payer: MEDICARE

## 2024-09-20 VITALS
WEIGHT: 150 LBS | HEART RATE: 76 BPM | SYSTOLIC BLOOD PRESSURE: 126 MMHG | BODY MASS INDEX: 26.57 KG/M2 | DIASTOLIC BLOOD PRESSURE: 68 MMHG

## 2024-09-20 DIAGNOSIS — M48.07 SPINAL STENOSIS OF LUMBOSACRAL REGION: ICD-10-CM

## 2024-09-20 DIAGNOSIS — M96.1 FAILED BACK SURGICAL SYNDROME: ICD-10-CM

## 2024-09-20 DIAGNOSIS — Z91.89 AT RISK FOR RESPIRATORY DEPRESSION DUE TO OPIOID: ICD-10-CM

## 2024-09-20 DIAGNOSIS — M48.061 DEGENERATIVE LUMBAR SPINAL STENOSIS: ICD-10-CM

## 2024-09-20 DIAGNOSIS — M96.1 FAILED NECK SYNDROME: ICD-10-CM

## 2024-09-20 DIAGNOSIS — M79.7 FIBROMYALGIA: ICD-10-CM

## 2024-09-20 DIAGNOSIS — G89.4 CHRONIC PAIN SYNDROME: ICD-10-CM

## 2024-09-20 DIAGNOSIS — G56.22 ULNAR NEUROPATHY AT ELBOW, LEFT: ICD-10-CM

## 2024-09-20 PROCEDURE — 3017F COLORECTAL CA SCREEN DOC REV: CPT | Performed by: INTERNAL MEDICINE

## 2024-09-20 PROCEDURE — G8427 DOCREV CUR MEDS BY ELIG CLIN: HCPCS | Performed by: INTERNAL MEDICINE

## 2024-09-20 PROCEDURE — 3078F DIAST BP <80 MM HG: CPT | Performed by: INTERNAL MEDICINE

## 2024-09-20 PROCEDURE — G8417 CALC BMI ABV UP PARAM F/U: HCPCS | Performed by: INTERNAL MEDICINE

## 2024-09-20 PROCEDURE — 1036F TOBACCO NON-USER: CPT | Performed by: INTERNAL MEDICINE

## 2024-09-20 PROCEDURE — G8399 PT W/DXA RESULTS DOCUMENT: HCPCS | Performed by: INTERNAL MEDICINE

## 2024-09-20 PROCEDURE — 99213 OFFICE O/P EST LOW 20 MIN: CPT | Performed by: INTERNAL MEDICINE

## 2024-09-20 PROCEDURE — 1090F PRES/ABSN URINE INCON ASSESS: CPT | Performed by: INTERNAL MEDICINE

## 2024-09-20 PROCEDURE — 1123F ACP DISCUSS/DSCN MKR DOCD: CPT | Performed by: INTERNAL MEDICINE

## 2024-09-20 PROCEDURE — 3074F SYST BP LT 130 MM HG: CPT | Performed by: INTERNAL MEDICINE

## 2024-09-20 RX ORDER — OXYCODONE AND ACETAMINOPHEN 7.5; 325 MG/1; MG/1
1 TABLET ORAL EVERY 6 HOURS PRN
Qty: 100 TABLET | Refills: 0 | Status: SHIPPED | OUTPATIENT
Start: 2024-09-20 | End: 2024-10-18

## 2024-09-20 RX ORDER — GABAPENTIN 400 MG/1
400 CAPSULE ORAL 3 TIMES DAILY
Qty: 90 CAPSULE | Refills: 1 | Status: SHIPPED | OUTPATIENT
Start: 2024-09-20 | End: 2024-11-19

## 2024-10-04 ENCOUNTER — TELEPHONE (OUTPATIENT)
Dept: FAMILY MEDICINE CLINIC | Age: 74
End: 2024-10-04

## 2024-10-04 NOTE — TELEPHONE ENCOUNTER
Patient needs a refill on Robaxin. They need a 30 day supply.     Mail order or local pharmacy: local    Pharmacy:     Last OV: 9/18/24    Future Appointments   Date Time Provider Department Center   10/18/2024 11:45 AM Larry De Los Santos MD R [facility] PAIN [facility]   11/4/2024  1:00 PM Batsheva Mora, APRN - CNP YUNIOR [facility] E[facility] DEP   1/15/2025  9:45 AM SCHEDULE, BONE HEALTH & OSTEOPOROSIS PHYS [facility]&O [facility]   7/28/2025  9:20 AM DEXA [facility] [employer]   7/28/2025  9:40 [facility]

## 2024-10-04 NOTE — TELEPHONE ENCOUNTER
Future Appointments   Date Time Provider Department Center   10/18/2024 11:45 AM Larry De Los Santos MD R BANK PAIN [facility]   11/4/2024  1:00 PM Batsheva Mora APRN - CNP YUNIOR [facility]   1/15/2025  9:45 AM SCHEDULE, BONE HEALTH & OSTEOPOROSIS MHPHYS &O [facility]   7/28/2025  9:20 AM DEXA [facility] RAD [employer]   7/28/2025  9:40 [facility] 9/18/2024

## 2024-10-07 RX ORDER — METHOCARBAMOL 750 MG/1
750 TABLET, FILM COATED ORAL 4 TIMES DAILY
Qty: 120 TABLET | Refills: 0 | Status: SHIPPED | OUTPATIENT
Start: 2024-10-07

## 2024-10-18 ENCOUNTER — OFFICE VISIT (OUTPATIENT)
Dept: PAIN MANAGEMENT | Age: 74
End: 2024-10-18

## 2024-10-18 VITALS
DIASTOLIC BLOOD PRESSURE: 67 MMHG | WEIGHT: 144 LBS | BODY MASS INDEX: 25.51 KG/M2 | HEART RATE: 97 BPM | SYSTOLIC BLOOD PRESSURE: 119 MMHG

## 2024-10-18 DIAGNOSIS — M48.07 SPINAL STENOSIS OF LUMBOSACRAL REGION: ICD-10-CM

## 2024-10-18 DIAGNOSIS — F51.01 PRIMARY INSOMNIA: ICD-10-CM

## 2024-10-18 DIAGNOSIS — M96.1 FAILED BACK SURGICAL SYNDROME: ICD-10-CM

## 2024-10-18 DIAGNOSIS — M48.061 DEGENERATIVE LUMBAR SPINAL STENOSIS: ICD-10-CM

## 2024-10-18 DIAGNOSIS — F39 MOOD DISORDER (HCC): ICD-10-CM

## 2024-10-18 DIAGNOSIS — M79.7 FIBROMYALGIA: ICD-10-CM

## 2024-10-18 DIAGNOSIS — G56.03 BILATERAL CARPAL TUNNEL SYNDROME: ICD-10-CM

## 2024-10-18 DIAGNOSIS — G89.4 CHRONIC PAIN SYNDROME: ICD-10-CM

## 2024-10-18 DIAGNOSIS — G56.22 ULNAR NEUROPATHY AT ELBOW, LEFT: ICD-10-CM

## 2024-10-18 DIAGNOSIS — Z91.89 AT RISK FOR RESPIRATORY DEPRESSION DUE TO OPIOID: ICD-10-CM

## 2024-10-18 DIAGNOSIS — M96.1 FAILED NECK SYNDROME: ICD-10-CM

## 2024-10-18 RX ORDER — OXYCODONE AND ACETAMINOPHEN 7.5; 325 MG/1; MG/1
1 TABLET ORAL 3 TIMES DAILY PRN
Qty: 84 TABLET | Refills: 0 | Status: SHIPPED | OUTPATIENT
Start: 2024-10-18 | End: 2024-11-15

## 2024-10-18 RX ORDER — GABAPENTIN 400 MG/1
400 CAPSULE ORAL 3 TIMES DAILY
Qty: 90 CAPSULE | Refills: 1 | Status: SHIPPED | OUTPATIENT
Start: 2024-10-18 | End: 2024-12-17

## 2024-10-18 NOTE — PROGRESS NOTES
Raya Adrian  1950  [id number]      HISTORY OF PRESENT ILLNESS:  Ms. Adrian is a 74 y.o. female returns for a follow up visit for pain management  She has a diagnosis of   1. Chronic pain syndrome    2. Fibromyalgia    3. Ulnar neuropathy at elbow, left    4. Failed neck syndrome    5. At risk for respiratory depression due to opioid    6. Spinal stenosis of lumbosacral region    7. Primary insomnia    8. Degenerative lumbar spinal stenosis    9. Failed back surgical syndrome    10. Bilateral carpal tunnel syndrome    11. Mood disorder (HCC)    .      As per information/history ol  obtained from the PADT(patient assessment and documentation tool) -  She complains of pain in the lower back with radiation to the buttocks, hips Left, upper leg Left, knees Left, lower leg Left, ankles Left, and feet Left She rates the pain 4/10 and describes it as aching.  Pain is made worse by: movement, bending. She denies any side effects from the current pain regimen. Patient reports that since last follow up visit the physical functioning is unchanged, family/social relationships are unchanged, mood is unchanged sleep patterns are unchanged. Ms. Adrian states that since starting the treatment with the current regimen the  overall functioning  in the above aspects is  better, Patient denies misusing/abusing her narcotic pain medications or using any illegal drugs.  There are No indicators for possible drug abuse, addiction or diversion problems.   Upon obtaining the medical history from Ms. Adrian regarding today's office visit for her presenting problems, patient states she is doing fair, recovering from her surgery. He mentions she using at home. He mentions he is doing most of her house work, part time doing at home. He says he's using Percocet 3 per day mostly.   ALLERGIES: Patients list of allergies were reviewed     MEDICATIONS: Ms. Adrian list of medications were reviewed.Her current medications

## 2024-10-29 DIAGNOSIS — F32.A DEPRESSION, UNSPECIFIED DEPRESSION TYPE: ICD-10-CM

## 2024-10-29 NOTE — TELEPHONE ENCOUNTER
Future Appointments   Date Time Provider Department Center   11/4/2024  1:00 PM Batsheva Mora, SAI - CNP YUNIOR [facility]   11/15/2024 12:15 PM Larry De Los Santos MD R BANK PAIN [facility]   1/15/2025  9:45 AM SCHEDULE, [facility] &O [facility]   7/28/2025  9:20 AM DEXA [facility] RAD [facility]   7/28/2025  9:40 [facility]             LOV 9/18/2024

## 2024-10-31 ENCOUNTER — OFFICE VISIT (OUTPATIENT)
Dept: FAMILY MEDICINE CLINIC | Age: 74
End: 2024-10-31

## 2024-10-31 VITALS — OXYGEN SATURATION: 98 % | TEMPERATURE: 98 F | HEART RATE: 61 BPM | WEIGHT: 149 LBS | BODY MASS INDEX: 26.39 KG/M2

## 2024-10-31 DIAGNOSIS — R30.0 DYSURIA: Primary | ICD-10-CM

## 2024-10-31 LAB
BILIRUBIN, POC: NORMAL
BLOOD URINE, POC: NORMAL
CLARITY, POC: CLEAR
COLOR, POC: YELLOW
GLUCOSE URINE, POC: 1000 MG/DL
KETONES, POC: NORMAL MG/DL
LEUKOCYTE EST, POC: NORMAL
NITRITE, POC: NORMAL
PH, POC: 7
PROTEIN, POC: NORMAL MG/DL
SPECIFIC GRAVITY, POC: 1.01
UROBILINOGEN, POC: 0.2 MG/DL

## 2024-10-31 RX ORDER — SERTRALINE HYDROCHLORIDE 100 MG/1
100 TABLET, FILM COATED ORAL 2 TIMES DAILY
Qty: 180 TABLET | Refills: 1 | Status: SHIPPED | OUTPATIENT
Start: 2024-10-31

## 2024-10-31 RX ORDER — NITROFURANTOIN 25; 75 MG/1; MG/1
100 CAPSULE ORAL 2 TIMES DAILY
Qty: 14 CAPSULE | Refills: 0 | Status: SHIPPED | OUTPATIENT
Start: 2024-10-31 | End: 2024-11-07

## 2024-10-31 NOTE — PROGRESS NOTES
SUBJECTIVE: Raya Adrian is a 74 y.o. female who complains of urinary frequency, urgency and dysuria x 1 days, without flank pain, fever, chills, or abnormal vaginal discharge or bleeding. Had UTI's in the past  and had several urethral dilatations. Had taken macrobid in the past. Will take med pending culture results.     OBJECTIVE: Appears well, in no apparent distress.  Vital signs are normal. The abdomen is soft with mild tenderness, over the bladder area,  no  guarding, mass, rebound or organomegaly. No CVA tenderness or inguinal adenopathy noted. Urine dipstick shows   omponent  Ref Range & Units 10/31/24 1311 7/22/24 1103 5/7/24 1633 12/8/23 1027 9/1/23 1604 9/1/23 1538 7/25/23 1611   Color, UA yellow         Clarity, UA clear         Glucose, UA POC  mg/dL 1,000 Negative R neg R 100 mg/dL R n R Neg R Negativve R   Bilirubin, UA neg Negative neg Neg n neg Small   Ketones, UA  mg/dL neg Negative R neg R Neg R n R neg R Trace R   Spec Grav, UA 1.015 1.010 1.010 1.010 1.015 1.020 1.020   Blood, UA POC trace Negative neg Neg n neg Negative   pH, UA 7.0 6.0 6.0 5.5 7.0 7.5 5.0   Protein, UA POC  mg/dL neg Negative R neg R Neg R n R neg R Negative R   Urobilinogen, UA  mg/dL 0.2 0.2 E.U./dl R 0.2 R 0.2 E.U/dL R 0.2eu/dL R 0.2 R 0.2 E.U./dL R   Leukocytes, UA trace Negative neg Neg n neg Trace   Nitrite, UA  Negative neg Neg n neg Negative                  ASSESSMENT: UTI uncomplicated without evidence of pyelonephritis    PLAN: Treatment per orders - also push fluids,   Will take macrobid pending culture results   Call or return to clinic prn if these symptoms worsen or fail to improve as anticipated.

## 2024-11-02 LAB — BACTERIA UR CULT: NORMAL

## 2024-11-04 ENCOUNTER — OFFICE VISIT (OUTPATIENT)
Dept: FAMILY MEDICINE CLINIC | Age: 74
End: 2024-11-04

## 2024-11-04 VITALS
SYSTOLIC BLOOD PRESSURE: 137 MMHG | TEMPERATURE: 97.1 F | HEART RATE: 85 BPM | RESPIRATION RATE: 16 BRPM | OXYGEN SATURATION: 96 % | DIASTOLIC BLOOD PRESSURE: 77 MMHG | BODY MASS INDEX: 26.04 KG/M2 | WEIGHT: 147 LBS

## 2024-11-04 DIAGNOSIS — F51.01 PRIMARY INSOMNIA: ICD-10-CM

## 2024-11-04 DIAGNOSIS — I10 ESSENTIAL HYPERTENSION: ICD-10-CM

## 2024-11-04 DIAGNOSIS — E11.69 TYPE 2 DIABETES MELLITUS WITH OTHER SPECIFIED COMPLICATION, WITH LONG-TERM CURRENT USE OF INSULIN (HCC): Primary | ICD-10-CM

## 2024-11-04 DIAGNOSIS — Z98.1 S/P LUMBAR FUSION: ICD-10-CM

## 2024-11-04 DIAGNOSIS — F32.A DEPRESSION, UNSPECIFIED DEPRESSION TYPE: ICD-10-CM

## 2024-11-04 DIAGNOSIS — E55.9 VITAMIN D DEFICIENCY: ICD-10-CM

## 2024-11-04 DIAGNOSIS — Z79.4 TYPE 2 DIABETES MELLITUS WITH OTHER SPECIFIED COMPLICATION, WITH LONG-TERM CURRENT USE OF INSULIN (HCC): Primary | ICD-10-CM

## 2024-11-04 LAB — HBA1C MFR BLD: 8.7 %

## 2024-11-04 RX ORDER — BLOOD-GLUCOSE SENSOR
EACH MISCELLANEOUS
Qty: 2 EACH | Refills: 3 | Status: SHIPPED | OUTPATIENT
Start: 2024-11-04

## 2024-11-04 RX ORDER — INSULIN GLARGINE 100 [IU]/ML
INJECTION, SOLUTION SUBCUTANEOUS
Qty: 5 ADJUSTABLE DOSE PRE-FILLED PEN SYRINGE | Refills: 3
Start: 2024-11-04

## 2024-11-04 RX ORDER — SERTRALINE HYDROCHLORIDE 100 MG/1
100 TABLET, FILM COATED ORAL 2 TIMES DAILY
Qty: 180 TABLET | Refills: 1 | Status: SHIPPED | OUTPATIENT
Start: 2024-11-04

## 2024-11-04 SDOH — ECONOMIC STABILITY: FOOD INSECURITY: WITHIN THE PAST 12 MONTHS, THE FOOD YOU BOUGHT JUST DIDN'T LAST AND YOU DIDN'T HAVE MONEY TO GET MORE.: NEVER TRUE

## 2024-11-04 SDOH — ECONOMIC STABILITY: INCOME INSECURITY: HOW HARD IS IT FOR YOU TO PAY FOR THE VERY BASICS LIKE FOOD, HOUSING, MEDICAL CARE, AND HEATING?: NOT HARD AT ALL

## 2024-11-04 SDOH — ECONOMIC STABILITY: FOOD INSECURITY: WITHIN THE PAST 12 MONTHS, YOU WORRIED THAT YOUR FOOD WOULD RUN OUT BEFORE YOU GOT MONEY TO BUY MORE.: NEVER TRUE

## 2024-11-04 ASSESSMENT — PATIENT HEALTH QUESTIONNAIRE - PHQ9
6. FEELING BAD ABOUT YOURSELF - OR THAT YOU ARE A FAILURE OR HAVE LET YOURSELF OR YOUR FAMILY DOWN: NOT AT ALL
2. FEELING DOWN, DEPRESSED OR HOPELESS: NOT AT ALL
6. FEELING BAD ABOUT YOURSELF - OR THAT YOU ARE A FAILURE OR HAVE LET YOURSELF OR YOUR FAMILY DOWN: NOT AT ALL
10. IF YOU CHECKED OFF ANY PROBLEMS, HOW DIFFICULT HAVE THESE PROBLEMS MADE IT FOR YOU TO DO YOUR WORK, TAKE CARE OF THINGS AT HOME, OR GET ALONG WITH OTHER PEOPLE: SOMEWHAT DIFFICULT
SUM OF ALL RESPONSES TO PHQ QUESTIONS 1-9: 5
SUM OF ALL RESPONSES TO PHQ QUESTIONS 1-9: 5
5. POOR APPETITE OR OVEREATING: NOT AT ALL
SUM OF ALL RESPONSES TO PHQ QUESTIONS 1-9: 0
DEPRESSION UNABLE TO ASSESS: FUNCTIONAL CAPACITY MOTIVATION LIMITS ACCURACY
4. FEELING TIRED OR HAVING LITTLE ENERGY: NEARLY EVERY DAY
SUM OF ALL RESPONSES TO PHQ QUESTIONS 1-9: 5
10. IF YOU CHECKED OFF ANY PROBLEMS, HOW DIFFICULT HAVE THESE PROBLEMS MADE IT FOR YOU TO DO YOUR WORK, TAKE CARE OF THINGS AT HOME, OR GET ALONG WITH OTHER PEOPLE: NOT DIFFICULT AT ALL
SUM OF ALL RESPONSES TO PHQ QUESTIONS 1-9: 0
7. TROUBLE CONCENTRATING ON THINGS, SUCH AS READING THE NEWSPAPER OR WATCHING TELEVISION: NOT AT ALL
7. TROUBLE CONCENTRATING ON THINGS, SUCH AS READING THE NEWSPAPER OR WATCHING TELEVISION: NOT AT ALL
9. THOUGHTS THAT YOU WOULD BE BETTER OFF DEAD, OR OF HURTING YOURSELF: NOT AT ALL
DEPRESSION UNABLE TO ASSESS: FUNCTIONAL CAPACITY MOTIVATION LIMITS ACCURACY
SUM OF ALL RESPONSES TO PHQ QUESTIONS 1-9: 5
SUM OF ALL RESPONSES TO PHQ9 QUESTIONS 1 & 2: 0
SUM OF ALL RESPONSES TO PHQ QUESTIONS 1-9: 0
1. LITTLE INTEREST OR PLEASURE IN DOING THINGS: NOT AT ALL
1. LITTLE INTEREST OR PLEASURE IN DOING THINGS: NOT AT ALL
SUM OF ALL RESPONSES TO PHQ9 QUESTIONS 1 & 2: 0
8. MOVING OR SPEAKING SO SLOWLY THAT OTHER PEOPLE COULD HAVE NOTICED. OR THE OPPOSITE, BEING SO FIGETY OR RESTLESS THAT YOU HAVE BEEN MOVING AROUND A LOT MORE THAN USUAL: NOT AT ALL
9. THOUGHTS THAT YOU WOULD BE BETTER OFF DEAD, OR OF HURTING YOURSELF: NOT AT ALL
5. POOR APPETITE OR OVEREATING: NOT AT ALL
3. TROUBLE FALLING OR STAYING ASLEEP: NOT AT ALL
2. FEELING DOWN, DEPRESSED OR HOPELESS: NOT AT ALL
4. FEELING TIRED OR HAVING LITTLE ENERGY: NOT AT ALL
SUM OF ALL RESPONSES TO PHQ QUESTIONS 1-9: 0
8. MOVING OR SPEAKING SO SLOWLY THAT OTHER PEOPLE COULD HAVE NOTICED. OR THE OPPOSITE, BEING SO FIGETY OR RESTLESS THAT YOU HAVE BEEN MOVING AROUND A LOT MORE THAN USUAL: NOT AT ALL
3. TROUBLE FALLING OR STAYING ASLEEP: MORE THAN HALF THE DAYS

## 2024-11-04 ASSESSMENT — ANXIETY QUESTIONNAIRES
IF YOU CHECKED OFF ANY PROBLEMS ON THIS QUESTIONNAIRE, HOW DIFFICULT HAVE THESE PROBLEMS MADE IT FOR YOU TO DO YOUR WORK, TAKE CARE OF THINGS AT HOME, OR GET ALONG WITH OTHER PEOPLE: SOMEWHAT DIFFICULT
7. FEELING AFRAID AS IF SOMETHING AWFUL MIGHT HAPPEN: NOT AT ALL
3. WORRYING TOO MUCH ABOUT DIFFERENT THINGS: NOT AT ALL
4. TROUBLE RELAXING: SEVERAL DAYS
1. FEELING NERVOUS, ANXIOUS, OR ON EDGE: NEARLY EVERY DAY
5. BEING SO RESTLESS THAT IT IS HARD TO SIT STILL: MORE THAN HALF THE DAYS
GAD7 TOTAL SCORE: 12
2. NOT BEING ABLE TO STOP OR CONTROL WORRYING: NEARLY EVERY DAY
6. BECOMING EASILY ANNOYED OR IRRITABLE: NEARLY EVERY DAY

## 2024-11-04 NOTE — PROGRESS NOTES
(PROLIA) 60 MG/ML SOSY SC injection Inject 1 mL into the skin once for 1 dose  Chaparro Noel MD        Social History     Tobacco Use    Smoking status: Never    Smokeless tobacco: Never   Substance Use Topics    Alcohol use: Not Currently     Alcohol/week: 2.0 standard drinks of alcohol     Types: 2 Glasses of wine per week        Vitals:    11/04/24 1255   BP: 137/77   Site: Left Upper Arm   Position: Sitting   Pulse: 85   Resp: 16   Temp: 97.1 °F (36.2 °C)   TempSrc: Temporal   SpO2: 96%   Weight: 66.7 kg (147 lb)     Estimated body mass index is 26.04 kg/m² as calculated from the following:    Height as of 9/18/24: 1.6 m (5' 3\").    Weight as of this encounter: 66.7 kg (147 lb).    Physical Exam  Vitals and nursing note reviewed.   Constitutional:       General: She is not in acute distress.     Appearance: Normal appearance. She is well-developed. She is not ill-appearing, toxic-appearing or diaphoretic.   HENT:      Head: Normocephalic and atraumatic.   Eyes:      Extraocular Movements: Extraocular movements intact.      Pupils: Pupils are equal, round, and reactive to light.   Cardiovascular:      Rate and Rhythm: Normal rate and regular rhythm.      Heart sounds: Normal heart sounds, S1 normal and S2 normal. No murmur heard.     No friction rub. No gallop.   Pulmonary:      Effort: Pulmonary effort is normal. No respiratory distress.      Breath sounds: Normal breath sounds.   Neurological:      General: No focal deficit present.      Mental Status: She is alert and oriented to person, place, and time. Mental status is at baseline.      Cranial Nerves: No cranial nerve deficit.   Psychiatric:         Speech: Speech normal.       ASSESSMENT/PLAN:  1. Type 2 diabetes mellitus with other specified complication, with long-term current use of insulin (HCC)  - uncontrolled A1C 8.7  - having some lower readings in the morning, feels hypoglycemic If less than 100  - will get her CGM  - dicussed hypoglycemia

## 2024-11-04 NOTE — PATIENT INSTRUCTIONS
Try hydroxyzine closer to bed time to see if that helps with anxiety and sleep   A1C is 8.7  Cut back evening dose of insulin (basaglar) to 15 units nightly  Continuous glucose monitor sent to pharmacy ([facility])  Work on diet   Follow up 3 months

## 2024-11-05 ASSESSMENT — ENCOUNTER SYMPTOMS
VOMITING: 0
NAUSEA: 0
COUGH: 0
BACK PAIN: 1
SHORTNESS OF BREATH: 0
DIARRHEA: 0

## 2024-11-06 ENCOUNTER — TELEPHONE (OUTPATIENT)
Dept: FAMILY MEDICINE CLINIC | Age: 74
End: 2024-11-06

## 2024-11-06 NOTE — TELEPHONE ENCOUNTER
Ryan with [facility] called because the pt is requesting to stop skilled nursing visits, but wants to continue PT.     Please call back and advise if this is okay.

## 2024-11-15 ENCOUNTER — OFFICE VISIT (OUTPATIENT)
Dept: PAIN MANAGEMENT | Age: 74
End: 2024-11-15
Payer: MEDICARE

## 2024-11-15 VITALS
WEIGHT: 148 LBS | HEART RATE: 98 BPM | OXYGEN SATURATION: 98 % | DIASTOLIC BLOOD PRESSURE: 81 MMHG | BODY MASS INDEX: 26.22 KG/M2 | SYSTOLIC BLOOD PRESSURE: 143 MMHG

## 2024-11-15 DIAGNOSIS — G89.4 CHRONIC PAIN SYNDROME: ICD-10-CM

## 2024-11-15 DIAGNOSIS — M96.1 FAILED NECK SYNDROME: ICD-10-CM

## 2024-11-15 DIAGNOSIS — Z51.81 ENCOUNTER FOR THERAPEUTIC DRUG MONITORING: ICD-10-CM

## 2024-11-15 DIAGNOSIS — M48.07 SPINAL STENOSIS OF LUMBOSACRAL REGION: ICD-10-CM

## 2024-11-15 DIAGNOSIS — M79.7 FIBROMYALGIA: ICD-10-CM

## 2024-11-15 DIAGNOSIS — G56.03 BILATERAL CARPAL TUNNEL SYNDROME: ICD-10-CM

## 2024-11-15 DIAGNOSIS — M48.061 DEGENERATIVE LUMBAR SPINAL STENOSIS: ICD-10-CM

## 2024-11-15 DIAGNOSIS — Z91.89 AT RISK FOR RESPIRATORY DEPRESSION DUE TO OPIOID: ICD-10-CM

## 2024-11-15 DIAGNOSIS — G56.22 ULNAR NEUROPATHY AT ELBOW, LEFT: ICD-10-CM

## 2024-11-15 DIAGNOSIS — M96.1 FAILED BACK SURGICAL SYNDROME: ICD-10-CM

## 2024-11-15 PROCEDURE — 1123F ACP DISCUSS/DSCN MKR DOCD: CPT | Performed by: INTERNAL MEDICINE

## 2024-11-15 PROCEDURE — 3079F DIAST BP 80-89 MM HG: CPT | Performed by: INTERNAL MEDICINE

## 2024-11-15 PROCEDURE — G8417 CALC BMI ABV UP PARAM F/U: HCPCS | Performed by: INTERNAL MEDICINE

## 2024-11-15 PROCEDURE — 1090F PRES/ABSN URINE INCON ASSESS: CPT | Performed by: INTERNAL MEDICINE

## 2024-11-15 PROCEDURE — G8399 PT W/DXA RESULTS DOCUMENT: HCPCS | Performed by: INTERNAL MEDICINE

## 2024-11-15 PROCEDURE — 1036F TOBACCO NON-USER: CPT | Performed by: INTERNAL MEDICINE

## 2024-11-15 PROCEDURE — G8482 FLU IMMUNIZE ORDER/ADMIN: HCPCS | Performed by: INTERNAL MEDICINE

## 2024-11-15 PROCEDURE — 3077F SYST BP >= 140 MM HG: CPT | Performed by: INTERNAL MEDICINE

## 2024-11-15 PROCEDURE — G8427 DOCREV CUR MEDS BY ELIG CLIN: HCPCS | Performed by: INTERNAL MEDICINE

## 2024-11-15 PROCEDURE — 99213 OFFICE O/P EST LOW 20 MIN: CPT | Performed by: INTERNAL MEDICINE

## 2024-11-15 RX ORDER — OXYCODONE AND ACETAMINOPHEN 7.5; 325 MG/1; MG/1
1 TABLET ORAL 3 TIMES DAILY PRN
Qty: 90 TABLET | Refills: 0 | Status: SHIPPED | OUTPATIENT
Start: 2024-11-15 | End: 2024-12-15

## 2024-11-15 RX ORDER — GABAPENTIN 400 MG/1
400 CAPSULE ORAL 3 TIMES DAILY
Qty: 90 CAPSULE | Refills: 1 | Status: SHIPPED | OUTPATIENT
Start: 2024-11-15 | End: 2025-01-14

## 2024-11-15 NOTE — PROGRESS NOTES
Raya Adrian  1950  [id number]      HISTORY OF PRESENT ILLNESS:  Ms. Adrian is a 74 y.o. female returns for a follow up visit for pain management  She has a diagnosis of   1. Chronic pain syndrome    2. Failed neck syndrome    3. Primary insomnia    4. Bilateral carpal tunnel syndrome    5. Fibromyalgia    6. At risk for respiratory depression due to opioid    7. Degenerative lumbar spinal stenosis    8. Mood disorder (HCC)    9. Ulnar neuropathy at elbow, left    10. Spinal stenosis of lumbosacral region    11. Failed back surgical syndrome    .      As per information/history obtained from the PADT(patient assessment and documentation tool) -  She complains of pain in the lower back with radiation to the buttocks and hips Bilateral She rates the pain 4/10 and describes it as aching, burning.  Pain is made worse by: movement, walking, standing, sitting, bending, lifting. She denies any side effects from the current pain regimen. Patient reports that since last follow up visit the physical functioning is unchanged, family/social relationships are unchanged, mood is unchanged sleep patterns are unchanged. Ms. Adrian states that since starting the treatment with the current regimen the  overall functioning  in the above aspects is  better, Patient denies misusing/abusing her narcotic pain medications or using any illegal drugs.  There are No indicators for possible drug abuse, addiction or diversion problems.   Upon obtaining the medical history from Ms. Adrian regarding today's office visit for her presenting problems, patient states she has been feeling achy today, She complains of increased pain with changes in weather. Extreme temperatures- rain, cold and damp weather causes increased pain. Ms. Adrian mentions she is using Percocet along with Neurontin. Patient states the pain is worse in the back than the legs. Patient complains of her knees hurting also along with her neck. She says

## 2024-11-19 ENCOUNTER — TELEPHONE (OUTPATIENT)
Dept: FAMILY MEDICINE CLINIC | Age: 74
End: 2024-11-19

## 2024-11-19 NOTE — TELEPHONE ENCOUNTER
Patent having allergic reaction to something. Her neck is red, itching all over her face and mouth. She wants to know if she should go to urgent care. Please call patient [phone]

## 2024-11-19 NOTE — TELEPHONE ENCOUNTER
Allergic reaction to something , neck  redness with prickly feeling mouth and chin , head itches. Put moisturizer on this morning and stated that it burned. Took an OTC allergy pill this morning but not getting better and not getting worse . Spoke with Dr. Nix advised to try benadryl , cleanse face with mild soap and Aquaphor moisturizer

## 2024-11-21 ENCOUNTER — OFFICE VISIT (OUTPATIENT)
Dept: FAMILY MEDICINE CLINIC | Age: 74
End: 2024-11-21

## 2024-11-21 VITALS
OXYGEN SATURATION: 99 % | WEIGHT: 147 LBS | HEART RATE: 94 BPM | TEMPERATURE: 98.6 F | SYSTOLIC BLOOD PRESSURE: 110 MMHG | RESPIRATION RATE: 16 BRPM | DIASTOLIC BLOOD PRESSURE: 70 MMHG | BODY MASS INDEX: 26.04 KG/M2

## 2024-11-21 DIAGNOSIS — T78.40XS ALLERGIC REACTION, SEQUELA: Primary | ICD-10-CM

## 2024-11-21 DIAGNOSIS — I10 PRIMARY HYPERTENSION: ICD-10-CM

## 2024-11-21 DIAGNOSIS — Z79.4 TYPE 2 DIABETES MELLITUS WITH OTHER SPECIFIED COMPLICATION, WITH LONG-TERM CURRENT USE OF INSULIN (HCC): ICD-10-CM

## 2024-11-21 DIAGNOSIS — E11.69 TYPE 2 DIABETES MELLITUS WITH OTHER SPECIFIED COMPLICATION, WITH LONG-TERM CURRENT USE OF INSULIN (HCC): ICD-10-CM

## 2024-11-21 RX ORDER — TRIAMCINOLONE ACETONIDE 1 MG/G
OINTMENT TOPICAL
Qty: 30 G | Refills: 0 | Status: SHIPPED | OUTPATIENT
Start: 2024-11-21 | End: 2024-11-28

## 2024-11-21 RX ORDER — CAMPHOR 0.45 %
GEL (GRAM) TOPICAL
Qty: 28 G | Refills: 0 | Status: SHIPPED | OUTPATIENT
Start: 2024-11-21

## 2024-11-21 SDOH — ECONOMIC STABILITY: FOOD INSECURITY: WITHIN THE PAST 12 MONTHS, THE FOOD YOU BOUGHT JUST DIDN'T LAST AND YOU DIDN'T HAVE MONEY TO GET MORE.: NEVER TRUE

## 2024-11-21 SDOH — ECONOMIC STABILITY: INCOME INSECURITY: HOW HARD IS IT FOR YOU TO PAY FOR THE VERY BASICS LIKE FOOD, HOUSING, MEDICAL CARE, AND HEATING?: NOT VERY HARD

## 2024-11-21 SDOH — ECONOMIC STABILITY: FOOD INSECURITY: WITHIN THE PAST 12 MONTHS, YOU WORRIED THAT YOUR FOOD WOULD RUN OUT BEFORE YOU GOT MONEY TO BUY MORE.: NEVER TRUE

## 2024-11-21 ASSESSMENT — PATIENT HEALTH QUESTIONNAIRE - PHQ9
9. THOUGHTS THAT YOU WOULD BE BETTER OFF DEAD, OR OF HURTING YOURSELF: NOT AT ALL
SUM OF ALL RESPONSES TO PHQ QUESTIONS 1-9: 0
SUM OF ALL RESPONSES TO PHQ QUESTIONS 1-9: 0
SUM OF ALL RESPONSES TO PHQ9 QUESTIONS 1 & 2: 0
10. IF YOU CHECKED OFF ANY PROBLEMS, HOW DIFFICULT HAVE THESE PROBLEMS MADE IT FOR YOU TO DO YOUR WORK, TAKE CARE OF THINGS AT HOME, OR GET ALONG WITH OTHER PEOPLE: NOT DIFFICULT AT ALL
6. FEELING BAD ABOUT YOURSELF - OR THAT YOU ARE A FAILURE OR HAVE LET YOURSELF OR YOUR FAMILY DOWN: NOT AT ALL
2. FEELING DOWN, DEPRESSED OR HOPELESS: NOT AT ALL
7. TROUBLE CONCENTRATING ON THINGS, SUCH AS READING THE NEWSPAPER OR WATCHING TELEVISION: NOT AT ALL
SUM OF ALL RESPONSES TO PHQ QUESTIONS 1-9: 0
3. TROUBLE FALLING OR STAYING ASLEEP: NOT AT ALL
SUM OF ALL RESPONSES TO PHQ QUESTIONS 1-9: 0
4. FEELING TIRED OR HAVING LITTLE ENERGY: NOT AT ALL
8. MOVING OR SPEAKING SO SLOWLY THAT OTHER PEOPLE COULD HAVE NOTICED. OR THE OPPOSITE, BEING SO FIGETY OR RESTLESS THAT YOU HAVE BEEN MOVING AROUND A LOT MORE THAN USUAL: NOT AT ALL
1. LITTLE INTEREST OR PLEASURE IN DOING THINGS: NOT AT ALL
5. POOR APPETITE OR OVEREATING: NOT AT ALL

## 2024-11-21 NOTE — PROGRESS NOTES
triamcinolone (KENALOG) 0.1 % ointment; Apply topically 1- 2 times daily to the neck as needed for itching, Disp-30 g, R-0, Normal  -     Cetirizine HCl 10 MG CAPS; Take 10 mg by mouth daily for 14 days, Disp-30 capsule, R-0Normal  Primary hypertension  Type 2 diabetes mellitus with other specified complication, with long-term current use of insulin (HCC)        No follow-ups on file.  An electronic signature was used to authenticate this note.  --Christoph Nix MD on 11/21/2024

## 2024-11-21 NOTE — PATIENT INSTRUCTIONS
STOP BENADRYL PILL   Treat with cetirizine 10 mg by mouth  once or twice daily x 5 days. This may reduce itching and hasten resolution.    Can also use wet wraps and Benadryl ointment to manage the itching on the face   NECK :steroid Triamcinolone once or twice daily 1 -2 weeks           New Prescriptions    CETIRIZINE HCL 10 MG CAPS    Take 10 mg by mouth daily for 14 days    DIPHENHYDRAMINE-ZINC ACETATE (BENADRYL ITCH STOPPING) 1-0.1 % CREAM    Apply topically 3 times daily as needed to the face for itching    TRIAMCINOLONE (KENALOG) 0.1 % OINTMENT    Apply topically 1- 2 times daily to the neck as needed for itching

## 2024-12-02 ENCOUNTER — OFFICE VISIT (OUTPATIENT)
Dept: FAMILY MEDICINE CLINIC | Age: 74
End: 2024-12-02

## 2024-12-02 VITALS
RESPIRATION RATE: 16 BRPM | OXYGEN SATURATION: 97 % | SYSTOLIC BLOOD PRESSURE: 134 MMHG | BODY MASS INDEX: 26.04 KG/M2 | WEIGHT: 147 LBS | TEMPERATURE: 97.1 F | HEART RATE: 87 BPM | DIASTOLIC BLOOD PRESSURE: 76 MMHG

## 2024-12-02 DIAGNOSIS — T78.40XS ALLERGIC REACTION, SEQUELA: Primary | ICD-10-CM

## 2024-12-02 DIAGNOSIS — L30.9 DERMATITIS: ICD-10-CM

## 2024-12-02 RX ORDER — PREDNISONE 20 MG/1
20 TABLET ORAL DAILY
Qty: 5 TABLET | Refills: 0 | Status: SHIPPED | OUTPATIENT
Start: 2024-12-02 | End: 2024-12-07

## 2024-12-02 RX ORDER — TRIAMCINOLONE ACETONIDE 1 MG/G
OINTMENT TOPICAL
COMMUNITY
Start: 2024-11-21

## 2024-12-02 RX ORDER — METHOCARBAMOL 750 MG/1
750 TABLET, FILM COATED ORAL 4 TIMES DAILY PRN
Qty: 120 TABLET | Refills: 1 | Status: SHIPPED | OUTPATIENT
Start: 2024-12-02

## 2024-12-02 NOTE — PATIENT INSTRUCTIONS
Start prednisone  Stop the steroid cream   Take hydroxyzine 3 times daily as needed for itching  Use vaseline nightly on the chin and the face and neck to moisturize   Follow up next week as needed if symptoms do not improve

## 2024-12-02 NOTE — PROGRESS NOTES
2024     Chief Complaint   Patient presents with    Follow-up     Allergic reaction, still itchy        Raya Adrian (:  1950) is a 74 y.o. female, here for evaluation of the following medical concerns:    HPI  Here for follow up on rash/allergic reaction.   Still with the rash on the face, scalp, neck  The symptoms are better but persistent  Notices itching throughout the body but the rash is only on the neck, face and scalp  Has Tried PO Benadryl. Then saw Dr. Nix on 2024- and was pul on Zyrtec, topical benadryl and topical triamcinolone.   Does have some slight swelling of the right eyelid. There is no lip or tongue swelling.   There have no new use of lotions, soaps, detergents, etc  Fasting glucose this morning 115  BP well controlled  Review of Systems   Constitutional:  Negative for chills, fatigue and fever.   Respiratory:  Negative for cough and shortness of breath.    Cardiovascular:  Negative for chest pain and leg swelling.   Gastrointestinal:  Negative for diarrhea, nausea and vomiting.   Skin:  Positive for rash.   Neurological:  Negative for dizziness and headaches.   All other systems reviewed and are negative.      Prior to Visit Medications    Medication Sig Taking? Authorizing Provider   triamcinolone (KENALOG) 0.1 % ointment  Yes Provider, MD Irene   predniSONE (DELTASONE) 20 MG tablet Take 1 tablet by mouth daily for 5 days Yes Batsheva Mora APRN - CNP   methocarbamol (ROBAXIN) 750 MG tablet Take 1 tablet by mouth 4 times daily as needed (back pain) Yes Batsheva Mora APRN - CNP   Cetirizine HCl 10 MG CAPS Take 10 mg by mouth daily for 14 days Yes Christoph Nix MD   gabapentin (NEURONTIN) 400 MG capsule Take 1 capsule by mouth 3 times daily for 60 days. Yes Larry De Los Santos MD   oxyCODONE-acetaminophen (PERCOCET) 7.5-325 MG per tablet Take 1 tablet by mouth 3 times daily as needed for Pain for up to 30 days. Yes Larry De Los Santos MD

## 2024-12-03 ASSESSMENT — ENCOUNTER SYMPTOMS
COUGH: 0
SHORTNESS OF BREATH: 0
VOMITING: 0
DIARRHEA: 0
NAUSEA: 0

## 2024-12-05 ENCOUNTER — TELEPHONE (OUTPATIENT)
Dept: PAIN MANAGEMENT | Age: 74
End: 2024-12-05

## 2024-12-05 DIAGNOSIS — G89.4 CHRONIC PAIN SYNDROME: Primary | ICD-10-CM

## 2024-12-05 DIAGNOSIS — M47.812 OSTEOARTHRITIS OF CERVICAL SPINE, UNSPECIFIED SPINAL OSTEOARTHRITIS COMPLICATION STATUS: ICD-10-CM

## 2024-12-05 NOTE — TELEPHONE ENCOUNTER
Sara with [facility] [phone]    Dr Shearer said he does not perform C-3,4 inter laminer injection, however, he is offering a C-6,7 inter laminer if [facility] is in agreement.      If not, we may have to refer to a different MD outside the practice.    Call Sara [phone]

## 2024-12-06 NOTE — TELEPHONE ENCOUNTER
Called patient to inform her that Dr. De Los Santos will send a referral to Dr. Lynn office.    Called patient to inform her that her referral has been sent to Doctor Lynn.

## 2024-12-12 RX ORDER — LOSARTAN POTASSIUM 100 MG/1
100 TABLET ORAL DAILY
Qty: 90 TABLET | Refills: 3 | Status: SHIPPED | OUTPATIENT
Start: 2024-12-12

## 2024-12-12 NOTE — TELEPHONE ENCOUNTER
Future Appointments   Date Time Provider Department Center   12/16/2024  2:30 PM Larry De Los Santos MD R BANK PAIN [facility]   1/15/2025  9:45 AM SCHEDULE, [facility]STEOPOROS[facility] PHYS &O [facility]   2/4/2025  1:00 PM Batsheva Mora, APRN - CNP YUNIOR FP [facility]   7/28/2025  9:20 AM DEXA MOB [facility] TJ MOB RAD [facility]   7/28/2025  9:40 AM [facility] Noel [facility]     LOV 12/2/2024

## 2024-12-16 ENCOUNTER — OFFICE VISIT (OUTPATIENT)
Dept: PAIN MANAGEMENT | Age: 74
End: 2024-12-16
Payer: MEDICARE

## 2024-12-16 VITALS
OXYGEN SATURATION: 96 % | WEIGHT: 150 LBS | SYSTOLIC BLOOD PRESSURE: 123 MMHG | BODY MASS INDEX: 26.57 KG/M2 | HEART RATE: 81 BPM | DIASTOLIC BLOOD PRESSURE: 65 MMHG

## 2024-12-16 DIAGNOSIS — G56.03 BILATERAL CARPAL TUNNEL SYNDROME: ICD-10-CM

## 2024-12-16 DIAGNOSIS — G89.4 CHRONIC PAIN SYNDROME: ICD-10-CM

## 2024-12-16 DIAGNOSIS — E55.9 VITAMIN D DEFICIENCY: ICD-10-CM

## 2024-12-16 DIAGNOSIS — M96.1 FAILED NECK SYNDROME: ICD-10-CM

## 2024-12-16 DIAGNOSIS — I10 ESSENTIAL HYPERTENSION: ICD-10-CM

## 2024-12-16 DIAGNOSIS — M79.7 FIBROMYALGIA: ICD-10-CM

## 2024-12-16 DIAGNOSIS — E11.69 TYPE 2 DIABETES MELLITUS WITH OTHER SPECIFIED COMPLICATION, WITH LONG-TERM CURRENT USE OF INSULIN (HCC): ICD-10-CM

## 2024-12-16 DIAGNOSIS — M96.1 FAILED BACK SURGICAL SYNDROME: ICD-10-CM

## 2024-12-16 DIAGNOSIS — Z79.4 TYPE 2 DIABETES MELLITUS WITH OTHER SPECIFIED COMPLICATION, WITH LONG-TERM CURRENT USE OF INSULIN (HCC): ICD-10-CM

## 2024-12-16 DIAGNOSIS — M48.07 SPINAL STENOSIS OF LUMBOSACRAL REGION: ICD-10-CM

## 2024-12-16 DIAGNOSIS — M48.061 DEGENERATIVE LUMBAR SPINAL STENOSIS: ICD-10-CM

## 2024-12-16 DIAGNOSIS — M47.812 OSTEOARTHRITIS OF CERVICAL SPINE, UNSPECIFIED SPINAL OSTEOARTHRITIS COMPLICATION STATUS: ICD-10-CM

## 2024-12-16 DIAGNOSIS — Z91.89 AT RISK FOR RESPIRATORY DEPRESSION DUE TO OPIOID: ICD-10-CM

## 2024-12-16 DIAGNOSIS — G56.22 ULNAR NEUROPATHY AT ELBOW, LEFT: ICD-10-CM

## 2024-12-16 LAB
25(OH)D3 SERPL-MCNC: 17.7 NG/ML
ALBUMIN SERPL-MCNC: 4.2 G/DL (ref 3.4–5)
ALBUMIN/GLOB SERPL: 1.8 {RATIO} (ref 1.1–2.2)
ALP SERPL-CCNC: 131 U/L (ref 40–129)
ALT SERPL-CCNC: 17 U/L (ref 10–40)
ANION GAP SERPL CALCULATED.3IONS-SCNC: 12 MMOL/L (ref 3–16)
AST SERPL-CCNC: 23 U/L (ref 15–37)
BASOPHILS # BLD: 0 K/UL (ref 0–0.2)
BASOPHILS NFR BLD: 0.6 %
BILIRUB SERPL-MCNC: 0.5 MG/DL (ref 0–1)
BUN SERPL-MCNC: 6 MG/DL (ref 7–20)
CALCIUM SERPL-MCNC: 10 MG/DL (ref 8.3–10.6)
CHLORIDE SERPL-SCNC: 101 MMOL/L (ref 99–110)
CHOLEST SERPL-MCNC: 191 MG/DL (ref 0–199)
CO2 SERPL-SCNC: 28 MMOL/L (ref 21–32)
CREAT SERPL-MCNC: 0.7 MG/DL (ref 0.6–1.2)
DEPRECATED RDW RBC AUTO: 16.3 % (ref 12.4–15.4)
EOSINOPHIL # BLD: 0.2 K/UL (ref 0–0.6)
EOSINOPHIL NFR BLD: 3.6 %
GFR SERPLBLD CREATININE-BSD FMLA CKD-EPI: 89 ML/MIN/{1.73_M2}
GLUCOSE SERPL-MCNC: 97 MG/DL (ref 70–99)
HCT VFR BLD AUTO: 39.7 % (ref 36–48)
HDLC SERPL-MCNC: 87 MG/DL (ref 40–60)
HGB BLD-MCNC: 12.8 G/DL (ref 12–16)
LDL CHOLESTEROL: 86 MG/DL
LYMPHOCYTES # BLD: 1 K/UL (ref 1–5.1)
LYMPHOCYTES NFR BLD: 21.4 %
MCH RBC QN AUTO: 26 PG (ref 26–34)
MCHC RBC AUTO-ENTMCNC: 32.3 G/DL (ref 31–36)
MCV RBC AUTO: 80.4 FL (ref 80–100)
MONOCYTES # BLD: 0.3 K/UL (ref 0–1.3)
MONOCYTES NFR BLD: 6.7 %
NEUTROPHILS # BLD: 3.3 K/UL (ref 1.7–7.7)
NEUTROPHILS NFR BLD: 67.7 %
PLATELET # BLD AUTO: 206 K/UL (ref 135–450)
PMV BLD AUTO: 8.4 FL (ref 5–10.5)
POTASSIUM SERPL-SCNC: 3.6 MMOL/L (ref 3.5–5.1)
PROT SERPL-MCNC: 6.6 G/DL (ref 6.4–8.2)
RBC # BLD AUTO: 4.94 M/UL (ref 4–5.2)
SODIUM SERPL-SCNC: 141 MMOL/L (ref 136–145)
TRIGL SERPL-MCNC: 90 MG/DL (ref 0–150)
VLDLC SERPL CALC-MCNC: 18 MG/DL
WBC # BLD AUTO: 4.9 K/UL (ref 4–11)

## 2024-12-16 PROCEDURE — 3078F DIAST BP <80 MM HG: CPT | Performed by: INTERNAL MEDICINE

## 2024-12-16 PROCEDURE — G8417 CALC BMI ABV UP PARAM F/U: HCPCS | Performed by: INTERNAL MEDICINE

## 2024-12-16 PROCEDURE — 1090F PRES/ABSN URINE INCON ASSESS: CPT | Performed by: INTERNAL MEDICINE

## 2024-12-16 PROCEDURE — G8399 PT W/DXA RESULTS DOCUMENT: HCPCS | Performed by: INTERNAL MEDICINE

## 2024-12-16 PROCEDURE — 1123F ACP DISCUSS/DSCN MKR DOCD: CPT | Performed by: INTERNAL MEDICINE

## 2024-12-16 PROCEDURE — 99213 OFFICE O/P EST LOW 20 MIN: CPT | Performed by: INTERNAL MEDICINE

## 2024-12-16 PROCEDURE — G8482 FLU IMMUNIZE ORDER/ADMIN: HCPCS | Performed by: INTERNAL MEDICINE

## 2024-12-16 PROCEDURE — G8427 DOCREV CUR MEDS BY ELIG CLIN: HCPCS | Performed by: INTERNAL MEDICINE

## 2024-12-16 PROCEDURE — 1036F TOBACCO NON-USER: CPT | Performed by: INTERNAL MEDICINE

## 2024-12-16 PROCEDURE — 3074F SYST BP LT 130 MM HG: CPT | Performed by: INTERNAL MEDICINE

## 2024-12-16 RX ORDER — GABAPENTIN 400 MG/1
400 CAPSULE ORAL 3 TIMES DAILY
Qty: 90 CAPSULE | Refills: 1 | Status: SHIPPED | OUTPATIENT
Start: 2024-12-16 | End: 2025-02-14

## 2024-12-16 RX ORDER — OXYCODONE AND ACETAMINOPHEN 7.5; 325 MG/1; MG/1
1 TABLET ORAL 3 TIMES DAILY PRN
Qty: 84 TABLET | Refills: 0 | Status: SHIPPED | OUTPATIENT
Start: 2024-12-16 | End: 2025-01-13

## 2024-12-16 NOTE — PROGRESS NOTES
Raya Adrian  1950  [id number]      HISTORY OF PRESENT ILLNESS:  Ms. Adrian is a 74 y.o. female returns for a follow up visit for pain management  She has a diagnosis of   1. Chronic pain syndrome    2. Fibromyalgia    3. Spinal stenosis of lumbosacral region    4. Osteoarthritis of cervical spine, unspecified spinal osteoarthritis complication status    5. At risk for respiratory depression due to opioid    6. Failed back surgical syndrome    7. Failed neck syndrome    8. Degenerative lumbar spinal stenosis    9. Bilateral carpal tunnel syndrome    10. Ulnar neuropathy at elbow, left    .      As per information/history obtained from the PADT(patient assessment and documentation tool) -  She complains of pain in the neck and lower back with radiation to the shoulders Left She rates the pain 4/10 and describes it as aching, burning.  Pain is made worse by: movement, bending. She denies any side effects from the current pain regimen. Patient reports that since last follow up visit the physical functioning is unchanged, family/social relationships are unchanged, mood is unchanged sleep patterns are unchanged. Ms. Adrian states that since starting the treatment with the current regimen the  overall functioning  in the above aspects is  better, Patient denies misusing/abusing her narcotic pain medications or using any illegal drugs.  There are No indicators for possible drug abuse, addiction or diversion problems.   Upon obtaining the medical history from Ms. Adrian regarding today's office visit for her presenting problems, patient states she has been doing fair, she is managing with the medications. Ms. Adrian mentions she is using Percocet 3 per day along with other adjuvants. Patient reports she is managing light house chores. She states she is still wearing her back belt 4 months post surgery.       ALLERGIES: Patients list of allergies were reviewed     MEDICATIONS: Ms. Adrian list

## 2024-12-17 ENCOUNTER — OFFICE VISIT (OUTPATIENT)
Dept: FAMILY MEDICINE CLINIC | Age: 74
End: 2024-12-17
Payer: MEDICARE

## 2024-12-17 VITALS
OXYGEN SATURATION: 97 % | HEART RATE: 83 BPM | TEMPERATURE: 97.1 F | WEIGHT: 150 LBS | BODY MASS INDEX: 26.57 KG/M2 | RESPIRATION RATE: 16 BRPM | DIASTOLIC BLOOD PRESSURE: 82 MMHG | SYSTOLIC BLOOD PRESSURE: 137 MMHG

## 2024-12-17 DIAGNOSIS — L30.9 DERMATITIS: ICD-10-CM

## 2024-12-17 DIAGNOSIS — E55.9 VITAMIN D DEFICIENCY: ICD-10-CM

## 2024-12-17 DIAGNOSIS — R21 FACIAL RASH: Primary | ICD-10-CM

## 2024-12-17 PROCEDURE — G8482 FLU IMMUNIZE ORDER/ADMIN: HCPCS | Performed by: NURSE PRACTITIONER

## 2024-12-17 PROCEDURE — 99214 OFFICE O/P EST MOD 30 MIN: CPT | Performed by: NURSE PRACTITIONER

## 2024-12-17 PROCEDURE — 1090F PRES/ABSN URINE INCON ASSESS: CPT | Performed by: NURSE PRACTITIONER

## 2024-12-17 PROCEDURE — G8399 PT W/DXA RESULTS DOCUMENT: HCPCS | Performed by: NURSE PRACTITIONER

## 2024-12-17 PROCEDURE — 3075F SYST BP GE 130 - 139MM HG: CPT | Performed by: NURSE PRACTITIONER

## 2024-12-17 PROCEDURE — G8427 DOCREV CUR MEDS BY ELIG CLIN: HCPCS | Performed by: NURSE PRACTITIONER

## 2024-12-17 PROCEDURE — G8417 CALC BMI ABV UP PARAM F/U: HCPCS | Performed by: NURSE PRACTITIONER

## 2024-12-17 PROCEDURE — 1123F ACP DISCUSS/DSCN MKR DOCD: CPT | Performed by: NURSE PRACTITIONER

## 2024-12-17 PROCEDURE — 1159F MED LIST DOCD IN RCRD: CPT | Performed by: NURSE PRACTITIONER

## 2024-12-17 PROCEDURE — 3079F DIAST BP 80-89 MM HG: CPT | Performed by: NURSE PRACTITIONER

## 2024-12-17 PROCEDURE — 1036F TOBACCO NON-USER: CPT | Performed by: NURSE PRACTITIONER

## 2024-12-17 PROCEDURE — 1160F RVW MEDS BY RX/DR IN RCRD: CPT | Performed by: NURSE PRACTITIONER

## 2024-12-17 RX ORDER — PREDNISONE 10 MG/1
TABLET ORAL
Qty: 18 TABLET | Refills: 0 | Status: SHIPPED | OUTPATIENT
Start: 2024-12-17

## 2024-12-17 RX ORDER — TRAZODONE HYDROCHLORIDE 100 MG/1
100 TABLET ORAL NIGHTLY
Qty: 90 TABLET | Refills: 2 | Status: SHIPPED | OUTPATIENT
Start: 2024-12-17

## 2024-12-17 SDOH — ECONOMIC STABILITY: FOOD INSECURITY: WITHIN THE PAST 12 MONTHS, THE FOOD YOU BOUGHT JUST DIDN'T LAST AND YOU DIDN'T HAVE MONEY TO GET MORE.: NEVER TRUE

## 2024-12-17 SDOH — ECONOMIC STABILITY: FOOD INSECURITY: WITHIN THE PAST 12 MONTHS, YOU WORRIED THAT YOUR FOOD WOULD RUN OUT BEFORE YOU GOT MONEY TO BUY MORE.: NEVER TRUE

## 2024-12-17 SDOH — ECONOMIC STABILITY: INCOME INSECURITY: HOW HARD IS IT FOR YOU TO PAY FOR THE VERY BASICS LIKE FOOD, HOUSING, MEDICAL CARE, AND HEATING?: NOT HARD AT ALL

## 2024-12-17 ASSESSMENT — PATIENT HEALTH QUESTIONNAIRE - PHQ9
6. FEELING BAD ABOUT YOURSELF - OR THAT YOU ARE A FAILURE OR HAVE LET YOURSELF OR YOUR FAMILY DOWN: NEARLY EVERY DAY
SUM OF ALL RESPONSES TO PHQ QUESTIONS 1-9: 13
SUM OF ALL RESPONSES TO PHQ9 QUESTIONS 1 & 2: 4
2. FEELING DOWN, DEPRESSED OR HOPELESS: NEARLY EVERY DAY
5. POOR APPETITE OR OVEREATING: NOT AT ALL
SUM OF ALL RESPONSES TO PHQ QUESTIONS 1-9: 13
SUM OF ALL RESPONSES TO PHQ QUESTIONS 1-9: 13
8. MOVING OR SPEAKING SO SLOWLY THAT OTHER PEOPLE COULD HAVE NOTICED. OR THE OPPOSITE, BEING SO FIGETY OR RESTLESS THAT YOU HAVE BEEN MOVING AROUND A LOT MORE THAN USUAL: NOT AT ALL
3. TROUBLE FALLING OR STAYING ASLEEP: MORE THAN HALF THE DAYS
1. LITTLE INTEREST OR PLEASURE IN DOING THINGS: SEVERAL DAYS
DEPRESSION UNABLE TO ASSESS: FUNCTIONAL CAPACITY MOTIVATION LIMITS ACCURACY
10. IF YOU CHECKED OFF ANY PROBLEMS, HOW DIFFICULT HAVE THESE PROBLEMS MADE IT FOR YOU TO DO YOUR WORK, TAKE CARE OF THINGS AT HOME, OR GET ALONG WITH OTHER PEOPLE: VERY DIFFICULT
7. TROUBLE CONCENTRATING ON THINGS, SUCH AS READING THE NEWSPAPER OR WATCHING TELEVISION: MORE THAN HALF THE DAYS
SUM OF ALL RESPONSES TO PHQ QUESTIONS 1-9: 13
4. FEELING TIRED OR HAVING LITTLE ENERGY: MORE THAN HALF THE DAYS
9. THOUGHTS THAT YOU WOULD BE BETTER OFF DEAD, OR OF HURTING YOURSELF: NOT AT ALL

## 2024-12-17 ASSESSMENT — ANXIETY QUESTIONNAIRES
IF YOU CHECKED OFF ANY PROBLEMS ON THIS QUESTIONNAIRE, HOW DIFFICULT HAVE THESE PROBLEMS MADE IT FOR YOU TO DO YOUR WORK, TAKE CARE OF THINGS AT HOME, OR GET ALONG WITH OTHER PEOPLE: SOMEWHAT DIFFICULT
3. WORRYING TOO MUCH ABOUT DIFFERENT THINGS: SEVERAL DAYS
GAD7 TOTAL SCORE: 13
7. FEELING AFRAID AS IF SOMETHING AWFUL MIGHT HAPPEN: NOT AT ALL
5. BEING SO RESTLESS THAT IT IS HARD TO SIT STILL: MORE THAN HALF THE DAYS
4. TROUBLE RELAXING: SEVERAL DAYS
6. BECOMING EASILY ANNOYED OR IRRITABLE: NEARLY EVERY DAY
1. FEELING NERVOUS, ANXIOUS, OR ON EDGE: NEARLY EVERY DAY
2. NOT BEING ABLE TO STOP OR CONTROL WORRYING: NEARLY EVERY DAY

## 2024-12-17 ASSESSMENT — ENCOUNTER SYMPTOMS
COUGH: 0
DIARRHEA: 0
NAUSEA: 0
SHORTNESS OF BREATH: 0
VOMITING: 0

## 2024-12-17 NOTE — PROGRESS NOTES
distress.     Appearance: Normal appearance. She is well-developed. She is not ill-appearing, toxic-appearing or diaphoretic.   HENT:      Head: Normocephalic and atraumatic.   Eyes:      Extraocular Movements: Extraocular movements intact.      Pupils: Pupils are equal, round, and reactive to light.   Cardiovascular:      Rate and Rhythm: Normal rate and regular rhythm.      Heart sounds: Normal heart sounds, S1 normal and S2 normal. No murmur heard.     No friction rub. No gallop.   Pulmonary:      Effort: Pulmonary effort is normal. No respiratory distress.      Breath sounds: Normal breath sounds. No stridor. No wheezing or rales.   Skin:     Findings: Erythema present.          Neurological:      General: No focal deficit present.      Mental Status: She is alert and oriented to person, place, and time. Mental status is at baseline.      Cranial Nerves: No cranial nerve deficit.   Psychiatric:         Speech: Speech normal.         ASSESSMENT/PLAN:  1. Facial rash  - [facility]  - predniSONE (DELTASONE) 10 MG tablet; Take 3 tabs for days 1-3, take 2 tabs day 4-6, take 1 tab days 7-9.  Dispense: 18 tablet; Refill: 0    2. Dermatitis  - WANG - Lionel Arri[facility]  - predniSONE (DELTASONE) 10 MG tablet; Take 3 tabs for days 1-3, take 2 tabs day 4-6, take 1 tab days 7-9.  Dispense: 18 tablet; Refill: 0    3. Vitamin D deficiency    - resume your vitamin d3 supplement  - blood work looks ok beside vitamin d level is low  - take prednisone burst- keep close eye on blood sugar this steroid will increase your blood sugar level   - Use steroid cream on the neck and chin only for 1-2 weeks (triamcinolone). Do not apply to the eye lids  - make appointment with dermatology for the facial rash       An electronic signature was used to authenticate this note.    --SAI Liu - CNP on 12/17/2024 at 12:14 PM

## 2024-12-17 NOTE — PATIENT INSTRUCTIONS
- resume your vitamin d3 supplement  - blood work looks ok beside vitamin d level is low  - take prednisone burst- keep close eye on blood sugar  - Use steroid cream on the neck and chin only for 1-2 weeks   - make appointment with dermatology for the facial rash

## 2024-12-19 LAB — C PEPTIDE SERPL-MCNC: 0.2 NG/ML (ref 1.1–4.4)

## 2024-12-23 ENCOUNTER — TELEPHONE (OUTPATIENT)
Dept: ENDOCRINOLOGY | Age: 74
End: 2024-12-23

## 2025-01-03 DIAGNOSIS — G47.9 SLEEP DISTURBANCE: ICD-10-CM

## 2025-01-03 DIAGNOSIS — F41.9 ANXIETY: ICD-10-CM

## 2025-01-03 RX ORDER — HYDROXYZINE PAMOATE 25 MG/1
25 CAPSULE ORAL 2 TIMES DAILY PRN
Qty: 180 CAPSULE | Refills: 1 | Status: SHIPPED | OUTPATIENT
Start: 2025-01-03

## 2025-01-03 NOTE — TELEPHONE ENCOUNTER
LOV 12/17/2024    Future Appointments   Date Time Provider Department Center   1/13/2025  1:45 PM Larry De Los Santos MD R BANK PAIN [facility]   1/15/2025  9:45 AM SCHEDULE, [facility]STEOPOROSIS MHPHYS [facility]&O [facility]   2/4/2025  1:00 PM Batsheva Mora APRN - CNP YUNIOR FP [facility] DEP   7/28/2025  9:20 AM DEXA MOB [facility]   7/28/2025  9:40 AM Chaparro Noel MD PHYS [facility]&O MMA

## 2025-01-13 ENCOUNTER — OFFICE VISIT (OUTPATIENT)
Dept: PAIN MANAGEMENT | Age: 75
End: 2025-01-13
Payer: MEDICARE

## 2025-01-13 VITALS
BODY MASS INDEX: 26.93 KG/M2 | WEIGHT: 152 LBS | OXYGEN SATURATION: 93 % | DIASTOLIC BLOOD PRESSURE: 72 MMHG | SYSTOLIC BLOOD PRESSURE: 120 MMHG | HEART RATE: 92 BPM

## 2025-01-13 DIAGNOSIS — Z51.81 ENCOUNTER FOR THERAPEUTIC DRUG MONITORING: Primary | ICD-10-CM

## 2025-01-13 DIAGNOSIS — M48.061 DEGENERATIVE LUMBAR SPINAL STENOSIS: ICD-10-CM

## 2025-01-13 DIAGNOSIS — M47.812 OSTEOARTHRITIS OF CERVICAL SPINE, UNSPECIFIED SPINAL OSTEOARTHRITIS COMPLICATION STATUS: ICD-10-CM

## 2025-01-13 DIAGNOSIS — M48.07 SPINAL STENOSIS OF LUMBOSACRAL REGION: ICD-10-CM

## 2025-01-13 DIAGNOSIS — M96.1 FAILED NECK SYNDROME: ICD-10-CM

## 2025-01-13 DIAGNOSIS — G89.4 CHRONIC PAIN SYNDROME: ICD-10-CM

## 2025-01-13 DIAGNOSIS — G56.22 ULNAR NEUROPATHY AT ELBOW, LEFT: ICD-10-CM

## 2025-01-13 DIAGNOSIS — M96.1 FAILED BACK SURGICAL SYNDROME: ICD-10-CM

## 2025-01-13 DIAGNOSIS — G56.03 BILATERAL CARPAL TUNNEL SYNDROME: ICD-10-CM

## 2025-01-13 DIAGNOSIS — Z91.89 AT RISK FOR RESPIRATORY DEPRESSION DUE TO OPIOID: ICD-10-CM

## 2025-01-13 DIAGNOSIS — M79.7 FIBROMYALGIA: ICD-10-CM

## 2025-01-13 PROCEDURE — 3078F DIAST BP <80 MM HG: CPT | Performed by: INTERNAL MEDICINE

## 2025-01-13 PROCEDURE — 99213 OFFICE O/P EST LOW 20 MIN: CPT | Performed by: INTERNAL MEDICINE

## 2025-01-13 PROCEDURE — 1090F PRES/ABSN URINE INCON ASSESS: CPT | Performed by: INTERNAL MEDICINE

## 2025-01-13 PROCEDURE — G8399 PT W/DXA RESULTS DOCUMENT: HCPCS | Performed by: INTERNAL MEDICINE

## 2025-01-13 PROCEDURE — 1123F ACP DISCUSS/DSCN MKR DOCD: CPT | Performed by: INTERNAL MEDICINE

## 2025-01-13 PROCEDURE — 3074F SYST BP LT 130 MM HG: CPT | Performed by: INTERNAL MEDICINE

## 2025-01-13 PROCEDURE — G8417 CALC BMI ABV UP PARAM F/U: HCPCS | Performed by: INTERNAL MEDICINE

## 2025-01-13 PROCEDURE — G8427 DOCREV CUR MEDS BY ELIG CLIN: HCPCS | Performed by: INTERNAL MEDICINE

## 2025-01-13 PROCEDURE — 1036F TOBACCO NON-USER: CPT | Performed by: INTERNAL MEDICINE

## 2025-01-13 PROCEDURE — 1159F MED LIST DOCD IN RCRD: CPT | Performed by: INTERNAL MEDICINE

## 2025-01-13 RX ORDER — GABAPENTIN 400 MG/1
400 CAPSULE ORAL 3 TIMES DAILY
Qty: 90 CAPSULE | Refills: 1 | Status: SHIPPED | OUTPATIENT
Start: 2025-01-13 | End: 2025-03-14

## 2025-01-13 RX ORDER — OXYCODONE AND ACETAMINOPHEN 7.5; 325 MG/1; MG/1
1 TABLET ORAL 3 TIMES DAILY PRN
Qty: 84 TABLET | Refills: 0 | Status: SHIPPED | OUTPATIENT
Start: 2025-01-13 | End: 2025-02-10

## 2025-01-13 NOTE — PROGRESS NOTES
Raya Adrian  1950  [id number]      HISTORY OF PRESENT ILLNESS:  Ms. Adrian is a 74 y.o. female returns for a follow up visit for pain management  She has a diagnosis of   1. Chronic pain syndrome    2. Osteoarthritis of cervical spine, unspecified spinal osteoarthritis complication status    3. Failed neck syndrome    4. Ulnar neuropathy at elbow, left    5. Fibromyalgia    6. At risk for respiratory depression due to opioid    7. Degenerative lumbar spinal stenosis    8. Spinal stenosis of lumbosacral region    9. Failed back surgical syndrome    10. Bilateral carpal tunnel syndrome    .      As per information/history obtained from the PADT(patient assessment and documentation tool) -  She complains of pain in the neck, upper back, and lower back with radiation to the shoulders Left and pelvic area  She rates the pain 5/10 and describes it as aching, burning.  Pain is made worse by: movement, walking, bending. She denies any side effects from the current pain regimen. Patient reports that since last follow up visit the physical functioning is unchanged, family/social relationships are unchanged, mood is worse sleep patterns are unchanged. Ms. Adrian states that since starting the treatment with the current regimen the  overall functioning  in the above aspects is  better, Patient denies misusing/abusing her narcotic pain medications or using any illegal drugs.  There are No indicators for possible drug abuse, addiction or diversion problems.   Upon obtaining the medical history from Ms. Adrian regarding today's office visit for her presenting problems, patient states she has been doing fair. Ms. Adrian states she had a fall and bump her head. Patients BP is low, she is on medications for her BP. She mentions she is using Percocet along with Neurontin. Patient reports she went to the ER and had a CT scan, all was okay. Patient denies any side effects with the medications.

## 2025-01-17 ENCOUNTER — NURSE ONLY (OUTPATIENT)
Dept: ENDOCRINOLOGY | Age: 75
End: 2025-01-17
Payer: MEDICARE

## 2025-01-17 DIAGNOSIS — M89.9 DISEASE OF SKELETAL SYSTEM: Primary | ICD-10-CM

## 2025-01-17 PROCEDURE — 96372 THER/PROPH/DIAG INJ SC/IM: CPT | Performed by: INTERNAL MEDICINE

## 2025-02-04 ENCOUNTER — OFFICE VISIT (OUTPATIENT)
Dept: FAMILY MEDICINE CLINIC | Age: 75
End: 2025-02-04

## 2025-02-04 VITALS
BODY MASS INDEX: 26.57 KG/M2 | SYSTOLIC BLOOD PRESSURE: 137 MMHG | OXYGEN SATURATION: 98 % | HEART RATE: 89 BPM | WEIGHT: 150 LBS | TEMPERATURE: 97.1 F | DIASTOLIC BLOOD PRESSURE: 79 MMHG | RESPIRATION RATE: 16 BRPM

## 2025-02-04 DIAGNOSIS — Z79.4 TYPE 2 DIABETES MELLITUS WITH OTHER SPECIFIED COMPLICATION, WITH LONG-TERM CURRENT USE OF INSULIN (HCC): Primary | ICD-10-CM

## 2025-02-04 DIAGNOSIS — F11.20 OPIOID DEPENDENCE WITH CURRENT USE (HCC): ICD-10-CM

## 2025-02-04 DIAGNOSIS — J40 SINOBRONCHITIS: ICD-10-CM

## 2025-02-04 DIAGNOSIS — F32.2 SEVERE DEPRESSION (HCC): ICD-10-CM

## 2025-02-04 DIAGNOSIS — J32.9 SINOBRONCHITIS: ICD-10-CM

## 2025-02-04 DIAGNOSIS — E11.69 TYPE 2 DIABETES MELLITUS WITH OTHER SPECIFIED COMPLICATION, WITH LONG-TERM CURRENT USE OF INSULIN (HCC): Primary | ICD-10-CM

## 2025-02-04 DIAGNOSIS — G95.29 OTHER CORD COMPRESSION (HCC): ICD-10-CM

## 2025-02-04 DIAGNOSIS — Z91.81 AT HIGH RISK FOR FALLS: ICD-10-CM

## 2025-02-04 LAB
HBA1C MFR BLD: 8.1 %
INFLUENZA A ANTIGEN, POC: NEGATIVE
INFLUENZA B ANTIGEN, POC: NEGATIVE
Lab: 0
PERFORMING INSTRUMENT: NORMAL
QC PASS/FAIL: 0
SARS-COV-2, POC: NORMAL

## 2025-02-04 RX ORDER — AMOXICILLIN 500 MG/1
500 CAPSULE ORAL 2 TIMES DAILY
Qty: 14 CAPSULE | Refills: 0 | Status: SHIPPED | OUTPATIENT
Start: 2025-02-04 | End: 2025-02-11

## 2025-02-04 RX ORDER — INSULIN GLARGINE 100 [IU]/ML
INJECTION, SOLUTION SUBCUTANEOUS
Qty: 5 ADJUSTABLE DOSE PRE-FILLED PEN SYRINGE | Refills: 3 | Status: SHIPPED | OUTPATIENT
Start: 2025-02-04

## 2025-02-04 SDOH — ECONOMIC STABILITY: FOOD INSECURITY: WITHIN THE PAST 12 MONTHS, YOU WORRIED THAT YOUR FOOD WOULD RUN OUT BEFORE YOU GOT MONEY TO BUY MORE.: NEVER TRUE

## 2025-02-04 SDOH — ECONOMIC STABILITY: FOOD INSECURITY: WITHIN THE PAST 12 MONTHS, THE FOOD YOU BOUGHT JUST DIDN'T LAST AND YOU DIDN'T HAVE MONEY TO GET MORE.: NEVER TRUE

## 2025-02-04 ASSESSMENT — ANXIETY QUESTIONNAIRES
GAD7 TOTAL SCORE: 13
1. FEELING NERVOUS, ANXIOUS, OR ON EDGE: NEARLY EVERY DAY
IF YOU CHECKED OFF ANY PROBLEMS ON THIS QUESTIONNAIRE, HOW DIFFICULT HAVE THESE PROBLEMS MADE IT FOR YOU TO DO YOUR WORK, TAKE CARE OF THINGS AT HOME, OR GET ALONG WITH OTHER PEOPLE: SOMEWHAT DIFFICULT
3. WORRYING TOO MUCH ABOUT DIFFERENT THINGS: SEVERAL DAYS
4. TROUBLE RELAXING: SEVERAL DAYS
7. FEELING AFRAID AS IF SOMETHING AWFUL MIGHT HAPPEN: NOT AT ALL
5. BEING SO RESTLESS THAT IT IS HARD TO SIT STILL: MORE THAN HALF THE DAYS
2. NOT BEING ABLE TO STOP OR CONTROL WORRYING: NEARLY EVERY DAY
6. BECOMING EASILY ANNOYED OR IRRITABLE: NEARLY EVERY DAY

## 2025-02-04 ASSESSMENT — ENCOUNTER SYMPTOMS
SINUS PAIN: 1
VOMITING: 0
RHINORRHEA: 1
SHORTNESS OF BREATH: 0
SORE THROAT: 1
COUGH: 1
DIARRHEA: 1
SINUS PRESSURE: 1
NAUSEA: 0

## 2025-02-04 ASSESSMENT — PATIENT HEALTH QUESTIONNAIRE - PHQ9
SUM OF ALL RESPONSES TO PHQ9 QUESTIONS 1 & 2: 4
7. TROUBLE CONCENTRATING ON THINGS, SUCH AS READING THE NEWSPAPER OR WATCHING TELEVISION: MORE THAN HALF THE DAYS
6. FEELING BAD ABOUT YOURSELF - OR THAT YOU ARE A FAILURE OR HAVE LET YOURSELF OR YOUR FAMILY DOWN: NEARLY EVERY DAY
SUM OF ALL RESPONSES TO PHQ QUESTIONS 1-9: 13
2. FEELING DOWN, DEPRESSED OR HOPELESS: NEARLY EVERY DAY
9. THOUGHTS THAT YOU WOULD BE BETTER OFF DEAD, OR OF HURTING YOURSELF: NOT AT ALL
SUM OF ALL RESPONSES TO PHQ QUESTIONS 1-9: 13
SUM OF ALL RESPONSES TO PHQ QUESTIONS 1-9: 13
8. MOVING OR SPEAKING SO SLOWLY THAT OTHER PEOPLE COULD HAVE NOTICED. OR THE OPPOSITE, BEING SO FIGETY OR RESTLESS THAT YOU HAVE BEEN MOVING AROUND A LOT MORE THAN USUAL: NOT AT ALL
SUM OF ALL RESPONSES TO PHQ QUESTIONS 1-9: 13
4. FEELING TIRED OR HAVING LITTLE ENERGY: MORE THAN HALF THE DAYS
1. LITTLE INTEREST OR PLEASURE IN DOING THINGS: SEVERAL DAYS
5. POOR APPETITE OR OVEREATING: NOT AT ALL
3. TROUBLE FALLING OR STAYING ASLEEP: MORE THAN HALF THE DAYS
10. IF YOU CHECKED OFF ANY PROBLEMS, HOW DIFFICULT HAVE THESE PROBLEMS MADE IT FOR YOU TO DO YOUR WORK, TAKE CARE OF THINGS AT HOME, OR GET ALONG WITH OTHER PEOPLE: VERY DIFFICULT

## 2025-02-04 NOTE — PATIENT INSTRUCTIONS
- start antibiotic  - take a Claritin or Zyrtec daily   - call next week if URI symptoms are not improved  - A1C is improving down to 8.1  - follow up 3-4 months

## 2025-02-04 NOTE — PROGRESS NOTES
2025     Chief Complaint   Patient presents with    Diabetes    Congestion    Diarrhea       Raya Adrian (:  1950) is a 74 y.o. female, here for evaluation of the following medical concerns:    HPI  Here for follow-up on chronic conditions.  Has new complaints of 8 day history of cough, chest congestion, nasal congestion, fatigue and diarrhea.  Had several episodes of diarrhea 3 to 4 days ago, took Pepto-Bismol no diarrhea again until this morning.  No significant shortness of breath, vomiting or fever.  Also complains of bilateral ear pain and pressure.  Denies known ill contact.     Treatment Adherence:   Medication compliance:  compliant all of the time  Diet compliance:  compliant most of the time  Weight trend: decreasing  Current exercise: plans to start walking  Barriers: stress     Diabetes Mellitus Type 1:   Diagnosed in   Current symptoms/problems include none  Home blood sugar records: she tests 4 times daily- fasting and before each meal  Any episodes of hypoglycemia?  Only 1 episode recently glucose 65 in the morning  Eye exam current (within one year): no  Tobacco history: She  reports that she has never smoked. She has never used smokeless tobacco.   Daily Aspirin? Yes  Diet: poor, \"eats junk;' lately only eating 1 meal a day due to her pain  Missed appointment with endocrinology earlier this year due to a death in the family and has not rescheduled.     Hemoglobin A1C   Date Value Ref Range Status   2025 8.1 % Final   2024 8.7 % Final   2024 8.8 % Final   2023 8.9 % Final   2023 8.5 % Final       Hypertension:  Home blood pressure monitoring: No.  She is not adherent to a low sodium diet. Patient denies chest pain.  Current treatment: amlodipine 5 mg.  Blood pressure is elevated today.  Is typically been very well controlled for a long time, she is in significant pain today.  She does have a follow-up appointment with her pain management in

## 2025-02-10 ENCOUNTER — OFFICE VISIT (OUTPATIENT)
Dept: PAIN MANAGEMENT | Age: 75
End: 2025-02-10
Payer: MEDICARE

## 2025-02-10 VITALS
OXYGEN SATURATION: 96 % | BODY MASS INDEX: 26.68 KG/M2 | DIASTOLIC BLOOD PRESSURE: 66 MMHG | WEIGHT: 150.6 LBS | SYSTOLIC BLOOD PRESSURE: 114 MMHG | HEART RATE: 97 BPM

## 2025-02-10 DIAGNOSIS — M48.07 SPINAL STENOSIS OF LUMBOSACRAL REGION: ICD-10-CM

## 2025-02-10 DIAGNOSIS — M48.061 DEGENERATIVE LUMBAR SPINAL STENOSIS: ICD-10-CM

## 2025-02-10 DIAGNOSIS — G89.4 CHRONIC PAIN SYNDROME: ICD-10-CM

## 2025-02-10 DIAGNOSIS — G56.22 ULNAR NEUROPATHY AT ELBOW, LEFT: ICD-10-CM

## 2025-02-10 DIAGNOSIS — M79.7 FIBROMYALGIA: ICD-10-CM

## 2025-02-10 DIAGNOSIS — M96.1 FAILED NECK SYNDROME: ICD-10-CM

## 2025-02-10 DIAGNOSIS — G56.03 BILATERAL CARPAL TUNNEL SYNDROME: ICD-10-CM

## 2025-02-10 DIAGNOSIS — Z91.89 AT RISK FOR RESPIRATORY DEPRESSION DUE TO OPIOID: ICD-10-CM

## 2025-02-10 DIAGNOSIS — M96.1 FAILED BACK SURGICAL SYNDROME: ICD-10-CM

## 2025-02-10 DIAGNOSIS — Z51.81 ENCOUNTER FOR THERAPEUTIC DRUG MONITORING: ICD-10-CM

## 2025-02-10 DIAGNOSIS — M47.812 OSTEOARTHRITIS OF CERVICAL SPINE, UNSPECIFIED SPINAL OSTEOARTHRITIS COMPLICATION STATUS: ICD-10-CM

## 2025-02-10 PROCEDURE — 3074F SYST BP LT 130 MM HG: CPT | Performed by: INTERNAL MEDICINE

## 2025-02-10 PROCEDURE — 3078F DIAST BP <80 MM HG: CPT | Performed by: INTERNAL MEDICINE

## 2025-02-10 PROCEDURE — G8417 CALC BMI ABV UP PARAM F/U: HCPCS | Performed by: INTERNAL MEDICINE

## 2025-02-10 PROCEDURE — 1090F PRES/ABSN URINE INCON ASSESS: CPT | Performed by: INTERNAL MEDICINE

## 2025-02-10 PROCEDURE — G8427 DOCREV CUR MEDS BY ELIG CLIN: HCPCS | Performed by: INTERNAL MEDICINE

## 2025-02-10 PROCEDURE — 1159F MED LIST DOCD IN RCRD: CPT | Performed by: INTERNAL MEDICINE

## 2025-02-10 PROCEDURE — 1036F TOBACCO NON-USER: CPT | Performed by: INTERNAL MEDICINE

## 2025-02-10 PROCEDURE — 99213 OFFICE O/P EST LOW 20 MIN: CPT | Performed by: INTERNAL MEDICINE

## 2025-02-10 PROCEDURE — 1123F ACP DISCUSS/DSCN MKR DOCD: CPT | Performed by: INTERNAL MEDICINE

## 2025-02-10 PROCEDURE — G8399 PT W/DXA RESULTS DOCUMENT: HCPCS | Performed by: INTERNAL MEDICINE

## 2025-02-10 RX ORDER — GABAPENTIN 400 MG/1
400 CAPSULE ORAL 3 TIMES DAILY
Qty: 90 CAPSULE | Refills: 1 | Status: SHIPPED | OUTPATIENT
Start: 2025-02-10 | End: 2025-04-11

## 2025-02-10 RX ORDER — OXYCODONE AND ACETAMINOPHEN 7.5; 325 MG/1; MG/1
1 TABLET ORAL 3 TIMES DAILY PRN
Qty: 84 TABLET | Refills: 0 | Status: SHIPPED | OUTPATIENT
Start: 2025-02-10 | End: 2025-03-10

## 2025-02-10 NOTE — PROGRESS NOTES
Raya Adrian  1950  [id number]      HISTORY OF PRESENT ILLNESS:  Ms. Adrian is a 74 y.o. female returns for a follow up visit for pain management  She has a diagnosis of   1. Chronic pain syndrome    2. Failed back surgical syndrome    3. Fibromyalgia    4. Bilateral carpal tunnel syndrome    5. Osteoarthritis of cervical spine, unspecified spinal osteoarthritis complication status    6. Failed neck syndrome    7. Degenerative lumbar spinal stenosis    8. At risk for respiratory depression due to opioid    9. Ulnar neuropathy at elbow, left    10. Spinal stenosis of lumbosacral region    11. Encounter for therapeutic drug monitoring    .      As per information/history obtained from the PADT(patient assessment and documentation tool) -  She complains of pain in the lower back with radiation to the buttocks She rates the pain 4/10 and describes it as aching.  Pain is made worse by: movement, bending, lifting. She denies any side effects from the current pain regimen. Patient reports that since last follow up visit the physical functioning is unchanged, family/social relationships are unchanged, mood is unchanged sleep patterns are unchanged. Ms. Adrian states that since starting the treatment with the current regimen the  overall functioning  in the above aspects is  better, Patient denies misusing/abusing her narcotic pain medications or using any illegal drugs.  There are No indicators for possible drug abuse, addiction or diversion problems.   Upon obtaining the medical history from Ms. Adrian regarding today's office visit for her presenting problems, patient states she has been doing fair, her pain has been baseline. Ms. Adrian states she is getting over bronchitis, she is on Antibiotics. She mentions she is using Percocet along with Neurontin. Patient states her back and neck pain is manageable.       ALLERGIES: Patients list of allergies were reviewed     MEDICATIONS: Ms. Adrian

## 2025-03-10 ENCOUNTER — OFFICE VISIT (OUTPATIENT)
Dept: PAIN MANAGEMENT | Age: 75
End: 2025-03-10
Payer: MEDICARE

## 2025-03-10 VITALS
BODY MASS INDEX: 26.75 KG/M2 | SYSTOLIC BLOOD PRESSURE: 106 MMHG | WEIGHT: 151 LBS | DIASTOLIC BLOOD PRESSURE: 60 MMHG | HEART RATE: 88 BPM | OXYGEN SATURATION: 96 %

## 2025-03-10 DIAGNOSIS — G56.22 ULNAR NEUROPATHY AT ELBOW, LEFT: ICD-10-CM

## 2025-03-10 DIAGNOSIS — M48.07 SPINAL STENOSIS OF LUMBOSACRAL REGION: ICD-10-CM

## 2025-03-10 DIAGNOSIS — Z91.89 AT RISK FOR RESPIRATORY DEPRESSION DUE TO OPIOID: ICD-10-CM

## 2025-03-10 DIAGNOSIS — M47.812 OSTEOARTHRITIS OF CERVICAL SPINE, UNSPECIFIED SPINAL OSTEOARTHRITIS COMPLICATION STATUS: ICD-10-CM

## 2025-03-10 DIAGNOSIS — M48.061 DEGENERATIVE LUMBAR SPINAL STENOSIS: ICD-10-CM

## 2025-03-10 DIAGNOSIS — M96.1 FAILED NECK SYNDROME: ICD-10-CM

## 2025-03-10 DIAGNOSIS — G89.4 CHRONIC PAIN SYNDROME: ICD-10-CM

## 2025-03-10 DIAGNOSIS — M79.7 FIBROMYALGIA: ICD-10-CM

## 2025-03-10 DIAGNOSIS — M96.1 FAILED BACK SURGICAL SYNDROME: ICD-10-CM

## 2025-03-10 DIAGNOSIS — G56.03 BILATERAL CARPAL TUNNEL SYNDROME: ICD-10-CM

## 2025-03-10 PROCEDURE — G8399 PT W/DXA RESULTS DOCUMENT: HCPCS | Performed by: INTERNAL MEDICINE

## 2025-03-10 PROCEDURE — 1159F MED LIST DOCD IN RCRD: CPT | Performed by: INTERNAL MEDICINE

## 2025-03-10 PROCEDURE — 1036F TOBACCO NON-USER: CPT | Performed by: INTERNAL MEDICINE

## 2025-03-10 PROCEDURE — 1123F ACP DISCUSS/DSCN MKR DOCD: CPT | Performed by: INTERNAL MEDICINE

## 2025-03-10 PROCEDURE — 99213 OFFICE O/P EST LOW 20 MIN: CPT | Performed by: INTERNAL MEDICINE

## 2025-03-10 PROCEDURE — 3074F SYST BP LT 130 MM HG: CPT | Performed by: INTERNAL MEDICINE

## 2025-03-10 PROCEDURE — G8417 CALC BMI ABV UP PARAM F/U: HCPCS | Performed by: INTERNAL MEDICINE

## 2025-03-10 PROCEDURE — 1090F PRES/ABSN URINE INCON ASSESS: CPT | Performed by: INTERNAL MEDICINE

## 2025-03-10 PROCEDURE — G8427 DOCREV CUR MEDS BY ELIG CLIN: HCPCS | Performed by: INTERNAL MEDICINE

## 2025-03-10 PROCEDURE — 3078F DIAST BP <80 MM HG: CPT | Performed by: INTERNAL MEDICINE

## 2025-03-10 RX ORDER — GABAPENTIN 400 MG/1
400 CAPSULE ORAL 3 TIMES DAILY
Qty: 90 CAPSULE | Refills: 1 | Status: SHIPPED | OUTPATIENT
Start: 2025-03-10 | End: 2025-05-09

## 2025-03-10 RX ORDER — OXYCODONE AND ACETAMINOPHEN 7.5; 325 MG/1; MG/1
1 TABLET ORAL 3 TIMES DAILY PRN
Qty: 84 TABLET | Refills: 0 | Status: SHIPPED | OUTPATIENT
Start: 2025-03-10 | End: 2025-04-07

## 2025-03-10 NOTE — PROGRESS NOTES
Raya Adrian  1950  [id number]      HISTORY OF PRESENT ILLNESS:  Ms. Adrian is a 74 y.o. female returns for a follow up visit for pain management  She has a diagnosis of   1. Chronic pain syndrome    2. Bilateral carpal tunnel syndrome    3. Degenerative lumbar spinal stenosis    4. Spinal stenosis of lumbosacral region    5. Failed back surgical syndrome    6. Osteoarthritis of cervical spine, unspecified spinal osteoarthritis complication status    7. At risk for respiratory depression due to opioid    8. Fibromyalgia    9. Failed neck syndrome    10. Ulnar neuropathy at elbow, left    .      As per information/history obtained from the PADT(patient assessment and documentation tool) -  She complains of pain in the shoulders Left and lower back  She rates the pain 4/10 and describes it as aching, pins and needles.  Pain is made worse by: movement, bending, lifting. She denies any side effects from the current pain regimen. Patient reports that since last follow up visit the physical functioning is unchanged, family/social relationships are unchanged, mood is unchanged sleep patterns are unchanged. Ms. Adrian states that since starting the treatment with the current regimen the  overall functioning  in the above aspects is  better, Patient denies misusing/abusing her narcotic pain medications or using any illegal drugs.  There are No indicators for possible drug abuse, addiction or diversion problems.   Upon obtaining the medical history from Ms. Adrian regarding today's office visit for her presenting problems, patient states she has been doing fair, she is managing with her regimen. Ms. Adrian mentions she is using Percocet along with Neurontin. She states she is having more good days. Patient states she has been walking some daily. Patient denies any constipation symptoms.       ALLERGIES: Patients list of allergies were reviewed     MEDICATIONS: Ms. Adrian list of medications were  "    Physical Therapy Treatment    Patient Name: Mukesh Garg  MRN: [id number]  Encounter date:  5/9/2024  Time Calculation  Start Time: 1345  Stop Time: 1435  Time Calculation (min): 50 min     PT Therapeutic Procedures Time Entry  Therapeutic Exercise Time Entry: 40    Visit Number:  28 (including evaluation)  Planned total visits: 28  Visits Authorized/Insurance Coverage:  2024 - 2024- 96% C[place]AGE/ [place] VISIT/ NO AUTH PER AVAILITY [id number]  Transaction Time Feb 1, 3:07 PM    SJ - NEW [employer] ACTIVE / $0 DED / $1500 OOP / [place] VISITS/ $0 COPAY PER AVAILITY #[id number]    Current Problem  Problem List Items Addressed This Visit    None  Visit Diagnoses         Codes    Anticoagulated    -  Primary Z79.01            Pain  1/10    Subjective  General  Patient in good spirits and motivated to participate. Patient unable to walk > 10 minutes without need to sit secondary to right hip pain.    Objective  LEFS  15%       Treatment:  Nu-Step L4 x 10 min  HS stretch 30\" x 3  Ball flexion x 20  Bridges  2 x 10     Heel/toe raises 2 x 10-  on foam  Standing hip ABD 2 x 10  MIP 2 x 10- on foam  RB AP x 20     Foam step up FWD/LAT x 10 ea  Foam step tap to 8\" step x 20  Hurdles FWD/LAT x 6  Airex beam tandem/side step x 6    Current HEP:    Bridges  Calf raises  Standing hip ABD  MIP     HS stretching 20\" x 2       Activity tolerance:  Good    OP EDUCATION:      Assessment:  Patient plateauing with current POC.  Patient should continue to improve with current POC to maximize functional mobility  Pt's response to treatment:  good  Areas of improvements:  endurance/function  Limitations/deficits:  right hip pain/LE swelling    Pain end of session:   1/10    Plan:     Discharge- continue with HEP/ to trial Safe Start program.    Assessment of current progress against goals:  Progressing toward functional goals should continue to improve with HEP over time        Goals:  STG(3 visits)  Patient to tolerate 40 minutes of aqautic PT- " MET     LTG (28 visits)1  LEFS to < 40 % impaired-NOT MET  MMT LE to 4/5- MET- MET  Patient to walk for 30 minutes without need to sit secondary to pain/fatigue- NOT MET  4.   Patient to return to pre morbid recreational activities.- NOT MET

## 2025-03-17 ENCOUNTER — TELEPHONE (OUTPATIENT)
Dept: PAIN MANAGEMENT | Age: 75
End: 2025-03-17

## 2025-03-17 DIAGNOSIS — E03.9 HYPOTHYROIDISM, UNSPECIFIED TYPE: ICD-10-CM

## 2025-03-17 RX ORDER — LEVOTHYROXINE SODIUM 75 UG/1
75 TABLET ORAL DAILY
Qty: 90 TABLET | Refills: 3 | Status: SHIPPED | OUTPATIENT
Start: 2025-03-17

## 2025-03-17 NOTE — TELEPHONE ENCOUNTER
Pt calling because her percocet was never called in form her appt on Thursday. Please call pt when this is complete.

## 2025-03-17 NOTE — TELEPHONE ENCOUNTER
LOV 2/4/2025    Future Appointments   Date Time Provider Department Center   4/10/2025 11:30 AM Larry De Los Santos MD R BANK PAIN [facility]   5/5/2025 12:40 PM Batsheva Mora APRN - CNP YUNIOR FP [facility] ECC DEP   7/28/2025  9:20 AM DEXA MOB [facility] MOB RAD [employer]   7/28/2025  9:40 AM Chaparro Noel MD [facility] BH&O MMA

## 2025-03-18 NOTE — TELEPHONE ENCOUNTER
Tried calling pt to let her know that her medication was sent to her pharmacy on 03/10/2025,She had a voicemail set up with no name so I just left my title and name which office I was calling from and tommyy's phone number.

## 2025-04-01 ENCOUNTER — OFFICE VISIT (OUTPATIENT)
Dept: FAMILY MEDICINE CLINIC | Age: 75
End: 2025-04-01
Payer: MEDICARE

## 2025-04-01 VITALS
BODY MASS INDEX: 26.39 KG/M2 | SYSTOLIC BLOOD PRESSURE: 122 MMHG | RESPIRATION RATE: 16 BRPM | DIASTOLIC BLOOD PRESSURE: 78 MMHG | WEIGHT: 149 LBS | TEMPERATURE: 97.6 F | OXYGEN SATURATION: 96 % | HEART RATE: 79 BPM

## 2025-04-01 DIAGNOSIS — R09.82 POST-NASAL DRIP: ICD-10-CM

## 2025-04-01 DIAGNOSIS — N39.0 URINARY TRACT INFECTION WITHOUT HEMATURIA, SITE UNSPECIFIED: Primary | ICD-10-CM

## 2025-04-01 DIAGNOSIS — F32.2 SEVERE DEPRESSION (HCC): ICD-10-CM

## 2025-04-01 DIAGNOSIS — F41.9 ANXIETY: ICD-10-CM

## 2025-04-01 DIAGNOSIS — G47.9 SLEEP DISTURBANCE: ICD-10-CM

## 2025-04-01 PROBLEM — R39.15 URGENCY OF URINATION: Status: ACTIVE | Noted: 2025-04-01

## 2025-04-01 LAB
BILIRUBIN, POC: NORMAL
BLOOD URINE, POC: NORMAL
CLARITY, POC: NORMAL
COLOR, POC: NORMAL
GLUCOSE URINE, POC: NORMAL MG/DL
KETONES, POC: NORMAL MG/DL
LEUKOCYTE EST, POC: NORMAL
NITRITE, POC: NORMAL
PH, POC: 6
PROTEIN, POC: NORMAL MG/DL
SPECIFIC GRAVITY, POC: 1.01
UROBILINOGEN, POC: NORMAL MG/DL

## 2025-04-01 PROCEDURE — G8399 PT W/DXA RESULTS DOCUMENT: HCPCS | Performed by: NURSE PRACTITIONER

## 2025-04-01 PROCEDURE — 81003 URINALYSIS AUTO W/O SCOPE: CPT | Performed by: NURSE PRACTITIONER

## 2025-04-01 PROCEDURE — 3074F SYST BP LT 130 MM HG: CPT | Performed by: NURSE PRACTITIONER

## 2025-04-01 PROCEDURE — 1159F MED LIST DOCD IN RCRD: CPT | Performed by: NURSE PRACTITIONER

## 2025-04-01 PROCEDURE — 99214 OFFICE O/P EST MOD 30 MIN: CPT | Performed by: NURSE PRACTITIONER

## 2025-04-01 PROCEDURE — 1036F TOBACCO NON-USER: CPT | Performed by: NURSE PRACTITIONER

## 2025-04-01 PROCEDURE — 1090F PRES/ABSN URINE INCON ASSESS: CPT | Performed by: NURSE PRACTITIONER

## 2025-04-01 PROCEDURE — G8427 DOCREV CUR MEDS BY ELIG CLIN: HCPCS | Performed by: NURSE PRACTITIONER

## 2025-04-01 PROCEDURE — 1160F RVW MEDS BY RX/DR IN RCRD: CPT | Performed by: NURSE PRACTITIONER

## 2025-04-01 PROCEDURE — 3078F DIAST BP <80 MM HG: CPT | Performed by: NURSE PRACTITIONER

## 2025-04-01 PROCEDURE — G8417 CALC BMI ABV UP PARAM F/U: HCPCS | Performed by: NURSE PRACTITIONER

## 2025-04-01 PROCEDURE — 1123F ACP DISCUSS/DSCN MKR DOCD: CPT | Performed by: NURSE PRACTITIONER

## 2025-04-01 RX ORDER — ATORVASTATIN CALCIUM 40 MG/1
40 TABLET, FILM COATED ORAL DAILY
Qty: 90 TABLET | Refills: 3 | Status: SHIPPED | OUTPATIENT
Start: 2025-04-01

## 2025-04-01 RX ORDER — CEPHALEXIN 500 MG/1
500 CAPSULE ORAL 2 TIMES DAILY
Qty: 14 CAPSULE | Refills: 0 | Status: SHIPPED | OUTPATIENT
Start: 2025-04-01 | End: 2025-04-08

## 2025-04-01 RX ORDER — HYDROXYZINE PAMOATE 25 MG/1
25 CAPSULE ORAL 2 TIMES DAILY PRN
Qty: 180 CAPSULE | Refills: 3 | Status: SHIPPED | OUTPATIENT
Start: 2025-04-01

## 2025-04-01 RX ORDER — KETOCONAZOLE 20 MG/ML
SHAMPOO, SUSPENSION TOPICAL
COMMUNITY
Start: 2025-01-14

## 2025-04-01 SDOH — ECONOMIC STABILITY: FOOD INSECURITY: WITHIN THE PAST 12 MONTHS, THE FOOD YOU BOUGHT JUST DIDN'T LAST AND YOU DIDN'T HAVE MONEY TO GET MORE.: NEVER TRUE

## 2025-04-01 SDOH — ECONOMIC STABILITY: FOOD INSECURITY: WITHIN THE PAST 12 MONTHS, YOU WORRIED THAT YOUR FOOD WOULD RUN OUT BEFORE YOU GOT MONEY TO BUY MORE.: NEVER TRUE

## 2025-04-01 ASSESSMENT — PATIENT HEALTH QUESTIONNAIRE - PHQ9
5. POOR APPETITE OR OVEREATING: SEVERAL DAYS
3. TROUBLE FALLING OR STAYING ASLEEP: SEVERAL DAYS
4. FEELING TIRED OR HAVING LITTLE ENERGY: SEVERAL DAYS
2. FEELING DOWN, DEPRESSED OR HOPELESS: NEARLY EVERY DAY
9. THOUGHTS THAT YOU WOULD BE BETTER OFF DEAD, OR OF HURTING YOURSELF: NOT AT ALL
SUM OF ALL RESPONSES TO PHQ QUESTIONS 1-9: 11
10. IF YOU CHECKED OFF ANY PROBLEMS, HOW DIFFICULT HAVE THESE PROBLEMS MADE IT FOR YOU TO DO YOUR WORK, TAKE CARE OF THINGS AT HOME, OR GET ALONG WITH OTHER PEOPLE: SOMEWHAT DIFFICULT
7. TROUBLE CONCENTRATING ON THINGS, SUCH AS READING THE NEWSPAPER OR WATCHING TELEVISION: NOT AT ALL
SUM OF ALL RESPONSES TO PHQ QUESTIONS 1-9: 11
6. FEELING BAD ABOUT YOURSELF - OR THAT YOU ARE A FAILURE OR HAVE LET YOURSELF OR YOUR FAMILY DOWN: NEARLY EVERY DAY
SUM OF ALL RESPONSES TO PHQ QUESTIONS 1-9: 11
8. MOVING OR SPEAKING SO SLOWLY THAT OTHER PEOPLE COULD HAVE NOTICED. OR THE OPPOSITE, BEING SO FIGETY OR RESTLESS THAT YOU HAVE BEEN MOVING AROUND A LOT MORE THAN USUAL: NOT AT ALL
1. LITTLE INTEREST OR PLEASURE IN DOING THINGS: MORE THAN HALF THE DAYS
SUM OF ALL RESPONSES TO PHQ QUESTIONS 1-9: 11

## 2025-04-01 ASSESSMENT — ENCOUNTER SYMPTOMS
ABDOMINAL PAIN: 0
VOMITING: 0
NAUSEA: 0

## 2025-04-01 NOTE — PATIENT INSTRUCTIONS
Start Keflex (antibiotic) twice daily for one week  Make sure you are drinking plenty of water   We will send urine culture and that should back Thursday   Call if symptoms are not improving in 3-4 days or sooner if symptoms worsen       [employer] 211   Speak to a trained professional 24/7 who can connect you to essential community services including food, clothing, transportation, housing, utilities, employment services, childcare, and baby supplies. 211 serves nationwide.  [address] [place] [address].org for resources in [facility], [place] and [facility] in [place]; [place], [place], [place], and [facility] in [place].   [facility]org/resources for resources in [place], [place], [place], [place], [place], [place], [place], [place], [facility], [place], [place], and [facility] in [place].    Non-Emergency Transportation: Non-emergency medical transportation is for Medicaid members who do not have access to free transportation that suits their medical needs and need to be transported to a Medicaid-covered service performed by a Medicaid enrolled provider.   [facility]: [phone]  [facility]: [phone]  [facility]: [phone]  [facility]: [phone]  [facility]: [phone]  [facility]: [phone]. [phone]  [facility]: [phone]   [facility]: [place], [place], [place], [place], [place], [place], and [facility] [phone]  [place]: [phone] for non-managed Medicaid.     Public Transportation Services: Small fee may be associated with service.  [facility]: [phone]  [place] [facility]: [phone]  [facility]: [phone], ext. [phone]  [facility] Transit Services: [phone]  [facility] Transit Services: [phone]  [facility] Transportation: [phone]  [place]- Transit Authority [facility] (TANK): [phone]      [facility]   What they

## 2025-04-01 NOTE — PROGRESS NOTES
2025     Raya Adrian (:  1950) is a 74 y.o. female, here for evaluation of the following medical concerns:    Chief Complaint   Patient presents with    Bladder Problem     Heavy feeling      Urinary Urgency     Ongoing  Irritation & odor    Depression     Positive screening       HPI:  History of Present Illness  The patient is a 74-year-old female who presents with complaints of UTI.    Symptoms suggestive of a urinary tract infection include urinary urgency, discomfort, and a sensation of heaviness in the lower abdomen. An unusual odor in her urine and mild cloudiness are noted, but no hematuria has been observed. There is no difficulty in bladder emptying, although incontinence is admitted (stress incontinence which is not new.) Adequate hydration has been maintained. A history of frequent bladder issues dating back to youth is reported. A previous episode of leg pain following surgery was subsequently diagnosed as a bladder infection requiring emergency room treatment.    Persistent postnasal drainage is reported without associated cough or facial pain. She thinks from spring time allergies. Asking in Claritin or Zyrtec would help.    Blood glucose levels have been slightly elevated recently, with readings of 127 this morning and 120 yesterday, compared to the usual range of 80s.    Refills on Lipitor and hydroxyzine are requested.    Fatigue has been more pronounced than usual, and sleep disturbances due to overthinking are reported. No nausea or vomiting is present.  Persistent depression. Worsens when she is not feeling well  Chronic passive SI without intention or plan are frequent         2025     9:23 AM 2025     1:02 PM 2024     7:37 AM 2024     9:09 AM 2024    12:57 PM 2024    12:56 PM 2024    11:02 AM   PHQ Scores   PHQ2 Score 5 4 4 0 0 0 0   PHQ9 Score 11 13 13 0 0 5 0     Interpretation of Total Score Depression Severity: 1-4 = Minimal

## 2025-04-05 LAB
BACTERIA UR CULT: ABNORMAL
ORGANISM: ABNORMAL

## 2025-04-07 ENCOUNTER — RESULTS FOLLOW-UP (OUTPATIENT)
Dept: FAMILY MEDICINE CLINIC | Age: 75
End: 2025-04-07

## 2025-04-07 RX ORDER — CIPROFLOXACIN 250 MG/1
250 TABLET, FILM COATED ORAL 2 TIMES DAILY
Qty: 14 TABLET | Refills: 0 | Status: SHIPPED | OUTPATIENT
Start: 2025-04-07 | End: 2025-04-14

## 2025-04-10 ENCOUNTER — OFFICE VISIT (OUTPATIENT)
Dept: PAIN MANAGEMENT | Age: 75
End: 2025-04-10
Payer: MEDICARE

## 2025-04-10 VITALS
WEIGHT: 154 LBS | SYSTOLIC BLOOD PRESSURE: 131 MMHG | BODY MASS INDEX: 27.28 KG/M2 | HEART RATE: 84 BPM | OXYGEN SATURATION: 97 % | DIASTOLIC BLOOD PRESSURE: 68 MMHG

## 2025-04-10 DIAGNOSIS — Z91.89 AT RISK FOR RESPIRATORY DEPRESSION DUE TO OPIOID: ICD-10-CM

## 2025-04-10 DIAGNOSIS — M48.061 DEGENERATIVE LUMBAR SPINAL STENOSIS: ICD-10-CM

## 2025-04-10 DIAGNOSIS — G56.22 ULNAR NEUROPATHY AT ELBOW, LEFT: ICD-10-CM

## 2025-04-10 DIAGNOSIS — G56.03 BILATERAL CARPAL TUNNEL SYNDROME: ICD-10-CM

## 2025-04-10 DIAGNOSIS — M96.1 FAILED BACK SURGICAL SYNDROME: ICD-10-CM

## 2025-04-10 DIAGNOSIS — M79.7 FIBROMYALGIA: ICD-10-CM

## 2025-04-10 DIAGNOSIS — G89.4 CHRONIC PAIN SYNDROME: ICD-10-CM

## 2025-04-10 DIAGNOSIS — M47.812 OSTEOARTHRITIS OF CERVICAL SPINE, UNSPECIFIED SPINAL OSTEOARTHRITIS COMPLICATION STATUS: ICD-10-CM

## 2025-04-10 DIAGNOSIS — M96.1 FAILED NECK SYNDROME: ICD-10-CM

## 2025-04-10 DIAGNOSIS — M48.07 SPINAL STENOSIS OF LUMBOSACRAL REGION: ICD-10-CM

## 2025-04-10 PROCEDURE — 1090F PRES/ABSN URINE INCON ASSESS: CPT | Performed by: INTERNAL MEDICINE

## 2025-04-10 PROCEDURE — G8417 CALC BMI ABV UP PARAM F/U: HCPCS | Performed by: INTERNAL MEDICINE

## 2025-04-10 PROCEDURE — G8427 DOCREV CUR MEDS BY ELIG CLIN: HCPCS | Performed by: INTERNAL MEDICINE

## 2025-04-10 PROCEDURE — 1036F TOBACCO NON-USER: CPT | Performed by: INTERNAL MEDICINE

## 2025-04-10 PROCEDURE — 1159F MED LIST DOCD IN RCRD: CPT | Performed by: INTERNAL MEDICINE

## 2025-04-10 PROCEDURE — G8399 PT W/DXA RESULTS DOCUMENT: HCPCS | Performed by: INTERNAL MEDICINE

## 2025-04-10 PROCEDURE — 1123F ACP DISCUSS/DSCN MKR DOCD: CPT | Performed by: INTERNAL MEDICINE

## 2025-04-10 PROCEDURE — 99213 OFFICE O/P EST LOW 20 MIN: CPT | Performed by: INTERNAL MEDICINE

## 2025-04-10 PROCEDURE — 3078F DIAST BP <80 MM HG: CPT | Performed by: INTERNAL MEDICINE

## 2025-04-10 PROCEDURE — 3075F SYST BP GE 130 - 139MM HG: CPT | Performed by: INTERNAL MEDICINE

## 2025-04-10 RX ORDER — GABAPENTIN 400 MG/1
400 CAPSULE ORAL 3 TIMES DAILY
Qty: 90 CAPSULE | Refills: 1 | Status: SHIPPED | OUTPATIENT
Start: 2025-04-10 | End: 2025-06-09

## 2025-04-10 RX ORDER — OXYCODONE AND ACETAMINOPHEN 7.5; 325 MG/1; MG/1
1 TABLET ORAL 3 TIMES DAILY PRN
Qty: 84 TABLET | Refills: 0 | Status: SHIPPED | OUTPATIENT
Start: 2025-04-10 | End: 2025-05-08

## 2025-04-10 NOTE — PROGRESS NOTES
10. Failed neck syndrome        PLAN:  Informed verbal consent was obtained.  Risks and benefits of the medications and other alternative treatments  including no treatment have been discussed with the patient. Any questions related to these were addressed. The common side effects of these medications were also explained to the patient.    -[place] record was obtained and reviewed  for the last one year and no indicators of drug misuse  were found. Any other controlled substance prescriptions  seen on the record have been accounted for, I am aware of the patient receiving these medications. [place] record will be rechecked as part of office protocol.    --Chronic pain of multifactorial etiology present for 35-40  years. Above diagnosis  established  after complete work up. Management of the chronic pain over the years has included status post 4 back and 2 neck surgeries, status post 10 JULIA's/Blocks over the years    Multiple interventional and non interventional procedures and has participated in numerous  PT visits and HEP and non pharmacological treatment modalities.  Pharmacological agents have included opioid and non opioid medications which have helped Ms. Adrian manage the chronic disabling pain, improve Her quality of life, improve physical and psychosocial functioning  and help relieve suffering.  Current MED 33 down from more than 40 MED initially   Last UDS 1/2025 Consistent   -Above medical history reviewed, Any changes were updated 04/10/25   -Continue with Percocet 3 x per day   -ROM exercises and Walking as tolerated   -She was advised to increase fluids ( 5-7  glasses of fluid daily), limit caffeine, avoid cheese products, increase dietary fiber, increase activity and exercise as tolerated and relax regularly and enjoy meals    Current Outpatient Medications   Medication Sig Dispense Refill    gabapentin (NEURONTIN) 400 MG capsule Take 1 capsule by mouth 3 times daily for 60 days. 90 capsule 1

## 2025-05-05 ENCOUNTER — OFFICE VISIT (OUTPATIENT)
Dept: FAMILY MEDICINE CLINIC | Age: 75
End: 2025-05-05
Payer: MEDICARE

## 2025-05-05 VITALS
BODY MASS INDEX: 27.28 KG/M2 | WEIGHT: 154 LBS | DIASTOLIC BLOOD PRESSURE: 70 MMHG | TEMPERATURE: 97.8 F | SYSTOLIC BLOOD PRESSURE: 123 MMHG | RESPIRATION RATE: 16 BRPM | OXYGEN SATURATION: 96 % | HEART RATE: 96 BPM

## 2025-05-05 DIAGNOSIS — R35.0 URINE FREQUENCY: ICD-10-CM

## 2025-05-05 DIAGNOSIS — Z79.4 TYPE 2 DIABETES MELLITUS WITH OTHER SPECIFIED COMPLICATION, WITH LONG-TERM CURRENT USE OF INSULIN (HCC): Primary | ICD-10-CM

## 2025-05-05 DIAGNOSIS — E11.69 TYPE 2 DIABETES MELLITUS WITH OTHER SPECIFIED COMPLICATION, WITH LONG-TERM CURRENT USE OF INSULIN (HCC): Primary | ICD-10-CM

## 2025-05-05 DIAGNOSIS — E55.9 VITAMIN D DEFICIENCY: ICD-10-CM

## 2025-05-05 DIAGNOSIS — I10 PRIMARY HYPERTENSION: ICD-10-CM

## 2025-05-05 DIAGNOSIS — E03.9 HYPOTHYROIDISM, UNSPECIFIED TYPE: ICD-10-CM

## 2025-05-05 LAB
BILIRUBIN, POC: NEGATIVE
BLOOD URINE, POC: NEGATIVE
CLARITY, POC: NORMAL
COLOR, POC: NORMAL
GLUCOSE URINE, POC: NORMAL MG/DL
KETONES, POC: NEGATIVE MG/DL
LEUKOCYTE EST, POC: NEGATIVE
NITRITE, POC: NEGATIVE
PH, POC: 5.5
PROTEIN, POC: NEGATIVE MG/DL
SPECIFIC GRAVITY, POC: 1.01
UROBILINOGEN, POC: NORMAL MG/DL

## 2025-05-05 PROCEDURE — 3074F SYST BP LT 130 MM HG: CPT | Performed by: NURSE PRACTITIONER

## 2025-05-05 PROCEDURE — G8399 PT W/DXA RESULTS DOCUMENT: HCPCS | Performed by: NURSE PRACTITIONER

## 2025-05-05 PROCEDURE — 1036F TOBACCO NON-USER: CPT | Performed by: NURSE PRACTITIONER

## 2025-05-05 PROCEDURE — 1123F ACP DISCUSS/DSCN MKR DOCD: CPT | Performed by: NURSE PRACTITIONER

## 2025-05-05 PROCEDURE — 83036 HEMOGLOBIN GLYCOSYLATED A1C: CPT | Performed by: NURSE PRACTITIONER

## 2025-05-05 PROCEDURE — 3052F HG A1C>EQUAL 8.0%<EQUAL 9.0%: CPT | Performed by: NURSE PRACTITIONER

## 2025-05-05 PROCEDURE — 3078F DIAST BP <80 MM HG: CPT | Performed by: NURSE PRACTITIONER

## 2025-05-05 PROCEDURE — 99214 OFFICE O/P EST MOD 30 MIN: CPT | Performed by: NURSE PRACTITIONER

## 2025-05-05 PROCEDURE — 1159F MED LIST DOCD IN RCRD: CPT | Performed by: NURSE PRACTITIONER

## 2025-05-05 PROCEDURE — 81003 URINALYSIS AUTO W/O SCOPE: CPT | Performed by: NURSE PRACTITIONER

## 2025-05-05 PROCEDURE — 1090F PRES/ABSN URINE INCON ASSESS: CPT | Performed by: NURSE PRACTITIONER

## 2025-05-05 PROCEDURE — G8427 DOCREV CUR MEDS BY ELIG CLIN: HCPCS | Performed by: NURSE PRACTITIONER

## 2025-05-05 PROCEDURE — G8417 CALC BMI ABV UP PARAM F/U: HCPCS | Performed by: NURSE PRACTITIONER

## 2025-05-05 PROCEDURE — 1160F RVW MEDS BY RX/DR IN RCRD: CPT | Performed by: NURSE PRACTITIONER

## 2025-05-05 SDOH — ECONOMIC STABILITY: FOOD INSECURITY: WITHIN THE PAST 12 MONTHS, YOU WORRIED THAT YOUR FOOD WOULD RUN OUT BEFORE YOU GOT MONEY TO BUY MORE.: NEVER TRUE

## 2025-05-05 SDOH — ECONOMIC STABILITY: FOOD INSECURITY: WITHIN THE PAST 12 MONTHS, THE FOOD YOU BOUGHT JUST DIDN'T LAST AND YOU DIDN'T HAVE MONEY TO GET MORE.: NEVER TRUE

## 2025-05-05 ASSESSMENT — PATIENT HEALTH QUESTIONNAIRE - PHQ9
3. TROUBLE FALLING OR STAYING ASLEEP: SEVERAL DAYS
1. LITTLE INTEREST OR PLEASURE IN DOING THINGS: MORE THAN HALF THE DAYS
9. THOUGHTS THAT YOU WOULD BE BETTER OFF DEAD, OR OF HURTING YOURSELF: NOT AT ALL
SUM OF ALL RESPONSES TO PHQ QUESTIONS 1-9: 11
4. FEELING TIRED OR HAVING LITTLE ENERGY: SEVERAL DAYS
8. MOVING OR SPEAKING SO SLOWLY THAT OTHER PEOPLE COULD HAVE NOTICED. OR THE OPPOSITE, BEING SO FIGETY OR RESTLESS THAT YOU HAVE BEEN MOVING AROUND A LOT MORE THAN USUAL: NOT AT ALL
SUM OF ALL RESPONSES TO PHQ QUESTIONS 1-9: 11
2. FEELING DOWN, DEPRESSED OR HOPELESS: NEARLY EVERY DAY
7. TROUBLE CONCENTRATING ON THINGS, SUCH AS READING THE NEWSPAPER OR WATCHING TELEVISION: NOT AT ALL
10. IF YOU CHECKED OFF ANY PROBLEMS, HOW DIFFICULT HAVE THESE PROBLEMS MADE IT FOR YOU TO DO YOUR WORK, TAKE CARE OF THINGS AT HOME, OR GET ALONG WITH OTHER PEOPLE: SOMEWHAT DIFFICULT
6. FEELING BAD ABOUT YOURSELF - OR THAT YOU ARE A FAILURE OR HAVE LET YOURSELF OR YOUR FAMILY DOWN: NEARLY EVERY DAY
DEPRESSION UNABLE TO ASSESS: FUNCTIONAL CAPACITY MOTIVATION LIMITS ACCURACY
5. POOR APPETITE OR OVEREATING: SEVERAL DAYS

## 2025-05-05 ASSESSMENT — ANXIETY QUESTIONNAIRES
GAD7 TOTAL SCORE: 13
5. BEING SO RESTLESS THAT IT IS HARD TO SIT STILL: MORE THAN HALF THE DAYS
IF YOU CHECKED OFF ANY PROBLEMS ON THIS QUESTIONNAIRE, HOW DIFFICULT HAVE THESE PROBLEMS MADE IT FOR YOU TO DO YOUR WORK, TAKE CARE OF THINGS AT HOME, OR GET ALONG WITH OTHER PEOPLE: SOMEWHAT DIFFICULT
4. TROUBLE RELAXING: SEVERAL DAYS
6. BECOMING EASILY ANNOYED OR IRRITABLE: NEARLY EVERY DAY
7. FEELING AFRAID AS IF SOMETHING AWFUL MIGHT HAPPEN: NOT AT ALL
1. FEELING NERVOUS, ANXIOUS, OR ON EDGE: NEARLY EVERY DAY
3. WORRYING TOO MUCH ABOUT DIFFERENT THINGS: SEVERAL DAYS
2. NOT BEING ABLE TO STOP OR CONTROL WORRYING: NEARLY EVERY DAY

## 2025-05-05 ASSESSMENT — ENCOUNTER SYMPTOMS
DIARRHEA: 0
SHORTNESS OF BREATH: 0
COUGH: 0
VOMITING: 0
NAUSEA: 0

## 2025-05-05 NOTE — PROGRESS NOTES
2025     Chief Complaint   Patient presents with    Diabetes    Other     Scalp itching        Raya Adrian (:  1950) is a 74 y.o. female, here for evaluation of the following medical concerns:    HPI    Here for follow-up on chronic conditions.     Seborrheic dermatitis:   Scalp is itching, but not as bad as previous  Facial itching has resolved with use of ketoconazole shampoo and fluocinolone ointment   The facial itching has resolved   Sugars are \"about the same\"  Higher in the evening  Often skipping/forgetting meal time insulin      Treatment Adherence:   Medication compliance:  compliant all of the time  Diet compliance:  compliant most of the time  Weight trend: decreasing  Current exercise: plans to start walking  Barriers: stress     Diabetes Mellitus Type 1:   Diagnosed in   Current symptoms/problems include none  Home blood sugar records: she tests 4 times daily- fasting and before each meal  Any episodes of hypoglycemia?  Only 1 episode recently glucose 65 in the morning  Eye exam current (within one year): no  Tobacco history: She  reports that she has never smoked. She has never used smokeless tobacco.   Daily Aspirin? Yes  Diet: poor, \"eats junk;' lately only eating 1 meal a day due to her pain  Missed appointment with endocrinology earlier this year due to a death in the family and has not rescheduled.     Hemoglobin A1C   Date Value Ref Range Status   2025 8.1 % Final   2024 8.7 % Final   2024 8.8 % Final   2023 8.9 % Final   2023 8.5 % Final   2023 9.4 % Final        Hypertension:  Home blood pressure monitoring: No.  She is not adherent to a low sodium diet. Patient denies chest pain.  Current treatment: amlodipine 10 mg, losartan 100 mg QD      Hyperlipidemia:  No new myalgias or GI upset on atorvastatin (Lipitor).           Lab Results   Component Value Date     LDL 86 2024         Hypothyroidism: Recent symptoms: none. She

## 2025-05-08 ENCOUNTER — OFFICE VISIT (OUTPATIENT)
Dept: PAIN MANAGEMENT | Age: 75
End: 2025-05-08
Payer: MEDICARE

## 2025-05-08 VITALS
HEART RATE: 82 BPM | WEIGHT: 153 LBS | SYSTOLIC BLOOD PRESSURE: 150 MMHG | DIASTOLIC BLOOD PRESSURE: 82 MMHG | BODY MASS INDEX: 27.1 KG/M2 | OXYGEN SATURATION: 97 %

## 2025-05-08 DIAGNOSIS — M96.1 FAILED BACK SURGICAL SYNDROME: ICD-10-CM

## 2025-05-08 DIAGNOSIS — M96.1 FAILED NECK SYNDROME: ICD-10-CM

## 2025-05-08 DIAGNOSIS — G89.4 CHRONIC PAIN SYNDROME: ICD-10-CM

## 2025-05-08 DIAGNOSIS — M48.07 SPINAL STENOSIS OF LUMBOSACRAL REGION: ICD-10-CM

## 2025-05-08 DIAGNOSIS — G56.22 ULNAR NEUROPATHY AT ELBOW, LEFT: ICD-10-CM

## 2025-05-08 DIAGNOSIS — M79.7 FIBROMYALGIA: ICD-10-CM

## 2025-05-08 DIAGNOSIS — M47.812 OSTEOARTHRITIS OF CERVICAL SPINE, UNSPECIFIED SPINAL OSTEOARTHRITIS COMPLICATION STATUS: ICD-10-CM

## 2025-05-08 DIAGNOSIS — M48.061 DEGENERATIVE LUMBAR SPINAL STENOSIS: ICD-10-CM

## 2025-05-08 PROCEDURE — 3077F SYST BP >= 140 MM HG: CPT | Performed by: INTERNAL MEDICINE

## 2025-05-08 PROCEDURE — G8427 DOCREV CUR MEDS BY ELIG CLIN: HCPCS | Performed by: INTERNAL MEDICINE

## 2025-05-08 PROCEDURE — 99213 OFFICE O/P EST LOW 20 MIN: CPT | Performed by: INTERNAL MEDICINE

## 2025-05-08 PROCEDURE — 3079F DIAST BP 80-89 MM HG: CPT | Performed by: INTERNAL MEDICINE

## 2025-05-08 PROCEDURE — 1036F TOBACCO NON-USER: CPT | Performed by: INTERNAL MEDICINE

## 2025-05-08 PROCEDURE — G8399 PT W/DXA RESULTS DOCUMENT: HCPCS | Performed by: INTERNAL MEDICINE

## 2025-05-08 PROCEDURE — 1090F PRES/ABSN URINE INCON ASSESS: CPT | Performed by: INTERNAL MEDICINE

## 2025-05-08 PROCEDURE — 1159F MED LIST DOCD IN RCRD: CPT | Performed by: INTERNAL MEDICINE

## 2025-05-08 PROCEDURE — 1123F ACP DISCUSS/DSCN MKR DOCD: CPT | Performed by: INTERNAL MEDICINE

## 2025-05-08 PROCEDURE — G8417 CALC BMI ABV UP PARAM F/U: HCPCS | Performed by: INTERNAL MEDICINE

## 2025-05-08 NOTE — PROGRESS NOTES
for Pain for up to 28 days. 84 tablet 0    ketoconazole (NIZORAL) 2 % shampoo Apply topically      atorvastatin (LIPITOR) 40 MG tablet Take 1 tablet by mouth daily 90 tablet 3    hydrOXYzine pamoate (VISTARIL) 25 MG capsule Take 1 capsule by mouth 2 times daily as needed for Anxiety 180 capsule 3    levothyroxine (SYNTHROID) 75 MCG tablet TAKE 1 TABLET BY MOUTH EVERY DAY 90 tablet 3    insulin glargine (LANTUS SOLOSTAR) 100 UNIT/ML injection pen Inject 21 units in the AM and 15 units in the PM 5 Adjustable Dose Pre-filled Pen Syringe 3    traZODone (DESYREL) 100 MG tablet Take 1 tablet by mouth nightly 90 tablet 2    losartan (COZAAR) 100 MG tablet TAKE 1 TABLET BY MOUTH EVERY DAY 90 tablet 3    triamcinolone (KENALOG) 0.1 % ointment       sertraline (ZOLOFT) 100 MG tablet Take 1 tablet by mouth 2 times daily 180 tablet 1    OLANZapine (ZYPREXA) 5 MG tablet Take 1 tablet by mouth nightly 90 tablet 3    Multiple Vitamins-Minerals (THERAPEUTIC MULTIVITAMIN-MINERALS) tablet Take 1 tablet by mouth daily Calcium 300mg vit S8300st      Insulin Pen Needle 31G X 5 MM MISC 1 each by Does not apply route 4 times daily 120 each 5    amLODIPine (NORVASC) 10 MG tablet Take 1 tablet by mouth daily 90 tablet 3    Insulin Aspart, w/Niacinamide, (FIASP FLEXTOUCH) 100 UNIT/ML SOPN Inject 5 Units into the skin 3 times daily (before meals) 3 Adjustable Dose Pre-filled Pen Syringe 3    estradiol (ESTRACE) 0.1 MG/GM vaginal cream PLACE 0.25 G VAGINALLY IN THE MORNING. APPLY 0.25G WITH FINGERTIP TO THE VAGINAL OPENING EVERY NIGHT AT BEDTIME FOR 2 WEEKS, THEN DECREASE TO TWICE WEEKLY.. 42.5 g 2    CALCIUM-VITAMIN D PO Take 2 tablets by mouth daily Calcium 1200mg vit D 1000iu      blood glucose monitor kit and supplies Test 4 times a day & as needed for symptoms of irregular blood glucose. 1 kit 0    blood glucose monitor strips Test 4 times a day & as needed for symptoms of irregular blood glucose. Dispense sufficient amount for indicated

## 2025-05-09 RX ORDER — GABAPENTIN 400 MG/1
400 CAPSULE ORAL 3 TIMES DAILY
Qty: 90 CAPSULE | Refills: 1 | Status: SHIPPED | OUTPATIENT
Start: 2025-05-09 | End: 2025-07-08

## 2025-05-09 RX ORDER — OXYCODONE AND ACETAMINOPHEN 7.5; 325 MG/1; MG/1
1 TABLET ORAL 3 TIMES DAILY PRN
Qty: 84 TABLET | Refills: 0 | Status: SHIPPED | OUTPATIENT
Start: 2025-05-09 | End: 2025-06-06

## 2025-05-12 DIAGNOSIS — G89.4 CHRONIC PAIN SYNDROME: ICD-10-CM

## 2025-05-12 DIAGNOSIS — M48.07 SPINAL STENOSIS OF LUMBOSACRAL REGION: ICD-10-CM

## 2025-05-12 DIAGNOSIS — M96.1 FAILED BACK SURGICAL SYNDROME: ICD-10-CM

## 2025-05-12 DIAGNOSIS — M96.1 FAILED NECK SYNDROME: ICD-10-CM

## 2025-05-12 DIAGNOSIS — F32.A DEPRESSION, UNSPECIFIED DEPRESSION TYPE: ICD-10-CM

## 2025-05-12 DIAGNOSIS — G56.22 ULNAR NEUROPATHY AT ELBOW, LEFT: ICD-10-CM

## 2025-05-12 DIAGNOSIS — M48.061 DEGENERATIVE LUMBAR SPINAL STENOSIS: ICD-10-CM

## 2025-05-12 DIAGNOSIS — M79.7 FIBROMYALGIA: ICD-10-CM

## 2025-05-12 RX ORDER — OLANZAPINE 5 MG/1
5 TABLET, FILM COATED ORAL NIGHTLY
Qty: 90 TABLET | Refills: 3 | Status: SHIPPED | OUTPATIENT
Start: 2025-05-12 | End: 2025-05-13 | Stop reason: SDUPTHER

## 2025-05-12 NOTE — TELEPHONE ENCOUNTER
Patient needs a refill      OLANZapine (ZYPREXA) 5 MG tablet       Eyad pimentel  90 days     Future Appointments   Date Time Provider Department Center   6/5/2025 11:45 AM Larry De Los Santos MD R BANK PAIN [facility]   7/28/2025  9:20 AM DEX[facility]   7/28/2025  9:40 AM Chaparro Noel MD [facility] BH&O [facility]   8/5/2025 12:40 PM Batsheva Mora APRN - CNP YUNIOR FP [facility] ECC DEP

## 2025-05-13 DIAGNOSIS — F32.A DEPRESSION, UNSPECIFIED DEPRESSION TYPE: ICD-10-CM

## 2025-05-13 RX ORDER — OLANZAPINE 5 MG/1
5 TABLET, FILM COATED ORAL NIGHTLY
Qty: 90 TABLET | Refills: 3 | Status: SHIPPED | OUTPATIENT
Start: 2025-05-13

## 2025-05-13 NOTE — TELEPHONE ENCOUNTER
LOV 5/5/2025    Future Appointments   Date Time Provider Department Center   6/5/2025 11:45 AM Larry De Los Santos MD R BANK PAIN [facility]   7/28/2025  9:20 AM DEXA MOB [employer] [facility] MOB RAD [employer]   7/28/2025  9:40 AM Chaparro Noel MD PHYS BH&O [facility]   8/5/2025 12:40 PM Batsheva Mora, APRN - CNP YUNIOR FP [facility] ECC DEP

## 2025-05-15 RX ORDER — TRAZODONE HYDROCHLORIDE 100 MG/1
100 TABLET ORAL NIGHTLY
Qty: 90 TABLET | Refills: 2 | Status: SHIPPED | OUTPATIENT
Start: 2025-05-15

## 2025-05-15 NOTE — TELEPHONE ENCOUNTER
5/5/2025      Future Appointments   Date Time Provider Department Center   6/5/2025 11:45 AM Larry De Los Santos MD R BANK PAIN [facility]   7/28/2025  9:20 AM DEXA MOB [employer] [facility] MOB RAD [employer] [employer]   7/28/2025  9:40 AM Chaparro Noel MD PHYS BH&O MMA   8/5/2025 12:40 PM Batsheva Mora, APRN - CNP YUNIOR FP [facility] ECC DEP

## 2025-06-05 ENCOUNTER — OFFICE VISIT (OUTPATIENT)
Dept: PAIN MANAGEMENT | Age: 75
End: 2025-06-05
Payer: MEDICARE

## 2025-06-05 VITALS
HEART RATE: 62 BPM | SYSTOLIC BLOOD PRESSURE: 139 MMHG | OXYGEN SATURATION: 97 % | DIASTOLIC BLOOD PRESSURE: 74 MMHG | WEIGHT: 150.8 LBS | BODY MASS INDEX: 26.71 KG/M2

## 2025-06-05 DIAGNOSIS — M96.1 FAILED NECK SYNDROME: ICD-10-CM

## 2025-06-05 DIAGNOSIS — M48.07 SPINAL STENOSIS OF LUMBOSACRAL REGION: ICD-10-CM

## 2025-06-05 DIAGNOSIS — G89.4 CHRONIC PAIN SYNDROME: ICD-10-CM

## 2025-06-05 DIAGNOSIS — M48.061 DEGENERATIVE LUMBAR SPINAL STENOSIS: ICD-10-CM

## 2025-06-05 DIAGNOSIS — M96.1 FAILED BACK SURGICAL SYNDROME: ICD-10-CM

## 2025-06-05 DIAGNOSIS — G56.22 ULNAR NEUROPATHY AT ELBOW, LEFT: ICD-10-CM

## 2025-06-05 DIAGNOSIS — M79.7 FIBROMYALGIA: ICD-10-CM

## 2025-06-05 PROCEDURE — G8417 CALC BMI ABV UP PARAM F/U: HCPCS | Performed by: INTERNAL MEDICINE

## 2025-06-05 PROCEDURE — 3078F DIAST BP <80 MM HG: CPT | Performed by: INTERNAL MEDICINE

## 2025-06-05 PROCEDURE — 1159F MED LIST DOCD IN RCRD: CPT | Performed by: INTERNAL MEDICINE

## 2025-06-05 PROCEDURE — G8427 DOCREV CUR MEDS BY ELIG CLIN: HCPCS | Performed by: INTERNAL MEDICINE

## 2025-06-05 PROCEDURE — 1036F TOBACCO NON-USER: CPT | Performed by: INTERNAL MEDICINE

## 2025-06-05 PROCEDURE — G8399 PT W/DXA RESULTS DOCUMENT: HCPCS | Performed by: INTERNAL MEDICINE

## 2025-06-05 PROCEDURE — 1123F ACP DISCUSS/DSCN MKR DOCD: CPT | Performed by: INTERNAL MEDICINE

## 2025-06-05 PROCEDURE — 3075F SYST BP GE 130 - 139MM HG: CPT | Performed by: INTERNAL MEDICINE

## 2025-06-05 PROCEDURE — 1090F PRES/ABSN URINE INCON ASSESS: CPT | Performed by: INTERNAL MEDICINE

## 2025-06-05 PROCEDURE — 99213 OFFICE O/P EST LOW 20 MIN: CPT | Performed by: INTERNAL MEDICINE

## 2025-06-05 RX ORDER — OXYCODONE AND ACETAMINOPHEN 7.5; 325 MG/1; MG/1
1 TABLET ORAL 3 TIMES DAILY PRN
Qty: 84 TABLET | Refills: 0 | Status: SHIPPED | OUTPATIENT
Start: 2025-06-05 | End: 2025-07-03

## 2025-06-05 RX ORDER — GABAPENTIN 400 MG/1
400 CAPSULE ORAL 3 TIMES DAILY
Qty: 90 CAPSULE | Refills: 1 | Status: SHIPPED | OUTPATIENT
Start: 2025-06-05 | End: 2025-08-04

## 2025-06-05 NOTE — PROGRESS NOTES
Raya Adrian  1950  [id number]      HISTORY OF PRESENT ILLNESS:  Ms. Adrian is a 74 y.o. female returns for a follow up visit for pain management  She has a diagnosis of   1. Chronic pain syndrome    2. Degenerative lumbar spinal stenosis    3. Spinal stenosis of lumbosacral region    4. Failed back surgical syndrome    5. Failed neck syndrome    6. Ulnar neuropathy at elbow, left    7. Fibromyalgia    .      As per information/history obtained from the PADT(patient assessment and documentation tool) -  She complains of pain in the shoulders Left, lower back, and feet Left with radiation to the buttocks and hips Left She rates the pain 4/10 and describes it as aching, pins and needles.  Pain is made worse by: movement, walking, standing, sitting, bending, lifting. She denies any side effects from the current pain regimen. Patient reports that since last follow up visit the physical functioning is unchanged, family/social relationships are unchanged, mood is worse sleep patterns are worse. Ms. Adrian states that since starting the treatment with the current regimen the  overall functioning  in the above aspects is  better, Patient denies misusing/abusing her narcotic pain medications or using any illegal drugs.  There are No indicators for possible drug abuse, addiction or diversion problems.   Upon obtaining the medical history from Ms. Adrian regarding today's office visit for her presenting problems, patient states she has been doing fair, her pain has been up and down, she is getting better. She complains of increased pain with changes in weather. Extreme temperatures- rain, cold and damp weather causes increased pain. Patient denies any side effects with the medications. Patient mentions she is using Oxycodone 3 per day. Patient denies any constipation symptoms. She states she is managing her house chores.       ALLERGIES: Patients list of allergies were reviewed     MEDICATIONS: Ms.

## 2025-06-18 DIAGNOSIS — M89.9 DISEASE OF SKELETAL SYSTEM: Primary | ICD-10-CM

## 2025-06-18 DIAGNOSIS — M85.80 OSTEOPENIA WITH HIGH RISK OF FRACTURE: ICD-10-CM

## 2025-07-03 ENCOUNTER — OFFICE VISIT (OUTPATIENT)
Dept: PAIN MANAGEMENT | Age: 75
End: 2025-07-03
Payer: COMMERCIAL

## 2025-07-03 VITALS
OXYGEN SATURATION: 98 % | HEART RATE: 88 BPM | DIASTOLIC BLOOD PRESSURE: 74 MMHG | WEIGHT: 151 LBS | BODY MASS INDEX: 26.75 KG/M2 | SYSTOLIC BLOOD PRESSURE: 118 MMHG

## 2025-07-03 DIAGNOSIS — M48.061 DEGENERATIVE LUMBAR SPINAL STENOSIS: ICD-10-CM

## 2025-07-03 DIAGNOSIS — M48.07 SPINAL STENOSIS OF LUMBOSACRAL REGION: ICD-10-CM

## 2025-07-03 DIAGNOSIS — G56.22 ULNAR NEUROPATHY AT ELBOW, LEFT: ICD-10-CM

## 2025-07-03 DIAGNOSIS — M79.7 FIBROMYALGIA: ICD-10-CM

## 2025-07-03 DIAGNOSIS — G89.4 CHRONIC PAIN SYNDROME: ICD-10-CM

## 2025-07-03 DIAGNOSIS — M96.1 FAILED BACK SURGICAL SYNDROME: ICD-10-CM

## 2025-07-03 DIAGNOSIS — M96.1 FAILED NECK SYNDROME: ICD-10-CM

## 2025-07-03 PROCEDURE — 1159F MED LIST DOCD IN RCRD: CPT | Performed by: INTERNAL MEDICINE

## 2025-07-03 PROCEDURE — 99213 OFFICE O/P EST LOW 20 MIN: CPT | Performed by: INTERNAL MEDICINE

## 2025-07-03 PROCEDURE — 1036F TOBACCO NON-USER: CPT | Performed by: INTERNAL MEDICINE

## 2025-07-03 PROCEDURE — 3074F SYST BP LT 130 MM HG: CPT | Performed by: INTERNAL MEDICINE

## 2025-07-03 PROCEDURE — 3078F DIAST BP <80 MM HG: CPT | Performed by: INTERNAL MEDICINE

## 2025-07-03 PROCEDURE — 1123F ACP DISCUSS/DSCN MKR DOCD: CPT | Performed by: INTERNAL MEDICINE

## 2025-07-03 PROCEDURE — G8427 DOCREV CUR MEDS BY ELIG CLIN: HCPCS | Performed by: INTERNAL MEDICINE

## 2025-07-03 PROCEDURE — 1090F PRES/ABSN URINE INCON ASSESS: CPT | Performed by: INTERNAL MEDICINE

## 2025-07-03 PROCEDURE — G8417 CALC BMI ABV UP PARAM F/U: HCPCS | Performed by: INTERNAL MEDICINE

## 2025-07-03 PROCEDURE — G8399 PT W/DXA RESULTS DOCUMENT: HCPCS | Performed by: INTERNAL MEDICINE

## 2025-07-03 RX ORDER — GABAPENTIN 400 MG/1
400 CAPSULE ORAL 3 TIMES DAILY
Qty: 90 CAPSULE | Refills: 1 | Status: SHIPPED | OUTPATIENT
Start: 2025-07-03 | End: 2025-09-01

## 2025-07-03 RX ORDER — OXYCODONE AND ACETAMINOPHEN 7.5; 325 MG/1; MG/1
1 TABLET ORAL 3 TIMES DAILY PRN
Qty: 84 TABLET | Refills: 0 | Status: SHIPPED | OUTPATIENT
Start: 2025-07-03 | End: 2025-07-31

## 2025-07-14 NOTE — PROGRESS NOTES
Raya Adrian  1950  [id number]      HISTORY OF PRESENT ILLNESS:  Ms. Adrian is a 74 y.o. female returns for a follow up visit for pain management  She has a diagnosis of   1. Chronic pain syndrome    2. Degenerative lumbar spinal stenosis    3. Spinal stenosis of lumbosacral region    4. Failed back surgical syndrome    5. Failed neck syndrome    6. Ulnar neuropathy at elbow, left    7. Fibromyalgia    .      As per information/history obtained from the PADT(patient assessment and documentation tool) -  She complains of pain in the left rib cage, left hip.  She rates the pain 4/10 and describes it as aching.  Pain is made worse by: movement, walking, standing, sitting, bending, lifting. She denies any side effects from the current pain regimen. Patient reports that since last follow up visit the physical functioning is unchanged, family/social relationships are unchanged, mood is worse sleep patterns are unchanged. Ms. Adrian states that since starting the treatment with the current regimen the  overall functioning  in the above aspects is  better, Patient denies misusing/abusing her narcotic pain medications or using any illegal drugs.  There are No indicators for possible drug abuse, addiction or diversion problems.   Upon obtaining the medical history from Ms. Adrian regarding today's office visit for her presenting problems, Patient reports she has been doing fair, but the pain has been worse. She mentions she had lost her balance and had a fall. She complains of increase pain in the left side. She says she's using Neurontin along with Percocet 3 per day. She denies any constipation symptoms.       ALLERGIES: Patients list of allergies were reviewed     MEDICATIONS: Ms. Adrian list of medications were reviewed.Her current medications are   Outpatient Medications Prior to Visit   Medication Sig Dispense Refill    traZODone (DESYREL) 100 MG tablet TAKE ONE TABLET BY MOUTH ONCE NIGHTLY

## 2025-07-15 ENCOUNTER — TELEPHONE (OUTPATIENT)
Dept: FAMILY MEDICINE CLINIC | Age: 75
End: 2025-07-15

## 2025-07-15 RX ORDER — ATORVASTATIN CALCIUM 40 MG/1
40 TABLET, FILM COATED ORAL DAILY
Qty: 90 TABLET | Refills: 3 | Status: SHIPPED | OUTPATIENT
Start: 2025-07-15

## 2025-07-15 NOTE — TELEPHONE ENCOUNTER
Patient needs a refill on Lipitor. They need a 90 day supply.     Mail order or local pharmacy: local    Pharmacy: [facility]    Last OV: 5/5/2025    Future Appointments   Date Time Provider Department Center   7/28/2025  9:20 AM DEXA [facility] MOB RAD [facility] MOB Rad   7/28/2025  9:40 AM Chaparro Noel MD [facility] BH&O [facility]   7/31/2025 10:45 AM Larry De Los Santos MD R BANK PAIN [facility]   8/5/2025 12:40 PM Batsheva Mora, SAI - CNP [facility] ECC DEP     Pt states Ey[facility] told her they didn't have any refills. Pt is out of medication.

## 2025-07-16 ENCOUNTER — TELEPHONE (OUTPATIENT)
Dept: ENDOCRINOLOGY | Age: 75
End: 2025-07-16

## 2025-07-24 ENCOUNTER — OFFICE VISIT (OUTPATIENT)
Dept: FAMILY MEDICINE CLINIC | Age: 75
End: 2025-07-24
Payer: MEDICARE

## 2025-07-24 VITALS
TEMPERATURE: 97.1 F | HEART RATE: 95 BPM | OXYGEN SATURATION: 96 % | DIASTOLIC BLOOD PRESSURE: 70 MMHG | SYSTOLIC BLOOD PRESSURE: 130 MMHG | BODY MASS INDEX: 27.1 KG/M2 | RESPIRATION RATE: 16 BRPM | WEIGHT: 153 LBS

## 2025-07-24 DIAGNOSIS — L03.818 CELLULITIS OF OTHER SPECIFIED SITE: Primary | ICD-10-CM

## 2025-07-24 DIAGNOSIS — R22.2 MASS ON BACK: ICD-10-CM

## 2025-07-24 PROCEDURE — 1160F RVW MEDS BY RX/DR IN RCRD: CPT | Performed by: STUDENT IN AN ORGANIZED HEALTH CARE EDUCATION/TRAINING PROGRAM

## 2025-07-24 PROCEDURE — 1090F PRES/ABSN URINE INCON ASSESS: CPT | Performed by: STUDENT IN AN ORGANIZED HEALTH CARE EDUCATION/TRAINING PROGRAM

## 2025-07-24 PROCEDURE — 99214 OFFICE O/P EST MOD 30 MIN: CPT | Performed by: STUDENT IN AN ORGANIZED HEALTH CARE EDUCATION/TRAINING PROGRAM

## 2025-07-24 PROCEDURE — 3078F DIAST BP <80 MM HG: CPT | Performed by: STUDENT IN AN ORGANIZED HEALTH CARE EDUCATION/TRAINING PROGRAM

## 2025-07-24 PROCEDURE — G8417 CALC BMI ABV UP PARAM F/U: HCPCS | Performed by: STUDENT IN AN ORGANIZED HEALTH CARE EDUCATION/TRAINING PROGRAM

## 2025-07-24 PROCEDURE — 1123F ACP DISCUSS/DSCN MKR DOCD: CPT | Performed by: STUDENT IN AN ORGANIZED HEALTH CARE EDUCATION/TRAINING PROGRAM

## 2025-07-24 PROCEDURE — G8427 DOCREV CUR MEDS BY ELIG CLIN: HCPCS | Performed by: STUDENT IN AN ORGANIZED HEALTH CARE EDUCATION/TRAINING PROGRAM

## 2025-07-24 PROCEDURE — 1159F MED LIST DOCD IN RCRD: CPT | Performed by: STUDENT IN AN ORGANIZED HEALTH CARE EDUCATION/TRAINING PROGRAM

## 2025-07-24 PROCEDURE — G8399 PT W/DXA RESULTS DOCUMENT: HCPCS | Performed by: STUDENT IN AN ORGANIZED HEALTH CARE EDUCATION/TRAINING PROGRAM

## 2025-07-24 PROCEDURE — 1036F TOBACCO NON-USER: CPT | Performed by: STUDENT IN AN ORGANIZED HEALTH CARE EDUCATION/TRAINING PROGRAM

## 2025-07-24 PROCEDURE — 3075F SYST BP GE 130 - 139MM HG: CPT | Performed by: STUDENT IN AN ORGANIZED HEALTH CARE EDUCATION/TRAINING PROGRAM

## 2025-07-24 RX ORDER — CEPHALEXIN 500 MG/1
500 CAPSULE ORAL 4 TIMES DAILY
Qty: 28 CAPSULE | Refills: 0 | Status: SHIPPED | OUTPATIENT
Start: 2025-07-24 | End: 2025-07-31

## 2025-07-24 SDOH — ECONOMIC STABILITY: FOOD INSECURITY: WITHIN THE PAST 12 MONTHS, YOU WORRIED THAT YOUR FOOD WOULD RUN OUT BEFORE YOU GOT MONEY TO BUY MORE.: NEVER TRUE

## 2025-07-24 SDOH — ECONOMIC STABILITY: FOOD INSECURITY: WITHIN THE PAST 12 MONTHS, THE FOOD YOU BOUGHT JUST DIDN'T LAST AND YOU DIDN'T HAVE MONEY TO GET MORE.: NEVER TRUE

## 2025-07-24 ASSESSMENT — PATIENT HEALTH QUESTIONNAIRE - PHQ9
4. FEELING TIRED OR HAVING LITTLE ENERGY: NOT AT ALL
1. LITTLE INTEREST OR PLEASURE IN DOING THINGS: NOT AT ALL
SUM OF ALL RESPONSES TO PHQ QUESTIONS 1-9: 0
5. POOR APPETITE OR OVEREATING: NOT AT ALL
9. THOUGHTS THAT YOU WOULD BE BETTER OFF DEAD, OR OF HURTING YOURSELF: NOT AT ALL
10. IF YOU CHECKED OFF ANY PROBLEMS, HOW DIFFICULT HAVE THESE PROBLEMS MADE IT FOR YOU TO DO YOUR WORK, TAKE CARE OF THINGS AT HOME, OR GET ALONG WITH OTHER PEOPLE: NOT DIFFICULT AT ALL
SUM OF ALL RESPONSES TO PHQ QUESTIONS 1-9: 0
6. FEELING BAD ABOUT YOURSELF - OR THAT YOU ARE A FAILURE OR HAVE LET YOURSELF OR YOUR FAMILY DOWN: NOT AT ALL
SUM OF ALL RESPONSES TO PHQ QUESTIONS 1-9: 0
3. TROUBLE FALLING OR STAYING ASLEEP: NOT AT ALL
SUM OF ALL RESPONSES TO PHQ QUESTIONS 1-9: 0
8. MOVING OR SPEAKING SO SLOWLY THAT OTHER PEOPLE COULD HAVE NOTICED. OR THE OPPOSITE, BEING SO FIGETY OR RESTLESS THAT YOU HAVE BEEN MOVING AROUND A LOT MORE THAN USUAL: NOT AT ALL
2. FEELING DOWN, DEPRESSED OR HOPELESS: NOT AT ALL
7. TROUBLE CONCENTRATING ON THINGS, SUCH AS READING THE NEWSPAPER OR WATCHING TELEVISION: NOT AT ALL

## 2025-07-24 NOTE — PROGRESS NOTES
[facility]  [address]  [address]  [place], [place] [address]  Tel: [phone]      2025       Subjective    SUBJECTIVE/OBJECTIVE  HPI    Raya Adrian (:  1950) is a 74 y.o. female, here for evaluation of the following medical concerns:  Chief Complaint   Patient presents with    Cyst     Patient is a 74 y.o. female  has a past medical history of Chronic back pain, Depression, Diabetes mellitus (HCC), Hyperlipidemia, Hypertension, Retinopathy, Seasonal allergies, Spinal stenosis, Thyroid disease, and Type II or unspecified type diabetes mellitus without mention of complication, not stated as uncontrolled. who presents with red lump on back.    Skin problem  Patient reports swollen lump on back, redness and warmth. Presented 2 to 3 days ago.  Noted by her chiropractor. She was exposed to nothing. No spontaneous drainage. Pertinent negatives include no fevers, chills, nausea, congestion, cough, diarrhea, eye pain, facial edema, fever, joint pain, rhinorrhea, shortness of breath, sore throat or vomiting. Past treatments include nothing. There is no history of allergies.   No h/o trauma or injury.        Review of Systems   Constitutional:  Negative for fever.   HENT:  Negative for congestion, rhinorrhea and sore throat.    Eyes:  Negative for pain.   Respiratory:  Negative for cough and shortness of breath.    Gastrointestinal:  Negative for diarrhea and vomiting.                  Objective     Physical exam  /70 (BP Site: Left Upper Arm, Patient Position: Sitting)   Pulse 95   Temp 97.1 °F (36.2 °C) (Temporal)   Resp 16   Wt 69.4 kg (153 lb)   SpO2 96%   BMI 27.10 kg/m²   Physical Exam  Constitutional:       General: She is not in acute distress.     Appearance: She is not toxic-appearing.   HENT:      Head: Normocephalic.      Nose: Nose normal.   Eyes:      Conjunctiva/sclera: Conjunctivae normal.      Pupils: Pupils are equal, round, and reactive to

## 2025-07-24 NOTE — PATIENT INSTRUCTIONS
Warm Compress    New Prescriptions    CEPHALEXIN (KEFLEX) 500 MG CAPSULE    Take 1 capsule by mouth 4 times daily for 7 days

## 2025-07-28 ENCOUNTER — TELEPHONE (OUTPATIENT)
Dept: ENDOCRINOLOGY | Age: 75
End: 2025-07-28

## 2025-07-31 ENCOUNTER — OFFICE VISIT (OUTPATIENT)
Dept: PAIN MANAGEMENT | Age: 75
End: 2025-07-31
Payer: MEDICARE

## 2025-07-31 VITALS — DIASTOLIC BLOOD PRESSURE: 87 MMHG | HEART RATE: 97 BPM | SYSTOLIC BLOOD PRESSURE: 168 MMHG | OXYGEN SATURATION: 99 %

## 2025-07-31 DIAGNOSIS — M48.061 DEGENERATIVE LUMBAR SPINAL STENOSIS: ICD-10-CM

## 2025-07-31 DIAGNOSIS — M96.1 FAILED NECK SYNDROME: ICD-10-CM

## 2025-07-31 DIAGNOSIS — Z91.89 AT RISK FOR RESPIRATORY DEPRESSION DUE TO OPIOID: ICD-10-CM

## 2025-07-31 DIAGNOSIS — G89.4 CHRONIC PAIN SYNDROME: ICD-10-CM

## 2025-07-31 DIAGNOSIS — M79.7 FIBROMYALGIA: ICD-10-CM

## 2025-07-31 DIAGNOSIS — M96.1 FAILED BACK SURGICAL SYNDROME: ICD-10-CM

## 2025-07-31 DIAGNOSIS — M48.07 SPINAL STENOSIS OF LUMBOSACRAL REGION: ICD-10-CM

## 2025-07-31 PROCEDURE — G8399 PT W/DXA RESULTS DOCUMENT: HCPCS | Performed by: INTERNAL MEDICINE

## 2025-07-31 PROCEDURE — 99213 OFFICE O/P EST LOW 20 MIN: CPT | Performed by: INTERNAL MEDICINE

## 2025-07-31 PROCEDURE — 3077F SYST BP >= 140 MM HG: CPT | Performed by: INTERNAL MEDICINE

## 2025-07-31 PROCEDURE — G8427 DOCREV CUR MEDS BY ELIG CLIN: HCPCS | Performed by: INTERNAL MEDICINE

## 2025-07-31 PROCEDURE — 1090F PRES/ABSN URINE INCON ASSESS: CPT | Performed by: INTERNAL MEDICINE

## 2025-07-31 PROCEDURE — G8417 CALC BMI ABV UP PARAM F/U: HCPCS | Performed by: INTERNAL MEDICINE

## 2025-07-31 PROCEDURE — 1036F TOBACCO NON-USER: CPT | Performed by: INTERNAL MEDICINE

## 2025-07-31 PROCEDURE — 1123F ACP DISCUSS/DSCN MKR DOCD: CPT | Performed by: INTERNAL MEDICINE

## 2025-07-31 PROCEDURE — 3079F DIAST BP 80-89 MM HG: CPT | Performed by: INTERNAL MEDICINE

## 2025-07-31 RX ORDER — GABAPENTIN 400 MG/1
400 CAPSULE ORAL 3 TIMES DAILY
Qty: 90 CAPSULE | Refills: 1 | Status: SHIPPED | OUTPATIENT
Start: 2025-07-31 | End: 2025-09-29

## 2025-07-31 RX ORDER — OXYCODONE AND ACETAMINOPHEN 7.5; 325 MG/1; MG/1
1 TABLET ORAL 3 TIMES DAILY PRN
Qty: 84 TABLET | Refills: 0 | Status: SHIPPED | OUTPATIENT
Start: 2025-07-31 | End: 2025-08-28

## 2025-08-05 ENCOUNTER — TELEPHONE (OUTPATIENT)
Dept: FAMILY MEDICINE CLINIC | Age: 75
End: 2025-08-05

## 2025-08-05 ENCOUNTER — OFFICE VISIT (OUTPATIENT)
Dept: FAMILY MEDICINE CLINIC | Age: 75
End: 2025-08-05
Payer: MEDICARE

## 2025-08-05 VITALS
WEIGHT: 150 LBS | HEART RATE: 95 BPM | TEMPERATURE: 97.1 F | OXYGEN SATURATION: 96 % | SYSTOLIC BLOOD PRESSURE: 152 MMHG | DIASTOLIC BLOOD PRESSURE: 92 MMHG | RESPIRATION RATE: 16 BRPM | BODY MASS INDEX: 26.57 KG/M2

## 2025-08-05 DIAGNOSIS — I10 ESSENTIAL HYPERTENSION: ICD-10-CM

## 2025-08-05 DIAGNOSIS — L72.3 INFLAMED SEBACEOUS CYST: ICD-10-CM

## 2025-08-05 DIAGNOSIS — E11.69 TYPE 2 DIABETES MELLITUS WITH OTHER SPECIFIED COMPLICATION, WITH LONG-TERM CURRENT USE OF INSULIN (HCC): Primary | ICD-10-CM

## 2025-08-05 DIAGNOSIS — Z79.4 TYPE 2 DIABETES MELLITUS WITH OTHER SPECIFIED COMPLICATION, WITH LONG-TERM CURRENT USE OF INSULIN (HCC): Primary | ICD-10-CM

## 2025-08-05 DIAGNOSIS — F32.A DEPRESSION, UNSPECIFIED DEPRESSION TYPE: ICD-10-CM

## 2025-08-05 PROCEDURE — G8417 CALC BMI ABV UP PARAM F/U: HCPCS | Performed by: NURSE PRACTITIONER

## 2025-08-05 PROCEDURE — 1160F RVW MEDS BY RX/DR IN RCRD: CPT | Performed by: NURSE PRACTITIONER

## 2025-08-05 PROCEDURE — 1090F PRES/ABSN URINE INCON ASSESS: CPT | Performed by: NURSE PRACTITIONER

## 2025-08-05 PROCEDURE — 3052F HG A1C>EQUAL 8.0%<EQUAL 9.0%: CPT | Performed by: NURSE PRACTITIONER

## 2025-08-05 PROCEDURE — G8399 PT W/DXA RESULTS DOCUMENT: HCPCS | Performed by: NURSE PRACTITIONER

## 2025-08-05 PROCEDURE — 83036 HEMOGLOBIN GLYCOSYLATED A1C: CPT | Performed by: NURSE PRACTITIONER

## 2025-08-05 PROCEDURE — G8427 DOCREV CUR MEDS BY ELIG CLIN: HCPCS | Performed by: NURSE PRACTITIONER

## 2025-08-05 PROCEDURE — 1123F ACP DISCUSS/DSCN MKR DOCD: CPT | Performed by: NURSE PRACTITIONER

## 2025-08-05 PROCEDURE — 3077F SYST BP >= 140 MM HG: CPT | Performed by: NURSE PRACTITIONER

## 2025-08-05 PROCEDURE — 99214 OFFICE O/P EST MOD 30 MIN: CPT | Performed by: NURSE PRACTITIONER

## 2025-08-05 PROCEDURE — 1036F TOBACCO NON-USER: CPT | Performed by: NURSE PRACTITIONER

## 2025-08-05 PROCEDURE — 3080F DIAST BP >= 90 MM HG: CPT | Performed by: NURSE PRACTITIONER

## 2025-08-05 PROCEDURE — 1159F MED LIST DOCD IN RCRD: CPT | Performed by: NURSE PRACTITIONER

## 2025-08-05 RX ORDER — LOSARTAN POTASSIUM 100 MG/1
100 TABLET ORAL DAILY
Qty: 90 TABLET | Refills: 3 | Status: SHIPPED | OUTPATIENT
Start: 2025-08-05

## 2025-08-05 RX ORDER — INSULIN ASPART INJECTION 100 [IU]/ML
6 INJECTION, SOLUTION SUBCUTANEOUS
Qty: 3 ADJUSTABLE DOSE PRE-FILLED PEN SYRINGE | Refills: 3 | Status: SHIPPED | OUTPATIENT
Start: 2025-08-05

## 2025-08-05 RX ORDER — OLANZAPINE 7.5 MG/1
7.5 TABLET, FILM COATED ORAL NIGHTLY
Qty: 90 TABLET | Refills: 3 | Status: SHIPPED | OUTPATIENT
Start: 2025-08-05

## 2025-08-05 RX ORDER — AMLODIPINE BESYLATE 10 MG/1
10 TABLET ORAL DAILY
Qty: 90 TABLET | Refills: 3 | Status: SHIPPED | OUTPATIENT
Start: 2025-08-05

## 2025-08-05 RX ORDER — HYDROCHLOROTHIAZIDE 12.5 MG/1
CAPSULE ORAL
Qty: 2 EACH | Refills: 2 | Status: SHIPPED | OUTPATIENT
Start: 2025-08-05

## 2025-08-05 SDOH — ECONOMIC STABILITY: FOOD INSECURITY: WITHIN THE PAST 12 MONTHS, YOU WORRIED THAT YOUR FOOD WOULD RUN OUT BEFORE YOU GOT MONEY TO BUY MORE.: NEVER TRUE

## 2025-08-05 SDOH — ECONOMIC STABILITY: FOOD INSECURITY: WITHIN THE PAST 12 MONTHS, THE FOOD YOU BOUGHT JUST DIDN'T LAST AND YOU DIDN'T HAVE MONEY TO GET MORE.: NEVER TRUE

## 2025-08-05 ASSESSMENT — ENCOUNTER SYMPTOMS
COUGH: 0
NAUSEA: 0
SHORTNESS OF BREATH: 0
COLOR CHANGE: 1
VOMITING: 0
DIARRHEA: 0

## 2025-08-05 ASSESSMENT — PATIENT HEALTH QUESTIONNAIRE - PHQ9
SUM OF ALL RESPONSES TO PHQ QUESTIONS 1-9: 13
10. IF YOU CHECKED OFF ANY PROBLEMS, HOW DIFFICULT HAVE THESE PROBLEMS MADE IT FOR YOU TO DO YOUR WORK, TAKE CARE OF THINGS AT HOME, OR GET ALONG WITH OTHER PEOPLE: NOT DIFFICULT AT ALL
1. LITTLE INTEREST OR PLEASURE IN DOING THINGS: SEVERAL DAYS
SUM OF ALL RESPONSES TO PHQ QUESTIONS 1-9: 4
8. MOVING OR SPEAKING SO SLOWLY THAT OTHER PEOPLE COULD HAVE NOTICED. OR THE OPPOSITE, BEING SO FIGETY OR RESTLESS THAT YOU HAVE BEEN MOVING AROUND A LOT MORE THAN USUAL: NOT AT ALL
1. LITTLE INTEREST OR PLEASURE IN DOING THINGS: SEVERAL DAYS
SUM OF ALL RESPONSES TO PHQ QUESTIONS 1-9: 4
DEPRESSION UNABLE TO ASSESS: FUNCTIONAL CAPACITY MOTIVATION LIMITS ACCURACY
SUM OF ALL RESPONSES TO PHQ QUESTIONS 1-9: 13
SUM OF ALL RESPONSES TO PHQ QUESTIONS 1-9: 13
7. TROUBLE CONCENTRATING ON THINGS, SUCH AS READING THE NEWSPAPER OR WATCHING TELEVISION: NOT AT ALL
SUM OF ALL RESPONSES TO PHQ QUESTIONS 1-9: 4
SUM OF ALL RESPONSES TO PHQ QUESTIONS 1-9: 4
6. FEELING BAD ABOUT YOURSELF - OR THAT YOU ARE A FAILURE OR HAVE LET YOURSELF OR YOUR FAMILY DOWN: NEARLY EVERY DAY
DEPRESSION UNABLE TO ASSESS: FUNCTIONAL CAPACITY MOTIVATION LIMITS ACCURACY
9. THOUGHTS THAT YOU WOULD BE BETTER OFF DEAD, OR OF HURTING YOURSELF: NOT AT ALL
4. FEELING TIRED OR HAVING LITTLE ENERGY: NEARLY EVERY DAY
5. POOR APPETITE OR OVEREATING: SEVERAL DAYS
2. FEELING DOWN, DEPRESSED OR HOPELESS: NEARLY EVERY DAY
2. FEELING DOWN, DEPRESSED OR HOPELESS: NEARLY EVERY DAY
SUM OF ALL RESPONSES TO PHQ QUESTIONS 1-9: 13
3. TROUBLE FALLING OR STAYING ASLEEP: MORE THAN HALF THE DAYS

## 2025-08-07 ENCOUNTER — CLINICAL SUPPORT (OUTPATIENT)
Dept: ENDOCRINOLOGY | Age: 75
End: 2025-08-07

## 2025-08-07 ENCOUNTER — TELEPHONE (OUTPATIENT)
Dept: FAMILY MEDICINE CLINIC | Age: 75
End: 2025-08-07

## 2025-08-07 DIAGNOSIS — M89.9 DISEASE OF SKELETAL SYSTEM: Primary | ICD-10-CM

## 2025-08-07 DIAGNOSIS — Z79.4 TYPE 2 DIABETES MELLITUS WITH OTHER SPECIFIED COMPLICATION, WITH LONG-TERM CURRENT USE OF INSULIN (HCC): ICD-10-CM

## 2025-08-07 DIAGNOSIS — E11.69 TYPE 2 DIABETES MELLITUS WITH OTHER SPECIFIED COMPLICATION, WITH LONG-TERM CURRENT USE OF INSULIN (HCC): ICD-10-CM

## 2025-08-07 DIAGNOSIS — Z79.4 TYPE 2 DIABETES MELLITUS WITH OTHER SPECIFIED COMPLICATION, WITH LONG-TERM CURRENT USE OF INSULIN (HCC): Primary | ICD-10-CM

## 2025-08-07 DIAGNOSIS — J32.9 SINOBRONCHITIS: ICD-10-CM

## 2025-08-07 DIAGNOSIS — F32.A DEPRESSION, UNSPECIFIED DEPRESSION TYPE: ICD-10-CM

## 2025-08-07 DIAGNOSIS — J40 SINOBRONCHITIS: ICD-10-CM

## 2025-08-07 DIAGNOSIS — E11.69 TYPE 2 DIABETES MELLITUS WITH OTHER SPECIFIED COMPLICATION, WITH LONG-TERM CURRENT USE OF INSULIN (HCC): Primary | ICD-10-CM

## 2025-08-07 LAB — HBA1C MFR BLD: 8.4 %

## 2025-08-07 RX ORDER — INSULIN GLARGINE 100 [IU]/ML
INJECTION, SOLUTION SUBCUTANEOUS
Qty: 9 ML | Refills: 1 | Status: SHIPPED | OUTPATIENT
Start: 2025-08-07

## 2025-08-07 RX ORDER — SERTRALINE HYDROCHLORIDE 100 MG/1
100 TABLET, FILM COATED ORAL 2 TIMES DAILY
Qty: 180 TABLET | Refills: 1 | Status: SHIPPED | OUTPATIENT
Start: 2025-08-07

## 2025-08-18 DIAGNOSIS — I10 PRIMARY HYPERTENSION: ICD-10-CM

## 2025-08-18 DIAGNOSIS — E55.9 VITAMIN D DEFICIENCY: ICD-10-CM

## 2025-08-18 DIAGNOSIS — E11.69 TYPE 2 DIABETES MELLITUS WITH OTHER SPECIFIED COMPLICATION, WITH LONG-TERM CURRENT USE OF INSULIN (HCC): ICD-10-CM

## 2025-08-18 DIAGNOSIS — E03.9 HYPOTHYROIDISM, UNSPECIFIED TYPE: ICD-10-CM

## 2025-08-18 DIAGNOSIS — Z79.4 TYPE 2 DIABETES MELLITUS WITH OTHER SPECIFIED COMPLICATION, WITH LONG-TERM CURRENT USE OF INSULIN (HCC): ICD-10-CM

## 2025-08-18 LAB
25(OH)D3 SERPL-MCNC: 27.8 NG/ML
ALBUMIN SERPL-MCNC: 4.5 G/DL (ref 3.4–5)
ALBUMIN/GLOB SERPL: 1.7 {RATIO} (ref 1.1–2.2)
ALP SERPL-CCNC: 133 U/L (ref 40–129)
ALT SERPL-CCNC: 18 U/L (ref 10–40)
ANION GAP SERPL CALCULATED.3IONS-SCNC: 12 MMOL/L (ref 3–16)
AST SERPL-CCNC: 24 U/L (ref 15–37)
BASOPHILS # BLD: 0 K/UL (ref 0–0.2)
BASOPHILS NFR BLD: 0.5 %
BILIRUB SERPL-MCNC: 0.4 MG/DL (ref 0–1)
BUN SERPL-MCNC: 14 MG/DL (ref 7–20)
CALCIUM SERPL-MCNC: 9.4 MG/DL (ref 8.3–10.6)
CHLORIDE SERPL-SCNC: 100 MMOL/L (ref 99–110)
CHOLEST SERPL-MCNC: 177 MG/DL (ref 0–199)
CO2 SERPL-SCNC: 27 MMOL/L (ref 21–32)
CREAT SERPL-MCNC: 0.9 MG/DL (ref 0.6–1.2)
CREAT UR-MCNC: 93.2 MG/DL (ref 28–259)
DEPRECATED RDW RBC AUTO: 13.6 % (ref 12.4–15.4)
EOSINOPHIL # BLD: 0.1 K/UL (ref 0–0.6)
EOSINOPHIL NFR BLD: 2.3 %
GFR SERPLBLD CREATININE-BSD FMLA CKD-EPI: 66 ML/MIN/{1.73_M2}
GLUCOSE SERPL-MCNC: 185 MG/DL (ref 70–99)
HCT VFR BLD AUTO: 40.4 % (ref 36–48)
HDLC SERPL-MCNC: 76 MG/DL (ref 40–60)
HGB BLD-MCNC: 13.5 G/DL (ref 12–16)
LDL CHOLESTEROL: 86 MG/DL
LYMPHOCYTES # BLD: 1.3 K/UL (ref 1–5.1)
LYMPHOCYTES NFR BLD: 24.6 %
MCH RBC QN AUTO: 27.9 PG (ref 26–34)
MCHC RBC AUTO-ENTMCNC: 33.4 G/DL (ref 31–36)
MCV RBC AUTO: 83.5 FL (ref 80–100)
MICROALBUMIN UR DL<=1MG/L-MCNC: <1.2 MG/DL
MICROALBUMIN/CREAT UR: NORMAL MG/G (ref 0–30)
MONOCYTES # BLD: 0.4 K/UL (ref 0–1.3)
MONOCYTES NFR BLD: 7.6 %
NEUTROPHILS # BLD: 3.4 K/UL (ref 1.7–7.7)
NEUTROPHILS NFR BLD: 65 %
PLATELET # BLD AUTO: 204 K/UL (ref 135–450)
PMV BLD AUTO: 9 FL (ref 5–10.5)
POTASSIUM SERPL-SCNC: 4.8 MMOL/L (ref 3.5–5.1)
PROT SERPL-MCNC: 7.1 G/DL (ref 6.4–8.2)
RBC # BLD AUTO: 4.84 M/UL (ref 4–5.2)
SODIUM SERPL-SCNC: 139 MMOL/L (ref 136–145)
TRIGL SERPL-MCNC: 77 MG/DL (ref 0–150)
TSH SERPL DL<=0.005 MIU/L-ACNC: 1.98 UIU/ML (ref 0.27–4.2)
VLDLC SERPL CALC-MCNC: 15 MG/DL
WBC # BLD AUTO: 5.3 K/UL (ref 4–11)

## 2025-08-24 PROBLEM — Z51.81 MEDICATION MONITORING ENCOUNTER: Status: ACTIVE | Noted: 2025-08-24

## (undated) DEVICE — CANNULA NSL AD TBNG L7FT PVC STR NONFLARED PRNG O2 DEL W STD

## (undated) DEVICE — GLOVE SURG SZ 85 L12IN FNGR THK75MIL WHT LTX POLYMER BEAD

## (undated) DEVICE — 3M™ TEGADERM™ TRANSPARENT FILM DRESSING FRAME STYLE, 1626W, 4 IN X 4-3/4 IN (10 CM X 12 CM), 50/CT 4CT/CASE: Brand: 3M™ TEGADERM™

## (undated) DEVICE — MINOR SET UP PK

## (undated) DEVICE — SURE SET-DOUBLE BASIN-LF: Brand: MEDLINE INDUSTRIES, INC.

## (undated) DEVICE — GAUZE,SPONGE,2"X2",8PLY,STERILE,LF,2'S: Brand: MEDLINE

## (undated) DEVICE — SUTURE ETHLN SZ 3-0 L18IN NONABSORBABLE BLK L24MM PS-1 3/8 1663G

## (undated) DEVICE — SODIUM CHL 09% SOL EXCEL

## (undated) DEVICE — SUTURE VCRL SZ 0 L18IN ABSRB UD L36MM CT-1 1/2 CIR J840D

## (undated) DEVICE — SYRINGE IRRIG 60ML SFT PLIABLE BLB EZ TO GRP 1 HND USE W/

## (undated) DEVICE — CATHETER IV 14GA L5.25IN PERIPH ORNG FEP POLYMER 3 BVL

## (undated) DEVICE — SUTURE PDS II SZ 0 L18IN ABSRB VLT L36MM CT-1 1/2 CIR Z740D

## (undated) DEVICE — ELECTRODE PT RET AD L9FT HI MOIST COND ADH HYDRGEL CORDED

## (undated) DEVICE — BLADE ES ELASTOMERIC COAT INSUL DURABLE BEND UPTO 90DEG

## (undated) DEVICE — 3M™ TEGADERM™ TRANSPARENT FILM DRESSING FRAME STYLE, 1624W, 2-3/8 IN X 2-3/4 IN (6 CM X 7 CM), 100/CT 4CT/CASE: Brand: 3M™ TEGADERM™

## (undated) DEVICE — PREVENA INCISION MANAGEMENT SYSTEM- PEEL & PLACE DRESSING: Brand: PREVENA™ PEEL & PLACE™

## (undated) DEVICE — GLOVE SURG SZ 8 L12IN FNGR THK94MIL TRNSLUC YEL LTX HYDRGEL

## (undated) DEVICE — FAN SPRAY KIT: Brand: PULSAVAC®

## (undated) DEVICE — GLOVE SURG SZ 8 L12IN FNGR THK87MIL WHT LTX FREE

## (undated) DEVICE — GLOVE SURG SZ 85 STD WHT LTX SYN POLYMER BEAD REINF ANTI RL

## (undated) DEVICE — 10FR FRAZIER SUCTION HANDLE: Brand: CARDINAL HEALTH

## (undated) DEVICE — ULTRA CMFRT CVR M

## (undated) DEVICE — TURNOVER KIT RM INF CTRL TECH

## (undated) DEVICE — SPONGE,LAP,18"X18",DLX,XR,ST,5/PK,40/PK: Brand: MEDLINE

## (undated) DEVICE — STAPLER SKIN H3.9MM WIRE DIA0.58MM CRWN 6.9MM 35 STPL ROT

## (undated) DEVICE — SOLUTION IV 1000ML 0.9% SOD CHL

## (undated) DEVICE — ELECTRODE,ECG,STRESS,FOAM,3PK: Brand: MEDLINE

## (undated) DEVICE — TOOL 14MH30 LEGEND 14CM 3MM: Brand: MIDAS REX ™

## (undated) DEVICE — SYRINGE MED 10ML TRNSLUC BRL PLUNG BLK MRK POLYPR CTRL

## (undated) DEVICE — NEEDLE HYPO 25GA L1.5IN BLU POLYPR HUB S STL REG BVL STR

## (undated) DEVICE — SURGICAL PROCEDURE PACK IV U-BAR

## (undated) DEVICE — COVER LT HNDL BLU PLAS

## (undated) DEVICE — GLOVE,SURG,SENSICARE,ALOE,LF,PF,7: Brand: MEDLINE

## (undated) DEVICE — SUTURE PDS II SZ 2-0 L27IN ABSRB VLT SH L26MM 1/2 CIR Z317H

## (undated) DEVICE — SMARTGOWN SURGICAL GOWN, XL: Brand: CONVERTORS

## (undated) DEVICE — SUTURE PDS II SZ 3-0 L27IN ABSRB CLR SH L26MM 1/2 CIR TAPR Z416H

## (undated) DEVICE — SUTURE VCRL SZ 3-0 L18IN ABSRB UD L26MM SH 1/2 CIR J864D

## (undated) DEVICE — GOWN,SIRUS,NON REINFRCD,LARGE,SET IN SL: Brand: MEDLINE

## (undated) DEVICE — CHLORAPREP 26ML ORANGE

## (undated) DEVICE — GLOVE SURG SZ 75 L12IN FNGR THK94MIL TRNSLUC YEL LTX

## (undated) DEVICE — SOLUTION IV IRRIG 500ML 0.9% SODIUM CHL 2F7123

## (undated) DEVICE — GARMENT,MEDLINE,DVT,INT,CALF,MED, GEN2: Brand: MEDLINE

## (undated) DEVICE — 1200CC HIFLOW SUCTION CANISTER WITH AEROSTAT FILTER, FLOAT VALVE SHUTOFF WITH GREEN LID: Brand: BEMIS